# Patient Record
Sex: MALE | Race: WHITE | Employment: OTHER | URBAN - METROPOLITAN AREA
[De-identification: names, ages, dates, MRNs, and addresses within clinical notes are randomized per-mention and may not be internally consistent; named-entity substitution may affect disease eponyms.]

---

## 2017-01-06 ENCOUNTER — ANTI-COAG VISIT (OUTPATIENT)
Dept: INTERNAL MEDICINE CLINIC | Facility: CLINIC | Age: 77
End: 2017-01-06

## 2017-01-06 DIAGNOSIS — I48.91 ATRIAL FIBRILLATION, UNSPECIFIED TYPE (HCC): Primary | ICD-10-CM

## 2017-01-06 LAB — INR: 1.3 (ref 0.8–1.2)

## 2017-01-06 PROCEDURE — 36416 COLLJ CAPILLARY BLOOD SPEC: CPT

## 2017-01-06 PROCEDURE — 85610 PROTHROMBIN TIME: CPT

## 2017-01-06 PROCEDURE — G0463 HOSPITAL OUTPT CLINIC VISIT: HCPCS

## 2017-01-11 ENCOUNTER — ANTI-COAG VISIT (OUTPATIENT)
Dept: INTERNAL MEDICINE CLINIC | Facility: CLINIC | Age: 77
End: 2017-01-11

## 2017-01-11 DIAGNOSIS — I48.91 ATRIAL FIBRILLATION, UNSPECIFIED TYPE (HCC): Primary | ICD-10-CM

## 2017-01-11 LAB — INR: 1.7 (ref 0.8–1.2)

## 2017-01-11 PROCEDURE — 85610 PROTHROMBIN TIME: CPT

## 2017-01-11 PROCEDURE — 36416 COLLJ CAPILLARY BLOOD SPEC: CPT

## 2017-01-11 PROCEDURE — G0463 HOSPITAL OUTPT CLINIC VISIT: HCPCS

## 2017-01-18 ENCOUNTER — TELEPHONE (OUTPATIENT)
Dept: INTERNAL MEDICINE CLINIC | Facility: CLINIC | Age: 77
End: 2017-01-18

## 2017-01-19 RX ORDER — BUMETANIDE 1 MG/1
2 TABLET ORAL DAILY
Qty: 180 TABLET | Refills: 3 | Status: SHIPPED | OUTPATIENT
Start: 2017-01-19 | End: 2017-12-21 | Stop reason: DRUGHIGH

## 2017-01-19 RX ORDER — WARFARIN SODIUM 7.5 MG/1
7.5 TABLET ORAL AS DIRECTED
Qty: 90 TABLET | Refills: 1 | Status: CANCELLED | OUTPATIENT
Start: 2017-01-19

## 2017-01-20 NOTE — TELEPHONE ENCOUNTER
Another fax received from [address] with request for 3 pending refills  And - Warfarin sodium 7.5 mg tablet

## 2017-01-23 RX ORDER — CARVEDILOL 6.25 MG/1
6.25 TABLET ORAL 2 TIMES DAILY WITH MEALS
Qty: 180 TABLET | Refills: 3 | Status: SHIPPED | OUTPATIENT
Start: 2017-01-23 | End: 2017-11-06

## 2017-01-23 RX ORDER — ENALAPRIL MALEATE 2.5 MG/1
2.5 TABLET ORAL DAILY
Qty: 90 TABLET | Refills: 3 | Status: SHIPPED | OUTPATIENT
Start: 2017-01-23 | End: 2017-11-06

## 2017-01-23 RX ORDER — ATORVASTATIN CALCIUM 40 MG/1
40 TABLET, FILM COATED ORAL NIGHTLY
Qty: 90 TABLET | Refills: 3 | Status: SHIPPED | OUTPATIENT
Start: 2017-01-23 | End: 2017-12-26

## 2017-01-24 NOTE — TELEPHONE ENCOUNTER
Verified with patient that he takes carvedilol 6.25 mg po BID; pt called; ERx sent; last note addended

## 2017-01-24 NOTE — TELEPHONE ENCOUNTER
Hank faxed over clarification for Carvewdilol 6.25 mg & Atorvastatin 40 mg   90 day supply  Fax.  # [phone]     Placed in [address] [place] [address] folder routed to Rx

## 2017-01-25 ENCOUNTER — ANTI-COAG VISIT (OUTPATIENT)
Dept: INTERNAL MEDICINE CLINIC | Facility: CLINIC | Age: 77
End: 2017-01-25

## 2017-01-25 DIAGNOSIS — I48.91 ATRIAL FIBRILLATION, UNSPECIFIED TYPE (HCC): Primary | ICD-10-CM

## 2017-01-25 LAB — INR: 2 (ref 0.8–1.2)

## 2017-01-25 PROCEDURE — 85610 PROTHROMBIN TIME: CPT

## 2017-01-25 PROCEDURE — G0463 HOSPITAL OUTPT CLINIC VISIT: HCPCS

## 2017-01-25 PROCEDURE — 36416 COLLJ CAPILLARY BLOOD SPEC: CPT

## 2017-01-31 ENCOUNTER — TELEPHONE (OUTPATIENT)
Dept: INTERNAL MEDICINE CLINIC | Facility: CLINIC | Age: 77
End: 2017-01-31

## 2017-01-31 RX ORDER — WARFARIN SODIUM 5 MG/1
TABLET ORAL
Qty: 45 TABLET | Refills: 0 | Status: SHIPPED | OUTPATIENT
Start: 2017-01-31 | End: 2017-01-31

## 2017-01-31 RX ORDER — WARFARIN SODIUM 5 MG/1
TABLET ORAL
Qty: 135 TABLET | Refills: 0 | Status: SHIPPED | OUTPATIENT
Start: 2017-01-31 | End: 2017-04-20

## 2017-01-31 NOTE — TELEPHONE ENCOUNTER
Pt need a refill on Warfarin 5 mg, he takes 1.5 tablets a day. He would like a month supply sent to [facility] in [facility], and 3 months to [facility]. He ran out of medication yesterday. e thought he will get it in the mail but never received from [facility].  He sp

## 2017-01-31 NOTE — TELEPHONE ENCOUNTER
Refill request is for a maintenance medication and has met the criteria specified in the Ambulatory Medication Refill Standing Order for eligibility, visits, laboratory, alerts and was sent to the requested pharmacy. Both short and long term fill done.  Pt

## 2017-02-01 ENCOUNTER — TELEPHONE (OUTPATIENT)
Dept: INTERNAL MEDICINE CLINIC | Facility: CLINIC | Age: 77
End: 2017-02-01

## 2017-02-03 NOTE — TELEPHONE ENCOUNTER
spoke to pt ---he wants to start [facility] for joints; We discussed it can possibly interfere with INR ;  I recommended pt trial Glucosamine/chondroitin; Pt would like to cont. With cod liver oil;    Next INR in 9 days after start

## 2017-02-09 ENCOUNTER — ANTI-COAG VISIT (OUTPATIENT)
Dept: INTERNAL MEDICINE CLINIC | Facility: CLINIC | Age: 77
End: 2017-02-09

## 2017-02-09 DIAGNOSIS — I48.91 ATRIAL FIBRILLATION, UNSPECIFIED TYPE (HCC): Primary | ICD-10-CM

## 2017-02-09 LAB — INR: 2 (ref 0.8–1.2)

## 2017-02-09 PROCEDURE — 85610 PROTHROMBIN TIME: CPT

## 2017-02-09 PROCEDURE — G0463 HOSPITAL OUTPT CLINIC VISIT: HCPCS

## 2017-02-09 PROCEDURE — 36416 COLLJ CAPILLARY BLOOD SPEC: CPT

## 2017-03-02 ENCOUNTER — ANTI-COAG VISIT (OUTPATIENT)
Dept: INTERNAL MEDICINE CLINIC | Facility: CLINIC | Age: 77
End: 2017-03-02

## 2017-03-02 DIAGNOSIS — I48.91 ATRIAL FIBRILLATION, UNSPECIFIED TYPE (HCC): Primary | ICD-10-CM

## 2017-03-02 LAB — INR: 2.2 (ref 0.8–1.2)

## 2017-03-02 PROCEDURE — 85610 PROTHROMBIN TIME: CPT

## 2017-03-02 PROCEDURE — 36416 COLLJ CAPILLARY BLOOD SPEC: CPT

## 2017-03-02 PROCEDURE — G0463 HOSPITAL OUTPT CLINIC VISIT: HCPCS

## 2017-04-07 PROBLEM — E11.21 DIABETIC NEPHROPATHY (HCC): Status: ACTIVE | Noted: 2017-04-07

## 2017-04-07 PROBLEM — N25.81 HYPERPARATHYROIDISM, SECONDARY (HCC): Status: ACTIVE | Noted: 2017-04-07

## 2017-04-07 PROBLEM — F17.201 TOBACCO DEPENDENCE IN REMISSION: Status: ACTIVE | Noted: 2017-04-07

## 2017-04-11 ENCOUNTER — ANTI-COAG VISIT (OUTPATIENT)
Dept: INTERNAL MEDICINE CLINIC | Facility: CLINIC | Age: 77
End: 2017-04-11

## 2017-04-11 DIAGNOSIS — I48.91 ATRIAL FIBRILLATION, UNSPECIFIED TYPE (HCC): ICD-10-CM

## 2017-04-11 PROCEDURE — 36416 COLLJ CAPILLARY BLOOD SPEC: CPT

## 2017-04-11 PROCEDURE — G0463 HOSPITAL OUTPT CLINIC VISIT: HCPCS

## 2017-04-11 PROCEDURE — 85610 PROTHROMBIN TIME: CPT

## 2017-04-20 ENCOUNTER — LAB ENCOUNTER (OUTPATIENT)
Dept: LAB | Age: 77
End: 2017-04-20
Attending: INTERNAL MEDICINE
Payer: MEDICARE

## 2017-04-20 DIAGNOSIS — E78.00 HYPERCHOLESTEREMIA: ICD-10-CM

## 2017-04-20 DIAGNOSIS — E11.9 TYPE 2 DIABETES MELLITUS WITHOUT COMPLICATION, UNSPECIFIED LONG TERM INSULIN USE STATUS: ICD-10-CM

## 2017-04-20 DIAGNOSIS — N18.3 CHRONIC KIDNEY DISEASE (CKD), STAGE 3 (MODERATE): ICD-10-CM

## 2017-04-20 DIAGNOSIS — I48.91 ATRIAL FIBRILLATION, UNSPECIFIED TYPE (HCC): ICD-10-CM

## 2017-04-20 DIAGNOSIS — N18.30 CHRONIC RENAL DISEASE, STAGE III (HCC): ICD-10-CM

## 2017-04-20 PROCEDURE — 84100 ASSAY OF PHOSPHORUS: CPT

## 2017-04-20 PROCEDURE — 80061 LIPID PANEL: CPT

## 2017-04-20 PROCEDURE — 80053 COMPREHEN METABOLIC PANEL: CPT

## 2017-04-20 PROCEDURE — 83036 HEMOGLOBIN GLYCOSYLATED A1C: CPT

## 2017-04-20 PROCEDURE — 85025 COMPLETE CBC W/AUTO DIFF WBC: CPT

## 2017-04-20 PROCEDURE — 36415 COLL VENOUS BLD VENIPUNCTURE: CPT

## 2017-04-20 RX ORDER — WARFARIN SODIUM 5 MG/1
TABLET ORAL
Qty: 135 TABLET | Refills: 0 | Status: SHIPPED | OUTPATIENT
Start: 2017-04-20 | End: 2017-07-17

## 2017-04-24 ENCOUNTER — OFFICE VISIT (OUTPATIENT)
Dept: INTERNAL MEDICINE CLINIC | Facility: CLINIC | Age: 77
End: 2017-04-24

## 2017-04-24 VITALS
DIASTOLIC BLOOD PRESSURE: 68 MMHG | WEIGHT: 187 LBS | BODY MASS INDEX: 28.34 KG/M2 | HEART RATE: 76 BPM | TEMPERATURE: 98 F | HEIGHT: 68 IN | SYSTOLIC BLOOD PRESSURE: 128 MMHG

## 2017-04-24 DIAGNOSIS — I25.5 ISCHEMIC CARDIOMYOPATHY: ICD-10-CM

## 2017-04-24 DIAGNOSIS — Z79.01 LONG TERM (CURRENT) USE OF ANTICOAGULANTS: ICD-10-CM

## 2017-04-24 DIAGNOSIS — N18.3 CKD (CHRONIC KIDNEY DISEASE), STAGE 3 (MODERATE): ICD-10-CM

## 2017-04-24 DIAGNOSIS — I25.10 CORONARY ARTERY DISEASE INVOLVING NATIVE CORONARY ARTERY OF NATIVE HEART WITHOUT ANGINA PECTORIS: ICD-10-CM

## 2017-04-24 DIAGNOSIS — E11.9 TYPE 2 DIABETES MELLITUS WITHOUT COMPLICATION, WITHOUT LONG-TERM CURRENT USE OF INSULIN (HCC): ICD-10-CM

## 2017-04-24 DIAGNOSIS — Z00.00 PHYSICAL EXAM, ANNUAL: Primary | ICD-10-CM

## 2017-04-24 DIAGNOSIS — I42.9 CARDIOMYOPATHY, UNSPECIFIED TYPE (HCC): ICD-10-CM

## 2017-04-24 DIAGNOSIS — H35.30 MACULAR DEGENERATION: ICD-10-CM

## 2017-04-24 DIAGNOSIS — H26.9 CATARACT, UNSPECIFIED CATARACT TYPE, UNSPECIFIED LATERALITY: ICD-10-CM

## 2017-04-24 DIAGNOSIS — E11.21 DIABETIC NEPHROPATHY ASSOCIATED WITH TYPE 2 DIABETES MELLITUS (HCC): ICD-10-CM

## 2017-04-24 DIAGNOSIS — Z12.5 SCREENING PSA (PROSTATE SPECIFIC ANTIGEN): ICD-10-CM

## 2017-04-24 DIAGNOSIS — I48.20 CHRONIC ATRIAL FIBRILLATION (HCC): ICD-10-CM

## 2017-04-24 DIAGNOSIS — E78.00 HYPERCHOLESTEREMIA: ICD-10-CM

## 2017-04-24 DIAGNOSIS — N25.81 SECONDARY HYPERPARATHYROIDISM (HCC): ICD-10-CM

## 2017-04-24 DIAGNOSIS — N52.9 ERECTILE DYSFUNCTION, UNSPECIFIED ERECTILE DYSFUNCTION TYPE: ICD-10-CM

## 2017-04-24 DIAGNOSIS — E11.36 DIABETIC CATARACT(366.41): ICD-10-CM

## 2017-04-24 PROBLEM — N18.9 CKD (CHRONIC KIDNEY DISEASE): Status: ACTIVE | Noted: 2017-04-24

## 2017-04-24 PROCEDURE — 96160 PT-FOCUSED HLTH RISK ASSMT: CPT | Performed by: INTERNAL MEDICINE

## 2017-04-24 RX ORDER — SILDENAFIL 50 MG/1
50 TABLET, FILM COATED ORAL
Qty: 24 TABLET | Refills: 3 | Status: SHIPPED | OUTPATIENT
Start: 2017-04-24 | End: 2017-10-26

## 2017-04-24 NOTE — PROGRESS NOTES
Fifi Mena is a 68year old male.     HPI:   Patient presents with:  Physical: Pt is here for annual medicare physical      67 y/o M here for initial Medicare annual wellness exam;  no CP; no SOB; no headaches; no palpitation; had nuclear stress te bumetanide 1 MG Oral Tab Take 2 tablets (2 mg total) by mouth daily. Disp: 180 tablet Rfl: 3   aspirin 81 MG Oral Chew Tab Chew by mouth daily. Disp:  Rfl:    triamcinolone acetonide 0.1 % External Cream Apply topically 2 (two) times daily as needed.  Dis difficulty walking or getting up?: 0-No    Do you have any tripping hazards?: 0-No    Are you on multiple medications?: 0-No    Does pain affect your day to day activities?: 0-No     Have you had any memory issues?: 0-No    Fall/Risk ScorinIlsa Hoyt Eye Chart Acuity: 20/50     Cognitive Assessment     What day of the week is this?: Correct    What month is it?: Correct    What year is it?: Correct    Recall \"Ball\": Incorrect    Recall \"Flag\": Incorrect    Recall \"Tree\":  Incorrect          Denise Cisneros any previous visit. Update Immunization Activity if applicable    Tetanus No orders found for this or any previous visit. Update Immunization Activity if applicable    Zoster (Not covered by Medicare Part B) No orders found for this or any previous visit. melena or hematochezia  Musculoskeletal:  Negative for arthralgias or myalgias  Genitourinary:  Negative for dysuria or polyuria  Hema/Lymph:  Negative for easy bleeding and easy bruising  Integumentary:  Negative for pruritus and rash  Neurological:  Nega 1.77 in April 2017; F/U Dr Ken Laughter degeneration  Early and mild; saw ophtho Dr Car Pacheco in June 2016; advise annual visit    Cataract   saw ophtho Dr Car Pacheco in June 2016    DM  A1C 6.3% in April 2017; diet-controlled; check sugar qOD    PSA scre

## 2017-05-16 ENCOUNTER — ANTI-COAG VISIT (OUTPATIENT)
Dept: INTERNAL MEDICINE CLINIC | Facility: CLINIC | Age: 77
End: 2017-05-16

## 2017-05-16 DIAGNOSIS — I48.91 ATRIAL FIBRILLATION, UNSPECIFIED TYPE (HCC): ICD-10-CM

## 2017-05-16 PROCEDURE — G0463 HOSPITAL OUTPT CLINIC VISIT: HCPCS

## 2017-05-16 PROCEDURE — 36416 COLLJ CAPILLARY BLOOD SPEC: CPT

## 2017-05-16 PROCEDURE — 85610 PROTHROMBIN TIME: CPT

## 2017-06-16 ENCOUNTER — ANTI-COAG VISIT (OUTPATIENT)
Dept: INTERNAL MEDICINE CLINIC | Facility: CLINIC | Age: 77
End: 2017-06-16

## 2017-06-16 DIAGNOSIS — I48.20 CHRONIC ATRIAL FIBRILLATION (HCC): Primary | ICD-10-CM

## 2017-06-16 PROCEDURE — 36416 COLLJ CAPILLARY BLOOD SPEC: CPT

## 2017-06-16 PROCEDURE — G0463 HOSPITAL OUTPT CLINIC VISIT: HCPCS

## 2017-06-16 PROCEDURE — 85610 PROTHROMBIN TIME: CPT

## 2017-07-20 RX ORDER — WARFARIN SODIUM 5 MG/1
TABLET ORAL
Qty: 135 TABLET | Refills: 3 | Status: SHIPPED | OUTPATIENT
Start: 2017-07-20 | End: 2018-07-28

## 2017-07-27 ENCOUNTER — ANTI-COAG VISIT (OUTPATIENT)
Dept: INTERNAL MEDICINE CLINIC | Facility: CLINIC | Age: 77
End: 2017-07-27

## 2017-07-27 DIAGNOSIS — I48.20 CHRONIC ATRIAL FIBRILLATION (HCC): ICD-10-CM

## 2017-07-27 LAB
INR: 2.8 (ref 0.8–1.2)
TEST STRIP EXPIRATION DATE: ABNORMAL DATE

## 2017-07-27 PROCEDURE — 99211 OFF/OP EST MAY X REQ PHY/QHP: CPT | Performed by: INTERNAL MEDICINE

## 2017-07-27 PROCEDURE — 36416 COLLJ CAPILLARY BLOOD SPEC: CPT | Performed by: INTERNAL MEDICINE

## 2017-07-27 PROCEDURE — 85610 PROTHROMBIN TIME: CPT | Performed by: INTERNAL MEDICINE

## 2017-08-01 ENCOUNTER — OFFICE VISIT (OUTPATIENT)
Dept: CARDIOLOGY CLINIC | Facility: CLINIC | Age: 77
End: 2017-08-01

## 2017-08-01 VITALS
WEIGHT: 183 LBS | SYSTOLIC BLOOD PRESSURE: 100 MMHG | HEART RATE: 68 BPM | DIASTOLIC BLOOD PRESSURE: 58 MMHG | BODY MASS INDEX: 28 KG/M2 | RESPIRATION RATE: 20 BRPM

## 2017-08-01 DIAGNOSIS — I48.20 CHRONIC ATRIAL FIBRILLATION (HCC): Primary | ICD-10-CM

## 2017-08-01 DIAGNOSIS — I10 ESSENTIAL HYPERTENSION: ICD-10-CM

## 2017-08-01 PROCEDURE — 99214 OFFICE O/P EST MOD 30 MIN: CPT | Performed by: INTERNAL MEDICINE

## 2017-08-01 PROCEDURE — G0463 HOSPITAL OUTPT CLINIC VISIT: HCPCS | Performed by: INTERNAL MEDICINE

## 2017-08-01 NOTE — PROGRESS NOTES
Clau Arnulfo is a 68year old male. Patient presents with: Follow - Up    HPI:   This is a pleasant 59-year-old with coronary disease renal insufficiency hypertension with cholesterol and A. fib who presents for follow-up.   He is feeling well with n (congestive heart failure) ([facility] [address] 2014    occurred when off Bumex   • CKD (chronic kidney disease)     creatinine 1.8 in Nov 2015; nephrologist Dr Keely Hayes   • Diverticulosis     colonoscopy 3/10/15 by GI Dr Teri Perea   • Floaters 6/21/2016   • Hyper stress test is needed at this time. He is encouraged to continue activities as tolerated. Blood pressure stable his ankle swelling is stable on his present diuretic regimen. The patient indicates understanding of these issues and agrees to the plan.   Augustina Westbrook

## 2017-08-31 ENCOUNTER — ANTI-COAG VISIT (OUTPATIENT)
Dept: INTERNAL MEDICINE CLINIC | Facility: CLINIC | Age: 77
End: 2017-08-31

## 2017-08-31 DIAGNOSIS — I48.20 CHRONIC ATRIAL FIBRILLATION (HCC): ICD-10-CM

## 2017-08-31 LAB
INR: 1.9 (ref 0.8–1.2)
TEST STRIP EXPIRATION DATE: ABNORMAL DATE

## 2017-08-31 PROCEDURE — 85610 PROTHROMBIN TIME: CPT | Performed by: INTERNAL MEDICINE

## 2017-08-31 PROCEDURE — 36416 COLLJ CAPILLARY BLOOD SPEC: CPT | Performed by: INTERNAL MEDICINE

## 2017-09-28 ENCOUNTER — ANTI-COAG VISIT (OUTPATIENT)
Dept: INTERNAL MEDICINE CLINIC | Facility: CLINIC | Age: 77
End: 2017-09-28

## 2017-09-28 DIAGNOSIS — I48.20 CHRONIC ATRIAL FIBRILLATION (HCC): ICD-10-CM

## 2017-09-28 LAB
INR: 2 (ref 0.8–1.2)
TEST STRIP EXPIRATION DATE: ABNORMAL DATE

## 2017-09-28 PROCEDURE — 36416 COLLJ CAPILLARY BLOOD SPEC: CPT

## 2017-09-28 PROCEDURE — 85610 PROTHROMBIN TIME: CPT

## 2017-10-23 ENCOUNTER — LAB ENCOUNTER (OUTPATIENT)
Dept: LAB | Age: 77
End: 2017-10-23
Attending: INTERNAL MEDICINE
Payer: MEDICARE

## 2017-10-23 DIAGNOSIS — N25.81 SECONDARY HYPERPARATHYROIDISM (HCC): ICD-10-CM

## 2017-10-23 DIAGNOSIS — Z12.5 SCREENING PSA (PROSTATE SPECIFIC ANTIGEN): ICD-10-CM

## 2017-10-23 DIAGNOSIS — E11.9 TYPE 2 DIABETES MELLITUS WITHOUT COMPLICATION, WITHOUT LONG-TERM CURRENT USE OF INSULIN (HCC): ICD-10-CM

## 2017-10-23 DIAGNOSIS — E78.00 HYPERCHOLESTEREMIA: ICD-10-CM

## 2017-10-23 DIAGNOSIS — N18.30 STAGE 3 CHRONIC KIDNEY DISEASE (HCC): ICD-10-CM

## 2017-10-23 PROCEDURE — 80061 LIPID PANEL: CPT

## 2017-10-23 PROCEDURE — 83036 HEMOGLOBIN GLYCOSYLATED A1C: CPT

## 2017-10-23 PROCEDURE — 83970 ASSAY OF PARATHORMONE: CPT

## 2017-10-23 PROCEDURE — 36415 COLL VENOUS BLD VENIPUNCTURE: CPT

## 2017-10-23 PROCEDURE — 85025 COMPLETE CBC W/AUTO DIFF WBC: CPT

## 2017-10-23 PROCEDURE — 80069 RENAL FUNCTION PANEL: CPT

## 2017-10-26 ENCOUNTER — OFFICE VISIT (OUTPATIENT)
Dept: INTERNAL MEDICINE CLINIC | Facility: CLINIC | Age: 77
End: 2017-10-26

## 2017-10-26 ENCOUNTER — ANTI-COAG VISIT (OUTPATIENT)
Dept: INTERNAL MEDICINE CLINIC | Facility: CLINIC | Age: 77
End: 2017-10-26

## 2017-10-26 VITALS
SYSTOLIC BLOOD PRESSURE: 122 MMHG | WEIGHT: 187 LBS | BODY MASS INDEX: 28.34 KG/M2 | HEART RATE: 72 BPM | HEIGHT: 68 IN | DIASTOLIC BLOOD PRESSURE: 70 MMHG | TEMPERATURE: 98 F

## 2017-10-26 DIAGNOSIS — E78.00 HYPERCHOLESTEREMIA: ICD-10-CM

## 2017-10-26 DIAGNOSIS — I25.10 CORONARY ARTERY DISEASE INVOLVING NATIVE CORONARY ARTERY OF NATIVE HEART WITHOUT ANGINA PECTORIS: Primary | ICD-10-CM

## 2017-10-26 DIAGNOSIS — I25.5 ISCHEMIC CARDIOMYOPATHY: ICD-10-CM

## 2017-10-26 DIAGNOSIS — I48.20 CHRONIC ATRIAL FIBRILLATION (HCC): ICD-10-CM

## 2017-10-26 DIAGNOSIS — E11.9 TYPE 2 DIABETES MELLITUS WITHOUT COMPLICATION, WITHOUT LONG-TERM CURRENT USE OF INSULIN (HCC): ICD-10-CM

## 2017-10-26 DIAGNOSIS — H35.30 MACULAR DEGENERATION: ICD-10-CM

## 2017-10-26 DIAGNOSIS — N25.81 SECONDARY HYPERPARATHYROIDISM (HCC): ICD-10-CM

## 2017-10-26 PROCEDURE — 99215 OFFICE O/P EST HI 40 MIN: CPT | Performed by: INTERNAL MEDICINE

## 2017-10-26 PROCEDURE — G0463 HOSPITAL OUTPT CLINIC VISIT: HCPCS | Performed by: INTERNAL MEDICINE

## 2017-10-26 PROCEDURE — 85610 PROTHROMBIN TIME: CPT

## 2017-10-26 PROCEDURE — 36416 COLLJ CAPILLARY BLOOD SPEC: CPT

## 2017-10-26 RX ORDER — TADALAFIL 5 MG/1
5 TABLET ORAL
Qty: 30 TABLET | Refills: 0 | Status: SHIPPED | OUTPATIENT
Start: 2017-10-26 | End: 2018-01-05 | Stop reason: ALTCHOICE

## 2017-10-26 NOTE — PROGRESS NOTES
Oly Son is a 68year old male.     HPI:   Patient presents with:  Checkup: 11 month      69 y/o M with AF, CAD, cardiomyopathy here for F/U;  no CP; no SOB; no headaches; no palpitation        HISTORY:  Past Medical History:   Diagnosis Date   • Enalapril Maleate 2.5 MG Oral Tab Take 1 tablet (2.5 mg total) by mouth daily. Disp: 90 tablet Rfl: 3   bumetanide 1 MG Oral Tab Take 2 tablets (2 mg total) by mouth daily. Disp: 180 tablet Rfl: 3   aspirin 81 MG Oral Chew Tab Chew by mouth daily.  Disp: non-distended  Extremities: no clubbing, cyanosis or edema  Vascular: no carotid bruits; DP/PT 2/2  Musculoskeletal: Motor 5/5 upper and lower extremities  Neurological: cranial nerves II-XII intact; light touch and proprioception intact  Skin: no rash or Prescriptions Disp Refills    tadalafil (CIALIS) 5 MG Oral Tab 30 tablet 0      Sig: Take 1 tablet (5 mg total) by mouth daily as needed for Erectile Dysfunction.            Imaging & Referrals:  OPHTHALMOLOGY - INTERNAL     10/26/2017  Daljit Benites MD

## 2017-10-27 ENCOUNTER — OFFICE VISIT (OUTPATIENT)
Dept: NEPHROLOGY | Facility: CLINIC | Age: 77
End: 2017-10-27

## 2017-10-27 VITALS
HEART RATE: 81 BPM | SYSTOLIC BLOOD PRESSURE: 130 MMHG | WEIGHT: 189.63 LBS | DIASTOLIC BLOOD PRESSURE: 76 MMHG | BODY MASS INDEX: 29.07 KG/M2 | TEMPERATURE: 98 F | HEIGHT: 67.75 IN

## 2017-10-27 DIAGNOSIS — N18.30 CKD (CHRONIC KIDNEY DISEASE) STAGE 3, GFR 30-59 ML/MIN (HCC): ICD-10-CM

## 2017-10-27 DIAGNOSIS — I10 ESSENTIAL HYPERTENSION: ICD-10-CM

## 2017-10-27 DIAGNOSIS — E11.9 TYPE 2 DIABETES MELLITUS WITHOUT COMPLICATION, WITHOUT LONG-TERM CURRENT USE OF INSULIN (HCC): Primary | ICD-10-CM

## 2017-10-27 PROCEDURE — G0463 HOSPITAL OUTPT CLINIC VISIT: HCPCS | Performed by: INTERNAL MEDICINE

## 2017-10-27 PROCEDURE — 99213 OFFICE O/P EST LOW 20 MIN: CPT | Performed by: INTERNAL MEDICINE

## 2017-10-27 NOTE — PATIENT INSTRUCTIONS
1  Excellent job gus Lara stable 1.7     follow up w dr Phillip Botello    See me April     tylenol for any aches and pains     have a great holiday season!

## 2017-10-30 ENCOUNTER — APPOINTMENT (OUTPATIENT)
Dept: GENERAL RADIOLOGY | Facility: HOSPITAL | Age: 77
DRG: 222 | End: 2017-10-30
Attending: EMERGENCY MEDICINE
Payer: MEDICARE

## 2017-10-30 ENCOUNTER — HOSPITAL ENCOUNTER (INPATIENT)
Facility: HOSPITAL | Age: 77
LOS: 7 days | Discharge: HOME | DRG: 222 | End: 2017-11-06
Attending: EMERGENCY MEDICINE | Admitting: INTERNAL MEDICINE
Payer: MEDICARE

## 2017-10-30 DIAGNOSIS — I48.91 ATRIAL FIBRILLATION WITH RAPID VENTRICULAR RESPONSE (HCC): Primary | ICD-10-CM

## 2017-10-30 DIAGNOSIS — J81.0 ACUTE PULMONARY EDEMA (HCC): ICD-10-CM

## 2017-10-30 PROBLEM — R73.9 HYPERGLYCEMIA: Status: ACTIVE | Noted: 2017-10-30

## 2017-10-30 PROCEDURE — 71010 XR CHEST AP PORTABLE  (CPT=71010): CPT | Performed by: EMERGENCY MEDICINE

## 2017-10-30 RX ORDER — CARVEDILOL 12.5 MG/1
12.5 TABLET ORAL 2 TIMES DAILY WITH MEALS
Status: DISCONTINUED | OUTPATIENT
Start: 2017-10-31 | End: 2017-11-06

## 2017-10-30 RX ORDER — NITROGLYCERIN 20 MG/100ML
INJECTION INTRAVENOUS
Status: COMPLETED
Start: 2017-10-30 | End: 2017-10-30

## 2017-10-30 RX ORDER — FUROSEMIDE 10 MG/ML
INJECTION INTRAMUSCULAR; INTRAVENOUS
Status: COMPLETED
Start: 2017-10-30 | End: 2017-10-30

## 2017-10-30 RX ORDER — DILTIAZEM HYDROCHLORIDE 5 MG/ML
10 INJECTION INTRAVENOUS ONCE
Status: COMPLETED | OUTPATIENT
Start: 2017-10-30 | End: 2017-10-30

## 2017-10-30 RX ORDER — FUROSEMIDE 10 MG/ML
80 INJECTION INTRAMUSCULAR; INTRAVENOUS
Status: DISCONTINUED | OUTPATIENT
Start: 2017-10-31 | End: 2017-10-31

## 2017-10-30 RX ORDER — FUROSEMIDE 10 MG/ML
40 INJECTION INTRAMUSCULAR; INTRAVENOUS ONCE
Status: COMPLETED | OUTPATIENT
Start: 2017-10-30 | End: 2017-10-30

## 2017-10-30 RX ORDER — DILTIAZEM HYDROCHLORIDE 5 MG/ML
INJECTION INTRAVENOUS
Status: COMPLETED
Start: 2017-10-30 | End: 2017-10-30

## 2017-10-30 RX ORDER — NITROGLYCERIN 20 MG/100ML
INJECTION INTRAVENOUS CONTINUOUS
Status: DISCONTINUED | OUTPATIENT
Start: 2017-10-30 | End: 2017-10-30

## 2017-10-30 RX ORDER — ASPIRIN 325 MG
325 TABLET ORAL DAILY
Status: DISCONTINUED | OUTPATIENT
Start: 2017-10-30 | End: 2017-11-01

## 2017-10-30 NOTE — PROGRESS NOTES
NEPHROLOGY PROGRESS NOTE  Andres Elliott     MRN:  [id number]   Date of Service:  10/27/17     I saw Luci Holland for followup in the office. As you know, he is a 66-year-old white male who is doing well who has CKD stage 3.  He is doing fairly well

## 2017-10-31 ENCOUNTER — APPOINTMENT (OUTPATIENT)
Dept: GENERAL RADIOLOGY | Facility: HOSPITAL | Age: 77
DRG: 222 | End: 2017-10-31
Attending: INTERNAL MEDICINE
Payer: MEDICARE

## 2017-10-31 PROCEDURE — 99223 1ST HOSP IP/OBS HIGH 75: CPT | Performed by: INTERNAL MEDICINE

## 2017-10-31 PROCEDURE — 99222 1ST HOSP IP/OBS MODERATE 55: CPT | Performed by: INTERNAL MEDICINE

## 2017-10-31 PROCEDURE — 71020 XR CHEST PA + LAT CHEST (CPT=71020): CPT | Performed by: INTERNAL MEDICINE

## 2017-10-31 PROCEDURE — 74000 XR ABDOMEN (KUB) (1 AP VIEW)  (CPT=74000): CPT | Performed by: INTERNAL MEDICINE

## 2017-10-31 RX ORDER — ATORVASTATIN CALCIUM 40 MG/1
40 TABLET, FILM COATED ORAL NIGHTLY
Status: DISCONTINUED | OUTPATIENT
Start: 2017-10-31 | End: 2017-11-06

## 2017-10-31 RX ORDER — GUAIFENESIN 100 MG/5ML
100 SOLUTION ORAL EVERY 6 HOURS PRN
Status: DISCONTINUED | OUTPATIENT
Start: 2017-10-31 | End: 2017-11-06

## 2017-10-31 RX ORDER — WARFARIN SODIUM 5 MG/1
5 TABLET ORAL
Status: DISCONTINUED | OUTPATIENT
Start: 2017-10-31 | End: 2017-10-31

## 2017-10-31 RX ORDER — SACCHAROMYCES BOULARDII 250 MG
250 CAPSULE ORAL 2 TIMES DAILY
Status: DISCONTINUED | OUTPATIENT
Start: 2017-10-31 | End: 2017-11-06

## 2017-10-31 RX ORDER — DEXTROSE MONOHYDRATE 25 G/50ML
50 INJECTION, SOLUTION INTRAVENOUS AS NEEDED
Status: DISCONTINUED | OUTPATIENT
Start: 2017-10-31 | End: 2017-11-06

## 2017-10-31 RX ORDER — ASPIRIN 81 MG/1
81 TABLET, CHEWABLE ORAL DAILY
Status: DISCONTINUED | OUTPATIENT
Start: 2017-10-31 | End: 2017-10-31

## 2017-10-31 RX ORDER — WARFARIN SODIUM 7.5 MG/1
7.5 TABLET ORAL
Status: DISCONTINUED | OUTPATIENT
Start: 2017-10-31 | End: 2017-10-31

## 2017-10-31 RX ORDER — CARVEDILOL 6.25 MG/1
6.25 TABLET ORAL 2 TIMES DAILY WITH MEALS
Status: DISCONTINUED | OUTPATIENT
Start: 2017-10-31 | End: 2017-10-31

## 2017-10-31 RX ORDER — 0.9 % SODIUM CHLORIDE 0.9 %
VIAL (ML) INJECTION
Status: COMPLETED
Start: 2017-10-31 | End: 2017-10-31

## 2017-10-31 NOTE — PROGRESS NOTES
[facility]OSP - [facility]    Cardiology Progress Note    Daisy Perales Patient Status:  Inpatient    3/28/1940 MRN [id number]   Location [facility] 3W/SW Attending Catrachito Castelan MD   Hosp Day # 1 PCP Marivel Barajas MD     Patient A first admitted      Subjective:   Petty Barger is a(n) 68year old male with no new c/o today    Objective:   /57 (BP Location: Right arm)   Pulse 58   Temp 98.5 °F (36.9 °C) (Oral)   Resp 16   Ht 68\"   Wt 181 lb 11.2 oz (82.4 kg)   SpO2 99% Output   0      Net   +120 +100           Stool Occurrence  0 x              No results for input(s): BNP in the last 72 hours. Xr Chest Ap Portable  (cpt=71010)    Result Date: 10/30/2017  CONCLUSION:  1. Mild cardiomegaly. Tortuous aorta. CABG 2.  Min Payment

## 2017-10-31 NOTE — PLAN OF CARE
Pt a&o x4. Pt pedal pulses weak, capillary refill brisk, denies numbness in extremities, denies pain, denies shortness of breath.  Pt reports blood sugar of \"115-125\" in the morning and \"\" the rest of the day when checking sugars at home, Pt decli

## 2017-10-31 NOTE — ED PROVIDER NOTES
Patient Seen in: [facility] AND [facility] Emergency Department    History   Patient presents with:  Dyspnea JAH SOB (respiratory)    Stated Complaint: sob    HPI    The patient is a 66-year-old male with a history of atrial fibrillation, coronary artery disease complaint: sob  Other systems are as noted in HPI. Constitutional and vital signs reviewed. All other systems reviewed and negative except as noted above. PSFH elements reviewed from today and agreed except as otherwise stated in HPI.     Physical Reviewed   BASIC METABOLIC PANEL (8) - Abnormal; Notable for the following:        Result Value    Glucose 245 (*)     Potassium 3.2 (*)     BUN 25 (*)     Creatinine 2.03 (*)     Calculated Osmolality 303 (*)     GFR, Non- 32 (*)     GFR, performing a physical exam, bedside monitoring of interventions, collecting and interpreting tests and discussion with consultants but not including time spent performing procedures. Patient was placed on BiPAP, Lasix and nitroglycerin drip.   He was also

## 2017-10-31 NOTE — H&P
[facility]    History and Physical    [facility] Patient Status:  Inpatient    3/28/1940 MRN [id number]   Location [facility] 3W/SW Attending Jaxson Daniels MD   Hosp Day # 1 PCP Beth Birmingham MD     Date:  10/31/ 1.8 in Nov 2015; nephrologist Dr Bebeto Tsang   • Diverticulosis     colonoscopy 3/10/15 by GI Dr Bj Smith   • Floaters 6/21/2016   • Hypercholesteremia    • MI (myocardial infarction) 2008    cardiologist Dr Martha Chan at [facility]   • S/P cholecystecto Vital Signs:  Blood pressure 110/57, pulse 58, temperature 98.5 °F (36.9 °C), temperature source Oral, resp. rate 16, height 5' 8\" (1.727 m), weight 181 lb 11.2 oz (82.4 kg), SpO2 99 %.   Physical Exam  Gen: NAD  Lungs: bibasilar crackles (mild), no resp

## 2017-10-31 NOTE — CONSULTS
[facility]    Report of Consultation    Date of Admission:  10/30/2017  Date of Consult:  10/31/2017   Reason for Consultation:     WADE     History of Present Illness:     Solo Lopez is a 68 yrs old male with pmh of CAD s/p CABG Diabetes Mother    • Glaucoma Neg    • Macular degeneration Neg        Social History  Patient Guardian Status:  Not on file.     Other Topics            Concern    None on file    Social History Narrative    None on file            Current Medications: %  SpO2: 99 %     Intake/Output Summary (Last 24 hours) at 10/31/17 1204  Last data filed at 10/31/17 0900   Gross per 24 hour   Intake              220 ml   Output                0 ml   Net              220 ml     Wt Readings from Last 5 Encounters:  10/3 37-38 ml/min  - creatinine at 2.0 mg/dl   - WADE from NSAIDs along with perfusion related injury in light of A-fib rvr and CHF  - on bumex 2 mg daily dose at home - currently on lasix 80 mg IV BID  - change lasix to 40 mg IV BID from am and hold [facility]. R. Laura

## 2017-10-31 NOTE — ED NOTES
Patient is safe to transport to floor per MD. Report given to floor RN, Lilo Joshiiver. Transport via cart requested. Cardiac telemetry in progress and verified.

## 2017-10-31 NOTE — ED INITIAL ASSESSMENT (HPI)
SICK WITH FLU AT HOME FOR PAST WEEK. Sob WORSE TODAY. UNABLE TO TOLERATE BIPAP BY EMS.  O2 SATS 70% ON RA

## 2017-10-31 NOTE — CONSULTS
[facility] AND [facility]  [facility] Cardiology Progress Note    Derrick Rice Patient Status:  Emergency    3/28/1940 MRN [id number]   Location [address] Attending Allen Naylor MD   Hosp Day # 0 PCP Elizabeth Kirby MD 2014    occurred when off Bumex   • CKD (chronic kidney disease)     creatinine 1.8 in Nov 2015; nephrologist Dr Wing Grider   • Diverticulosis     colonoscopy 3/10/15 by GI Dr Lucía Sr   • Floaters 6/21/2016   • Hypercholesteremia    • MI (myocardial

## 2017-11-01 ENCOUNTER — APPOINTMENT (OUTPATIENT)
Dept: CV DIAGNOSTICS | Facility: HOSPITAL | Age: 77
DRG: 222 | End: 2017-11-01
Attending: INTERNAL MEDICINE
Payer: MEDICARE

## 2017-11-01 PROCEDURE — 99233 SBSQ HOSP IP/OBS HIGH 50: CPT | Performed by: INTERNAL MEDICINE

## 2017-11-01 PROCEDURE — 99232 SBSQ HOSP IP/OBS MODERATE 35: CPT | Performed by: INTERNAL MEDICINE

## 2017-11-01 PROCEDURE — 93306 TTE W/DOPPLER COMPLETE: CPT | Performed by: INTERNAL MEDICINE

## 2017-11-01 RX ORDER — FUROSEMIDE 10 MG/ML
40 INJECTION INTRAMUSCULAR; INTRAVENOUS
Status: DISCONTINUED | OUTPATIENT
Start: 2017-11-01 | End: 2017-11-01

## 2017-11-01 RX ORDER — ACETYLCYSTEINE 200 MG/ML
1200 SOLUTION ORAL; RESPIRATORY (INHALATION) EVERY 12 HOURS
Status: DISCONTINUED | OUTPATIENT
Start: 2017-11-01 | End: 2017-11-01

## 2017-11-01 RX ORDER — PHYTONADIONE 1 MG/.5ML
1 INJECTION, EMULSION INTRAMUSCULAR; INTRAVENOUS; SUBCUTANEOUS ONCE
Status: COMPLETED | OUTPATIENT
Start: 2017-11-01 | End: 2017-11-01

## 2017-11-01 RX ORDER — FUROSEMIDE 10 MG/ML
40 INJECTION INTRAMUSCULAR; INTRAVENOUS ONCE
Status: COMPLETED | OUTPATIENT
Start: 2017-11-01 | End: 2017-11-01

## 2017-11-01 RX ORDER — 0.9 % SODIUM CHLORIDE 0.9 %
VIAL (ML) INJECTION
Status: COMPLETED
Start: 2017-11-01 | End: 2017-11-01

## 2017-11-01 RX ORDER — FAMOTIDINE 20 MG/1
20 TABLET ORAL DAILY
Status: DISCONTINUED | OUTPATIENT
Start: 2017-11-01 | End: 2017-11-06

## 2017-11-01 RX ORDER — ACETYLCYSTEINE 200 MG/ML
600 SOLUTION ORAL; RESPIRATORY (INHALATION) EVERY 12 HOURS
Status: COMPLETED | OUTPATIENT
Start: 2017-11-02 | End: 2017-11-03

## 2017-11-01 RX ORDER — ASPIRIN 81 MG/1
81 TABLET ORAL DAILY
Status: DISCONTINUED | OUTPATIENT
Start: 2017-11-01 | End: 2017-11-06

## 2017-11-01 RX ORDER — ASPIRIN 81 MG/1
324 TABLET, CHEWABLE ORAL ONCE
Status: COMPLETED | OUTPATIENT
Start: 2017-11-02 | End: 2017-11-02

## 2017-11-01 RX ORDER — SODIUM CHLORIDE 9 MG/ML
INJECTION, SOLUTION INTRAVENOUS CONTINUOUS
Status: DISCONTINUED | OUTPATIENT
Start: 2017-11-01 | End: 2017-11-02 | Stop reason: ALTCHOICE

## 2017-11-01 RX ORDER — SIMETHICONE 80 MG
80 TABLET,CHEWABLE ORAL EVERY 6 HOURS PRN
Status: DISCONTINUED | OUTPATIENT
Start: 2017-11-01 | End: 2017-11-06

## 2017-11-01 NOTE — PROGRESS NOTES
[facility]    Cardiology Progress Note    Dre Signs Patient Status:  Inpatient    3/28/1940 MRN [id number]   Location [facility] 3W/SW Attending Samantha London MD   Hosp Day # 2 PCP Johny Moulton MD     Patient A will give the patient bicarb in the Cath Lab and start him on Mucomyst and I have discontinued his Lasix. Patient's INR today was 2.4 we will give him a small dose of vitamin K.           Subjective:   Tonya Block is a(n) 68year old male with no Intake/Output       10/30 0700 - 10/31 0659 10/31 0700 - 11/01 0659 11/01 0700 - 11/02 0659    P.O. 120 1115 300    I.V. (mL/kg) 0 (0) 10 (0.1)     Total Intake(mL/kg) 120 (1.5) 1125 (13.8) 300 (3.7)    Urine (mL/kg/hr) 0 1580 (0.8) 475 (1.1)    Stool

## 2017-11-01 NOTE — PLAN OF CARE
Orthopaedic Oncology clinic visit - TOÑO Hudson MD    Requesting physician: Georgi Hammond MD [facility]  Dr. Marlon Pulliam, [facility]  Dr. 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 5/19/2017, biopsy, tumor excision, ORIF with zometa impregnated cement ([facility]) [facility]      Interval History:  Doing well post-op    EXAM: incision healed.  Radial, median, ulnar nn intact motor function  Elbow -5-110 deg arc of motion  Forearm 90/90 sup and pronation    XR: intact fixation.    Plan:  1. Had alex kelly/ Dr. Andre Panda in Oncology for evaluation and management. Appointment is set for bone marrow biopsy today.  2. OK to proceed with any chemotherapy.  Hold on proceed with radiation to R arm for another 10 days.  3. RTC in 6 mo for XR of humerus.        John Hudson MD  [facility] Family Professor  Oncology and Adult Reconstructive Surgery  Dept Orthopaedic Surgery, [facility]  [phone] office, [phone] pager  [email or web address]    Total Time = 15 min, 50% of which was spent in counseling and coordination of care as documented above.         CARDIOVASCULAR - ADULT    • Maintains optimal cardiac output and hemodynamic stability Progressing    • Absence of cardiac arrhythmias or at baseline Progressing        Diabetes/Glucose Control    • Glucose maintained within prescribed range Progressing

## 2017-11-01 NOTE — PLAN OF CARE
Problem: Patient/Family Goals  Goal: Patient/Family Long Term Goal  Patient's Long Term Goal: to go home    Interventions:  - Medication Management  - See additional Care Plan goals for specific interventions    Outcome: Progressing    Goal: Patient/Family suctioning and perform as needed  - Assess and instruct to report SOB or any respiratory difficulty  - Respiratory Therapy support as indicated  - Manage/alleviate anxiety  - Monitor for signs/symptoms of CO2 retention   Outcome: Progressing      Problem:

## 2017-11-01 NOTE — CDS QUERY
Respiratory Failure Query   CLINICAL DOCUMENTATION CLARIFICATION FORM  Dear Doctor: Reji Cobian information (provided below) indicates impaired gas exchange.  For accurate ICD-10-CM code assignment to reflect severity of illness and risk of mortality,  P

## 2017-11-01 NOTE — PROGRESS NOTES
[facility]    Progress Note    Shirley Sky Patient Status:  Inpatient    3/28/1940 MRN [id number]   Location [facility] 3W/SW Attending Colton Redd MD   Hosp Day # 2 PCP Umer Andrea MD       SUBJECTIVE:  Bruce Marmolejo Scoliosis with degenerative changes of the spine. Xr Chest Ap Portable  (cpt=71010)    Result Date: 10/30/2017  CONCLUSION:  1. Mild cardiomegaly. Tortuous aorta.  CABG 2. Bilateral Perihilar and lower lobe mixed alveolar and interstitial infiltrate/pu

## 2017-11-02 ENCOUNTER — APPOINTMENT (OUTPATIENT)
Dept: INTERVENTIONAL RADIOLOGY/VASCULAR | Facility: HOSPITAL | Age: 77
DRG: 222 | End: 2017-11-02
Attending: NURSE PRACTITIONER
Payer: MEDICARE

## 2017-11-02 ENCOUNTER — APPOINTMENT (OUTPATIENT)
Dept: INTERVENTIONAL RADIOLOGY/VASCULAR | Facility: HOSPITAL | Age: 77
DRG: 222 | End: 2017-11-02
Attending: INTERNAL MEDICINE
Payer: MEDICARE

## 2017-11-02 PROCEDURE — B2151ZZ FLUOROSCOPY OF LEFT HEART USING LOW OSMOLAR CONTRAST: ICD-10-PCS | Performed by: INTERNAL MEDICINE

## 2017-11-02 PROCEDURE — 4A023N8 MEASUREMENT OF CARDIAC SAMPLING AND PRESSURE, BILATERAL, PERCUTANEOUS APPROACH: ICD-10-PCS | Performed by: INTERNAL MEDICINE

## 2017-11-02 PROCEDURE — 99232 SBSQ HOSP IP/OBS MODERATE 35: CPT | Performed by: INTERNAL MEDICINE

## 2017-11-02 PROCEDURE — B2111ZZ FLUOROSCOPY OF MULTIPLE CORONARY ARTERIES USING LOW OSMOLAR CONTRAST: ICD-10-PCS | Performed by: INTERNAL MEDICINE

## 2017-11-02 RX ORDER — SODIUM CHLORIDE 9 MG/ML
INJECTION, SOLUTION INTRAVENOUS CONTINUOUS
Status: ACTIVE | OUTPATIENT
Start: 2017-11-02 | End: 2017-11-02

## 2017-11-02 RX ORDER — HEPARIN SODIUM 1000 [USP'U]/ML
60 INJECTION, SOLUTION INTRAVENOUS; SUBCUTANEOUS ONCE
Status: COMPLETED | OUTPATIENT
Start: 2017-11-02 | End: 2017-11-02

## 2017-11-02 RX ORDER — POTASSIUM CHLORIDE 20 MEQ/1
40 TABLET, EXTENDED RELEASE ORAL 2 TIMES DAILY
Status: COMPLETED | OUTPATIENT
Start: 2017-11-02 | End: 2017-11-02

## 2017-11-02 RX ORDER — HEPARIN SODIUM AND DEXTROSE 10000; 5 [USP'U]/100ML; G/100ML
INJECTION INTRAVENOUS CONTINUOUS
Status: DISCONTINUED | OUTPATIENT
Start: 2017-11-02 | End: 2017-11-03

## 2017-11-02 RX ORDER — 0.9 % SODIUM CHLORIDE 0.9 %
VIAL (ML) INJECTION
Status: DISPENSED
Start: 2017-11-02 | End: 2017-11-03

## 2017-11-02 RX ORDER — HEPARIN SODIUM AND DEXTROSE 10000; 5 [USP'U]/100ML; G/100ML
12 INJECTION INTRAVENOUS ONCE
Status: COMPLETED | OUTPATIENT
Start: 2017-11-02 | End: 2017-11-03

## 2017-11-02 RX ORDER — BUMETANIDE 1 MG/1
2 TABLET ORAL DAILY
Status: DISCONTINUED | OUTPATIENT
Start: 2017-11-03 | End: 2017-11-06

## 2017-11-02 RX ORDER — MIDAZOLAM HYDROCHLORIDE 1 MG/ML
INJECTION INTRAMUSCULAR; INTRAVENOUS
Status: COMPLETED
Start: 2017-11-02 | End: 2017-11-02

## 2017-11-02 RX ORDER — SODIUM CHLORIDE 9 MG/ML
INJECTION, SOLUTION INTRAVENOUS
Status: COMPLETED
Start: 2017-11-02 | End: 2017-11-02

## 2017-11-02 RX ORDER — LIDOCAINE HYDROCHLORIDE 20 MG/ML
INJECTION, SOLUTION EPIDURAL; INFILTRATION; INTRACAUDAL; PERINEURAL
Status: COMPLETED
Start: 2017-11-02 | End: 2017-11-02

## 2017-11-02 NOTE — PROGRESS NOTES
Cardiac catheterization and PCI report obtained from [facility]. Reports added to paper chart.  [employer] sending films  Patient had CABG done at [facility] in [address] [place] [address],[facility]    09/04/08  Catheterization revealed  SVG to LAD, PDA and DIAG     100% prox SVG

## 2017-11-02 NOTE — PROGRESS NOTES
Tang Quintanilla  [id number]  [unfilled]    Post procedure/ recovery hand-off report given to Norah Raymond RN. Patient's vital signs stable, access site dry and intact, no signs and symptoms of bleeding/ hematoma.     Luis M Ayoub RN

## 2017-11-02 NOTE — PROGRESS NOTES
[place] [facility] - [facility]    Progress Note    [facility] Patient Status:  Inpatient    3/28/1940 MRN [id number]   Location [facility] 3W/SW Attending Kris Pearson MD   Hosp Day # 3 PCP Lyndsay Summers MD       Subjective:   [facility] abnormalities noted  Musculoskeletal: full ROM all extremities good strength  no deformities  Extremities: trace edema  Neurological:  Grossly normal    Results:     Laboratory Data:    Lab Results  Component Value Date   WBC 7.8 10/30/2017   HGB 14.8 10/3

## 2017-11-02 NOTE — PROGRESS NOTES
[place] [facility] - [facility]    Progress Note    Dre Signs Patient Status:  Inpatient    3/28/1940 MRN [id number]   Location [facility] 3W/SW Attending Samantha London MD   Hosp Day # 2 PCP Johny Moulton MD       Subjective:   Alli Feliz noted  Back/Spine: no abnormalities noted  Musculoskeletal: full ROM all extremities good strength  no deformities  Extremities: no edema, cyanosis  Neurological:  Grossly normal    Results:     Laboratory Data:    Lab Results  Component Value Date   WBC 7

## 2017-11-02 NOTE — PROCEDURES
CARDIAC CATH      Date of procedure November 2, 2017        Indication ischemic cardiomyopathy congestive heart failure coronary artery disease      After informed consent, explaining all risks and benefits of the procedure including 1/100 chance of MI str 55.1%    Recommendations:      Severe ischemic cardiomyopathy with no targets for bypass  Would convert from ACE inhibitors to [facility] and have the patient follow-up in the heart failure clinic  I recommended the patient for a defibrillator and I have exp

## 2017-11-02 NOTE — PROGRESS NOTES
[place] [facility] - [facility]    Progress Note    Iona Favor Patient Status:  Inpatient    3/28/1940 MRN [id number]   Location [facility] 3W/SW Attending Charisse Estrada MD   Hosp Day # 3 PCP Randall Ordonez MD       SUBJECTIVE:  Fee Xr Chest Ap Portable  (cpt=71010)    Result Date: 10/30/2017  CONCLUSION:  1. Mild cardiomegaly. Tortuous aorta.  CABG 2. Bilateral Perihilar and lower lobe mixed alveolar and interstitial infiltrate/pulmonary congestion                Assessment and Pl

## 2017-11-02 NOTE — PROGRESS NOTES
Core Measure Update: EF 35%  Currently on long acting beta-blocker. Currently no ace/arb or spironolactone secondary to renal function. Consider adding in the future if clinically appropriate.

## 2017-11-03 ENCOUNTER — APPOINTMENT (OUTPATIENT)
Dept: GENERAL RADIOLOGY | Facility: HOSPITAL | Age: 77
DRG: 222 | End: 2017-11-03
Attending: INTERNAL MEDICINE
Payer: MEDICARE

## 2017-11-03 ENCOUNTER — APPOINTMENT (OUTPATIENT)
Dept: INTERVENTIONAL RADIOLOGY/VASCULAR | Facility: HOSPITAL | Age: 77
DRG: 222 | End: 2017-11-03
Attending: INTERNAL MEDICINE
Payer: MEDICARE

## 2017-11-03 PROCEDURE — 99232 SBSQ HOSP IP/OBS MODERATE 35: CPT | Performed by: INTERNAL MEDICINE

## 2017-11-03 PROCEDURE — 02HK3KZ INSERTION OF DEFIBRILLATOR LEAD INTO RIGHT VENTRICLE, PERCUTANEOUS APPROACH: ICD-10-PCS | Performed by: INTERNAL MEDICINE

## 2017-11-03 PROCEDURE — 0JH609Z INSERTION OF CARDIAC RESYNCHRONIZATION DEFIBRILLATOR PULSE GENERATOR INTO CHEST SUBCUTANEOUS TISSUE AND FASCIA, OPEN APPROACH: ICD-10-PCS | Performed by: INTERNAL MEDICINE

## 2017-11-03 PROCEDURE — 71010 XR CHEST AP PORTABLE  (CPT=71010): CPT | Performed by: INTERNAL MEDICINE

## 2017-11-03 PROCEDURE — 02HL3KZ INSERTION OF DEFIBRILLATOR LEAD INTO LEFT VENTRICLE, PERCUTANEOUS APPROACH: ICD-10-PCS | Performed by: INTERNAL MEDICINE

## 2017-11-03 RX ORDER — BACITRACIN 50000 [USP'U]/1
INJECTION, POWDER, LYOPHILIZED, FOR SOLUTION INTRAMUSCULAR
Status: COMPLETED
Start: 2017-11-03 | End: 2017-11-03

## 2017-11-03 RX ORDER — MIDAZOLAM HYDROCHLORIDE 1 MG/ML
INJECTION INTRAMUSCULAR; INTRAVENOUS
Status: COMPLETED
Start: 2017-11-03 | End: 2017-11-03

## 2017-11-03 RX ORDER — ACETAMINOPHEN AND CODEINE PHOSPHATE 300; 30 MG/1; MG/1
1 TABLET ORAL EVERY 4 HOURS PRN
Status: DISCONTINUED | OUTPATIENT
Start: 2017-11-03 | End: 2017-11-06

## 2017-11-03 RX ORDER — ONDANSETRON 2 MG/ML
4 INJECTION INTRAMUSCULAR; INTRAVENOUS EVERY 6 HOURS PRN
Status: DISCONTINUED | OUTPATIENT
Start: 2017-11-03 | End: 2017-11-06

## 2017-11-03 RX ORDER — SODIUM CHLORIDE 9 MG/ML
INJECTION, SOLUTION INTRAVENOUS ONCE
Status: DISCONTINUED | OUTPATIENT
Start: 2017-11-03 | End: 2017-11-06

## 2017-11-03 RX ORDER — ACETAMINOPHEN 325 MG/1
650 TABLET ORAL EVERY 4 HOURS PRN
Status: DISCONTINUED | OUTPATIENT
Start: 2017-11-03 | End: 2017-11-06

## 2017-11-03 RX ORDER — POLYETHYLENE GLYCOL 3350 17 G/17G
17 POWDER, FOR SOLUTION ORAL DAILY
Status: DISCONTINUED | OUTPATIENT
Start: 2017-11-03 | End: 2017-11-06

## 2017-11-03 RX ORDER — SIMETHICONE 80 MG
80 TABLET,CHEWABLE ORAL EVERY 6 HOURS PRN
Qty: 60 TABLET | Refills: 0 | Status: SHIPPED | OUTPATIENT
Start: 2017-11-03 | End: 2018-02-27 | Stop reason: ALTCHOICE

## 2017-11-03 RX ORDER — SODIUM CHLORIDE 9 MG/ML
INJECTION, SOLUTION INTRAVENOUS
Status: COMPLETED
Start: 2017-11-03 | End: 2017-11-03

## 2017-11-03 RX ORDER — FAMOTIDINE 20 MG/1
20 TABLET ORAL DAILY
Qty: 90 TABLET | Refills: 0 | Status: SHIPPED | OUTPATIENT
Start: 2017-11-04 | End: 2017-11-06

## 2017-11-03 RX ORDER — LIDOCAINE HYDROCHLORIDE AND EPINEPHRINE 10; 10 MG/ML; UG/ML
INJECTION, SOLUTION INFILTRATION; PERINEURAL
Status: COMPLETED
Start: 2017-11-03 | End: 2017-11-03

## 2017-11-03 RX ORDER — SODIUM CHLORIDE 9 MG/ML
INJECTION, SOLUTION INTRAVENOUS
Status: DISPENSED
Start: 2017-11-03 | End: 2017-11-04

## 2017-11-03 RX ORDER — ACETAMINOPHEN AND CODEINE PHOSPHATE 300; 30 MG/1; MG/1
2 TABLET ORAL EVERY 4 HOURS PRN
Status: DISCONTINUED | OUTPATIENT
Start: 2017-11-03 | End: 2017-11-06

## 2017-11-03 NOTE — PLAN OF CARE
CARDIOVASCULAR - ADULT    • Maintains optimal cardiac output and hemodynamic stability Progressing    • Absence of cardiac arrhythmias or at baseline Progressing    BiV ICD placed in the left upper shoulder     Diabetes/Glucose Control    • Glucose maintai

## 2017-11-03 NOTE — PROGRESS NOTES
Sebastian Love  [id number]      Post procedure/ recovery hand-off report given to [facility]. Patient's vital signs stable, access site dry and intact, no signs and symptoms of bleeding/ hematoma.   Lt arm sling with patient    GEE Barrera

## 2017-11-03 NOTE — PROCEDURES
OPERATION(S) PERFORMED:   1. Biv ICD implant    2. Chest fluoroscopy. 3. DFT testing     : Ranjit Perkins MD    INDICATION: CHF, NYHA III, EF <30% >90d on medical rx, BBB qrs >140msec. Cad/scar/cmp but no recent pci/mi.     COMPLICATIONS: None   Mode muscle over the suture collar. The leads were connected to a pulse generator/new Biv ICD. The leads were coiled and placed into the pocket along with the generator. Fibrillar was placed in the pocket after purse string suture used to stop back bleeding.  Fuad Jim

## 2017-11-03 NOTE — PROGRESS NOTES
POD #0 s/p biv icd  Hold AC until tomorrow  Chg AC to xarelto low dose, 15mg, due to CrCl (off warfarin, heparin)  Home tomorrow if ok  F/u icd clinic and Dr. Snow Patrick

## 2017-11-03 NOTE — PLAN OF CARE
Aware of accucheck of 271. Pt completed late dinner after cath just 1 hour prior to accucheck. Will monitor for signs of hyperglycemia.

## 2017-11-03 NOTE — PROGRESS NOTES
[facility]    Progress Note    Darek Ivey Patient Status:  Inpatient    3/28/1940 MRN [id number]   Location [facility] 3W/SW Attending Meryle Raveling, MD   Hosp Day # 4 PCP Tana Saini MD       SUBJECTIVE:    Norman Sharpe degenerative changes of the spine. Assessment and Plan:          1. Acute hypoxemic respiratory failure 2/2 #2 and bronchitis. Off oxygen now. Get O2 saturations with ambulation.      2. Acute systolic congestive heart failure  Likely multifact

## 2017-11-03 NOTE — CONSULTS
[facility] HOSP - [facility]    Report of Cardiology Consultation    Delle Party Patient Status:  Inpatient    3/28/1940 MRN [id number]   Location [facility] 3W/SW Attending Christa Vilchis MD   Hosp Day # 4 PCP Queen Zeny MD • Glaucoma Neg    • Macular degeneration Neg        Social History  Patient Guardian Status:  Not on file.     Other Topics            Concern    None on file    Social History Narrative    None on file            Current Medications:    Current Facility- needed. Warfarin Sodium 7.5 MG Oral Tab Take 7.5 mg by mouth As Directed.  Tuesday, Wednesday, Thursday, Saturday, Sunday        Allergies  No Known Allergies    Review of Systems:   A comprehensive review of systems was negative except for as described i 10/23/2017   TROP 0.09 (HH) 10/30/2017         Thank you for allowing me to participate in the care of your patient.     Glen Alvarez, [address] [place] [address] [address] [facility]  Cardiac Electrophysiology  11/3/2017

## 2017-11-03 NOTE — PLAN OF CARE
Pt alert and oriented. Reports no pain. VSS. Bed rest in effect. Right groin dry and in tact. No bleeding or hematoma noted post procedure. Droplet precautions in effect. Swabbed for MRSA and sent to lab as ordered. Heparin infusing per protocol.

## 2017-11-03 NOTE — PLAN OF CARE
Aware of accucheck of 204. Pt completed late dinner after cath just 1 hour prior to accucheck. Will monitor for signs of hyperglycemia.

## 2017-11-04 ENCOUNTER — APPOINTMENT (OUTPATIENT)
Dept: GENERAL RADIOLOGY | Facility: HOSPITAL | Age: 77
DRG: 222 | End: 2017-11-04
Attending: INTERNAL MEDICINE
Payer: MEDICARE

## 2017-11-04 PROCEDURE — 71020 XR CHEST PA + LAT CHEST (CPT=71020): CPT | Performed by: INTERNAL MEDICINE

## 2017-11-04 PROCEDURE — 99232 SBSQ HOSP IP/OBS MODERATE 35: CPT | Performed by: INTERNAL MEDICINE

## 2017-11-04 RX ORDER — ACETAMINOPHEN AND CODEINE PHOSPHATE 300; 30 MG/1; MG/1
1 TABLET ORAL EVERY 6 HOURS PRN
Qty: 30 TABLET | Refills: 0 | Status: SHIPPED | OUTPATIENT
Start: 2017-11-04 | End: 2017-11-06

## 2017-11-04 RX ORDER — 0.9 % SODIUM CHLORIDE 0.9 %
VIAL (ML) INJECTION
Status: DISPENSED
Start: 2017-11-04 | End: 2017-11-04

## 2017-11-04 RX ORDER — SODIUM CHLORIDE 9 MG/ML
INJECTION, SOLUTION INTRAVENOUS
Status: DISPENSED
Start: 2017-11-04 | End: 2017-11-04

## 2017-11-04 NOTE — PROGRESS NOTES
[facility]    Progress Note    Trino Franco Patient Status:  Inpatient    3/28/1940 MRN [id number]   Location [facility] 3W/SW Attending Jarret Spears MD   Hosp Day # 5 PCP Lawrence Pardo MD       SUBJECTIVE:  Eusebia Chung radiographic pneumothorax. 3. Blunting of both posterior gutters may represent fluid or pleural reaction. 4. Pacemaker pack over the left chest. Stable position of the leads.          Xr Chest Ap Portable  (cpt=71010)    Result Date: 11/3/2017  CONCLUSION:

## 2017-11-04 NOTE — PLAN OF CARE
PATIENT AMBULATED WITH 2L/02 PER NASAL CANNULA OBTAINED 95-96% PULSE OX READING. PATIENT AMBULATED WITHOUT O2  OBTAINED PULSE OX READING OF 81-88%. PATIENT O2 SAT AT REST WITHOUT OXYGEN OBTAINED PULSE OX READING 86-88%.

## 2017-11-04 NOTE — PROGRESS NOTES
[place] [facility] - [facility]    Progress Note    Taj Velasquez Patient Status:  Inpatient    3/28/1940 MRN [id number]   Location [facility] 3W/SW Attending Mason Clemons MD   [facility] Day # 4 PCP Tala Ray MD       Subjective:   Tangela Pierce abnormal bruising noted  Back/Spine: no abnormalities noted  Musculoskeletal: full ROM all extremities good strength  no deformities  Extremities: no edema, cyanosis  Neurological:  Grossly normal    Results:     Laboratory Data:    Lab Results  Component

## 2017-11-05 ENCOUNTER — APPOINTMENT (OUTPATIENT)
Dept: GENERAL RADIOLOGY | Facility: HOSPITAL | Age: 77
DRG: 222 | End: 2017-11-05
Attending: INTERNAL MEDICINE
Payer: MEDICARE

## 2017-11-05 PROCEDURE — 71010 XR CHEST AP/PA (1 VIEW) (CPT=71010): CPT | Performed by: INTERNAL MEDICINE

## 2017-11-05 PROCEDURE — 99232 SBSQ HOSP IP/OBS MODERATE 35: CPT | Performed by: INTERNAL MEDICINE

## 2017-11-05 RX ORDER — POTASSIUM CHLORIDE 1.5 G/1.77G
40 POWDER, FOR SOLUTION ORAL ONCE
Status: DISCONTINUED | OUTPATIENT
Start: 2017-11-05 | End: 2017-11-06

## 2017-11-05 RX ORDER — FUROSEMIDE 10 MG/ML
20 INJECTION INTRAMUSCULAR; INTRAVENOUS ONCE
Status: COMPLETED | OUTPATIENT
Start: 2017-11-05 | End: 2017-11-05

## 2017-11-05 RX ORDER — AZITHROMYCIN 250 MG/1
250 TABLET, FILM COATED ORAL
Status: DISCONTINUED | OUTPATIENT
Start: 2017-11-06 | End: 2017-11-06

## 2017-11-05 RX ORDER — AZITHROMYCIN 250 MG/1
500 TABLET, FILM COATED ORAL ONCE
Status: COMPLETED | OUTPATIENT
Start: 2017-11-05 | End: 2017-11-05

## 2017-11-05 NOTE — PROGRESS NOTES
[facility]    Progress Note    Clau Arredondo Patient Status:  Inpatient    3/28/1940 MRN [id number]   Location [facility] 3W/SW Attending Sandro Harrison MD   Hosp Day # 6 PCP Seda Campa MD       SUBJECTIVE:    Silvia Sales radiographic pneumothorax. 3. Blunting of both posterior gutters may represent fluid or pleural reaction. 4. Pacemaker pack over the left chest. Stable position of the leads.          Xr Chest Ap Portable  (cpt=71010)    Result Date: 11/3/2017  CONCLUSION:

## 2017-11-05 NOTE — PROGRESS NOTES
[facility]    Progress Note      Subjective:     Feels good.  Denies any sob      Review of Systems:     Constitutional: + fatigue, cough and cold  Eyes: runny eye  Ears, nose, mouth, throat, and face: congestion and runny nose  Respirator powder packet 17 g 17 g Oral Daily   0.9%  NaCl infusion  Intravenous Once   ondansetron HCl (ZOFRAN) injection 4 mg 4 mg Intravenous Q6H PRN   acetaminophen (TYLENOL) tab 650 mg 650 mg Oral Q4H PRN   Or      Acetaminophen-Codeine #3 (TYLENOL #3) 300-30 MG 101  101   CO2  31  30  28       PTT   Date Value Ref Range Status   11/04/2017 36.6 (H) 23.2 - 35.3 seconds Final   Comment:     Elevations of the aPTT in patients not receiving  anticoagulant therapy (Heparin, etc.), may be  seen in Factor deficiency, vi baseline   - WADE from NSAIDs along with perfusion related injury in light of A-fib rvr and CHF. - on bumex 2 mg daily dose at home . Currently at home dose   - avoid nephrotoxins      2.  CHF with CMP/A-fib: on xarelto  - s/p Biv ICD   - s/p LHC - no inte

## 2017-11-06 ENCOUNTER — TELEPHONE (OUTPATIENT)
Dept: INTERNAL MEDICINE CLINIC | Facility: CLINIC | Age: 77
End: 2017-11-06

## 2017-11-06 ENCOUNTER — APPOINTMENT (OUTPATIENT)
Dept: NUCLEAR MEDICINE | Facility: HOSPITAL | Age: 77
DRG: 222 | End: 2017-11-06
Attending: INTERNAL MEDICINE
Payer: MEDICARE

## 2017-11-06 VITALS
TEMPERATURE: 98 F | HEIGHT: 68 IN | OXYGEN SATURATION: 93 % | HEART RATE: 76 BPM | DIASTOLIC BLOOD PRESSURE: 65 MMHG | WEIGHT: 187.88 LBS | SYSTOLIC BLOOD PRESSURE: 146 MMHG | RESPIRATION RATE: 20 BRPM | BODY MASS INDEX: 28.47 KG/M2

## 2017-11-06 PROCEDURE — 78582 LUNG VENTILAT&PERFUS IMAGING: CPT | Performed by: INTERNAL MEDICINE

## 2017-11-06 PROCEDURE — CB12VZZ PLANAR NUCLEAR MEDICINE IMAGING OF LUNGS AND BRONCHI USING XENON 133 (XE-133): ICD-10-PCS | Performed by: NUCLEAR MEDICINE

## 2017-11-06 PROCEDURE — 99238 HOSP IP/OBS DSCHRG MGMT 30/<: CPT | Performed by: INTERNAL MEDICINE

## 2017-11-06 RX ORDER — FUROSEMIDE 10 MG/ML
40 INJECTION INTRAMUSCULAR; INTRAVENOUS ONCE
Status: DISCONTINUED | OUTPATIENT
Start: 2017-11-06 | End: 2017-11-06

## 2017-11-06 RX ORDER — BUMETANIDE 0.25 MG/ML
1 INJECTION, SOLUTION INTRAMUSCULAR; INTRAVENOUS ONCE
Status: COMPLETED | OUTPATIENT
Start: 2017-11-06 | End: 2017-11-06

## 2017-11-06 RX ORDER — AZITHROMYCIN 250 MG/1
TABLET, FILM COATED ORAL
Qty: 3 TABLET | Refills: 0 | Status: SHIPPED | OUTPATIENT
Start: 2017-11-07 | End: 2017-11-10 | Stop reason: ALTCHOICE

## 2017-11-06 RX ORDER — AZITHROMYCIN 250 MG/1
TABLET, FILM COATED ORAL
Qty: 3 TABLET | Refills: 0 | Status: SHIPPED | OUTPATIENT
Start: 2017-11-06 | End: 2017-11-06

## 2017-11-06 RX ORDER — AZITHROMYCIN 250 MG/1
TABLET, FILM COATED ORAL
Qty: 3 TABLET | Refills: 0 | Status: SHIPPED | OUTPATIENT
Start: 2017-11-06 | End: 2017-11-08

## 2017-11-06 RX ORDER — WARFARIN SODIUM 5 MG/1
5 TABLET ORAL ONCE
Status: DISCONTINUED | OUTPATIENT
Start: 2017-11-06 | End: 2017-11-06

## 2017-11-06 RX ORDER — CARVEDILOL 12.5 MG/1
12.5 TABLET ORAL 2 TIMES DAILY WITH MEALS
Qty: 180 TABLET | Refills: 3 | Status: SHIPPED | OUTPATIENT
Start: 2017-11-06 | End: 2019-01-03

## 2017-11-06 RX ORDER — 0.9 % SODIUM CHLORIDE 0.9 %
VIAL (ML) INJECTION
Status: COMPLETED
Start: 2017-11-06 | End: 2017-11-06

## 2017-11-06 NOTE — PROGRESS NOTES
[facility]    Progress Note    Daisy Perales Patient Status:  Inpatient    3/28/1940 MRN [id number]   Location [facility] 3W/SW Attending Catrachito Castelan MD   Hosp Day # 7 PCP Marivel Barajas MD         Assessment and P Sacubitril-Valsartan  1 tablet Oral BID   • bumetanide  2 mg Oral Daily   • Fluticasone  2 puff Inhalation BID   • aspirin  81 mg Oral Daily   • famotidine  20 mg Oral Daily   • atorvastatin  40 mg Oral Nightly   • saccharomyces boulardii  250 mg Oral BID

## 2017-11-06 NOTE — PLAN OF CARE
O2 SAT WHILE AMBULATING ON ROOM AIR OXYGEN LEVEL 84-88%. O2 SAT ON ROOM AIR AT REST 94-95%    O2 SAT ON 1L 02 PER NASAL CANNULA 95%. MADE DR. EDWARDS AWARE OF THE ABOVE FINDINGS. ORDERS TO FOLLOW.

## 2017-11-06 NOTE — CONSULTS
Pulmonary Consult     Assessment / Plan:  1. Hypoxia - due to bronchitis causing afib with rvr and resulting pulm edema. Likely has underlying COPD and emphysema as well. Overall much better today  - desat screen with normal oximetry today  2.  Bronchitis w nephrologist Dr Serena Rodriguez   • Diverticulosis     colonoscopy 3/10/15 by GI Dr Peyton Patino   • Floaters 6/21/2016   • Hypercholesteremia    • MI (myocardial infarction) 2008    cardiologist Dr Krysten De La Fuente at [facility]   • S/P cholecystectomy 2005    [facility] Aerosol Powder, Breath Activated Inhale 2 puffs into the lungs 2 (two) times daily. Disp: 1 Inhaler Rfl: 0   simethicone 80 MG Oral Chew Tab Chew 1 tablet (80 mg total) by mouth every 6 (six) hours as needed for FLATULENCE.  Disp: 60 tablet Rfl: 0   famoTID Mother    • Glaucoma Neg    • Macular degeneration Neg          Exam:   11/05/17 2034 11/06/17  0500 11/06/17  0525 11/06/17  0851   BP: 124/65 139/82  146/65   BP Location: Right arm Right arm  Right arm   Pulse: 89 87  76   Resp: 20 18  20   Temp: 98.8

## 2017-11-06 NOTE — PROGRESS NOTES
[facility]    Progress Note    Jennifer Clemens Patient Status:  Inpatient    3/28/1940 MRN [id number]   Location [facility] 3W/SW Attending Maura Coley MD   [facility] Day # 7 PCP Anna Murcia MD         Assessment and Intake/Output:    Intake/Output Summary (Last 24 hours) at 11/06/17 1012  Last data filed at 11/06/17 0536   Gross per 24 hour   Intake              750 ml   Output             2000 ml   Net            -1250 ml       Wt Readings from Last 3 Encounters: atorvastatin (LIPITOR) tab 40 mg 40 mg Oral Nightly   saccharomyces boulardii (FLORASTOR) cap 250 mg 250 mg Oral BID   dextrose 50% injection 50 mL 50 mL Intravenous PRN   Glucose-Vitamin C (DEX-4) 4-0.006 g chewable tab 4 tablet 4 tablet Oral Q15 Min MO 11/3/2017  CONCLUSION:  1. Status post left-sided pacemaker/defibrillator device placement. There is no evidence of postprocedural complication or pneumothorax.   2. Postthoracotomy chest demonstrating cardiomegaly, pulmonary vascular congestion, and pulmon

## 2017-11-07 ENCOUNTER — PRIOR ORIGINAL RECORDS (OUTPATIENT)
Dept: OTHER | Age: 77
End: 2017-11-07

## 2017-11-07 ENCOUNTER — TELEPHONE (OUTPATIENT)
Dept: CARDIOLOGY CLINIC | Facility: HOSPITAL | Age: 77
End: 2017-11-07

## 2017-11-07 ENCOUNTER — TELEPHONE (OUTPATIENT)
Dept: CARDIOLOGY UNIT | Facility: HOSPITAL | Age: 77
End: 2017-11-07

## 2017-11-07 NOTE — TELEPHONE ENCOUNTER
Patient called with questions regarding prior authorization regarding his Huggins Yadiel which was started per Dr. Rony Martinez during his hospitalization. I contacted Dr. Laurent Nguyen office for him and spoke with Suresh Aguilar.   She will relay the message to Montse Whitney, Dr. Brittanie Walker

## 2017-11-08 ENCOUNTER — PATIENT OUTREACH (OUTPATIENT)
Dept: INTERNAL MEDICINE CLINIC | Facility: CLINIC | Age: 77
End: 2017-11-08

## 2017-11-08 NOTE — PROGRESS NOTES
Initial Post Discharge Follow Up   Discharge Date: 11/6/17  Contact Date: 11/8/2017    Consent Verification:  Assessment Completed With: Patient  HIPAA Verified? Yes    1.  Tell me why you were in the hospital? Patient reports he went to the ER for complai triamcinolone acetonide 0.1 % External Cream Apply topically 2 (two) times daily as needed. Disp: 45 g Rfl: 1       4. What questions do you have about your medications? None    5. How often do you forget to take your medicine? Never.  Denies forgetting 30, 2017 10:15 AM CST Anti-Coag with [facility], Aryan Barry ([facility])    Feb 06, 2018 11:30 AM CST Follow Up Visit with Maribeth Stallworth MD [facility] ([facility])

## 2017-11-09 ENCOUNTER — PRIOR ORIGINAL RECORDS (OUTPATIENT)
Dept: OTHER | Age: 77
End: 2017-11-09

## 2017-11-10 ENCOUNTER — OFFICE VISIT (OUTPATIENT)
Dept: CARDIOLOGY CLINIC | Facility: HOSPITAL | Age: 77
End: 2017-11-10
Attending: INTERNAL MEDICINE
Payer: MEDICARE

## 2017-11-10 VITALS
SYSTOLIC BLOOD PRESSURE: 116 MMHG | HEART RATE: 71 BPM | OXYGEN SATURATION: 100 % | DIASTOLIC BLOOD PRESSURE: 68 MMHG | WEIGHT: 187.5 LBS | BODY MASS INDEX: 29 KG/M2

## 2017-11-10 DIAGNOSIS — I48.20 CHRONIC ATRIAL FIBRILLATION (HCC): ICD-10-CM

## 2017-11-10 DIAGNOSIS — I50.23 ACUTE ON CHRONIC SYSTOLIC CONGESTIVE HEART FAILURE (HCC): Primary | ICD-10-CM

## 2017-11-10 DIAGNOSIS — J96.01 ACUTE HYPOXEMIC RESPIRATORY FAILURE (HCC): ICD-10-CM

## 2017-11-10 DIAGNOSIS — I50.9 CHF (CONGESTIVE HEART FAILURE) (HCC): ICD-10-CM

## 2017-11-10 DIAGNOSIS — I25.10 CORONARY ARTERY DISEASE INVOLVING NATIVE CORONARY ARTERY OF NATIVE HEART WITHOUT ANGINA PECTORIS: ICD-10-CM

## 2017-11-10 PROCEDURE — 93010 ELECTROCARDIOGRAM REPORT: CPT | Performed by: NURSE PRACTITIONER

## 2017-11-10 PROCEDURE — 36415 COLL VENOUS BLD VENIPUNCTURE: CPT | Performed by: NURSE PRACTITIONER

## 2017-11-10 PROCEDURE — 80048 BASIC METABOLIC PNL TOTAL CA: CPT | Performed by: NURSE PRACTITIONER

## 2017-11-10 PROCEDURE — 83880 ASSAY OF NATRIURETIC PEPTIDE: CPT | Performed by: NURSE PRACTITIONER

## 2017-11-10 PROCEDURE — 85025 COMPLETE CBC W/AUTO DIFF WBC: CPT | Performed by: NURSE PRACTITIONER

## 2017-11-10 PROCEDURE — 93005 ELECTROCARDIOGRAM TRACING: CPT

## 2017-11-10 PROCEDURE — 99214 OFFICE O/P EST MOD 30 MIN: CPT | Performed by: NURSE PRACTITIONER

## 2017-11-10 PROCEDURE — 83735 ASSAY OF MAGNESIUM: CPT | Performed by: NURSE PRACTITIONER

## 2017-11-10 PROCEDURE — 99211 OFF/OP EST MAY X REQ PHY/QHP: CPT | Performed by: NURSE PRACTITIONER

## 2017-11-10 RX ORDER — POTASSIUM CHLORIDE 20 MEQ/1
20 TABLET, EXTENDED RELEASE ORAL DAILY
Qty: 30 TABLET | Refills: 1 | Status: SHIPPED | OUTPATIENT
Start: 2017-11-10 | End: 2017-12-26

## 2017-11-10 NOTE — DISCHARGE SUMMARY
[facility]    PATIENT'S NAME: GERARD MICHAELS   ATTENDING PHYSICIAN: Joanne Paniagua MD   PATIENT ACCOUNT#:   [id number]    [facility] Cir #:   [id number]       YOB: 1940  ADMISSION DATE:       1 good flow collaterals that could be seen filling a faint left anterior descending artery. There were no suitable target lesions for bypass. The patient's enalapril was discontinued in favor of Entresto 1 tablet p.o. b.i.d.   The patient was advised for pl care.    Dictated By Dora Varghese.  Alfonzo Closs, MD  d: 11/08/2017 19:59:07  t: 11/10/2017 40:00:86  Job [id number]  [facility]/

## 2017-11-10 NOTE — PROGRESS NOTES
[address] Patient Status:  Outpatient    3/28/1940 MRN [id number]   Location MD Dr. Laurent Oliva is a 68year old male who presents to  05:55 AM   K 3.8 11/06/2017 05:55 AM    11/06/2017 05:55 AM   CO2 31 11/06/2017 05:55 AM    (H) 11/06/2017 05:55 AM   CA 8.7 11/06/2017 05:55 AM   ALB 3.8 10/23/2017 07:34 AM   ALKPHO 58 04/20/2017 07:35 AM   BILT 1.2 04/20/2017 07:35 AM   TP supplied the PDA and the posterolateral branches of the right coronary artery     #2 left main: Angiographically normal   #3 circumflex: Previous stents were wide open with good flow no lesions greater than 20%  #4 left anterior descending artery: 100% occ Completed abx. Increased activity tolerance. Wbc is 6.1, hgb 12.6. Renal function stable. Clinic wt 187, Home wt 179, stable here since discharge. Discharged on Entresto 24-26mg twice daily, carvedilol 25 mg twice daily, bumex 2 mg daily.  Compliant with t of breath, coughing, swelling, weight gain or worsening symptoms. 32–64 ounces of fluid daily    Less than 2000 mg salt/sodium daily.  Common high sodium foods include frozen dinners, soups (not homemade), some cereal, vegetable juice, canned vegetables

## 2017-11-10 NOTE — PATIENT INSTRUCTIONS
Start KDur 20 meq daily    Continue all other medications    Please call the heart failure clinic if you notice a weight gain of 3 pounds overnight or 5 pounds or more in 5 days.      Monitor yourself for signs and symptoms of fluid overload, increased addis exercise like walking for about 30 minutes 5 days per week. Start by walking at a slow to moderate pace for 5-10 minutes 2-3 times a day. Pace your activity to prevent shortness of breath or fatigue.  Stop exercise if you develop chest pain, lightheadedness

## 2017-11-13 PROBLEM — J43.9 EMPHYSEMA: Status: ACTIVE | Noted: 2017-11-13

## 2017-11-16 ENCOUNTER — OFFICE VISIT (OUTPATIENT)
Dept: INTERNAL MEDICINE CLINIC | Facility: CLINIC | Age: 77
End: 2017-11-16

## 2017-11-16 VITALS
HEIGHT: 66.75 IN | OXYGEN SATURATION: 97 % | WEIGHT: 184 LBS | RESPIRATION RATE: 18 BRPM | HEART RATE: 78 BPM | SYSTOLIC BLOOD PRESSURE: 122 MMHG | DIASTOLIC BLOOD PRESSURE: 56 MMHG | TEMPERATURE: 98 F | BODY MASS INDEX: 28.88 KG/M2

## 2017-11-16 DIAGNOSIS — E78.00 HYPERCHOLESTEREMIA: ICD-10-CM

## 2017-11-16 DIAGNOSIS — I48.20 CHRONIC ATRIAL FIBRILLATION (HCC): ICD-10-CM

## 2017-11-16 DIAGNOSIS — J44.9 CHRONIC OBSTRUCTIVE PULMONARY DISEASE, UNSPECIFIED COPD TYPE (HCC): ICD-10-CM

## 2017-11-16 DIAGNOSIS — E11.9 TYPE 2 DIABETES MELLITUS WITHOUT COMPLICATION, WITHOUT LONG-TERM CURRENT USE OF INSULIN (HCC): ICD-10-CM

## 2017-11-16 DIAGNOSIS — I25.5 ISCHEMIC CARDIOMYOPATHY: Primary | ICD-10-CM

## 2017-11-16 DIAGNOSIS — N18.30 CHRONIC RENAL DISEASE, STAGE III (HCC): ICD-10-CM

## 2017-11-16 DIAGNOSIS — H35.30 MACULAR DEGENERATION: ICD-10-CM

## 2017-11-16 DIAGNOSIS — H26.9 CATARACT, UNSPECIFIED CATARACT TYPE, UNSPECIFIED LATERALITY: ICD-10-CM

## 2017-11-16 PROBLEM — D69.6 THROMBOCYTOPENIA (HCC): Chronic | Status: ACTIVE | Noted: 2017-11-16

## 2017-11-16 PROCEDURE — 99495 TRANSJ CARE MGMT MOD F2F 14D: CPT | Performed by: INTERNAL MEDICINE

## 2017-11-16 NOTE — PROGRESS NOTES
HPI:    Thomas Correa is a 68year old male here today for hospital follow up.    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   Saphenous vein graft to distal right coronary artery supply the posterior descending artery and the posterolateral branc Prior to Visit:  famoTIDine 20 MG Oral Tab Take 20 mg by mouth 2 (two) times daily. Ipratropium-Albuterol (COMBIVENT RESPIMAT)  MCG/ACT Inhalation Aero Soln Inhale 1 puff into the lungs 4 (four) times daily.    Potassium Chloride ER 20 MEQ Oral Tab his father. He  reports that he quit smoking about 26 years ago. His smoking use included Cigarettes. He has a 35.00 pack-year smoking history. He has never used smokeless tobacco. He reports that he does not drink alcohol or use drugs.      ROS:   GENERA intact    ASSESSMENT/ PLAN:   There are no diagnoses linked to this encounter. No orders of the defined types were placed in this encounter.       Meds & Refills for this Visit:  No prescriptions requested or ordered in this encounter    Imaging & Consul service period of discharge to 30 days:   · Number of Possible Diagnoses and/or Management Options: moderate  · Amount and/or Complexity of Data to Be Reviewed: moderate  · Risk of Significant Complications, Morbidity, and/or Mortality: moderate    Overall

## 2017-11-17 ENCOUNTER — OFFICE VISIT (OUTPATIENT)
Dept: CARDIOLOGY CLINIC | Facility: HOSPITAL | Age: 77
End: 2017-11-17
Attending: INTERNAL MEDICINE
Payer: MEDICARE

## 2017-11-17 ENCOUNTER — TELEPHONE (OUTPATIENT)
Dept: CARDIOLOGY CLINIC | Facility: CLINIC | Age: 77
End: 2017-11-17

## 2017-11-17 VITALS
WEIGHT: 184 LBS | OXYGEN SATURATION: 96 % | SYSTOLIC BLOOD PRESSURE: 130 MMHG | BODY MASS INDEX: 29 KG/M2 | DIASTOLIC BLOOD PRESSURE: 64 MMHG | HEART RATE: 69 BPM

## 2017-11-17 DIAGNOSIS — I50.9 HEART FAILURE, UNSPECIFIED (HCC): ICD-10-CM

## 2017-11-17 DIAGNOSIS — I50.23 ACUTE ON CHRONIC SYSTOLIC CONGESTIVE HEART FAILURE (HCC): Primary | ICD-10-CM

## 2017-11-17 PROCEDURE — 80048 BASIC METABOLIC PNL TOTAL CA: CPT | Performed by: NURSE PRACTITIONER

## 2017-11-17 PROCEDURE — 83735 ASSAY OF MAGNESIUM: CPT | Performed by: NURSE PRACTITIONER

## 2017-11-17 PROCEDURE — 99214 OFFICE O/P EST MOD 30 MIN: CPT | Performed by: NURSE PRACTITIONER

## 2017-11-17 PROCEDURE — 99211 OFF/OP EST MAY X REQ PHY/QHP: CPT | Performed by: NURSE PRACTITIONER

## 2017-11-17 PROCEDURE — 36415 COLL VENOUS BLD VENIPUNCTURE: CPT | Performed by: NURSE PRACTITIONER

## 2017-11-17 NOTE — PROGRESS NOTES
[address] Patient Status:  Outpatient    3/28/1940 MRN [id number]   Location MD Dr. Yael Carlisle is a 68year old male who presents to  AM    (H) 11/10/2017 09:59 AM   CA 8.2 (L) 11/10/2017 09:59 AM   ALB 3.8 10/23/2017 07:34 AM   ALKPHO 58 04/20/2017 07:35 AM   BILT 1.2 04/20/2017 07:35 AM   TP 7.2 04/20/2017 07:35 AM   AST 24 04/20/2017 07:35 AM   ALT 22 04/20/2017 07:35 AM   PTT 20%  #4 left anterior descending artery: 100% occluded based on previous angiograms a saphenous vein graft to the left anterior descending artery was 100% occludedA saphenous vein graft supplying the diagonal was wide open with good flow collaterals could medications. He is not sure if he will be able to continue on Entresto as his co-pay is >$150. I have asked him to reach out to Dr. Bebo Goyal staff to find out about the other programs [employer] offers to keep costs lower.   Feeling good and states nutrition labels for sodium content.      Use your incentive spirometer 2 sets of 10 daily and use the Acapella/pickle/hippo breathing devices  2 sets of 10 daily    Return to clinic on 11/17/17    Follow up with Pacemaker clinic on 11/17/17 at [facility]

## 2017-11-17 NOTE — TELEPHONE ENCOUNTER
Pt states Natividad Somers is not covered by insurance and requesting alternative medication. Pls call. Thank you.

## 2017-11-17 NOTE — PATIENT INSTRUCTIONS
Start KDur 20 meq daily    Continue all other medications    Please call the heart failure clinic if you notice a weight gain of 3 pounds overnight or 5 pounds or more in 5 days.      Monitor yourself for signs and symptoms of fluid overload, increased addis

## 2017-11-21 NOTE — TELEPHONE ENCOUNTER
S/w representative for [employer] [facility] and state we should refer patient to [facility] ([phone]) as they can provide him with support or guidance as to how to get the [facility].        S/w Hank and they state PA was approved valid 11/09/17 until 11/09/

## 2017-11-21 NOTE — TELEPHONE ENCOUNTER
Received fax from [facility] stating Tier Exception has been approved. Until 12/31/17 S/w Serena and states approval is [id number]. Inquired cost but she referred me to Jorge [address] [phone].  S/w  and states cost will be between $15

## 2017-11-27 ENCOUNTER — TELEPHONE (OUTPATIENT)
Dept: CARDIOLOGY CLINIC | Facility: CLINIC | Age: 77
End: 2017-11-27

## 2017-11-27 NOTE — TELEPHONE ENCOUNTER
Pt states he followed our reccommendation  to call [employer]. Pt states he called [employer] and was on the phone with them for 2 hours and then he was told he ws not eligible because he has Medicare B. Pt states he called [employer] and was told his co pay was still about the same, even after this office completed a tier exception (see encounter 11/17/17). Pt was also given another foundation for assistance [employer] but states was on hold ofr one hour. Pt would like to switch to an alternate. Pt aware there is no alternate but would like to be switched from [facility] to something more affordable. pls advise.

## 2017-11-28 ENCOUNTER — OFFICE VISIT (OUTPATIENT)
Dept: CARDIOLOGY CLINIC | Facility: HOSPITAL | Age: 77
End: 2017-11-28
Attending: INTERNAL MEDICINE
Payer: MEDICARE

## 2017-11-28 VITALS
OXYGEN SATURATION: 98 % | HEART RATE: 78 BPM | WEIGHT: 189.81 LBS | DIASTOLIC BLOOD PRESSURE: 61 MMHG | SYSTOLIC BLOOD PRESSURE: 117 MMHG | BODY MASS INDEX: 30 KG/M2

## 2017-11-28 DIAGNOSIS — I50.9 HEART FAILURE, UNSPECIFIED (HCC): ICD-10-CM

## 2017-11-28 DIAGNOSIS — I50.23 ACUTE ON CHRONIC SYSTOLIC CONGESTIVE HEART FAILURE (HCC): Primary | ICD-10-CM

## 2017-11-28 DIAGNOSIS — N18.30 CKD (CHRONIC KIDNEY DISEASE), STAGE III (HCC): ICD-10-CM

## 2017-11-28 PROCEDURE — 36415 COLL VENOUS BLD VENIPUNCTURE: CPT | Performed by: NURSE PRACTITIONER

## 2017-11-28 PROCEDURE — 99214 OFFICE O/P EST MOD 30 MIN: CPT | Performed by: NURSE PRACTITIONER

## 2017-11-28 PROCEDURE — 99211 OFF/OP EST MAY X REQ PHY/QHP: CPT | Performed by: NURSE PRACTITIONER

## 2017-11-28 PROCEDURE — 80048 BASIC METABOLIC PNL TOTAL CA: CPT | Performed by: NURSE PRACTITIONER

## 2017-11-28 PROCEDURE — 83880 ASSAY OF NATRIURETIC PEPTIDE: CPT | Performed by: NURSE PRACTITIONER

## 2017-11-28 RX ORDER — SPIRONOLACTONE 25 MG/1
25 TABLET ORAL DAILY
Qty: 30 TABLET | Refills: 0 | Status: SHIPPED | OUTPATIENT
Start: 2017-11-28 | End: 2017-12-05 | Stop reason: CLARIF

## 2017-11-28 RX ORDER — LISINOPRIL 5 MG/1
5 TABLET ORAL DAILY
Qty: 30 TABLET | Refills: 1 | Status: SHIPPED | OUTPATIENT
Start: 2017-11-28 | End: 2017-12-21

## 2017-11-28 RX ORDER — POTASSIUM CHLORIDE 20 MEQ/1
TABLET, EXTENDED RELEASE ORAL
Status: COMPLETED
Start: 2017-11-28 | End: 2017-11-28

## 2017-11-28 RX ADMIN — POTASSIUM CHLORIDE 40 MEQ: 20 TABLET, EXTENDED RELEASE ORAL at 11:15:00

## 2017-11-28 NOTE — TELEPHONE ENCOUNTER
Discussed with Dr. Kala Thakkar and will hold on aldactone for now stop entresto then 2 days later start lisinopril 5mg daily follow up with BmP in 1 wk at CHF clinic and if renal is stable then start aldactone 12.5mg daily.  Pt informed, Rx faxed

## 2017-11-28 NOTE — PATIENT INSTRUCTIONS
Increase Bumex to 2mg twice daily for 3 days, then Bumex 2 mg daily    Increase Potassium Chloride (KDur) to 20 meq twice daily for 3 days, then KDur 20 meq in am and 10 meq in pm    Start Spironolactone 12.5 mg daily.   Please call nurse practitioner prior nutrition labels for sodium content. · Limit caffeine to no more than 16 ounces per day     · Exercise daily as tolerated, with goal of doing moderate aerobic exercise like walking for about 30 minutes 5 days per week.  Start by walking at a slow to mod

## 2017-11-28 NOTE — PROGRESS NOTES
[address] Patient Status:  Outpatient    3/28/1940 MRN [id number]   Location MD Dr. Jocelyn Chaparro is a 68year old male who presents to  09:50 AM    11/28/2017 09:50 AM   CO2 24 11/28/2017 09:50 AM    (H) 11/28/2017 09:50 AM   CA 8.5 11/28/2017 09:50 AM   ALB 3.8 10/23/2017 07:34 AM   ALKPHO 58 04/20/2017 07:35 AM   BILT 1.2 04/20/2017 07:35 AM   TP 7.2 04/20/2017 07:35 AM   AS greater than 20%  #4 left anterior descending artery: 100% occluded based on previous angiograms a saphenous vein graft to the left anterior descending artery was 100% occludedA saphenous vein graft supplying the diagonal was wide open with good flow colla Entresto as his co-pay is >$150. He has been working with Sylvia Tsai in Dr. Murali Prakash office. At this point he does not think he has enough coverage to continue on this medication.  Feeling good and states having an excellent appetite but has noticed increased medications    Please call the heart failure clinic if you notice a weight gain of 3 pounds overnight or 5 pounds or more in 5 days.      Monitor yourself for signs and symptoms of fluid overload, increased shortness of breath, difficulty breathing when la breath or fatigue.  Stop exercise if you develop chest pain, lightheadedness, or significant shortness of breath      I spent greater than 45 minutes with this patient providing counseling, coordination of care and education related specifically to heart fa

## 2017-11-30 ENCOUNTER — ANTI-COAG VISIT (OUTPATIENT)
Dept: INTERNAL MEDICINE CLINIC | Facility: CLINIC | Age: 77
End: 2017-11-30

## 2017-11-30 DIAGNOSIS — I48.20 CHRONIC ATRIAL FIBRILLATION (HCC): ICD-10-CM

## 2017-11-30 PROCEDURE — 85610 PROTHROMBIN TIME: CPT

## 2017-11-30 PROCEDURE — 99211 OFF/OP EST MAY X REQ PHY/QHP: CPT

## 2017-11-30 PROCEDURE — 36416 COLLJ CAPILLARY BLOOD SPEC: CPT

## 2017-12-01 ENCOUNTER — MYAURORA ACCOUNT LINK (OUTPATIENT)
Dept: OTHER | Age: 77
End: 2017-12-01

## 2017-12-01 ENCOUNTER — TELEPHONE (OUTPATIENT)
Dept: INTERNAL MEDICINE CLINIC | Facility: CLINIC | Age: 77
End: 2017-12-01

## 2017-12-01 DIAGNOSIS — I25.5 ISCHEMIC CARDIOMYOPATHY: Primary | ICD-10-CM

## 2017-12-01 DIAGNOSIS — I48.91 ATRIAL FIBRILLATION, UNSPECIFIED TYPE (HCC): ICD-10-CM

## 2017-12-01 NOTE — TELEPHONE ENCOUNTER
Pt was at [facility] heart this morning. He saw the technician for a pace maker check? He needs a referral for today's visit, and for the next visit on 2/23/18.    To nursing

## 2017-12-01 NOTE — TELEPHONE ENCOUNTER
OK; referrals generated for Dr Jakob Pina for pacemaker and cardiologist Dr Praveen Barragan; please call pt

## 2017-12-05 ENCOUNTER — OFFICE VISIT (OUTPATIENT)
Dept: CARDIOLOGY CLINIC | Facility: HOSPITAL | Age: 77
End: 2017-12-05
Attending: NURSE PRACTITIONER
Payer: MEDICARE

## 2017-12-05 ENCOUNTER — TELEPHONE (OUTPATIENT)
Dept: CARDIOLOGY CLINIC | Facility: HOSPITAL | Age: 77
End: 2017-12-05

## 2017-12-05 VITALS
OXYGEN SATURATION: 100 % | HEART RATE: 78 BPM | WEIGHT: 192.81 LBS | DIASTOLIC BLOOD PRESSURE: 79 MMHG | BODY MASS INDEX: 30 KG/M2 | SYSTOLIC BLOOD PRESSURE: 145 MMHG

## 2017-12-05 DIAGNOSIS — I50.9 HEART FAILURE, UNSPECIFIED (HCC): ICD-10-CM

## 2017-12-05 DIAGNOSIS — I50.23 ACUTE ON CHRONIC SYSTOLIC CONGESTIVE HEART FAILURE (HCC): Primary | ICD-10-CM

## 2017-12-05 PROCEDURE — 83880 ASSAY OF NATRIURETIC PEPTIDE: CPT | Performed by: NURSE PRACTITIONER

## 2017-12-05 PROCEDURE — 36415 COLL VENOUS BLD VENIPUNCTURE: CPT | Performed by: NURSE PRACTITIONER

## 2017-12-05 PROCEDURE — 99214 OFFICE O/P EST MOD 30 MIN: CPT | Performed by: NURSE PRACTITIONER

## 2017-12-05 PROCEDURE — 99211 OFF/OP EST MAY X REQ PHY/QHP: CPT | Performed by: NURSE PRACTITIONER

## 2017-12-05 PROCEDURE — 96374 THER/PROPH/DIAG INJ IV PUSH: CPT | Performed by: NURSE PRACTITIONER

## 2017-12-05 PROCEDURE — 80048 BASIC METABOLIC PNL TOTAL CA: CPT | Performed by: NURSE PRACTITIONER

## 2017-12-05 RX ORDER — FUROSEMIDE 10 MG/ML
INJECTION INTRAMUSCULAR; INTRAVENOUS
Status: COMPLETED
Start: 2017-12-05 | End: 2017-12-05

## 2017-12-05 RX ORDER — POTASSIUM CHLORIDE 20 MEQ/1
TABLET, EXTENDED RELEASE ORAL
Status: COMPLETED
Start: 2017-12-05 | End: 2017-12-05

## 2017-12-05 RX ADMIN — FUROSEMIDE 40 MG: 10 INJECTION INTRAMUSCULAR; INTRAVENOUS at 14:48:00

## 2017-12-05 RX ADMIN — POTASSIUM CHLORIDE 20 MEQ: 20 TABLET, EXTENDED RELEASE ORAL at 14:45:00

## 2017-12-05 NOTE — PATIENT INSTRUCTIONS
Increase Bumex to 2mg twice daily for 3 days, then Bumex 2 mg daily     Increase Potassium Chloride (KDur) to 20 meq twice daily for 3 days, then KDur 20 meq in am and 10 meq in pm    Stop [employer] Lisinopril 5 mg on Thursday 12/7/17    Continue a per day     · Exercise daily as tolerated, with goal of doing moderate aerobic exercise like walking for about 30 minutes 5 days per week. Start by walking at a slow to moderate pace for 5-10 minutes 2-3 times a day.  Pace your activity to prevent shortness

## 2017-12-05 NOTE — PROGRESS NOTES
[address] Patient Status:  Outpatient    3/28/1940 MRN [id number]   Location MD Dr. Millicent Martinez is a 68year old male who presents to  01:21 PM    (H) 12/05/2017 01:21 PM   CA 8.9 12/05/2017 01:21 PM   ALB 3.8 10/23/2017 07:34 AM   ALKPHO 58 04/20/2017 07:35 AM   BILT 1.2 04/20/2017 07:35 AM   TP 7.2 04/20/2017 07:35 AM   AST 24 04/20/2017 07:35 AM   ALT 22 04/20/2017 07:35 AM   PT anterior descending artery: 100% occluded based on previous angiograms a saphenous vein graft to the left anterior descending artery was 100% occludedA saphenous vein graft supplying the diagonal was wide open with good flow collaterals could be seen filli Entresto 24-26mg twice daily. Is unable to continue due to [place]. Instructions per Dr. Nikia Nance will be to start Lisinopril 5 mg daily. Received Lisinopril in mail this morning (Mail order pharmacy).   Instructed to discontinue [employer] and start pre-packaged rice or potatoes. Please remember to read nutrition labels for sodium content.      Use your incentive spirometer 2 sets of 10 daily and use the Acapella/pickle/hippo breathing devices  2 sets of 10 daily    Return to clinic on 12/13/17    [facility]

## 2017-12-05 NOTE — TELEPHONE ENCOUNTER
Patient called stating he is currently up 6 pounds on his home scale, now 185 lbs states he is slightly bloated with mild increase in shortness of breath. [facility]   Has not started this yet as he just received lisinop

## 2017-12-13 ENCOUNTER — OFFICE VISIT (OUTPATIENT)
Dept: CARDIOLOGY CLINIC | Facility: HOSPITAL | Age: 77
End: 2017-12-13
Attending: INTERNAL MEDICINE
Payer: MEDICARE

## 2017-12-13 VITALS
WEIGHT: 188.31 LBS | BODY MASS INDEX: 30 KG/M2 | SYSTOLIC BLOOD PRESSURE: 142 MMHG | HEART RATE: 68 BPM | DIASTOLIC BLOOD PRESSURE: 69 MMHG | OXYGEN SATURATION: 100 %

## 2017-12-13 DIAGNOSIS — I50.23 ACUTE ON CHRONIC SYSTOLIC CONGESTIVE HEART FAILURE (HCC): Primary | ICD-10-CM

## 2017-12-13 DIAGNOSIS — N18.30 CKD (CHRONIC KIDNEY DISEASE), STAGE III (HCC): ICD-10-CM

## 2017-12-13 DIAGNOSIS — I50.9 HEART FAILURE, UNSPECIFIED (HCC): ICD-10-CM

## 2017-12-13 PROCEDURE — 99214 OFFICE O/P EST MOD 30 MIN: CPT | Performed by: NURSE PRACTITIONER

## 2017-12-13 PROCEDURE — 80048 BASIC METABOLIC PNL TOTAL CA: CPT | Performed by: NURSE PRACTITIONER

## 2017-12-13 PROCEDURE — 83880 ASSAY OF NATRIURETIC PEPTIDE: CPT | Performed by: NURSE PRACTITIONER

## 2017-12-13 PROCEDURE — 83735 ASSAY OF MAGNESIUM: CPT | Performed by: NURSE PRACTITIONER

## 2017-12-13 PROCEDURE — 36415 COLL VENOUS BLD VENIPUNCTURE: CPT | Performed by: NURSE PRACTITIONER

## 2017-12-13 PROCEDURE — 99211 OFF/OP EST MAY X REQ PHY/QHP: CPT | Performed by: NURSE PRACTITIONER

## 2017-12-13 RX ORDER — HYDRALAZINE HYDROCHLORIDE 10 MG/1
10 TABLET, FILM COATED ORAL 3 TIMES DAILY
Qty: 90 TABLET | Refills: 0 | Status: SHIPPED | OUTPATIENT
Start: 2017-12-13 | End: 2017-12-29

## 2017-12-13 NOTE — PROGRESS NOTES
[address] Patient Status:  Outpatient    3/28/1940 MRN [id number]   Location MD Dr. Marycarmen Casiano is a 68year old male who presents to  10/23/2017 07:34 AM   ALKPHO 58 04/20/2017 07:35 AM   BILT 1.2 04/20/2017 07:35 AM   TP 7.2 04/20/2017 07:35 AM   AST 24 04/20/2017 07:35 AM   ALT 22 04/20/2017 07:35 AM   PTT 36.6 (H) 11/04/2017 06:41 AM   INR 2.1 (A) 11/30/2017 09:04 AM   PTP 19.5 (H) 11 artery was 100% occludedA saphenous vein graft supplying the diagonal was wide open with good flow collaterals could be seen filling a faint left anterior descending artery  #5 left heart hemodynamics: The EDP was 16.   No gradient was noted across the aort Discussed with Dr. Gail Dennis. Will continue Lisinopril 5 mg daily and add Hydralazine 10 mg TID. Repeat blood work in 1 week. Continue Bumex 2 mg daily. Return to clinic in 1 week.     Plan:  Start Hydralazine 10 mg three times a day    Continue Mariam Wheeler exercise like walking for about 30 minutes 5 days per week. Start by walking at a slow to moderate pace for 5-10 minutes 2-3 times a day. Pace your activity to prevent shortness of breath or fatigue.  Stop exercise if you develop chest pain, lightheadedness

## 2017-12-13 NOTE — PATIENT INSTRUCTIONS
Start Hydralazine 10 mg three times a day    Continue Lisinopril 5 mg on Friday 12/8/17    Continue all other medications    Please call the heart failure clinic if you notice a weight gain of 3 pounds overnight or 5 pounds or more in 5 days.      Monitor Stop exercise if you develop chest pain, lightheadedness, or significant shortness of breath

## 2017-12-18 ENCOUNTER — ANTI-COAG VISIT (OUTPATIENT)
Dept: INTERNAL MEDICINE CLINIC | Facility: CLINIC | Age: 77
End: 2017-12-18

## 2017-12-18 DIAGNOSIS — I48.20 CHRONIC ATRIAL FIBRILLATION (HCC): ICD-10-CM

## 2017-12-18 PROCEDURE — 36416 COLLJ CAPILLARY BLOOD SPEC: CPT

## 2017-12-18 PROCEDURE — 85610 PROTHROMBIN TIME: CPT

## 2017-12-20 ENCOUNTER — PRIOR ORIGINAL RECORDS (OUTPATIENT)
Dept: OTHER | Age: 77
End: 2017-12-20

## 2017-12-21 ENCOUNTER — OFFICE VISIT (OUTPATIENT)
Dept: CARDIOLOGY CLINIC | Facility: HOSPITAL | Age: 77
End: 2017-12-21
Attending: INTERNAL MEDICINE
Payer: MEDICARE

## 2017-12-21 VITALS
OXYGEN SATURATION: 100 % | HEART RATE: 70 BPM | WEIGHT: 187.81 LBS | DIASTOLIC BLOOD PRESSURE: 48 MMHG | BODY MASS INDEX: 30 KG/M2 | SYSTOLIC BLOOD PRESSURE: 108 MMHG

## 2017-12-21 DIAGNOSIS — I50.9 HEART FAILURE, UNSPECIFIED (HCC): ICD-10-CM

## 2017-12-21 DIAGNOSIS — N18.30 CKD (CHRONIC KIDNEY DISEASE), STAGE III (HCC): ICD-10-CM

## 2017-12-21 DIAGNOSIS — I10 ESSENTIAL HYPERTENSION: ICD-10-CM

## 2017-12-21 DIAGNOSIS — I50.23 ACUTE ON CHRONIC SYSTOLIC CONGESTIVE HEART FAILURE (HCC): Primary | ICD-10-CM

## 2017-12-21 PROCEDURE — 80048 BASIC METABOLIC PNL TOTAL CA: CPT | Performed by: NURSE PRACTITIONER

## 2017-12-21 PROCEDURE — 99211 OFF/OP EST MAY X REQ PHY/QHP: CPT | Performed by: NURSE PRACTITIONER

## 2017-12-21 PROCEDURE — 99214 OFFICE O/P EST MOD 30 MIN: CPT | Performed by: NURSE PRACTITIONER

## 2017-12-21 PROCEDURE — 36415 COLL VENOUS BLD VENIPUNCTURE: CPT | Performed by: NURSE PRACTITIONER

## 2017-12-21 PROCEDURE — 83880 ASSAY OF NATRIURETIC PEPTIDE: CPT | Performed by: NURSE PRACTITIONER

## 2017-12-21 RX ORDER — BUMETANIDE 1 MG/1
1 TABLET ORAL 2 TIMES DAILY
COMMUNITY
End: 2017-12-26

## 2017-12-21 NOTE — PATIENT INSTRUCTIONS
Continue Hydralazine 10 mg three times a day     Stop Lisinopril 5 mg     Call Shelly Parrish on 12/27/17 with Blood Pressure readings     Continue all other medications     Please call the heart failure clinic if you notice a weight gain of 3 pounds overnight or 5

## 2017-12-21 NOTE — PROGRESS NOTES
[address] Patient Status:  Outpatient    3/28/1940 MRN [id number]   Location MD Dr. Josseline Santos is a 68year old male who presents to  25 12/13/2017 10:46 AM    (H) 12/13/2017 10:46 AM   CA 8.7 12/13/2017 10:46 AM   ALB 3.8 10/23/2017 07:34 AM   ALKPHO 58 04/20/2017 07:35 AM   BILT 1.2 04/20/2017 07:35 AM   TP 7.2 04/20/2017 07:35 AM   AST 24 04/20/2017 07:35 AM   ALT 22 04/20/2017 descending artery: 100% occluded based on previous angiograms a saphenous vein graft to the left anterior descending artery was 100% occludedA saphenous vein graft supplying the diagonal was wide open with good flow collaterals could be seen filling a ilsa improvement. Blood pressure improved with hydralazine addition. With continued rise in creatinine will discontinue lisinopril, Continue  Hydralazine 10 mg TID. Patient will call next Wednesday 12/27/17 with blood pressure readings off lisinopril.  Continue labels for sodium content. · Limit caffeine to no more than 16 ounces per day     · Exercise daily as tolerated, with goal of doing moderate aerobic exercise like walking for about 30 minutes 5 days per week.  Start by walking at a slow to moderate pace

## 2017-12-27 ENCOUNTER — TELEPHONE (OUTPATIENT)
Dept: CARDIOLOGY CLINIC | Facility: HOSPITAL | Age: 77
End: 2017-12-27

## 2017-12-27 RX ORDER — ATORVASTATIN CALCIUM 40 MG/1
TABLET, FILM COATED ORAL
Qty: 90 TABLET | Refills: 3 | Status: SHIPPED | OUTPATIENT
Start: 2017-12-27 | End: 2019-01-03

## 2017-12-27 RX ORDER — POTASSIUM CHLORIDE 20 MEQ/1
TABLET, EXTENDED RELEASE ORAL
Qty: 60 TABLET | Refills: 1 | Status: SHIPPED | OUTPATIENT
Start: 2017-12-27 | End: 2018-03-15

## 2017-12-27 NOTE — TELEPHONE ENCOUNTER
Patient stopped by with blood pressure readings since discontinuing Lisinpril last week. BPs running 113-139/60-80 consistently. Will continue current medications.   States he was contacted by [employer] regarding prescription assistance to get him back on

## 2017-12-27 NOTE — TELEPHONE ENCOUNTER
ATORVASTATIN: Refill request is for a maintenance medication and has met the criteria specified in the Ambulatory Medication Refill Standing Order for eligibility, visits, laboratory, alerts and was sent to the requested pharmacy. To Dr. Jennifer Ferguson for Bumex:  Germán Kyle

## 2017-12-28 RX ORDER — BUMETANIDE 1 MG/1
TABLET ORAL
Qty: 180 TABLET | Refills: 3 | Status: SHIPPED | OUTPATIENT
Start: 2017-12-28 | End: 2018-03-23

## 2018-01-05 ENCOUNTER — OFFICE VISIT (OUTPATIENT)
Dept: CARDIOLOGY CLINIC | Facility: HOSPITAL | Age: 78
End: 2018-01-05
Attending: INTERNAL MEDICINE
Payer: MEDICARE

## 2018-01-05 ENCOUNTER — TELEPHONE (OUTPATIENT)
Dept: INTERNAL MEDICINE CLINIC | Facility: CLINIC | Age: 78
End: 2018-01-05

## 2018-01-05 ENCOUNTER — TELEPHONE (OUTPATIENT)
Dept: CARDIOLOGY CLINIC | Facility: CLINIC | Age: 78
End: 2018-01-05

## 2018-01-05 VITALS
HEART RATE: 75 BPM | DIASTOLIC BLOOD PRESSURE: 56 MMHG | BODY MASS INDEX: 30 KG/M2 | OXYGEN SATURATION: 99 % | WEIGHT: 190 LBS | SYSTOLIC BLOOD PRESSURE: 97 MMHG

## 2018-01-05 DIAGNOSIS — I50.23 ACUTE ON CHRONIC SYSTOLIC CONGESTIVE HEART FAILURE (HCC): Primary | ICD-10-CM

## 2018-01-05 DIAGNOSIS — N18.30 STAGE 3 CHRONIC KIDNEY DISEASE (HCC): Primary | ICD-10-CM

## 2018-01-05 DIAGNOSIS — I50.9 HEART FAILURE, UNSPECIFIED (HCC): ICD-10-CM

## 2018-01-05 DIAGNOSIS — N18.30 CKD (CHRONIC KIDNEY DISEASE), STAGE III (HCC): ICD-10-CM

## 2018-01-05 LAB
ANION GAP SERPL CALC-SCNC: 8 MMOL/L (ref 0–18)
BUN SERPL-MCNC: 26 MG/DL (ref 8–20)
BUN/CREAT SERPL: 12.3 (ref 10–20)
CALCIUM SERPL-MCNC: 8.8 MG/DL (ref 8.5–10.5)
CHLORIDE SERPL-SCNC: 101 MMOL/L (ref 95–110)
CO2 SERPL-SCNC: 26 MMOL/L (ref 22–32)
CREAT SERPL-MCNC: 2.12 MG/DL (ref 0.5–1.5)
GLUCOSE SERPL-MCNC: 210 MG/DL (ref 70–99)
OSMOLALITY UR CALC.SUM OF ELEC: 291 MOSM/KG (ref 275–295)
POTASSIUM SERPL-SCNC: 4 MMOL/L (ref 3.3–5.1)
SODIUM SERPL-SCNC: 135 MMOL/L (ref 136–144)

## 2018-01-05 PROCEDURE — 99214 OFFICE O/P EST MOD 30 MIN: CPT | Performed by: NURSE PRACTITIONER

## 2018-01-05 PROCEDURE — 80048 BASIC METABOLIC PNL TOTAL CA: CPT | Performed by: NURSE PRACTITIONER

## 2018-01-05 PROCEDURE — 99211 OFF/OP EST MAY X REQ PHY/QHP: CPT | Performed by: NURSE PRACTITIONER

## 2018-01-05 PROCEDURE — 36415 COLL VENOUS BLD VENIPUNCTURE: CPT | Performed by: NURSE PRACTITIONER

## 2018-01-05 RX ORDER — ISOSORBIDE DINITRATE 5 MG/1
5 TABLET ORAL 3 TIMES DAILY
Qty: 90 TABLET | Refills: 0 | Status: SHIPPED | OUTPATIENT
Start: 2018-01-05 | End: 2018-02-08

## 2018-01-05 NOTE — PATIENT INSTRUCTIONS
Hold Bumex until Monday    Decrease Bumex to 2 mg Monday, Wednesday and Friday's    Start Isordil 5 mg three times a day if blood pressure remains greater than 95/60    Repeat blood work in 1 week    Please call the heart failure clinic if you notice a Tristian Wilkerson times a day. Pace your activity to prevent shortness of breath or fatigue.  Stop exercise if you develop chest pain, lightheadedness, or significant shortness of breath

## 2018-01-05 NOTE — PROGRESS NOTES
[address] Patient Status:  Outpatient    3/28/1940 MRN [id number]   Location MD Dr. Kiara Sheppard is a 68year old male who presents to  K 3.8 12/21/2017 08:56 AM    12/21/2017 08:56 AM   CO2 25 12/21/2017 08:56 AM    (H) 12/21/2017 08:56 AM   CA 8.6 12/21/2017 08:56 AM   ALB 3.8 10/23/2017 07:34 AM   ALKPHO 58 04/20/2017 07:35 AM   BILT 1.2 04/20/2017 07:35 AM   TP 7.2 04/20 open with good flow no lesions greater than 20%  #4 left anterior descending artery: 100% occluded based on previous angiograms a saphenous vein graft to the left anterior descending artery was 100% occludedA saphenous vein graft supplying the diagonal was edema. Has been adhering to his low sodium diet. Drinking 32-64 oz per day. With continued Cr increase, spoke with Dr. Angel Orr. Will hold Bumex until Monday and decrease Bumex to 2mg on MWF. We will add isordil 5 mg TID.   Repeat blood work in 1 sausage, isaac, pepperoni, soy sauce, pre-packaged rice or potatoes. Please remember to read nutrition labels for sodium content.      · Limit caffeine to no more than 16 ounces per day     · Exercise daily as tolerated, with goal of doing moderate aerobic

## 2018-01-05 NOTE — TELEPHONE ENCOUNTER
To Dr. Sam Anderson - see below, pended above.   Dr. Brady Rascon and [facility] both advised pt to see nephrologist.

## 2018-01-10 ENCOUNTER — TELEPHONE (OUTPATIENT)
Dept: NEPHROLOGY | Facility: CLINIC | Age: 78
End: 2018-01-10

## 2018-01-10 NOTE — TELEPHONE ENCOUNTER
Pt requesting to be seen sooner than first available due to kidney levels are high. Pt states kidney level is at 26.  Please call thank you [phone]

## 2018-01-12 ENCOUNTER — APPOINTMENT (OUTPATIENT)
Dept: LAB | Facility: HOSPITAL | Age: 78
End: 2018-01-12
Attending: NURSE PRACTITIONER
Payer: MEDICARE

## 2018-01-12 ENCOUNTER — OFFICE VISIT (OUTPATIENT)
Dept: NEPHROLOGY | Facility: CLINIC | Age: 78
End: 2018-01-12

## 2018-01-12 VITALS
SYSTOLIC BLOOD PRESSURE: 138 MMHG | BODY MASS INDEX: 29.69 KG/M2 | DIASTOLIC BLOOD PRESSURE: 72 MMHG | HEART RATE: 84 BPM | WEIGHT: 193.63 LBS | HEIGHT: 67.75 IN

## 2018-01-12 DIAGNOSIS — N18.30 CKD (CHRONIC KIDNEY DISEASE) STAGE 3, GFR 30-59 ML/MIN (HCC): Primary | ICD-10-CM

## 2018-01-12 DIAGNOSIS — I50.9 HEART FAILURE, UNSPECIFIED (HCC): ICD-10-CM

## 2018-01-12 LAB
ANION GAP SERPL CALC-SCNC: 8 MMOL/L (ref 0–18)
BUN SERPL-MCNC: 32 MG/DL (ref 8–20)
BUN/CREAT SERPL: 16.2 (ref 10–20)
CALCIUM SERPL-MCNC: 8.9 MG/DL (ref 8.5–10.5)
CHLORIDE SERPL-SCNC: 103 MMOL/L (ref 95–110)
CO2 SERPL-SCNC: 25 MMOL/L (ref 22–32)
CREAT SERPL-MCNC: 1.98 MG/DL (ref 0.5–1.5)
GLUCOSE SERPL-MCNC: 157 MG/DL (ref 70–99)
OSMOLALITY UR CALC.SUM OF ELEC: 292 MOSM/KG (ref 275–295)
POTASSIUM SERPL-SCNC: 3.9 MMOL/L (ref 3.3–5.1)
SODIUM SERPL-SCNC: 136 MMOL/L (ref 136–144)

## 2018-01-12 PROCEDURE — 99213 OFFICE O/P EST LOW 20 MIN: CPT | Performed by: INTERNAL MEDICINE

## 2018-01-12 PROCEDURE — 36415 COLL VENOUS BLD VENIPUNCTURE: CPT

## 2018-01-12 PROCEDURE — 80048 BASIC METABOLIC PNL TOTAL CA: CPT

## 2018-01-12 PROCEDURE — G0463 HOSPITAL OUTPT CLINIC VISIT: HCPCS | Performed by: INTERNAL MEDICINE

## 2018-01-12 NOTE — PATIENT INSTRUCTIONS
Good job Juve    Take 2 water pills  Monday, wed, Friday     if weight up over 2 lbs or swelling. . Take one water pill on  Other days until weight down     call me if any problems    Watch salt.  Fluids    See me eight weeks    Keep creatinine levell  2  Or

## 2018-01-18 ENCOUNTER — OFFICE VISIT (OUTPATIENT)
Dept: CARDIOLOGY CLINIC | Facility: HOSPITAL | Age: 78
End: 2018-01-18
Attending: INTERNAL MEDICINE
Payer: MEDICARE

## 2018-01-18 VITALS
OXYGEN SATURATION: 99 % | SYSTOLIC BLOOD PRESSURE: 129 MMHG | HEART RATE: 72 BPM | DIASTOLIC BLOOD PRESSURE: 73 MMHG | WEIGHT: 188.5 LBS | BODY MASS INDEX: 29 KG/M2

## 2018-01-18 DIAGNOSIS — I50.9 HEART FAILURE, UNSPECIFIED (HCC): ICD-10-CM

## 2018-01-18 DIAGNOSIS — I50.23 ACUTE ON CHRONIC SYSTOLIC CONGESTIVE HEART FAILURE (HCC): Primary | ICD-10-CM

## 2018-01-18 DIAGNOSIS — N18.30 STAGE 3 CHRONIC KIDNEY DISEASE (HCC): ICD-10-CM

## 2018-01-18 LAB
ANION GAP SERPL CALC-SCNC: 9 MMOL/L (ref 0–18)
BUN SERPL-MCNC: 30 MG/DL (ref 8–20)
BUN/CREAT SERPL: 14.7 (ref 10–20)
CALCIUM SERPL-MCNC: 9.2 MG/DL (ref 8.5–10.5)
CHLORIDE SERPL-SCNC: 105 MMOL/L (ref 95–110)
CO2 SERPL-SCNC: 23 MMOL/L (ref 22–32)
CREAT SERPL-MCNC: 2.04 MG/DL (ref 0.5–1.5)
GLUCOSE SERPL-MCNC: 148 MG/DL (ref 70–99)
OSMOLALITY UR CALC.SUM OF ELEC: 293 MOSM/KG (ref 275–295)
POTASSIUM SERPL-SCNC: 4 MMOL/L (ref 3.3–5.1)
SODIUM SERPL-SCNC: 137 MMOL/L (ref 136–144)

## 2018-01-18 PROCEDURE — 99214 OFFICE O/P EST MOD 30 MIN: CPT | Performed by: NURSE PRACTITIONER

## 2018-01-18 PROCEDURE — 80048 BASIC METABOLIC PNL TOTAL CA: CPT | Performed by: NURSE PRACTITIONER

## 2018-01-18 PROCEDURE — 36415 COLL VENOUS BLD VENIPUNCTURE: CPT | Performed by: NURSE PRACTITIONER

## 2018-01-18 PROCEDURE — 99211 OFF/OP EST MAY X REQ PHY/QHP: CPT | Performed by: NURSE PRACTITIONER

## 2018-01-18 NOTE — PATIENT INSTRUCTIONS
Continue Bumex to 2 mg Monday, Wednesday and Friday's.  Can add Bumex 1 mg in addition on Sunday if noticing weight gain or lower extremity swelling    Continue all other medications    Please call the heart failure clinic if you notice a weight gain of 3 p Pace your activity to prevent shortness of breath or fatigue.  Stop exercise if you develop chest pain, lightheadedness, or significant shortness of breath

## 2018-01-18 NOTE — PROGRESS NOTES
[address] Patient Status:  Outpatient    3/28/1940 MRN [id number]   Location Thor Ally, MD Dr. Roney Opitz is a 68year old male who presents to  01/18/2018 10:42 AM   K 4.0 01/18/2018 10:42 AM    01/18/2018 10:42 AM   CO2 23 01/18/2018 10:42 AM    (H) 01/18/2018 10:42 AM   CA 9.2 01/18/2018 10:42 AM   ALB 3.8 10/23/2017 07:34 AM   ALKPHO 58 04/20/2017 07:35 AM   BILT 1.2 04/20/2017 07: circumflex: Previous stents were wide open with good flow no lesions greater than 20%  #4 left anterior descending artery: 100% occluded based on previous angiograms a saphenous vein graft to the left anterior descending artery was 100% occludedA saphenous Saturday and Sunday when he doesn't take his bumex on Sunday he has increased swelling in his legs. He took extra 1 mg of bumex which alleviated this. Sleeping well. Denies sob, young, cp, dizziness. Denies fatigue. Clinic wt [employer] [facility] Denies YOUNG.  Destiny Cage differences    · Heart healthy, decreased refined sugars, and  2000 mg sodium restricted diet and maintain fluids at 32 to 64 ounces per day.  Common high sodium foods include frozen dinners, soups (not homemade), some cereal, vegetable juice, canned vegeta

## 2018-01-22 ENCOUNTER — ANTI-COAG VISIT (OUTPATIENT)
Dept: INTERNAL MEDICINE CLINIC | Facility: CLINIC | Age: 78
End: 2018-01-22

## 2018-01-22 DIAGNOSIS — I48.20 CHRONIC ATRIAL FIBRILLATION (HCC): ICD-10-CM

## 2018-01-22 LAB — INR: 2 (ref 0.8–1.2)

## 2018-01-22 PROCEDURE — 36416 COLLJ CAPILLARY BLOOD SPEC: CPT

## 2018-01-22 PROCEDURE — 85610 PROTHROMBIN TIME: CPT

## 2018-01-31 ENCOUNTER — TELEPHONE (OUTPATIENT)
Dept: CARDIOLOGY CLINIC | Facility: HOSPITAL | Age: 78
End: 2018-01-31

## 2018-01-31 NOTE — TELEPHONE ENCOUNTER
Patient phoned with 3 pound increase in weight. He has noticed this on days he is not taking bumex. (Bumex 2 mg on MWF) Has taken 1mg on Saturday and Sunday this last week to keep weight off.  In order to make things easier, he would like to try taking Bum

## 2018-01-31 NOTE — PROGRESS NOTES
His creatinine and BUN are 30 and 2.0 respectively which is stable for her GFR is about 33 on exam he looks good blood pressure is stable he is not coughing not shortness of breath his heart is irregular rhythm and A. fib which is chronic his lungs are lynn

## 2018-02-09 ENCOUNTER — TELEPHONE (OUTPATIENT)
Dept: CARDIOLOGY CLINIC | Facility: HOSPITAL | Age: 78
End: 2018-02-09

## 2018-02-09 RX ORDER — ISOSORBIDE DINITRATE 5 MG/1
TABLET ORAL
Qty: 90 TABLET | Refills: 0 | Status: SHIPPED | OUTPATIENT
Start: 2018-02-09 | End: 2018-02-27

## 2018-02-09 NOTE — TELEPHONE ENCOUNTER
Patient called with condition update. States with change in his Bumex to 1 mg daily his weight has increased by 1.5-2 lbs over night with increased SOB.   He has been  taking Bumex 2mg daily from Saturday through Wednesday this past week, and only Bumex 1

## 2018-02-13 ENCOUNTER — OFFICE VISIT (OUTPATIENT)
Dept: CARDIOLOGY CLINIC | Facility: CLINIC | Age: 78
End: 2018-02-13

## 2018-02-13 VITALS
HEIGHT: 68 IN | DIASTOLIC BLOOD PRESSURE: 60 MMHG | RESPIRATION RATE: 18 BRPM | WEIGHT: 186 LBS | BODY MASS INDEX: 28.19 KG/M2 | SYSTOLIC BLOOD PRESSURE: 122 MMHG | HEART RATE: 72 BPM

## 2018-02-13 DIAGNOSIS — I48.20 CHRONIC ATRIAL FIBRILLATION (HCC): ICD-10-CM

## 2018-02-13 DIAGNOSIS — I25.10 CORONARY ARTERY DISEASE INVOLVING NATIVE CORONARY ARTERY OF NATIVE HEART WITHOUT ANGINA PECTORIS: Primary | ICD-10-CM

## 2018-02-13 DIAGNOSIS — I25.5 ISCHEMIC CARDIOMYOPATHY: ICD-10-CM

## 2018-02-13 PROCEDURE — G0463 HOSPITAL OUTPT CLINIC VISIT: HCPCS | Performed by: INTERNAL MEDICINE

## 2018-02-13 PROCEDURE — 99214 OFFICE O/P EST MOD 30 MIN: CPT | Performed by: INTERNAL MEDICINE

## 2018-02-13 RX ORDER — ISOSORBIDE DINITRATE 10 MG/1
TABLET ORAL
Qty: 90 TABLET | Refills: 3 | Status: SHIPPED | OUTPATIENT
Start: 2018-02-13 | End: 2019-01-03

## 2018-02-13 NOTE — PROGRESS NOTES
Sebastian Love is a 68year old male. Patient presents with:   Follow - Up  Fatigue  Dyspnea: on exertion  Atrial Fibrillation  CHF    HPI:   This is a pleasant 42-year-old with coronary disease cardiomyopathy bypass defibrillator last November and  Oral Chew Tab Chew 1 tablet (80 mg total) by mouth every 6 (six) hours as needed for FLATULENCE. Disp: 60 tablet Rfl: 0   triamcinolone acetonide 0.1 % External Cream Apply topically 2 (two) times daily as needed.  Disp: 45 g Rfl: 1      Past Medical Histor murmur  GI: good BS's,no masses, HSM or tenderness  EXTREMITIES: no cyanosis, clubbing or edema  PSYCH:alert and oriented x3    Assessment   ASSESSMENT AND PLAN:     Problem List Items Addressed This Visit     CAD (coronary artery disease) - Primary    Rel

## 2018-02-22 ENCOUNTER — ANTI-COAG VISIT (OUTPATIENT)
Dept: INTERNAL MEDICINE CLINIC | Facility: CLINIC | Age: 78
End: 2018-02-22

## 2018-02-22 DIAGNOSIS — I48.20 CHRONIC ATRIAL FIBRILLATION (HCC): ICD-10-CM

## 2018-02-22 LAB
INR: 2.3 (ref 0.8–1.2)
TEST STRIP EXPIRATION DATE: ABNORMAL DATE

## 2018-02-22 PROCEDURE — 36416 COLLJ CAPILLARY BLOOD SPEC: CPT

## 2018-02-22 PROCEDURE — 85610 PROTHROMBIN TIME: CPT

## 2018-02-22 PROCEDURE — G0463 HOSPITAL OUTPT CLINIC VISIT: HCPCS

## 2018-02-23 ENCOUNTER — TELEPHONE (OUTPATIENT)
Dept: INTERNAL MEDICINE CLINIC | Facility: CLINIC | Age: 78
End: 2018-02-23

## 2018-02-23 DIAGNOSIS — I25.5 ISCHEMIC CARDIOMYOPATHY: Primary | ICD-10-CM

## 2018-02-23 NOTE — TELEPHONE ENCOUNTER
Referral generated; will forward to managed care; apparently was needed for pacemaker check; please forward to [address] [place] [address]

## 2018-02-23 NOTE — TELEPHONE ENCOUNTER
Pt needs referral for pacemaker check - was at [facility] this Am

## 2018-02-23 NOTE — TELEPHONE ENCOUNTER
Pt needs a written referral for a Curetis check, [facility].   Pt is being seen this morning   Tasked to nursing high

## 2018-02-27 ENCOUNTER — OFFICE VISIT (OUTPATIENT)
Dept: CARDIOLOGY CLINIC | Facility: HOSPITAL | Age: 78
End: 2018-02-27
Attending: NURSE PRACTITIONER
Payer: MEDICARE

## 2018-02-27 VITALS
HEART RATE: 70 BPM | BODY MASS INDEX: 28 KG/M2 | DIASTOLIC BLOOD PRESSURE: 66 MMHG | OXYGEN SATURATION: 100 % | SYSTOLIC BLOOD PRESSURE: 114 MMHG | WEIGHT: 187 LBS

## 2018-02-27 DIAGNOSIS — N18.30 CKD (CHRONIC KIDNEY DISEASE), STAGE III (HCC): ICD-10-CM

## 2018-02-27 DIAGNOSIS — I50.9 HEART FAILURE, UNSPECIFIED (HCC): ICD-10-CM

## 2018-02-27 DIAGNOSIS — I25.5 ISCHEMIC CARDIOMYOPATHY: Primary | ICD-10-CM

## 2018-02-27 LAB
ANION GAP SERPL CALC-SCNC: 9 MMOL/L (ref 0–18)
BUN SERPL-MCNC: 37 MG/DL (ref 8–20)
BUN/CREAT SERPL: 19.4 (ref 10–20)
CALCIUM SERPL-MCNC: 8.8 MG/DL (ref 8.5–10.5)
CHLORIDE SERPL-SCNC: 104 MMOL/L (ref 95–110)
CO2 SERPL-SCNC: 24 MMOL/L (ref 22–32)
CREAT SERPL-MCNC: 1.91 MG/DL (ref 0.5–1.5)
GLUCOSE SERPL-MCNC: 173 MG/DL (ref 70–99)
INR BLD: 2.9 (ref 0.9–1.2)
OSMOLALITY UR CALC.SUM OF ELEC: 297 MOSM/KG (ref 275–295)
POTASSIUM SERPL-SCNC: 4 MMOL/L (ref 3.3–5.1)
PROTHROMBIN TIME: 29.4 SECONDS (ref 11.8–14.5)
SODIUM SERPL-SCNC: 137 MMOL/L (ref 136–144)

## 2018-02-27 PROCEDURE — 80048 BASIC METABOLIC PNL TOTAL CA: CPT

## 2018-02-27 PROCEDURE — 99211 OFF/OP EST MAY X REQ PHY/QHP: CPT | Performed by: NURSE PRACTITIONER

## 2018-02-27 PROCEDURE — 85610 PROTHROMBIN TIME: CPT

## 2018-02-27 PROCEDURE — 36415 COLL VENOUS BLD VENIPUNCTURE: CPT | Performed by: NURSE PRACTITIONER

## 2018-02-27 PROCEDURE — 99214 OFFICE O/P EST MOD 30 MIN: CPT | Performed by: NURSE PRACTITIONER

## 2018-02-27 NOTE — PROGRESS NOTES
[address] Patient Status:  Outpatient    3/28/1940 MRN [id number]   Location MD Dr. Josh Aguilar is a 68year old male who presents to  10:42 AM    01/18/2018 10:42 AM   K 4.0 01/18/2018 10:42 AM    01/18/2018 10:42 AM   CO2 23 01/18/2018 10:42 AM    (H) 01/18/2018 10:42 AM   CA 9.2 01/18/2018 10:42 AM   ALB 3.8 10/23/2017 07:34 AM   ALKPHO 58 04/20/2017 07:35 AM   BILT Angiographically normal   #3 circumflex: Previous stents were wide open with good flow no lesions greater than 20%  #4 left anterior descending artery: 100% occluded based on previous angiograms a saphenous vein graft to the left anterior descending artery running between 100-110/60s. Home wt 179-182 consistently. Clinic weight 187. Sleeping well. Denies sob, drummond, cp, dizziness. Excellent energy. Euvolemic on exam.    Has been adhering to his low sodium diet.   Reinforced adherence to no more than 64 oz p like hotdogs, sausage, isaac, pepperoni, soy sauce, pre-packaged rice or potatoes. Please remember to read nutrition labels for sodium content.      · Limit caffeine to no more than 16 ounces per day     · Exercise daily as tolerated, with goal of doing mod

## 2018-02-27 NOTE — PATIENT INSTRUCTIONS
Continue taking bumex 2mg alternating with 1 mg every other day.       Please call the heart failure clinic if you notice a weight gain of 3 pounds overnight or 5 pounds or more in 5 days.      Monitor yourself for signs and symptoms of fluid overload, incr breath or fatigue.  Stop exercise if you develop chest pain, lightheadedness, or significant shortness of breath

## 2018-03-14 ENCOUNTER — OFFICE VISIT (OUTPATIENT)
Dept: NEPHROLOGY | Facility: CLINIC | Age: 78
End: 2018-03-14

## 2018-03-14 VITALS
WEIGHT: 190.38 LBS | HEART RATE: 75 BPM | SYSTOLIC BLOOD PRESSURE: 127 MMHG | HEIGHT: 68 IN | DIASTOLIC BLOOD PRESSURE: 73 MMHG | BODY MASS INDEX: 28.85 KG/M2

## 2018-03-14 DIAGNOSIS — N18.30 CKD (CHRONIC KIDNEY DISEASE) STAGE 3, GFR 30-59 ML/MIN (HCC): Primary | ICD-10-CM

## 2018-03-14 DIAGNOSIS — I10 ESSENTIAL HYPERTENSION: ICD-10-CM

## 2018-03-14 DIAGNOSIS — I42.9 CARDIOMYOPATHY, UNSPECIFIED TYPE (HCC): ICD-10-CM

## 2018-03-14 PROCEDURE — G0463 HOSPITAL OUTPT CLINIC VISIT: HCPCS | Performed by: INTERNAL MEDICINE

## 2018-03-14 PROCEDURE — 99213 OFFICE O/P EST LOW 20 MIN: CPT | Performed by: INTERNAL MEDICINE

## 2018-03-15 RX ORDER — POTASSIUM CHLORIDE 20 MEQ/1
TABLET, EXTENDED RELEASE ORAL
Qty: 90 TABLET | Refills: 1 | Status: SHIPPED | OUTPATIENT
Start: 2018-03-15 | End: 2018-10-11

## 2018-03-15 NOTE — PROGRESS NOTES
Dear Yuliana Vaughan,  I had the pleasure of seeing Jodee Salgado today as you know he is a 80-year-old white male with systolic heart failure EF about 35%. He also has coronary artery disease with stents.   Christiane Gayle is doing well we will try to manage his diuretics

## 2018-03-23 ENCOUNTER — TELEPHONE (OUTPATIENT)
Dept: NEPHROLOGY | Facility: CLINIC | Age: 78
End: 2018-03-23

## 2018-03-23 RX ORDER — BUMETANIDE 1 MG/1
TABLET ORAL
Qty: 180 TABLET | Refills: 3 | Status: SHIPPED | OUTPATIENT
Start: 2018-03-23 | End: 2018-06-07

## 2018-03-23 RX ORDER — METOLAZONE 2.5 MG/1
TABLET ORAL
Qty: 30 TABLET | Refills: 0 | Status: SHIPPED | OUTPATIENT
Start: 2018-03-23 | End: 2019-06-12

## 2018-03-23 RX ORDER — METOLAZONE 2.5 MG/1
TABLET ORAL
Qty: 30 TABLET | Refills: 0 | Status: SHIPPED | OUTPATIENT
Start: 2018-03-23 | End: 2018-03-23

## 2018-03-23 NOTE — TELEPHONE ENCOUNTER
Patient contacted. Dr. Gisela De La Torre advice relayed. Prescription has been sent to [address] [facility]. Patient will call on Monday to update Dr. Yogi Canada on condition.  [facility] order pharmacy contacted and requested a stop on Metolazone prescription that was inadvertently

## 2018-03-23 NOTE — TELEPHONE ENCOUNTER
Patient contacted. He states he has gained weight since seen by Dr. Edson Donaldson in the office. 3/14/18 weight was 181.5, today 3/23/18 186.5.  Blood pressures: 3/14/18 120/57-71, 3/15 112/59-70, 3/16 115/67-70, 3/17 116/58-72, 3/18 121/62-74, 3/19 118/59-70, 3/20

## 2018-03-23 NOTE — TELEPHONE ENCOUNTER
Take 2 bumex daily. .  Add metolozone  30 min before am bumex daily     call me Monday with condition. .  Anthony Sow called in.

## 2018-03-26 ENCOUNTER — ANTI-COAG VISIT (OUTPATIENT)
Dept: INTERNAL MEDICINE CLINIC | Facility: CLINIC | Age: 78
End: 2018-03-26

## 2018-03-26 DIAGNOSIS — I48.20 CHRONIC ATRIAL FIBRILLATION (HCC): ICD-10-CM

## 2018-03-26 LAB
INR: 2.5 (ref 0.8–1.2)
TEST STRIP EXPIRATION DATE: ABNORMAL DATE

## 2018-03-26 PROCEDURE — G0463 HOSPITAL OUTPT CLINIC VISIT: HCPCS

## 2018-03-26 PROCEDURE — 36416 COLLJ CAPILLARY BLOOD SPEC: CPT

## 2018-03-26 PROCEDURE — 85610 PROTHROMBIN TIME: CPT

## 2018-03-26 NOTE — TELEPHONE ENCOUNTER
Patient contacted. He states he feels much better. Breathing much better. Lost weight: today weight is 181.5. Blood pressure today is 101/58-74. Knees feel much better, walking is easier. He is going to call next Friday with another update.

## 2018-03-27 ENCOUNTER — TELEPHONE (OUTPATIENT)
Dept: NEPHROLOGY | Facility: CLINIC | Age: 78
End: 2018-03-27

## 2018-03-27 NOTE — TELEPHONE ENCOUNTER
Encounter routed to Dr. Zofia Faria to address. (out of the office today Tuesday.  Will be back on Wednesday)

## 2018-04-08 ENCOUNTER — HOSPITAL ENCOUNTER (EMERGENCY)
Facility: HOSPITAL | Age: 78
Discharge: HOME | End: 2018-04-08
Attending: EMERGENCY MEDICINE
Payer: MEDICARE

## 2018-04-08 ENCOUNTER — APPOINTMENT (OUTPATIENT)
Dept: CT IMAGING | Facility: HOSPITAL | Age: 78
End: 2018-04-08
Attending: EMERGENCY MEDICINE
Payer: MEDICARE

## 2018-04-08 VITALS
HEIGHT: 68 IN | OXYGEN SATURATION: 100 % | HEART RATE: 88 BPM | RESPIRATION RATE: 20 BRPM | SYSTOLIC BLOOD PRESSURE: 128 MMHG | TEMPERATURE: 97 F | BODY MASS INDEX: 26.83 KG/M2 | WEIGHT: 177 LBS | DIASTOLIC BLOOD PRESSURE: 84 MMHG

## 2018-04-08 DIAGNOSIS — R42 VERTIGO: Primary | ICD-10-CM

## 2018-04-08 PROCEDURE — 85025 COMPLETE CBC W/AUTO DIFF WBC: CPT | Performed by: EMERGENCY MEDICINE

## 2018-04-08 PROCEDURE — 93010 ELECTROCARDIOGRAM REPORT: CPT | Performed by: EMERGENCY MEDICINE

## 2018-04-08 PROCEDURE — 96374 THER/PROPH/DIAG INJ IV PUSH: CPT

## 2018-04-08 PROCEDURE — 93005 ELECTROCARDIOGRAM TRACING: CPT

## 2018-04-08 PROCEDURE — 80048 BASIC METABOLIC PNL TOTAL CA: CPT | Performed by: EMERGENCY MEDICINE

## 2018-04-08 PROCEDURE — 80061 LIPID PANEL: CPT | Performed by: EMERGENCY MEDICINE

## 2018-04-08 PROCEDURE — 70450 CT HEAD/BRAIN W/O DYE: CPT | Performed by: EMERGENCY MEDICINE

## 2018-04-08 PROCEDURE — 99285 EMERGENCY DEPT VISIT HI MDM: CPT

## 2018-04-08 PROCEDURE — 84484 ASSAY OF TROPONIN QUANT: CPT | Performed by: EMERGENCY MEDICINE

## 2018-04-08 RX ORDER — ONDANSETRON 2 MG/ML
4 INJECTION INTRAMUSCULAR; INTRAVENOUS ONCE
Status: COMPLETED | OUTPATIENT
Start: 2018-04-08 | End: 2018-04-08

## 2018-04-08 RX ORDER — MECLIZINE HYDROCHLORIDE 25 MG/1
25 TABLET ORAL ONCE
Status: COMPLETED | OUTPATIENT
Start: 2018-04-08 | End: 2018-04-08

## 2018-04-09 NOTE — ED NOTES
Umer De Paz call from [address] [place] [address] and pts device is not causing his dizziness, nausea and vomiting

## 2018-04-09 NOTE — ED PROVIDER NOTES
Patient Seen in: [facility] AND [facility] Emergency Department    History   No chief complaint on file.     Stated Complaint: Dizziness; Vomiting    HPI    66year old male with pmh a fib on coumadin, CAD s/p CABG, AICD, CHF, high cholesterol who presents with d except as noted above.     Physical Exam   ED Triage Vitals [04/08/18 4299]  BP: (!) 146/123  Pulse: 83  Resp: 19  Temp: (!) 97.2 °F (36.2 °C)  Temp src: Oral  SpO2: 98 %  O2 Device: None (Room air)    Current:/84   Pulse 88   Temp (!) 97.2 °F (36.2 ° following:     Cholesterol, Total 87 (*)     All other components within normal limits   TROPONIN I - Abnormal; Notable for the following:     Troponin 0.05 (*)     All other components within normal limits   CBC W/ DIFFERENTIAL - Abnormal; Notable for the Radiology exams  Viewed and reviewed by myself and findings discussed with patient including need for follow up    D/w Dr Ebony Juárez (cardiology) - based on my assessment agrees ok to go home and f/u closely    Pt had trop 0.05 2 hrs apart, last comparis

## 2018-04-09 NOTE — ED NOTES
[facility] called for pacemaker interrogation. Pt was told in Md office that his device was on a 10sec delay and may experience dizziness, nausea and vomiting which pt is experiencing.

## 2018-04-09 NOTE — ED NOTES
Using Merlin at [facility] device to transmit device interrogation to [address] [place] [address] representative.

## 2018-04-09 NOTE — ED NOTES
Received pt from triage. Pt here with c/o dizziness that began today while getting dressed. Pt states he then began vomiting after dizziness started. Pt states he has been unable to hold down any liquids today. Pt denies any cp or sob.  IV inserted , labs d

## 2018-04-10 RX ORDER — HYDRALAZINE HYDROCHLORIDE 10 MG/1
TABLET, FILM COATED ORAL
Qty: 270 TABLET | Refills: 0 | Status: SHIPPED | OUTPATIENT
Start: 2018-04-10 | End: 2018-08-22

## 2018-04-13 ENCOUNTER — OFFICE VISIT (OUTPATIENT)
Dept: INTERNAL MEDICINE CLINIC | Facility: CLINIC | Age: 78
End: 2018-04-13

## 2018-04-13 ENCOUNTER — TELEPHONE (OUTPATIENT)
Dept: INTERNAL MEDICINE CLINIC | Facility: CLINIC | Age: 78
End: 2018-04-13

## 2018-04-13 VITALS
DIASTOLIC BLOOD PRESSURE: 68 MMHG | BODY MASS INDEX: 28.04 KG/M2 | SYSTOLIC BLOOD PRESSURE: 146 MMHG | HEART RATE: 64 BPM | HEIGHT: 68 IN | TEMPERATURE: 98 F | WEIGHT: 185 LBS

## 2018-04-13 DIAGNOSIS — E78.00 HYPERCHOLESTEREMIA: ICD-10-CM

## 2018-04-13 DIAGNOSIS — R42 DIZZINESS: Primary | ICD-10-CM

## 2018-04-13 DIAGNOSIS — N18.30 STAGE 3 CHRONIC KIDNEY DISEASE (HCC): ICD-10-CM

## 2018-04-13 DIAGNOSIS — I25.5 ISCHEMIC CARDIOMYOPATHY: ICD-10-CM

## 2018-04-13 DIAGNOSIS — J44.9 CHRONIC OBSTRUCTIVE PULMONARY DISEASE, UNSPECIFIED COPD TYPE (HCC): ICD-10-CM

## 2018-04-13 DIAGNOSIS — I48.91 ATRIAL FIBRILLATION, UNSPECIFIED TYPE (HCC): ICD-10-CM

## 2018-04-13 DIAGNOSIS — E11.9 TYPE 2 DIABETES MELLITUS WITHOUT COMPLICATION, WITHOUT LONG-TERM CURRENT USE OF INSULIN (HCC): ICD-10-CM

## 2018-04-13 PROCEDURE — 99212 OFFICE O/P EST SF 10 MIN: CPT | Performed by: INTERNAL MEDICINE

## 2018-04-13 PROCEDURE — 99214 OFFICE O/P EST MOD 30 MIN: CPT | Performed by: INTERNAL MEDICINE

## 2018-04-13 NOTE — PROGRESS NOTES
Arlinda Jeans is a 66year old male. HPI:   Patient presents with:  Er F/u: Pt went to ER on Sunday for \"violent vertigo\" with vomiting. He had a CT scan. Incidently, the abdominal bloating he has experienced all his life seems to have resolved. 10 MG Oral Tab TAKE 1 TABLET THREE TIMES DAILY. (Patient taking differently: 2 times daily) Disp: 270 tablet Rfl: 0   isosorbide dinitrate 10 MG Oral Tab One half pill TID (Patient taking differently: 2 (two) times daily.  One half pill TID ) Disp: 90 table erythema; no oral lesions  Neck/Thyroid: neck supple; no thyromegaly  Cardiovascular: RRR, S1, S2, no S3 or murmur  Respiratory: lungs without crackles or wheezes  Abdomen: normoactive bowel sounds, soft, non-tender and non-distended  Extremities: no clubb hyperparathyroidism   in April 2016; repeat PTH is 103 (was 67 in Oct 2016); on Vitamin D 2000 IU po qD     Diabetic nephropathy associated with diabetes mellitus  Urine microalbumin abnormal in April 2016 with microalbumin 29.8 mg/dL     ED  on Elizabeth

## 2018-04-19 ENCOUNTER — LAB ENCOUNTER (OUTPATIENT)
Dept: LAB | Age: 78
End: 2018-04-19
Attending: INTERNAL MEDICINE
Payer: MEDICARE

## 2018-04-19 ENCOUNTER — PRIOR ORIGINAL RECORDS (OUTPATIENT)
Dept: OTHER | Age: 78
End: 2018-04-19

## 2018-04-19 DIAGNOSIS — N18.30 CKD (CHRONIC KIDNEY DISEASE) STAGE 3, GFR 30-59 ML/MIN (HCC): ICD-10-CM

## 2018-04-19 DIAGNOSIS — E78.00 HYPERCHOLESTEREMIA: ICD-10-CM

## 2018-04-19 DIAGNOSIS — E11.9 TYPE 2 DIABETES MELLITUS WITHOUT COMPLICATION, WITHOUT LONG-TERM CURRENT USE OF INSULIN (HCC): ICD-10-CM

## 2018-04-19 PROCEDURE — 83036 HEMOGLOBIN GLYCOSYLATED A1C: CPT

## 2018-04-19 PROCEDURE — 80048 BASIC METABOLIC PNL TOTAL CA: CPT

## 2018-04-19 PROCEDURE — 85025 COMPLETE CBC W/AUTO DIFF WBC: CPT

## 2018-04-19 PROCEDURE — 36415 COLL VENOUS BLD VENIPUNCTURE: CPT

## 2018-04-19 PROCEDURE — 80061 LIPID PANEL: CPT

## 2018-05-07 ENCOUNTER — ANTI-COAG VISIT (OUTPATIENT)
Dept: INTERNAL MEDICINE CLINIC | Facility: CLINIC | Age: 78
End: 2018-05-07

## 2018-05-07 DIAGNOSIS — I48.20 CHRONIC ATRIAL FIBRILLATION (HCC): ICD-10-CM

## 2018-05-07 PROCEDURE — 93793 ANTICOAG MGMT PT WARFARIN: CPT

## 2018-05-07 PROCEDURE — 36416 COLLJ CAPILLARY BLOOD SPEC: CPT

## 2018-05-07 PROCEDURE — 85610 PROTHROMBIN TIME: CPT

## 2018-05-23 ENCOUNTER — MYAURORA ACCOUNT LINK (OUTPATIENT)
Dept: OTHER | Age: 78
End: 2018-05-23

## 2018-05-29 ENCOUNTER — TELEPHONE (OUTPATIENT)
Dept: INTERNAL MEDICINE CLINIC | Facility: CLINIC | Age: 78
End: 2018-05-29

## 2018-05-29 NOTE — TELEPHONE ENCOUNTER
[facility] faxed a request for: Atorvastatin 40 mg, Carvedilol 12.5 mg, Bumetanide 1 mg   Fax.  # [phone]   Routed to Rx

## 2018-06-06 ENCOUNTER — OFFICE VISIT (OUTPATIENT)
Dept: NEPHROLOGY | Facility: CLINIC | Age: 78
End: 2018-06-06

## 2018-06-06 VITALS
BODY MASS INDEX: 26.9 KG/M2 | HEART RATE: 71 BPM | DIASTOLIC BLOOD PRESSURE: 70 MMHG | HEIGHT: 69.6 IN | WEIGHT: 185.81 LBS | SYSTOLIC BLOOD PRESSURE: 116 MMHG

## 2018-06-06 DIAGNOSIS — I25.5 ISCHEMIC CARDIOMYOPATHY: ICD-10-CM

## 2018-06-06 DIAGNOSIS — N18.30 CKD (CHRONIC KIDNEY DISEASE) STAGE 3, GFR 30-59 ML/MIN (HCC): Primary | ICD-10-CM

## 2018-06-06 PROCEDURE — G0463 HOSPITAL OUTPT CLINIC VISIT: HCPCS | Performed by: INTERNAL MEDICINE

## 2018-06-06 PROCEDURE — 99213 OFFICE O/P EST LOW 20 MIN: CPT | Performed by: INTERNAL MEDICINE

## 2018-06-06 NOTE — PATIENT INSTRUCTIONS
Good job gus    Same meds    Keep weight same or less     labs July 1    Call me for results    See me September    Have a great summer

## 2018-06-07 ENCOUNTER — PRIOR ORIGINAL RECORDS (OUTPATIENT)
Dept: OTHER | Age: 78
End: 2018-06-07

## 2018-06-07 ENCOUNTER — OFFICE VISIT (OUTPATIENT)
Dept: CARDIOLOGY CLINIC | Facility: HOSPITAL | Age: 78
End: 2018-06-07
Attending: INTERNAL MEDICINE
Payer: MEDICARE

## 2018-06-07 VITALS
HEART RATE: 70 BPM | WEIGHT: 185.63 LBS | DIASTOLIC BLOOD PRESSURE: 55 MMHG | SYSTOLIC BLOOD PRESSURE: 104 MMHG | BODY MASS INDEX: 27 KG/M2 | OXYGEN SATURATION: 100 %

## 2018-06-07 DIAGNOSIS — N18.30 CKD (CHRONIC KIDNEY DISEASE), STAGE III (HCC): ICD-10-CM

## 2018-06-07 DIAGNOSIS — I50.22 CHRONIC SYSTOLIC HEART FAILURE (HCC): Primary | ICD-10-CM

## 2018-06-07 DIAGNOSIS — I50.9 HEART FAILURE, UNSPECIFIED (HCC): ICD-10-CM

## 2018-06-07 DIAGNOSIS — N28.9 RENAL INSUFFICIENCY: ICD-10-CM

## 2018-06-07 PROCEDURE — 99211 OFF/OP EST MAY X REQ PHY/QHP: CPT | Performed by: NURSE PRACTITIONER

## 2018-06-07 PROCEDURE — 99213 OFFICE O/P EST LOW 20 MIN: CPT | Performed by: NURSE PRACTITIONER

## 2018-06-07 PROCEDURE — 36415 COLL VENOUS BLD VENIPUNCTURE: CPT | Performed by: NURSE PRACTITIONER

## 2018-06-07 PROCEDURE — 80048 BASIC METABOLIC PNL TOTAL CA: CPT | Performed by: NURSE PRACTITIONER

## 2018-06-07 RX ORDER — BUMETANIDE 1 MG/1
TABLET ORAL
Qty: 180 TABLET | Refills: 3 | COMMUNITY
Start: 2018-06-07 | End: 2018-06-11

## 2018-06-07 NOTE — PROGRESS NOTES
[address] Patient Status:  Outpatient    3/28/1940 MRN [id number]   Location MD Dr. Janneth Burgess is a 66year old male who presents to  106 04/19/2018 07:40 AM   CO2 23 04/19/2018 07:40 AM    (H) 04/19/2018 07:40 AM   CA 8.6 04/19/2018 07:40 AM   ALB 3.8 10/23/2017 07:34 AM   ALKPHO 58 04/20/2017 07:35 AM   BILT 1.2 04/20/2017 07:35 AM   TP 7.2 04/20/2017 07:35 AM   AST 24 04/20/201 stents were wide open with good flow no lesions greater than 20%  #4 left anterior descending artery: 100% occluded based on previous angiograms a saphenous vein graft to the left anterior descending artery was 100% occludedA saphenous vein graft supplying wt 178-180 consistently. Clinic weight 187. Sleeping well. Denies sob, drummond, cp, dizziness. Excellent energy. Euvolemic on exam.    Has been adhering to his low sodium diet. Fluid intake has only been around 32 oz. BUN/Cr elevated 61/2.6.  Instructe sodium restricted diet and maintain fluids at 32 to 64 ounces per day.  Common high sodium foods include frozen dinners, soups (not homemade), some cereal, vegetable juice, canned vegetables, lunch meats, processed meats like hotdogs, sausage, isaac, pepper

## 2018-06-07 NOTE — PATIENT INSTRUCTIONS
HOLD BUMEX TODAY AND TOMORROW.   Decrease Bumex to 1 mg daily    Repeat blood work on Monday, 6/11/18    Please call the heart failure clinic if you notice a weight gain of 3 pounds overnight or 5 pounds or more in 5 days.      Monitor yourself for signs an exercise if you develop chest pain, lightheadedness, or significant shortness of breath

## 2018-06-11 ENCOUNTER — LAB ENCOUNTER (OUTPATIENT)
Dept: LAB | Age: 78
End: 2018-06-11
Attending: NURSE PRACTITIONER
Payer: MEDICARE

## 2018-06-11 ENCOUNTER — TELEPHONE (OUTPATIENT)
Dept: INTERNAL MEDICINE CLINIC | Facility: CLINIC | Age: 78
End: 2018-06-11

## 2018-06-11 ENCOUNTER — TELEPHONE (OUTPATIENT)
Dept: CARDIOLOGY CLINIC | Facility: HOSPITAL | Age: 78
End: 2018-06-11

## 2018-06-11 ENCOUNTER — PRIOR ORIGINAL RECORDS (OUTPATIENT)
Dept: OTHER | Age: 78
End: 2018-06-11

## 2018-06-11 DIAGNOSIS — I25.5 ISCHEMIC CARDIOMYOPATHY: ICD-10-CM

## 2018-06-11 DIAGNOSIS — N28.9 RENAL INSUFFICIENCY: ICD-10-CM

## 2018-06-11 DIAGNOSIS — N18.30 CKD (CHRONIC KIDNEY DISEASE) STAGE 3, GFR 30-59 ML/MIN (HCC): ICD-10-CM

## 2018-06-11 DIAGNOSIS — I48.20 CHRONIC ATRIAL FIBRILLATION (HCC): ICD-10-CM

## 2018-06-11 PROCEDURE — 85025 COMPLETE CBC W/AUTO DIFF WBC: CPT

## 2018-06-11 PROCEDURE — 85610 PROTHROMBIN TIME: CPT

## 2018-06-11 PROCEDURE — 80069 RENAL FUNCTION PANEL: CPT

## 2018-06-11 PROCEDURE — 36415 COLL VENOUS BLD VENIPUNCTURE: CPT

## 2018-06-11 RX ORDER — BUMETANIDE 1 MG/1
TABLET ORAL
Qty: 90 TABLET | Refills: 3 | Status: SHIPPED | OUTPATIENT
Start: 2018-06-11 | End: 2019-05-14

## 2018-06-11 NOTE — TELEPHONE ENCOUNTER
Pt states that they are still playing with the dosage of Bumetanide but it is okay that 1 mg tablets were ordered. He will expect a call from [facility] tomorrow.

## 2018-06-11 NOTE — TELEPHONE ENCOUNTER
Pt stopped in office  Cancelled his appt for today as he had labs this morning that included INR, please call him later with results  Pt scheduled his next appt for 7/12/18, please let pt know if he would need to be sooner.   Tasked to Coumadin

## 2018-06-11 NOTE — TELEPHONE ENCOUNTER
INR 3.6; will forward to EMA coumadin    Bumetanide was decreased to 1 mg daily by CHF clinic on 6/7/18; ERx sent to [address] for bumetanide 1 mg po qD #90, 3RF; please verify with patient

## 2018-06-11 NOTE — TELEPHONE ENCOUNTER
At last clinic visit, creatinine elevated and Bumex decreased to 1 mg daily. Today, patient called reporting 4 lb weight gain, now 184 lbs on home scale (goal weight 180 lbs). Denies respiratory complaints.  Renal panel today per nephrology, creatinine impr

## 2018-06-13 ENCOUNTER — TELEPHONE (OUTPATIENT)
Dept: CARDIOLOGY CLINIC | Facility: HOSPITAL | Age: 78
End: 2018-06-13

## 2018-06-13 NOTE — TELEPHONE ENCOUNTER
Spoke with patient regarding his blood work results and a condition update. BUN/Cr improved to 40/2.06 after holding bumex for 2 days and decreasing bumex to 1 mg daily. Home weight is staying around 180 lbs. currently.   Had long discussion regarding his o

## 2018-06-26 ENCOUNTER — ANTI-COAG VISIT (OUTPATIENT)
Dept: INTERNAL MEDICINE CLINIC | Facility: CLINIC | Age: 78
End: 2018-06-26

## 2018-06-26 DIAGNOSIS — I48.20 CHRONIC ATRIAL FIBRILLATION (HCC): ICD-10-CM

## 2018-06-26 PROCEDURE — 85610 PROTHROMBIN TIME: CPT

## 2018-06-26 PROCEDURE — 36416 COLLJ CAPILLARY BLOOD SPEC: CPT

## 2018-06-26 PROCEDURE — 93793 ANTICOAG MGMT PT WARFARIN: CPT

## 2018-06-29 NOTE — PROGRESS NOTES
Vianca Saenz is here for follow-up. He just recently came back from [facility] where he got a little fluid overloaded from too much salt and drinking.   He continued a heart failure center has been doing a great job with pressures 116/70 pulse is 71 his weight is gone u

## 2018-07-06 ENCOUNTER — TELEPHONE (OUTPATIENT)
Dept: NEPHROLOGY | Facility: CLINIC | Age: 78
End: 2018-07-06

## 2018-07-06 NOTE — TELEPHONE ENCOUNTER
Message from Dr. Luisito Maldonado cut/pasted from Results note tab/folder LM on vm. Requested a callback on Monday to confirm that patient got this message or if he has any questions or concerns.

## 2018-07-09 ENCOUNTER — ANTI-COAG VISIT (OUTPATIENT)
Dept: INTERNAL MEDICINE CLINIC | Facility: CLINIC | Age: 78
End: 2018-07-09

## 2018-07-09 DIAGNOSIS — I48.20 CHRONIC ATRIAL FIBRILLATION (HCC): ICD-10-CM

## 2018-07-09 LAB
INR: 3.5 (ref 0.8–1.2)
TEST STRIP EXPIRATION DATE: ABNORMAL DATE

## 2018-07-09 PROCEDURE — G0463 HOSPITAL OUTPT CLINIC VISIT: HCPCS

## 2018-07-09 PROCEDURE — 85610 PROTHROMBIN TIME: CPT

## 2018-07-09 PROCEDURE — 36416 COLLJ CAPILLARY BLOOD SPEC: CPT

## 2018-07-16 ENCOUNTER — TELEPHONE (OUTPATIENT)
Dept: INTERNAL MEDICINE CLINIC | Facility: CLINIC | Age: 78
End: 2018-07-16

## 2018-07-16 ENCOUNTER — TELEPHONE (OUTPATIENT)
Dept: CARDIOLOGY CLINIC | Facility: CLINIC | Age: 78
End: 2018-07-16

## 2018-07-16 ENCOUNTER — TELEPHONE (OUTPATIENT)
Dept: NEPHROLOGY | Facility: CLINIC | Age: 78
End: 2018-07-16

## 2018-07-16 DIAGNOSIS — N18.30 CKD (CHRONIC KIDNEY DISEASE) STAGE 3, GFR 30-59 ML/MIN (HCC): Primary | ICD-10-CM

## 2018-07-16 DIAGNOSIS — H53.8 BLURRY VISION: Primary | ICD-10-CM

## 2018-07-16 NOTE — TELEPHONE ENCOUNTER
Attn BELINDA Sheets, please see message below and advise regarding blood work prior to F/U appt with Dr. Siddharth Molina.  Thanks!!

## 2018-07-16 NOTE — TELEPHONE ENCOUNTER
Informed pt okay for orders. Lab orders listed below placed. Verbalized understanding. No further questions or concerns.

## 2018-07-16 NOTE — TELEPHONE ENCOUNTER
Called patient and notified of new referral entered for Dr. Tito Diaz. Informed that all others are still active.

## 2018-07-16 NOTE — TELEPHONE ENCOUNTER
Pt asking if labs are needed prior to appt [facility] 8/14, pls call at:[phone],thanks.

## 2018-07-16 NOTE — TELEPHONE ENCOUNTER
Pt is looking for referrals for the following doctors to which the pt has appointments for already:   Dr Christelle Mansfield on 8/14   Dr Jessica Bagley on 9/5  Dr Kelton Orta on 10/25  Best call back is [phone]. Tasked to nursing.

## 2018-07-16 NOTE — TELEPHONE ENCOUNTER
Pt called to request labs to be done before appt on 9/5/18 with [facility]. Please call.

## 2018-07-23 ENCOUNTER — ANTI-COAG VISIT (OUTPATIENT)
Dept: INTERNAL MEDICINE CLINIC | Facility: CLINIC | Age: 78
End: 2018-07-23

## 2018-07-23 DIAGNOSIS — I48.20 CHRONIC ATRIAL FIBRILLATION (HCC): ICD-10-CM

## 2018-07-23 LAB
INR: 2.1 (ref 0.8–1.2)
TEST STRIP EXPIRATION DATE: ABNORMAL DATE

## 2018-07-23 PROCEDURE — 36416 COLLJ CAPILLARY BLOOD SPEC: CPT

## 2018-07-23 PROCEDURE — 85610 PROTHROMBIN TIME: CPT

## 2018-07-28 RX ORDER — WARFARIN SODIUM 5 MG/1
TABLET ORAL
Qty: 135 TABLET | Refills: 3 | Status: SHIPPED | OUTPATIENT
Start: 2018-07-28 | End: 2019-08-14

## 2018-08-14 ENCOUNTER — OFFICE VISIT (OUTPATIENT)
Dept: CARDIOLOGY CLINIC | Facility: CLINIC | Age: 78
End: 2018-08-14

## 2018-08-14 VITALS
DIASTOLIC BLOOD PRESSURE: 54 MMHG | WEIGHT: 189 LBS | BODY MASS INDEX: 28.64 KG/M2 | RESPIRATION RATE: 16 BRPM | HEART RATE: 72 BPM | SYSTOLIC BLOOD PRESSURE: 110 MMHG | HEIGHT: 68 IN

## 2018-08-14 DIAGNOSIS — I10 ESSENTIAL HYPERTENSION: ICD-10-CM

## 2018-08-14 DIAGNOSIS — I48.20 CHRONIC ATRIAL FIBRILLATION (HCC): ICD-10-CM

## 2018-08-14 DIAGNOSIS — I25.10 CORONARY ARTERY DISEASE INVOLVING NATIVE CORONARY ARTERY OF NATIVE HEART WITHOUT ANGINA PECTORIS: Primary | ICD-10-CM

## 2018-08-14 DIAGNOSIS — E78.5 DYSLIPIDEMIA: ICD-10-CM

## 2018-08-14 PROCEDURE — 99214 OFFICE O/P EST MOD 30 MIN: CPT | Performed by: INTERNAL MEDICINE

## 2018-08-14 PROCEDURE — G0463 HOSPITAL OUTPT CLINIC VISIT: HCPCS | Performed by: INTERNAL MEDICINE

## 2018-08-14 NOTE — PROGRESS NOTES
Freddie Judd is a 66year old male. Patient presents with:  CHF: Follow Up    HPI:   This is a pleasant 75-year-old gentleman with coronary disease cardiomyopathy bypass and defibrillator last November who presents for follow-up.   He also has chronic fibrillation ([facility] 1995   • CAD (coronary artery disease)     s/p 3v CABG 1995   • CAD (coronary artery disease)     s/p stent x3 in 2008   • CHF (congestive heart failure) ([facility] [facility] 2014    occurred when off Bumex   • CKD (chronic kidney disease)     creatini Relevant Orders    CARD ECHO 2D DOPPLER (CPT=93306)    Atrial fibrillation ([address]    Relevant Orders    CARD ECHO 2D DOPPLER (CPT=93306)          In conclusion this is a pleasant 80-year-old with coronary disease atrial fibrillation cardiomyopathy defibr

## 2018-08-21 ENCOUNTER — TELEPHONE (OUTPATIENT)
Dept: CARDIOLOGY CLINIC | Facility: CLINIC | Age: 78
End: 2018-08-21

## 2018-08-21 RX ORDER — HYDRALAZINE HYDROCHLORIDE 10 MG/1
TABLET, FILM COATED ORAL
Qty: 270 TABLET | Refills: 0 | OUTPATIENT
Start: 2018-08-21

## 2018-08-22 ENCOUNTER — MED REC SCAN ONLY (OUTPATIENT)
Dept: CARDIOLOGY CLINIC | Facility: CLINIC | Age: 78
End: 2018-08-22

## 2018-08-23 RX ORDER — HYDRALAZINE HYDROCHLORIDE 10 MG/1
TABLET, FILM COATED ORAL
Qty: 270 TABLET | Refills: 3 | Status: ON HOLD | OUTPATIENT
Start: 2018-08-23 | End: 2019-09-10

## 2018-08-28 ENCOUNTER — ANTI-COAG VISIT (OUTPATIENT)
Dept: INTERNAL MEDICINE CLINIC | Facility: CLINIC | Age: 78
End: 2018-08-28

## 2018-08-28 DIAGNOSIS — I48.20 CHRONIC ATRIAL FIBRILLATION (HCC): ICD-10-CM

## 2018-08-28 LAB
INR: 2.4 (ref 0.8–1.2)
TEST STRIP EXPIRATION DATE: ABNORMAL DATE

## 2018-08-28 PROCEDURE — 93793 ANTICOAG MGMT PT WARFARIN: CPT

## 2018-08-28 PROCEDURE — 85610 PROTHROMBIN TIME: CPT

## 2018-08-28 PROCEDURE — 36416 COLLJ CAPILLARY BLOOD SPEC: CPT

## 2018-08-29 LAB
BUN: 61 MG/DL
CALCIUM: 9.1 MG/DL
CHLORIDE: 99 MEQ/L
CHOLESTEROL, TOTAL: 91 MG/DL
CREATININE, SERUM: 2.6 MG/DL
GLUCOSE: 141 MG/DL
HDL CHOLESTEROL: 27 MG/DL
HEMATOCRIT: 37.5 %
HEMOGLOBIN A1C: 7 %
HEMOGLOBIN: 12.9 G/DL
LDL CHOLESTEROL: 46 MG/DL
NON-HDL CHOLESTEROL: 64 MG/DL
PLATELETS: 153 K/UL
POTASSIUM, SERUM: 3.3 MEQ/L
RED BLOOD COUNT: 4.07 X 10-6/U
SODIUM: 134 MEQ/L
TRIGLYCERIDES: 91 MG/DL
WHITE BLOOD COUNT: 4.7 X 10-3/U

## 2018-08-31 ENCOUNTER — PRIOR ORIGINAL RECORDS (OUTPATIENT)
Dept: OTHER | Age: 78
End: 2018-08-31

## 2018-09-05 ENCOUNTER — OFFICE VISIT (OUTPATIENT)
Dept: NEPHROLOGY | Facility: CLINIC | Age: 78
End: 2018-09-05

## 2018-09-05 VITALS
WEIGHT: 190 LBS | HEART RATE: 71 BPM | DIASTOLIC BLOOD PRESSURE: 82 MMHG | HEIGHT: 68 IN | SYSTOLIC BLOOD PRESSURE: 137 MMHG | BODY MASS INDEX: 28.79 KG/M2

## 2018-09-05 DIAGNOSIS — N18.30 CKD (CHRONIC KIDNEY DISEASE) STAGE 3, GFR 30-59 ML/MIN (HCC): Primary | ICD-10-CM

## 2018-09-05 DIAGNOSIS — I25.5 ISCHEMIC CARDIOMYOPATHY: ICD-10-CM

## 2018-09-05 PROCEDURE — 99213 OFFICE O/P EST LOW 20 MIN: CPT | Performed by: INTERNAL MEDICINE

## 2018-09-05 NOTE — PATIENT INSTRUCTIONS
Great job gus     keep up good work     labs in late September    See me January   Call if problems     have a nice Debby

## 2018-09-07 NOTE — PROGRESS NOTES
Dear Meagan Trujillo saw Gallo Francois today in the office he is managing his cardiomyopathy very well is watching his salt intake his fluid intake.   He no longer sees a heart failure center he has been alternating his Bumex 1 mg daily with 2 mg daily he is not taking m

## 2018-09-27 ENCOUNTER — ANTI-COAG VISIT (OUTPATIENT)
Dept: INTERNAL MEDICINE CLINIC | Facility: CLINIC | Age: 78
End: 2018-09-27

## 2018-09-27 DIAGNOSIS — I48.20 CHRONIC ATRIAL FIBRILLATION (HCC): ICD-10-CM

## 2018-09-27 LAB
INR: 3.4 (ref 0.8–1.2)
TEST STRIP EXPIRATION DATE: ABNORMAL DATE

## 2018-09-27 PROCEDURE — 93793 ANTICOAG MGMT PT WARFARIN: CPT

## 2018-09-27 PROCEDURE — 85610 PROTHROMBIN TIME: CPT

## 2018-09-27 PROCEDURE — 36416 COLLJ CAPILLARY BLOOD SPEC: CPT

## 2018-10-10 RX ORDER — POTASSIUM CHLORIDE 20 MEQ/1
TABLET, EXTENDED RELEASE ORAL
Qty: 90 TABLET | Refills: 1 | OUTPATIENT
Start: 2018-10-10

## 2018-10-11 ENCOUNTER — ANTI-COAG VISIT (OUTPATIENT)
Dept: INTERNAL MEDICINE CLINIC | Facility: CLINIC | Age: 78
End: 2018-10-11

## 2018-10-11 DIAGNOSIS — I48.20 CHRONIC ATRIAL FIBRILLATION (HCC): ICD-10-CM

## 2018-10-11 PROCEDURE — 85610 PROTHROMBIN TIME: CPT

## 2018-10-11 PROCEDURE — 93793 ANTICOAG MGMT PT WARFARIN: CPT

## 2018-10-11 PROCEDURE — 36416 COLLJ CAPILLARY BLOOD SPEC: CPT

## 2018-10-11 RX ORDER — POTASSIUM CHLORIDE 20 MEQ/1
20 TABLET, EXTENDED RELEASE ORAL
Qty: 90 TABLET | Refills: 3 | Status: SHIPPED | OUTPATIENT
Start: 2018-10-11 | End: 2019-10-14

## 2018-10-25 ENCOUNTER — OFFICE VISIT (OUTPATIENT)
Dept: OPHTHALMOLOGY | Facility: CLINIC | Age: 78
End: 2018-10-25

## 2018-10-25 DIAGNOSIS — H35.30 ARMD (AGE-RELATED MACULAR DEGENERATION), BILATERAL: ICD-10-CM

## 2018-10-25 DIAGNOSIS — E11.9 DIET-CONTROLLED DIABETES MELLITUS (HCC): Primary | ICD-10-CM

## 2018-10-25 DIAGNOSIS — H25.13 AGE-RELATED NUCLEAR CATARACT OF BOTH EYES: ICD-10-CM

## 2018-10-25 DIAGNOSIS — H43.393 FLOATERS, BILATERAL: ICD-10-CM

## 2018-10-25 PROCEDURE — 92014 COMPRE OPH EXAM EST PT 1/>: CPT | Performed by: OPHTHALMOLOGY

## 2018-10-25 RX ORDER — MULTIVITAMIN
TABLET ORAL
Status: ON HOLD | COMMUNITY
End: 2021-08-28

## 2018-10-25 NOTE — PROGRESS NOTES
Shirley Swanson is a 66year old male. HPI:     HPI     Diabetic Eye Exam     Diabetes characteristics include Type 2. Duration of 4 years. Number of years on pills 0. Number of years on insulin 0.   Does PT check his/her own bloodsugar 2 (Twice a per week      Comment: Occas. Drug use: No      Medications:    Current Outpatient Medications:  Multiple Vitamin (ONE-A-DAY ESSENTIAL) Oral Tab Take by mouth.  Disp:  Rfl:    Potassium Chloride ER 20 MEQ Oral Tab CR Take 1 tablet (20 mEq total) by mouth dysfunction Dermatochalasis, Meibomian gland dysfunction    Conjunctiva/Sclera Temp pinguecula Nasal/temp pinguecula    Cornea Clear Clear    Anterior Chamber Deep and quiet Deep and quiet    Iris Normal Normal    Lens 1-2+ Nuclear sclerosis 1-2+ Nuclear s Presley Kumari MD

## 2018-10-25 NOTE — PATIENT INSTRUCTIONS
Diet-controlled diabetes mellitus ([facility] [address]  Diet controlled diabetes: no background of retinopathy, no signs of neovascularization noted. Discussed ocular and systemic benefits of blood sugar control.   Diagnosis and treatment discussed in detail with patient

## 2018-11-01 PROBLEM — I70.0 ATHEROSCLEROSIS OF AORTA (HCC): Status: ACTIVE | Noted: 2018-11-01

## 2018-11-01 PROBLEM — I77.1 TORTUOUS AORTA (HCC): Status: ACTIVE | Noted: 2018-11-01

## 2018-11-05 ENCOUNTER — TELEPHONE (OUTPATIENT)
Dept: NEPHROLOGY | Facility: CLINIC | Age: 78
End: 2018-11-05

## 2018-11-13 ENCOUNTER — LAB ENCOUNTER (OUTPATIENT)
Dept: LAB | Age: 78
End: 2018-11-13
Attending: INTERNAL MEDICINE
Payer: MEDICARE

## 2018-11-13 ENCOUNTER — PRIOR ORIGINAL RECORDS (OUTPATIENT)
Dept: OTHER | Age: 78
End: 2018-11-13

## 2018-11-13 DIAGNOSIS — N18.30 CKD (CHRONIC KIDNEY DISEASE) STAGE 3, GFR 30-59 ML/MIN (HCC): ICD-10-CM

## 2018-11-13 PROCEDURE — 85025 COMPLETE CBC W/AUTO DIFF WBC: CPT

## 2018-11-13 PROCEDURE — 36415 COLL VENOUS BLD VENIPUNCTURE: CPT

## 2018-11-13 PROCEDURE — 80069 RENAL FUNCTION PANEL: CPT

## 2018-11-14 ENCOUNTER — PRIOR ORIGINAL RECORDS (OUTPATIENT)
Dept: OTHER | Age: 78
End: 2018-11-14

## 2018-11-14 LAB
ALBUMIN: 3.7 G/DL
BUN: 29 MG/DL
CALCIUM: 8.9 MG/DL
CHLORIDE: 109 MEQ/L
CREATININE, SERUM: 2.1 MG/DL
GLUCOSE: 143 MG/DL
HEMATOCRIT: 40.4 %
HEMOGLOBIN: 13.8 G/DL
PLATELETS: 183 K/UL
POTASSIUM, SERUM: 3.6 MEQ/L
RED BLOOD COUNT: 4.25 X 10-6/U
SODIUM: 140 MEQ/L
WHITE BLOOD COUNT: 6.4 X 10-3/U

## 2018-11-15 ENCOUNTER — OFFICE VISIT (OUTPATIENT)
Dept: INTERNAL MEDICINE CLINIC | Facility: CLINIC | Age: 78
End: 2018-11-15

## 2018-11-15 ENCOUNTER — ANTI-COAG VISIT (OUTPATIENT)
Dept: INTERNAL MEDICINE CLINIC | Facility: CLINIC | Age: 78
End: 2018-11-15

## 2018-11-15 ENCOUNTER — TELEPHONE (OUTPATIENT)
Dept: CARDIOLOGY CLINIC | Facility: CLINIC | Age: 78
End: 2018-11-15

## 2018-11-15 VITALS
BODY MASS INDEX: 28.19 KG/M2 | TEMPERATURE: 98 F | DIASTOLIC BLOOD PRESSURE: 50 MMHG | WEIGHT: 186 LBS | OXYGEN SATURATION: 98 % | HEIGHT: 68 IN | SYSTOLIC BLOOD PRESSURE: 90 MMHG | HEART RATE: 70 BPM

## 2018-11-15 DIAGNOSIS — N25.81 SECONDARY HYPERPARATHYROIDISM (HCC): ICD-10-CM

## 2018-11-15 DIAGNOSIS — I48.20 CHRONIC ATRIAL FIBRILLATION (HCC): ICD-10-CM

## 2018-11-15 DIAGNOSIS — N18.30 STAGE 3 CHRONIC KIDNEY DISEASE (HCC): ICD-10-CM

## 2018-11-15 DIAGNOSIS — H35.30 MACULAR DEGENERATION, UNSPECIFIED LATERALITY, UNSPECIFIED TYPE: ICD-10-CM

## 2018-11-15 DIAGNOSIS — J44.9 CHRONIC OBSTRUCTIVE PULMONARY DISEASE, UNSPECIFIED COPD TYPE (HCC): ICD-10-CM

## 2018-11-15 DIAGNOSIS — E11.21 DIABETIC NEPHROPATHY ASSOCIATED WITH TYPE 2 DIABETES MELLITUS (HCC): ICD-10-CM

## 2018-11-15 DIAGNOSIS — I70.0 ATHEROSCLEROSIS OF AORTA (HCC): ICD-10-CM

## 2018-11-15 DIAGNOSIS — E78.00 HYPERCHOLESTEREMIA: ICD-10-CM

## 2018-11-15 DIAGNOSIS — E11.9 TYPE 2 DIABETES MELLITUS WITHOUT COMPLICATION, WITHOUT LONG-TERM CURRENT USE OF INSULIN (HCC): ICD-10-CM

## 2018-11-15 DIAGNOSIS — I25.5 ISCHEMIC CARDIOMYOPATHY: ICD-10-CM

## 2018-11-15 DIAGNOSIS — N52.9 ERECTILE DYSFUNCTION, UNSPECIFIED ERECTILE DYSFUNCTION TYPE: ICD-10-CM

## 2018-11-15 DIAGNOSIS — Z12.5 SCREENING PSA (PROSTATE SPECIFIC ANTIGEN): ICD-10-CM

## 2018-11-15 DIAGNOSIS — H26.9 CATARACT, UNSPECIFIED CATARACT TYPE, UNSPECIFIED LATERALITY: ICD-10-CM

## 2018-11-15 DIAGNOSIS — Z00.00 PHYSICAL EXAM, ANNUAL: Primary | ICD-10-CM

## 2018-11-15 PROCEDURE — G0439 PPPS, SUBSEQ VISIT: HCPCS | Performed by: INTERNAL MEDICINE

## 2018-11-15 PROCEDURE — 36416 COLLJ CAPILLARY BLOOD SPEC: CPT

## 2018-11-15 PROCEDURE — 96160 PT-FOCUSED HLTH RISK ASSMT: CPT | Performed by: INTERNAL MEDICINE

## 2018-11-15 PROCEDURE — 85610 PROTHROMBIN TIME: CPT

## 2018-11-15 NOTE — PROGRESS NOTES
Khushi Lynn is a 66year old male.     HPI:   Patient presents with:  Physical: Patient is here for medicare annual physical.      67 y/o M here for subsequent Medicare annual wellness exam;  no CP; no SOB; no headaches; no palpitation; pt wanted to tablet Rfl: 3   hydrALAzine HCl 10 MG Oral Tab TAKE 1 TABLET THREE TIMES DAILY.  Disp: 270 tablet Rfl: 3   WARFARIN SODIUM 5 MG Oral Tab TAKE 5MG  AT BEDTIME ON  MONDAY  AND  7.5MG ALL OTHER DAYS OR AS DIRECTED BY MD Disp: 135 tablet Rfl: 3   bumetanide 1 M help    Managing money/bills: Able without help    Taking medications as prescribed: Able without help    Are you able to afford your medications?: Yes    Hearing Problems?: Yes     Functional Status     Hearing Problems?: Yes    Vision Problems? : No    D avoid social activities because I cannot hear well and fear I will reply improperly:  No   Family members and friends have told me they think I may have hearing loss:  No           Visual Acuity     Right Eye Visual Acuity: Uncorrected Left Eye Visual Acui patient. Update Health Maintenance if applicable   Immunizations      Influenza No orders found for this or any previous visit.  Update Immunization Activity if applicable    Pneumococcal Orders placed or performed in visit on 11/13/17   • PNEUMOCOCCAL VAC Negative for eye discharge and vision loss  Cardiovascular:  Negative for chest pain; negative palpitations  Respiratory:  Negative for cough, dyspnea and wheezing  Endocrine:  Negative for abnormal sleep patterns, increased activity, polydipsia and polyph 24/26 one tab po BID, so med was changed to lisinopril 5 mg po qD, though creatinine increased to 1.9, so lisinopril was stopped; tried spironolactone 12.5 mg po qD in Nov 2017 and now stopped for unclear reasons (?elevated creatinine); s/p ICD/ Biv on 11/ [E]      Meds This Visit:  Requested Prescriptions      No prescriptions requested or ordered in this encounter       Imaging & Referrals:  None     11/15/2018  Meggan Whittington MD

## 2018-11-15 NOTE — TELEPHONE ENCOUNTER
Echo in nov or dec. CARD ECHO 2D DOPPLER (CPT=93306  Dx: Coronary artery disease involving native coronary artery of native heart without angina pectoris [I25.10 (ICD-10-CM)]; Chronic atrial fibrillation (HCC) [I48.2 (ICD-10-CM)];  Essential hypertens

## 2018-11-15 NOTE — TELEPHONE ENCOUNTER
Verified with [facility] Duane [facility] at [phone] ext [phone]). That this particular test does not need any prior authorization just the order. Called patient to give information and directions for him to go ahead and schedule ECHO.

## 2018-11-20 ENCOUNTER — TELEPHONE (OUTPATIENT)
Dept: NEPHROLOGY | Facility: CLINIC | Age: 78
End: 2018-11-20

## 2018-11-26 ENCOUNTER — TELEPHONE (OUTPATIENT)
Dept: CARDIOLOGY CLINIC | Facility: CLINIC | Age: 78
End: 2018-11-26

## 2018-11-26 DIAGNOSIS — I48.20 CHRONIC ATRIAL FIBRILLATION (HCC): ICD-10-CM

## 2018-11-26 DIAGNOSIS — I25.10 ATHEROSCLEROSIS OF NATIVE CORONARY ARTERY OF NATIVE HEART WITHOUT ANGINA PECTORIS: Primary | ICD-10-CM

## 2018-11-26 NOTE — TELEPHONE ENCOUNTER
Called patient and reminded him of Dr. Shivani Mathews recommendation for 2D Echo in November or December. Patient has P medicare adv. [employer] HMO. Please call patient once PA obtained and he is agreeable to schedule.       CARD ECHO 2D DOPPLER (CPT=93306) Herve Carroll

## 2018-11-29 NOTE — TELEPHONE ENCOUNTER
2D Echo authorized #[id number]. S/w Ellena Kawasaki to call and schedule test. states he has the number to call.

## 2018-11-30 PROBLEM — I77.1 TORTUOUS AORTA (HCC): Status: RESOLVED | Noted: 2018-11-01 | Resolved: 2018-11-30

## 2018-11-30 PROBLEM — D69.6 THROMBOCYTOPENIA (HCC): Chronic | Status: RESOLVED | Noted: 2017-11-16 | Resolved: 2018-11-30

## 2018-11-30 PROBLEM — J81.0 ACUTE PULMONARY EDEMA (HCC): Status: RESOLVED | Noted: 2017-10-30 | Resolved: 2018-11-30

## 2018-11-30 PROBLEM — J43.9 EMPHYSEMA: Status: RESOLVED | Noted: 2017-11-13 | Resolved: 2018-11-30

## 2018-11-30 PROBLEM — F17.201 TOBACCO DEPENDENCE IN REMISSION: Status: RESOLVED | Noted: 2017-04-07 | Resolved: 2018-11-30

## 2018-11-30 PROBLEM — N18.9 CKD (CHRONIC KIDNEY DISEASE): Status: RESOLVED | Noted: 2017-04-24 | Resolved: 2018-11-30

## 2018-12-06 ENCOUNTER — HOSPITAL ENCOUNTER (OUTPATIENT)
Dept: CV DIAGNOSTICS | Facility: HOSPITAL | Age: 78
Discharge: HOME | End: 2018-12-06
Attending: INTERNAL MEDICINE
Payer: MEDICARE

## 2018-12-06 ENCOUNTER — MYAURORA ACCOUNT LINK (OUTPATIENT)
Dept: OTHER | Age: 78
End: 2018-12-06

## 2018-12-06 DIAGNOSIS — I48.20 CHRONIC ATRIAL FIBRILLATION (HCC): ICD-10-CM

## 2018-12-06 DIAGNOSIS — I25.10 CORONARY ARTERY DISEASE INVOLVING NATIVE CORONARY ARTERY OF NATIVE HEART WITHOUT ANGINA PECTORIS: ICD-10-CM

## 2018-12-06 DIAGNOSIS — I10 ESSENTIAL HYPERTENSION: ICD-10-CM

## 2018-12-06 PROCEDURE — 93306 TTE W/DOPPLER COMPLETE: CPT | Performed by: INTERNAL MEDICINE

## 2018-12-11 ENCOUNTER — TELEPHONE (OUTPATIENT)
Dept: CARDIOLOGY CLINIC | Facility: CLINIC | Age: 78
End: 2018-12-11

## 2018-12-12 ENCOUNTER — APPOINTMENT (OUTPATIENT)
Dept: LAB | Age: 78
End: 2018-12-12
Attending: INTERNAL MEDICINE
Payer: MEDICARE

## 2018-12-12 DIAGNOSIS — Z12.5 SCREENING PSA (PROSTATE SPECIFIC ANTIGEN): ICD-10-CM

## 2018-12-12 DIAGNOSIS — E78.00 HYPERCHOLESTEREMIA: ICD-10-CM

## 2018-12-12 DIAGNOSIS — E11.9 TYPE 2 DIABETES MELLITUS WITHOUT COMPLICATION, WITHOUT LONG-TERM CURRENT USE OF INSULIN (HCC): ICD-10-CM

## 2018-12-12 PROCEDURE — 36415 COLL VENOUS BLD VENIPUNCTURE: CPT

## 2018-12-12 PROCEDURE — 80061 LIPID PANEL: CPT

## 2018-12-12 PROCEDURE — 83036 HEMOGLOBIN GLYCOSYLATED A1C: CPT

## 2018-12-12 PROCEDURE — 82947 ASSAY GLUCOSE BLOOD QUANT: CPT

## 2018-12-12 NOTE — TELEPHONE ENCOUNTER
Returned call to discuss his echo results, informed of results and CPM.  Answered questions. States he feels good.

## 2018-12-18 ENCOUNTER — ANTI-COAG VISIT (OUTPATIENT)
Dept: INTERNAL MEDICINE CLINIC | Facility: CLINIC | Age: 78
End: 2018-12-18

## 2018-12-18 DIAGNOSIS — I48.20 CHRONIC ATRIAL FIBRILLATION (HCC): ICD-10-CM

## 2018-12-18 PROCEDURE — 36416 COLLJ CAPILLARY BLOOD SPEC: CPT

## 2018-12-18 PROCEDURE — 85610 PROTHROMBIN TIME: CPT

## 2018-12-18 PROCEDURE — 93793 ANTICOAG MGMT PT WARFARIN: CPT

## 2019-01-04 ENCOUNTER — OFFICE VISIT (OUTPATIENT)
Dept: NEPHROLOGY | Facility: CLINIC | Age: 79
End: 2019-01-04
Payer: MEDICARE

## 2019-01-04 VITALS
HEIGHT: 68 IN | WEIGHT: 186 LBS | HEART RATE: 76 BPM | BODY MASS INDEX: 28.19 KG/M2 | DIASTOLIC BLOOD PRESSURE: 64 MMHG | SYSTOLIC BLOOD PRESSURE: 118 MMHG

## 2019-01-04 DIAGNOSIS — E78.5 DYSLIPIDEMIA: ICD-10-CM

## 2019-01-04 DIAGNOSIS — I10 ESSENTIAL HYPERTENSION: ICD-10-CM

## 2019-01-04 DIAGNOSIS — N18.30 CKD (CHRONIC KIDNEY DISEASE), STAGE III (HCC): Primary | ICD-10-CM

## 2019-01-04 DIAGNOSIS — E11.21 DIABETIC NEPHROPATHY ASSOCIATED WITH TYPE 2 DIABETES MELLITUS (HCC): ICD-10-CM

## 2019-01-04 PROCEDURE — G0463 HOSPITAL OUTPT CLINIC VISIT: HCPCS | Performed by: INTERNAL MEDICINE

## 2019-01-04 PROCEDURE — 99213 OFFICE O/P EST LOW 20 MIN: CPT | Performed by: INTERNAL MEDICINE

## 2019-01-04 RX ORDER — CARVEDILOL 12.5 MG/1
TABLET ORAL
Qty: 180 TABLET | Refills: 3 | Status: SHIPPED | OUTPATIENT
Start: 2019-01-04 | End: 2019-11-11

## 2019-01-04 RX ORDER — ATORVASTATIN CALCIUM 40 MG/1
TABLET, FILM COATED ORAL
Qty: 90 TABLET | Refills: 3 | Status: SHIPPED | OUTPATIENT
Start: 2019-01-04 | End: 2020-03-11

## 2019-01-04 RX ORDER — ISOSORBIDE DINITRATE 10 MG/1
TABLET ORAL
Qty: 135 TABLET | Refills: 1 | Status: ON HOLD | OUTPATIENT
Start: 2019-01-04 | End: 2019-09-10

## 2019-01-04 NOTE — PATIENT INSTRUCTIONS
Great job gus     no changes     keep working on sugars      Do labs around April 1    See me in April     have a healthy new year

## 2019-01-04 NOTE — TELEPHONE ENCOUNTER
isosorbide refill. Verified medication dose, creatinine elevated, but less than 10 % Meets protocol , refill order sent.   Hypertensive Medications  Protocol Criteria:  · Appointment scheduled with Cardiology in the past 12 months or in the next 3 months  · 11/13/2018    GFRAA 34 (L) 11/13/2018    CA 8.9 11/13/2018    ALKPHOS 53 04/18/2016    AST 24 04/20/2017    ALT 22 04/20/2017    BILT 1.2 04/20/2017    TP 7.2 04/20/2017    ALB 3.7 11/13/2018     11/13/2018    K 3.6 11/13/2018     11/13/2018

## 2019-01-15 NOTE — PROGRESS NOTES
Alva Lopes is here for a visit he has a history of cardiomyopathy with diastolic dysfunction and EF of about 35% is doing fine he is watching his weight is trying to keep it around 180 pounds in terms of diuretics he is taking Bumex 1 mg daily alternating with

## 2019-01-22 ENCOUNTER — ANTI-COAG VISIT (OUTPATIENT)
Dept: INTERNAL MEDICINE CLINIC | Facility: CLINIC | Age: 79
End: 2019-01-22
Payer: MEDICARE

## 2019-01-22 DIAGNOSIS — I48.20 CHRONIC ATRIAL FIBRILLATION (HCC): ICD-10-CM

## 2019-01-22 LAB
INR: 1.7 (ref 0.8–1.2)
TEST STRIP EXPIRATION DATE: ABNORMAL DATE

## 2019-01-22 PROCEDURE — 93793 ANTICOAG MGMT PT WARFARIN: CPT

## 2019-01-22 PROCEDURE — 85610 PROTHROMBIN TIME: CPT

## 2019-01-22 PROCEDURE — 36416 COLLJ CAPILLARY BLOOD SPEC: CPT

## 2019-02-12 ENCOUNTER — OFFICE VISIT (OUTPATIENT)
Dept: CARDIOLOGY CLINIC | Facility: CLINIC | Age: 79
End: 2019-02-12
Payer: MEDICARE

## 2019-02-12 ENCOUNTER — ANTI-COAG VISIT (OUTPATIENT)
Dept: INTERNAL MEDICINE CLINIC | Facility: CLINIC | Age: 79
End: 2019-02-12
Payer: MEDICARE

## 2019-02-12 VITALS
DIASTOLIC BLOOD PRESSURE: 68 MMHG | HEART RATE: 68 BPM | SYSTOLIC BLOOD PRESSURE: 129 MMHG | RESPIRATION RATE: 20 BRPM | BODY MASS INDEX: 29 KG/M2 | WEIGHT: 188 LBS

## 2019-02-12 DIAGNOSIS — I48.20 CHRONIC ATRIAL FIBRILLATION (HCC): ICD-10-CM

## 2019-02-12 DIAGNOSIS — I25.10 ATHEROSCLEROSIS OF NATIVE CORONARY ARTERY OF NATIVE HEART WITHOUT ANGINA PECTORIS: Primary | ICD-10-CM

## 2019-02-12 DIAGNOSIS — I25.10 CORONARY ARTERY DISEASE INVOLVING NATIVE CORONARY ARTERY OF NATIVE HEART WITHOUT ANGINA PECTORIS: ICD-10-CM

## 2019-02-12 LAB
INR: 1.7 (ref 0.8–1.2)
TEST STRIP EXPIRATION DATE: ABNORMAL DATE

## 2019-02-12 PROCEDURE — 85610 PROTHROMBIN TIME: CPT

## 2019-02-12 PROCEDURE — 99214 OFFICE O/P EST MOD 30 MIN: CPT | Performed by: INTERNAL MEDICINE

## 2019-02-12 PROCEDURE — G0463 HOSPITAL OUTPT CLINIC VISIT: HCPCS | Performed by: INTERNAL MEDICINE

## 2019-02-12 PROCEDURE — 36416 COLLJ CAPILLARY BLOOD SPEC: CPT

## 2019-02-12 NOTE — PROGRESS NOTES
Solo Lopez is a 66year old male. Patient presents with:   Follow - Up  CAD  Atrial Fibrillation  Chest Pain    HPI:   This is a pleasant 80-year-old gentleman known to us with coronary disease cardiomyopathy prior bypass in November 2017 with defib Diagnosis Date   • Age-related nuclear cataract of both eyes 6/21/2016   • ARMD (age-related macular degeneration), bilateral 6/21/2016   • Atrial fibrillation ([facility] [address] 1995   • CAD (coronary artery disease)     s/p 3v CABG 1995   • CAD (coronary artery dis auscultation  CARDIO: regular rate and rhythm,nl G1,G9, 2/6 systolic ejection murmur  GI: good BS's,no masses, HSM or tenderness  EXTREMITIES: no cyanosis, clubbing or edema  NEURO: no focal deficits  PSYCH:alert and oriented x3    Assessment   ASSESSMENT

## 2019-02-28 VITALS
SYSTOLIC BLOOD PRESSURE: 128 MMHG | BODY MASS INDEX: 28.49 KG/M2 | DIASTOLIC BLOOD PRESSURE: 60 MMHG | HEART RATE: 70 BPM | WEIGHT: 188 LBS | HEIGHT: 68 IN

## 2019-03-01 ENCOUNTER — TELEPHONE (OUTPATIENT)
Dept: CARDIOLOGY CLINIC | Facility: CLINIC | Age: 79
End: 2019-03-01

## 2019-03-01 ENCOUNTER — ANTI-COAG VISIT (OUTPATIENT)
Dept: INTERNAL MEDICINE CLINIC | Facility: CLINIC | Age: 79
End: 2019-03-01
Payer: MEDICARE

## 2019-03-01 DIAGNOSIS — I48.20 CHRONIC ATRIAL FIBRILLATION (HCC): ICD-10-CM

## 2019-03-01 LAB
INR: 2.5 (ref 0.8–1.2)
TEST STRIP EXPIRATION DATE: ABNORMAL DATE

## 2019-03-01 PROCEDURE — 85610 PROTHROMBIN TIME: CPT

## 2019-03-01 PROCEDURE — 36416 COLLJ CAPILLARY BLOOD SPEC: CPT

## 2019-03-01 PROCEDURE — 93793 ANTICOAG MGMT PT WARFARIN: CPT

## 2019-03-01 NOTE — TELEPHONE ENCOUNTER
Pt came by office and was stating that BPs were low 100-90s, he feels a bit tired.  Advised to cut coreg to 6.25mg BID and record BPs will review next time he has coumadin appt

## 2019-03-26 ENCOUNTER — ANCILLARY PROCEDURE (OUTPATIENT)
Dept: CARDIOLOGY | Age: 79
End: 2019-03-26
Attending: INTERNAL MEDICINE

## 2019-03-26 VITALS — SYSTOLIC BLOOD PRESSURE: 144 MMHG | DIASTOLIC BLOOD PRESSURE: 70 MMHG

## 2019-03-26 DIAGNOSIS — Z45.02 IMPLANTABLE DEFIBRILLATOR REPROGRAMMING/CHECK: Primary | ICD-10-CM

## 2019-04-01 ENCOUNTER — LAB ENCOUNTER (OUTPATIENT)
Dept: LAB | Age: 79
End: 2019-04-01
Attending: INTERNAL MEDICINE
Payer: MEDICARE

## 2019-04-01 DIAGNOSIS — N18.30 CKD (CHRONIC KIDNEY DISEASE), STAGE III (HCC): ICD-10-CM

## 2019-04-01 DIAGNOSIS — I10 ESSENTIAL HYPERTENSION: ICD-10-CM

## 2019-04-01 DIAGNOSIS — E78.5 DYSLIPIDEMIA: ICD-10-CM

## 2019-04-01 DIAGNOSIS — E11.21 DIABETIC NEPHROPATHY ASSOCIATED WITH TYPE 2 DIABETES MELLITUS (HCC): ICD-10-CM

## 2019-04-01 PROCEDURE — 82043 UR ALBUMIN QUANTITATIVE: CPT

## 2019-04-01 PROCEDURE — 83970 ASSAY OF PARATHORMONE: CPT

## 2019-04-01 PROCEDURE — 83036 HEMOGLOBIN GLYCOSYLATED A1C: CPT

## 2019-04-01 PROCEDURE — 85025 COMPLETE CBC W/AUTO DIFF WBC: CPT

## 2019-04-01 PROCEDURE — 36415 COLL VENOUS BLD VENIPUNCTURE: CPT

## 2019-04-01 PROCEDURE — 80069 RENAL FUNCTION PANEL: CPT

## 2019-04-01 PROCEDURE — 82570 ASSAY OF URINE CREATININE: CPT

## 2019-04-02 ENCOUNTER — ANTI-COAG VISIT (OUTPATIENT)
Dept: INTERNAL MEDICINE CLINIC | Facility: CLINIC | Age: 79
End: 2019-04-02
Payer: MEDICARE

## 2019-04-02 DIAGNOSIS — I48.20 CHRONIC ATRIAL FIBRILLATION (HCC): ICD-10-CM

## 2019-04-02 PROCEDURE — 36416 COLLJ CAPILLARY BLOOD SPEC: CPT

## 2019-04-02 PROCEDURE — 85610 PROTHROMBIN TIME: CPT

## 2019-04-02 PROCEDURE — 93793 ANTICOAG MGMT PT WARFARIN: CPT

## 2019-04-12 ENCOUNTER — TELEPHONE (OUTPATIENT)
Dept: CARDIOLOGY | Age: 79
End: 2019-04-12

## 2019-04-12 RX ORDER — CARVEDILOL 6.25 MG/1
6.26 TABLET ORAL 2 TIMES DAILY WITH MEALS
COMMUNITY
End: 2021-05-14 | Stop reason: SDUPTHER

## 2019-04-12 RX ORDER — WARFARIN SODIUM 5 MG/1
5 TABLET ORAL DAILY
COMMUNITY

## 2019-04-12 RX ORDER — POTASSIUM CHLORIDE 20 MEQ/1
20 TABLET, EXTENDED RELEASE ORAL DAILY
COMMUNITY

## 2019-04-12 RX ORDER — FAMOTIDINE 20 MG/1
TABLET, FILM COATED ORAL
COMMUNITY
End: 2019-08-19 | Stop reason: CLARIF

## 2019-04-12 RX ORDER — BUMETANIDE 1 MG/1
1 TABLET ORAL DAILY
COMMUNITY
End: 2021-05-14 | Stop reason: SDUPTHER

## 2019-04-15 ENCOUNTER — OFFICE VISIT (OUTPATIENT)
Dept: NEPHROLOGY | Facility: CLINIC | Age: 79
End: 2019-04-15
Payer: MEDICARE

## 2019-04-15 VITALS
DIASTOLIC BLOOD PRESSURE: 70 MMHG | SYSTOLIC BLOOD PRESSURE: 116 MMHG | WEIGHT: 182 LBS | BODY MASS INDEX: 27.58 KG/M2 | HEIGHT: 68 IN | HEART RATE: 82 BPM

## 2019-04-15 DIAGNOSIS — N18.30 CKD (CHRONIC KIDNEY DISEASE), STAGE III (HCC): ICD-10-CM

## 2019-04-15 DIAGNOSIS — I48.91 ATRIAL FIBRILLATION WITH RAPID VENTRICULAR RESPONSE (HCC): ICD-10-CM

## 2019-04-15 DIAGNOSIS — I25.10 CORONARY ARTERY DISEASE INVOLVING NATIVE CORONARY ARTERY OF NATIVE HEART WITHOUT ANGINA PECTORIS: Primary | ICD-10-CM

## 2019-04-15 DIAGNOSIS — I10 ESSENTIAL HYPERTENSION: ICD-10-CM

## 2019-04-15 DIAGNOSIS — I70.0 ATHEROSCLEROSIS OF AORTA (HCC): ICD-10-CM

## 2019-04-15 DIAGNOSIS — I25.10 ATHEROSCLEROSIS OF NATIVE CORONARY ARTERY OF NATIVE HEART WITHOUT ANGINA PECTORIS: ICD-10-CM

## 2019-04-15 DIAGNOSIS — I48.20 CHRONIC ATRIAL FIBRILLATION (HCC): ICD-10-CM

## 2019-04-15 PROCEDURE — G0463 HOSPITAL OUTPT CLINIC VISIT: HCPCS | Performed by: INTERNAL MEDICINE

## 2019-04-15 PROCEDURE — 99213 OFFICE O/P EST LOW 20 MIN: CPT | Performed by: INTERNAL MEDICINE

## 2019-04-15 NOTE — PROGRESS NOTES
Dianelysgregg Alessandro is here and is doing well is keeping his weights down in the low 180s at home he denies any chest pain or shortness of breath he is going to [facility] in the next week to visit his family he is due to see cardiology is currently Nerissa Luevano

## 2019-05-02 ENCOUNTER — ANTI-COAG VISIT (OUTPATIENT)
Dept: INTERNAL MEDICINE CLINIC | Facility: CLINIC | Age: 79
End: 2019-05-02
Payer: MEDICARE

## 2019-05-02 DIAGNOSIS — I48.20 CHRONIC ATRIAL FIBRILLATION (HCC): ICD-10-CM

## 2019-05-02 PROCEDURE — 85610 PROTHROMBIN TIME: CPT

## 2019-05-02 PROCEDURE — 36416 COLLJ CAPILLARY BLOOD SPEC: CPT

## 2019-05-02 PROCEDURE — 93793 ANTICOAG MGMT PT WARFARIN: CPT

## 2019-05-09 ENCOUNTER — TELEPHONE (OUTPATIENT)
Dept: INTERNAL MEDICINE CLINIC | Facility: CLINIC | Age: 79
End: 2019-05-09

## 2019-05-09 RX ORDER — BLOOD SUGAR DIAGNOSTIC
STRIP MISCELLANEOUS
Qty: 100 STRIP | Refills: 3 | Status: SHIPPED | OUTPATIENT
Start: 2019-05-09 | End: 2021-10-13

## 2019-05-09 RX ORDER — LANCETS 33 GAUGE
EACH MISCELLANEOUS
Qty: 100 EACH | Refills: 3 | Status: SHIPPED | OUTPATIENT
Start: 2019-05-09

## 2019-05-09 RX ORDER — BLOOD-GLUCOSE METER
EACH MISCELLANEOUS
Qty: 1 KIT | Refills: 11 | Status: SHIPPED | OUTPATIENT
Start: 2019-05-09 | End: 2019-05-28

## 2019-05-09 RX ORDER — BLOOD-GLUCOSE CONTROL, NORMAL
EACH MISCELLANEOUS
Qty: 1 VIAL | Refills: 11 | Status: SHIPPED | OUTPATIENT
Start: 2019-05-09 | End: 2021-10-25

## 2019-05-09 NOTE — TELEPHONE ENCOUNTER
Pt is returning Catalina's call regarding his diabetic supplies his insurance will cover one touch pt need everything

## 2019-05-14 RX ORDER — BUMETANIDE 1 MG/1
1-2 TABLET ORAL DAILY
Qty: 135 TABLET | Refills: 3 | Status: SHIPPED | OUTPATIENT
Start: 2019-05-14 | End: 2019-08-07

## 2019-05-14 NOTE — TELEPHONE ENCOUNTER
11/15/18 note states pt taking 1mg T,Th,S,S - 2 mg MWF - see 11/18 note. Spoke with pt: he states he is taking every other day 1mg and 2mg. Per Candance Hymen, OK to refill as pended.

## 2019-05-15 ENCOUNTER — MA CHART PREP (OUTPATIENT)
Dept: FAMILY MEDICINE CLINIC | Facility: CLINIC | Age: 79
End: 2019-05-15

## 2019-05-15 PROBLEM — Z79.01 CURRENT USE OF LONG TERM ANTICOAGULATION: Status: ACTIVE | Noted: 2019-05-15

## 2019-05-15 PROBLEM — Z95.810 ICD (IMPLANTABLE CARDIOVERTER-DEFIBRILLATOR) IN PLACE: Status: ACTIVE | Noted: 2019-05-15

## 2019-05-16 ENCOUNTER — OFFICE VISIT (OUTPATIENT)
Dept: INTERNAL MEDICINE CLINIC | Facility: CLINIC | Age: 79
End: 2019-05-16
Payer: MEDICARE

## 2019-05-16 VITALS
DIASTOLIC BLOOD PRESSURE: 64 MMHG | WEIGHT: 180.81 LBS | SYSTOLIC BLOOD PRESSURE: 110 MMHG | BODY MASS INDEX: 29.06 KG/M2 | HEIGHT: 66 IN | HEART RATE: 74 BPM | OXYGEN SATURATION: 98 % | TEMPERATURE: 98 F

## 2019-05-16 DIAGNOSIS — N18.30 STAGE 3 CHRONIC KIDNEY DISEASE (HCC): ICD-10-CM

## 2019-05-16 DIAGNOSIS — E11.22 TYPE 2 DIABETES MELLITUS WITH STAGE 3 CHRONIC KIDNEY DISEASE, UNSPECIFIED WHETHER LONG TERM INSULIN USE: ICD-10-CM

## 2019-05-16 DIAGNOSIS — N18.3 TYPE 2 DIABETES MELLITUS WITH STAGE 3 CHRONIC KIDNEY DISEASE, UNSPECIFIED WHETHER LONG TERM INSULIN USE: ICD-10-CM

## 2019-05-16 DIAGNOSIS — H35.30 MACULAR DEGENERATION, UNSPECIFIED LATERALITY, UNSPECIFIED TYPE: ICD-10-CM

## 2019-05-16 DIAGNOSIS — E78.00 HYPERCHOLESTEREMIA: ICD-10-CM

## 2019-05-16 DIAGNOSIS — N25.81 SECONDARY HYPERPARATHYROIDISM (HCC): ICD-10-CM

## 2019-05-16 DIAGNOSIS — J44.9 CHRONIC OBSTRUCTIVE PULMONARY DISEASE, UNSPECIFIED COPD TYPE (HCC): ICD-10-CM

## 2019-05-16 DIAGNOSIS — Z12.5 SCREENING PSA (PROSTATE SPECIFIC ANTIGEN): ICD-10-CM

## 2019-05-16 DIAGNOSIS — I70.0 ATHEROSCLEROSIS OF AORTA (HCC): ICD-10-CM

## 2019-05-16 DIAGNOSIS — Z00.00 PHYSICAL EXAM, ANNUAL: Primary | ICD-10-CM

## 2019-05-16 DIAGNOSIS — E11.21 DIABETIC NEPHROPATHY ASSOCIATED WITH TYPE 2 DIABETES MELLITUS (HCC): ICD-10-CM

## 2019-05-16 DIAGNOSIS — N52.9 ERECTILE DYSFUNCTION, UNSPECIFIED ERECTILE DYSFUNCTION TYPE: ICD-10-CM

## 2019-05-16 DIAGNOSIS — H26.9 CATARACT, UNSPECIFIED CATARACT TYPE, UNSPECIFIED LATERALITY: ICD-10-CM

## 2019-05-16 DIAGNOSIS — I48.20 CHRONIC ATRIAL FIBRILLATION (HCC): ICD-10-CM

## 2019-05-16 DIAGNOSIS — I34.0 MITRAL VALVE INSUFFICIENCY, UNSPECIFIED ETIOLOGY: ICD-10-CM

## 2019-05-16 DIAGNOSIS — I25.5 ISCHEMIC CARDIOMYOPATHY: ICD-10-CM

## 2019-05-16 PROCEDURE — 90732 PPSV23 VACC 2 YRS+ SUBQ/IM: CPT | Performed by: INTERNAL MEDICINE

## 2019-05-16 PROCEDURE — G0439 PPPS, SUBSEQ VISIT: HCPCS | Performed by: INTERNAL MEDICINE

## 2019-05-16 PROCEDURE — G0009 ADMIN PNEUMOCOCCAL VACCINE: HCPCS | Performed by: INTERNAL MEDICINE

## 2019-05-16 PROCEDURE — 99397 PER PM REEVAL EST PAT 65+ YR: CPT | Performed by: INTERNAL MEDICINE

## 2019-05-16 PROCEDURE — 96160 PT-FOCUSED HLTH RISK ASSMT: CPT | Performed by: INTERNAL MEDICINE

## 2019-05-16 NOTE — PROGRESS NOTES
Arlinda Jeans is a 78year old male.     HPI:   Patient presents with:  Physical: medicare annual wellness visit      77 y/o M here for subsequent Medicare annual wellness exam;  no CP; no SOB; no headaches; no palpitation; no constipation; no diarrhe apply Misc Use daily Disp: 100 each Rfl: 3   Glucose Blood (ONETOUCH VERIO) In Vitro Strip Use daily Disp: 100 strip Rfl: 3   Blood Glucose Monitoring Suppl ([place] [employer] IQ SYSTEM) w/Device Does not apply Kit Use as directed Disp: 1 kit Rfl: 11   Blood health state?: Good    How do you maintain positive mental well-being?: Social Interaction; Visiting Family; Visiting Friends    If you are a male age 38-65 or a female age 47-67, do you take aspirin?: Yes    Have you had any immunizations at another office physician but may not be covered, or covered at this frequency, by your insurer. Please check with your insurance carrier before scheduling to verify coverage.     PREVENTATIVE SERVICES  INDICATIONS AND SCHEDULE Internal Lab or Procedure External Lab or Pro Lab or Procedure External Lab or Procedure   Annual Monitoring of Persistent     Medications (ACE/ARB, digoxin, diuretics)    Potassium  Annually Potassium (mmol/L)   Date Value   04/01/2019 4.0     POTASSIUM (P) (mmol/L)   Date Value   04/18/2016 3.4    N m), weight 180 lb 12.8 oz (82 kg), SpO2 98 %.   Constitutional: alert and oriented x3 in no acute distress  HEENT- EOMI, PERRL  Nose/Mouth/Throat: pharynx without erythema  Neck/Thyroid: neck supple; no thyromegaly  Lymphatics: no lymphadenopathy of neck or except 5 mg po qHS on Mon, Wed, Fri; Rx per [facility] coumadin [facility]     Type II Diabetes mellitus, with kidney complication, CKD stage III, without insulin  accucheck ac/hs; A1c 7.0% in April 2019;  I have discussed that glycemic control may reduce worsening if

## 2019-05-21 NOTE — TELEPHONE ENCOUNTER
Form rec'd which requires completion for:  OneTouch Verio IQ Kit  Form placed in blue folder  Tasked to Will Dumas

## 2019-05-28 RX ORDER — BLOOD-GLUCOSE METER
EACH MISCELLANEOUS
Qty: 1 KIT | Refills: 11 | Status: SHIPPED | OUTPATIENT
Start: 2019-05-28 | End: 2019-05-29 | Stop reason: CLARIF

## 2019-05-28 NOTE — TELEPHONE ENCOUNTER
Clarified with [facility] - Kit needed instructions which were added.   Refill request is for a maintenance medication and has met the criteria specified in the Ambulatory Medication Refill Standing Order for eligibility, visits, laboratory, alerts and was se

## 2019-05-29 RX ORDER — LANCETS
EACH MISCELLANEOUS
Qty: 100 EACH | Refills: 3 | Status: SHIPPED | OUTPATIENT
Start: 2019-05-29 | End: 2021-10-25

## 2019-05-29 NOTE — TELEPHONE ENCOUNTER
Called pharmacy, spoke with [facility] pharmacist and sent Burnice Perking which is covered , with strips and lancets sent

## 2019-05-29 NOTE — TELEPHONE ENCOUNTER
Celeste/Nancy requesting call back to change orders to Accu chek which is preferred/covered by [employer]    [phone]

## 2019-06-07 ENCOUNTER — ANTI-COAG VISIT (OUTPATIENT)
Dept: INTERNAL MEDICINE CLINIC | Facility: CLINIC | Age: 79
End: 2019-06-07
Payer: MEDICARE

## 2019-06-07 DIAGNOSIS — I48.20 CHRONIC ATRIAL FIBRILLATION (HCC): ICD-10-CM

## 2019-06-07 PROCEDURE — 93793 ANTICOAG MGMT PT WARFARIN: CPT

## 2019-06-07 PROCEDURE — 85610 PROTHROMBIN TIME: CPT

## 2019-06-07 PROCEDURE — 36416 COLLJ CAPILLARY BLOOD SPEC: CPT

## 2019-06-12 RX ORDER — METOLAZONE 2.5 MG/1
TABLET ORAL
Qty: 90 TABLET | Refills: 0 | Status: SHIPPED | OUTPATIENT
Start: 2019-06-12 | End: 2019-06-14

## 2019-06-14 ENCOUNTER — TELEPHONE (OUTPATIENT)
Dept: NEPHROLOGY | Facility: CLINIC | Age: 79
End: 2019-06-14

## 2019-06-14 RX ORDER — METOLAZONE 2.5 MG/1
TABLET ORAL
Qty: 90 TABLET | Refills: 0 | Status: ON HOLD | OUTPATIENT
Start: 2019-06-14 | End: 2019-09-10

## 2019-06-14 NOTE — TELEPHONE ENCOUNTER
Pt requesting Metolazone to be sent to [facility] ([facility]). Pls call. Thank you.       Current Outpatient Medications:  metolazone 2.5 MG Oral Tab TAKE 1 TABLET BY MOUTH 30 MINUTES PRIOR TO MORNING BUMEX (BUMETANIDE) ONCE DAILY Disp: 90 tablet Rfl: 0

## 2019-06-14 NOTE — TELEPHONE ENCOUNTER
Refill was sent to [facility]. Resent to [facility] mail order pharmacy as patient requested.

## 2019-06-19 ENCOUNTER — APPOINTMENT (OUTPATIENT)
Dept: CARDIOLOGY | Age: 79
End: 2019-06-19
Attending: INTERNAL MEDICINE

## 2019-06-19 PROCEDURE — 93296 REM INTERROG EVL PM/IDS: CPT | Performed by: INTERNAL MEDICINE

## 2019-06-19 PROCEDURE — 93295 DEV INTERROG REMOTE 1/2/MLT: CPT | Performed by: INTERNAL MEDICINE

## 2019-06-28 ENCOUNTER — ANCILLARY ORDERS (OUTPATIENT)
Dept: CARDIOLOGY | Age: 79
End: 2019-06-28

## 2019-06-28 ENCOUNTER — ANCILLARY PROCEDURE (OUTPATIENT)
Dept: CARDIOLOGY | Age: 79
End: 2019-06-28
Attending: INTERNAL MEDICINE

## 2019-06-28 DIAGNOSIS — Z95.810 ICD (IMPLANTABLE CARDIOVERTER-DEFIBRILLATOR) IN PLACE: ICD-10-CM

## 2019-07-08 ENCOUNTER — TELEPHONE (OUTPATIENT)
Dept: NEPHROLOGY | Facility: CLINIC | Age: 79
End: 2019-07-08

## 2019-07-12 ENCOUNTER — APPOINTMENT (OUTPATIENT)
Dept: LAB | Age: 79
End: 2019-07-12
Attending: INTERNAL MEDICINE
Payer: MEDICARE

## 2019-07-12 ENCOUNTER — TELEPHONE (OUTPATIENT)
Dept: CARDIOLOGY CLINIC | Facility: CLINIC | Age: 79
End: 2019-07-12

## 2019-07-12 ENCOUNTER — ANTI-COAG VISIT (OUTPATIENT)
Dept: INTERNAL MEDICINE CLINIC | Facility: CLINIC | Age: 79
End: 2019-07-12
Payer: MEDICARE

## 2019-07-12 DIAGNOSIS — N28.9 RENAL INSUFFICIENCY: ICD-10-CM

## 2019-07-12 DIAGNOSIS — I25.10 CORONARY ARTERY DISEASE INVOLVING NATIVE HEART WITHOUT ANGINA PECTORIS, UNSPECIFIED VESSEL OR LESION TYPE: ICD-10-CM

## 2019-07-12 DIAGNOSIS — I48.20 CHRONIC ATRIAL FIBRILLATION (HCC): ICD-10-CM

## 2019-07-12 DIAGNOSIS — I25.10 CORONARY ARTERY DISEASE INVOLVING NATIVE HEART WITHOUT ANGINA PECTORIS, UNSPECIFIED VESSEL OR LESION TYPE: Primary | ICD-10-CM

## 2019-07-12 LAB
ANION GAP SERPL CALC-SCNC: 8 MMOL/L (ref 0–18)
BUN BLD-MCNC: 81 MG/DL (ref 7–18)
BUN/CREAT SERPL: 27.1 (ref 10–20)
CALCIUM BLD-MCNC: 8.8 MG/DL (ref 8.5–10.1)
CHLORIDE SERPL-SCNC: 103 MMOL/L (ref 98–112)
CO2 SERPL-SCNC: 27 MMOL/L (ref 21–32)
CREAT BLD-MCNC: 2.99 MG/DL (ref 0.7–1.3)
GLUCOSE BLD-MCNC: 145 MG/DL (ref 70–99)
INR: 2 (ref 0.8–1.2)
OSMOLALITY SERPL CALC.SUM OF ELEC: 313 MOSM/KG (ref 275–295)
PATIENT FASTING: NO
POTASSIUM SERPL-SCNC: 4.4 MMOL/L (ref 3.5–5.1)
SODIUM SERPL-SCNC: 138 MMOL/L (ref 136–145)
TEST STRIP EXPIRATION DATE: ABNORMAL DATE

## 2019-07-12 PROCEDURE — 80048 BASIC METABOLIC PNL TOTAL CA: CPT

## 2019-07-12 PROCEDURE — 93793 ANTICOAG MGMT PT WARFARIN: CPT

## 2019-07-12 PROCEDURE — 36415 COLL VENOUS BLD VENIPUNCTURE: CPT

## 2019-07-12 PROCEDURE — 85610 PROTHROMBIN TIME: CPT

## 2019-07-12 PROCEDURE — 36416 COLLJ CAPILLARY BLOOD SPEC: CPT

## 2019-07-12 NOTE — TELEPHONE ENCOUNTER
Spoke with Dr. Jacy Berkowitz, he ordered BMP today (order entered). Message left for patient to call back.     S/w [facility], advised to take Carvedilol 6.25 (1/2 of his 12.5 mg tab) twice daily, get BMP done today and record BPs over the weekend and that we will c

## 2019-07-12 NOTE — TELEPHONE ENCOUNTER
Reviewed BMP with Dr. Kaela Herrera. K+ 4.4 Creatinine 2.99 (4/1/19 1.99). Recommends patient not take any diuretic over the weekend and check BMP on Monday and notify Dr. Kaela Herrera. Then maybe, we can resume some diuretic.     Detailed message left (HIPAA verifi

## 2019-07-12 NOTE — TELEPHONE ENCOUNTER
Reba Erwin stopped by today after seeing Miguel Johnson in the [facility].    States he has reduced his   Carvedilol from 12.5 mg bid to daily  Hydralazine from 110 mg tid to daily and  Isosorbide dinitrate 5 mg tid to daily    And increased his  Bumetanide (bumex

## 2019-07-15 ENCOUNTER — APPOINTMENT (OUTPATIENT)
Dept: LAB | Age: 79
End: 2019-07-15
Attending: INTERNAL MEDICINE
Payer: MEDICARE

## 2019-07-15 DIAGNOSIS — I25.10 CORONARY ARTERY DISEASE INVOLVING NATIVE HEART WITHOUT ANGINA PECTORIS, UNSPECIFIED VESSEL OR LESION TYPE: ICD-10-CM

## 2019-07-15 LAB
ANION GAP SERPL CALC-SCNC: 7 MMOL/L (ref 0–18)
BUN BLD-MCNC: 70 MG/DL (ref 7–18)
BUN/CREAT SERPL: 25 (ref 10–20)
CALCIUM BLD-MCNC: 8.8 MG/DL (ref 8.5–10.1)
CHLORIDE SERPL-SCNC: 103 MMOL/L (ref 98–112)
CO2 SERPL-SCNC: 30 MMOL/L (ref 21–32)
CREAT BLD-MCNC: 2.8 MG/DL (ref 0.7–1.3)
GLUCOSE BLD-MCNC: 141 MG/DL (ref 70–99)
OSMOLALITY SERPL CALC.SUM OF ELEC: 313 MOSM/KG (ref 275–295)
PATIENT FASTING: NO
POTASSIUM SERPL-SCNC: 4.6 MMOL/L (ref 3.5–5.1)
SODIUM SERPL-SCNC: 140 MMOL/L (ref 136–145)

## 2019-07-15 PROCEDURE — 80048 BASIC METABOLIC PNL TOTAL CA: CPT

## 2019-07-15 PROCEDURE — 36415 COLL VENOUS BLD VENIPUNCTURE: CPT

## 2019-07-15 NOTE — TELEPHONE ENCOUNTER
Pt was here at desk, states he did not hold his diuretic, but taking half of carvedilol dose. Has not done lab yet. Has bp reading with him. He will complete lab work today and we will review with MB  tomorrow.

## 2019-07-16 NOTE — TELEPHONE ENCOUNTER
Reviewed bp's, labs with MB, instruction is to continue  Taking  bumex alternating 1 and 2 mg doses, daily . Check BMP in one week. follow up with Dr. Beatriz Penaloza.    Pt repeated directions correctly,

## 2019-07-17 ENCOUNTER — TELEPHONE (OUTPATIENT)
Dept: NEPHROLOGY | Facility: CLINIC | Age: 79
End: 2019-07-17

## 2019-07-17 NOTE — TELEPHONE ENCOUNTER
Pt requesting to speak with RN re: kidney issues. Pt states he saw Dr Jacy Berkowitz and discussed kidney levels increased from 1.9 to 2.9. Pls call. Thank you.

## 2019-07-19 NOTE — TELEPHONE ENCOUNTER
Dr. Aguilar Fail did you see this message? Just checking if it was taken care of so I can remove it from the in basket.

## 2019-07-23 ENCOUNTER — APPOINTMENT (OUTPATIENT)
Dept: LAB | Age: 79
End: 2019-07-23
Attending: INTERNAL MEDICINE
Payer: MEDICARE

## 2019-07-23 DIAGNOSIS — I48.20 CHRONIC ATRIAL FIBRILLATION (HCC): ICD-10-CM

## 2019-07-23 DIAGNOSIS — I25.10 CORONARY ARTERY DISEASE INVOLVING NATIVE HEART WITHOUT ANGINA PECTORIS, UNSPECIFIED VESSEL OR LESION TYPE: ICD-10-CM

## 2019-07-23 DIAGNOSIS — N28.9 RENAL INSUFFICIENCY: ICD-10-CM

## 2019-07-23 DIAGNOSIS — N18.30 CKD (CHRONIC KIDNEY DISEASE), STAGE III (HCC): ICD-10-CM

## 2019-07-23 DIAGNOSIS — I10 ESSENTIAL HYPERTENSION: ICD-10-CM

## 2019-07-23 DIAGNOSIS — I25.10 CORONARY ARTERY DISEASE INVOLVING NATIVE CORONARY ARTERY OF NATIVE HEART WITHOUT ANGINA PECTORIS: ICD-10-CM

## 2019-07-23 LAB
ALBUMIN SERPL-MCNC: 3.6 G/DL (ref 3.4–5)
ANION GAP SERPL CALC-SCNC: 6 MMOL/L (ref 0–18)
BUN BLD-MCNC: 55 MG/DL (ref 7–18)
BUN/CREAT SERPL: 22.7 (ref 10–20)
CALCIUM BLD-MCNC: 8.7 MG/DL (ref 8.5–10.1)
CHLORIDE SERPL-SCNC: 108 MMOL/L (ref 98–112)
CO2 SERPL-SCNC: 28 MMOL/L (ref 21–32)
CREAT BLD-MCNC: 2.42 MG/DL (ref 0.7–1.3)
GLUCOSE BLD-MCNC: 129 MG/DL (ref 70–99)
OSMOLALITY SERPL CALC.SUM OF ELEC: 311 MOSM/KG (ref 275–295)
PHOSPHATE SERPL-MCNC: 3.9 MG/DL (ref 2.5–4.9)
POTASSIUM SERPL-SCNC: 3.5 MMOL/L (ref 3.5–5.1)
SODIUM SERPL-SCNC: 142 MMOL/L (ref 136–145)

## 2019-07-23 PROCEDURE — 80069 RENAL FUNCTION PANEL: CPT

## 2019-07-23 PROCEDURE — 36415 COLL VENOUS BLD VENIPUNCTURE: CPT

## 2019-08-06 ENCOUNTER — TELEPHONE (OUTPATIENT)
Dept: NEPHROLOGY | Facility: CLINIC | Age: 79
End: 2019-08-06

## 2019-08-06 ENCOUNTER — OFFICE VISIT (OUTPATIENT)
Dept: CARDIOLOGY CLINIC | Facility: CLINIC | Age: 79
End: 2019-08-06
Payer: MEDICARE

## 2019-08-06 ENCOUNTER — APPOINTMENT (OUTPATIENT)
Dept: LAB | Age: 79
End: 2019-08-06
Attending: INTERNAL MEDICINE
Payer: MEDICARE

## 2019-08-06 ENCOUNTER — TELEPHONE (OUTPATIENT)
Dept: CARDIOLOGY CLINIC | Facility: CLINIC | Age: 79
End: 2019-08-06

## 2019-08-06 ENCOUNTER — ANTI-COAG VISIT (OUTPATIENT)
Dept: INTERNAL MEDICINE CLINIC | Facility: CLINIC | Age: 79
End: 2019-08-06
Payer: MEDICARE

## 2019-08-06 VITALS
RESPIRATION RATE: 18 BRPM | HEART RATE: 74 BPM | SYSTOLIC BLOOD PRESSURE: 124 MMHG | HEIGHT: 66 IN | DIASTOLIC BLOOD PRESSURE: 75 MMHG | BODY MASS INDEX: 27.97 KG/M2 | WEIGHT: 174 LBS

## 2019-08-06 DIAGNOSIS — E78.5 DYSLIPIDEMIA: ICD-10-CM

## 2019-08-06 DIAGNOSIS — I25.5 ISCHEMIC CARDIOMYOPATHY: ICD-10-CM

## 2019-08-06 DIAGNOSIS — N17.9 AKI (ACUTE KIDNEY INJURY) (HCC): Primary | ICD-10-CM

## 2019-08-06 DIAGNOSIS — I25.10 CORONARY ARTERY DISEASE INVOLVING NATIVE HEART WITHOUT ANGINA PECTORIS, UNSPECIFIED VESSEL OR LESION TYPE: ICD-10-CM

## 2019-08-06 DIAGNOSIS — I48.20 CHRONIC ATRIAL FIBRILLATION (HCC): ICD-10-CM

## 2019-08-06 DIAGNOSIS — I25.10 CORONARY ARTERY DISEASE INVOLVING NATIVE HEART WITHOUT ANGINA PECTORIS, UNSPECIFIED VESSEL OR LESION TYPE: Primary | ICD-10-CM

## 2019-08-06 LAB
ANION GAP SERPL CALC-SCNC: 8 MMOL/L (ref 0–18)
BUN BLD-MCNC: 92 MG/DL (ref 7–18)
BUN/CREAT SERPL: 29.6 (ref 10–20)
CALCIUM BLD-MCNC: 9.7 MG/DL (ref 8.5–10.1)
CHLORIDE SERPL-SCNC: 97 MMOL/L (ref 98–112)
CO2 SERPL-SCNC: 30 MMOL/L (ref 21–32)
CREAT BLD-MCNC: 3.11 MG/DL (ref 0.7–1.3)
GLUCOSE BLD-MCNC: 124 MG/DL (ref 70–99)
INR: 2.6 (ref 0.8–1.2)
OSMOLALITY SERPL CALC.SUM OF ELEC: 310 MOSM/KG (ref 275–295)
PATIENT FASTING: NO
POTASSIUM SERPL-SCNC: 4.2 MMOL/L (ref 3.5–5.1)
SODIUM SERPL-SCNC: 135 MMOL/L (ref 136–145)
TEST STRIP EXPIRATION DATE: ABNORMAL DATE

## 2019-08-06 PROCEDURE — 93793 ANTICOAG MGMT PT WARFARIN: CPT

## 2019-08-06 PROCEDURE — 36415 COLL VENOUS BLD VENIPUNCTURE: CPT

## 2019-08-06 PROCEDURE — 36416 COLLJ CAPILLARY BLOOD SPEC: CPT

## 2019-08-06 PROCEDURE — 80048 BASIC METABOLIC PNL TOTAL CA: CPT

## 2019-08-06 PROCEDURE — 99214 OFFICE O/P EST MOD 30 MIN: CPT | Performed by: INTERNAL MEDICINE

## 2019-08-06 PROCEDURE — 85610 PROTHROMBIN TIME: CPT

## 2019-08-06 NOTE — TELEPHONE ENCOUNTER
Received pt log with weights and bp reading with medications notations. Pt here for visit with MB, copies with appt notes for MB to review.

## 2019-08-06 NOTE — TELEPHONE ENCOUNTER
Spoke to pt and Suzie Meyer, RN - pt has not heard anything back from nephrology re: his lab work;    Pt would like assurance someone will call him today;   Leeroy Hood agrees to call him today to clarify delay for pt

## 2019-08-06 NOTE — TELEPHONE ENCOUNTER
addressed at [facility] UMESH SHI recommendations:  See BELINDA Sheets and approximately 1 week and Dr. Yvonne Israel in 8 weeks     Hold Isordil and hydralazine  Try taking carvedilol 6.25 twice a day if blood pressure okay     Decrease Bumex to 1 mg a day only taking extra do

## 2019-08-06 NOTE — PROGRESS NOTES
[address] [place] [address] NOTE    Amanda Ruth is a 78year old male. Patient presents with:   Follow - Up  Atrial Fibrillation  CAD    HPI:   This is a pleasant 78year old male with history of coronary disease cardiomyopathy prior bypass and Novembe TAKE 1 TABLET THREE TIMES DAILY.  (Patient taking differently: Take 10 mg by mouth 2 (two) times daily.  ) Disp: 270 tablet Rfl: 3   WARFARIN SODIUM 5 MG Oral Tab TAKE 5MG  AT BEDTIME ON  MONDAY  AND  7.5MG ALL OTHER DAYS OR AS DIRECTED BY MD Disp: 135 tabl quittin.2      Smokeless tobacco: Never Used    Alcohol use:  Yes      Alcohol/week: 0.0 standard drinks      Comment: occassionaly    Drug use: No    Family History  Family History   Problem Relation Age of Onset   • Hypertension Father    • Diabetes well in the past.  With variable blood pressures will try having him take regular doses of carvedilol and see if able to tolerate. Will then reassess if Isordil hydralazine can be reinitiated. He will see the APN in a week.   We will also decrease Bumex 2

## 2019-08-06 NOTE — PATIENT INSTRUCTIONS
Blood test today    See BELINDA Sheets and approximately 1 week and Dr. Phyllistine Burkitt in 8 weeks    Hold Isordil and hydralazine  Try taking carvedilol 6.25 twice a day if blood pressure okay    Decrease Bumex to 1 mg a day only taking extra dosing if gained 3 pound

## 2019-08-06 NOTE — TELEPHONE ENCOUNTER
Patient contacted. (See original message below) Patient also had lab work done on 7/23/19 and is asking about results. Encounter routed to Dr. Aakash Kenney to advise. Patient informed and is aware that Dr. Aakash Kenney is out of the office today.

## 2019-08-06 NOTE — TELEPHONE ENCOUNTER
Spoke to pt  carson  I have call ed him about 3x previous but cannot leave message on his vm   he will hold bumex wed, thurs. . Start 1mg day frid, sat, sun   repat bmp Monday    He is aweare

## 2019-08-06 NOTE — TELEPHONE ENCOUNTER
Dr. Angel Hatch spoke to patient who is upset that he has not received a call regarding lab results. An encounter from 7/17/19 that was routed to Dr. Aakash Kenney to advise but no one has called patient back.

## 2019-08-07 RX ORDER — BUMETANIDE 1 MG/1
1 TABLET ORAL DAILY
Qty: 135 TABLET | Refills: 3 | COMMUNITY
Start: 2019-08-07 | End: 2020-03-30

## 2019-08-12 ENCOUNTER — APPOINTMENT (OUTPATIENT)
Dept: LAB | Age: 79
End: 2019-08-12
Attending: INTERNAL MEDICINE
Payer: MEDICARE

## 2019-08-12 ENCOUNTER — TELEPHONE (OUTPATIENT)
Dept: NEPHROLOGY | Facility: CLINIC | Age: 79
End: 2019-08-12

## 2019-08-12 DIAGNOSIS — N17.9 AKI (ACUTE KIDNEY INJURY) (HCC): ICD-10-CM

## 2019-08-12 LAB
ANION GAP SERPL CALC-SCNC: 8 MMOL/L (ref 0–18)
BUN BLD-MCNC: 54 MG/DL (ref 7–18)
BUN/CREAT SERPL: 20.8 (ref 10–20)
CALCIUM BLD-MCNC: 9 MG/DL (ref 8.5–10.1)
CHLORIDE SERPL-SCNC: 105 MMOL/L (ref 98–112)
CO2 SERPL-SCNC: 28 MMOL/L (ref 21–32)
CREAT BLD-MCNC: 2.6 MG/DL (ref 0.7–1.3)
GLUCOSE BLD-MCNC: 143 MG/DL (ref 70–99)
OSMOLALITY SERPL CALC.SUM OF ELEC: 309 MOSM/KG (ref 275–295)
PATIENT FASTING: YES
POTASSIUM SERPL-SCNC: 4.4 MMOL/L (ref 3.5–5.1)
SODIUM SERPL-SCNC: 141 MMOL/L (ref 136–145)

## 2019-08-12 PROCEDURE — 80048 BASIC METABOLIC PNL TOTAL CA: CPT

## 2019-08-12 PROCEDURE — 36415 COLL VENOUS BLD VENIPUNCTURE: CPT

## 2019-08-12 NOTE — TELEPHONE ENCOUNTER
Pt states he was told to call [facility] today to discuss next steps in treatment. Please call.

## 2019-08-13 ENCOUNTER — TELEPHONE (OUTPATIENT)
Dept: CARDIOLOGY CLINIC | Facility: CLINIC | Age: 79
End: 2019-08-13

## 2019-08-13 ENCOUNTER — OFFICE VISIT (OUTPATIENT)
Dept: CARDIOLOGY CLINIC | Facility: CLINIC | Age: 79
End: 2019-08-13
Payer: MEDICARE

## 2019-08-13 VITALS
DIASTOLIC BLOOD PRESSURE: 57 MMHG | SYSTOLIC BLOOD PRESSURE: 88 MMHG | HEART RATE: 77 BPM | WEIGHT: 175 LBS | OXYGEN SATURATION: 95 % | RESPIRATION RATE: 20 BRPM | BODY MASS INDEX: 28 KG/M2

## 2019-08-13 DIAGNOSIS — R06.00 DYSPNEA, UNSPECIFIED TYPE: Primary | ICD-10-CM

## 2019-08-13 PROCEDURE — 99204 OFFICE O/P NEW MOD 45 MIN: CPT | Performed by: NURSE PRACTITIONER

## 2019-08-13 RX ORDER — CARVEDILOL 3.12 MG/1
3.12 TABLET ORAL 2 TIMES DAILY WITH MEALS
Qty: 60 TABLET | Refills: 1 | Status: ON HOLD | OUTPATIENT
Start: 2019-08-13 | End: 2019-09-10

## 2019-08-13 NOTE — PATIENT INSTRUCTIONS
1. Start coreg 3.125mg twice a day  2.  Schedule TARIQ   Monitor weight daily  Call if gain 3 lbs in 1 day or 5 lbs in 1 week  Call if increased leg edema or shortness of breath

## 2019-08-13 NOTE — PROGRESS NOTES
Amanda Ruth is a 78year old male. Patient presents with: Follow - Up  Atrial Fibrillation  CAD  Chest Pressure: For a month now  Dyspnea: For a month     HPI:   Patient comes in today for checkup.  He sees Dr. Leah Chris he has a history of coronary BS once daily Disp: 100 each Rfl: 3   ONETOUCH DELICA LANCETS 54X Does not apply Misc Use daily Disp: 100 each Rfl: 3   Glucose Blood (ONETOUCH VERIO) In Vitro Strip Use daily Disp: 100 strip Rfl: 3   Blood Glucose Calibration (ONETOUCH ULTRA CONTROL) In V [place]   • Diverticulosis     colonoscopy 3/10/15 by GI Dr Monique Hammonds   • Floaters 6/21/2016   • Hypercholesteremia    • MI (myocardial infarction) [facility]) 2008    cardiologist Dr Grant Burris at [facility]   • S/P cholecystectomy 2005    [address] regards to fixing his valve so we will recommend a TARIQ or Dr. Kaela Herrera. Based on this decide if he is a candidate for MitraClip. He understands to keep an eye on his pressures and daily weights.   Of note he does have a history of A. fib he is on warfarin

## 2019-08-13 NOTE — TELEPHONE ENCOUNTER
Scheduled 8/16/19. CARD ECHO TARIQ (CPT=93320/07152) [[id number]]  Order #: [id number].  #:[id number] FUTURE   Priority: Routine  Class: EHV - RFL   Resulting Agency: Kelsey Chain  Test ID: [id number]  Future Order Information    Expires on:08/13/2020

## 2019-08-14 NOTE — TELEPHONE ENCOUNTER
Called [employer] to initiate a PA for TARIQ, per agent MARBIN Toney was approved from 8/16/19 through 9/14/19. Authorization #: [id number].

## 2019-08-14 NOTE — TELEPHONE ENCOUNTER
Called and spoke to pt, informed him that per his insurance, TARIQ is covered and he may proceed with procedure on 8/16/19. Pt verbalized understanding.

## 2019-08-15 RX ORDER — WARFARIN SODIUM 5 MG/1
TABLET ORAL
Qty: 135 TABLET | Refills: 1 | Status: SHIPPED | OUTPATIENT
Start: 2019-08-15 | End: 2020-02-25

## 2019-08-16 ENCOUNTER — HOSPITAL ENCOUNTER (OUTPATIENT)
Dept: INTERVENTIONAL RADIOLOGY/VASCULAR | Facility: HOSPITAL | Age: 79
Discharge: HOME | End: 2019-08-16
Attending: INTERNAL MEDICINE | Admitting: INTERNAL MEDICINE
Payer: MEDICARE

## 2019-08-16 ENCOUNTER — HOSPITAL ENCOUNTER (OUTPATIENT)
Dept: CV DIAGNOSTICS | Facility: HOSPITAL | Age: 79
Discharge: HOME | End: 2019-08-16
Attending: INTERNAL MEDICINE
Payer: MEDICARE

## 2019-08-16 VITALS
SYSTOLIC BLOOD PRESSURE: 108 MMHG | RESPIRATION RATE: 22 BRPM | BODY MASS INDEX: 27.15 KG/M2 | HEIGHT: 67.5 IN | WEIGHT: 175 LBS | DIASTOLIC BLOOD PRESSURE: 67 MMHG | OXYGEN SATURATION: 93 % | HEART RATE: 85 BPM

## 2019-08-16 DIAGNOSIS — I34.0 MITRAL REGURGITATION: ICD-10-CM

## 2019-08-16 LAB — GLUCOSE BLDC GLUCOMTR-MCNC: 133 MG/DL (ref 70–99)

## 2019-08-16 PROCEDURE — 93312 ECHO TRANSESOPHAGEAL: CPT | Performed by: INTERNAL MEDICINE

## 2019-08-16 PROCEDURE — 93325 DOPPLER ECHO COLOR FLOW MAPG: CPT | Performed by: INTERNAL MEDICINE

## 2019-08-16 PROCEDURE — 93320 DOPPLER ECHO COMPLETE: CPT | Performed by: INTERNAL MEDICINE

## 2019-08-16 PROCEDURE — B246ZZ4 ULTRASONOGRAPHY OF RIGHT AND LEFT HEART, TRANSESOPHAGEAL: ICD-10-PCS | Performed by: INTERNAL MEDICINE

## 2019-08-16 PROCEDURE — 93312 ECHO TRANSESOPHAGEAL: CPT

## 2019-08-16 PROCEDURE — 99152 MOD SED SAME PHYS/QHP 5/>YRS: CPT

## 2019-08-16 PROCEDURE — 82962 GLUCOSE BLOOD TEST: CPT

## 2019-08-16 RX ORDER — SODIUM CHLORIDE 9 MG/ML
INJECTION, SOLUTION INTRAVENOUS
Status: DISCONTINUED
Start: 2019-08-16 | End: 2019-08-16

## 2019-08-16 RX ORDER — MIDAZOLAM HYDROCHLORIDE 1 MG/ML
INJECTION INTRAMUSCULAR; INTRAVENOUS
Status: DISCONTINUED
Start: 2019-08-16 | End: 2019-08-16

## 2019-08-16 RX ORDER — MIDAZOLAM HYDROCHLORIDE 1 MG/ML
1 INJECTION INTRAMUSCULAR; INTRAVENOUS ONCE
Status: DISCONTINUED | OUTPATIENT
Start: 2019-08-16 | End: 2019-08-16

## 2019-08-16 RX ORDER — SODIUM CHLORIDE 0.9 % (FLUSH) 0.9 %
10 SYRINGE (ML) INJECTION AS NEEDED
Status: DISCONTINUED | OUTPATIENT
Start: 2019-08-16 | End: 2019-08-16

## 2019-08-16 RX ORDER — SODIUM CHLORIDE 9 MG/ML
INJECTION, SOLUTION INTRAVENOUS
Status: DISCONTINUED | OUTPATIENT
Start: 2019-08-16 | End: 2019-08-16

## 2019-08-16 NOTE — IVS NOTE
Pt is able to sit up and ambulate without difficulty. Pt voided and tolerated fluids and food. Instruction provided, patient/family verbalizes understanding. Dr. Pat Talavera spoke with patient/family post procedure.    Appt with Dr. Luis Hooks set up on 8/19

## 2019-08-16 NOTE — BRIEF PROCEDURE NOTE
Cardiology:  Procedure:Transesophogeal echocardiogram with color flow doppler and agitated saline contrast  Indications: Mitral regurgitation  Meds:cetacaine spray,versed,fentanyl  Complications: none  Findings: Four-chamber enlargement, biatrial smoke, RV

## 2019-08-19 ENCOUNTER — TELEPHONE (OUTPATIENT)
Dept: INTERNAL MEDICINE CLINIC | Facility: CLINIC | Age: 79
End: 2019-08-19

## 2019-08-19 ENCOUNTER — OFFICE VISIT (OUTPATIENT)
Dept: CARDIOLOGY | Age: 79
End: 2019-08-19

## 2019-08-19 ENCOUNTER — TELEPHONE (OUTPATIENT)
Dept: CARDIOLOGY CLINIC | Facility: CLINIC | Age: 79
End: 2019-08-19

## 2019-08-19 ENCOUNTER — TELEPHONE (OUTPATIENT)
Dept: CARDIOLOGY | Age: 79
End: 2019-08-19

## 2019-08-19 VITALS — RESPIRATION RATE: 18 BRPM | WEIGHT: 173 LBS | BODY MASS INDEX: 27.15 KG/M2 | HEIGHT: 67 IN

## 2019-08-19 DIAGNOSIS — I50.22 CHRONIC SYSTOLIC CONGESTIVE HEART FAILURE (CMD): Primary | ICD-10-CM

## 2019-08-19 DIAGNOSIS — I25.10 CORONARY ARTERY DISEASE INVOLVING NATIVE HEART WITHOUT ANGINA PECTORIS, UNSPECIFIED VESSEL OR LESION TYPE: ICD-10-CM

## 2019-08-19 DIAGNOSIS — I50.9 HEART FAILURE, UNSPECIFIED HF CHRONICITY, UNSPECIFIED HEART FAILURE TYPE (HCC): Primary | ICD-10-CM

## 2019-08-19 DIAGNOSIS — I25.5 ISCHEMIC CARDIOMYOPATHY: ICD-10-CM

## 2019-08-19 DIAGNOSIS — I48.20 CHRONIC ATRIAL FIBRILLATION (HCC): ICD-10-CM

## 2019-08-19 DIAGNOSIS — I25.10 CORONARY ARTERY DISEASE INVOLVING NATIVE HEART WITHOUT ANGINA PECTORIS, UNSPECIFIED VESSEL OR LESION TYPE: Primary | ICD-10-CM

## 2019-08-19 PROCEDURE — 99215 OFFICE O/P EST HI 40 MIN: CPT | Performed by: INTERNAL MEDICINE

## 2019-08-19 RX ORDER — ISOSORBIDE DINITRATE 10 MG/1
5 TABLET ORAL 3 TIMES DAILY
COMMUNITY
Start: 2019-01-04 | End: 2019-08-19 | Stop reason: CLARIF

## 2019-08-19 RX ORDER — METOLAZONE 2.5 MG/1
2.5 TABLET ORAL PRN
COMMUNITY
Start: 2019-06-14

## 2019-08-19 RX ORDER — ATORVASTATIN CALCIUM 40 MG/1
40 TABLET, FILM COATED ORAL NIGHTLY
COMMUNITY

## 2019-08-19 SDOH — HEALTH STABILITY: MENTAL HEALTH: HOW OFTEN DO YOU HAVE A DRINK CONTAINING ALCOHOL?: MONTHLY OR LESS

## 2019-08-19 ASSESSMENT — ENCOUNTER SYMPTOMS
FEVER: 0
BRUISES/BLEEDS EASILY: 0
CHILLS: 0
SUSPICIOUS LESIONS: 0
ALLERGIC/IMMUNOLOGIC COMMENTS: NO NEW FOOD ALLERGIES
HEMATOCHEZIA: 0
WEIGHT LOSS: 0
BLOATING: 1
HEMOPTYSIS: 0
COUGH: 0
WEIGHT GAIN: 0

## 2019-08-19 NOTE — TELEPHONE ENCOUNTER
I believe referral needs to come from Dr. Ochoa Gonzalez.  Routed to [facility] office staff for assistance with referral for External Cardiology Dr. Carole Biggs for Heart Failure specialist.

## 2019-08-19 NOTE — TELEPHONE ENCOUNTER
Dangelo Skelton with [facility] called and requested referral for patient to see Dr Asael Vyas (Cardiology). Please sign off if agree. Maryann Flores MD  Cardiology  [address] [address]  [address] [address] [phone]  [phone]

## 2019-08-19 NOTE — TELEPHONE ENCOUNTER
Pt needs a referral for Dr Sobia Escalante (Cardiology) he saw today and was told needed a referral from dr Lexi Erwin since he referred pt to see dr Heidi Moreno  please advice

## 2019-08-19 NOTE — TELEPHONE ENCOUNTER
Pt need referral to see Traci Day he was referred to him by [facility] office.

## 2019-08-19 NOTE — TELEPHONE ENCOUNTER
[address] [facility] Access for Labs    NAME:  Kyle Wilder OF BIRTH:  1957  MEDICAL RECORD NUMBER:  [id number]  DATE:  1/9/2018    Patient arrived to [facility]   [] per wheelchair   [x] ambulatory   Alert and oriented X 4, denies c/o pain or fatigues. States can't tell when blood counts are low. Had appointment with DR. Castellon this AM. HGB 6.1. WBC 1.4. Orders received for 2 units blood tomorrow  Port Site Location:  right chest    Port Site:  Redness: No  Bruising: No   Edema: No  Pain: No     Port Site cleansed with:  Chloroprep Scrub for 30 seconds and air dried? Yes    Port Accessed with: 19 Gauge . 75 inch Power Port needle using sterile technique. Blood return obtained: Yes    Labs: type and screen and extra small lavender tube  Blood wasted: 20 ml  Labs drawn using a Vacutainer/Syringe: Yes  Flushed with 20 ml Normal Saline using push-pause method. Port flushes without resistance. Port Deaccessed:  Yes, covered with a bandaid per patient request    Response to treatment:  Well tolerated by patient. Scheduled for 10AM tomorrow 1/10/18 for 2 units blood.     Education:    Verbalized understanding    Electronically signed by Kaden Song RN on 1/9/2018 at 1:14 PM Referral pended to Dr. Scott Grew.

## 2019-08-21 ENCOUNTER — TELEPHONE (OUTPATIENT)
Dept: CARDIOLOGY | Age: 79
End: 2019-08-21

## 2019-08-21 ENCOUNTER — EXTERNAL RECORD (OUTPATIENT)
Dept: HEALTH INFORMATION MANAGEMENT | Facility: OTHER | Age: 79
End: 2019-08-21

## 2019-08-21 NOTE — H&P
Patient seen and examined. Agree with Above  Neck veins flat, no chest pain     Plan:   1. NYHA class III HF  2.  Plan RHC today for titration of meds and /or advanced therapy considerations      Chava Roberto MD, [facility]  Heart Failure Transplant / Pulmonary H

## 2019-08-22 NOTE — TELEPHONE ENCOUNTER
Spoke to pt creat coming down     take no  bumex if weght under 167   one bumex 167-170  Over 170  bumex bid   pt aware

## 2019-08-28 ENCOUNTER — TELEPHONE (OUTPATIENT)
Dept: INTERNAL MEDICINE CLINIC | Facility: CLINIC | Age: 79
End: 2019-08-28

## 2019-08-28 DIAGNOSIS — I25.10 CORONARY ARTERY DISEASE INVOLVING NATIVE HEART, ANGINA PRESENCE UNSPECIFIED, UNSPECIFIED VESSEL OR LESION TYPE: ICD-10-CM

## 2019-08-28 DIAGNOSIS — I25.5 ISCHEMIC CARDIOMYOPATHY: Primary | ICD-10-CM

## 2019-08-28 NOTE — TELEPHONE ENCOUNTER
Spoke to patient and relayed MD message, patient verbalized understanding. Referral faxed to Dr. Pratik Spain office, confirmation received.

## 2019-08-30 PROBLEM — Z95.810 ICD (IMPLANTABLE CARDIOVERTER-DEFIBRILLATOR) IN PLACE: Status: ACTIVE | Noted: 2019-08-30

## 2019-09-03 ENCOUNTER — OFFICE VISIT (OUTPATIENT)
Dept: CARDIOLOGY | Age: 79
End: 2019-09-03

## 2019-09-03 ENCOUNTER — TELEPHONE (OUTPATIENT)
Dept: INTERNAL MEDICINE CLINIC | Facility: CLINIC | Age: 79
End: 2019-09-03

## 2019-09-03 VITALS
BODY MASS INDEX: 25.76 KG/M2 | HEART RATE: 68 BPM | DIASTOLIC BLOOD PRESSURE: 56 MMHG | SYSTOLIC BLOOD PRESSURE: 90 MMHG | HEIGHT: 68 IN | OXYGEN SATURATION: 97 % | WEIGHT: 170 LBS

## 2019-09-03 DIAGNOSIS — I48.0 PAROXYSMAL ATRIAL FIBRILLATION (CMD): Primary | ICD-10-CM

## 2019-09-03 DIAGNOSIS — N18.30 STAGE 3 CHRONIC KIDNEY DISEASE (HCC): Primary | ICD-10-CM

## 2019-09-03 DIAGNOSIS — E78.00 HYPERCHOLESTEREMIA: ICD-10-CM

## 2019-09-03 DIAGNOSIS — I25.10 CORONARY ARTERY DISEASE INVOLVING NATIVE CORONARY ARTERY OF NATIVE HEART WITHOUT ANGINA PECTORIS: ICD-10-CM

## 2019-09-03 DIAGNOSIS — I50.22 CHRONIC SYSTOLIC CONGESTIVE HEART FAILURE (CMD): ICD-10-CM

## 2019-09-03 DIAGNOSIS — Z95.810 ICD (IMPLANTABLE CARDIOVERTER-DEFIBRILLATOR) IN PLACE: ICD-10-CM

## 2019-09-03 PROCEDURE — 99214 OFFICE O/P EST MOD 30 MIN: CPT | Performed by: INTERNAL MEDICINE

## 2019-09-03 SDOH — HEALTH STABILITY: MENTAL HEALTH: HOW OFTEN DO YOU HAVE A DRINK CONTAINING ALCOHOL?: MONTHLY OR LESS

## 2019-09-03 ASSESSMENT — PATIENT HEALTH QUESTIONNAIRE - PHQ9
2. FEELING DOWN, DEPRESSED OR HOPELESS: NOT AT ALL
1. LITTLE INTEREST OR PLEASURE IN DOING THINGS: NOT AT ALL
SUM OF ALL RESPONSES TO PHQ9 QUESTIONS 1 AND 2: 0
SUM OF ALL RESPONSES TO PHQ9 QUESTIONS 1 AND 2: 0

## 2019-09-03 NOTE — TELEPHONE ENCOUNTER
Pt is requesting a referral for Dr Xiang Land, pt has an dena on 9/4, for a routine follow up  Tasked to nursing

## 2019-09-04 ENCOUNTER — TELEPHONE (OUTPATIENT)
Dept: NEPHROLOGY | Facility: CLINIC | Age: 79
End: 2019-09-04

## 2019-09-04 ENCOUNTER — OFFICE VISIT (OUTPATIENT)
Dept: NEPHROLOGY | Facility: CLINIC | Age: 79
End: 2019-09-04
Payer: MEDICARE

## 2019-09-04 VITALS
DIASTOLIC BLOOD PRESSURE: 64 MMHG | SYSTOLIC BLOOD PRESSURE: 110 MMHG | HEIGHT: 66 IN | HEART RATE: 84 BPM | BODY MASS INDEX: 28.09 KG/M2 | WEIGHT: 174.81 LBS

## 2019-09-04 DIAGNOSIS — I25.10 ATHEROSCLEROSIS OF NATIVE CORONARY ARTERY OF NATIVE HEART WITHOUT ANGINA PECTORIS: Primary | ICD-10-CM

## 2019-09-04 DIAGNOSIS — E11.21 DIABETIC NEPHROPATHY ASSOCIATED WITH TYPE 2 DIABETES MELLITUS (HCC): ICD-10-CM

## 2019-09-04 DIAGNOSIS — N18.30 CKD (CHRONIC KIDNEY DISEASE), STAGE III (HCC): ICD-10-CM

## 2019-09-04 PROCEDURE — 99213 OFFICE O/P EST LOW 20 MIN: CPT | Performed by: INTERNAL MEDICINE

## 2019-09-04 NOTE — PATIENT INSTRUCTIONS
Good job gus    Same meds    Take bumex if weight over 173    Hold if less     do labs around sept 20 and call me for results    Have a great week

## 2019-09-05 NOTE — PROGRESS NOTES
Dear Lala Ill saw Anjalironnell Almaguer today for follow up   he's keeping his weights around 173 lbs  Is breathing well he denies any edema. He is currently dosing his Bumex due to his weight if it is under 173 hold it if it is over 173 he takes 1 mg.   He is not taking h

## 2019-09-09 ENCOUNTER — ANTI-COAG VISIT (OUTPATIENT)
Dept: INTERNAL MEDICINE CLINIC | Facility: CLINIC | Age: 79
End: 2019-09-09
Payer: MEDICARE

## 2019-09-09 DIAGNOSIS — I48.20 CHRONIC ATRIAL FIBRILLATION (HCC): ICD-10-CM

## 2019-09-09 LAB
INR: 1.7 (ref 0.8–1.2)
TEST STRIP EXPIRATION DATE: ABNORMAL DATE

## 2019-09-09 PROCEDURE — 93793 ANTICOAG MGMT PT WARFARIN: CPT

## 2019-09-09 PROCEDURE — 36416 COLLJ CAPILLARY BLOOD SPEC: CPT

## 2019-09-09 PROCEDURE — 85610 PROTHROMBIN TIME: CPT

## 2019-09-09 NOTE — PROGRESS NOTES
Per 8/19/19 Sunil Arango MD - 08/19/2019 2:00 PM CDT    Formatting of this note might be different from the original.  1. Increase coreg 6.25 twice daily     2.  Daria Vera will call you with the appt - Continue Coumadin if INR 2-3, if greater t

## 2019-09-10 ENCOUNTER — HOSPITAL ENCOUNTER (OUTPATIENT)
Dept: INTERVENTIONAL RADIOLOGY/VASCULAR | Facility: HOSPITAL | Age: 79
Discharge: HOME | End: 2019-09-10
Attending: INTERNAL MEDICINE | Admitting: INTERNAL MEDICINE
Payer: MEDICARE

## 2019-09-10 VITALS
SYSTOLIC BLOOD PRESSURE: 119 MMHG | WEIGHT: 173 LBS | OXYGEN SATURATION: 99 % | BODY MASS INDEX: 28 KG/M2 | DIASTOLIC BLOOD PRESSURE: 64 MMHG | RESPIRATION RATE: 15 BRPM | HEART RATE: 70 BPM

## 2019-09-10 DIAGNOSIS — I50.9 HF (HEART FAILURE) (HCC): ICD-10-CM

## 2019-09-10 LAB
ANION GAP SERPL CALC-SCNC: 7 MMOL/L (ref 0–18)
BUN BLD-MCNC: 59 MG/DL (ref 7–18)
BUN/CREAT SERPL: 22.5 (ref 10–20)
CALCIUM BLD-MCNC: 8.8 MG/DL (ref 8.5–10.1)
CHLORIDE SERPL-SCNC: 108 MMOL/L (ref 98–112)
CO2 SERPL-SCNC: 25 MMOL/L (ref 21–32)
CREAT BLD-MCNC: 2.62 MG/DL (ref 0.7–1.3)
DEPRECATED RDW RBC AUTO: 45.1 FL (ref 35.1–46.3)
ERYTHROCYTE [DISTWIDTH] IN BLOOD BY AUTOMATED COUNT: 13.5 % (ref 11–15)
GLUCOSE BLD-MCNC: 129 MG/DL (ref 70–99)
HCT VFR BLD AUTO: 33.1 % (ref 39–53)
HGB BLD-MCNC: 10.8 G/DL (ref 13–17.5)
INR BLD: 1.7 (ref 0.9–1.2)
MCH RBC QN AUTO: 29.8 PG (ref 26–34)
MCHC RBC AUTO-ENTMCNC: 32.6 G/DL (ref 31–37)
MCV RBC AUTO: 91.2 FL (ref 80–100)
OSMOLALITY SERPL CALC.SUM OF ELEC: 308 MOSM/KG (ref 275–295)
PATIENT FASTING: YES
PLATELET # BLD AUTO: 200 10(3)UL (ref 150–450)
POTASSIUM SERPL-SCNC: 4.1 MMOL/L (ref 3.5–5.1)
PROTHROMBIN TIME: 20 SECONDS (ref 11.8–14.5)
RBC # BLD AUTO: 3.63 X10(6)UL (ref 3.8–5.8)
SODIUM SERPL-SCNC: 140 MMOL/L (ref 136–145)
WBC # BLD AUTO: 5 X10(3) UL (ref 4–11)

## 2019-09-10 PROCEDURE — 85027 COMPLETE CBC AUTOMATED: CPT | Performed by: INTERNAL MEDICINE

## 2019-09-10 PROCEDURE — 85610 PROTHROMBIN TIME: CPT | Performed by: INTERNAL MEDICINE

## 2019-09-10 PROCEDURE — 99153 MOD SED SAME PHYS/QHP EA: CPT

## 2019-09-10 PROCEDURE — 80048 BASIC METABOLIC PNL TOTAL CA: CPT | Performed by: INTERNAL MEDICINE

## 2019-09-10 PROCEDURE — 4A023N6 MEASUREMENT OF CARDIAC SAMPLING AND PRESSURE, RIGHT HEART, PERCUTANEOUS APPROACH: ICD-10-PCS | Performed by: INTERNAL MEDICINE

## 2019-09-10 PROCEDURE — 93451 RIGHT HEART CATH: CPT

## 2019-09-10 PROCEDURE — 99152 MOD SED SAME PHYS/QHP 5/>YRS: CPT

## 2019-09-10 PROCEDURE — 93451 RIGHT HEART CATH: CPT | Performed by: INTERNAL MEDICINE

## 2019-09-10 PROCEDURE — B2111ZZ FLUOROSCOPY OF MULTIPLE CORONARY ARTERIES USING LOW OSMOLAR CONTRAST: ICD-10-PCS | Performed by: INTERNAL MEDICINE

## 2019-09-10 PROCEDURE — 36415 COLL VENOUS BLD VENIPUNCTURE: CPT

## 2019-09-10 RX ORDER — LIDOCAINE HYDROCHLORIDE 20 MG/ML
INJECTION, SOLUTION EPIDURAL; INFILTRATION; INTRACAUDAL; PERINEURAL
Status: COMPLETED
Start: 2019-09-10 | End: 2019-09-10

## 2019-09-10 RX ORDER — MIDAZOLAM HYDROCHLORIDE 1 MG/ML
INJECTION INTRAMUSCULAR; INTRAVENOUS
Status: COMPLETED
Start: 2019-09-10 | End: 2019-09-10

## 2019-09-10 RX ORDER — HYDRALAZINE HYDROCHLORIDE 10 MG/1
5 TABLET, FILM COATED ORAL 3 TIMES DAILY
Qty: 270 TABLET | Refills: 3 | Status: ON HOLD | OUTPATIENT
Start: 2019-09-10 | End: 2020-06-29

## 2019-09-10 RX ORDER — SODIUM CHLORIDE 9 MG/ML
INJECTION, SOLUTION INTRAVENOUS
Status: DISCONTINUED
Start: 2019-09-10 | End: 2019-09-10 | Stop reason: WASHOUT

## 2019-09-10 RX ORDER — SODIUM CHLORIDE 9 MG/ML
INJECTION, SOLUTION INTRAVENOUS
Status: DISCONTINUED | OUTPATIENT
Start: 2019-09-10 | End: 2019-09-10

## 2019-09-10 RX ORDER — SODIUM CHLORIDE 9 MG/ML
INJECTION, SOLUTION INTRAVENOUS
Status: DISCONTINUED
Start: 2019-09-10 | End: 2019-09-10

## 2019-09-10 NOTE — PROCEDURES
Sae Newby  3/28/1940      Procedure:  1) Right Heart Catheterization    Reason for Procedure: CHF    Procedure Summary:  1. Risks and Benefits were explained to patient. Informed Consent was obtained  2.  Patient prepped and draped in usual steril

## 2019-09-11 ENCOUNTER — TELEPHONE (OUTPATIENT)
Dept: CARDIOLOGY | Age: 79
End: 2019-09-11

## 2019-09-11 DIAGNOSIS — I50.22 CHRONIC SYSTOLIC CONGESTIVE HEART FAILURE (CMD): Primary | ICD-10-CM

## 2019-09-20 ENCOUNTER — LAB ENCOUNTER (OUTPATIENT)
Dept: LAB | Age: 79
End: 2019-09-20
Attending: INTERNAL MEDICINE
Payer: MEDICARE

## 2019-09-20 ENCOUNTER — TELEPHONE (OUTPATIENT)
Dept: NEPHROLOGY | Facility: CLINIC | Age: 79
End: 2019-09-20

## 2019-09-20 DIAGNOSIS — N18.30 CHRONIC KIDNEY DISEASE, STAGE III (MODERATE) (HCC): Primary | ICD-10-CM

## 2019-09-20 DIAGNOSIS — N18.30 CKD (CHRONIC KIDNEY DISEASE), STAGE III (HCC): ICD-10-CM

## 2019-09-20 DIAGNOSIS — E11.21 DIABETIC NEPHROPATHY ASSOCIATED WITH TYPE 2 DIABETES MELLITUS (HCC): ICD-10-CM

## 2019-09-20 DIAGNOSIS — I25.10 ATHEROSCLEROSIS OF NATIVE CORONARY ARTERY OF NATIVE HEART WITHOUT ANGINA PECTORIS: ICD-10-CM

## 2019-09-20 LAB
ALBUMIN SERPL-MCNC: 3.8 G/DL (ref 3.4–5)
ANION GAP SERPL CALC-SCNC: 10 MMOL/L (ref 0–18)
BASOPHILS # BLD AUTO: 0.04 X10(3) UL (ref 0–0.2)
BASOPHILS NFR BLD AUTO: 0.7 %
BUN BLD-MCNC: 51 MG/DL (ref 7–18)
BUN/CREAT SERPL: 21.3 (ref 10–20)
CALCIUM BLD-MCNC: 8.9 MG/DL (ref 8.5–10.1)
CHLORIDE SERPL-SCNC: 110 MMOL/L (ref 98–112)
CO2 SERPL-SCNC: 22 MMOL/L (ref 21–32)
CREAT BLD-MCNC: 2.39 MG/DL (ref 0.7–1.3)
DEPRECATED RDW RBC AUTO: 46.5 FL (ref 35.1–46.3)
EOSINOPHIL # BLD AUTO: 0.14 X10(3) UL (ref 0–0.7)
EOSINOPHIL NFR BLD AUTO: 2.6 %
ERYTHROCYTE [DISTWIDTH] IN BLOOD BY AUTOMATED COUNT: 13.9 % (ref 11–15)
GLUCOSE BLD-MCNC: 122 MG/DL (ref 70–99)
HCT VFR BLD AUTO: 33.4 % (ref 39–53)
HGB BLD-MCNC: 10.5 G/DL (ref 13–17.5)
IMM GRANULOCYTES # BLD AUTO: 0.01 X10(3) UL (ref 0–1)
IMM GRANULOCYTES NFR BLD: 0.2 %
LYMPHOCYTES # BLD AUTO: 1.18 X10(3) UL (ref 1–4)
LYMPHOCYTES NFR BLD AUTO: 21.6 %
MCH RBC QN AUTO: 28.8 PG (ref 26–34)
MCHC RBC AUTO-ENTMCNC: 31.4 G/DL (ref 31–37)
MCV RBC AUTO: 91.5 FL (ref 80–100)
MONOCYTES # BLD AUTO: 0.54 X10(3) UL (ref 0.1–1)
MONOCYTES NFR BLD AUTO: 9.9 %
NEUTROPHILS # BLD AUTO: 3.56 X10 (3) UL (ref 1.5–7.7)
NEUTROPHILS # BLD AUTO: 3.56 X10(3) UL (ref 1.5–7.7)
NEUTROPHILS NFR BLD AUTO: 65 %
OSMOLALITY SERPL CALC.SUM OF ELEC: 309 MOSM/KG (ref 275–295)
PHOSPHATE SERPL-MCNC: 3.4 MG/DL (ref 2.5–4.9)
PLATELET # BLD AUTO: 188 10(3)UL (ref 150–450)
POTASSIUM SERPL-SCNC: 3.9 MMOL/L (ref 3.5–5.1)
RBC # BLD AUTO: 3.65 X10(6)UL (ref 3.8–5.8)
SODIUM SERPL-SCNC: 142 MMOL/L (ref 136–145)
WBC # BLD AUTO: 5.5 X10(3) UL (ref 4–11)

## 2019-09-20 PROCEDURE — 80069 RENAL FUNCTION PANEL: CPT

## 2019-09-20 PROCEDURE — 85025 COMPLETE CBC W/AUTO DIFF WBC: CPT

## 2019-09-20 PROCEDURE — 36415 COLL VENOUS BLD VENIPUNCTURE: CPT

## 2019-09-24 ENCOUNTER — OFFICE VISIT (OUTPATIENT)
Dept: CARDIOLOGY CLINIC | Facility: HOSPITAL | Age: 79
End: 2019-09-24
Attending: NURSE PRACTITIONER
Payer: MEDICARE

## 2019-09-24 VITALS
BODY MASS INDEX: 28 KG/M2 | DIASTOLIC BLOOD PRESSURE: 39 MMHG | WEIGHT: 175.69 LBS | SYSTOLIC BLOOD PRESSURE: 100 MMHG | OXYGEN SATURATION: 97 % | HEART RATE: 62 BPM

## 2019-09-24 DIAGNOSIS — I50.9 HEART FAILURE, UNSPECIFIED (HCC): ICD-10-CM

## 2019-09-24 DIAGNOSIS — I25.5 ISCHEMIC CARDIOMYOPATHY: Primary | ICD-10-CM

## 2019-09-24 DIAGNOSIS — N18.30 CKD (CHRONIC KIDNEY DISEASE), STAGE III (HCC): ICD-10-CM

## 2019-09-24 DIAGNOSIS — I48.20 CHRONIC ATRIAL FIBRILLATION (HCC): ICD-10-CM

## 2019-09-24 LAB
ABSOLUTE IMMATURE GRANULOCYTES (OFFPRE24): NORMAL
ANION GAP SERPL CALC-SCNC: 6 MMOL/L (ref 0–18)
ANION GAP SERPL CALC-SCNC: NORMAL MMOL/L
BASO+EOS+MONOS # BLD: NORMAL 10*3/UL
BASO+EOS+MONOS NFR BLD: NORMAL %
BASOPHILS # BLD AUTO: 0.03 X10(3) UL (ref 0–0.2)
BASOPHILS # BLD: NORMAL 10*3/UL
BASOPHILS NFR BLD AUTO: 0.5 %
BASOPHILS NFR BLD: NORMAL %
BUN BLD-MCNC: 48 MG/DL (ref 7–18)
BUN SERPL-MCNC: 48 MG/DL
BUN/CREAT SERPL: 19.5 (ref 10–20)
BUN/CREAT SERPL: NORMAL
CALCIUM BLD-MCNC: 8.8 MG/DL (ref 8.5–10.1)
CALCIUM SERPL-MCNC: 8.8 MG/DL
CHLORIDE SERPL-SCNC: 111 MMOL/L
CHLORIDE SERPL-SCNC: 111 MMOL/L (ref 98–112)
CO2 SERPL-SCNC: 24 MMOL/L (ref 21–32)
CO2 SERPL-SCNC: NORMAL MMOL/L
CREAT BLD-MCNC: 2.46 MG/DL (ref 0.7–1.3)
CREAT SERPL-MCNC: 2.46 MG/DL
DEPRECATED RDW RBC AUTO: 47.8 FL (ref 35.1–46.3)
DIFFERENTIAL METHOD BLD: NORMAL
EOSINOPHIL # BLD AUTO: 0.12 X10(3) UL (ref 0–0.7)
EOSINOPHIL # BLD: NORMAL 10*3/UL
EOSINOPHIL NFR BLD AUTO: 2.2 %
EOSINOPHIL NFR BLD: NORMAL %
ERYTHROCYTE [DISTWIDTH] IN BLOOD BY AUTOMATED COUNT: 14.3 % (ref 11–15)
ERYTHROCYTE [DISTWIDTH] IN BLOOD: NORMAL %
GLUCOSE BLD-MCNC: 105 MG/DL (ref 70–99)
GLUCOSE SERPL-MCNC: 105 MG/DL
HCT VFR BLD AUTO: 32 % (ref 39–53)
HCT VFR BLD CALC: 32 %
HGB BLD-MCNC: 10.2 G/DL
HGB BLD-MCNC: 10.2 G/DL (ref 13–17.5)
IMM GRANULOCYTES # BLD AUTO: 0.01 X10(3) UL (ref 0–1)
IMM GRANULOCYTES NFR BLD: 0.2 %
IMMATURE GRANULOCYTES (OFFPRE25): NORMAL
LENGTH OF FAST TIME PATIENT: NORMAL H
LYMPHOCYTES # BLD AUTO: 1.02 X10(3) UL (ref 1–4)
LYMPHOCYTES # BLD: NORMAL 10*3/UL
LYMPHOCYTES NFR BLD AUTO: 18.4 %
LYMPHOCYTES NFR BLD: NORMAL %
MCH RBC QN AUTO: 29.3 PG (ref 26–34)
MCH RBC QN AUTO: NORMAL PG
MCHC RBC AUTO-ENTMCNC: 31.9 G/DL (ref 31–37)
MCHC RBC AUTO-ENTMCNC: NORMAL G/DL
MCV RBC AUTO: 92 FL (ref 80–100)
MCV RBC AUTO: NORMAL FL
MONOCYTES # BLD AUTO: 0.58 X10(3) UL (ref 0.1–1)
MONOCYTES # BLD: NORMAL 10*3/UL
MONOCYTES NFR BLD AUTO: 10.5 %
MONOCYTES NFR BLD: NORMAL %
MPV (OFFPRE2): NORMAL
NEUTROPHILS # BLD AUTO: 3.78 X10 (3) UL (ref 1.5–7.7)
NEUTROPHILS # BLD AUTO: 3.78 X10(3) UL (ref 1.5–7.7)
NEUTROPHILS # BLD: NORMAL 10*3/UL
NEUTROPHILS NFR BLD AUTO: 68.2 %
NEUTROPHILS NFR BLD: NORMAL %
NRBC BLD MANUAL-RTO: NORMAL %
OSMOLALITY SERPL CALC.SUM OF ELEC: 305 MOSM/KG (ref 275–295)
PATIENT FASTING: NO
PLAT MORPH BLD: NORMAL
PLATELET # BLD AUTO: 195 10(3)UL (ref 150–450)
PLATELET # BLD: 195 10*3/UL
POTASSIUM SERPL-SCNC: 4.6 MMOL/L
POTASSIUM SERPL-SCNC: 4.6 MMOL/L (ref 3.5–5.1)
RBC # BLD AUTO: 3.48 X10(6)UL (ref 3.8–5.8)
RBC # BLD: 3.48 10*6/UL
RBC MORPH BLD: NORMAL
SODIUM SERPL-SCNC: 141 MMOL/L
SODIUM SERPL-SCNC: 141 MMOL/L (ref 136–145)
WBC # BLD AUTO: 5.5 X10(3) UL (ref 4–11)
WBC # BLD: 5.5 10*3/UL
WBC MORPH BLD: NORMAL

## 2019-09-24 PROCEDURE — 99214 OFFICE O/P EST MOD 30 MIN: CPT | Performed by: NURSE PRACTITIONER

## 2019-09-24 PROCEDURE — 80048 BASIC METABOLIC PNL TOTAL CA: CPT | Performed by: NURSE PRACTITIONER

## 2019-09-24 PROCEDURE — 99212 OFFICE O/P EST SF 10 MIN: CPT | Performed by: NURSE PRACTITIONER

## 2019-09-24 PROCEDURE — 85025 COMPLETE CBC W/AUTO DIFF WBC: CPT | Performed by: NURSE PRACTITIONER

## 2019-09-24 PROCEDURE — 36415 COLL VENOUS BLD VENIPUNCTURE: CPT | Performed by: NURSE PRACTITIONER

## 2019-09-24 NOTE — PATIENT INSTRUCTIONS
Continue current medications    Cardiopulmonary exercise test on 10/4/19     Follow up with Dr. Rickie Day on 10/14/19    Return to [address] [place] [address] as directed by Dr. Rickie Day    Please call the heart failure clinic if you notice a weight gain of 3 pounds

## 2019-09-24 NOTE — PROGRESS NOTES
[address] Patient Status:  Outpatient    3/28/1940 MRN [id number]   Location MD Dr. Sara Oliva Dr. is a 78year old male who 07:30 AM    K 3.9 09/20/2019 07:30 AM     09/20/2019 07:30 AM    CO2 22.0 09/20/2019 07:30 AM     (H) 09/20/2019 07:30 AM    CA 8.9 09/20/2019 07:30 AM    ALB 3.8 09/20/2019 07:30 AM    ALKPHO 58 04/20/2017 07:35 AM    BILT 1.2 04/20/2017 07:3 Plan:  1. Add vasodilators   2.  Fup on Cpx to assess for any advanced therapy options, if not improving        Janie Moran MD, [facility]  Heart Failure Transplant / Pulmonary HTN                   Education:  Patient instructed at length regarding clinic p Disp: 180 tablet, Rfl: 3  •  ATORVASTATIN 40 MG Oral Tab, TAKE 1 TABLET EVERY NIGHT, Disp: 90 tablet, Rfl: 3  •  Multiple Vitamin (ONE-A-DAY ESSENTIAL) Oral Tab, Take by mouth., Disp: , Rfl:   •  Potassium Chloride ER 20 MEQ Oral Tab CR, Take 1 tablet (20

## 2019-09-24 NOTE — PROGRESS NOTES
[address] Patient Status:  Outpatient    3/28/1940 MRN [id number]   Location MD Dr. Stiven Ortiz Dr., Dr. is a 78year old improvement in his activity over the past 4-6 days. Has been off his isosorbide since Aug 12. Now able to walk almost back to his 1.5 miles. Tracks daily weights and dave take bumex 1 mg for weight > 173lb as directed by Dr. Yogi Canada.   Took bumex 1 mg last 09/20/2019 07:30 AM    TROP 0.05 (HH) 04/08/2018 10:04 PM    PGLU 133 (H) 08/16/2019 01:26 PM       Clinical labs drawn by MA: BMP, CBC-results reviewed with patient and family      /39   Pulse 62   Wt 175 lb 11.2 oz (79.7 kg)   SpO2 97%   BMI 28.36 occluded in its midportion a saphenous vein graft to the distal right supplied the PDA and the posterolateral branches of the right coronary artery     #2 left main: Angiographically normal   #3 circumflex: Previous stents were wide open with good flow no discontinued due to low BP and hydralazine decreased to 5 mg tid. -EF 25-30%, new decrease in EF, ICD already in situ  -After changes in medications, now able to walk his mile to 1.5 mile route again without dyspnea.   Home BPs improved on lower d

## 2019-10-02 ENCOUNTER — ANCILLARY ORDERS (OUTPATIENT)
Dept: CARDIOLOGY | Age: 79
End: 2019-10-02

## 2019-10-02 ENCOUNTER — ANCILLARY PROCEDURE (OUTPATIENT)
Dept: CARDIOLOGY | Age: 79
End: 2019-10-02
Attending: INTERNAL MEDICINE

## 2019-10-02 DIAGNOSIS — Z95.810 ICD (IMPLANTABLE CARDIOVERTER-DEFIBRILLATOR) IN PLACE: ICD-10-CM

## 2019-10-02 PROCEDURE — X1114 CARDIAC DEVICE HOME CHECK - REMOTE UNSCHEDULED: HCPCS | Performed by: INTERNAL MEDICINE

## 2019-10-02 PROCEDURE — 93295 DEV INTERROG REMOTE 1/2/MLT: CPT | Performed by: INTERNAL MEDICINE

## 2019-10-02 PROCEDURE — 93296 REM INTERROG EVL PM/IDS: CPT | Performed by: INTERNAL MEDICINE

## 2019-10-04 ENCOUNTER — APPOINTMENT (OUTPATIENT)
Dept: CARDIOLOGY | Age: 79
End: 2019-10-04
Attending: INTERNAL MEDICINE

## 2019-10-07 ENCOUNTER — ANTI-COAG VISIT (OUTPATIENT)
Dept: INTERNAL MEDICINE CLINIC | Facility: CLINIC | Age: 79
End: 2019-10-07
Payer: MEDICARE

## 2019-10-07 DIAGNOSIS — I48.20 CHRONIC ATRIAL FIBRILLATION (HCC): ICD-10-CM

## 2019-10-07 PROCEDURE — 93793 ANTICOAG MGMT PT WARFARIN: CPT

## 2019-10-07 PROCEDURE — 85610 PROTHROMBIN TIME: CPT

## 2019-10-07 PROCEDURE — 36416 COLLJ CAPILLARY BLOOD SPEC: CPT

## 2019-10-11 ENCOUNTER — ANCILLARY PROCEDURE (OUTPATIENT)
Dept: CARDIOLOGY | Age: 79
End: 2019-10-11
Attending: INTERNAL MEDICINE

## 2019-10-11 VITALS — HEIGHT: 68 IN | BODY MASS INDEX: 25.76 KG/M2 | WEIGHT: 170 LBS

## 2019-10-11 DIAGNOSIS — I50.22 CHRONIC SYSTOLIC CONGESTIVE HEART FAILURE (CMD): ICD-10-CM

## 2019-10-11 PROCEDURE — 94621 CARDIOPULM EXERCISE TESTING: CPT | Performed by: INTERNAL MEDICINE

## 2019-10-11 ASSESSMENT — EXERCISE STRESS TEST
PEAK_HR: 97
PEAK_HR: 84
PEAK_HR: 134
PEAK_BP: 130/80
PEAK_O2_SAT: 99
PEAK_RPP: 12610
STAGE_CATEGORIES: RECOVERY 0
STAGE_CATEGORIES: RESTING
STAGE_CATEGORIES: RECOVERY 1
PEAK_RPP: 8400
PEAK_BP: 120/70
PEAK_O2_SAT: 99
GRADE: 5
PEAK_HR: 123
STAGE_CATEGORIES: 1
COMMENTS: RPE:13
PEAK_RPP: 16080
PEAK_O2_SAT: 100
STAGE_CATEGORIES: RECOVERY 2
PEAK_BP: 124/64
STAGE_CATEGORIES: 3
STAGE_CATEGORIES: 2
PEAK_BP: 110/70
PEAK_RPP: 13530
PEAK_O2_SAT: 99
PEAK_BP: 100/60
MPH: 1.4
COMMENTS: RPE:15
MPH: 1.7
GRADE: 7
PEAK_BP: 120/70
PEAK_HR: 120
PEAK_RPP: 14400
PEAK_HR: 134
PEAK_O2_SAT: 99
PEAK_O2_SAT: 99

## 2019-10-14 ENCOUNTER — OFFICE VISIT (OUTPATIENT)
Dept: CARDIOLOGY | Age: 79
End: 2019-10-14

## 2019-10-14 VITALS
HEIGHT: 68 IN | SYSTOLIC BLOOD PRESSURE: 96 MMHG | DIASTOLIC BLOOD PRESSURE: 50 MMHG | BODY MASS INDEX: 26.22 KG/M2 | WEIGHT: 173 LBS | HEART RATE: 72 BPM | RESPIRATION RATE: 18 BRPM

## 2019-10-14 DIAGNOSIS — I50.22 CHRONIC SYSTOLIC CONGESTIVE HEART FAILURE (CMD): Primary | ICD-10-CM

## 2019-10-14 LAB
BODY MASS INDEX: 26.3
BREATHING RESERVE (CALCULATED) PREDICTED: 19 %
BREATHING RESERVE (MEASURED) ACHIEVED: 25.6 %
CHRONOTROPIC INDEX PREDICTED: 2.76
HEART RATE RESERVE PREDICTED: 4.96 BPM
HEIGHT: 171 CM
IDEAL BODY WEIGHT: 74 KG
METS ACHIEVED: 2.9
O2 SATURATION ACHIEVED: 97 %
OUES ACHIEVED: 975.6
PEAK HR ACHIEVED: 134 BPM
PEAK HR PREDICTED: 141 BPM
PEAK O2 PULSE (%) PREDICTED: 43.57 %
PEAK O2 PULSE (ML/BEAT) ACHIEVED: 5.93 ML/BEAT
PEAK O2 PULSE (ML/BEAT) PREDICTED: 13.62 ML/BEAT
PEAK RESPIRATORY RATE ACHIEVED: 29 BRS/MIN
PEAK VE ACHIEVED: 40.5 L/MIN
PECO2 ACHIEVED: 18.2 MMHG
PECO2/PETCO2 AT PREDICTED: 72.8 %
PETCO2 ACHIEVED: 25 MMHG
PREDICTED VO2 % AT AT: 33.33 %
PREDICTED VO2 %: 41.37 %
RER MAX ACHIEVED: 1.04
RESTING HR ACHIEVED: 84 BPM
RESTING MVV: 50 L/MIN
STRESS BASELINE BP: NORMAL MMHG
STRESS O2 SAT REST: 98 %
STRESS PERCENT HR: 95 %
STRESS POST ESTIMATED WORKLOAD: 2.9 METS
STRESS POST EXERCISE DUR MIN: 4 MIN
STRESS POST EXERCISE DUR SEC: 17 SEC
STRESS POST O2 SAT PEAK: 97 %
STRESS POST PEAK BP: NORMAL MMHG
STRESS TARGET HR: 141 BPM
VD/VT ACHIEVED: 0.28
VE/VCO2 AT ACHIEVED: 47
VE/VO2 AT ACHIEVED: 44
VO2 AT ACHIEVED: 8.3 ML/KG/MIN
VO2 AT AT (ML/MIN) ACHIEVED: 644 ML/MIN
VO2 AT, IBW ACHIEVED: 8.7 ML/KG/MIN
VO2 PEAK (ML/KG/MIN) ACHIEVED: 10.3 ML/KG/MIN
VO2 PEAK (ML/KG/MIN) PREDICTED: 24.9 ML/KG/MIN
VO2 PEAK (ML/MIN) ACHIEVED: 795 ML/MIN
VO2 PEAK (ML/MIN) PREDICTED: 1920 ML/MIN
VO2 PEAK IBW ACHIEVED: 10.74 ML/KG/MIN
VT/IC PREDICTED: 55 %
WEIGHT MEASUREMENT: 77 KG

## 2019-10-14 PROCEDURE — 99215 OFFICE O/P EST HI 40 MIN: CPT | Performed by: INTERNAL MEDICINE

## 2019-10-14 RX ORDER — DIGOXIN 125 MCG
125 TABLET ORAL
Qty: 15 TABLET | Refills: 11 | Status: SHIPPED | OUTPATIENT
Start: 2019-10-14 | End: 2019-10-29 | Stop reason: SDUPTHER

## 2019-10-14 RX ORDER — HYDRALAZINE HYDROCHLORIDE 10 MG/1
10 TABLET, FILM COATED ORAL 3 TIMES DAILY
COMMUNITY
Start: 2019-09-10 | End: 2020-10-20 | Stop reason: SDUPTHER

## 2019-10-14 SDOH — HEALTH STABILITY: MENTAL HEALTH: HOW OFTEN DO YOU HAVE A DRINK CONTAINING ALCOHOL?: MONTHLY OR LESS

## 2019-10-14 ASSESSMENT — ENCOUNTER SYMPTOMS
ALLERGIC/IMMUNOLOGIC COMMENTS: NO NEW FOOD ALLERGIES
FEVER: 0
CHILLS: 0
BLOATING: 1
HEMATOCHEZIA: 0
WEIGHT GAIN: 0
COUGH: 0
BRUISES/BLEEDS EASILY: 0
WEIGHT LOSS: 0
HEMOPTYSIS: 0
SUSPICIOUS LESIONS: 0

## 2019-10-15 ENCOUNTER — CLINICAL ABSTRACT (OUTPATIENT)
Dept: CARDIOLOGY | Age: 79
End: 2019-10-15

## 2019-10-15 ENCOUNTER — TELEPHONE (OUTPATIENT)
Dept: CARDIOLOGY | Age: 79
End: 2019-10-15

## 2019-10-15 DIAGNOSIS — I50.22 CHRONIC SYSTOLIC CONGESTIVE HEART FAILURE (CMD): ICD-10-CM

## 2019-10-15 DIAGNOSIS — I50.22 CHRONIC SYSTOLIC CONGESTIVE HEART FAILURE (CMD): Primary | ICD-10-CM

## 2019-10-16 RX ORDER — POTASSIUM CHLORIDE 20 MEQ/1
20 TABLET, EXTENDED RELEASE ORAL
Qty: 90 TABLET | Refills: 1 | Status: SHIPPED | OUTPATIENT
Start: 2019-10-16 | End: 2020-03-31

## 2019-10-18 ENCOUNTER — TELEPHONE (OUTPATIENT)
Dept: CARDIOLOGY | Age: 79
End: 2019-10-18

## 2019-10-24 ENCOUNTER — OFFICE VISIT (OUTPATIENT)
Dept: OPHTHALMOLOGY | Facility: CLINIC | Age: 79
End: 2019-10-24
Payer: MEDICARE

## 2019-10-24 DIAGNOSIS — H35.30 ARMD (AGE-RELATED MACULAR DEGENERATION), BILATERAL: ICD-10-CM

## 2019-10-24 DIAGNOSIS — H43.393 FLOATERS, BILATERAL: ICD-10-CM

## 2019-10-24 DIAGNOSIS — E11.9 DIET-CONTROLLED DIABETES MELLITUS (HCC): Primary | ICD-10-CM

## 2019-10-24 DIAGNOSIS — H25.13 AGE-RELATED NUCLEAR CATARACT OF BOTH EYES: ICD-10-CM

## 2019-10-24 PROCEDURE — 92015 DETERMINE REFRACTIVE STATE: CPT | Performed by: OPHTHALMOLOGY

## 2019-10-24 PROCEDURE — 92014 COMPRE OPH EXAM EST PT 1/>: CPT | Performed by: OPHTHALMOLOGY

## 2019-10-24 NOTE — ASSESSMENT & PLAN NOTE
Discussed early cataracts with patient. No treatment recommended at this time. Glasses RX given today for bifocal, but patient says he will most likely just stay with +3.00 over the counter for reading.

## 2019-10-24 NOTE — PATIENT INSTRUCTIONS
Diet-controlled diabetes mellitus ([facility] [address]  Diet controlled diabetes: no background of retinopathy, no signs of neovascularization noted. Discussed ocular and systemic benefits of blood sugar control.   Diagnosis and treatment discussed in detail with patient

## 2019-10-24 NOTE — PROGRESS NOTES
Petty Barger is a 78year old male.     HPI:     HPI     Diabetic Eye Exam      Additional comments: Pt has been a diabetic for 20 years   0 years on pills/  0 years on Insulin (diet controlled)  Pt checks his/her BS once a month  Pt's last blood sug ONCE DAILY. , Disp: 90 tablet, Rfl: 1  hydrALAzine HCl 10 MG Oral Tab, Take 0.5 tablets (5 mg total) by mouth 3 (three) times daily. TAKE 0.5 TABLET THREE TIMES DAILY. , Disp: 270 tablet, Rfl: 3  WARFARIN SODIUM 5 MG Oral Tab, TAKE 1 TABLET AT BEDTIME ON MON slightly reactive OU.            Visual Fields       Left Right     Full Full          Extraocular Movement       Right Left     Full, Ortho Full, Ortho          Dilation     Both eyes:  1.0% Mydriacyl and 2.5% Randy Synephrine @ 10:49 AM            Slit Lamp treatment recommended at this time. Glasses RX given today for bifocal, but patient says he will most likely just stay with +3.00 over the counter for reading.      ARMD (age-related macular degeneration), bilateral  Discussed early macular degeneration c

## 2019-10-25 ENCOUNTER — APPOINTMENT (OUTPATIENT)
Dept: LAB | Age: 79
End: 2019-10-25
Attending: INTERNAL MEDICINE
Payer: MEDICARE

## 2019-10-25 DIAGNOSIS — I50.22 CHRONIC SYSTOLIC HEART FAILURE (HCC): ICD-10-CM

## 2019-10-25 LAB
ANION GAP SERPL CALC-SCNC: NORMAL MMOL/L
BUN SERPL-MCNC: 49 MG/DL
BUN/CREAT SERPL: NORMAL
CALCIUM SERPL-MCNC: 9.1 MG/DL
CHLORIDE SERPL-SCNC: 111 MMOL/L
CO2 SERPL-SCNC: NORMAL MMOL/L
CREAT SERPL-MCNC: 2.32 MG/DL
DIGOXIN: 0.19
GLUCOSE SERPL-MCNC: 141 MG/DL
LENGTH OF FAST TIME PATIENT: NORMAL H
NT-PROBNP SERPL-MCNC: 4965 PG/ML
POTASSIUM SERPL-SCNC: 4.7 MMOL/L
SODIUM SERPL-SCNC: 144 MMOL/L

## 2019-10-25 PROCEDURE — 36415 COLL VENOUS BLD VENIPUNCTURE: CPT

## 2019-10-25 PROCEDURE — 80162 ASSAY OF DIGOXIN TOTAL: CPT

## 2019-10-25 PROCEDURE — 83880 ASSAY OF NATRIURETIC PEPTIDE: CPT

## 2019-10-25 PROCEDURE — 80048 BASIC METABOLIC PNL TOTAL CA: CPT

## 2019-10-28 ENCOUNTER — CLINICAL ABSTRACT (OUTPATIENT)
Dept: CARDIOLOGY | Age: 79
End: 2019-10-28

## 2019-10-29 ENCOUNTER — OFFICE VISIT (OUTPATIENT)
Dept: CARDIOLOGY CLINIC | Facility: CLINIC | Age: 79
End: 2019-10-29
Payer: MEDICARE

## 2019-10-29 ENCOUNTER — TELEPHONE (OUTPATIENT)
Dept: CARDIOLOGY | Age: 79
End: 2019-10-29

## 2019-10-29 VITALS
HEIGHT: 66 IN | HEART RATE: 71 BPM | BODY MASS INDEX: 27.64 KG/M2 | RESPIRATION RATE: 18 BRPM | DIASTOLIC BLOOD PRESSURE: 74 MMHG | WEIGHT: 172 LBS | SYSTOLIC BLOOD PRESSURE: 115 MMHG

## 2019-10-29 DIAGNOSIS — I25.10 CORONARY ARTERY DISEASE INVOLVING NATIVE HEART WITHOUT ANGINA PECTORIS, UNSPECIFIED VESSEL OR LESION TYPE: ICD-10-CM

## 2019-10-29 DIAGNOSIS — I50.22 CHRONIC SYSTOLIC CONGESTIVE HEART FAILURE (CMD): ICD-10-CM

## 2019-10-29 DIAGNOSIS — I48.20 CHRONIC ATRIAL FIBRILLATION (HCC): ICD-10-CM

## 2019-10-29 DIAGNOSIS — R06.00 DYSPNEA, UNSPECIFIED TYPE: Primary | ICD-10-CM

## 2019-10-29 PROCEDURE — 99214 OFFICE O/P EST MOD 30 MIN: CPT | Performed by: INTERNAL MEDICINE

## 2019-10-29 RX ORDER — DIGOXIN 125 MCG
125 TABLET ORAL
COMMUNITY
Start: 2019-10-30 | End: 2020-11-17

## 2019-10-29 RX ORDER — CARVEDILOL 3.12 MG/1
3.12 TABLET ORAL 2 TIMES DAILY WITH MEALS
Qty: 60 TABLET | Refills: 5 | Status: SHIPPED | OUTPATIENT
Start: 2019-10-29 | End: 2019-11-11

## 2019-10-29 RX ORDER — DIGOXIN 125 MCG
125 TABLET ORAL
Qty: 15 TABLET | Refills: 6 | Status: SHIPPED | OUTPATIENT
Start: 2019-10-30 | End: 2020-03-30

## 2019-10-29 NOTE — PATIENT INSTRUCTIONS
See BELINDA Sheets in 4 weeks    Increase carvedilol to 9.375 mg daily which is 6.25 mg tablet plus a 3.125 mg tablet twice a day    If gained 3 pounds in a day or 5 pounds in a week take an extra diuretic and call the office    Follow-up with Dr. Sang martinez

## 2019-10-29 NOTE — PROGRESS NOTES
[address] NOTE    Iona Elizondo is a 78year old male. Patient presents with: Follow - Up: CAD    HPI:   This is a pleasant 78year old male is feeling much better at this time with additional diuretics.   He was seen by Dr. Sara Stoddard and directed, Disp: 1 vial, Rfl: 11  CARVEDILOL 12.5 MG Oral Tab, TAKE 1 TABLET TWICE DAILY WITH MEALS (Patient taking differently: Take 6.25 mg by mouth 2 (two) times daily with meals.  ), Disp: 180 tablet, Rfl: 3  ATORVASTATIN 40 MG Oral Tab, TAKE 1 TABLET E breath with exertion  CARDIOVASCULAR:See HPI  GI: denies abdominal pain and denies heartburn  NEURO: denies headaches  Remainder of review of systems is completed and negative    EXAM:   /74   Pulse 71   Resp 18   Ht 5' 6\" (1.676 m)   Wt 172 lb (78

## 2019-11-07 ENCOUNTER — APPOINTMENT (OUTPATIENT)
Dept: LAB | Age: 79
End: 2019-11-07
Attending: INTERNAL MEDICINE
Payer: MEDICARE

## 2019-11-07 DIAGNOSIS — N18.30 CHRONIC KIDNEY DISEASE, STAGE III (MODERATE) (HCC): ICD-10-CM

## 2019-11-07 LAB
ANION GAP SERPL CALC-SCNC: NORMAL MMOL/L
BUN SERPL-MCNC: 69 MG/DL
BUN/CREAT SERPL: NORMAL
CALCIUM SERPL-MCNC: 9.3 MG/DL
CHLORIDE SERPL-SCNC: 101 MMOL/L
CO2 SERPL-SCNC: NORMAL MMOL/L
CREAT SERPL-MCNC: 2.55 MG/DL
GLUCOSE SERPL-MCNC: 154 MG/DL
LENGTH OF FAST TIME PATIENT: NORMAL H
POTASSIUM SERPL-SCNC: 3.5 MMOL/L
SODIUM SERPL-SCNC: 137 MMOL/L

## 2019-11-07 PROCEDURE — 80048 BASIC METABOLIC PNL TOTAL CA: CPT

## 2019-11-07 PROCEDURE — 36415 COLL VENOUS BLD VENIPUNCTURE: CPT

## 2019-11-08 ENCOUNTER — CLINICAL ABSTRACT (OUTPATIENT)
Dept: CARDIOLOGY | Age: 79
End: 2019-11-08

## 2019-11-11 ENCOUNTER — ANTI-COAG VISIT (OUTPATIENT)
Dept: INTERNAL MEDICINE CLINIC | Facility: CLINIC | Age: 79
End: 2019-11-11
Payer: MEDICARE

## 2019-11-11 ENCOUNTER — TELEPHONE (OUTPATIENT)
Dept: CARDIOLOGY | Age: 79
End: 2019-11-11

## 2019-11-11 ENCOUNTER — TELEPHONE (OUTPATIENT)
Dept: NEPHROLOGY | Facility: CLINIC | Age: 79
End: 2019-11-11

## 2019-11-11 DIAGNOSIS — I48.20 CHRONIC ATRIAL FIBRILLATION (HCC): ICD-10-CM

## 2019-11-11 PROCEDURE — 85610 PROTHROMBIN TIME: CPT

## 2019-11-11 PROCEDURE — 36416 COLLJ CAPILLARY BLOOD SPEC: CPT

## 2019-11-11 PROCEDURE — 93793 ANTICOAG MGMT PT WARFARIN: CPT

## 2019-11-11 RX ORDER — CARVEDILOL 6.25 MG/1
9.38 TABLET ORAL 2 TIMES DAILY WITH MEALS
Qty: 1 TABLET | Refills: 0 | COMMUNITY
Start: 2019-10-29 | End: 2020-03-31

## 2019-11-12 ENCOUNTER — TELEPHONE (OUTPATIENT)
Dept: CARDIOLOGY | Age: 79
End: 2019-11-12

## 2019-11-12 NOTE — TELEPHONE ENCOUNTER
Looks like pt called yesterday and rescheduled his appt to 1/17/20. Is he okay to wait this long or do you want him in sooner?

## 2019-11-12 NOTE — TELEPHONE ENCOUNTER
Patient was a no-show last week could we please have him reschedule for sometime this week with me thank you

## 2019-11-18 ENCOUNTER — OFFICE VISIT (OUTPATIENT)
Dept: NEPHROLOGY | Facility: CLINIC | Age: 79
End: 2019-11-18
Payer: MEDICARE

## 2019-11-18 VITALS
DIASTOLIC BLOOD PRESSURE: 72 MMHG | BODY MASS INDEX: 27.91 KG/M2 | HEART RATE: 70 BPM | HEIGHT: 66 IN | WEIGHT: 173.63 LBS | SYSTOLIC BLOOD PRESSURE: 125 MMHG

## 2019-11-18 DIAGNOSIS — I48.91 ATRIAL FIBRILLATION WITH RAPID VENTRICULAR RESPONSE (HCC): Primary | ICD-10-CM

## 2019-11-18 DIAGNOSIS — Z95.810 ICD (IMPLANTABLE CARDIOVERTER-DEFIBRILLATOR) IN PLACE: ICD-10-CM

## 2019-11-18 DIAGNOSIS — N18.30 CKD (CHRONIC KIDNEY DISEASE), STAGE III (HCC): ICD-10-CM

## 2019-11-18 DIAGNOSIS — E11.21 DIABETIC NEPHROPATHY ASSOCIATED WITH TYPE 2 DIABETES MELLITUS (HCC): ICD-10-CM

## 2019-11-18 DIAGNOSIS — I10 ESSENTIAL HYPERTENSION: ICD-10-CM

## 2019-11-18 PROCEDURE — 99213 OFFICE O/P EST LOW 20 MIN: CPT | Performed by: INTERNAL MEDICINE

## 2019-11-18 NOTE — PATIENT INSTRUCTIONS
Try to keep weight 164-167    If over take extra bumex    Do labs around dec 15 and call me    See me January     happy Carry Viral Reece 77 job w your health

## 2019-11-21 ENCOUNTER — TELEPHONE (OUTPATIENT)
Dept: NEPHROLOGY | Facility: CLINIC | Age: 79
End: 2019-11-21

## 2019-11-22 NOTE — PROGRESS NOTES
Dear Cristhian Mitchell saw Aparna Levin in the office today he is doing well his dry weight has gone down he is trying to keep his weight around between 164 168  His meds include potassium Bumex 1 mg a day hydralazine digoxin carvedilol  As you know he is A. fib rate con

## 2019-12-13 ENCOUNTER — OFFICE VISIT (OUTPATIENT)
Dept: CARDIOLOGY CLINIC | Facility: CLINIC | Age: 79
End: 2019-12-13
Payer: MEDICARE

## 2019-12-13 ENCOUNTER — ANTI-COAG VISIT (OUTPATIENT)
Dept: INTERNAL MEDICINE CLINIC | Facility: CLINIC | Age: 79
End: 2019-12-13
Payer: MEDICARE

## 2019-12-13 VITALS
HEIGHT: 66 IN | SYSTOLIC BLOOD PRESSURE: 105 MMHG | WEIGHT: 169 LBS | DIASTOLIC BLOOD PRESSURE: 62 MMHG | HEART RATE: 72 BPM | RESPIRATION RATE: 18 BRPM | BODY MASS INDEX: 27.16 KG/M2

## 2019-12-13 DIAGNOSIS — I48.20 CHRONIC ATRIAL FIBRILLATION (HCC): ICD-10-CM

## 2019-12-13 DIAGNOSIS — I25.5 ISCHEMIC CARDIOMYOPATHY: Primary | ICD-10-CM

## 2019-12-13 PROCEDURE — 99214 OFFICE O/P EST MOD 30 MIN: CPT | Performed by: NURSE PRACTITIONER

## 2019-12-13 PROCEDURE — 36416 COLLJ CAPILLARY BLOOD SPEC: CPT

## 2019-12-13 PROCEDURE — 85610 PROTHROMBIN TIME: CPT

## 2019-12-13 PROCEDURE — 93793 ANTICOAG MGMT PT WARFARIN: CPT

## 2019-12-13 RX ORDER — METOLAZONE 2.5 MG/1
2.5 TABLET ORAL AS NEEDED
COMMUNITY
Start: 2019-06-14 | End: 2020-11-17

## 2019-12-13 NOTE — PATIENT INSTRUCTIONS
1. Monitor weight daily  Call if gain 3 lbs in 1 day or 5 lbs in 1 week  Call if increased leg edema or shortness of breath  2. Continue same medications  3.  Follow up with dr. Marichuy Jane and Dr. Carole Biggs

## 2019-12-13 NOTE — PROGRESS NOTES
Trino Franco is a 78year old male. No chief complaint on file. HPI:   Patient comes in today for follow-up. He sees Dr. Christelle Mansfield he also sees Dr. Peyton Parish.   Had his diuretics adjusted and he is doing very well with Bumex 1 mg daily and metolazone a ESSENTIAL) Oral Tab Take by mouth. • aspirin 81 MG Oral Chew Tab Chew by mouth daily. • triamcinolone acetonide 0.1 % External Cream Apply topically 2 (two) times daily as needed.  45 g 1   • Blood Glucose Monitoring Suppl (ACCU-CHEK BROOKE) Does not breath with exertion  CARDIOVASCULAR: no chest pain  GI: denies abdominal pain and denies heartburn  NEURO: denies headaches    EXAM:   /62   Pulse 72   Resp 18   Ht 5' 6\" (1.676 m)   Wt 169 lb (76.7 kg)   BMI 27.28 kg/m²   GENERAL: well developed,

## 2019-12-16 ENCOUNTER — APPOINTMENT (OUTPATIENT)
Dept: LAB | Age: 79
End: 2019-12-16
Attending: INTERNAL MEDICINE
Payer: MEDICARE

## 2019-12-16 DIAGNOSIS — N18.30 CKD (CHRONIC KIDNEY DISEASE), STAGE III (HCC): ICD-10-CM

## 2019-12-16 DIAGNOSIS — I10 ESSENTIAL HYPERTENSION: ICD-10-CM

## 2019-12-16 DIAGNOSIS — I48.91 ATRIAL FIBRILLATION WITH RAPID VENTRICULAR RESPONSE (HCC): ICD-10-CM

## 2019-12-16 LAB
ANION GAP SERPL CALC-SCNC: NORMAL MMOL/L
BUN SERPL-MCNC: 56 MG/DL
BUN/CREAT SERPL: NORMAL
CALCIUM SERPL-MCNC: 9.3 MG/DL
CHLORIDE SERPL-SCNC: 106 MMOL/L
CO2 SERPL-SCNC: NORMAL MMOL/L
CREAT SERPL-MCNC: 2.56 MG/DL
GLUCOSE SERPL-MCNC: 112 MG/DL
LENGTH OF FAST TIME PATIENT: NORMAL H
POTASSIUM SERPL-SCNC: 4.6 MMOL/L
SODIUM SERPL-SCNC: 138 MMOL/L

## 2019-12-16 PROCEDURE — 36415 COLL VENOUS BLD VENIPUNCTURE: CPT

## 2019-12-16 PROCEDURE — 80048 BASIC METABOLIC PNL TOTAL CA: CPT

## 2019-12-17 ENCOUNTER — TELEPHONE (OUTPATIENT)
Dept: CARDIOLOGY | Age: 79
End: 2019-12-17

## 2019-12-17 ENCOUNTER — CLINICAL ABSTRACT (OUTPATIENT)
Dept: CARDIOLOGY | Age: 79
End: 2019-12-17

## 2019-12-30 ENCOUNTER — ANTI-COAG VISIT (OUTPATIENT)
Dept: INTERNAL MEDICINE CLINIC | Facility: CLINIC | Age: 79
End: 2019-12-30
Payer: MEDICARE

## 2019-12-30 DIAGNOSIS — I48.20 CHRONIC ATRIAL FIBRILLATION (HCC): ICD-10-CM

## 2019-12-30 PROCEDURE — 85610 PROTHROMBIN TIME: CPT

## 2019-12-30 PROCEDURE — 36416 COLLJ CAPILLARY BLOOD SPEC: CPT

## 2019-12-30 PROCEDURE — 93793 ANTICOAG MGMT PT WARFARIN: CPT

## 2020-01-02 ENCOUNTER — OFFICE VISIT (OUTPATIENT)
Dept: INTERNAL MEDICINE CLINIC | Facility: CLINIC | Age: 80
End: 2020-01-02
Payer: MEDICARE

## 2020-01-02 ENCOUNTER — ANCILLARY PROCEDURE (OUTPATIENT)
Dept: CARDIOLOGY | Age: 80
End: 2020-01-02
Attending: INTERNAL MEDICINE

## 2020-01-02 VITALS
BODY MASS INDEX: 27.16 KG/M2 | HEART RATE: 64 BPM | TEMPERATURE: 99 F | HEIGHT: 66 IN | SYSTOLIC BLOOD PRESSURE: 130 MMHG | DIASTOLIC BLOOD PRESSURE: 70 MMHG | WEIGHT: 169 LBS

## 2020-01-02 DIAGNOSIS — N18.3 TYPE 2 DIABETES MELLITUS WITH STAGE 3 CHRONIC KIDNEY DISEASE, UNSPECIFIED WHETHER LONG TERM INSULIN USE: ICD-10-CM

## 2020-01-02 DIAGNOSIS — E78.00 HYPERCHOLESTEREMIA: ICD-10-CM

## 2020-01-02 DIAGNOSIS — N18.30 STAGE 3 CHRONIC KIDNEY DISEASE (HCC): ICD-10-CM

## 2020-01-02 DIAGNOSIS — Z95.810 ICD (IMPLANTABLE CARDIOVERTER-DEFIBRILLATOR) IN PLACE: ICD-10-CM

## 2020-01-02 DIAGNOSIS — I48.20 CHRONIC ATRIAL FIBRILLATION (HCC): ICD-10-CM

## 2020-01-02 DIAGNOSIS — M54.41 ACUTE RIGHT-SIDED LOW BACK PAIN WITH RIGHT-SIDED SCIATICA: Primary | ICD-10-CM

## 2020-01-02 DIAGNOSIS — I25.5 ISCHEMIC CARDIOMYOPATHY: ICD-10-CM

## 2020-01-02 DIAGNOSIS — E11.22 TYPE 2 DIABETES MELLITUS WITH STAGE 3 CHRONIC KIDNEY DISEASE, UNSPECIFIED WHETHER LONG TERM INSULIN USE: ICD-10-CM

## 2020-01-02 DIAGNOSIS — J44.9 CHRONIC OBSTRUCTIVE PULMONARY DISEASE, UNSPECIFIED COPD TYPE (HCC): ICD-10-CM

## 2020-01-02 PROCEDURE — 93296 REM INTERROG EVL PM/IDS: CPT | Performed by: INTERNAL MEDICINE

## 2020-01-02 PROCEDURE — 93295 DEV INTERROG REMOTE 1/2/MLT: CPT | Performed by: INTERNAL MEDICINE

## 2020-01-02 PROCEDURE — 99214 OFFICE O/P EST MOD 30 MIN: CPT | Performed by: INTERNAL MEDICINE

## 2020-01-02 NOTE — PROGRESS NOTES
Jennifer Clemens is a 78year old male.     HPI:   Patient presents with:  Checkup: right hip and leg pain for last week also uri symptoms      79 y/o M who states he was lifting air conditioners and experienced subsequent +low back pain with radiation t Tab Take 1.5 tablets (9.375 mg total) by mouth 2 (two) times daily with meals. 1 tablet 0   • digoxin 0.125 MG Oral Tab Take 125 mcg by mouth. M-W-F     • POTASSIUM CHLORIDE ER 20 MEQ Oral Tab CR TAKE 1 TABLET (20 MEQ TOTAL) BY MOUTH ONCE DAILY.  90 tablet 130/70, pulse 64, temperature 98.7 °F (37.1 °C), temperature source Oral, height 5' 6\" (1.676 m), weight 169 lb (76.7 kg).   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

## 2020-01-03 ENCOUNTER — HOSPITAL ENCOUNTER (OUTPATIENT)
Dept: GENERAL RADIOLOGY | Age: 80
Discharge: HOME | End: 2020-01-03
Attending: INTERNAL MEDICINE
Payer: MEDICARE

## 2020-01-03 DIAGNOSIS — M54.41 ACUTE RIGHT-SIDED LOW BACK PAIN WITH RIGHT-SIDED SCIATICA: ICD-10-CM

## 2020-01-03 PROCEDURE — 72110 X-RAY EXAM L-2 SPINE 4/>VWS: CPT | Performed by: INTERNAL MEDICINE

## 2020-01-06 ENCOUNTER — TELEPHONE (OUTPATIENT)
Dept: INTERNAL MEDICINE CLINIC | Facility: CLINIC | Age: 80
End: 2020-01-06

## 2020-01-06 NOTE — TELEPHONE ENCOUNTER
Patient's wife Isabella Vanessa wants to know if her  can take her gabapentin pills. She states the pills worked very well for her pain, but she cannot take them due to the side effects  She states her  has terrible groin pain.  He had xrays done last Fr

## 2020-01-07 ENCOUNTER — TELEPHONE (OUTPATIENT)
Dept: INTERNAL MEDICINE CLINIC | Facility: CLINIC | Age: 80
End: 2020-01-07

## 2020-01-07 ENCOUNTER — OFFICE VISIT (OUTPATIENT)
Dept: PHYSICAL THERAPY | Age: 80
End: 2020-01-07
Attending: INTERNAL MEDICINE
Payer: MEDICARE

## 2020-01-07 DIAGNOSIS — M54.41 ACUTE RIGHT-SIDED LOW BACK PAIN WITH RIGHT-SIDED SCIATICA: ICD-10-CM

## 2020-01-07 PROCEDURE — 97162 PT EVAL MOD COMPLEX 30 MIN: CPT

## 2020-01-07 PROCEDURE — 97110 THERAPEUTIC EXERCISES: CPT

## 2020-01-07 NOTE — TELEPHONE ENCOUNTER
Patient had an x-ray for his groin & leg on Friday. Any results yet? (Was informed Dr. Potts Bethesda is not in today).       Call patient on cell at:  [phone]

## 2020-01-07 NOTE — PROGRESS NOTES
SPINE EVALUATION:   Referring Physician: Dr. Nadeen De La Cruz  Diagnosis: Acute right-sided low back pain with right-sided sciatica (M54.41)     Date of Service: 1/7/2020     PATIENT SUMMARY   Starla Cardenas is a 78year old y/o male who presents to therapy medical history of Age-related nuclear cataract of both eyes (6/21/2016), ARMD (age-related macular degeneration), bilateral (6/21/2016), Atrial fibrillation ([facility] [address] (1995), CAD (coronary artery disease), CAD (coronary artery disease), CHF (congestive heart 5/5  Knee Flexion: R 5/5; L 5/5   Knee extension (L3): R 4+/5; L 4+/5   DF (L4): R 5/5; L 5/5  Great Toe Ext (L5): R 5/5, L 5/5    Core: 4-/5      Flexibility:   LE   Hamstrings: mod  Piriformis: mod  Quads: min-mod       Special tests:   L/s  (-) SLR  (-) Therapeutic Activities, Therapeutic Exercise and Home Exercise Program instruction    Education or treatment limitation: None  Rehab Potential:good    FOTO: 50/100 (predicted 72/100)    The patient was advised of these findings, precautions, and treatment

## 2020-01-08 ENCOUNTER — TELEPHONE (OUTPATIENT)
Dept: CARDIOLOGY | Age: 80
End: 2020-01-08

## 2020-01-09 ENCOUNTER — OFFICE VISIT (OUTPATIENT)
Dept: PHYSICAL THERAPY | Age: 80
End: 2020-01-09
Attending: INTERNAL MEDICINE
Payer: MEDICARE

## 2020-01-09 DIAGNOSIS — M54.41 ACUTE RIGHT-SIDED LOW BACK PAIN WITH RIGHT-SIDED SCIATICA: ICD-10-CM

## 2020-01-09 PROCEDURE — 97110 THERAPEUTIC EXERCISES: CPT

## 2020-01-09 PROCEDURE — 97140 MANUAL THERAPY 1/> REGIONS: CPT

## 2020-01-09 NOTE — PROGRESS NOTES
Dx: Acute right-sided low back pain with right-sided sciatica (M54.41)          Insurance (Authorized # of Visits): [employer] [facility] (18 approved)           Authorizing Physician: Dr. Nereida Crowley MD visit: post therapy   Fall Risk: standard         P cts  Supine hip fall outs 15x  Supine R SKTC 5 x 5 cts  Gait training                      Manual:  Sdly MHP  Sdly lower lumbar paraspinal/sacral MFR  Sdly glut MFR  Sdly R ITB MFR         HEP: LTR; R SKTC; R piriformis stretch     Charges: 2TE; 1MM

## 2020-01-13 ENCOUNTER — ANTI-COAG VISIT (OUTPATIENT)
Dept: INTERNAL MEDICINE CLINIC | Facility: CLINIC | Age: 80
End: 2020-01-13
Payer: MEDICARE

## 2020-01-13 ENCOUNTER — APPOINTMENT (OUTPATIENT)
Dept: CARDIOLOGY | Age: 80
End: 2020-01-13

## 2020-01-13 DIAGNOSIS — I48.20 CHRONIC ATRIAL FIBRILLATION (HCC): ICD-10-CM

## 2020-01-13 LAB
INR: 4 (ref 0.8–1.2)
TEST STRIP EXPIRATION DATE: ABNORMAL DATE

## 2020-01-13 PROCEDURE — 85610 PROTHROMBIN TIME: CPT

## 2020-01-13 PROCEDURE — 93793 ANTICOAG MGMT PT WARFARIN: CPT

## 2020-01-13 PROCEDURE — 36416 COLLJ CAPILLARY BLOOD SPEC: CPT

## 2020-01-14 ENCOUNTER — TELEPHONE (OUTPATIENT)
Dept: NEPHROLOGY | Facility: CLINIC | Age: 80
End: 2020-01-14

## 2020-01-14 DIAGNOSIS — N18.30 CHRONIC KIDNEY DISEASE, STAGE III (MODERATE) (HCC): Primary | ICD-10-CM

## 2020-01-15 ENCOUNTER — OFFICE VISIT (OUTPATIENT)
Dept: PHYSICAL THERAPY | Age: 80
End: 2020-01-15
Attending: INTERNAL MEDICINE
Payer: MEDICARE

## 2020-01-15 DIAGNOSIS — M54.41 ACUTE RIGHT-SIDED LOW BACK PAIN WITH RIGHT-SIDED SCIATICA: ICD-10-CM

## 2020-01-15 PROCEDURE — 97140 MANUAL THERAPY 1/> REGIONS: CPT

## 2020-01-15 PROCEDURE — 97110 THERAPEUTIC EXERCISES: CPT

## 2020-01-15 NOTE — PROGRESS NOTES
Dx: Acute right-sided low back pain with right-sided sciatica (M54.41)          Insurance (Authorized # of Visits): [employer] [facility] (18 approved)           Authorizing Physician: Dr. Riggs Heads  Next MD visit: post therapy   Fall Risk: standard         P 15x  Hooklying hip add SB 10x 3 cts  Supine hip fall outs 15x  Supine R SKTC 5 x 5 cts  Gait training  There ex:   Sdly clam shell 20x  Supine LTR 20x  Supine R hip fallout 20x  Supine hip add SB 20x  Supine R SKTC 5 x 5 cts  Hamstring stretch  with DF/PF 2

## 2020-01-16 ENCOUNTER — APPOINTMENT (OUTPATIENT)
Dept: LAB | Age: 80
End: 2020-01-16
Attending: INTERNAL MEDICINE
Payer: MEDICARE

## 2020-01-16 DIAGNOSIS — N18.30 CHRONIC KIDNEY DISEASE, STAGE III (MODERATE) (HCC): ICD-10-CM

## 2020-01-16 LAB
ANION GAP SERPL CALC-SCNC: 5 MMOL/L (ref 0–18)
BUN BLD-MCNC: 61 MG/DL (ref 7–18)
BUN/CREAT SERPL: 23.4 (ref 10–20)
CALCIUM BLD-MCNC: 9.1 MG/DL (ref 8.5–10.1)
CHLORIDE SERPL-SCNC: 110 MMOL/L (ref 98–112)
CO2 SERPL-SCNC: 25 MMOL/L (ref 21–32)
CREAT BLD-MCNC: 2.61 MG/DL (ref 0.7–1.3)
GLUCOSE BLD-MCNC: 123 MG/DL (ref 70–99)
OSMOLALITY SERPL CALC.SUM OF ELEC: 309 MOSM/KG (ref 275–295)
PATIENT FASTING Y/N/NP: YES
POTASSIUM SERPL-SCNC: 5 MMOL/L (ref 3.5–5.1)
SODIUM SERPL-SCNC: 140 MMOL/L (ref 136–145)

## 2020-01-16 PROCEDURE — 80048 BASIC METABOLIC PNL TOTAL CA: CPT

## 2020-01-16 PROCEDURE — 36415 COLL VENOUS BLD VENIPUNCTURE: CPT

## 2020-01-17 ENCOUNTER — TELEPHONE (OUTPATIENT)
Dept: NEPHROLOGY | Facility: CLINIC | Age: 80
End: 2020-01-17

## 2020-01-17 ENCOUNTER — APPOINTMENT (OUTPATIENT)
Dept: PHYSICAL THERAPY | Age: 80
End: 2020-01-17
Attending: INTERNAL MEDICINE
Payer: MEDICARE

## 2020-01-20 ENCOUNTER — APPOINTMENT (OUTPATIENT)
Dept: PHYSICAL THERAPY | Age: 80
End: 2020-01-20
Attending: INTERNAL MEDICINE
Payer: MEDICARE

## 2020-01-20 ENCOUNTER — TELEPHONE (OUTPATIENT)
Dept: CARDIOLOGY | Age: 80
End: 2020-01-20

## 2020-01-22 ENCOUNTER — APPOINTMENT (OUTPATIENT)
Dept: PHYSICAL THERAPY | Age: 80
End: 2020-01-22
Attending: INTERNAL MEDICINE
Payer: MEDICARE

## 2020-01-22 ENCOUNTER — OFFICE VISIT (OUTPATIENT)
Dept: PHYSICAL THERAPY | Age: 80
End: 2020-01-22
Attending: INTERNAL MEDICINE
Payer: MEDICARE

## 2020-01-22 PROCEDURE — 97110 THERAPEUTIC EXERCISES: CPT

## 2020-01-22 PROCEDURE — 97140 MANUAL THERAPY 1/> REGIONS: CPT

## 2020-01-22 NOTE — PROGRESS NOTES
Dx: Acute right-sided low back pain with right-sided sciatica (M54.41)          Insurance (Authorized # of Visits): [employer] [place] [employer] [facility] (18 approved)           Authorizing Physician: Dr. Nataliya Crowley MD visit: post therapy   Fall Risk: standard         P 15x  Hooklying hip add SB 10x 3 cts  Supine hip fall outs 15x  Supine R SKTC 5 x 5 cts  Gait training  There ex:   Sdly clam shell 20x  Supine LTR 20x  Supine R hip fallout 20x  Supine hip add SB 20x  Supine R SKTC 5 x 5 cts  Hamstring stretch  with DF/PF 2

## 2020-01-23 ENCOUNTER — ANTI-COAG VISIT (OUTPATIENT)
Dept: INTERNAL MEDICINE CLINIC | Facility: CLINIC | Age: 80
End: 2020-01-23
Payer: MEDICARE

## 2020-01-23 DIAGNOSIS — I48.20 CHRONIC ATRIAL FIBRILLATION (HCC): ICD-10-CM

## 2020-01-23 LAB
INR: 2 (ref 0.8–1.2)
TEST STRIP EXPIRATION DATE: ABNORMAL DATE

## 2020-01-23 PROCEDURE — 36416 COLLJ CAPILLARY BLOOD SPEC: CPT

## 2020-01-23 PROCEDURE — 85610 PROTHROMBIN TIME: CPT

## 2020-01-23 PROCEDURE — 93793 ANTICOAG MGMT PT WARFARIN: CPT

## 2020-01-24 ENCOUNTER — OFFICE VISIT (OUTPATIENT)
Dept: NEPHROLOGY | Facility: CLINIC | Age: 80
End: 2020-01-24
Payer: MEDICARE

## 2020-01-24 ENCOUNTER — APPOINTMENT (OUTPATIENT)
Dept: PHYSICAL THERAPY | Age: 80
End: 2020-01-24
Attending: INTERNAL MEDICINE
Payer: MEDICARE

## 2020-01-24 VITALS
SYSTOLIC BLOOD PRESSURE: 107 MMHG | WEIGHT: 168.63 LBS | BODY MASS INDEX: 27.1 KG/M2 | HEART RATE: 68 BPM | DIASTOLIC BLOOD PRESSURE: 69 MMHG | HEIGHT: 66 IN

## 2020-01-24 DIAGNOSIS — I48.91 ATRIAL FIBRILLATION WITH RAPID VENTRICULAR RESPONSE (HCC): Primary | ICD-10-CM

## 2020-01-24 DIAGNOSIS — N18.30 CKD (CHRONIC KIDNEY DISEASE), STAGE III (HCC): ICD-10-CM

## 2020-01-24 DIAGNOSIS — D50.8 OTHER IRON DEFICIENCY ANEMIA: ICD-10-CM

## 2020-01-24 PROCEDURE — 99213 OFFICE O/P EST LOW 20 MIN: CPT | Performed by: INTERNAL MEDICINE

## 2020-01-24 NOTE — PATIENT INSTRUCTIONS
Good job Jair Chairez with your health  Please continue present medications    Do labs in early March and call me for results    See me in April  Try to keep weight under 165

## 2020-01-27 ENCOUNTER — APPOINTMENT (OUTPATIENT)
Dept: PHYSICAL THERAPY | Age: 80
End: 2020-01-27
Attending: INTERNAL MEDICINE
Payer: MEDICARE

## 2020-01-29 ENCOUNTER — TELEPHONE (OUTPATIENT)
Dept: NEPHROLOGY | Facility: CLINIC | Age: 80
End: 2020-01-29

## 2020-01-29 ENCOUNTER — APPOINTMENT (OUTPATIENT)
Dept: PHYSICAL THERAPY | Age: 80
End: 2020-01-29
Attending: INTERNAL MEDICINE
Payer: MEDICARE

## 2020-01-29 NOTE — PROGRESS NOTES
Pt came to his visit on 1/29/20. He did not want to cancel fearing a charge. However, he has been feeling good, denying pain. He has been able to walk > 2 blocks without difficulty.  After discussion, pt would like to decrease frequency to 1x/wk, as he will

## 2020-01-30 NOTE — PROGRESS NOTES
DEAR TAYLOR,  I saw Gianni Elisa in the office today. As you know he has heart failure with reduced systolic function atherosclerotic heart disease chronic atrial fibrillation and diabetes.   He is immaculate in his records his weights have been stable at about 162 p

## 2020-01-31 ENCOUNTER — APPOINTMENT (OUTPATIENT)
Dept: PHYSICAL THERAPY | Age: 80
End: 2020-01-31
Attending: INTERNAL MEDICINE
Payer: MEDICARE

## 2020-02-03 ENCOUNTER — APPOINTMENT (OUTPATIENT)
Dept: PHYSICAL THERAPY | Age: 80
End: 2020-02-03
Attending: INTERNAL MEDICINE
Payer: MEDICARE

## 2020-02-04 NOTE — PROGRESS NOTES
Pt stopped by office on 2/4/20 to cancel remaining appointments. The patient denies pain and/or functional limitations.  Self-discharge and will f/u with his primary as needed

## 2020-02-05 ENCOUNTER — APPOINTMENT (OUTPATIENT)
Dept: PHYSICAL THERAPY | Age: 80
End: 2020-02-05
Attending: INTERNAL MEDICINE
Payer: MEDICARE

## 2020-02-07 ENCOUNTER — APPOINTMENT (OUTPATIENT)
Dept: PHYSICAL THERAPY | Age: 80
End: 2020-02-07
Attending: INTERNAL MEDICINE
Payer: MEDICARE

## 2020-02-11 ENCOUNTER — ANTI-COAG VISIT (OUTPATIENT)
Dept: INTERNAL MEDICINE CLINIC | Facility: CLINIC | Age: 80
End: 2020-02-11
Payer: MEDICARE

## 2020-02-11 DIAGNOSIS — I48.20 CHRONIC ATRIAL FIBRILLATION (HCC): ICD-10-CM

## 2020-02-11 LAB
INR: 2.5 (ref 0.8–1.2)
TEST STRIP EXPIRATION DATE: ABNORMAL DATE

## 2020-02-11 PROCEDURE — 93793 ANTICOAG MGMT PT WARFARIN: CPT

## 2020-02-11 PROCEDURE — 36416 COLLJ CAPILLARY BLOOD SPEC: CPT

## 2020-02-11 PROCEDURE — 85610 PROTHROMBIN TIME: CPT

## 2020-02-12 ENCOUNTER — TELEPHONE (OUTPATIENT)
Dept: INTERNAL MEDICINE CLINIC | Facility: CLINIC | Age: 80
End: 2020-02-12

## 2020-02-12 DIAGNOSIS — M41.80 LEVOSCOLIOSIS: ICD-10-CM

## 2020-02-12 DIAGNOSIS — G89.29 CHRONIC LOW BACK PAIN, UNSPECIFIED BACK PAIN LATERALITY, UNSPECIFIED WHETHER SCIATICA PRESENT: Primary | ICD-10-CM

## 2020-02-12 DIAGNOSIS — M47.816 LUMBAR SPONDYLOSIS: ICD-10-CM

## 2020-02-12 DIAGNOSIS — M54.50 CHRONIC LOW BACK PAIN, UNSPECIFIED BACK PAIN LATERALITY, UNSPECIFIED WHETHER SCIATICA PRESENT: Primary | ICD-10-CM

## 2020-02-12 NOTE — TELEPHONE ENCOUNTER
Pt last saw Dr Peng Sharma on 1/2/2020 and was sent to PT for his back pain. Pt was informed per Dr Peng Sharma to call back if PT did not help. Pt is calling back today to inform Dr Peng Sharma that PT did not help.  Pt states that he was under the impression that Dr Peng Sharma would then sent

## 2020-02-13 NOTE — TELEPHONE ENCOUNTER
Advise referral to see spine physiatrist, Dr Yefri Grossman next; if Dr Nicole Rojas feels acupuncture is appropriate, then we can pursue; advise pt call Dr Nicole Rojas, [address] [facility] [facility] [address], [place], phone [phone]; please call pt; referral generated

## 2020-02-20 ENCOUNTER — OFFICE VISIT (OUTPATIENT)
Dept: NEUROLOGY | Facility: CLINIC | Age: 80
End: 2020-02-20
Payer: MEDICARE

## 2020-02-20 ENCOUNTER — MED REC SCAN ONLY (OUTPATIENT)
Dept: NEUROLOGY | Facility: CLINIC | Age: 80
End: 2020-02-20

## 2020-02-20 VITALS
HEIGHT: 67.5 IN | BODY MASS INDEX: 25.05 KG/M2 | HEART RATE: 70 BPM | RESPIRATION RATE: 18 BRPM | WEIGHT: 161.5 LBS | SYSTOLIC BLOOD PRESSURE: 115 MMHG | DIASTOLIC BLOOD PRESSURE: 64 MMHG

## 2020-02-20 DIAGNOSIS — Z79.01 ANTICOAGULANT LONG-TERM USE: ICD-10-CM

## 2020-02-20 DIAGNOSIS — I25.10 CORONARY ARTERY DISEASE INVOLVING NATIVE CORONARY ARTERY OF NATIVE HEART WITHOUT ANGINA PECTORIS: ICD-10-CM

## 2020-02-20 DIAGNOSIS — Z95.810 ICD (IMPLANTABLE CARDIOVERTER-DEFIBRILLATOR) IN PLACE: ICD-10-CM

## 2020-02-20 DIAGNOSIS — E11.9 TYPE 2 DIABETES MELLITUS WITHOUT COMPLICATION, WITHOUT LONG-TERM CURRENT USE OF INSULIN (HCC): ICD-10-CM

## 2020-02-20 DIAGNOSIS — N18.30 CKD (CHRONIC KIDNEY DISEASE), STAGE III (HCC): ICD-10-CM

## 2020-02-20 DIAGNOSIS — M47.816 LUMBAR SPONDYLOSIS: ICD-10-CM

## 2020-02-20 DIAGNOSIS — I48.20 CHRONIC ATRIAL FIBRILLATION (HCC): ICD-10-CM

## 2020-02-20 DIAGNOSIS — M76.31 ILIOTIBIAL BAND SYNDROME OF RIGHT SIDE: Primary | ICD-10-CM

## 2020-02-20 PROBLEM — N17.9 ACUTE RENAL FAILURE (HCC): Status: ACTIVE | Noted: 2020-02-20

## 2020-02-20 PROBLEM — I25.89 OTHER SPECIFIED FORMS OF CHRONIC ISCHEMIC HEART DISEASE: Status: ACTIVE | Noted: 2020-02-20

## 2020-02-20 PROBLEM — I50.9 CONGESTIVE HEART FAILURE (CHF) (HCC): Status: ACTIVE | Noted: 2020-02-20

## 2020-02-20 PROCEDURE — 99203 OFFICE O/P NEW LOW 30 MIN: CPT | Performed by: PHYSICAL MEDICINE & REHABILITATION

## 2020-02-20 NOTE — PROGRESS NOTES
[address]  Progress Note    CHIEF COMPLAINT:  Patient presents with:  Back Pain: New patient referred by Dr. Liliana Marcelino for back pain.  Patient states that in October of last year he was piling up window air co Not on file    Tobacco Use      Smoking status: Former Smoker        Packs/day: 1.00        Years: 35.00        Pack years: 35        Types: Cigarettes        Quit date: 1991        Years since quittin.8      Smokeless tobacco: Never Used    [employer] ESSENTIAL) Oral Tab Take by mouth. • aspirin 81 MG Oral Chew Tab Chew by mouth daily. • triamcinolone acetonide 0.1 % External Cream Apply topically 2 (two) times daily as needed.  45 g 1   • WARFARIN SODIUM 5 MG Oral Tab TAKE 1 TABLET AT BEDTIME ON Lungs: Non-labored respirations  Extremities: No lower extremity edema bilaterally   Skin: No lesions noted. Spine:  There is pain with lumbar extension, no pain with percussion, mildly tender with paraspinal palpation  hips: full and painfree ROM, tend due to the risk of hyperglycemia. 8. CKD (chronic kidney disease), stage III (HCC)  NSAIDs contraindicated due to renal disease. Limits treatment options. RTC:    Return in about 4 weeks (around 3/19/2020).         Discharge Instructions were pro

## 2020-02-21 ENCOUNTER — LAB ENCOUNTER (OUTPATIENT)
Dept: LAB | Age: 80
End: 2020-02-21
Attending: INTERNAL MEDICINE
Payer: MEDICARE

## 2020-02-21 ENCOUNTER — TELEPHONE (OUTPATIENT)
Dept: INTERNAL MEDICINE CLINIC | Facility: CLINIC | Age: 80
End: 2020-02-21

## 2020-02-21 DIAGNOSIS — I48.91 ATRIAL FIBRILLATION WITH RAPID VENTRICULAR RESPONSE (HCC): ICD-10-CM

## 2020-02-21 DIAGNOSIS — M76.31 ILIOTIBIAL BAND SYNDROME OF RIGHT SIDE: Primary | ICD-10-CM

## 2020-02-21 DIAGNOSIS — N18.30 CKD (CHRONIC KIDNEY DISEASE), STAGE III (HCC): ICD-10-CM

## 2020-02-21 DIAGNOSIS — D50.8 OTHER IRON DEFICIENCY ANEMIA: ICD-10-CM

## 2020-02-21 DIAGNOSIS — E78.00 HYPERCHOLESTEREMIA: ICD-10-CM

## 2020-02-21 LAB
ALBUMIN SERPL-MCNC: 3.4 G/DL (ref 3.4–5)
ANION GAP SERPL CALC-SCNC: 3 MMOL/L (ref 0–18)
BASOPHILS # BLD AUTO: 0.02 X10(3) UL (ref 0–0.2)
BASOPHILS NFR BLD AUTO: 0.4 %
BUN BLD-MCNC: 43 MG/DL (ref 7–18)
BUN/CREAT SERPL: 18.1 (ref 10–20)
CALCIUM BLD-MCNC: 8.7 MG/DL (ref 8.5–10.1)
CHLORIDE SERPL-SCNC: 110 MMOL/L (ref 98–112)
CHOLEST SMN-MCNC: 87 MG/DL (ref ?–200)
CO2 SERPL-SCNC: 25 MMOL/L (ref 21–32)
CREAT BLD-MCNC: 2.38 MG/DL (ref 0.7–1.3)
DEPRECATED RDW RBC AUTO: 58.8 FL (ref 35.1–46.3)
EOSINOPHIL # BLD AUTO: 0.15 X10(3) UL (ref 0–0.7)
EOSINOPHIL NFR BLD AUTO: 3 %
ERYTHROCYTE [DISTWIDTH] IN BLOOD BY AUTOMATED COUNT: 17.2 % (ref 11–15)
GLUCOSE BLD-MCNC: 144 MG/DL (ref 70–99)
HCT VFR BLD AUTO: 34.3 % (ref 39–53)
HDLC SERPL-MCNC: 31 MG/DL (ref 40–59)
HGB BLD-MCNC: 11.1 G/DL (ref 13–17.5)
IMM GRANULOCYTES # BLD AUTO: 0.01 X10(3) UL (ref 0–1)
IMM GRANULOCYTES NFR BLD: 0.2 %
IRON SATURATION: 21 % (ref 20–50)
IRON SERPL-MCNC: 83 UG/DL (ref 65–175)
LDLC SERPL CALC-MCNC: 27 MG/DL (ref ?–100)
LYMPHOCYTES # BLD AUTO: 0.82 X10(3) UL (ref 1–4)
LYMPHOCYTES NFR BLD AUTO: 16.4 %
MCH RBC QN AUTO: 30.1 PG (ref 26–34)
MCHC RBC AUTO-ENTMCNC: 32.4 G/DL (ref 31–37)
MCV RBC AUTO: 93 FL (ref 80–100)
MONOCYTES # BLD AUTO: 0.49 X10(3) UL (ref 0.1–1)
MONOCYTES NFR BLD AUTO: 9.8 %
NEUTROPHILS # BLD AUTO: 3.5 X10 (3) UL (ref 1.5–7.7)
NEUTROPHILS # BLD AUTO: 3.5 X10(3) UL (ref 1.5–7.7)
NEUTROPHILS NFR BLD AUTO: 70.2 %
NONHDLC SERPL-MCNC: 56 MG/DL (ref ?–130)
OSMOLALITY SERPL CALC.SUM OF ELEC: 299 MOSM/KG (ref 275–295)
PATIENT FASTING Y/N/NP: NO
PHOSPHATE SERPL-MCNC: 3.4 MG/DL (ref 2.5–4.9)
PLATELET # BLD AUTO: 189 10(3)UL (ref 150–450)
POTASSIUM SERPL-SCNC: 4.7 MMOL/L (ref 3.5–5.1)
RBC # BLD AUTO: 3.69 X10(6)UL (ref 3.8–5.8)
SODIUM SERPL-SCNC: 138 MMOL/L (ref 136–145)
TOTAL IRON BINDING CAPACITY: 390 UG/DL (ref 240–450)
TRANSFERRIN SERPL-MCNC: 262 MG/DL (ref 200–360)
TRIGL SERPL-MCNC: 146 MG/DL (ref 30–149)
TSI SER-ACNC: 1.96 MIU/ML (ref 0.36–3.74)
VLDLC SERPL CALC-MCNC: 29 MG/DL (ref 0–30)
WBC # BLD AUTO: 5 X10(3) UL (ref 4–11)

## 2020-02-21 PROCEDURE — 36415 COLL VENOUS BLD VENIPUNCTURE: CPT

## 2020-02-21 PROCEDURE — 84443 ASSAY THYROID STIM HORMONE: CPT

## 2020-02-21 PROCEDURE — 80069 RENAL FUNCTION PANEL: CPT

## 2020-02-21 PROCEDURE — 83540 ASSAY OF IRON: CPT

## 2020-02-21 PROCEDURE — 84466 ASSAY OF TRANSFERRIN: CPT

## 2020-02-21 PROCEDURE — 80061 LIPID PANEL: CPT

## 2020-02-21 PROCEDURE — 85025 COMPLETE CBC W/AUTO DIFF WBC: CPT

## 2020-02-21 NOTE — TELEPHONE ENCOUNTER
Patient saw Dr Riley Alfaro & was referred to acupuncturist Dr Milagro Lucero at [address] [place] [address]  They do not take pt's HMO and he is looking for referral that will take his insurance  Patient was not able to reach Dr Oracio Heath office & asks for Dr Pablito Grider assistance with this due to the pain he is in     Please call to advise   [phone]

## 2020-02-21 NOTE — TELEPHONE ENCOUNTER
Are they any acupuncturists in network?; will forward to [facility] care; please let me know and also contact pt

## 2020-02-25 PROBLEM — M76.31 ILIOTIBIAL BAND SYNDROME OF RIGHT SIDE: Status: ACTIVE | Noted: 2020-02-25

## 2020-02-25 PROBLEM — M47.816 LUMBAR SPONDYLOSIS: Status: ACTIVE | Noted: 2020-02-25

## 2020-02-25 PROBLEM — Z79.01 ANTICOAGULANT LONG-TERM USE: Status: ACTIVE | Noted: 2019-05-15

## 2020-02-25 RX ORDER — WARFARIN SODIUM 5 MG/1
TABLET ORAL
Qty: 135 TABLET | Refills: 1 | Status: ON HOLD | OUTPATIENT
Start: 2020-02-25 | End: 2020-06-29

## 2020-02-25 NOTE — TELEPHONE ENCOUNTER
Good morning,    Yes,  Patient must go to [facility] clinic at [facility] to have service but this must be approved first through health-plan. Please initiate referral request so I can submit and begin process for patient.     Kind Regards,    [place] Mar-Referral Specialist

## 2020-02-28 ENCOUNTER — TELEPHONE (OUTPATIENT)
Dept: NEPHROLOGY | Facility: CLINIC | Age: 80
End: 2020-02-28

## 2020-02-28 NOTE — TELEPHONE ENCOUNTER
Referral placed for [facility] clinic; to managed care; please contact pt if approved by health plan

## 2020-03-11 RX ORDER — ATORVASTATIN CALCIUM 40 MG/1
TABLET, FILM COATED ORAL
Qty: 90 TABLET | Refills: 3 | Status: SHIPPED | OUTPATIENT
Start: 2020-03-11 | End: 2021-06-09

## 2020-03-12 ENCOUNTER — ANTI-COAG VISIT (OUTPATIENT)
Dept: INTERNAL MEDICINE CLINIC | Facility: CLINIC | Age: 80
End: 2020-03-12
Payer: MEDICARE

## 2020-03-12 DIAGNOSIS — I48.20 CHRONIC ATRIAL FIBRILLATION (HCC): ICD-10-CM

## 2020-03-12 LAB — INR: 2.1 (ref 0.8–1.2)

## 2020-03-12 PROCEDURE — 36416 COLLJ CAPILLARY BLOOD SPEC: CPT

## 2020-03-12 PROCEDURE — 85610 PROTHROMBIN TIME: CPT

## 2020-03-12 PROCEDURE — 93793 ANTICOAG MGMT PT WARFARIN: CPT

## 2020-03-17 ENCOUNTER — TELEPHONE (OUTPATIENT)
Dept: NEUROLOGY | Facility: CLINIC | Age: 80
End: 2020-03-17

## 2020-03-17 ENCOUNTER — MED REC SCAN ONLY (OUTPATIENT)
Dept: NEUROLOGY | Facility: CLINIC | Age: 80
End: 2020-03-17

## 2020-03-17 ENCOUNTER — OFFICE VISIT (OUTPATIENT)
Dept: NEUROLOGY | Facility: CLINIC | Age: 80
End: 2020-03-17
Payer: MEDICARE

## 2020-03-17 VITALS
SYSTOLIC BLOOD PRESSURE: 140 MMHG | DIASTOLIC BLOOD PRESSURE: 80 MMHG | RESPIRATION RATE: 16 BRPM | HEART RATE: 68 BPM | BODY MASS INDEX: 26.37 KG/M2 | WEIGHT: 170 LBS | HEIGHT: 67.5 IN

## 2020-03-17 DIAGNOSIS — M47.816 LUMBAR SPONDYLOSIS: ICD-10-CM

## 2020-03-17 DIAGNOSIS — N18.30 CKD (CHRONIC KIDNEY DISEASE), STAGE III (HCC): ICD-10-CM

## 2020-03-17 DIAGNOSIS — Z95.810 ICD (IMPLANTABLE CARDIOVERTER-DEFIBRILLATOR) IN PLACE: ICD-10-CM

## 2020-03-17 DIAGNOSIS — I25.10 CORONARY ARTERY DISEASE INVOLVING NATIVE CORONARY ARTERY OF NATIVE HEART WITHOUT ANGINA PECTORIS: ICD-10-CM

## 2020-03-17 DIAGNOSIS — I48.20 CHRONIC ATRIAL FIBRILLATION (HCC): ICD-10-CM

## 2020-03-17 DIAGNOSIS — Z79.01 ANTICOAGULANT LONG-TERM USE: ICD-10-CM

## 2020-03-17 DIAGNOSIS — M76.31 ILIOTIBIAL BAND SYNDROME OF RIGHT SIDE: Primary | ICD-10-CM

## 2020-03-17 DIAGNOSIS — E11.9 TYPE 2 DIABETES MELLITUS WITHOUT COMPLICATION, WITHOUT LONG-TERM CURRENT USE OF INSULIN (HCC): ICD-10-CM

## 2020-03-17 PROCEDURE — 99213 OFFICE O/P EST LOW 20 MIN: CPT | Performed by: PHYSICAL MEDICINE & REHABILITATION

## 2020-03-17 NOTE — TELEPHONE ENCOUNTER
[address] for authorization of approval on  [address] ([address])  [place] codes 16835/53684. Spoke to Hunter Alvarado who informed that this is a cover benefit under the [facility] medical plan and Prior authorization is NOT required.  Reference#2

## 2020-03-17 NOTE — PROGRESS NOTES
[address]  Progress Note    CHIEF COMPLAINT:  Patient presents with:  Low Back Pain: Patient presents for follow up on right side low back pain, LOV:2/20/2020. States the pain has improved since last office [facility]) 2008    cardiologist Dr Martha Chan at [facility]   • S/P cholecystectomy 2005    [facility]       SURGICAL HISTORY:  No past surgical history on file.     SOCIAL HISTORY:   Social History    Occupational History      Not on file    Tobacco Use      Smoking sta strip 3   • ACCU-CHEK SOFTCLIX LANCETS Does not apply Misc Check BS once daily 100 each 3   • ONETOUCH DELICA LANCETS 40R Does not apply Misc Use daily 100 each 3   • Glucose Blood (ONETOUCH VERIO) In Vitro Strip Use daily 100 strip 3   • Blood Glucose Chidi the lower thoracic vertebrae. ASSESSMENT AND PLAN:  1. Iliotibial band syndrome of right side  He is much better. We are very limited in our ability to treat him with oral medication due to his comorbidities.   The availability of acupuncture will be

## 2020-03-30 DIAGNOSIS — I50.22 CHRONIC SYSTOLIC CONGESTIVE HEART FAILURE (CMD): ICD-10-CM

## 2020-03-30 RX ORDER — DIGOXIN 125 MCG
TABLET ORAL
Qty: 39 TABLET | Refills: 2 | Status: SHIPPED | OUTPATIENT
Start: 2020-03-30 | End: 2021-05-14 | Stop reason: SDUPTHER

## 2020-03-30 RX ORDER — BUMETANIDE 1 MG/1
1 TABLET ORAL DAILY
Qty: 90 TABLET | Refills: 3 | Status: ON HOLD | OUTPATIENT
Start: 2020-03-30 | End: 2020-06-29

## 2020-03-30 NOTE — TELEPHONE ENCOUNTER
To Dr. Wendy Zuniga---    Please see rx pended for review. Unable to fill per protocol due to high crt.

## 2020-03-31 ENCOUNTER — TELEPHONE (OUTPATIENT)
Dept: CARDIOLOGY CLINIC | Facility: CLINIC | Age: 80
End: 2020-03-31

## 2020-03-31 RX ORDER — CARVEDILOL 6.25 MG/1
9.38 TABLET ORAL 2 TIMES DAILY WITH MEALS
Qty: 1 TABLET | Refills: 1 | Status: SHIPPED | OUTPATIENT
Start: 2020-03-31 | End: 2020-04-07 | Stop reason: CLARIF

## 2020-03-31 RX ORDER — POTASSIUM CHLORIDE 20 MEQ/1
TABLET, EXTENDED RELEASE ORAL
Qty: 90 TABLET | Refills: 1 | OUTPATIENT
Start: 2020-03-31

## 2020-03-31 RX ORDER — BUMETANIDE 1 MG/1
TABLET ORAL
Qty: 180 TABLET | Refills: 0 | OUTPATIENT
Start: 2020-03-31

## 2020-03-31 RX ORDER — POTASSIUM CHLORIDE 20 MEQ/1
20 TABLET, EXTENDED RELEASE ORAL
Qty: 90 TABLET | Refills: 3 | Status: SHIPPED | OUTPATIENT
Start: 2020-03-31 | End: 2021-04-22

## 2020-03-31 NOTE — TELEPHONE ENCOUNTER
Current refill request refused due to refill is either a duplicate request or has active refills at the pharmacy. Check previous templates.     Requested Prescriptions     Refused Prescriptions Disp Refills   • POTASSIUM CHLORIDE ER 20 MEQ Oral Tab CR [Pha

## 2020-03-31 NOTE — TELEPHONE ENCOUNTER
Request was for refill on 3.125 mg tab, 1 tab BID  Chart reviewed. Patient should be on 9.375 mg BID (6.25 mg tab)  Elevated creatinine less than previous. Meets refill protocol. Refill sent. For correct dose.   Hypertensive Medications  Protocol Criteria: 02/21/2020    CA 8.7 02/21/2020    ALKPHOS 53 04/18/2016    AST 24 04/20/2017    ALT 22 04/20/2017    BILT 1.2 04/20/2017    TP 7.2 04/20/2017    ALB 3.4 02/21/2020     02/21/2020    K 4.7 02/21/2020     02/21/2020    CO2 25.0 02/21/2020    EMILY

## 2020-04-03 ENCOUNTER — VIRTUAL PHONE E/M (OUTPATIENT)
Dept: NEPHROLOGY | Facility: CLINIC | Age: 80
End: 2020-04-03

## 2020-04-03 DIAGNOSIS — N18.4 CKD (CHRONIC KIDNEY DISEASE) STAGE 4, GFR 15-29 ML/MIN (HCC): ICD-10-CM

## 2020-04-03 DIAGNOSIS — I25.5 ISCHEMIC CARDIOMYOPATHY: Primary | ICD-10-CM

## 2020-04-03 PROCEDURE — G2012 BRIEF CHECK IN BY MD/QHP: HCPCS | Performed by: INTERNAL MEDICINE

## 2020-04-06 ENCOUNTER — TELEPHONE (OUTPATIENT)
Dept: NEPHROLOGY | Facility: CLINIC | Age: 80
End: 2020-04-06

## 2020-04-06 ENCOUNTER — ANCILLARY PROCEDURE (OUTPATIENT)
Dept: CARDIOLOGY | Age: 80
End: 2020-04-06
Attending: INTERNAL MEDICINE

## 2020-04-06 DIAGNOSIS — N18.4 CKD (CHRONIC KIDNEY DISEASE) STAGE 4, GFR 15-29 ML/MIN (HCC): ICD-10-CM

## 2020-04-06 DIAGNOSIS — Z95.810 ICD (IMPLANTABLE CARDIOVERTER-DEFIBRILLATOR) IN PLACE: ICD-10-CM

## 2020-04-06 DIAGNOSIS — I25.5 ISCHEMIC CARDIOMYOPATHY: Primary | ICD-10-CM

## 2020-04-06 DIAGNOSIS — I48.0 PAROXYSMAL ATRIAL FIBRILLATION (HCC): Primary | ICD-10-CM

## 2020-04-06 PROCEDURE — 93296 REM INTERROG EVL PM/IDS: CPT | Performed by: INTERNAL MEDICINE

## 2020-04-06 PROCEDURE — G2012 BRIEF CHECK IN BY MD/QHP: HCPCS | Performed by: INTERNAL MEDICINE

## 2020-04-06 PROCEDURE — 93295 DEV INTERROG REMOTE 1/2/MLT: CPT | Performed by: INTERNAL MEDICINE

## 2020-04-06 NOTE — TELEPHONE ENCOUNTER
Virtual/Telephone Check-In    Tonya Block consents a Virtual/Telephone Check-In service on 04/06/20. Patient understands and accepts financial responsibility for any deductible, co-insurance and/or co-pays associated with this service.     Duration

## 2020-04-07 RX ORDER — CARVEDILOL 6.25 MG/1
9.5 TABLET ORAL 2 TIMES DAILY WITH MEALS
Qty: 180 TABLET | Refills: 1 | Status: SHIPPED | OUTPATIENT
Start: 2020-04-07 | End: 2020-04-29

## 2020-04-07 NOTE — TELEPHONE ENCOUNTER
Patient called to follow up on refills for  Potassium Chloride ER   And Bumetanide  Hank advises pt he can request these  refills to have medicine on hand due to the virus situation  Please send ok to [facility]

## 2020-04-10 ENCOUNTER — ANTI-COAG VISIT (OUTPATIENT)
Dept: INTERNAL MEDICINE CLINIC | Facility: CLINIC | Age: 80
End: 2020-04-10
Payer: MEDICARE

## 2020-04-10 DIAGNOSIS — I48.20 CHRONIC ATRIAL FIBRILLATION (HCC): ICD-10-CM

## 2020-04-10 PROCEDURE — 93793 ANTICOAG MGMT PT WARFARIN: CPT

## 2020-04-10 PROCEDURE — 85610 PROTHROMBIN TIME: CPT

## 2020-04-10 PROCEDURE — 36416 COLLJ CAPILLARY BLOOD SPEC: CPT

## 2020-04-28 ENCOUNTER — TELEPHONE (OUTPATIENT)
Dept: CARDIOLOGY CLINIC | Facility: CLINIC | Age: 80
End: 2020-04-28

## 2020-04-29 RX ORDER — CARVEDILOL 6.25 MG/1
6.25 TABLET ORAL 2 TIMES DAILY WITH MEALS
Qty: 180 TABLET | Refills: 1 | Status: ON HOLD | OUTPATIENT
Start: 2020-04-29 | End: 2021-08-28

## 2020-04-29 RX ORDER — CARVEDILOL 3.12 MG/1
3.12 TABLET ORAL 2 TIMES DAILY WITH MEALS
Qty: 180 TABLET | Refills: 1 | Status: SHIPPED | OUTPATIENT
Start: 2020-04-29 | End: 2020-11-24

## 2020-05-14 ENCOUNTER — TELEPHONE (OUTPATIENT)
Dept: NEPHROLOGY | Facility: CLINIC | Age: 80
End: 2020-05-14

## 2020-05-14 ENCOUNTER — APPOINTMENT (OUTPATIENT)
Dept: LAB | Age: 80
End: 2020-05-14
Attending: INTERNAL MEDICINE
Payer: MEDICARE

## 2020-05-14 ENCOUNTER — TELEPHONE (OUTPATIENT)
Dept: CARDIOLOGY CLINIC | Facility: CLINIC | Age: 80
End: 2020-05-14

## 2020-05-14 DIAGNOSIS — N18.4 CKD (CHRONIC KIDNEY DISEASE) STAGE 4, GFR 15-29 ML/MIN (HCC): ICD-10-CM

## 2020-05-14 DIAGNOSIS — I48.0 PAROXYSMAL ATRIAL FIBRILLATION (HCC): ICD-10-CM

## 2020-05-14 PROCEDURE — 36415 COLL VENOUS BLD VENIPUNCTURE: CPT

## 2020-05-14 PROCEDURE — 85610 PROTHROMBIN TIME: CPT

## 2020-05-14 PROCEDURE — 80048 BASIC METABOLIC PNL TOTAL CA: CPT

## 2020-05-14 NOTE — TELEPHONE ENCOUNTER
Patient came to office to request refill on medication. Please Advise. •  carvedilol 3.125 MG Oral Tab, Take 1 tablet (3.125 mg total) by mouth 2 (two) times daily with meals.  Take one tab along with the 6.25 mg tab for total of 9.375 mg twice daily, Dis

## 2020-05-15 NOTE — TELEPHONE ENCOUNTER
S/w with Zach's  [facility] pharmacy and they are set to send next refill in June. Confirmed he has both Rx sent to his home.    S/w Denise Cisneros and clarified that he should not be splitting any pill , but taking one of each coreg 3.125 and 6.25 mg to make dose of

## 2020-05-22 ENCOUNTER — ANTI-COAG VISIT (OUTPATIENT)
Dept: INTERNAL MEDICINE CLINIC | Facility: CLINIC | Age: 80
End: 2020-05-22
Payer: MEDICARE

## 2020-05-22 DIAGNOSIS — I48.20 CHRONIC ATRIAL FIBRILLATION (HCC): ICD-10-CM

## 2020-05-22 PROCEDURE — 36416 COLLJ CAPILLARY BLOOD SPEC: CPT

## 2020-05-22 PROCEDURE — 93793 ANTICOAG MGMT PT WARFARIN: CPT

## 2020-05-22 PROCEDURE — 85610 PROTHROMBIN TIME: CPT

## 2020-06-15 ENCOUNTER — OFFICE VISIT (OUTPATIENT)
Dept: INTERNAL MEDICINE CLINIC | Facility: CLINIC | Age: 80
End: 2020-06-15
Payer: MEDICARE

## 2020-06-15 VITALS
SYSTOLIC BLOOD PRESSURE: 112 MMHG | BODY MASS INDEX: 26.22 KG/M2 | DIASTOLIC BLOOD PRESSURE: 60 MMHG | TEMPERATURE: 97 F | RESPIRATION RATE: 18 BRPM | HEART RATE: 64 BPM | HEIGHT: 67.5 IN | WEIGHT: 169 LBS

## 2020-06-15 DIAGNOSIS — E11.22 TYPE 2 DIABETES MELLITUS WITH STAGE 3 CHRONIC KIDNEY DISEASE, UNSPECIFIED WHETHER LONG TERM INSULIN USE: ICD-10-CM

## 2020-06-15 DIAGNOSIS — N25.81 SECONDARY HYPERPARATHYROIDISM (HCC): ICD-10-CM

## 2020-06-15 DIAGNOSIS — N18.3 TYPE 2 DIABETES MELLITUS WITH STAGE 3 CHRONIC KIDNEY DISEASE, UNSPECIFIED WHETHER LONG TERM INSULIN USE: ICD-10-CM

## 2020-06-15 DIAGNOSIS — H26.9 CATARACT, UNSPECIFIED CATARACT TYPE, UNSPECIFIED LATERALITY: ICD-10-CM

## 2020-06-15 DIAGNOSIS — N18.30 STAGE 3 CHRONIC KIDNEY DISEASE (HCC): ICD-10-CM

## 2020-06-15 DIAGNOSIS — J44.9 CHRONIC OBSTRUCTIVE PULMONARY DISEASE, UNSPECIFIED COPD TYPE (HCC): ICD-10-CM

## 2020-06-15 DIAGNOSIS — I70.0 ATHEROSCLEROSIS OF AORTA (HCC): ICD-10-CM

## 2020-06-15 DIAGNOSIS — I25.5 ISCHEMIC CARDIOMYOPATHY: ICD-10-CM

## 2020-06-15 DIAGNOSIS — I34.0 MITRAL VALVE INSUFFICIENCY, UNSPECIFIED ETIOLOGY: ICD-10-CM

## 2020-06-15 DIAGNOSIS — I35.0 AORTIC VALVE STENOSIS, ETIOLOGY OF CARDIAC VALVE DISEASE UNSPECIFIED: ICD-10-CM

## 2020-06-15 DIAGNOSIS — N52.9 ERECTILE DYSFUNCTION, UNSPECIFIED ERECTILE DYSFUNCTION TYPE: ICD-10-CM

## 2020-06-15 DIAGNOSIS — Z12.5 SCREENING PSA (PROSTATE SPECIFIC ANTIGEN): ICD-10-CM

## 2020-06-15 DIAGNOSIS — H35.30 MACULAR DEGENERATION, UNSPECIFIED LATERALITY, UNSPECIFIED TYPE: ICD-10-CM

## 2020-06-15 DIAGNOSIS — E78.00 HYPERCHOLESTEREMIA: ICD-10-CM

## 2020-06-15 DIAGNOSIS — K59.00 CONSTIPATION, UNSPECIFIED CONSTIPATION TYPE: ICD-10-CM

## 2020-06-15 DIAGNOSIS — I48.20 CHRONIC ATRIAL FIBRILLATION (HCC): ICD-10-CM

## 2020-06-15 DIAGNOSIS — E11.21 DIABETIC NEPHROPATHY ASSOCIATED WITH TYPE 2 DIABETES MELLITUS (HCC): ICD-10-CM

## 2020-06-15 DIAGNOSIS — M54.16 LUMBAR RADICULOPATHY: ICD-10-CM

## 2020-06-15 DIAGNOSIS — Z00.00 PHYSICAL EXAM, ANNUAL: Primary | ICD-10-CM

## 2020-06-15 PROCEDURE — 96160 PT-FOCUSED HLTH RISK ASSMT: CPT | Performed by: INTERNAL MEDICINE

## 2020-06-15 PROCEDURE — G0439 PPPS, SUBSEQ VISIT: HCPCS | Performed by: INTERNAL MEDICINE

## 2020-06-15 PROCEDURE — 99397 PER PM REEVAL EST PAT 65+ YR: CPT | Performed by: INTERNAL MEDICINE

## 2020-06-15 NOTE — PROGRESS NOTES
Bib Crespo is a [de-identified]year old male. HPI:   Patient presents with:  Checkup: Pt is retaining water for 3 days. Pt states bumex did not help. Feels bloated- finds it difficult to breathe when lying down.     [de-identified] y/o M here for subsequent Medicare rik use: No       Medications (Active prior to today's visit):  Current Outpatient Medications   Medication Sig Dispense Refill   • carvedilol 3.125 MG Oral Tab Take 1 tablet (3.125 mg total) by mouth 2 (two) times daily with meals.  Take one tab along with the Cream Apply topically 2 (two) times daily as needed.  45 g 1       Allergies:  No Known Allergies                    General Health     In the past six months, have you lost more than 10 pounds without trying?: 2 - No    Has your appetite been poor?: No Right Eye Visual Acuity: Uncorrected Left Eye Visual Acuity: Uncorrected   Right Eye Chart Acuity: 20/40 Left Eye Chart Acuity: 20/40     Cognitive Assessment     What day of the week is this?: Correct    What month is it?: Correct    What year is it?: Update Immunization Activity if applicable    Hepatitis B No orders found for this or any previous visit. Update Immunization Activity if applicable    Tetanus No orders found for this or any previous visit.  Update Immunization Activity if applicable    Zo myalgias  Genitourinary:  Negative for dysuria or polyuria  Hema/Lymph:  Negative for easy bleeding and easy bruising  Integumentary:  Negative for pruritus and rash  Neurological:  Negative for gait disturbance; negative for paresthesias   All other revie mEQ po qD, and metolazone 2.5 mg prn weight gain; Rx per CHF clinic and Dr Leah Chris     Chronic obstructive pulmonary disease, unspecified COPD type ([facility]  CXR 11/5/17 with +hyperinflated lungs; saw pulmonologist, Dr Frances Choe; PFTs not consistent with  therapy     Constipation  Takes Metamucil 1 tbsp daily; TSH 1.96 in Feb 2020;  Last colonoscopy in Mar 2015 by GI Dr Francisca Torres in [address] [place] [address]; pt requests GI referral to Dr Aparna Ba 6/15/20            Orders This Visit:  No orders of the defined types we

## 2020-06-18 ENCOUNTER — OFFICE VISIT (OUTPATIENT)
Dept: GASTROENTEROLOGY | Facility: CLINIC | Age: 80
End: 2020-06-18
Payer: MEDICARE

## 2020-06-18 ENCOUNTER — TELEPHONE (OUTPATIENT)
Dept: NEPHROLOGY | Facility: CLINIC | Age: 80
End: 2020-06-18

## 2020-06-18 VITALS
SYSTOLIC BLOOD PRESSURE: 119 MMHG | DIASTOLIC BLOOD PRESSURE: 59 MMHG | WEIGHT: 166 LBS | HEART RATE: 80 BPM | HEIGHT: 66 IN | BODY MASS INDEX: 26.68 KG/M2

## 2020-06-18 DIAGNOSIS — R10.9 ABDOMINAL PAIN, UNSPECIFIED ABDOMINAL LOCATION: Primary | ICD-10-CM

## 2020-06-18 DIAGNOSIS — N18.30 CHRONIC KIDNEY DISEASE, STAGE III (MODERATE) (HCC): Primary | ICD-10-CM

## 2020-06-18 DIAGNOSIS — K62.5 RECTAL BLEEDING: ICD-10-CM

## 2020-06-18 DIAGNOSIS — R19.4 CHANGE IN BOWEL HABITS: ICD-10-CM

## 2020-06-18 PROCEDURE — 99203 OFFICE O/P NEW LOW 30 MIN: CPT | Performed by: INTERNAL MEDICINE

## 2020-06-18 RX ORDER — HYDROCORTISONE 25 MG/G
1 CREAM TOPICAL 2 TIMES DAILY
Qty: 28 G | Refills: 0 | Status: SHIPPED | OUTPATIENT
Start: 2020-06-18 | End: 2020-06-18

## 2020-06-18 RX ORDER — HYDROCORTISONE 25 MG/G
1 CREAM TOPICAL 2 TIMES DAILY
Qty: 28 G | Refills: 0 | Status: SHIPPED | OUTPATIENT
Start: 2020-06-18 | End: 2020-11-17

## 2020-06-18 NOTE — PATIENT INSTRUCTIONS
Abdominal pain/bloating  Rectal bleeding  Change in bowel movement   - CT scan abdomen Noberto Cerise  - anusol for hemorrhoids  - avoid Milk/dairy  - blood work up   - try probiotic - Florastor twice a day for 1 month

## 2020-06-18 NOTE — TELEPHONE ENCOUNTER
Order entered in system. Left message on voicemail and advised patient to call back if he has any questions or concerns.  No fasting per Dr. Chidi Casiano for this lab test.

## 2020-06-18 NOTE — PROGRESS NOTES
Arlinda Jeans is a [de-identified]year old male.     HPI:   Patient presents with:  Constipation: mucus in stool and some blood when wipping per pt    The patient is an 44-year-old male who has a history of heart disease/CHF who presents for evaluation of change 2008    cardiologist Dr Apurva Gutiérrez at [facility]   • S/P cholecystectomy 2005    [facility]      History reviewed. No pertinent surgical history.    Family History   Problem Relation Age of Onset   • Hypertension Father    • Diabetes Mother    • Glaucoma Neg    • Ma TAKE 0.5 TABLET THREE TIMES DAILY.  (Patient taking differently: Take 10 mg by mouth 3 (three) times daily.  ) 270 tablet 3   • Blood Glucose Monitoring Suppl (ACCU-CHEK [place]) Does not apply Device Check BS once daily 1 Device 0   • Glucose Blood (ACCU-CARLY many of his symptoms may be related to congestion of his liver and/or abdominal region which could be impairing liver and GI function. He has no other liver issues.   Rectal bleeding is likely hemorrhoidal and I think he would be at extremely high risk for

## 2020-06-19 ENCOUNTER — APPOINTMENT (OUTPATIENT)
Dept: LAB | Age: 80
DRG: 377 | End: 2020-06-19
Attending: INTERNAL MEDICINE
Payer: MEDICARE

## 2020-06-19 DIAGNOSIS — N18.30 CHRONIC KIDNEY DISEASE, STAGE III (MODERATE) (HCC): ICD-10-CM

## 2020-06-19 DIAGNOSIS — R10.9 ABDOMINAL PAIN, UNSPECIFIED ABDOMINAL LOCATION: ICD-10-CM

## 2020-06-19 DIAGNOSIS — K62.5 RECTAL BLEEDING: ICD-10-CM

## 2020-06-19 DIAGNOSIS — R19.4 CHANGE IN BOWEL HABITS: ICD-10-CM

## 2020-06-19 LAB
ALBUMIN SERPL-MCNC: 3.5 G/DL (ref 3.4–5)
ALP LIVER SERPL-CCNC: 76 U/L (ref 45–117)
ALT SERPL-CCNC: 24 U/L (ref 16–61)
ANION GAP SERPL CALC-SCNC: 8 MMOL/L (ref 0–18)
AST SERPL-CCNC: 19 U/L (ref 15–37)
BILIRUB DIRECT SERPL-MCNC: 0.2 MG/DL (ref 0–0.2)
BILIRUB SERPL-MCNC: 0.5 MG/DL (ref 0.1–2)
BUN BLD-MCNC: 91 MG/DL (ref 7–18)
BUN/CREAT SERPL: 26 (ref 10–20)
CALCIUM BLD-MCNC: 8.7 MG/DL (ref 8.5–10.1)
CHLORIDE SERPL-SCNC: 100 MMOL/L (ref 98–112)
CO2 SERPL-SCNC: 28 MMOL/L (ref 21–32)
CREAT BLD-MCNC: 3.5 MG/DL (ref 0.7–1.3)
GLUCOSE BLD-MCNC: 163 MG/DL (ref 70–99)
IGA SERPL-MCNC: 284 MG/DL (ref 70–312)
M PROTEIN MFR SERPL ELPH: 7.2 G/DL (ref 6.4–8.2)
OSMOLALITY SERPL CALC.SUM OF ELEC: 314 MOSM/KG (ref 275–295)
PATIENT FASTING Y/N/NP: NO
POTASSIUM SERPL-SCNC: 4 MMOL/L (ref 3.5–5.1)
SODIUM SERPL-SCNC: 136 MMOL/L (ref 136–145)

## 2020-06-19 PROCEDURE — 80076 HEPATIC FUNCTION PANEL: CPT

## 2020-06-19 PROCEDURE — 83516 IMMUNOASSAY NONANTIBODY: CPT

## 2020-06-19 PROCEDURE — 36415 COLL VENOUS BLD VENIPUNCTURE: CPT

## 2020-06-19 PROCEDURE — 82784 ASSAY IGA/IGD/IGG/IGM EACH: CPT

## 2020-06-19 PROCEDURE — 80048 BASIC METABOLIC PNL TOTAL CA: CPT

## 2020-06-21 ENCOUNTER — HOSPITAL ENCOUNTER (INPATIENT)
Facility: HOSPITAL | Age: 80
LOS: 8 days | Discharge: HOME | DRG: 377 | End: 2020-06-29
Attending: EMERGENCY MEDICINE | Admitting: INTERNAL MEDICINE
Payer: MEDICARE

## 2020-06-21 ENCOUNTER — APPOINTMENT (OUTPATIENT)
Dept: CT IMAGING | Facility: HOSPITAL | Age: 80
DRG: 377 | End: 2020-06-21
Attending: EMERGENCY MEDICINE
Payer: MEDICARE

## 2020-06-21 DIAGNOSIS — D64.9 ANEMIA, UNSPECIFIED TYPE: ICD-10-CM

## 2020-06-21 DIAGNOSIS — K92.2 GASTROINTESTINAL HEMORRHAGE, UNSPECIFIED GASTROINTESTINAL HEMORRHAGE TYPE: ICD-10-CM

## 2020-06-21 DIAGNOSIS — R10.9 ABDOMINAL PAIN, ACUTE: Primary | ICD-10-CM

## 2020-06-21 LAB
ALBUMIN SERPL-MCNC: 3.7 G/DL (ref 3.4–5)
ALP LIVER SERPL-CCNC: 77 U/L (ref 45–117)
ALT SERPL-CCNC: 25 U/L (ref 16–61)
ANION GAP SERPL CALC-SCNC: 5 MMOL/L (ref 0–18)
AST SERPL-CCNC: 28 U/L (ref 15–37)
BACTERIA UR QL AUTO: NEGATIVE /HPF
BASOPHILS # BLD AUTO: 0.03 X10(3) UL (ref 0–0.2)
BASOPHILS NFR BLD AUTO: 0.5 %
BILIRUB DIRECT SERPL-MCNC: 0.2 MG/DL (ref 0–0.2)
BILIRUB SERPL-MCNC: 0.4 MG/DL (ref 0.1–2)
BILIRUB UR QL: NEGATIVE
BUN BLD-MCNC: 91 MG/DL (ref 7–18)
BUN/CREAT SERPL: 25.8 (ref 10–20)
CALCIUM BLD-MCNC: 9.1 MG/DL (ref 8.5–10.1)
CHLORIDE SERPL-SCNC: 105 MMOL/L (ref 98–112)
CO2 SERPL-SCNC: 30 MMOL/L (ref 21–32)
COLOR UR: YELLOW
CREAT BLD-MCNC: 3.53 MG/DL (ref 0.7–1.3)
DEPRECATED RDW RBC AUTO: 45.4 FL (ref 35.1–46.3)
EOSINOPHIL # BLD AUTO: 0.12 X10(3) UL (ref 0–0.7)
EOSINOPHIL NFR BLD AUTO: 2 %
ERYTHROCYTE [DISTWIDTH] IN BLOOD BY AUTOMATED COUNT: 13.5 % (ref 11–15)
EST. AVERAGE GLUCOSE BLD GHB EST-MCNC: 157 MG/DL (ref 68–126)
GLUCOSE BLD-MCNC: 187 MG/DL (ref 70–99)
GLUCOSE BLDC GLUCOMTR-MCNC: 158 MG/DL (ref 70–99)
GLUCOSE BLDC GLUCOMTR-MCNC: 311 MG/DL (ref 70–99)
GLUCOSE UR-MCNC: NEGATIVE MG/DL
HBA1C MFR BLD HPLC: 7.1 % (ref ?–5.7)
HCT VFR BLD AUTO: 25.3 % (ref 39–53)
HGB BLD-MCNC: 7.7 G/DL (ref 13–17.5)
HGB UR QL STRIP.AUTO: NEGATIVE
HYALINE CASTS #/AREA URNS AUTO: 1 /LPF
IMM GRANULOCYTES # BLD AUTO: 0.02 X10(3) UL (ref 0–1)
IMM GRANULOCYTES NFR BLD: 0.3 %
INR BLD: 3.74 (ref 0.9–1.2)
KETONES UR-MCNC: NEGATIVE MG/DL
LEUKOCYTE ESTERASE UR QL STRIP.AUTO: NEGATIVE
LYMPHOCYTES # BLD AUTO: 0.93 X10(3) UL (ref 1–4)
LYMPHOCYTES NFR BLD AUTO: 15.5 %
M PROTEIN MFR SERPL ELPH: 7.6 G/DL (ref 6.4–8.2)
MCH RBC QN AUTO: 28.2 PG (ref 26–34)
MCHC RBC AUTO-ENTMCNC: 30.4 G/DL (ref 31–37)
MCV RBC AUTO: 92.7 FL (ref 80–100)
MONOCYTES # BLD AUTO: 0.64 X10(3) UL (ref 0.1–1)
MONOCYTES NFR BLD AUTO: 10.7 %
NEUTROPHILS # BLD AUTO: 4.26 X10 (3) UL (ref 1.5–7.7)
NEUTROPHILS # BLD AUTO: 4.26 X10(3) UL (ref 1.5–7.7)
NEUTROPHILS NFR BLD AUTO: 71 %
NITRITE UR QL STRIP.AUTO: NEGATIVE
OSMOLALITY SERPL CALC.SUM OF ELEC: 323 MOSM/KG (ref 275–295)
PH UR: 5 [PH] (ref 5–8)
PLATELET # BLD AUTO: 253 10(3)UL (ref 150–450)
POTASSIUM SERPL-SCNC: 3.7 MMOL/L (ref 3.5–5.1)
PROT UR-MCNC: NEGATIVE MG/DL
PROTHROMBIN TIME: 36.4 SECONDS (ref 11.8–14.5)
RBC # BLD AUTO: 2.73 X10(6)UL (ref 3.8–5.8)
RBC #/AREA URNS AUTO: <1 /HPF
SARS-COV-2 RNA RESP QL NAA+PROBE: NOT DETECTED
SODIUM SERPL-SCNC: 140 MMOL/L (ref 136–145)
SP GR UR STRIP: 1.01 (ref 1–1.03)
UROBILINOGEN UR STRIP-ACNC: <2
WBC # BLD AUTO: 6 X10(3) UL (ref 4–11)
WBC #/AREA URNS AUTO: 0 /HPF

## 2020-06-21 PROCEDURE — 99222 1ST HOSP IP/OBS MODERATE 55: CPT | Performed by: INTERNAL MEDICINE

## 2020-06-21 PROCEDURE — 74176 CT ABD & PELVIS W/O CONTRAST: CPT | Performed by: EMERGENCY MEDICINE

## 2020-06-21 RX ORDER — DOXEPIN HYDROCHLORIDE 50 MG/1
1 CAPSULE ORAL DAILY
Status: DISCONTINUED | OUTPATIENT
Start: 2020-06-21 | End: 2020-06-29

## 2020-06-21 RX ORDER — SODIUM CHLORIDE 0.9 % (FLUSH) 0.9 %
3 SYRINGE (ML) INJECTION AS NEEDED
Status: DISCONTINUED | OUTPATIENT
Start: 2020-06-21 | End: 2020-06-29

## 2020-06-21 RX ORDER — CARVEDILOL 6.25 MG/1
6.25 TABLET ORAL 2 TIMES DAILY WITH MEALS
Status: DISCONTINUED | OUTPATIENT
Start: 2020-06-21 | End: 2020-06-29

## 2020-06-21 RX ORDER — TRAZODONE HYDROCHLORIDE 50 MG/1
50 TABLET ORAL NIGHTLY PRN
Status: DISCONTINUED | OUTPATIENT
Start: 2020-06-21 | End: 2020-06-29

## 2020-06-21 RX ORDER — ATORVASTATIN CALCIUM 40 MG/1
40 TABLET, FILM COATED ORAL NIGHTLY
Status: DISCONTINUED | OUTPATIENT
Start: 2020-06-21 | End: 2020-06-29

## 2020-06-21 RX ORDER — BISACODYL 10 MG
10 SUPPOSITORY, RECTAL RECTAL
Status: DISCONTINUED | OUTPATIENT
Start: 2020-06-21 | End: 2020-06-29

## 2020-06-21 RX ORDER — POLYETHYLENE GLYCOL 3350 17 G/17G
17 POWDER, FOR SOLUTION ORAL DAILY PRN
Status: DISCONTINUED | OUTPATIENT
Start: 2020-06-21 | End: 2020-06-29

## 2020-06-21 RX ORDER — METOCLOPRAMIDE HYDROCHLORIDE 5 MG/ML
10 INJECTION INTRAMUSCULAR; INTRAVENOUS EVERY 8 HOURS PRN
Status: DISCONTINUED | OUTPATIENT
Start: 2020-06-21 | End: 2020-06-21

## 2020-06-21 RX ORDER — CARVEDILOL 3.12 MG/1
3.12 TABLET ORAL 2 TIMES DAILY WITH MEALS
Status: DISCONTINUED | OUTPATIENT
Start: 2020-06-21 | End: 2020-06-29

## 2020-06-21 RX ORDER — SODIUM CHLORIDE 9 MG/ML
INJECTION, SOLUTION INTRAVENOUS CONTINUOUS
Status: DISCONTINUED | OUTPATIENT
Start: 2020-06-21 | End: 2020-06-22

## 2020-06-21 RX ORDER — METOCLOPRAMIDE HYDROCHLORIDE 5 MG/ML
5 INJECTION INTRAMUSCULAR; INTRAVENOUS EVERY 8 HOURS PRN
Status: DISCONTINUED | OUTPATIENT
Start: 2020-06-21 | End: 2020-06-29

## 2020-06-21 RX ORDER — DEXTROSE MONOHYDRATE 25 G/50ML
50 INJECTION, SOLUTION INTRAVENOUS
Status: DISCONTINUED | OUTPATIENT
Start: 2020-06-21 | End: 2020-06-29

## 2020-06-21 RX ORDER — ONDANSETRON 2 MG/ML
4 INJECTION INTRAMUSCULAR; INTRAVENOUS EVERY 6 HOURS PRN
Status: DISCONTINUED | OUTPATIENT
Start: 2020-06-21 | End: 2020-06-29

## 2020-06-21 RX ORDER — DIGOXIN 125 MCG
125 TABLET ORAL
Status: DISCONTINUED | OUTPATIENT
Start: 2020-06-24 | End: 2020-06-29

## 2020-06-21 RX ORDER — DOCUSATE SODIUM 100 MG/1
100 CAPSULE, LIQUID FILLED ORAL 2 TIMES DAILY
Status: DISCONTINUED | OUTPATIENT
Start: 2020-06-21 | End: 2020-06-29

## 2020-06-21 RX ORDER — HYDRALAZINE HYDROCHLORIDE 10 MG/1
10 TABLET, FILM COATED ORAL 3 TIMES DAILY
Status: DISCONTINUED | OUTPATIENT
Start: 2020-06-21 | End: 2020-06-29

## 2020-06-21 RX ORDER — ACETAMINOPHEN 325 MG/1
650 TABLET ORAL EVERY 6 HOURS PRN
Status: DISCONTINUED | OUTPATIENT
Start: 2020-06-21 | End: 2020-06-29

## 2020-06-21 NOTE — ED NOTES
Orders for admission, patient is aware of plan and ready to go upstairs.  Any questions, please call ED RN Matias Vides  at extension [phone]

## 2020-06-21 NOTE — ED NOTES
Patient complaining of chest pressure starting 2 weeks ago. Has been getting worse. C/o burning sensation when having bowel movements and wiping. History of hemorrhoids.

## 2020-06-21 NOTE — CONSULTS
[address]  Report of GI Consultation    Iona Elizondo Patient Status:  Emergency    3/28/1940 MRN [id number]   Location [address] Attending Fabien Snell MD   Hosp Day # 0 PCP Randall Ordonez MD blood.    Noncontrast CT scan due to renal insufficiency is unrevealing. No bowel obstruction. Liver is described as grossly normal.  Cholecystectomy clips. No obvious biliary dilation. No abnormalities seen in the GI tract, intestines.       Surgical h bilateral lower and epigastric abdominal pain. Symptoms have become intolerable.     Initial labs in the ED show normocytic anemia with hemoglobin 7.7g, INR 3.74    Recommendations:  · Empiric PPI therapy with pantoprazole 40 mg IVP q12hrs  · Administered Current Medications:  Pantoprazole Sodium (PROTONIX) 40 mg in Sodium Chloride 0.9 % 10 mL IV push, 40 mg, Intravenous, Once      (Not in a hospital admission)      Allergies  No Known Allergies          Review of Systems:     No fevers. No vomiting. definite acute diverticulitis. Prominent prostate.       Dictated by (CST): Ag Rodriguez MD on 6/21/2020 at 2:41 PM     Finalized by (CST): Ag Rodriguez MD on 6/21/2020 at 2:48 PM

## 2020-06-21 NOTE — ED NOTES
Wife, Zulma Fearing, screened per care partner guidelines. Band placed and restrictions verbalized and printed for Winifred.

## 2020-06-21 NOTE — ED PROVIDER NOTES
Patient Seen in: [facility] AND [facility] Emergency Department      History   Patient presents with:  Abdomen/Flank Pain    Stated Complaint: abdominal pain    HPI    51-year-old male with chronic kidney disease, atrial fibrillation on Coumadin, coronary diseas Review of Systems    Positive for stated complaint: abdominal pain  Other systems are as noted in HPI. Constitutional and vital signs reviewed. All other systems reviewed and negative except as noted above.     Physical Exam     ED Triage Vitals Notable for the following components:    RBC 2.73 (*)     HGB 7.7 (*)     HCT 25.3 (*)     MCHC 30.4 (*)     Lymphocyte Absolute 0.93 (*)     All other components within normal limits   CBC WITH DIFFERENTIAL WITH PLATELET    Narrative:      The following or or atrophy. ADRENALS: No defined mass or abnormal enlargement. KIDNEYS: No hydronephrosis or calculi. AORTA/VASCULAR:   Abdominal aorta is normal in caliber. ABDOMINAL NODES: No mass or adenopathy.   BOWEL/MESENTERY:  No dilated loops of bowel or definite

## 2020-06-22 ENCOUNTER — APPOINTMENT (OUTPATIENT)
Dept: GENERAL RADIOLOGY | Facility: HOSPITAL | Age: 80
DRG: 377 | End: 2020-06-22
Attending: INTERNAL MEDICINE
Payer: MEDICARE

## 2020-06-22 PROBLEM — N17.9 ACUTE KIDNEY INJURY SUPERIMPOSED ON CKD (HCC): Status: ACTIVE | Noted: 2020-06-22

## 2020-06-22 PROBLEM — D62 ANEMIA DUE TO ACUTE BLOOD LOSS: Status: ACTIVE | Noted: 2020-06-22

## 2020-06-22 PROBLEM — D68.9 COAGULOPATHY (HCC): Status: ACTIVE | Noted: 2020-06-22

## 2020-06-22 PROBLEM — N18.9 ACUTE KIDNEY INJURY SUPERIMPOSED ON CKD (HCC): Status: ACTIVE | Noted: 2020-06-22

## 2020-06-22 PROBLEM — N17.9 ACUTE KIDNEY INJURY SUPERIMPOSED ON CKD: Status: ACTIVE | Noted: 2020-06-22

## 2020-06-22 PROBLEM — N18.9 ACUTE KIDNEY INJURY SUPERIMPOSED ON CKD: Status: ACTIVE | Noted: 2020-06-22

## 2020-06-22 LAB
ANION GAP SERPL CALC-SCNC: 5 MMOL/L (ref 0–18)
ANTIBODY SCREEN: NEGATIVE
BASOPHILS # BLD AUTO: 0.02 X10(3) UL (ref 0–0.2)
BASOPHILS NFR BLD AUTO: 0.4 %
BUN BLD-MCNC: 90 MG/DL (ref 7–18)
BUN/CREAT SERPL: 28.5 (ref 10–20)
CALCIUM BLD-MCNC: 8.1 MG/DL (ref 8.5–10.1)
CHLORIDE SERPL-SCNC: 107 MMOL/L (ref 98–112)
CO2 SERPL-SCNC: 27 MMOL/L (ref 21–32)
CREAT BLD-MCNC: 3.16 MG/DL (ref 0.7–1.3)
DEPRECATED RDW RBC AUTO: 44.8 FL (ref 35.1–46.3)
EOSINOPHIL # BLD AUTO: 0.11 X10(3) UL (ref 0–0.7)
EOSINOPHIL NFR BLD AUTO: 2.3 %
ERYTHROCYTE [DISTWIDTH] IN BLOOD BY AUTOMATED COUNT: 13.4 % (ref 11–15)
GLUCOSE BLD-MCNC: 129 MG/DL (ref 70–99)
GLUCOSE BLDC GLUCOMTR-MCNC: 154 MG/DL (ref 70–99)
GLUCOSE BLDC GLUCOMTR-MCNC: 176 MG/DL (ref 70–99)
GLUCOSE BLDC GLUCOMTR-MCNC: 188 MG/DL (ref 70–99)
GLUCOSE BLDC GLUCOMTR-MCNC: 236 MG/DL (ref 70–99)
HCT VFR BLD AUTO: 19.4 % (ref 39–53)
HCT VFR BLD AUTO: 22.2 % (ref 39–53)
HGB BLD-MCNC: 6.1 G/DL (ref 13–17.5)
HGB BLD-MCNC: 7 G/DL (ref 13–17.5)
HGB BLD-MCNC: 8.7 G/DL (ref 13–17.5)
IMM GRANULOCYTES # BLD AUTO: 0.01 X10(3) UL (ref 0–1)
IMM GRANULOCYTES NFR BLD: 0.2 %
INR BLD: 2.3 (ref 0.9–1.2)
LYMPHOCYTES # BLD AUTO: 0.9 X10(3) UL (ref 1–4)
LYMPHOCYTES NFR BLD AUTO: 18.4 %
MCH RBC QN AUTO: 28.9 PG (ref 26–34)
MCHC RBC AUTO-ENTMCNC: 31.4 G/DL (ref 31–37)
MCV RBC AUTO: 91.9 FL (ref 80–100)
MONOCYTES # BLD AUTO: 0.51 X10(3) UL (ref 0.1–1)
MONOCYTES NFR BLD AUTO: 10.5 %
NEUTROPHILS # BLD AUTO: 3.33 X10 (3) UL (ref 1.5–7.7)
NEUTROPHILS # BLD AUTO: 3.33 X10(3) UL (ref 1.5–7.7)
NEUTROPHILS NFR BLD AUTO: 68.2 %
OSMOLALITY SERPL CALC.SUM OF ELEC: 317 MOSM/KG (ref 275–295)
PLATELET # BLD AUTO: 164 10(3)UL (ref 150–450)
POTASSIUM SERPL-SCNC: 4 MMOL/L (ref 3.5–5.1)
PROTHROMBIN TIME: 24.9 SECONDS (ref 11.8–14.5)
RBC # BLD AUTO: 2.11 X10(6)UL (ref 3.8–5.8)
RH BLOOD TYPE: POSITIVE
SODIUM SERPL-SCNC: 139 MMOL/L (ref 136–145)
WBC # BLD AUTO: 4.9 X10(3) UL (ref 4–11)

## 2020-06-22 PROCEDURE — 30233N1 TRANSFUSION OF NONAUTOLOGOUS RED BLOOD CELLS INTO PERIPHERAL VEIN, PERCUTANEOUS APPROACH: ICD-10-PCS | Performed by: INTERNAL MEDICINE

## 2020-06-22 PROCEDURE — 99233 SBSQ HOSP IP/OBS HIGH 50: CPT | Performed by: INTERNAL MEDICINE

## 2020-06-22 PROCEDURE — 99223 1ST HOSP IP/OBS HIGH 75: CPT | Performed by: INTERNAL MEDICINE

## 2020-06-22 PROCEDURE — 71045 X-RAY EXAM CHEST 1 VIEW: CPT | Performed by: INTERNAL MEDICINE

## 2020-06-22 PROCEDURE — 99221 1ST HOSP IP/OBS SF/LOW 40: CPT | Performed by: INTERNAL MEDICINE

## 2020-06-22 RX ORDER — BUMETANIDE 0.25 MG/ML
1 INJECTION, SOLUTION INTRAMUSCULAR; INTRAVENOUS ONCE
Status: DISCONTINUED | OUTPATIENT
Start: 2020-06-22 | End: 2020-06-22

## 2020-06-22 RX ORDER — FUROSEMIDE 10 MG/ML
20 INJECTION INTRAMUSCULAR; INTRAVENOUS ONCE
Status: DISCONTINUED | OUTPATIENT
Start: 2020-06-22 | End: 2020-06-22

## 2020-06-22 RX ORDER — BUMETANIDE 0.25 MG/ML
0.5 INJECTION, SOLUTION INTRAMUSCULAR; INTRAVENOUS 2 TIMES DAILY PRN
Status: DISCONTINUED | OUTPATIENT
Start: 2020-06-22 | End: 2020-06-22

## 2020-06-22 RX ORDER — SODIUM CHLORIDE 9 MG/ML
INJECTION, SOLUTION INTRAVENOUS ONCE
Status: COMPLETED | OUTPATIENT
Start: 2020-06-22 | End: 2020-06-22

## 2020-06-22 NOTE — PHYSICAL THERAPY NOTE
PT evaluation orders received. Patient's Hgb at 6.1. Per rehab protocol, therapeutic intervention held with Hgb <7.0. Will reschedule.      Thank you,  Mabel [place], PT, DPT

## 2020-06-22 NOTE — H&P
[address] Patient Status:  Inpatient    3/28/1940 MRN [id number]   [facility] 5SW/SE Attending Mirlande Smith MD   Hosp Day # 1 PCP Mane Pryor MD     Date:   Hypercholesteremia    • MI (myocardial infarction) [facility]) 2008    cardiologist Dr Ephraim Villalba at [facility]   • S/P cholecystectomy 2005    [facility]     History reviewed. No pertinent surgical history.     Allergies:  No Known Allergies    Home Medications:  No curr neck or groin  Cardiovascular: RRR, S1, S2, no S3 or murmur  Respiratory: lungs without crackles or wheezes  Abdomen: normoactive bowel sounds, soft, non-tender and non-distended  Extremities: no clubbing, cyanosis or edema  Vascular: no carotid bruits; DP s/p Vitamin K 5 mg po with decrease in INR to 2.3; will give additional Vitamin K 1 mg IVP x one dose    Anemia due to acute blood loss  Hgb decreased from 7.7 on admission to 6.1 this morning  -transfuse 2 U PRBCs; check post tx H/H    Acute on CKD  Creat changes     Hypercholesterolemia  LDL 27 in Feb 2020 on Lipitor 40 mg po qHS     Macular degeneration  Early and mild; saw ophtho Dr Wendy Aguirre 2019; annual visit     Cataract   saw ophtho Dr Babak Gibbons in Oct 2019     Secondary hyperparathyroidism  PTH

## 2020-06-22 NOTE — PLAN OF CARE
Problem: Diabetes/Glucose Control  Goal: Glucose maintained within prescribed range  Description  INTERVENTIONS:  - Monitor Blood Glucose as ordered  - Assess for signs and symptoms of hyperglycemia and hypoglycemia  - Administer ordered medications to m perspectives and choices  6/22/2020 0207 by Syl Bansal RN  Outcome: Progressing  6/22/2020 0202 by Syl Bansal RN  Outcome: Progressing     Problem: PAIN - ADULT  Goal: Verbalizes/displays adequate comfort level or patient's stated pain

## 2020-06-22 NOTE — OCCUPATIONAL THERAPY NOTE
Orders received, chart reviewed for OT evaluation. Pt with a hemoglobin of 6.1 --pt not appropriate to participate at this time. Per rehab department protocol, therapeutic activity held when hemoglobin <7.0. Per EMR, plan for blood transfusion.  Will follow

## 2020-06-22 NOTE — CONSULTS
Metro Gasman  3/28/1940    Reason for consult: ICM      HPI:   [de-identified]year old with DM, CAD, CABG 3V 1995, subsequent PCI, ICM EF 20-25%, BIV ICD 2017, PAF, CKD, who presents with abdominal discomfort and acute GI bleeding, Hgb down to 6.   GI consult on 35        Types: Cigarettes        Quit date: 1991        Years since quittin.1      Smokeless tobacco: Never Used    Substance and Sexual Activity      Alcohol use:  Yes        Alcohol/week: 0.0 standard drinks        Comment: occassionaly      D joint pain, stiffness or swelling. GI: No nausea, vomiting or diarrhea. + abdominal pain. No blood in stools. Neurologic: No seizures, tremors, weakness or numbness.       PE:   Blood pressure 95/41, pulse 86, temperature 97.8 °F (36.6 °C), temperature so Recent Labs   Lab 06/21/20  1253 06/22/20  0555   RBC 2.73* 2.11*   HGB 7.7* 6.1*   HCT 25.3* 19.4*   MCV 92.7 91.9   MCH 28.2 28.9   MCHC 30.4* 31.4   RDW 13.5 13.4   NEPRELIM 4.26 3.33   WBC 6.0 4.9   .0 164.0             No results for input( in place     Anticoagulant long-term use     Dyspnea     Congestive heart failure (CHF) ([facility] [address]     Acute renal failure (HCC)     Vitamin D deficiency     Other specified forms of chronic ischemic heart disease     Iliotibial band syndrome of right side

## 2020-06-22 NOTE — PLAN OF CARE
Problem: Diabetes/Glucose Control  Goal: Glucose maintained within prescribed range  Description  INTERVENTIONS:  - Monitor Blood Glucose as ordered  - Assess for signs and symptoms of hyperglycemia and hypoglycemia  - Administer ordered medications to m pain and evaluate response  - Implement non-pharmacological measures as appropriate and evaluate response  - Consider cultural and social influences on pain and pain management  - Manage/alleviate anxiety  - Utilize distraction and/or relaxation techniques

## 2020-06-23 ENCOUNTER — ANESTHESIA (OUTPATIENT)
Dept: ENDOSCOPY | Facility: HOSPITAL | Age: 80
DRG: 377 | End: 2020-06-23
Payer: MEDICARE

## 2020-06-23 ENCOUNTER — ANESTHESIA EVENT (OUTPATIENT)
Dept: ENDOSCOPY | Facility: HOSPITAL | Age: 80
DRG: 377 | End: 2020-06-23
Payer: MEDICARE

## 2020-06-23 LAB
ALBUMIN SERPL-MCNC: 2.9 G/DL (ref 3.4–5)
ALBUMIN/GLOB SERPL: 1 {RATIO} (ref 1–2)
ALP LIVER SERPL-CCNC: 60 U/L (ref 45–117)
ALT SERPL-CCNC: 23 U/L (ref 16–61)
ANION GAP SERPL CALC-SCNC: 6 MMOL/L (ref 0–18)
AST SERPL-CCNC: 24 U/L (ref 15–37)
BASOPHILS # BLD AUTO: 0.02 X10(3) UL (ref 0–0.2)
BASOPHILS NFR BLD AUTO: 0.4 %
BILIRUB SERPL-MCNC: 0.6 MG/DL (ref 0.1–2)
BLOOD TYPE BARCODE: 6200
BLOOD TYPE BARCODE: 6200
BUN BLD-MCNC: 74 MG/DL (ref 7–18)
BUN/CREAT SERPL: 24.1 (ref 10–20)
CALCIUM BLD-MCNC: 8 MG/DL (ref 8.5–10.1)
CHLORIDE SERPL-SCNC: 107 MMOL/L (ref 98–112)
CO2 SERPL-SCNC: 26 MMOL/L (ref 21–32)
CREAT BLD-MCNC: 3.07 MG/DL (ref 0.7–1.3)
DEPRECATED RDW RBC AUTO: 45.2 FL (ref 35.1–46.3)
EOSINOPHIL # BLD AUTO: 0.13 X10(3) UL (ref 0–0.7)
EOSINOPHIL NFR BLD AUTO: 2.5 %
ERYTHROCYTE [DISTWIDTH] IN BLOOD BY AUTOMATED COUNT: 13.5 % (ref 11–15)
GLOBULIN PLAS-MCNC: 2.9 G/DL (ref 2.8–4.4)
GLUCOSE BLD-MCNC: 131 MG/DL (ref 70–99)
GLUCOSE BLDC GLUCOMTR-MCNC: 118 MG/DL (ref 70–99)
GLUCOSE BLDC GLUCOMTR-MCNC: 146 MG/DL (ref 70–99)
GLUCOSE BLDC GLUCOMTR-MCNC: 215 MG/DL (ref 70–99)
GLUCOSE BLDC GLUCOMTR-MCNC: 286 MG/DL (ref 70–99)
HCT VFR BLD AUTO: 26.1 % (ref 39–53)
HCT VFR BLD AUTO: 27.6 % (ref 39–53)
HEMOCCULT STL QL: POSITIVE
HGB BLD-MCNC: 8.2 G/DL (ref 13–17.5)
HGB BLD-MCNC: 8.8 G/DL (ref 13–17.5)
IMM GRANULOCYTES # BLD AUTO: 0.02 X10(3) UL (ref 0–1)
IMM GRANULOCYTES NFR BLD: 0.4 %
INR BLD: 1.7 (ref 0.9–1.2)
IRON SATURATION: 5 % (ref 20–50)
IRON SERPL-MCNC: 20 UG/DL (ref 65–175)
LYMPHOCYTES # BLD AUTO: 0.84 X10(3) UL (ref 1–4)
LYMPHOCYTES NFR BLD AUTO: 16 %
M PROTEIN MFR SERPL ELPH: 5.8 G/DL (ref 6.4–8.2)
MCH RBC QN AUTO: 28.8 PG (ref 26–34)
MCHC RBC AUTO-ENTMCNC: 31.4 G/DL (ref 31–37)
MCV RBC AUTO: 91.6 FL (ref 80–100)
MONOCYTES # BLD AUTO: 0.59 X10(3) UL (ref 0.1–1)
MONOCYTES NFR BLD AUTO: 11.2 %
NEUTROPHILS # BLD AUTO: 3.66 X10 (3) UL (ref 1.5–7.7)
NEUTROPHILS # BLD AUTO: 3.66 X10(3) UL (ref 1.5–7.7)
NEUTROPHILS NFR BLD AUTO: 69.5 %
OSMOLALITY SERPL CALC.SUM OF ELEC: 312 MOSM/KG (ref 275–295)
PLATELET # BLD AUTO: 157 10(3)UL (ref 150–450)
POTASSIUM SERPL-SCNC: 3.7 MMOL/L (ref 3.5–5.1)
PROTHROMBIN TIME: 19.7 SECONDS (ref 11.8–14.5)
RBC # BLD AUTO: 2.85 X10(6)UL (ref 3.8–5.8)
SODIUM SERPL-SCNC: 139 MMOL/L (ref 136–145)
TOTAL IRON BINDING CAPACITY: 375 UG/DL (ref 240–450)
TRANSFERRIN SERPL-MCNC: 252 MG/DL (ref 200–360)
TTG IGA SER-ACNC: 0.6 U/ML (ref ?–7)
WBC # BLD AUTO: 5.3 X10(3) UL (ref 4–11)

## 2020-06-23 PROCEDURE — 0DB98ZX EXCISION OF DUODENUM, VIA NATURAL OR ARTIFICIAL OPENING ENDOSCOPIC, DIAGNOSTIC: ICD-10-PCS | Performed by: INTERNAL MEDICINE

## 2020-06-23 PROCEDURE — 99233 SBSQ HOSP IP/OBS HIGH 50: CPT | Performed by: INTERNAL MEDICINE

## 2020-06-23 PROCEDURE — 43239 EGD BIOPSY SINGLE/MULTIPLE: CPT | Performed by: INTERNAL MEDICINE

## 2020-06-23 PROCEDURE — 99232 SBSQ HOSP IP/OBS MODERATE 35: CPT | Performed by: INTERNAL MEDICINE

## 2020-06-23 RX ORDER — LIDOCAINE HYDROCHLORIDE 20 MG/ML
INJECTION, SOLUTION EPIDURAL; INFILTRATION; INTRACAUDAL; PERINEURAL AS NEEDED
Status: DISCONTINUED | OUTPATIENT
Start: 2020-06-23 | End: 2020-06-23 | Stop reason: SURG

## 2020-06-23 RX ORDER — SODIUM CHLORIDE 9 MG/ML
INJECTION, SOLUTION INTRAVENOUS CONTINUOUS PRN
Status: DISCONTINUED | OUTPATIENT
Start: 2020-06-23 | End: 2020-06-23 | Stop reason: SURG

## 2020-06-23 RX ADMIN — SODIUM CHLORIDE: 9 INJECTION, SOLUTION INTRAVENOUS at 14:52:00

## 2020-06-23 RX ADMIN — LIDOCAINE HYDROCHLORIDE 80 MG: 20 INJECTION, SOLUTION EPIDURAL; INFILTRATION; INTRACAUDAL; PERINEURAL at 14:52:00

## 2020-06-23 NOTE — OCCUPATIONAL THERAPY NOTE
OCCUPATIONAL THERAPY EVALUATION - INPATIENT     Room Number: 566/566-A  Evaluation Date: 6/23/2020  Type of Evaluation: Initial  Presenting Problem: (gastrointestinal hemorrhage)    Physician Order: IP Consult to Occupational Therapy  Reason for Therapy: A Treatment Plan: Balance activities; Energy conservation/work simplification techniques;ADL training;Functional transfer training; Endurance training;Patient/Family education;Patient/Family training;Equipment eval/education; Compensatory technique education basement (does not go down there)   Use of Assistive Device(s): none     Prior Level of [place]: Pt reports being indep with self care and fx mobility and transfers - lives home with his spouse.      SUBJECTIVE  \"I feel so much better after I got that good  Dynamic Sitting: good  Static Standing: fair+  Dynamic Standing: fair+    FUNCTIONAL ADL ASSESSMENT  Grooming: indep  Feeding: indep  Bathing: min A   Toileting: SBA  Upper Extremity Dressing: indep  Lower Extremity Dressing: min a     Education [facility]

## 2020-06-23 NOTE — PLAN OF CARE
Problem: Diabetes/Glucose Control  Goal: Glucose maintained within prescribed range  Description  INTERVENTIONS:  - Monitor Blood Glucose as ordered  - Assess for signs and symptoms of hyperglycemia and hypoglycemia  - Administer ordered medications to m OB stool sent this morning. Call light within reach.

## 2020-06-23 NOTE — PLAN OF CARE
Problem: Diabetes/Glucose Control  Goal: Glucose maintained within prescribed range  Description  INTERVENTIONS:  - Monitor Blood Glucose as ordered  - Assess for signs and symptoms of hyperglycemia and hypoglycemia  - Administer ordered medications to m on type and severity of pain and evaluate response  - Implement non-pharmacological measures as appropriate and evaluate response  - Consider cultural and social influences on pain and pain management  - Manage/alleviate anxiety  - Utilize distraction and/

## 2020-06-23 NOTE — ANESTHESIA PREPROCEDURE EVALUATION
Anesthesia PreOp Note    HPI:     Severo Christensen is a [de-identified]year old male who presents for preoperative consultation requested by: Chuy Ford MD    Date of Surgery: 6/21/2020 - 6/23/2020    Procedure(s):  ESOPHAGOGASTRODUODENOSCOPY (EGD) 08/31/2017      Diabetic nephropathy associated with type 2 diabetes mellitus ([facility] [address]         Date Noted: 04/24/2017      Secondary hyperparathyroidism [facility])         Date Noted: 04/24/2017      Type 2 diabetes mellitus without complication, without long-term • CKD (chronic kidney disease)     creatinine 1.8 in Nov 2015; nephrologist Dr Helene Lau   • Congestive heart disease [facility])    • Diabetes [facility])    • Diverticulosis     colonoscopy 3/10/15 by GI Dr Hosea Ayala   • Floaters 6/21/2016   • High blood pre differently: Take 10 mg by mouth 3 (three) times daily.  ), Disp: 270 tablet, Rfl: 3, Taking  Blood Glucose Monitoring Suppl (ACCU-CHEK BROOKE) Does not apply Device, Check BS once daily, Disp: 1 Device, Rfl: 0, Taking  Glucose Blood (ACCU-CHEK BROOKE) In Vi Intravenous, PRN, Ole Orosco MD  glucose (DEX4) oral liquid 15 g, 15 g, Oral, Q15 Min PRN, Kaila Lamb MD    Or  Glucose-Vitamin C (DEX-4) chewable tab 4 tablet, 4 tablet, Oral, Q15 Min PRN, Ole Orosco MD    Or  dextrose 50 % injection Financial resource strain: Not on file      Food insecurity:        Worry: Not on file        Inability: Not on file      Transportation needs:        Medical: Not on file        Non-medical: Not on file    Tobacco Use      Smoking status: Former Smoker Results   Component Value Date    WBC 5.3 06/23/2020    RBC 2.85 (L) 06/23/2020    HGB 8.2 (L) 06/23/2020    HCT 26.1 (L) 06/23/2020    MCV 91.6 06/23/2020    MCH 28.8 06/23/2020    MCHC 31.4 06/23/2020    RDW 13.5 06/23/2020    .0 06/23/2020     La plan with:  CRNA      I have informed Sebastian Love and/or legal guardian or family member of the nature of the anesthetic plan, benefits, risks including possible dental damage if relevant, major complications, and any alternative forms of anestheti

## 2020-06-23 NOTE — CONSULTS
[facility]    Report of Consultation    Debra Torre Patient Status:  Inpatient    3/28/1940 MRN [id number]   Location [facility] 5SW/SE Attending Willard Charles MD   Hosp Day # 1 PCP Sharon Hugo MD     Date of Adm reviewed. No pertinent surgical history.     Family History  Family History   Problem Relation Age of Onset   • Hypertension Father    • Diabetes Mother    • Glaucoma Neg    • Macular degeneration Neg        Social History  Originally from [place]  Is  1 cup of coffee daily        Occupational Exposure: Not Asked        Hobby Hazards: Not Asked        Sleep Concern: Not Asked        Stress Concern: Not Asked        Weight Concern: Not Asked        Special Diet: Not Asked        Back Care: Not Asked suppository 10 mg, 10 mg, Rectal, Daily PRN  Metoclopramide HCl (REGLAN) injection 5 mg, 5 mg, Intravenous, Q8H PRN  Pantoprazole Sodium (PROTONIX) 40 mg in Sodium Chloride 0.9 % 10 mL IV push, 40 mg, Intravenous, Q12H  Insulin Aspart Pen (NOVOLOG) 100 UNI Vitamin (ONE-A-DAY ESSENTIAL) Oral Tab, Take by mouth. aspirin 81 MG Oral Chew Tab, Chew by mouth daily. triamcinolone acetonide 0.1 % External Cream, Apply topically 2 (two) times daily as needed.         Allergies  No Known Allergies      Review of Syst calm    Results:     Laboratory Data:  Lab Results   Component Value Date    WBC 4.9 06/22/2020    HGB 8.7 (L) 06/22/2020    HCT 22.2 (L) 06/22/2020    .0 06/22/2020    CREATSERUM 3.16 (H) 06/22/2020    BUN 90 (H) 06/22/2020     06/22/2020 Hypercholesteremia     Ischemic cardiomyopathy     Atherosclerosis of native coronary artery of native heart without angina pectoris     Erectile dysfunction     Chronic atrial fibrillation (HCC)     Atrial fibrillation with rapid ventricular response ([address]

## 2020-06-23 NOTE — PAYOR COMM NOTE
6/23 ALREADY SUBMITTED 6/22    CONTINUED STAY REVIEW    Marisa Marcelino MA [facility] [place] [facility]  Subscriber #:  [id number]  Authorization Number: [id number]    Admit date: 6/21/20  Admit time: [address],8[address]    Admitting Physician: Thelma De La Garza MD  Attending Physician:  Samantha Grider, Location: Right arm)   Pulse 82   Temp 97.2 °F (36.2 °C) (Oral)   Resp 18   Wt 169 lb 8 oz (76.9 kg)   SpO2 100%   BMI 27.36 kg/m²      Intake/Output:     Intake/Output Summary (Last 24 hours) at 6/23/2020 1008  Last data filed at 6/22/2020 1930      Gross bloating/gas  -pt notes 2-3 weeks of significant abdominal bloating --relieved temporarily with belching/flatus but then recurs shortly thereafter  -CT a/p unrevealing  -EGD today     Acute on CKD  Creatinine 3.53 on admission; creatinine was 2.75 in May 2 or insulin  -'s on admission. Now on SS insulin -- 's-200's.   May add levemir once pt no longer NPO     Hypercholesterolemia  LDL 27 in Feb 2020 on Lipitor 40 mg po qHS     Macular degeneration  Early and mild; saw ophtho Dr Lisbet Durant 20 and responsive no acute distress noted  Neck/Thyroid: neck is supple without adenopathy  Lymphatic: no abnormal cervical, supraclavicular or axillary adenopathy is noted  Respiratory: normal to inspection lungs are clear to auscultation bilaterally normal angina pectoris     Erectile dysfunction     Chronic atrial fibrillation (HCC)     Atrial fibrillation with rapid ventricular response (HCC)     Hyperglycemia     Bloating     Floaters, bilateral     Atherosclerosis of aorta ([address]     ICD (implantable card PRBC yesterday  3. ICM, EF 25%  4. BIV ICD   5. CKD      Plan:  1. Stable CV status. Patient is at Intermediate risk to proceed with GI evaluation. No further testing is recommended   2. Hold diuretics for now  3. Continue current CV meds  4.  Will discuss

## 2020-06-23 NOTE — PROGRESS NOTES
[address] NOTE    Honey Milder Patient Status:  Inpatient    3/28/1940 MRN [id number]   [facility] 5SW/SE Attending Mirlande Smith MD   Hosp Day # 1 PCP MD Bryant Reynoso B12 in the last 168 hours. No results for input(s): URINE, CULTI, BLDSMR in the last 168 hours. Ct Abdomen+pelvis(cpt=74176)    Result Date: 6/21/2020  CONCLUSION:   Motion limits evaluation.   No definite evidence of acute abnormality in the abdome coming down  · Normal LFTs 3 days ago 6/19/2020 and 6/21  · Cardiac evaluation for CHF decompensation; abdominal congestion could be causing/contributing to the abdominal pressure complaint and difficulty breathing.   Check CXR; consider echocardiogram etc.

## 2020-06-23 NOTE — PROGRESS NOTES
[facility]    Progress Note    Leobardo Robert Patient Status:  Inpatient    3/28/1940 MRN [id number]   [facility] 5SW/SE Attending Hayden Almeida MD   Hosp Day # 2 PCP Lena Mann MD       SUBJECTIVE:  Fee Colonic diverticulosis without definite acute diverticulitis. Prominent prostate.       Dictated by (CST): Maurice Parra MD on 6/21/2020 at 2:41 PM     Finalized by (CST): Maurice Parra MD on 6/21/2020 at 2:48 PM          Xr Chest Ap Portable  (cp PRN  bisacodyl (DULCOLAX) rectal suppository 10 mg, 10 mg, Rectal, Daily PRN  Metoclopramide HCl (REGLAN) injection 5 mg, 5 mg, Intravenous, Q8H PRN  Pantoprazole Sodium (PROTONIX) 40 mg in Sodium Chloride 0.9 % 10 mL IV push, 40 mg, Intravenous, Q12H  Ins 10 mg po TID, Kdur 20 mEQ po qD, and metolazone 2.5 mg prn weight gain; outpt Rx per CHF clinic and Dr Claudia Birmingham  - seen by Dr. Sang Whitehead; he will d/w EP whether PPM is MRI-compatible  - bumex on hold due to WADE, though did receive 1 dose between units of bloo stenosis  As seen on ECHO in Aug 2019     Low back pain with lumbar radiculopathy   +radiation to RLE; s/p physical therapy     Constipation  Takes Metamucil 1 tbsp daily; TSH 1.96 in Feb 2020; Last colonoscopy in Mar 2015 by GI Dr Dedrick Steele; p

## 2020-06-23 NOTE — ANESTHESIA POSTPROCEDURE EVALUATION
Patient: Leobardo Robert    Procedure Summary     Date:  06/23/20 Room / Location:  [facility] ENDOSCOPY 05 / [facility] ENDOSCOPY    Anesthesia Start:  1615 Anesthesia Stop:      Procedure:  ESOPHAGOGASTRODUODENOSCOPY (EGD) (N/A ) Diagnosis:  (duodenitis)    Mariano Rojas

## 2020-06-23 NOTE — PROGRESS NOTES
[facility]  Nephrology Daily Progress Note    Oly Son  [id number]  [de-identified]year old      HPI:   Oly Son is a [de-identified]year old male. Up in chair. Overall feels better but still c/o abd bloating and discomfrot.   No grosss GI b for age    Labs:  Lab Results   Component Value Date    WBC 5.3 06/23/2020    HGB 8.2 06/23/2020    HCT 26.1 06/23/2020    .0 06/23/2020    CREATSERUM 3.07 06/23/2020    BUN 74 06/23/2020     06/23/2020    K 3.7 06/23/2020     06/23/2020 Demineralization. 8. Scoliosis. 9. Osteoarthritis. 10. Defibrillator. Dictated by (CST): Lucy Scott MD on 6/23/2020 at 8:01 AM     Finalized by (CST):  Lucy Scott MD on 6/23/2020 at 8:05 AM              Medications:    Current Facility-[facility]mi Allergies    Input/Output:    Intake/Output Summary (Last 24 hours) at 6/23/2020 1028  Last data filed at 6/22/2020 1930  Gross per 24 hour   Intake 1173.33 ml   Output 0 ml   Net 1173.33 ml          ASSESSMENT/PLAN:   Assessment   Patient Active Problem L down to 3.07. Scheduled for CT of abd and EGD today. Await results. Venofer for iron def. Discussed with Dr. Brionna Batista and wife.               6/23/2020  Shawn Miranda MD

## 2020-06-23 NOTE — PROGRESS NOTES
Cardiology Progress Note    SUBJECTIVE:  S/p 2 U PRBC yesterday  Feeling much better  No chest pain; some SOB    OBJECTIVE:    PE: Blood pressure 106/63, pulse 78, temperature 97 °F (36.1 °C), temperature source Oral, resp.  rate 15, height 5' 6\" (1.676 105 107 107   CO2 30.0 27.0 26.0     Recent Labs   Lab 06/21/20  1253 06/22/20  0555 06/22/20  1342 06/22/20  2104 06/23/20  0624   RBC 2.73* 2.11*  --   --  2.85*   HGB 7.7* 6.1* 7.0* 8.7* 8.2*   HCT 25.3* 19.4* 22.2*  --  26.1*   MCV 92.7 91.9  --   -- dysfunction     Chronic atrial fibrillation (HCC)     Atrial fibrillation with rapid ventricular response (HCC)     Hyperglycemia     Bloating     Floaters, bilateral     Atherosclerosis of aorta (HCC)     ICD (implantable cardioverter-defibrillator) in pl

## 2020-06-23 NOTE — PAYOR COMM NOTE
--------------  ADMISSION REVIEW     [facility]  Subscriber #:  [id number]  Authorization Number: [id number]    Admit date: 6/21/20  Admit time: 1717       Admitting Physician: Bronwyn Espinoza MD  Attending Physician:  Lyndia Ochoa, MD Lazarus Cassis by GI Dr Kraig Torrez   • Floaters 6/21/2016   • Hypercholesteremia    • MI (myocardial infarction) [facility]) 2008    cardiologist Dr Vincent Trivedi at [facility]   • S/P cholecystectomy 2005    [facility]              History reviewed. No pertinent surgical history. colitis, diverticulitis, constipation, ileus, GI bleeding, neoplastic process.       ED Course     Labs Reviewed   BASIC METABOLIC PANEL (8) - Abnormal; Notable for the following components:       Result Value    Glucose 187 (*)     BUN 91 (*)     Creatinin intravenous contrast.  Multiplanar reformations obtained. Dose reduction techniques utilized. Dose information is transmitted to the [facility] [employer] Radiology) NRDR ([address]) which includes the Dose Index Registry.   Lance Impression:  Abdominal pain, acute  (primary encounter diagnosis)  Anemia, unspecified type  Gastrointestinal hemorrhage, unspecified gastrointestinal hemorrhage type    Disposition:  Admit  6/21/2020  3:29 pm    Follow-up:  No follow-up provider specified diverticulosis without definite acute diverticulitis.   Hgb low at 7.7, and INR supratherapeutically elevated at 3.7; creatinine was also above baseline at 3.53; he was seen in consultation with GI Dr Ramon Marcelino; he was given Vitamin K 5 mg po; he was given IVF Negative for cough, dyspnea and wheezing  Endocrine:  Negative for abnormal sleep patterns, increased activity, polydipsia and polyphagia  Gastrointestinal:  Negative for abdominal pain, constipation, decreased appetite, diarrhea and vomiting; no melena or 3.33   .0         Imaging:  Ct Abdomen+pelvis(cpt=74176)    Result Date: 6/21/2020  CONCLUSION:   Motion limits evaluation.   No definite evidence of acute abnormality in the abdomen and pelvis on this unenhanced examination within the limitations fr mg po qD,  Isordil 10 mg TID, and hydralazine 10 mg po TID, Kdur 20 mEQ po qD, and metolazone 2.5 mg prn weight gain; Rx per CHF clinic and Dr Natali Medina     Chronic obstructive pulmonary disease, unspecified COPD type ([facility]  CXR 11/5/17 with +hyperinflated therapy     Constipation  Takes Metamucil 1 tbsp daily; TSH 1.96 in Feb 2020;  Last colonoscopy in Mar 2015 by GI Dr Hosea Ayala in [address] [place] [address]; pt requests GI referral to Dr Ish Olvera colonoscopy in Mar 2015 by GI Dr Hosea [facility]ala in [facility] Date Action Dose Route User    6/22/2020 2221 Given 2 packet Oral Argelia Chandra, RN      0.9% NaCl infusion     Date Action Dose Route User    6/22/2020 1020 New Bag (none) Intravenous Asael Toure, RN      0.9% NaCl infusion     Date Action Dose Ro 2/21/2020.     Rectal exam in the ED shows dark brown stool, \"strongly positive\" for occult blood.     Noncontrast CT scan due to renal insufficiency is unrevealing. No bowel obstruction. Liver is described as grossly normal.  Cholecystectomy clips.   Coreen Thomas down to 6. GI consult ongoing. Plans for EGD today for eval.  Cardiac status has been stable. No chest pain sxs. Diuretics have been adjusted for fluid managemnt.  Weight has remained stable.    Review of Systems:    Constitutional: No fevers, chills risk to proceed with GI evaluation. No further testing is recommended   2. IV diuretics with blood transfusion   3. Cont CV meds   4. Will discuss with EP re: MRI compatibility of device.      6/22 NEPHROLOGY  NOTE  Date of Consult:  6/22/2020  Reason for C weight 166 lb (75.3 kg), SpO2 97 %.     GENERAL: Looks fatigued.   Lying in bed, smiling as always  HEENT: Normocephalic; pupils equally round and reactive to light; no temporal wasting; no thyromegaly or cervical lymphadenopathy  PULM: Speaking in shorter bloating, diffuse bilateral lower and epigastric abdominal pain.  Symptoms have become intolerable.     Initial labs in the ED show normocytic anemia with hemoglobin 7.7g, INR 3.74     6/22/2020: Low blood pressure, short of breath.   Hemoglobin 7.7g –> 6.1

## 2020-06-23 NOTE — PHYSICAL THERAPY NOTE
PHYSICAL THERAPY EVALUATION - INPATIENT     Room Number: 566/566-A  Evaluation Date: 6/23/2020  Type of Evaluation: Initial   Physician Order: PT Eval and Treat    Presenting Problem: GI bleed, acute anemia, s/p blood transfusion 6/22  Reason for Therapy: continued IP PT services to address these deficits in preparation for discharge. DISCHARGE RECOMMENDATIONS  PT Discharge Recommendations: 24 hour care/supervision;Home    PLAN  PT Treatment Plan: Bed mobility; Endurance; Energy conservation;Patient educat [place]: IND with ADLs, IADLs without assistive device. Pt is retired, drives, does his own grocery shopping. SUBJECTIVE  \"I feel much better after I got the blood yesterday. \"    PHYSICAL THERAPY EXAMINATION     OBJECTIVE  Precautions: Cardiac; Manjula Shetty Degree of Impairment: 46.58%   Standardized Score (AM-PAC Scale): 43.63   CMS Modifier (G-Code): CK    FUNCTIONAL ABILITY STATUS  Gait Assessment   Gait Assistance: Contact guard assist(to SBA)  Distance (ft): 15' x 1, 60' x 1  Assistive Device: None  Thelma

## 2020-06-23 NOTE — OPERATIVE REPORT
EGD PROCEDURE REPORT    DATE OF PROCEDURE:  6/23/2020    PCP: Anna Murcia MD     PREOPERATIVE DIAGNOSIS: Anemia, occult blood in the stool, epigastric and generalized abdominal pain     POSTOPERATIVE DIAGNOSIS:  See impression.      SURGEON:  Lotus complaint. RECOMMENDATIONS:    · Followup above duodenal mucosal biopsy results. Aspirin effect? · Ultrasound of liver and biliary tree. Patient has a cardiac pacemaker –> no MRI MRCP  · Continue close monitoring of H&H.   Receiving Venofer iron infus

## 2020-06-24 ENCOUNTER — APPOINTMENT (OUTPATIENT)
Dept: ULTRASOUND IMAGING | Facility: HOSPITAL | Age: 80
DRG: 377 | End: 2020-06-24
Attending: INTERNAL MEDICINE
Payer: MEDICARE

## 2020-06-24 LAB
ALBUMIN SERPL-MCNC: 2.8 G/DL (ref 3.4–5)
ANION GAP SERPL CALC-SCNC: 7 MMOL/L (ref 0–18)
BASOPHILS # BLD AUTO: 0.01 X10(3) UL (ref 0–0.2)
BASOPHILS NFR BLD AUTO: 0.2 %
BUN BLD-MCNC: 57 MG/DL (ref 7–18)
BUN/CREAT SERPL: 21.1 (ref 10–20)
CALCIUM BLD-MCNC: 7.9 MG/DL (ref 8.5–10.1)
CHLORIDE SERPL-SCNC: 109 MMOL/L (ref 98–112)
CO2 SERPL-SCNC: 25 MMOL/L (ref 21–32)
CREAT BLD-MCNC: 2.7 MG/DL (ref 0.7–1.3)
DEPRECATED RDW RBC AUTO: 45.5 FL (ref 35.1–46.3)
EOSINOPHIL # BLD AUTO: 0.13 X10(3) UL (ref 0–0.7)
EOSINOPHIL NFR BLD AUTO: 2 %
ERYTHROCYTE [DISTWIDTH] IN BLOOD BY AUTOMATED COUNT: 13.5 % (ref 11–15)
GLUCOSE BLD-MCNC: 124 MG/DL (ref 70–99)
GLUCOSE BLDC GLUCOMTR-MCNC: 152 MG/DL (ref 70–99)
GLUCOSE BLDC GLUCOMTR-MCNC: 159 MG/DL (ref 70–99)
GLUCOSE BLDC GLUCOMTR-MCNC: 161 MG/DL (ref 70–99)
GLUCOSE BLDC GLUCOMTR-MCNC: 235 MG/DL (ref 70–99)
HAV IGM SER QL: 2.2 MG/DL (ref 1.6–2.6)
HCT VFR BLD AUTO: 25.8 % (ref 39–53)
HGB BLD-MCNC: 8.1 G/DL (ref 13–17.5)
IMM GRANULOCYTES # BLD AUTO: 0.02 X10(3) UL (ref 0–1)
IMM GRANULOCYTES NFR BLD: 0.3 %
INR BLD: 1.2 (ref 0.9–1.2)
LYMPHOCYTES # BLD AUTO: 0.55 X10(3) UL (ref 1–4)
LYMPHOCYTES NFR BLD AUTO: 8.5 %
MCH RBC QN AUTO: 28.7 PG (ref 26–34)
MCHC RBC AUTO-ENTMCNC: 31.4 G/DL (ref 31–37)
MCV RBC AUTO: 91.5 FL (ref 80–100)
MONOCYTES # BLD AUTO: 0.75 X10(3) UL (ref 0.1–1)
MONOCYTES NFR BLD AUTO: 11.6 %
NEUTROPHILS # BLD AUTO: 5.02 X10 (3) UL (ref 1.5–7.7)
NEUTROPHILS # BLD AUTO: 5.02 X10(3) UL (ref 1.5–7.7)
NEUTROPHILS NFR BLD AUTO: 77.4 %
OSMOLALITY SERPL CALC.SUM OF ELEC: 309 MOSM/KG (ref 275–295)
PHOSPHATE SERPL-MCNC: 3.3 MG/DL (ref 2.5–4.9)
PLATELET # BLD AUTO: 149 10(3)UL (ref 150–450)
POTASSIUM SERPL-SCNC: 3.5 MMOL/L (ref 3.5–5.1)
POTASSIUM SERPL-SCNC: 3.7 MMOL/L (ref 3.5–5.1)
PROTHROMBIN TIME: 15 SECONDS (ref 11.8–14.5)
RBC # BLD AUTO: 2.82 X10(6)UL (ref 3.8–5.8)
SODIUM SERPL-SCNC: 141 MMOL/L (ref 136–145)
WBC # BLD AUTO: 6.5 X10(3) UL (ref 4–11)

## 2020-06-24 PROCEDURE — 93975 VASCULAR STUDY: CPT | Performed by: INTERNAL MEDICINE

## 2020-06-24 PROCEDURE — 99232 SBSQ HOSP IP/OBS MODERATE 35: CPT | Performed by: INTERNAL MEDICINE

## 2020-06-24 PROCEDURE — 99232 SBSQ HOSP IP/OBS MODERATE 35: CPT | Performed by: NURSE PRACTITIONER

## 2020-06-24 PROCEDURE — 76700 US EXAM ABDOM COMPLETE: CPT | Performed by: INTERNAL MEDICINE

## 2020-06-24 RX ORDER — POTASSIUM CHLORIDE 20 MEQ/1
20 TABLET, EXTENDED RELEASE ORAL ONCE
Status: COMPLETED | OUTPATIENT
Start: 2020-06-24 | End: 2020-06-24

## 2020-06-24 RX ORDER — POTASSIUM CHLORIDE 20 MEQ/1
40 TABLET, EXTENDED RELEASE ORAL ONCE
Status: COMPLETED | OUTPATIENT
Start: 2020-06-24 | End: 2020-06-24

## 2020-06-24 NOTE — PLAN OF CARE
Problem: PAIN - ADULT  Goal: Verbalizes/displays adequate comfort level or patient's stated pain goal  Description  INTERVENTIONS:  - Encourage pt to monitor pain and request assistance  - Assess pain using appropriate pain scale  - Administer analgesics trends  - Administer supportive blood products/factors, fluids and medications as ordered and appropriate  - Administer supportive blood products/factors as ordered and appropriate  Outcome: Progressing     Problem: Diabetes/Glucose Control  Goal: Glucose

## 2020-06-24 NOTE — PLAN OF CARE
Problem: Diabetes/Glucose Control  Goal: Glucose maintained within prescribed range  Description  INTERVENTIONS:  - Monitor Blood Glucose as ordered  - Assess for signs and symptoms of hyperglycemia and hypoglycemia  - Administer ordered medications to m type and severity of pain and evaluate response  - Implement non-pharmacological measures as appropriate and evaluate response  - Consider cultural and social influences on pain and pain management  - Manage/alleviate anxiety  - Utilize distraction and/or noc. No pain so far this shift. Pt is s/p abd u/s per dr feliz pt ok to eat. Pt currently on 02 saturation 98% pt states feeling sob  and worried about 2 pound weight gain. Md informed and will assess pt later. Pt instructed on use of call light system .

## 2020-06-24 NOTE — PROGRESS NOTES
[facility]    Progress Note    Bib Crespo Patient Status:  Inpatient    3/28/1940 MRN [id number]   [facility] 5SW/SE Attending Garland Hodgkin, MD   Hosp Day # 3 PCP Karen Payne MD       Subjective:   [facility] bruising noted  Back/Spine: no abnormalities noted  Musculoskeletal: full ROM all extremities good strength  no deformities  Extremities: no edema, cyanosis  Neurological:  Grossly normal    Results:     Laboratory Data:  Lab Results   Component Value Date 6/24/2020 at 11:21 AM     Finalized by (CST): Wendi Koch MD on 6/24/2020 at 11:29 AM              ASSESSMENT/PLAN:   Assessment       GI bleed hemoglobin is stable 8.1  On proton pump inhibitor on IV iron for low iron 5% await pathology from duodenum

## 2020-06-24 NOTE — PROGRESS NOTES
[facility]    Progress Note    Alburgh Clause Patient Status:  Inpatient    3/28/1940 MRN [id number]   [facility] 5SW/SE Attending Krystin Rangel MD   Hosp Day # 3 PCP Sahra Herr MD         Assessment and Dr Reji Smith carvedilol 9.375 mg po BID, Bumex 1 mg po qD,  Isordil 10 mg TID, and hydralazine 10 mg po TID, Kdur 20 mEQ po qD, and metolazone 2.5 mg prn weight gain; outpt Rx per CHF clinic and Dr Bethena Burkitt  - seen by Dr. Adan Nagel; he will d/w EP whether PP ECHO in Aug 2019 shows EF 25-30% ; not on ACEI due to CKD;  Rx per Dr Sascha Zamudio     Mild aortic stenosis  As seen on ECHO in Aug 2019     Low back pain with lumbar radiculopathy   +radiation to RLE; s/p physical therapy     Constipation  Takes Metamucil 1 tb (LANOXIN) tab 125 mcg, 125 mcg, Oral, Once per day on Wed Fri  hydrALAzine HCl (APRESOLINE) tab 10 mg, 10 mg, Oral, TID  multivitamin (ADULT) tab 1 tablet, 1 tablet, Oral, Daily  Normal Saline Flush 0.9 % injection 3 mL, 3 mL, Intravenous, PRN  glucose (DE UROBILINOGEN <2.0   NITRITE Negative   LEUUR Negative   WBCUR 0   RBCUR <1   BACUR Negative       No results for input(s): URINE, CULTI, BLDSMR in the last 168 hours.       Imaging:  Ct Abdomen+pelvis(cpt=74176)    Result Date: 6/21/2020  CONCLUSION:   Mo

## 2020-06-24 NOTE — PROGRESS NOTES
[facility] - [facility]    Cardiology Progress Note    Sae Newby Patient Status:  Inpatient    3/28/1940 MRN [id number]   [facility] 5SW/SE Attending Dearradhika Card MD   Hosp Day # 3 PCP Mariam Byrd MD     Primary C yesterday  3. ICM, EF 25%  4. BIV ICD   5. CKD      Plan:  1. Stable CV status. Patient is at Intermediate risk to proceed with GI testing. No further testing is recommended   2. Hold diuretics & ASA for now  3.  Continue current CV meds: statin, coreg 9.75 kidneys. No hydronephrosis. Normal aorta and IVC. 2. Patent main portal vein and hepatic veins. Mesenteric veins could not be visualized.     Dictated by (CST): Fabi Jay MD on 6/24/2020 at 11:21 AM     Finalized by (CST): Fabi Jay MD on 6/24/

## 2020-06-25 ENCOUNTER — APPOINTMENT (OUTPATIENT)
Dept: GENERAL RADIOLOGY | Facility: HOSPITAL | Age: 80
DRG: 377 | End: 2020-06-25
Attending: INTERNAL MEDICINE
Payer: MEDICARE

## 2020-06-25 LAB
ALBUMIN SERPL-MCNC: 2.8 G/DL (ref 3.4–5)
ANION GAP SERPL CALC-SCNC: 6 MMOL/L (ref 0–18)
BASOPHILS # BLD AUTO: 0.03 X10(3) UL (ref 0–0.2)
BASOPHILS NFR BLD AUTO: 0.4 %
BUN BLD-MCNC: 55 MG/DL (ref 7–18)
BUN/CREAT SERPL: 19 (ref 10–20)
CALCIUM BLD-MCNC: 8.4 MG/DL (ref 8.5–10.1)
CHLORIDE SERPL-SCNC: 110 MMOL/L (ref 98–112)
CO2 SERPL-SCNC: 25 MMOL/L (ref 21–32)
CREAT BLD-MCNC: 2.89 MG/DL (ref 0.7–1.3)
DEPRECATED RDW RBC AUTO: 46.6 FL (ref 35.1–46.3)
EOSINOPHIL # BLD AUTO: 0.13 X10(3) UL (ref 0–0.7)
EOSINOPHIL NFR BLD AUTO: 1.8 %
ERYTHROCYTE [DISTWIDTH] IN BLOOD BY AUTOMATED COUNT: 13.7 % (ref 11–15)
GLUCOSE BLD-MCNC: 100 MG/DL (ref 70–99)
GLUCOSE BLDC GLUCOMTR-MCNC: 198 MG/DL (ref 70–99)
GLUCOSE BLDC GLUCOMTR-MCNC: 93 MG/DL (ref 70–99)
GLUCOSE BLDC GLUCOMTR-MCNC: 94 MG/DL (ref 70–99)
GLUCOSE BLDC GLUCOMTR-MCNC: 94 MG/DL (ref 70–99)
HCT VFR BLD AUTO: 26.3 % (ref 39–53)
HGB BLD-MCNC: 8.1 G/DL (ref 13–17.5)
IMM GRANULOCYTES # BLD AUTO: 0.03 X10(3) UL (ref 0–1)
IMM GRANULOCYTES NFR BLD: 0.4 %
INR BLD: 1.54 (ref 0.9–1.2)
LYMPHOCYTES # BLD AUTO: 0.6 X10(3) UL (ref 1–4)
LYMPHOCYTES NFR BLD AUTO: 8.5 %
MCH RBC QN AUTO: 28.5 PG (ref 26–34)
MCHC RBC AUTO-ENTMCNC: 30.8 G/DL (ref 31–37)
MCV RBC AUTO: 92.6 FL (ref 80–100)
MONOCYTES # BLD AUTO: 0.9 X10(3) UL (ref 0.1–1)
MONOCYTES NFR BLD AUTO: 12.8 %
NEUTROPHILS # BLD AUTO: 5.34 X10 (3) UL (ref 1.5–7.7)
NEUTROPHILS # BLD AUTO: 5.34 X10(3) UL (ref 1.5–7.7)
NEUTROPHILS NFR BLD AUTO: 76.1 %
OSMOLALITY SERPL CALC.SUM OF ELEC: 307 MOSM/KG (ref 275–295)
PHOSPHATE SERPL-MCNC: 2.9 MG/DL (ref 2.5–4.9)
PLATELET # BLD AUTO: 150 10(3)UL (ref 150–450)
POTASSIUM SERPL-SCNC: 4.1 MMOL/L (ref 3.5–5.1)
PROTHROMBIN TIME: 18.2 SECONDS (ref 11.8–14.5)
RBC # BLD AUTO: 2.84 X10(6)UL (ref 3.8–5.8)
SODIUM SERPL-SCNC: 141 MMOL/L (ref 136–145)
WBC # BLD AUTO: 7 X10(3) UL (ref 4–11)

## 2020-06-25 PROCEDURE — 99232 SBSQ HOSP IP/OBS MODERATE 35: CPT | Performed by: INTERNAL MEDICINE

## 2020-06-25 PROCEDURE — 74250 X-RAY XM SM INT 1CNTRST STD: CPT | Performed by: INTERNAL MEDICINE

## 2020-06-25 PROCEDURE — 99233 SBSQ HOSP IP/OBS HIGH 50: CPT | Performed by: INTERNAL MEDICINE

## 2020-06-25 RX ORDER — BUMETANIDE 1 MG/1
1 TABLET ORAL DAILY
Status: DISCONTINUED | OUTPATIENT
Start: 2020-06-26 | End: 2020-06-26

## 2020-06-25 RX ORDER — BUMETANIDE 1 MG/1
0.5 TABLET ORAL DAILY
Status: DISCONTINUED | OUTPATIENT
Start: 2020-06-26 | End: 2020-06-25

## 2020-06-25 RX ORDER — BUMETANIDE 0.25 MG/ML
1 INJECTION, SOLUTION INTRAMUSCULAR; INTRAVENOUS ONCE
Status: COMPLETED | OUTPATIENT
Start: 2020-06-25 | End: 2020-06-25

## 2020-06-25 RX ORDER — SIMETHICONE 80 MG
80 TABLET,CHEWABLE ORAL
Status: DISCONTINUED | OUTPATIENT
Start: 2020-06-25 | End: 2020-06-29

## 2020-06-25 NOTE — PLAN OF CARE
Problem: Diabetes/Glucose Control  Goal: Glucose maintained within prescribed range  Description  INTERVENTIONS:  - Monitor Blood Glucose as ordered  - Assess for signs and symptoms of hyperglycemia and hypoglycemia  - Administer ordered medications to m scale  - Administer analgesics based on type and severity of pain and evaluate response  - Implement non-pharmacological measures as appropriate and evaluate response  - Consider cultural and social influences on pain and pain management  - Manage/alleviat RESPIRATORY - ADULT  Goal: Achieves optimal ventilation and oxygenation  Description  INTERVENTIONS:  - Assess for changes in respiratory status  - Assess for changes in mentation and behavior  - Position to facilitate oxygenation and minimize respiratory

## 2020-06-25 NOTE — PLAN OF CARE
Problem: Diabetes/Glucose Control  Goal: Glucose maintained within prescribed range  Description  INTERVENTIONS:  - Monitor Blood Glucose as ordered  - Assess for signs and symptoms of hyperglycemia and hypoglycemia  - Administer ordered medications to m and request assistance  - Assess pain using appropriate pain scale  - Administer analgesics based on type and severity of pain and evaluate response  - Implement non-pharmacological measures as appropriate and evaluate response  - Consider cultural and soc and appropriate  Outcome: Progressing     Problem: RESPIRATORY - ADULT  Goal: Achieves optimal ventilation and oxygenation  Description  INTERVENTIONS:  - Assess for changes in respiratory status  - Assess for changes in mentation and behavior  - Position

## 2020-06-25 NOTE — PROGRESS NOTES
[facility]    Progress Note    Nicole Toribio Patient Status:  Inpatient    3/28/1940 MRN [id number]   [facility] 5SW/SE Attending David Anguiano MD   Hosp Day # 4 PCP Brandie Guevara MD       SUBJECTIVE:  Rep 3.7 3.5 3.7 4.1    105   < > 107 109  --  110   CO2 28.0 30.0   < > 26.0 25.0  --  25.0   ALKPHO 76 77  --  60  --   --   --    AST 19 28  --  24  --   --   --    ALT 24 25  --  23  --   --   --    BILT 0.5 0.4  --  0.6  --   --   --    TP 7.2 7.6  - units PRBC's on 6/22; Hgb 8.1 yesterday and today.  Cont to monitor closely  -warfarin held for INR 3.74 on admission -- INR 1.20  To 1.5 today  -on pantoprazole 40 mg IV [id number]  -pt seen in consultation with gastroenterologist, Dr Ashley Melendez; EGD on 6/23 wa pulmonologist, Dr Frank Hicks; PFTs not consistent with COPD; he never took Combivent 1 p QID; Flovent stopped; on no meds     Chronic atrial fibrillation  On Coreg 9.375 mg po bid.  aspirin 81 mg daily; on coumadin 7.5 mg po qHS except 5 mg po qHS on Mon, W in 2015; had Prevnar on 11/13/17;  Pneumovax 23 given in May 2019  [address] [place], [place]  6/25/2020  2:36 PM

## 2020-06-25 NOTE — PROGRESS NOTES
[facility]    Progress Note    Oly Ruby Patient Status:  Inpatient    3/28/1940 MRN [id number]   [facility] 5SW/SE Attending Oneida Rubinstein, MD   Hosp Day # 4 PCP Dimas Flores MD       Subjective:   [facility] ROM all extremities good strength  no deformities  Extremities: 1+ edema  Neurological:  Grossly normal    Results:     Laboratory Data:  Lab Results   Component Value Date    WBC 7.0 06/25/2020    HGB 8.1 (L) 06/25/2020    HCT 26.3 (L) 06/25/2020    PLT 1 start him on 1 mg p.o. Bumex daily  #4 anticoagulation hold Coumadin still INR 1.54  #5 low blood pressure now in the low 100s resume blood pressure meds which help his heart  #6 cardiomyopathy  Continue present plan           6/25/2020  Wan Abel,

## 2020-06-25 NOTE — PROGRESS NOTES
[facility] HOSP - [facility]    Cardiology Progress Note    Shirley Sky Patient Status:  Inpatient    3/28/1940 MRN [id number]   [facility] 5SW/SE Attending Mg Tan MD   Hosp Day # 4 PCP Umer Andrea MD     Primary C yesterday  3. ICM, EF 25%  4. BIV ICD   5. CKD      Plan:  1. Stable CV status. Patient is at Intermediate risk to proceed with GI testing. No further testing is recommended   2. REsume diuretics today. LE edema present.    3. Continue current CV meds: stat

## 2020-06-25 NOTE — PROGRESS NOTES
[address] NOTE    Tonya Double Patient Status:  Inpatient    3/28/1940 MRN [id number]   [facility] 5SW/SE Attending Bello Hughes MD   Hosp Day # 4 PCP MD Kylee Napier --  141   K 4.0 3.7   < > 3.7 3.5 3.7 4.1    105   < > 107 109  --  110   CO2 28.0 30.0   < > 26.0 25.0  --  25.0   ALKPHO 76 77  --  60  --   --   --    AST 19 28  --  24  --   --   --    ALT 24 25  --  23  --   --   --    BILT 0.5 0.4  --  0.6  -- passive hepatic congestion. Initial plan was to avoid invasive testing and begin with CT imaging of the abdomen and pelvis first.     Taking daily aspirin 81 mg in addition to the Coumadin.     Mr. Burk presented to the ED today 12:30 PM complaini

## 2020-06-26 ENCOUNTER — ANESTHESIA EVENT (OUTPATIENT)
Dept: ENDOSCOPY | Facility: HOSPITAL | Age: 80
DRG: 377 | End: 2020-06-26
Payer: MEDICARE

## 2020-06-26 ENCOUNTER — ANESTHESIA (OUTPATIENT)
Dept: ENDOSCOPY | Facility: HOSPITAL | Age: 80
DRG: 377 | End: 2020-06-26
Payer: MEDICARE

## 2020-06-26 LAB
ANION GAP SERPL CALC-SCNC: 6 MMOL/L (ref 0–18)
BASOPHILS # BLD AUTO: 0.03 X10(3) UL (ref 0–0.2)
BASOPHILS NFR BLD AUTO: 0.5 %
BUN BLD-MCNC: 44 MG/DL (ref 7–18)
BUN/CREAT SERPL: 16.4 (ref 10–20)
CALCIUM BLD-MCNC: 8.4 MG/DL (ref 8.5–10.1)
CHLORIDE SERPL-SCNC: 107 MMOL/L (ref 98–112)
CO2 SERPL-SCNC: 28 MMOL/L (ref 21–32)
CREAT BLD-MCNC: 2.68 MG/DL (ref 0.7–1.3)
DEPRECATED RDW RBC AUTO: 46.7 FL (ref 35.1–46.3)
EOSINOPHIL # BLD AUTO: 0.16 X10(3) UL (ref 0–0.7)
EOSINOPHIL NFR BLD AUTO: 2.8 %
ERYTHROCYTE [DISTWIDTH] IN BLOOD BY AUTOMATED COUNT: 14 % (ref 11–15)
GLUCOSE BLD-MCNC: 120 MG/DL (ref 70–99)
GLUCOSE BLDC GLUCOMTR-MCNC: 111 MG/DL (ref 70–99)
GLUCOSE BLDC GLUCOMTR-MCNC: 131 MG/DL (ref 70–99)
GLUCOSE BLDC GLUCOMTR-MCNC: 184 MG/DL (ref 70–99)
GLUCOSE BLDC GLUCOMTR-MCNC: 277 MG/DL (ref 70–99)
GLUCOSE BLDC GLUCOMTR-MCNC: 292 MG/DL (ref 70–99)
HAV IGM SER QL: 2.2 MG/DL (ref 1.6–2.6)
HCT VFR BLD AUTO: 29.3 % (ref 39–53)
HGB BLD-MCNC: 9.1 G/DL (ref 13–17.5)
IMM GRANULOCYTES # BLD AUTO: 0.02 X10(3) UL (ref 0–1)
IMM GRANULOCYTES NFR BLD: 0.3 %
INR BLD: 2.09 (ref 0.9–1.2)
LYMPHOCYTES # BLD AUTO: 0.68 X10(3) UL (ref 1–4)
LYMPHOCYTES NFR BLD AUTO: 11.8 %
MCH RBC QN AUTO: 28.6 PG (ref 26–34)
MCHC RBC AUTO-ENTMCNC: 31.1 G/DL (ref 31–37)
MCV RBC AUTO: 92.1 FL (ref 80–100)
MONOCYTES # BLD AUTO: 0.82 X10(3) UL (ref 0.1–1)
MONOCYTES NFR BLD AUTO: 14.3 %
NEUTROPHILS # BLD AUTO: 4.04 X10 (3) UL (ref 1.5–7.7)
NEUTROPHILS # BLD AUTO: 4.04 X10(3) UL (ref 1.5–7.7)
NEUTROPHILS NFR BLD AUTO: 70.3 %
OSMOLALITY SERPL CALC.SUM OF ELEC: 304 MOSM/KG (ref 275–295)
PLATELET # BLD AUTO: 182 10(3)UL (ref 150–450)
POTASSIUM SERPL-SCNC: 3.7 MMOL/L (ref 3.5–5.1)
PROTHROMBIN TIME: 23.1 SECONDS (ref 11.8–14.5)
RBC # BLD AUTO: 3.18 X10(6)UL (ref 3.8–5.8)
SARS-COV-2 RNA RESP QL NAA+PROBE: NOT DETECTED
SODIUM SERPL-SCNC: 141 MMOL/L (ref 136–145)
WBC # BLD AUTO: 5.8 X10(3) UL (ref 4–11)

## 2020-06-26 PROCEDURE — 99233 SBSQ HOSP IP/OBS HIGH 50: CPT | Performed by: INTERNAL MEDICINE

## 2020-06-26 PROCEDURE — 99232 SBSQ HOSP IP/OBS MODERATE 35: CPT | Performed by: INTERNAL MEDICINE

## 2020-06-26 PROCEDURE — 99232 SBSQ HOSP IP/OBS MODERATE 35: CPT | Performed by: NURSE PRACTITIONER

## 2020-06-26 PROCEDURE — 45385 COLONOSCOPY W/LESION REMOVAL: CPT | Performed by: INTERNAL MEDICINE

## 2020-06-26 PROCEDURE — 0DBK8ZZ EXCISION OF ASCENDING COLON, VIA NATURAL OR ARTIFICIAL OPENING ENDOSCOPIC: ICD-10-PCS | Performed by: INTERNAL MEDICINE

## 2020-06-26 RX ORDER — PHENYLEPHRINE HCL 10 MG/ML
VIAL (ML) INJECTION AS NEEDED
Status: DISCONTINUED | OUTPATIENT
Start: 2020-06-26 | End: 2020-06-26 | Stop reason: SURG

## 2020-06-26 RX ORDER — BUMETANIDE 0.25 MG/ML
0.5 INJECTION, SOLUTION INTRAMUSCULAR; INTRAVENOUS
Status: DISCONTINUED | OUTPATIENT
Start: 2020-06-26 | End: 2020-06-27

## 2020-06-26 RX ORDER — SODIUM CHLORIDE 9 MG/ML
INJECTION, SOLUTION INTRAVENOUS CONTINUOUS PRN
Status: DISCONTINUED | OUTPATIENT
Start: 2020-06-26 | End: 2020-06-26 | Stop reason: SURG

## 2020-06-26 RX ORDER — POTASSIUM CHLORIDE 14.9 MG/ML
20 INJECTION INTRAVENOUS ONCE
Status: DISCONTINUED | OUTPATIENT
Start: 2020-06-26 | End: 2020-06-26

## 2020-06-26 RX ORDER — LIDOCAINE HYDROCHLORIDE 10 MG/ML
INJECTION, SOLUTION EPIDURAL; INFILTRATION; INTRACAUDAL; PERINEURAL AS NEEDED
Status: DISCONTINUED | OUTPATIENT
Start: 2020-06-26 | End: 2020-06-26 | Stop reason: SURG

## 2020-06-26 RX ORDER — POTASSIUM CHLORIDE 1.5 G/1.77G
20 POWDER, FOR SOLUTION ORAL ONCE
Status: COMPLETED | OUTPATIENT
Start: 2020-06-26 | End: 2020-06-26

## 2020-06-26 RX ADMIN — PHENYLEPHRINE HCL 100 MCG: 10 MG/ML VIAL (ML) INJECTION at 15:00:00

## 2020-06-26 RX ADMIN — PHENYLEPHRINE HCL 100 MCG: 10 MG/ML VIAL (ML) INJECTION at 15:16:00

## 2020-06-26 RX ADMIN — PHENYLEPHRINE HCL 100 MCG: 10 MG/ML VIAL (ML) INJECTION at 15:13:00

## 2020-06-26 RX ADMIN — LIDOCAINE HYDROCHLORIDE 50 MG: 10 INJECTION, SOLUTION EPIDURAL; INFILTRATION; INTRACAUDAL; PERINEURAL at 14:36:00

## 2020-06-26 RX ADMIN — PHENYLEPHRINE HCL 100 MCG: 10 MG/ML VIAL (ML) INJECTION at 14:53:00

## 2020-06-26 RX ADMIN — SODIUM CHLORIDE: 9 INJECTION, SOLUTION INTRAVENOUS at 14:24:00

## 2020-06-26 RX ADMIN — PHENYLEPHRINE HCL 100 MCG: 10 MG/ML VIAL (ML) INJECTION at 15:27:00

## 2020-06-26 NOTE — ANESTHESIA PREPROCEDURE EVALUATION
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 TAKE 0.5 TABLET THREE TIMES DAILY.  (Patient taking differently: Take 10 mg by mouth 3 (three) times daily.  ), Disp: 270 tablet, Rfl: 3, Taking  Blood Glucose Monitoring Suppl (ACCU-CHEK BROOKE) Does not apply Device, Check BS o mcg, 125 mcg, Oral, Once per day on Wed Fri, [place], Jose Manuel Richards MD, 125 mcg at 06/26/20 9680  hydrALAzine HCl (APRESOLINE) tab 10 mg, 10 mg, Oral, TID, Alexey Pate MD, 10 mg at 06/26/20 0813  multivitamin (ADULT) tab 1 tablet, 1 tablet, Oral, Daily, H Onset   • Hypertension Father    • Diabetes Mother    • Glaucoma Neg    • Macular degeneration Neg      Social History    Socioeconomic History      Marital status:       Spouse name: Not on file      Number of children: Not on file      Years of ed Asked        Weight Concern: Not Asked        Special Diet: Not Asked        Back Care: Not Asked        Exercise: No        Bike Helmet: Not Asked        Seat Belt: Not Asked        Self-Exams: Not Asked    Social History Narrative      The patient does n estimated ejection fraction was 25-30%. Severe hypokinesis.     BiV pacemaker/ICD    Neuro/Psych      GI/Hepatic/Renal    (+) chronic renal disease CRI,     Endo/Other    (+) diabetes mellitus,   Abdominal      Other findings: Edentulous        Anesthesia P

## 2020-06-26 NOTE — PLAN OF CARE
Problem: Diabetes/Glucose Control  Goal: Glucose maintained within prescribed range  Description  INTERVENTIONS:  - Monitor Blood Glucose as ordered  - Assess for signs and symptoms of hyperglycemia and hypoglycemia  - Administer ordered medications to m goal  Description  INTERVENTIONS:  - Encourage pt to monitor pain and request assistance  - Assess pain using appropriate pain scale  - Administer analgesics based on type and severity of pain and evaluate response  - Implement non-pharmacological measures appropriate  - Administer supportive blood products/factors as ordered and appropriate  Outcome: Progressing     Problem: RESPIRATORY - ADULT  Goal: Achieves optimal ventilation and oxygenation  Description  INTERVENTIONS:  - Assess for changes in respirat

## 2020-06-26 NOTE — OCCUPATIONAL THERAPY NOTE
OCCUPATIONAL THERAPY TREATMENT NOTE - INPATIENT        Room Number: 566/566-A    Presenting Problem: (gastrointestinal hemorrhage)    Problem List  Principal Problem:    Gastrointestinal hemorrhage, unspecified gastrointestinal hemorrhage type  Active Prob eval/education    SUBJECTIVE  I feel bloated because of that drink I had to finish     OBJECTIVE  Precautions: Cardiac;Hard of hearing    WEIGHT BEARING RESTRICTION  Weight Bearing Restriction: None    PAIN ASSESSMENT  Ratin    ACTIVITY TOLERANCE    AC

## 2020-06-26 NOTE — OPERATIVE REPORT
COLONOSCOPY PROCEDURE REPORT     DATE OF PROCEDURE:  6/26/2020     PCP: Kate Avilez MD     PREOPERATIVE DIAGNOSIS: Anemia, abdominal pain, occult blood in the stool     POSTOPERATIVE DIAGNOSIS:  See impression. SURGEON:  JESSE Kay year.  · Advance diet  · No aspirin or NSAID medications for next 10 days to prevent bleeding.   · Elective \"Given\" Video Capsule Enteroscopy (VCE examination)

## 2020-06-26 NOTE — PROGRESS NOTES
[facility] HOSP - [facility]    Cardiology Progress Note    Dre Signs Patient Status:  Inpatient    3/28/1940 MRN [id number]   [facility] 5SW/SE Attending Thelma De La Garza MD   Hosp Day # 5 PCP Johny Moulton MD     Primary C Normocytic Anemia s/p 2 U PRBC yesterday  3. ICM, EF 25%  4. BIV ICD   5. CKD      Plan:  1. Stable CV status. Patient is at Intermediate risk to proceed with GI testing. No further testing is recommended   2.  IV bumex AFTER Cscope today - pt is compensate

## 2020-06-26 NOTE — PROGRESS NOTES
[facility]    Progress Note    Taj Velasquez Patient Status:  Inpatient    3/28/1940 MRN [id number]   Location [facility] ENDOSCOPY LAB SUITES Attending Maureen Bergeron MD   Hosp Day # 5 PCP Meggan Whittington MD oil     Acute on CKD  Creatinine 3.53 on admission; creatinine was 2.75 in May 2020.  Creat improved 3.53>3.16>3.07>2.70>2.8;   Followed by Huyen Casey  -resumed on bumex 1 mg po qD     CAD with ischemic cardiomyopathy  S/p 3v CABG in 1995, s/p ksenia degeneration  Early and mild; saw ophtho Dr Shahnaz Connor 2019; annual visit     Cataract   saw ophtho Dr Lance Mclain in Oct 2019     Secondary hyperparathyroidism   in April 2016; repeat PTH is 107 in April 2019 (was 72 in Oct 2016); on Vitamin D 20 normoactive bowel sounds, soft, non-tender and non-distended  Extremities: no clubbing, cyanosis or edema        Meds:   bumetanide (BUMEX) 0.25 MG/ML injection 0.5 mg, 0.5 mg, Intravenous, BID (Diuretic)  simethicone (MYLICON) chewable tab 80 mg, 80 mg, O Component Value Date     06/26/2020     06/26/2020    K 3.7 06/26/2020     06/26/2020    CO2 28.0 06/26/2020    BUN 44 06/26/2020    CREATSERUM 2.68 06/26/2020    CA 8.4 06/26/2020    INR 2.09 06/26/2020    MG 2.2 06/26/2020       Rece

## 2020-06-27 PROBLEM — I47.2 NSVT (NONSUSTAINED VENTRICULAR TACHYCARDIA) (HCC): Status: ACTIVE | Noted: 2020-06-27

## 2020-06-27 PROBLEM — I50.23 ACUTE ON CHRONIC SYSTOLIC CHF (CONGESTIVE HEART FAILURE) (HCC): Status: ACTIVE | Noted: 2020-06-27

## 2020-06-27 PROBLEM — I47.29 NSVT (NONSUSTAINED VENTRICULAR TACHYCARDIA) (HCC): Status: ACTIVE | Noted: 2020-06-27

## 2020-06-27 LAB
ALBUMIN SERPL-MCNC: 2.8 G/DL (ref 3.4–5)
ANION GAP SERPL CALC-SCNC: 8 MMOL/L (ref 0–18)
BASOPHILS # BLD AUTO: 0.01 X10(3) UL (ref 0–0.2)
BASOPHILS NFR BLD AUTO: 0.2 %
BUN BLD-MCNC: 42 MG/DL (ref 7–18)
BUN/CREAT SERPL: 15.4 (ref 10–20)
CALCIUM BLD-MCNC: 8.2 MG/DL (ref 8.5–10.1)
CHLORIDE SERPL-SCNC: 105 MMOL/L (ref 98–112)
CO2 SERPL-SCNC: 26 MMOL/L (ref 21–32)
CREAT BLD-MCNC: 2.72 MG/DL (ref 0.7–1.3)
DEPRECATED RDW RBC AUTO: 46.5 FL (ref 35.1–46.3)
EOSINOPHIL # BLD AUTO: 0.12 X10(3) UL (ref 0–0.7)
EOSINOPHIL NFR BLD AUTO: 2.5 %
ERYTHROCYTE [DISTWIDTH] IN BLOOD BY AUTOMATED COUNT: 14.2 % (ref 11–15)
GLUCOSE BLD-MCNC: 111 MG/DL (ref 70–99)
GLUCOSE BLDC GLUCOMTR-MCNC: 124 MG/DL (ref 70–99)
GLUCOSE BLDC GLUCOMTR-MCNC: 130 MG/DL (ref 70–99)
GLUCOSE BLDC GLUCOMTR-MCNC: 231 MG/DL (ref 70–99)
GLUCOSE BLDC GLUCOMTR-MCNC: 246 MG/DL (ref 70–99)
HCT VFR BLD AUTO: 25.7 % (ref 39–53)
HGB BLD-MCNC: 8 G/DL (ref 13–17.5)
IMM GRANULOCYTES # BLD AUTO: 0.01 X10(3) UL (ref 0–1)
IMM GRANULOCYTES NFR BLD: 0.2 %
INR BLD: 1.41 (ref 0.9–1.2)
LYMPHOCYTES # BLD AUTO: 0.65 X10(3) UL (ref 1–4)
LYMPHOCYTES NFR BLD AUTO: 13.4 %
MCH RBC QN AUTO: 28.7 PG (ref 26–34)
MCHC RBC AUTO-ENTMCNC: 31.1 G/DL (ref 31–37)
MCV RBC AUTO: 92.1 FL (ref 80–100)
MONOCYTES # BLD AUTO: 0.53 X10(3) UL (ref 0.1–1)
MONOCYTES NFR BLD AUTO: 10.9 %
NEUTROPHILS # BLD AUTO: 3.53 X10 (3) UL (ref 1.5–7.7)
NEUTROPHILS # BLD AUTO: 3.53 X10(3) UL (ref 1.5–7.7)
NEUTROPHILS NFR BLD AUTO: 72.8 %
OSMOLALITY SERPL CALC.SUM OF ELEC: 299 MOSM/KG (ref 275–295)
PHOSPHATE SERPL-MCNC: 2.6 MG/DL (ref 2.5–4.9)
PLATELET # BLD AUTO: 148 10(3)UL (ref 150–450)
POTASSIUM SERPL-SCNC: 3.6 MMOL/L (ref 3.5–5.1)
PROTHROMBIN TIME: 17 SECONDS (ref 11.8–14.5)
RBC # BLD AUTO: 2.79 X10(6)UL (ref 3.8–5.8)
SODIUM SERPL-SCNC: 139 MMOL/L (ref 136–145)
WBC # BLD AUTO: 4.9 X10(3) UL (ref 4–11)

## 2020-06-27 PROCEDURE — 99232 SBSQ HOSP IP/OBS MODERATE 35: CPT | Performed by: NURSE PRACTITIONER

## 2020-06-27 PROCEDURE — 99232 SBSQ HOSP IP/OBS MODERATE 35: CPT | Performed by: INTERNAL MEDICINE

## 2020-06-27 RX ORDER — WARFARIN SODIUM 5 MG/1
5 TABLET ORAL NIGHTLY
Status: DISCONTINUED | OUTPATIENT
Start: 2020-06-27 | End: 2020-06-29

## 2020-06-27 RX ORDER — BUMETANIDE 1 MG/1
1 TABLET ORAL
Status: DISCONTINUED | OUTPATIENT
Start: 2020-06-27 | End: 2020-06-29

## 2020-06-27 RX ORDER — METOLAZONE 2.5 MG/1
2.5 TABLET ORAL ONCE
Status: COMPLETED | OUTPATIENT
Start: 2020-06-28 | End: 2020-06-28

## 2020-06-27 RX ORDER — BUMETANIDE 1 MG/1
1 TABLET ORAL DAILY
Status: DISCONTINUED | OUTPATIENT
Start: 2020-06-27 | End: 2020-06-27

## 2020-06-27 RX ORDER — POTASSIUM CHLORIDE 20 MEQ/1
40 TABLET, EXTENDED RELEASE ORAL ONCE
Status: COMPLETED | OUTPATIENT
Start: 2020-06-27 | End: 2020-06-27

## 2020-06-27 RX ORDER — METOLAZONE 2.5 MG/1
2.5 TABLET ORAL ONCE
Status: COMPLETED | OUTPATIENT
Start: 2020-06-27 | End: 2020-06-27

## 2020-06-27 NOTE — PROGRESS NOTES
[facility]    Progress Note    Ha Juarez Patient Status:  Inpatient    3/28/1940 MRN [id number]   [facility] 5SW/SE Attending Ole Orosco MD   Hosp Day # 6 PCP Linette Aragon MD         Assessment and in Aug 2019 with LVEF 25-30%, hypokinesis of inferior area, mod MR;  LVEF closer to 20% on CATH, with chronic CAD on 11/2/17; unable to afford Entresto 24/26 one tab po BID, so med was changed to lisinopril 5 mg po qD, though creatinine increased to 1.9, s declined Victoza Rx, or insulin  - Levemir 15 Un SQ qD, SSI, -290     Hypercholesterolemia  LDL 27 in Feb 2020 on Lipitor 40 mg po qHS     Macular degeneration  Early and mild; saw ophthmariah Dunlap Pap 2019; annual visit     Cataract   saw Aurelia Salcedo without erythema  Neck/Thyroid: neck supple; no thyromegaly  Cardiovascular: RRR, S1, S2, no S3 or murmur  Respiratory: lungs without crackles or wheezes  Abdomen: normoactive bowel sounds, soft, non-tender and non-distended  Extremities: no clubbing, cyan Units, Subcutaneous, TID CC and HS             Data Review:     Labs:   Lab Results   Component Value Date     06/27/2020     06/27/2020    K 3.6 06/27/2020     06/27/2020    CO2 26.0 06/27/2020    BUN 42 06/27/2020    CREATSERUM 2.72 06

## 2020-06-27 NOTE — PLAN OF CARE
Problem: Diabetes/Glucose Control  Goal: Glucose maintained within prescribed range  Description  INTERVENTIONS:  - Monitor Blood Glucose as ordered  - Assess for signs and symptoms of hyperglycemia and hypoglycemia  - Administer ordered medications to m goal  Description  INTERVENTIONS:  - Encourage pt to monitor pain and request assistance  - Assess pain using appropriate pain scale  - Administer analgesics based on type and severity of pain and evaluate response  - Implement non-pharmacological measures appropriate  - Administer supportive blood products/factors as ordered and appropriate  Outcome: Progressing     Problem: RESPIRATORY - ADULT  Goal: Achieves optimal ventilation and oxygenation  Description  INTERVENTIONS:  - Assess for changes in respirat and integrated in the patient's plan of care  Description  Interventions:  - What would you like us to know as we care for you? \"I  speak [place], [facility], [address] and Faro[place]\".    - Provide timely, complete, and accurate information to patient/family and nutrition restrictions as appropriate  Outcome: Progressing     Problem: SKIN/TISSUE INTEGRITY - ADULT  Goal: Skin integrity remains intact  Description  INTERVENTIONS  - Assess and document risk factors for pressure ulcer development  - Assess and doc RN  Outcome: Progressing  6/27/2020 1226 by Pavan Bruno RN  Outcome: Progressing     Problem: Patient/Family Goals  Goal: Patient/Family Long Term Goal  Description  Patient's Long Term Goal: to return home     Interventions:  -Monitor labs Progressing  6/27/2020 1226 by Giana Martinez, RN  Outcome: Progressing     Problem: HEMATOLOGIC - ADULT  Goal: Maintains hematologic stability  Description  INTERVENTIONS  - Assess for signs and symptoms of bleeding or hemorrhage  - Monitor lab

## 2020-06-27 NOTE — PHYSICAL THERAPY NOTE
Pt cleared for session by RN. Pt greeted sitting in room with family member present. Pt refusing PT at this time due to swelling in B ankles resulting in pain with mobility up to 5/10. Continues to refuse despite education. RN aware.  Will re-attempt tomorr

## 2020-06-27 NOTE — PLAN OF CARE
Dr. Ciara Parrish called-informed patient had a 9 beat run of V tach at 0350 this morning-patient was asymptomatic, sitting up in chair, vital signs stable.   Dr. Ciara Parrish also informed patient's weight has been trending upward--today: 179.6 pounds, 6/26: 176.2,

## 2020-06-27 NOTE — PROGRESS NOTES
[address]     Gastroenterology Progress Note    Jennifer Fitting Patient Status:  Inpatient    3/28/1940 MRN [id number]   [facility] 5SW/SE Attending Thad Aguilar MD   [facility] Day # 6 PCP Monty Addison 06/26/20 1610 93/45 — — 70 20 97 % —   06/26/20 1559 98/48 — — 79 20 94 % —   06/26/20 1557 95/43 — — 74 22 94 % —   06/26/20 1550 (!) 73/41 97.1 °F (36.2 °C) — 76 11 96 % —     Body mass index is 28.99 kg/m².     General: Patient appears comfortable and Date    PT 41.6 (H) 04/18/2016    INR 1.41 (H) 06/27/2020    INR 2.09 (H) 06/26/2020    INR 1.54 (H) 06/25/2020       Xr Small Bowel Single Contrast (cpt=74250)    Result Date: 6/25/2020  CONCLUSION: Normal examination. Dictated by (CST):  Sarah Gomez

## 2020-06-27 NOTE — PROGRESS NOTES
[facility]    Progress Note    Amanda Ruth Patient Status:  Inpatient    3/28/1940 MRN [id number]   [facility] 5SW/SE Attending Kunal Garner MD   Hosp Day # 6 PCP Daljit Benites MD       Subjective:   [facility] all extremities good strength  no deformities  Extremities: 1+EDEMA  Neurological:  Grossly normal    Results:     Laboratory Data:  Lab Results   Component Value Date    WBC 5.8 06/26/2020    HGB 9.1 (L) 06/26/2020    HCT 29.3 (L) 06/26/2020    .0

## 2020-06-27 NOTE — PROGRESS NOTES
[facility]    Progress Note    Derrick Rice Patient Status:  Inpatient    3/28/1940 MRN [id number]   [facility] 5SW/SE Attending Karyn Jauregui MD   Hosp Day # 6 PCP Elizabeth Kirby MD        Subjective: 06/23/2020    BILT 0.6 06/23/2020    TP 5.8 (L) 06/23/2020    AST 24 06/23/2020    ALT 23 06/23/2020    PTT 36.6 (H) 11/04/2017    INR 2.09 (H) 06/26/2020    PT 41.6 (H) 04/18/2016    TSH 1.960 02/21/2020    PSA 3.4 12/12/2018    MG 2.2 06/26/2020    PHOS

## 2020-06-28 ENCOUNTER — TELEPHONE (OUTPATIENT)
Dept: GASTROENTEROLOGY | Facility: CLINIC | Age: 80
End: 2020-06-28

## 2020-06-28 DIAGNOSIS — D50.0 CHRONIC BLOOD LOSS ANEMIA: ICD-10-CM

## 2020-06-28 DIAGNOSIS — R19.5 OCCULT BLOOD IN STOOLS: Primary | ICD-10-CM

## 2020-06-28 LAB
ANION GAP SERPL CALC-SCNC: 7 MMOL/L (ref 0–18)
BASOPHILS # BLD AUTO: 0.03 X10(3) UL (ref 0–0.2)
BASOPHILS NFR BLD AUTO: 0.6 %
BUN BLD-MCNC: 46 MG/DL (ref 7–18)
BUN/CREAT SERPL: 17.1 (ref 10–20)
CALCIUM BLD-MCNC: 8.1 MG/DL (ref 8.5–10.1)
CHLORIDE SERPL-SCNC: 105 MMOL/L (ref 98–112)
CO2 SERPL-SCNC: 29 MMOL/L (ref 21–32)
CREAT BLD-MCNC: 2.69 MG/DL (ref 0.7–1.3)
DEPRECATED RDW RBC AUTO: 45.2 FL (ref 35.1–46.3)
EOSINOPHIL # BLD AUTO: 0.13 X10(3) UL (ref 0–0.7)
EOSINOPHIL NFR BLD AUTO: 2.6 %
ERYTHROCYTE [DISTWIDTH] IN BLOOD BY AUTOMATED COUNT: 14.3 % (ref 11–15)
GLUCOSE BLD-MCNC: 74 MG/DL (ref 70–99)
GLUCOSE BLDC GLUCOMTR-MCNC: 126 MG/DL (ref 70–99)
GLUCOSE BLDC GLUCOMTR-MCNC: 180 MG/DL (ref 70–99)
GLUCOSE BLDC GLUCOMTR-MCNC: 210 MG/DL (ref 70–99)
GLUCOSE BLDC GLUCOMTR-MCNC: 92 MG/DL (ref 70–99)
HAV IGM SER QL: 1.9 MG/DL (ref 1.6–2.6)
HCT VFR BLD AUTO: 25.7 % (ref 39–53)
HGB BLD-MCNC: 8 G/DL (ref 13–17.5)
IMM GRANULOCYTES # BLD AUTO: 0.01 X10(3) UL (ref 0–1)
IMM GRANULOCYTES NFR BLD: 0.2 %
INR BLD: 1.36 (ref 0.9–1.2)
LYMPHOCYTES # BLD AUTO: 0.62 X10(3) UL (ref 1–4)
LYMPHOCYTES NFR BLD AUTO: 12.5 %
MCH RBC QN AUTO: 28.7 PG (ref 26–34)
MCHC RBC AUTO-ENTMCNC: 31.1 G/DL (ref 31–37)
MCV RBC AUTO: 92.1 FL (ref 80–100)
MONOCYTES # BLD AUTO: 0.58 X10(3) UL (ref 0.1–1)
MONOCYTES NFR BLD AUTO: 11.7 %
NEUTROPHILS # BLD AUTO: 3.59 X10 (3) UL (ref 1.5–7.7)
NEUTROPHILS # BLD AUTO: 3.59 X10(3) UL (ref 1.5–7.7)
NEUTROPHILS NFR BLD AUTO: 72.4 %
OSMOLALITY SERPL CALC.SUM OF ELEC: 303 MOSM/KG (ref 275–295)
PLATELET # BLD AUTO: 146 10(3)UL (ref 150–450)
POTASSIUM SERPL-SCNC: 3.1 MMOL/L (ref 3.5–5.1)
POTASSIUM SERPL-SCNC: 3.8 MMOL/L (ref 3.5–5.1)
PROTHROMBIN TIME: 16.5 SECONDS (ref 11.8–14.5)
RBC # BLD AUTO: 2.79 X10(6)UL (ref 3.8–5.8)
SODIUM SERPL-SCNC: 141 MMOL/L (ref 136–145)
WBC # BLD AUTO: 5 X10(3) UL (ref 4–11)

## 2020-06-28 PROCEDURE — 99232 SBSQ HOSP IP/OBS MODERATE 35: CPT | Performed by: NURSE PRACTITIONER

## 2020-06-28 PROCEDURE — 99232 SBSQ HOSP IP/OBS MODERATE 35: CPT | Performed by: INTERNAL MEDICINE

## 2020-06-28 RX ORDER — POTASSIUM CHLORIDE 20 MEQ/1
40 TABLET, EXTENDED RELEASE ORAL EVERY 4 HOURS
Status: COMPLETED | OUTPATIENT
Start: 2020-06-28 | End: 2020-06-28

## 2020-06-28 RX ORDER — POTASSIUM CHLORIDE 20 MEQ/1
20 TABLET, EXTENDED RELEASE ORAL ONCE
Status: COMPLETED | OUTPATIENT
Start: 2020-06-28 | End: 2020-06-28

## 2020-06-28 NOTE — PROGRESS NOTES
[facility]    [facility] Note    Ace Goo Patient Status:  Inpatient    3/28/1940 MRN [id number]   [facility] 5SW/SE Attending Adrianna Dong MD   Hosp Day # 7 PCP Margarita Osuna MD         Assessment and in Aug 2019 with LVEF 25-30%, hypokinesis of inferior area, mod MR;  LVEF closer to 20% on CATH, with chronic CAD on 11/2/17; unable to afford Entresto 24/26 one tab po BID, so med was changed to lisinopril 5 mg po qD, though creatinine increased to 1.9, s pt declined Victoza Rx, or insulin  - Levemir 15 Un SQ qD, SSI, BS 110-290     Hypercholesterolemia  LDL 27 in Feb 2020 on Lipitor 40 mg po qHS     Macular degeneration  Early and mild; saw ophtho Dr Crews Necessary 2019; annual visit     Cataract   saw op pharynx without erythema  Neck/Thyroid: neck supple; no thyromegaly  Cardiovascular: RRR, S1, S2, no S3 or murmur  Respiratory: lungs without crackles or wheezes  Abdomen: normoactive bowel sounds, soft, non-tender and non-distended  Extremities: no clubbi Intravenous, Q8H PRN  Insulin Aspart Pen (NOVOLOG) 100 UNIT/ML flexpen 1-5 Units, 1-5 Units, Subcutaneous, TID CC and HS             Data Review:     Labs:   Lab Results   Component Value Date    GLU 74 06/28/2020     06/28/2020    K 3.1 06/28/2020

## 2020-06-28 NOTE — TELEPHONE ENCOUNTER
The patient will be discharged from the hospital tomorrow 6/29/2020. Please arrange for the patient to have an outpatient capsule enteroscopy under the direction of Dr. Pau Danielson. Timing and preprocedural orders as per Dr. Pau Danielson.   The patient will require

## 2020-06-28 NOTE — PROGRESS NOTES
[facility]    Progress Note    Jodeekasia Salgado Patient Status:  Inpatient    3/28/1940 MRN [id number]   [facility] 5SW/SE Attending Christoph Shearer MD   Hosp Day # 7 PCP Veda Jones MD        Subjective: 06/27/2020    CA 8.2 (L) 06/27/2020    ALB 2.8 (L) 06/27/2020    ALKPHO 60 06/23/2020    BILT 0.6 06/23/2020    TP 5.8 (L) 06/23/2020    AST 24 06/23/2020    ALT 23 06/23/2020    PTT 36.6 (H) 11/04/2017    INR 1.41 (H) 06/27/2020    PT 41.6 (H) 04/18/2016

## 2020-06-29 ENCOUNTER — APPOINTMENT (OUTPATIENT)
Dept: GENERAL RADIOLOGY | Facility: HOSPITAL | Age: 80
DRG: 377 | End: 2020-06-29
Attending: NURSE PRACTITIONER
Payer: MEDICARE

## 2020-06-29 VITALS
BODY MASS INDEX: 27.94 KG/M2 | HEART RATE: 75 BPM | WEIGHT: 173.88 LBS | HEIGHT: 66 IN | DIASTOLIC BLOOD PRESSURE: 48 MMHG | SYSTOLIC BLOOD PRESSURE: 109 MMHG | RESPIRATION RATE: 18 BRPM | OXYGEN SATURATION: 99 % | TEMPERATURE: 97 F

## 2020-06-29 LAB
ALBUMIN SERPL-MCNC: 3.1 G/DL (ref 3.4–5)
ANION GAP SERPL CALC-SCNC: 7 MMOL/L (ref 0–18)
ANION GAP SERPL CALC-SCNC: 7 MMOL/L (ref 0–18)
BASOPHILS # BLD AUTO: 0.03 X10(3) UL (ref 0–0.2)
BASOPHILS NFR BLD AUTO: 0.5 %
BUN BLD-MCNC: 46 MG/DL (ref 7–18)
BUN BLD-MCNC: 46 MG/DL (ref 7–18)
BUN/CREAT SERPL: 18 (ref 10–20)
BUN/CREAT SERPL: 18 (ref 10–20)
CALCIUM BLD-MCNC: 8.7 MG/DL (ref 8.5–10.1)
CALCIUM BLD-MCNC: 8.7 MG/DL (ref 8.5–10.1)
CHLORIDE SERPL-SCNC: 107 MMOL/L (ref 98–112)
CHLORIDE SERPL-SCNC: 107 MMOL/L (ref 98–112)
CO2 SERPL-SCNC: 30 MMOL/L (ref 21–32)
CO2 SERPL-SCNC: 30 MMOL/L (ref 21–32)
CREAT BLD-MCNC: 2.56 MG/DL (ref 0.7–1.3)
CREAT BLD-MCNC: 2.56 MG/DL (ref 0.7–1.3)
DEPRECATED RDW RBC AUTO: 46 FL (ref 35.1–46.3)
EOSINOPHIL # BLD AUTO: 0.11 X10(3) UL (ref 0–0.7)
EOSINOPHIL NFR BLD AUTO: 1.8 %
ERYTHROCYTE [DISTWIDTH] IN BLOOD BY AUTOMATED COUNT: 15.1 % (ref 11–15)
GLUCOSE BLD-MCNC: 61 MG/DL (ref 70–99)
GLUCOSE BLD-MCNC: 61 MG/DL (ref 70–99)
GLUCOSE BLDC GLUCOMTR-MCNC: 246 MG/DL (ref 70–99)
GLUCOSE BLDC GLUCOMTR-MCNC: 80 MG/DL (ref 70–99)
HCT VFR BLD AUTO: 27.6 % (ref 39–53)
HGB BLD-MCNC: 8.8 G/DL (ref 13–17.5)
IMM GRANULOCYTES # BLD AUTO: 0.02 X10(3) UL (ref 0–1)
IMM GRANULOCYTES NFR BLD: 0.3 %
INR BLD: 1.22 (ref 0.9–1.2)
LYMPHOCYTES # BLD AUTO: 0.72 X10(3) UL (ref 1–4)
LYMPHOCYTES NFR BLD AUTO: 11.7 %
MCH RBC QN AUTO: 29.1 PG (ref 26–34)
MCHC RBC AUTO-ENTMCNC: 31.9 G/DL (ref 31–37)
MCV RBC AUTO: 91.4 FL (ref 80–100)
MONOCYTES # BLD AUTO: 0.62 X10(3) UL (ref 0.1–1)
MONOCYTES NFR BLD AUTO: 10.1 %
NEUTROPHILS # BLD AUTO: 4.64 X10 (3) UL (ref 1.5–7.7)
NEUTROPHILS # BLD AUTO: 4.64 X10(3) UL (ref 1.5–7.7)
NEUTROPHILS NFR BLD AUTO: 75.6 %
OSMOLALITY SERPL CALC.SUM OF ELEC: 308 MOSM/KG (ref 275–295)
OSMOLALITY SERPL CALC.SUM OF ELEC: 308 MOSM/KG (ref 275–295)
PHOSPHATE SERPL-MCNC: 2.1 MG/DL (ref 2.5–4.9)
PLATELET # BLD AUTO: 164 10(3)UL (ref 150–450)
POTASSIUM SERPL-SCNC: 3.3 MMOL/L (ref 3.5–5.1)
POTASSIUM SERPL-SCNC: 3.3 MMOL/L (ref 3.5–5.1)
POTASSIUM SERPL-SCNC: 3.7 MMOL/L (ref 3.5–5.1)
PROTHROMBIN TIME: 15.2 SECONDS (ref 11.8–14.5)
RBC # BLD AUTO: 3.02 X10(6)UL (ref 3.8–5.8)
SODIUM SERPL-SCNC: 144 MMOL/L (ref 136–145)
SODIUM SERPL-SCNC: 144 MMOL/L (ref 136–145)
WBC # BLD AUTO: 6.1 X10(3) UL (ref 4–11)

## 2020-06-29 PROCEDURE — 99239 HOSP IP/OBS DSCHRG MGMT >30: CPT | Performed by: INTERNAL MEDICINE

## 2020-06-29 PROCEDURE — 99232 SBSQ HOSP IP/OBS MODERATE 35: CPT | Performed by: NURSE PRACTITIONER

## 2020-06-29 PROCEDURE — 71046 X-RAY EXAM CHEST 2 VIEWS: CPT | Performed by: NURSE PRACTITIONER

## 2020-06-29 RX ORDER — HYDRALAZINE HYDROCHLORIDE 10 MG/1
10 TABLET, FILM COATED ORAL 3 TIMES DAILY
Qty: 90 TABLET | Refills: 5 | Status: SHIPPED | OUTPATIENT
Start: 2020-06-29 | End: 2021-04-22

## 2020-06-29 RX ORDER — POTASSIUM CHLORIDE 20 MEQ/1
40 TABLET, EXTENDED RELEASE ORAL ONCE
Status: COMPLETED | OUTPATIENT
Start: 2020-06-29 | End: 2020-06-29

## 2020-06-29 RX ORDER — POTASSIUM CHLORIDE 20 MEQ/1
20 TABLET, EXTENDED RELEASE ORAL ONCE
Status: COMPLETED | OUTPATIENT
Start: 2020-06-29 | End: 2020-06-29

## 2020-06-29 RX ORDER — BUMETANIDE 1 MG/1
1 TABLET ORAL 2 TIMES DAILY
Qty: 60 TABLET | Refills: 5 | Status: ON HOLD | OUTPATIENT
Start: 2020-06-29 | End: 2020-07-12

## 2020-06-29 RX ORDER — WARFARIN SODIUM 5 MG/1
5 TABLET ORAL NIGHTLY
Qty: 30 TABLET | Refills: 5 | Status: SHIPPED | OUTPATIENT
Start: 2020-06-29 | End: 2021-03-04

## 2020-06-29 NOTE — PROGRESS NOTES
Patient dc home. IV removed by SAGRARIO Ventura. Understands to follow up with PCP in 1 week, cardiology as directed, and dr. Arty Hammans as directed. Patient given scripts for bumetanidine, hydralazine and coumadin (prepared by SAGRARIO cyr with dc papers).  All needs met

## 2020-06-29 NOTE — PROGRESS NOTES
[facility]    Progress Note    Nicole Toribio Patient Status:  Inpatient    3/28/1940 MRN [id number]   [facility] 5SW/SE Attending David Anguiano MD   Hosp Day # 8 PCP Brandie Guevara MD         Assessment and BID  -weight down 6 pounds from yesterday; advise continue Bumex 1 mg po BID at discharge; check BMP in 1 week    Acute of chronic systolic CHF  CAD with ischemic cardiomyopathy  S/p 3v CABG in 1995, s/p stent x 3 in 2008  TARIQ in Aug 2019 with LVEF 25-30%, may reduce worsening of renal function; I discussed addition of Victoza 0.6 mg SQ qD would be reasonable Rx to reduce risk of worsening renal function; pt declined Victoza Rx, or insulin  - Levemir 15 Un SQ qD, SSI, BN 730-600     Hypercholesterolemia  LDL Encounters:  06/29/20 : 173 lb 14.4 oz (78.9 kg)  06/18/20 : 166 lb (75.3 kg)  06/15/20 : 169 lb (76.7 kg)        Constitutional: alert and oriented x3 in no acute distress  HEENT- EOMI, PERRL  Nose/Mouth/Throat: pharynx without erythema  Neck/Thyroid: [employer] Oral, Daily PRN  bisacodyl (DULCOLAX) rectal suppository 10 mg, 10 mg, Rectal, Daily PRN  Metoclopramide HCl (REGLAN) injection 5 mg, 5 mg, Intravenous, Q8H PRN  Insulin Aspart Pen (NOVOLOG) 100 UNIT/ML flexpen 1-5 Units, 1-5 Units, Subcutaneous, TID CC an

## 2020-06-29 NOTE — PLAN OF CARE
PIV and tele removed. Belongings gathered. Discharging in stable condition to home with wife.    Problem: Diabetes/Glucose Control  Goal: Glucose maintained within prescribed range  Description  INTERVENTIONS:  - Monitor Blood Glucose as ordered  - Assess f Discharge     Problem: PAIN - ADULT  Goal: Verbalizes/displays adequate comfort level or patient's stated pain goal  Description  INTERVENTIONS:  - Encourage pt to monitor pain and request assistance  - Assess pain using appropriate pain scale  - Administe hemorrhage  - Monitor labs and vital signs for trends  - Administer supportive blood products/factors, fluids and medications as ordered and appropriate  - Administer supportive blood products/factors as ordered and appropriate  Outcome: Adequate for Disch

## 2020-06-29 NOTE — PROGRESS NOTES
[facility]    Progress Note    Starla Cardenas Patient Status:  Inpatient    3/28/1940 MRN [id number]   [facility] 5SW/SE Attending Bryon Blanco MD   Hosp Day # 8 PCP Fernando Lane MD     Patient seen and e WADE on CKD stage-4  -CR baseline aprox 2.6-2.7  - cr pending   -renal  S/o       ICM, EF 25%- BIV ICD - mri compatible   -hypervolemia LE edema  Improved on exam   -bumex 1 mg qd, zaroxolyn x1 6/27 and 6/28  diuresis as ordered  per renal - excellent

## 2020-06-29 NOTE — DISCHARGE SUMMARY
[facility] AND [facility]  Discharge Summary    Zully Cotton Patient Status:  Inpatient    3/28/1940 MRN [id number]   [facility] 5SW/SE Attending No att. providers found   Hosp Day # 8 PCP Gunjan Stark MD     Date of Admission:  Gastrointestinal hemorrhage, unspecified gastrointestinal hemorrhage type     GI bleed     Acute kidney injury superimposed on CKD (HCC)     Anemia due to acute blood loss     Coagulopathy (HCC)     Acute on chronic systolic CHF (congestive heart failure) above baseline at 3.53; he was seen in consultation with GI Dr Ashley Melendez; he was given Vitamin K 5 mg po; he was given IVF and admitted for further care his Hgb has decreased this AM to 6.1; he has not yet had a [facility]       Hospital Course:     GI Bleed  Pt has yesterday; advise continue Bumex 1 mg po BID at discharge; check BMP in 1 week     Acute of chronic systolic CHF  CAD with ischemic cardiomyopathy  S/p 3v CABG in 1995, s/p stent x 3 in 2008  TARIQ in Aug 2019 with LVEF 25-30%, hypokinesis of inferior area, function; I discussed addition of Victoza 0.6 mg SQ qD would be reasonable Rx to reduce risk of worsening renal function; pt declined Victoza Rx, or insulin  - Levemir 15 Un SQ qD, SSI, EJ 488-104     Hypercholesterolemia  LDL 27 in Feb 2020 on Lipitor 40 nightly. , Print Script, Disp-30 tablet, R-5      CONTINUE these medications which have NOT CHANGED    Hydrocortisone 2.5 % External Cream  Place 1 Dose rectally 2 (two) times daily.  Apply by topical route 2 times every day to the affected area(s), Normal, (ONE-A-DAY ESSENTIAL) Oral Tab  Take by mouth., Historical    aspirin 81 MG Oral Chew Tab  Chew by mouth daily. , Historical    triamcinolone acetonide 0.1 % External Cream  Apply topically 2 (two) times daily as needed., Normal, Disp-45 g, R-1    !! - Pote

## 2020-06-30 ENCOUNTER — PATIENT OUTREACH (OUTPATIENT)
Dept: CASE MANAGEMENT | Age: 80
End: 2020-06-30

## 2020-06-30 DIAGNOSIS — D64.9 ANEMIA, UNSPECIFIED TYPE: ICD-10-CM

## 2020-06-30 DIAGNOSIS — Z02.9 ENCOUNTERS FOR ADMINISTRATIVE PURPOSE: ICD-10-CM

## 2020-06-30 PROCEDURE — 1111F DSCHRG MED/CURRENT MED MERGE: CPT

## 2020-06-30 NOTE — PROGRESS NOTES
SHAWANDA s/w patient who stated that he was currently driving but wanted to schedule follow up with PCP. SHAWANDA scheduled HFU for 7/6/2020.  He states that SHAWANDA can call back tomorrow to complete HFU. SHAWANDA also transferred patient to main number to schedule with the

## 2020-07-01 NOTE — PROGRESS NOTES
Initial Post Discharge Follow Up   Discharge Date: 6/29/20  Contact Date: 6/30/2020    Consent Verification:  Assessment Completed With: Patient  HIPAA Verified?   Yes    Discharge Dx:   Gastrointestinal hemorrhage, unspecified gastrointestinal hemorrhage 2 (two) times daily with meals. Take one tab along with the 3.125 mg tab for total of 9.375 mg twice daily 180 tablet 1   • Potassium Chloride ER 20 MEQ Oral Tab CR Take 1 tablet (20 mEq total) by mouth once daily.  90 tablet 3   • ATORVASTATIN 40 MG Oral T Health/Services ordered at D/C? Yes   What services:   Rehab, CHF, Stroke, PT, OT, Speech, Other: CHF clinic  Except for [address] mentioned above, have you scheduled these other services? yes, 7/2/2020       DME ordered at D/C? No  DX: [facility] Carlee Santoyo  [address] [facility] [address]  [phone]          PCP TCM/HFU appointment: scheduled at D/C within 7-14 days  no     NCM Reviewed/scheduled/rescheduled PCP TCM/HFU appointment with pt:  Yes      Have you made all

## 2020-07-01 NOTE — PROGRESS NOTES
[address] Patient Status:  Outpatient    3/28/1940 MRN [id number]   Location MD Dr. Jasmeet Aguilar Dr., Dr. is a 78year old episode of 9 beat run NSVT likely result of hypokalemia; K 3.1. Multiple consults with GI, Cardiology and Nephrology. Discharged to home on bumex 1 mg twice daily, hydralazine 10 mg tid and warfarin. All other medications remained unchanged.          Proble 06/29/2020 06:52 AM    GLU 61 (L) 06/29/2020 06:52 AM    GLU 61 (L) 06/29/2020 06:52 AM    CA 8.7 06/29/2020 06:52 AM    CA 8.7 06/29/2020 06:52 AM    ALB 3.1 (L) 06/29/2020 06:52 AM    ALKPHO 60 06/23/2020 06:24 AM    BILT 0.6 06/23/2020 06:24 AM    TP 5. 9. 7   6. Cirilo BUBBA: 6396 dynes         Saturations Study:  1. Ao sat: 96   2. PA sat: 57   3. SVC sat: 56         Impression:   1. Stable RH and LH filling pressures  2. Mild pulmonary artery hypertension  3. Severely reduced Cardiac index   4.  Severely usha vascular congestion.   4. 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       Monitor yourself for signs and symptoms of fluid overload, increased shortness of breath, difficulty breathing when laying down etc. Call the clinic if any of these signs develop at ph: [phone]    Call if

## 2020-07-01 NOTE — PAYOR COMM NOTE
--------------  DISCHARGE REVIEW    Cesar Espana MA [facility]  Subscriber #:  [id number]  Authorization Number: [id number]    Admit date: 6/21/20  Admit time:  1717  Discharge Date: 6/29/2020  2:03 PM     Admitting Physician: Garland Hodgkin, MD  Attending Physi coronary artery of native heart without angina pectoris     Erectile dysfunction     Chronic atrial fibrillation (HCC)     Atrial fibrillation with rapid ventricular response (HCC)     Hyperglycemia     Bloating     Floaters, bilateral     Atherosclerosis Metamucil 1 tbsp po qD; has BM daily, though c/o small stool volumes; has not responded to stool softeners in the past; he reports occasional epigastric discomfort, though he denies post-prandial pain; no melena; no hematochezia, though he does reports int PRBC's; s/p Venofer 300 mg IV qD x 3 doses     Abdominal bloating/gas  -pt notes 2-3 weeks of significant abdominal bloating --relieved temporarily with belching/flatus but then recurs shortly thereafter  -CT a/p unrevealing, abdominal US unrevealing  -tri x 1 dose     Chronic obstructive pulmonary disease, unspecified COPD type ([facility]  CXR 11/5/17 with +hyperinflated lungs; saw pulmonologist, Dr Latonya Leavitt; PFTs not consistent with COPD; he never took Combivent 1 p QID; Flovent stopped; on no meds     Chronic 23 given in May 2019       Consultations: cardio, Dr Luis Hooks; GI, Dr Fermin Wilkerson; Renal, Dr Luis Ravi    Procedures: EGD, colonoscopy, PRBC transfusion    Disposition: Home or Self Care    Discharge Condition: Stable    Discharge Medications: Discharge Medication Relda Fraction E11.9 non insulin dependend    !! Glucose Blood (ACCU-CHEK BROOKE) In Vitro Strip  Check BS once daily, Normal, Disp-100 strip, R-3DX E11.9    ! ! ACCU-CHEK SOFTCLIX LANCETS Does not apply Misc  Check BS once daily, Normal, Disp-100 each, R-3DX E 11.9    !!

## 2020-07-01 NOTE — TELEPHONE ENCOUNTER
pt. states that he just got out of the hosp., and is supposed to call our office to sched his proc. Out Patient Capsule Enteroscopy with Dr Kash Gottlieb.

## 2020-07-02 ENCOUNTER — OFFICE VISIT (OUTPATIENT)
Dept: CARDIOLOGY CLINIC | Facility: HOSPITAL | Age: 80
End: 2020-07-02
Attending: NURSE PRACTITIONER
Payer: MEDICARE

## 2020-07-02 VITALS
HEART RATE: 70 BPM | BODY MASS INDEX: 27 KG/M2 | SYSTOLIC BLOOD PRESSURE: 108 MMHG | WEIGHT: 170 LBS | DIASTOLIC BLOOD PRESSURE: 59 MMHG | OXYGEN SATURATION: 98 %

## 2020-07-02 DIAGNOSIS — I50.9 HEART FAILURE, UNSPECIFIED (HCC): ICD-10-CM

## 2020-07-02 DIAGNOSIS — Z95.810 ICD (IMPLANTABLE CARDIOVERTER-DEFIBRILLATOR) IN PLACE: ICD-10-CM

## 2020-07-02 DIAGNOSIS — I50.23 ACUTE ON CHRONIC SYSTOLIC CHF (CONGESTIVE HEART FAILURE) (HCC): Primary | ICD-10-CM

## 2020-07-02 DIAGNOSIS — K92.2 GASTROINTESTINAL HEMORRHAGE, UNSPECIFIED GASTROINTESTINAL HEMORRHAGE TYPE: ICD-10-CM

## 2020-07-02 DIAGNOSIS — I48.20 CHRONIC ATRIAL FIBRILLATION (HCC): ICD-10-CM

## 2020-07-02 LAB
ANION GAP SERPL CALC-SCNC: 8 MMOL/L (ref 0–18)
BASOPHILS # BLD AUTO: 0.02 X10(3) UL (ref 0–0.2)
BASOPHILS NFR BLD AUTO: 0.4 %
BUN BLD-MCNC: 51 MG/DL (ref 7–18)
BUN/CREAT SERPL: 17.2 (ref 10–20)
CALCIUM BLD-MCNC: 8.7 MG/DL (ref 8.5–10.1)
CHLORIDE SERPL-SCNC: 102 MMOL/L (ref 98–112)
CO2 SERPL-SCNC: 31 MMOL/L (ref 21–32)
CREAT BLD-MCNC: 2.96 MG/DL (ref 0.7–1.3)
DEPRECATED RDW RBC AUTO: 52.3 FL (ref 35.1–46.3)
EOSINOPHIL # BLD AUTO: 0.09 X10(3) UL (ref 0–0.7)
EOSINOPHIL NFR BLD AUTO: 2 %
ERYTHROCYTE [DISTWIDTH] IN BLOOD BY AUTOMATED COUNT: 16.4 % (ref 11–15)
GLUCOSE BLD-MCNC: 167 MG/DL (ref 70–99)
HCT VFR BLD AUTO: 29.7 % (ref 39–53)
HGB BLD-MCNC: 9.3 G/DL (ref 13–17.5)
IMM GRANULOCYTES # BLD AUTO: 0.01 X10(3) UL (ref 0–1)
IMM GRANULOCYTES NFR BLD: 0.2 %
INR BLD: 1.51 (ref 0.9–1.2)
LYMPHOCYTES # BLD AUTO: 0.58 X10(3) UL (ref 1–4)
LYMPHOCYTES NFR BLD AUTO: 12.8 %
MCH RBC QN AUTO: 29.2 PG (ref 26–34)
MCHC RBC AUTO-ENTMCNC: 31.3 G/DL (ref 31–37)
MCV RBC AUTO: 93.4 FL (ref 80–100)
MONOCYTES # BLD AUTO: 0.49 X10(3) UL (ref 0.1–1)
MONOCYTES NFR BLD AUTO: 10.8 %
NEUTROPHILS # BLD AUTO: 3.35 X10 (3) UL (ref 1.5–7.7)
NEUTROPHILS # BLD AUTO: 3.35 X10(3) UL (ref 1.5–7.7)
NEUTROPHILS NFR BLD AUTO: 73.8 %
OSMOLALITY SERPL CALC.SUM OF ELEC: 309 MOSM/KG (ref 275–295)
PATIENT FASTING Y/N/NP: NO
PLATELET # BLD AUTO: 152 10(3)UL (ref 150–450)
POTASSIUM SERPL-SCNC: 3.4 MMOL/L (ref 3.5–5.1)
PROTHROMBIN TIME: 17.9 SECONDS (ref 11.8–14.5)
RBC # BLD AUTO: 3.18 X10(6)UL (ref 3.8–5.8)
SODIUM SERPL-SCNC: 141 MMOL/L (ref 136–145)
WBC # BLD AUTO: 4.5 X10(3) UL (ref 4–11)

## 2020-07-02 PROCEDURE — 99212 OFFICE O/P EST SF 10 MIN: CPT | Performed by: NURSE PRACTITIONER

## 2020-07-02 PROCEDURE — 80048 BASIC METABOLIC PNL TOTAL CA: CPT | Performed by: NURSE PRACTITIONER

## 2020-07-02 PROCEDURE — 85025 COMPLETE CBC W/AUTO DIFF WBC: CPT | Performed by: NURSE PRACTITIONER

## 2020-07-02 PROCEDURE — 85610 PROTHROMBIN TIME: CPT | Performed by: NURSE PRACTITIONER

## 2020-07-02 PROCEDURE — 99214 OFFICE O/P EST MOD 30 MIN: CPT | Performed by: NURSE PRACTITIONER

## 2020-07-02 PROCEDURE — 36415 COLL VENOUS BLD VENIPUNCTURE: CPT | Performed by: NURSE PRACTITIONER

## 2020-07-02 NOTE — PATIENT INSTRUCTIONS
Continue bumex 1 mg twice daily for 2 more days then decrease to 1 mg daily    Wear compression stockings or tubigrips during the day    Follow up with Dr. Tyler Phoenix 7/6/20    Return to [address] [place] [address] 7/16/20    Please call the heart failure clinic i

## 2020-07-06 ENCOUNTER — TELEPHONE (OUTPATIENT)
Dept: GASTROENTEROLOGY | Facility: CLINIC | Age: 80
End: 2020-07-06

## 2020-07-06 ENCOUNTER — ANTI-COAG VISIT (OUTPATIENT)
Dept: INTERNAL MEDICINE CLINIC | Facility: CLINIC | Age: 80
End: 2020-07-06
Payer: MEDICARE

## 2020-07-06 ENCOUNTER — OFFICE VISIT (OUTPATIENT)
Dept: INTERNAL MEDICINE CLINIC | Facility: CLINIC | Age: 80
End: 2020-07-06
Payer: MEDICARE

## 2020-07-06 VITALS
BODY MASS INDEX: 26.71 KG/M2 | TEMPERATURE: 98 F | OXYGEN SATURATION: 97 % | WEIGHT: 166.19 LBS | HEART RATE: 68 BPM | DIASTOLIC BLOOD PRESSURE: 52 MMHG | HEIGHT: 66 IN | SYSTOLIC BLOOD PRESSURE: 96 MMHG

## 2020-07-06 DIAGNOSIS — H35.30 MACULAR DEGENERATION, UNSPECIFIED LATERALITY, UNSPECIFIED TYPE: ICD-10-CM

## 2020-07-06 DIAGNOSIS — R14.0 ABDOMINAL BLOATING: ICD-10-CM

## 2020-07-06 DIAGNOSIS — I48.20 CHRONIC ATRIAL FIBRILLATION (HCC): ICD-10-CM

## 2020-07-06 DIAGNOSIS — E78.00 HYPERCHOLESTEREMIA: ICD-10-CM

## 2020-07-06 DIAGNOSIS — I47.2 NSVT (NONSUSTAINED VENTRICULAR TACHYCARDIA) (HCC): ICD-10-CM

## 2020-07-06 DIAGNOSIS — K92.2 GASTROINTESTINAL HEMORRHAGE, UNSPECIFIED GASTROINTESTINAL HEMORRHAGE TYPE: Primary | ICD-10-CM

## 2020-07-06 DIAGNOSIS — N18.30 CKD (CHRONIC KIDNEY DISEASE), STAGE III (HCC): ICD-10-CM

## 2020-07-06 DIAGNOSIS — N18.3 TYPE 2 DIABETES MELLITUS WITH STAGE 3 CHRONIC KIDNEY DISEASE, UNSPECIFIED WHETHER LONG TERM INSULIN USE: ICD-10-CM

## 2020-07-06 DIAGNOSIS — E11.22 TYPE 2 DIABETES MELLITUS WITH STAGE 3 CHRONIC KIDNEY DISEASE, UNSPECIFIED WHETHER LONG TERM INSULIN USE: ICD-10-CM

## 2020-07-06 DIAGNOSIS — E87.6 HYPOKALEMIA: ICD-10-CM

## 2020-07-06 DIAGNOSIS — I50.23 ACUTE ON CHRONIC SYSTOLIC CHF (CONGESTIVE HEART FAILURE), NYHA CLASS 2 (HCC): ICD-10-CM

## 2020-07-06 DIAGNOSIS — I25.5 ISCHEMIC CARDIOMYOPATHY: ICD-10-CM

## 2020-07-06 DIAGNOSIS — J44.9 CHRONIC OBSTRUCTIVE PULMONARY DISEASE, UNSPECIFIED COPD TYPE (HCC): ICD-10-CM

## 2020-07-06 DIAGNOSIS — D50.0 ANEMIA, BLOOD LOSS: ICD-10-CM

## 2020-07-06 LAB
INR: 1.4 (ref 0.8–1.2)
TEST STRIP EXPIRATION DATE: ABNORMAL DATE

## 2020-07-06 PROCEDURE — 99496 TRANSJ CARE MGMT HIGH F2F 7D: CPT | Performed by: INTERNAL MEDICINE

## 2020-07-06 PROCEDURE — 36416 COLLJ CAPILLARY BLOOD SPEC: CPT

## 2020-07-06 PROCEDURE — 85610 PROTHROMBIN TIME: CPT

## 2020-07-06 RX ORDER — MELATONIN
325
Qty: 90 TABLET | Refills: 3 | Status: SHIPPED | OUTPATIENT
Start: 2020-07-06 | End: 2020-09-22

## 2020-07-06 NOTE — PROGRESS NOTES
HPI:    Freddie Judd is a [de-identified]year old male here today for hospital follow up.    He was discharged from Inpatient hospital, [facility]  to Home   Admission Date: 6/21/20   Discharge Date: 6/29/20  Hospital Discharge Diagnoses (since 6/6/2020) supratherapeutically elevated at 3.7; creatinine was also above baseline at 3.53; he was seen in consultation with GI Dr Ramon Marcelino; he was given Vitamin K 5 mg po; he was given IVF and admitted for further care his Hgb had decreased this AM to 6.1; suspected one tab along with the 3.125 mg tab for total of 9.375 mg twice daily  Potassium Chloride ER 20 MEQ Oral Tab CR, Take 1 tablet (20 mEq total) by mouth once daily.   ATORVASTATIN 40 MG Oral Tab, TAKE 1 TABLET EVERY NIGHT  metolazone 2.5 MG Oral Tab, Take 2.5 smoking about 29 years ago. His smoking use included cigarettes. He has a 35.00 pack-year smoking history. He has never used smokeless tobacco. He reports current alcohol use. He reports that he does not use drugs.      ROS:   GENERAL: weight stable, energy Diagnoses and all orders for this visit:    Gastrointestinal hemorrhage, unspecified gastrointestinal hemorrhage type    Anemia, blood loss  -     CBC WITH DIFFERENTIAL WITH PLATELET;  Future    Abdominal bloating    CKD (chronic kidney disease), stage II diverticulosis descending and sigmoid colon.  Medium sized internal hemorrhoids seen; repeat colonoscopy in 1 year per Dr Silvia Fiore  -Hgb 9.3 on 7/2/20; will monitor H/H as outpatient and consider capsule endoscopy if demonstrates worsening anemia     Anemia 5pm today     Hypokalemia  K+ 3.4 on 7/2/20     Chronic obstructive pulmonary disease, unspecified COPD type ([facility]  CXR 11/5/17 with +hyperinflated lungs; saw pulmonologist, Dr Diana Dunne; PFTs not consistent with COPD; he never took Combivent 1 p QID; Flov Pneumovax in 2015; had Prevnar on 11/13/17;  Pneumovax 23 given in May 2019        Transitional Care Management Certification:  I certify that the following are true:  Communication with the patient was made within 2 business days of discharge on date above

## 2020-07-06 NOTE — TELEPHONE ENCOUNTER
Patient called in requesting to speak directly to the DR.  Or Nurse about him recently being discharged from the hospital. Please advise Walking

## 2020-07-06 NOTE — TELEPHONE ENCOUNTER
I spoke to the pt and we scheduled a f/u appt in /ADO with the pt. Date, time & location verified with the pt.     Future Appointments   Date Time Provider Yandy Perla   7/10/2020 11:00 AM Wil Fox MD [facility] EC ADO

## 2020-07-07 ENCOUNTER — ANCILLARY PROCEDURE (OUTPATIENT)
Dept: CARDIOLOGY | Age: 80
End: 2020-07-07
Attending: INTERNAL MEDICINE

## 2020-07-07 VITALS
HEIGHT: 68 IN | BODY MASS INDEX: 24.71 KG/M2 | SYSTOLIC BLOOD PRESSURE: 92 MMHG | DIASTOLIC BLOOD PRESSURE: 52 MMHG | WEIGHT: 163 LBS | HEART RATE: 70 BPM

## 2020-07-07 DIAGNOSIS — Z45.02 IMPLANTABLE DEFIBRILLATOR REPROGRAMMING/CHECK: ICD-10-CM

## 2020-07-07 PROCEDURE — 93283 PRGRMG EVAL IMPLANTABLE DFB: CPT | Performed by: INTERNAL MEDICINE

## 2020-07-09 ENCOUNTER — TELEPHONE (OUTPATIENT)
Dept: INTERNAL MEDICINE CLINIC | Facility: CLINIC | Age: 80
End: 2020-07-09

## 2020-07-09 DIAGNOSIS — Z20.822 EXPOSURE TO COVID-19 VIRUS: Primary | ICD-10-CM

## 2020-07-10 ENCOUNTER — HOSPITAL ENCOUNTER (INPATIENT)
Facility: HOSPITAL | Age: 80
LOS: 2 days | Discharge: HOME HEALTH | DRG: 177 | End: 2020-07-12
Attending: EMERGENCY MEDICINE | Admitting: INTERNAL MEDICINE
Payer: MEDICARE

## 2020-07-10 ENCOUNTER — OFFICE VISIT (OUTPATIENT)
Dept: GASTROENTEROLOGY | Facility: CLINIC | Age: 80
End: 2020-07-10
Payer: MEDICARE

## 2020-07-10 ENCOUNTER — APPOINTMENT (OUTPATIENT)
Dept: GENERAL RADIOLOGY | Facility: HOSPITAL | Age: 80
DRG: 177 | End: 2020-07-10
Attending: EMERGENCY MEDICINE
Payer: MEDICARE

## 2020-07-10 ENCOUNTER — APPOINTMENT (OUTPATIENT)
Dept: NUCLEAR MEDICINE | Facility: HOSPITAL | Age: 80
DRG: 177 | End: 2020-07-10
Attending: EMERGENCY MEDICINE
Payer: MEDICARE

## 2020-07-10 ENCOUNTER — MED REC SCAN ONLY (OUTPATIENT)
Dept: GASTROENTEROLOGY | Facility: CLINIC | Age: 80
End: 2020-07-10

## 2020-07-10 VITALS
BODY MASS INDEX: 25.17 KG/M2 | DIASTOLIC BLOOD PRESSURE: 55 MMHG | HEART RATE: 73 BPM | WEIGHT: 156.63 LBS | SYSTOLIC BLOOD PRESSURE: 91 MMHG | HEIGHT: 66 IN | OXYGEN SATURATION: 81 %

## 2020-07-10 DIAGNOSIS — I48.20 CHRONIC ATRIAL FIBRILLATION (HCC): ICD-10-CM

## 2020-07-10 DIAGNOSIS — I50.23 ACUTE ON CHRONIC SYSTOLIC CHF (CONGESTIVE HEART FAILURE) (HCC): Primary | ICD-10-CM

## 2020-07-10 DIAGNOSIS — R77.8 ELEVATED TROPONIN: ICD-10-CM

## 2020-07-10 DIAGNOSIS — R09.02 HYPOXIA: ICD-10-CM

## 2020-07-10 DIAGNOSIS — D62 ANEMIA DUE TO ACUTE BLOOD LOSS: ICD-10-CM

## 2020-07-10 DIAGNOSIS — U07.1 COVID-19: Primary | ICD-10-CM

## 2020-07-10 DIAGNOSIS — N18.30 CKD (CHRONIC KIDNEY DISEASE), STAGE III (HCC): ICD-10-CM

## 2020-07-10 PROBLEM — R79.89 ELEVATED TROPONIN: Status: ACTIVE | Noted: 2020-07-10

## 2020-07-10 LAB
ANION GAP SERPL CALC-SCNC: 9 MMOL/L (ref 0–18)
APTT PPP: 54.7 SECONDS (ref 23.2–35.3)
BASOPHILS # BLD AUTO: 0.01 X10(3) UL (ref 0–0.2)
BASOPHILS NFR BLD AUTO: 0.3 %
BUN BLD-MCNC: 60 MG/DL (ref 7–18)
BUN/CREAT SERPL: 16.2 (ref 10–20)
CALCIUM BLD-MCNC: 8.7 MG/DL (ref 8.5–10.1)
CHLORIDE SERPL-SCNC: 105 MMOL/L (ref 98–112)
CHOLEST SMN-MCNC: 67 MG/DL (ref ?–200)
CK SERPL-CCNC: 112 U/L (ref 39–308)
CO2 SERPL-SCNC: 23 MMOL/L (ref 21–32)
CREAT BLD-MCNC: 3.71 MG/DL (ref 0.7–1.3)
CRP SERPL-MCNC: 6.6 MG/DL (ref ?–0.3)
D DIMER PPP FEU-MCNC: 1.03 UG/ML FEU (ref ?–0.8)
DEPRECATED HBV CORE AB SER IA-ACNC: 625.9 NG/ML (ref 30–530)
DEPRECATED RDW RBC AUTO: 59.6 FL (ref 35.1–46.3)
DIGOXIN SERPL-MCNC: 0.71 NG/ML (ref 0.8–2)
EOSINOPHIL # BLD AUTO: 0 X10(3) UL (ref 0–0.7)
EOSINOPHIL NFR BLD AUTO: 0 %
ERYTHROCYTE [DISTWIDTH] IN BLOOD BY AUTOMATED COUNT: 17.7 % (ref 11–15)
GLUCOSE BLD-MCNC: 133 MG/DL (ref 70–99)
GLUCOSE BLDC GLUCOMTR-MCNC: 122 MG/DL (ref 70–99)
GLUCOSE BLDC GLUCOMTR-MCNC: 165 MG/DL (ref 70–99)
HCT VFR BLD AUTO: 33.8 % (ref 39–53)
HDLC SERPL-MCNC: 24 MG/DL (ref 40–59)
HGB BLD-MCNC: 10.8 G/DL (ref 13–17.5)
IMM GRANULOCYTES # BLD AUTO: 0.02 X10(3) UL (ref 0–1)
IMM GRANULOCYTES NFR BLD: 0.6 %
INR BLD: 1.95 (ref 0.9–1.2)
LDLC SERPL CALC-MCNC: 24 MG/DL (ref ?–100)
LYMPHOCYTES # BLD AUTO: 0.59 X10(3) UL (ref 1–4)
LYMPHOCYTES NFR BLD AUTO: 17.5 %
MCH RBC QN AUTO: 29.6 PG (ref 26–34)
MCHC RBC AUTO-ENTMCNC: 32 G/DL (ref 31–37)
MCV RBC AUTO: 92.6 FL (ref 80–100)
MONOCYTES # BLD AUTO: 0.57 X10(3) UL (ref 0.1–1)
MONOCYTES NFR BLD AUTO: 16.9 %
NEUTROPHILS # BLD AUTO: 2.18 X10 (3) UL (ref 1.5–7.7)
NEUTROPHILS # BLD AUTO: 2.18 X10(3) UL (ref 1.5–7.7)
NEUTROPHILS NFR BLD AUTO: 64.7 %
NONHDLC SERPL-MCNC: 43 MG/DL (ref ?–130)
NT-PROBNP SERPL-MCNC: ABNORMAL PG/ML (ref ?–450)
OSMOLALITY SERPL CALC.SUM OF ELEC: 303 MOSM/KG (ref 275–295)
PLATELET # BLD AUTO: 168 10(3)UL (ref 150–450)
POTASSIUM SERPL-SCNC: 4.6 MMOL/L (ref 3.5–5.1)
PROCALCITONIN SERPL-MCNC: 0.56 NG/ML (ref ?–0.16)
PROTHROMBIN TIME: 21.9 SECONDS (ref 11.8–14.5)
RBC # BLD AUTO: 3.65 X10(6)UL (ref 3.8–5.8)
SARS-COV-2 RNA RESP QL NAA+PROBE: DETECTED
SODIUM SERPL-SCNC: 137 MMOL/L (ref 136–145)
TRIGL SERPL-MCNC: 94 MG/DL (ref 30–149)
TROPONIN I SERPL-MCNC: 0.18 NG/ML (ref ?–0.04)
TROPONIN I SERPL-MCNC: 0.19 NG/ML (ref ?–0.04)
TROPONIN I SERPL-MCNC: 0.19 NG/ML (ref ?–0.04)
VLDLC SERPL CALC-MCNC: 19 MG/DL (ref 0–30)
WBC # BLD AUTO: 3.4 X10(3) UL (ref 4–11)

## 2020-07-10 PROCEDURE — 99223 1ST HOSP IP/OBS HIGH 75: CPT | Performed by: INTERNAL MEDICINE

## 2020-07-10 PROCEDURE — 99213 OFFICE O/P EST LOW 20 MIN: CPT | Performed by: INTERNAL MEDICINE

## 2020-07-10 PROCEDURE — 71045 X-RAY EXAM CHEST 1 VIEW: CPT | Performed by: EMERGENCY MEDICINE

## 2020-07-10 PROCEDURE — 99221 1ST HOSP IP/OBS SF/LOW 40: CPT | Performed by: INTERNAL MEDICINE

## 2020-07-10 PROCEDURE — 78580 LUNG PERFUSION IMAGING: CPT | Performed by: EMERGENCY MEDICINE

## 2020-07-10 RX ORDER — ATORVASTATIN CALCIUM 40 MG/1
40 TABLET, FILM COATED ORAL NIGHTLY
Status: DISCONTINUED | OUTPATIENT
Start: 2020-07-10 | End: 2020-07-12

## 2020-07-10 RX ORDER — DIGOXIN 125 MCG
125 TABLET ORAL
Status: DISCONTINUED | OUTPATIENT
Start: 2020-07-13 | End: 2020-07-12

## 2020-07-10 RX ORDER — HYDRALAZINE HYDROCHLORIDE 10 MG/1
10 TABLET, FILM COATED ORAL 3 TIMES DAILY
Status: DISCONTINUED | OUTPATIENT
Start: 2020-07-10 | End: 2020-07-12

## 2020-07-10 RX ORDER — WARFARIN SODIUM 5 MG/1
5 TABLET ORAL NIGHTLY
Status: DISCONTINUED | OUTPATIENT
Start: 2020-07-10 | End: 2020-07-12

## 2020-07-10 RX ORDER — CARVEDILOL 3.12 MG/1
3.12 TABLET ORAL 2 TIMES DAILY WITH MEALS
Status: DISCONTINUED | OUTPATIENT
Start: 2020-07-10 | End: 2020-07-12

## 2020-07-10 RX ORDER — ASPIRIN 81 MG/1
81 TABLET, CHEWABLE ORAL DAILY
Status: DISCONTINUED | OUTPATIENT
Start: 2020-07-11 | End: 2020-07-12

## 2020-07-10 RX ORDER — BUMETANIDE 1 MG/1
1 TABLET ORAL NIGHTLY
Status: DISCONTINUED | OUTPATIENT
Start: 2020-07-10 | End: 2020-07-11

## 2020-07-10 RX ORDER — MELATONIN
325
Status: DISCONTINUED | OUTPATIENT
Start: 2020-07-11 | End: 2020-07-12

## 2020-07-10 RX ORDER — CARVEDILOL 6.25 MG/1
6.25 TABLET ORAL 2 TIMES DAILY WITH MEALS
Status: DISCONTINUED | OUTPATIENT
Start: 2020-07-10 | End: 2020-07-12

## 2020-07-10 RX ORDER — POTASSIUM CHLORIDE 20 MEQ/1
20 TABLET, EXTENDED RELEASE ORAL DAILY
Status: DISCONTINUED | OUTPATIENT
Start: 2020-07-11 | End: 2020-07-12

## 2020-07-10 RX ORDER — DEXTROSE MONOHYDRATE 25 G/50ML
50 INJECTION, SOLUTION INTRAVENOUS
Status: DISCONTINUED | OUTPATIENT
Start: 2020-07-10 | End: 2020-07-12

## 2020-07-10 NOTE — CONSULTS
[facility] HOSP - [facility]    Report of Consultation    Daisy Perales Patient Status:  Emergency    3/28/1940 MRN [id number]   Location [address] Attending Mauricio Chavez MD   Hosp Day # 0 PCP Marivel Barajas MD CHOLECYSTECTOMY     • COLONOSCOPY N/A 6/26/2020    Performed by En Carrillo MD at [address] [place] [address] ENDOSCOPY   • ESOPHAGOGASTRODUODENOSCOPY (EGD) N/A 6/23/2020    Performed by En Carrillo MD at [address] [place] [address] ENDOSCOPY       Family History  Family Hist 07/10/2020    HGB 10.8 (L) 07/10/2020    HCT 33.8 (L) 07/10/2020    .0 07/10/2020    CREATSERUM 3.71 (H) 07/10/2020    BUN 60 (H) 07/10/2020     07/10/2020    K 4.6 07/10/2020     07/10/2020    CO2 23.0 07/10/2020     (H) 07/10/20

## 2020-07-10 NOTE — PROGRESS NOTES
Mr. Eugene Maguire presented today for a follow-up visit with me after the recent hospitalization for multiple concerns including severe anemia, congestive heart failure, renal insufficiency. On check-in, Mr. Burk complained of extreme fatigue, mo

## 2020-07-10 NOTE — ED PROVIDER NOTES
Patient Seen in: [facility] AND [facility] Emergency Department      History   Patient presents with:  Dyspnea JAH SOB    Stated Complaint: SOB    HPI    Patient is an 51-year-old male status post recent hospitalization for anemia who presents with hypoxia noted Smokeless tobacco: Never Used    Alcohol use: Yes      Alcohol/week: 1.0 standard drinks      Types: 1 Glasses of wine per week      Drinks per session: 1 or 2      Comment: about 3 months ago    Drug use:  No             Review of Systems    Positive for s Notable for the following components:    Pro-Beta Natriuretic Peptide 17,579 (*)     All other components within normal limits   DIGOXIN (LANOXIN) - Abnormal; Notable for the following components:    Digoxin 0.71 (*)     All other components within normal Pulse Ox: 96%, Normal, RA but 80% RA with ambulation    Cardiac Monitor: Pulse Readings from Last 1 Encounters:  07/10/20 : 72  , atrial fibrillation     Radiology findings: Nm Lung Perfusion Scan Only  (cpt=78580)    Result Date: 7/10/2020  CONCLUSION health screening including reassessment of your blood pressure.       Medications Prescribed:  Current Discharge Medication List                       Present on Admission  Date Reviewed: 7/10/2020    None

## 2020-07-10 NOTE — CONSULTS
CARDIOLOGY SERVICE CONSULT      Patient: Flako Tejada Date: 7/10/2020     : 3/28/1940 Attending: Shea Pereira MD   [de-identified]year old male Requested by: Dr. Martha Vanegas     A/P:  SOB/Hypoxia  COVID+  Elevated troponin  Chronic HFrEF due to ICM s/p B has not noticed any melena/hematochezia.     Past Medical History:   Diagnosis Date   • Age-related nuclear cataract of both eyes 6/21/2016   • ARMD (age-related macular degeneration), bilateral 6/21/2016   • Arrhythmia    • Atrial fibrillation ([facility] 1995 Alcohol use:  Yes        Alcohol/week: 1.0 standard drinks        Types: 1 Glasses of wine per week        Drinks per session: 1 or 2        Comment: about 3 months ago      Drug use: No      Sexual activity: Not on file    Lifestyle      Physical Ernestina Cowart °F (37.8 °C) Temp  Min: 98.2 °F (36.8 °C)  Max: 100.1 °F (37.8 °C)   Pulse 87 Pulse  Min: 73  Max: 87   Respiratory 20 Resp  Min: 20  Max: 20   Blood Pressure 102/58 BP  Min: 91/55  Max: 120/51   Pulse Oximetry 96 % SpO2  Min: 81 %  Max: 97 %     Vital Tod atrium: The atrium was mildly to moderately dilated. No     evidence of thrombus in the atrial cavity or appendage. There     was spontaneous echo contrast (&quot;smoke&quot;). 5. Right ventricle: Systolic function was mildly reduced. 6. Right atrium:  Cloyde Seminole

## 2020-07-10 NOTE — ED NOTES
PCT had pt ambulate 1 lap around pod. Beginning O2 sat started at 95%. After 1 lap pt O2 sat dropped to 85%.

## 2020-07-11 LAB
ANION GAP SERPL CALC-SCNC: 12 MMOL/L (ref 0–18)
BASOPHILS # BLD AUTO: 0 X10(3) UL (ref 0–0.2)
BASOPHILS NFR BLD AUTO: 0 %
BILIRUB UR QL: NEGATIVE
BUN BLD-MCNC: 76 MG/DL (ref 7–18)
BUN/CREAT SERPL: 18.1 (ref 10–20)
CALCIUM BLD-MCNC: 8.6 MG/DL (ref 8.5–10.1)
CHLORIDE SERPL-SCNC: 105 MMOL/L (ref 98–112)
CO2 SERPL-SCNC: 21 MMOL/L (ref 21–32)
COLOR UR: YELLOW
CREAT BLD-MCNC: 4.21 MG/DL (ref 0.7–1.3)
CRP SERPL-MCNC: 5.1 MG/DL (ref ?–0.3)
D DIMER PPP FEU-MCNC: 1.17 UG/ML FEU (ref ?–0.8)
DEPRECATED HBV CORE AB SER IA-ACNC: 701.5 NG/ML (ref 30–530)
DEPRECATED RDW RBC AUTO: 59.4 FL (ref 35.1–46.3)
EOSINOPHIL # BLD AUTO: 0.01 X10(3) UL (ref 0–0.7)
EOSINOPHIL NFR BLD AUTO: 0.3 %
ERYTHROCYTE [DISTWIDTH] IN BLOOD BY AUTOMATED COUNT: 17.5 % (ref 11–15)
GLUCOSE BLD-MCNC: 96 MG/DL (ref 70–99)
GLUCOSE BLDC GLUCOMTR-MCNC: 110 MG/DL (ref 70–99)
GLUCOSE BLDC GLUCOMTR-MCNC: 113 MG/DL (ref 70–99)
GLUCOSE BLDC GLUCOMTR-MCNC: 116 MG/DL (ref 70–99)
GLUCOSE BLDC GLUCOMTR-MCNC: 129 MG/DL (ref 70–99)
GLUCOSE UR-MCNC: NEGATIVE MG/DL
HCT VFR BLD AUTO: 33.3 % (ref 39–53)
HGB BLD-MCNC: 10.7 G/DL (ref 13–17.5)
HGB UR QL STRIP.AUTO: NEGATIVE
HYALINE CASTS #/AREA URNS AUTO: 2 /LPF
IMM GRANULOCYTES # BLD AUTO: 0.01 X10(3) UL (ref 0–1)
IMM GRANULOCYTES NFR BLD: 0.3 %
INR BLD: 1.92 (ref 0.9–1.2)
KETONES UR-MCNC: NEGATIVE MG/DL
LDH SERPL L TO P-CCNC: 253 U/L
LEUKOCYTE ESTERASE UR QL STRIP.AUTO: NEGATIVE
LYMPHOCYTES # BLD AUTO: 0.72 X10(3) UL (ref 1–4)
LYMPHOCYTES NFR BLD AUTO: 20.9 %
MCH RBC QN AUTO: 29.6 PG (ref 26–34)
MCHC RBC AUTO-ENTMCNC: 32.1 G/DL (ref 31–37)
MCV RBC AUTO: 92.2 FL (ref 80–100)
MONOCYTES # BLD AUTO: 0.52 X10(3) UL (ref 0.1–1)
MONOCYTES NFR BLD AUTO: 15.1 %
NEUTROPHILS # BLD AUTO: 2.18 X10 (3) UL (ref 1.5–7.7)
NEUTROPHILS # BLD AUTO: 2.18 X10(3) UL (ref 1.5–7.7)
NEUTROPHILS NFR BLD AUTO: 63.4 %
NITRITE UR QL STRIP.AUTO: NEGATIVE
OSMOLALITY SERPL CALC.SUM OF ELEC: 308 MOSM/KG (ref 275–295)
PH UR: 5 [PH] (ref 5–8)
PLATELET # BLD AUTO: 178 10(3)UL (ref 150–450)
POTASSIUM SERPL-SCNC: 4 MMOL/L (ref 3.5–5.1)
PROT UR-MCNC: 30 MG/DL
PROTHROMBIN TIME: 21.6 SECONDS (ref 11.8–14.5)
RBC # BLD AUTO: 3.61 X10(6)UL (ref 3.8–5.8)
RBC #/AREA URNS AUTO: <1 /HPF
SODIUM SERPL-SCNC: 138 MMOL/L (ref 136–145)
SP GR UR STRIP: 1.02 (ref 1–1.03)
TROPONIN I SERPL-MCNC: 0.2 NG/ML (ref ?–0.04)
UROBILINOGEN UR STRIP-ACNC: <2
WBC # BLD AUTO: 3.4 X10(3) UL (ref 4–11)
WBC #/AREA URNS AUTO: 5 /HPF

## 2020-07-11 PROCEDURE — 99232 SBSQ HOSP IP/OBS MODERATE 35: CPT | Performed by: INTERNAL MEDICINE

## 2020-07-11 PROCEDURE — 99222 1ST HOSP IP/OBS MODERATE 55: CPT | Performed by: INTERNAL MEDICINE

## 2020-07-11 RX ORDER — SODIUM CHLORIDE 9 MG/ML
INJECTION, SOLUTION INTRAVENOUS CONTINUOUS
Status: DISCONTINUED | OUTPATIENT
Start: 2020-07-11 | End: 2020-07-12

## 2020-07-11 NOTE — PLAN OF CARE
Problem: Patient Centered Care  Goal: Patient preferences are identified and integrated in the patient's plan of care  Description  Interventions:  - What would you like us to know as we care for you? Patient is home with wife.   States wife also has covi

## 2020-07-11 NOTE — PROGRESS NOTES
[facility]     Progress Note        Jodee Salgado Patient Status:  Inpatient    3/28/1940 MRN [id number]   Location [facility] Attending Laura Avina MD   Hosp Day # 1 PCP Veda Jones MD       Subjective:   Lm Alegre Units, 10 Units, Subcutaneous, Nightly  Insulin Aspart Pen (NOVOLOG) 100 UNIT/ML flexpen 1-5 Units, 1-5 Units, Subcutaneous, TID CC        Continuous Infusions:     Physical Exam  Constitutional: no acute distress  Eyes: PERRL  ENT: joshua pateint  Cardio: Date: 7/10/2020  ECG Report  Interpretation  -------------------------- Atrial fibrillation -With ventricular paced beats and PVC's ABNORMAL When compared with ECG of 06/21/2020 12:36:56 No significant changes have occurred Electronically signed on 07/10/2

## 2020-07-11 NOTE — PROGRESS NOTES
[facility]    Progress Note    Nicole Toribio Patient Status:  Inpatient    3/28/1940 MRN [id number]   Location [facility] Attending Bar Gomes MD   Hosp Day # 1 PCP Brandie Guevara MD       Subjective:   Ji Estevez wheezes, rales, rhonchi. Normal excursions and effort. Abdomen: Soft, non-tender. No mass or rebound, BS-present x 4. Extremities: Without cyanosis or edema.   Peripheral pulses are 2+, equal and bilateral.        Assessment and Plan:      -Elevated trop reticulonodular opacities, unchanged. Differentials include mild interstitial edema, chronic scarring versus viral infectious etiology.     Dictated by (CST): Anny Wagoner MD on 7/10/2020 at 5:08 PM     Finalized by (CST): Anny Wagoner MD on 7/10/2020 at 5:

## 2020-07-11 NOTE — H&P
[facility] HOSP - [facility]    History and Physical    Daisy Perales Patient Status:  Inpatient    3/28/1940 MRN [id number]   Location [facility] Attending Catrachito Castelan MD   Hosp Day # 1 PCP Marivel Barajas MD     Date:  2020 • MI (myocardial infarction) [facility]) 2008    cardiologist Dr Vincent Trivedi at [facility]   • S/P cholecystectomy 2005    [facility]   • Shortness of breath      Past Surgical History:   Procedure Laterality Date   • CABG     • CHOLECYSTECTOMY     • COLONOSCOPY N/A 6/2 CR, Take 1 tablet (20 mEq total) by mouth once daily. ATORVASTATIN 40 MG Oral Tab, TAKE 1 TABLET EVERY NIGHT  metolazone 2.5 MG Oral Tab, Take 2.5 mg by mouth as needed. digoxin 0.125 MG Oral Tab, Take 125 mcg by mouth.  M-W-F  Multiple Vitamin (ONE-A-DAY Value Date    WBC 3.4 (L) 07/11/2020    HGB 10.7 (L) 07/11/2020    HCT 33.3 (L) 07/11/2020    .0 07/11/2020    CREATSERUM 3.71 (H) 07/10/2020    BUN 60 (H) 07/10/2020     07/10/2020    K 4.6 07/10/2020     07/10/2020    CO2 23.0 07/10/20 significant changes have occurred Electronically signed on 07/10/2020 at 14:40 by JESSE Solorio       Assessment/Plan:       Acute hypoxic respiratory failure, COVID-19 positive  The patient was noted to be significantly hypoxic in the low 80s at Dr. Panda Brannon' unable to afford Entresto 24/26 one tab po BID, so med was changed to lisinopril 5 mg po qD, though creatinine increased to 1.9, so lisinopril was stopped; tried spironolactone 12.5 mg po qD in Nov 2017 and now stopped for unclear reasons (?elevated creati 5940         **Certification      PHYSICIAN Certification of Need for Inpatient Hospitalization - Initial Certification    Patient will require inpatient services that will reasonably be expected to span two midnight's based on the clinical documentation i

## 2020-07-11 NOTE — PLAN OF CARE
Patient alert and oriented with low systolic varying from 47'A to low 100's. Bp meds held this evening. No oxygen required. Glucose 165. Patient resting comfortably throughout evening. Trop was trending down and came back . 197 . [facility] notified.  Found pinky Tyra Hartley  Outcome: Progressing  Goal: Patient/Family Short Term Goal  Description  Patient's Short Term Goal: stable VSS    Interventions:   - follow plan o care  - See additional Care Plan goals for specific interventions   7/11/2020 0212 by Annabelle Krishnamurthy

## 2020-07-12 VITALS
DIASTOLIC BLOOD PRESSURE: 56 MMHG | SYSTOLIC BLOOD PRESSURE: 93 MMHG | WEIGHT: 173.63 LBS | RESPIRATION RATE: 20 BRPM | BODY MASS INDEX: 26.32 KG/M2 | HEART RATE: 90 BPM | HEIGHT: 68 IN | OXYGEN SATURATION: 93 % | TEMPERATURE: 98 F

## 2020-07-12 LAB
ANION GAP SERPL CALC-SCNC: 9 MMOL/L (ref 0–18)
BASOPHILS # BLD AUTO: 0.01 X10(3) UL (ref 0–0.2)
BASOPHILS NFR BLD AUTO: 0.3 %
BUN BLD-MCNC: 78 MG/DL (ref 7–18)
BUN/CREAT SERPL: 19.5 (ref 10–20)
CALCIUM BLD-MCNC: 8.4 MG/DL (ref 8.5–10.1)
CHLORIDE SERPL-SCNC: 108 MMOL/L (ref 98–112)
CO2 SERPL-SCNC: 20 MMOL/L (ref 21–32)
CREAT BLD-MCNC: 3.99 MG/DL (ref 0.7–1.3)
CRP SERPL-MCNC: 4.19 MG/DL (ref ?–0.3)
D DIMER PPP FEU-MCNC: 1.24 UG/ML FEU (ref ?–0.8)
DEPRECATED HBV CORE AB SER IA-ACNC: 733.3 NG/ML (ref 30–530)
DEPRECATED RDW RBC AUTO: 58.9 FL (ref 35.1–46.3)
EOSINOPHIL # BLD AUTO: 0.01 X10(3) UL (ref 0–0.7)
EOSINOPHIL NFR BLD AUTO: 0.3 %
ERYTHROCYTE [DISTWIDTH] IN BLOOD BY AUTOMATED COUNT: 17.6 % (ref 11–15)
GLUCOSE BLD-MCNC: 105 MG/DL (ref 70–99)
GLUCOSE BLDC GLUCOMTR-MCNC: 104 MG/DL (ref 70–99)
HCT VFR BLD AUTO: 34 % (ref 39–53)
HGB BLD-MCNC: 10.9 G/DL (ref 13–17.5)
IMM GRANULOCYTES # BLD AUTO: 0.01 X10(3) UL (ref 0–1)
IMM GRANULOCYTES NFR BLD: 0.3 %
INR BLD: 2.09 (ref 0.9–1.2)
LDH SERPL L TO P-CCNC: 282 U/L
LYMPHOCYTES # BLD AUTO: 0.66 X10(3) UL (ref 1–4)
LYMPHOCYTES NFR BLD AUTO: 18.5 %
MCH RBC QN AUTO: 29.5 PG (ref 26–34)
MCHC RBC AUTO-ENTMCNC: 32.1 G/DL (ref 31–37)
MCV RBC AUTO: 92.1 FL (ref 80–100)
MONOCYTES # BLD AUTO: 0.44 X10(3) UL (ref 0.1–1)
MONOCYTES NFR BLD AUTO: 12.3 %
NEUTROPHILS # BLD AUTO: 2.44 X10 (3) UL (ref 1.5–7.7)
NEUTROPHILS # BLD AUTO: 2.44 X10(3) UL (ref 1.5–7.7)
NEUTROPHILS NFR BLD AUTO: 68.3 %
OSMOLALITY SERPL CALC.SUM OF ELEC: 308 MOSM/KG (ref 275–295)
PLATELET # BLD AUTO: 164 10(3)UL (ref 150–450)
POTASSIUM SERPL-SCNC: 3.9 MMOL/L (ref 3.5–5.1)
PROTHROMBIN TIME: 23.1 SECONDS (ref 11.8–14.5)
RBC # BLD AUTO: 3.69 X10(6)UL (ref 3.8–5.8)
SODIUM SERPL-SCNC: 137 MMOL/L (ref 136–145)
WBC # BLD AUTO: 3.6 X10(3) UL (ref 4–11)

## 2020-07-12 PROCEDURE — 99232 SBSQ HOSP IP/OBS MODERATE 35: CPT | Performed by: INTERNAL MEDICINE

## 2020-07-12 PROCEDURE — 99239 HOSP IP/OBS DSCHRG MGMT >30: CPT | Performed by: INTERNAL MEDICINE

## 2020-07-12 RX ORDER — BUMETANIDE 1 MG/1
1 TABLET ORAL DAILY
Qty: 60 TABLET | Refills: 5 | Status: SHIPPED | COMMUNITY
Start: 2020-07-14 | End: 2021-02-26

## 2020-07-12 NOTE — DISCHARGE SUMMARY
[facility]    Discharge Summary    Honey Milder Patient Status:  Inpatient    3/28/1940 MRN [id number]   Location [facility] Attending Jefferson Suresh MD   Hosp Day # 2 PCP Mane Pryor MD     Date of Admission: 7 health). Followed by Huyen Escobar/Jose Luis as outpt.     Recent GI bleed 6/2020  Patient was recently admitted to the hospital last month for suspected GI blood loss with a hemoglobin of 6.1. Patient received 2 units PRBCs on 6/22/2020.   EGD on 623 was unre  aspirin 81 mg daily; was on coumadin 7.5 mg po qHS except 5 mg po qHS on Mon, Wed, Fri; Rx per [facility] coumadin clinic  -INR 1.95; cont same coumadin; repeat in am     Type II Diabetes mellitus, with kidney complication, CKD stage III, without insulin  accuch Oral Tab  Take 1 tablet (6.25 mg total) by mouth 2 (two) times daily with meals. Take one tab along with the 3.125 mg tab for total of 9.375 mg twice daily    Potassium Chloride ER 20 MEQ Oral Tab CR  Take 1 tablet (20 mEq total) by mouth once daily.     AT

## 2020-07-12 NOTE — PLAN OF CARE
Patient's VSS. Patient refusing hydralazine due to being concerned it will result in a low blood pressure in the morning which will prevent him from going home.  This rn educated patient on why it is important to take and that his blood pressure Is stable e Outcome: Progressing

## 2020-07-12 NOTE — PROGRESS NOTES
[facility]     Progress Note        Amanda Ruth Patient Status:  Inpatient    3/28/1940 MRN [id number]   Location [facility] Attending Sergio Anderson MD   Hosp Day # 2 PCP Daljit Benites MD       Subjective:   Nicanor Pelaez UNIT/ML flexpen 1-5 Units, 1-5 Units, Subcutaneous, TID CC        Continuous Infusions:   • sodium chloride 50 mL/hr at 07/11/20 1229       Physical Exam  Constitutional: no acute distress  Eyes: PERRL  ENT: nares pateint  Cardio: irregularly irregular, S1 ventricular paced beats and PVC's ABNORMAL When compared with ECG of 06/21/2020 12:36:56 No significant changes have occurred Electronically signed on 07/10/2020 at 14:40 by Severa Res, M. Assessment   1. COVID-19  2. Anemia  3. Recent GI bleed  4.

## 2020-07-13 ENCOUNTER — PATIENT OUTREACH (OUTPATIENT)
Dept: CASE MANAGEMENT | Age: 80
End: 2020-07-13

## 2020-07-13 ENCOUNTER — TELEPHONE (OUTPATIENT)
Dept: INTERNAL MEDICINE CLINIC | Facility: CLINIC | Age: 80
End: 2020-07-13

## 2020-07-13 DIAGNOSIS — N18.4 STAGE 4 CHRONIC KIDNEY DISEASE (HCC): ICD-10-CM

## 2020-07-13 DIAGNOSIS — Z02.9 ENCOUNTERS FOR ADMINISTRATIVE PURPOSE: ICD-10-CM

## 2020-07-13 DIAGNOSIS — I50.9 CONGESTIVE HEART FAILURE, UNSPECIFIED HF CHRONICITY, UNSPECIFIED HEART FAILURE TYPE (HCC): Primary | ICD-10-CM

## 2020-07-13 DIAGNOSIS — U07.1 LAB TEST POSITIVE FOR DETECTION OF COVID-19 VIRUS: ICD-10-CM

## 2020-07-13 PROCEDURE — 93296 REM INTERROG EVL PM/IDS: CPT | Performed by: INTERNAL MEDICINE

## 2020-07-13 NOTE — PROGRESS NOTES
Initial Post Discharge Follow Up   Discharge Date: 7/12/20  Contact Date: 7/13/2020    Consent Verification:  Assessment Completed With: Patient  HIPAA Verified?   Yes    Discharge Dx:   [id number]         General:   • How have you been since your discharg (Patient taking differently: Place 1 Dose rectally as needed. Apply by topical route 2 times every day to the affected area(s) ) 28 g 0   • carvedilol 3.125 MG Oral Tab Take 1 tablet (3.125 mg total) by mouth 2 (two) times daily with meals.  Take one tab al No  Are you having any concerns with constipation? No    Referrals/orders at D/C:  Home Health/Services ordered at D/C? Yes but patient is stating that he does not want anyone coming to his home. DME ordered at D/C? No    DX: specifics:  CHF:   [facility]EL [facility]  [address] [address] [facility] [address]  [phone]          PCP TCM/HFU appointment: scheduled at D/C within 7-14 days  no     NCM Reviewed/scheduled/rescheduled PCP TCM/ but confirmed changes as stated on the AVS. NCM sent TE to PCP office for HFU appt. Patient denied having any questions or concerns at this time.      CCM referral placed:  Yes      [x]  Discharge Summary, Discharge medications reviewed/discussed/and reconc

## 2020-07-13 NOTE — TELEPHONE ENCOUNTER
Patient discharged 7/12/2020, +COVID-19. Pt will need a f/u call and possible televisit or virtual visit. HFU appt recommended by (7/14/2020—within 2 days of discharge) as pt is a high risk for readmission.  Please discuss with PCP and follow up with leena

## 2020-07-13 NOTE — PROGRESS NOTES
[facility]    Cardiology Progress Note    Derrick Rice Patient Status:  Inpatient    3/28/1940 MRN [id number]   Location [address] [facility] Attending No att. providers found   Hosp Day # 2 PCP Elizabeth Kirby MD         Assessm 07/10/2020    INR 2.09 (H) 07/12/2020    PT 41.6 (H) 04/18/2016    TSH 1.960 02/21/2020    PSA 3.4 12/12/2018    DDIMER 1.24 (H) 07/12/2020    CRP 4.19 (H) 07/12/2020    MG 1.9 06/28/2020    PHOS 2.1 (L) 06/29/2020    TROP 0.197 (HH) 07/10/2020     0

## 2020-07-13 NOTE — TELEPHONE ENCOUNTER
Pt. Is calling to speak with Ladarius Goldberg he was just discharged from [facility] and he is COVID19 positive he wants to know what to do about his appt.  Ph. #  [phone]   Routed high to [facility]

## 2020-07-13 NOTE — TELEPHONE ENCOUNTER
Spoke to pt - s/p discharge and pt will have [address] ;  He will take 5 mg HS until INR with HH;  Pt aware to not come to outpt facilities at this time  Discussed with DR. Niurka Parks will open pt and check BMP/INR 7/16/20;   Order faxed to [phone]

## 2020-07-14 ENCOUNTER — TELEPHONE (OUTPATIENT)
Dept: INTERNAL MEDICINE CLINIC | Facility: CLINIC | Age: 80
End: 2020-07-14

## 2020-07-14 NOTE — TELEPHONE ENCOUNTER
Pt wants to speak with Rajendra Rivera  re: home nurse visits - before scheduling tele - visit w/ Dr Doris Belle

## 2020-07-14 NOTE — TELEPHONE ENCOUNTER
Patient calling today to speak with Grace Benitez. Patient states Grace Benitez step up for someone to visit him at home and he cannot remember when they are coming to visit him.      Best call back: [phone]

## 2020-07-16 ENCOUNTER — VIRTUAL PHONE E/M (OUTPATIENT)
Dept: CARDIOLOGY CLINIC | Facility: HOSPITAL | Age: 80
End: 2020-07-16
Attending: NURSE PRACTITIONER
Payer: MEDICARE

## 2020-07-16 DIAGNOSIS — I25.5 ISCHEMIC CARDIOMYOPATHY: ICD-10-CM

## 2020-07-16 DIAGNOSIS — I50.22 CHRONIC HFREF (HEART FAILURE WITH REDUCED EJECTION FRACTION) (HCC): ICD-10-CM

## 2020-07-16 DIAGNOSIS — N18.9 ACUTE KIDNEY INJURY SUPERIMPOSED ON CKD (HCC): ICD-10-CM

## 2020-07-16 DIAGNOSIS — I48.19 PERSISTENT ATRIAL FIBRILLATION (HCC): ICD-10-CM

## 2020-07-16 DIAGNOSIS — N17.9 ACUTE KIDNEY INJURY SUPERIMPOSED ON CKD (HCC): ICD-10-CM

## 2020-07-16 DIAGNOSIS — U07.1 COVID-19 VIRUS INFECTION: Primary | ICD-10-CM

## 2020-07-16 PROCEDURE — 99443 PR TELEPHONE EVAL AND MGNT EST PATIENT 21-30 MIN MEDICAL DISCUSSION: CPT | Performed by: NURSE PRACTITIONER

## 2020-07-16 RX ORDER — MULTIVIT-MIN/IRON/FOLIC ACID/K 18-600-40
1 CAPSULE ORAL DAILY
Status: ON HOLD | COMMUNITY
End: 2021-08-28

## 2020-07-16 RX ORDER — MULTIVIT WITH MINERALS/LUTEIN
500 TABLET ORAL 2 TIMES DAILY
Refills: 0 | COMMUNITY
Start: 2020-07-16 | End: 2020-07-16

## 2020-07-16 RX ORDER — MULTIVIT WITH MINERALS/LUTEIN
2000 TABLET ORAL 2 TIMES DAILY
COMMUNITY
End: 2020-07-16

## 2020-07-16 RX ORDER — GARLIC EXTRACT 500 MG
1 CAPSULE ORAL 2 TIMES DAILY
COMMUNITY

## 2020-07-16 RX ORDER — ASCORBIC ACID 500 MG
500 TABLET ORAL 2 TIMES DAILY
COMMUNITY
Start: 2020-07-16

## 2020-07-16 NOTE — PROGRESS NOTES
[address] Patient Status:  Outpatient    3/28/1940 MRN [id number]   Location MD Dr. Jamir Gonzales Dr., Dr. is a 78year old function, cr up to 3.0, down to 2.6 after coreg decreased from 25 mg to 3.25 mg bid, hydralazine and isosorbide dc'd, bumex decreased. Coreg up to 6.25 mg bid.  RA 10, RV 45/10, PA 46/18 mean of 32, PCWP 18, CO 1.5, SVR 2096 stable right heart pressures, se negative for cough,  negative hemoptysis and wheezing  Cardiovascular: negative for chest pain, exertional chest pressure/discomfort, near-syncope, orthopnea and palpitations  Gastrointestinal: negative for abdominal pain,  melena, nausea and vomiting  Hem 1. 6   2. Thermodilution PVR / PVRI: 5.1 / 9.6      4. Cirilo C.O. / C. I. :  2.9 / 1.5   5. Cirilo PVR / PVRI:  5.2 / 9.7   6. Cirilo EHX: 9808 dynes         Saturations Study:  1. Ao sat: 96   2. PA sat: 57   3.  SVC sat: 56         Impression:   1. Stable RH and scheduled  -Call if having recurrent diarrhea, fever, chills, night sweats, cough, wheezing, shortness of breath, chest pain or weakness. Chronic HFrEF  ACC/AHA Stage D; NYHA III  --weight is down to 154 lbs since discharge 7/12/2020, with diarrhea.  Adron Home NP  7/16/20

## 2020-07-17 ENCOUNTER — TELEPHONE (OUTPATIENT)
Dept: INTERNAL MEDICINE CLINIC | Facility: CLINIC | Age: 80
End: 2020-07-17

## 2020-07-17 NOTE — TELEPHONE ENCOUNTER
David Moreno - [facility]   called to let Dr Zaida Oneil patient does not want home health services

## 2020-07-17 NOTE — TELEPHONE ENCOUNTER
Patient denied services because he did not understand who Javan Marie from [facility] was. Spoke to patient and explained who Javan Marie is and what her role is. Patient verbalized understanding- patient will call [facility] back and schedule visit with romeo

## 2020-07-21 ENCOUNTER — LAB REQUISITION (OUTPATIENT)
Dept: LAB | Facility: HOSPITAL | Age: 80
End: 2020-07-21
Payer: MEDICARE

## 2020-07-21 ENCOUNTER — TELEPHONE (OUTPATIENT)
Dept: INTERNAL MEDICINE CLINIC | Facility: CLINIC | Age: 80
End: 2020-07-21

## 2020-07-21 DIAGNOSIS — I50.9 HEART FAILURE, UNSPECIFIED (HCC): ICD-10-CM

## 2020-07-21 LAB
ANION GAP SERPL CALC-SCNC: 8 MMOL/L (ref 0–18)
BUN BLD-MCNC: 81 MG/DL (ref 7–18)
BUN/CREAT SERPL: 22.7 (ref 10–20)
CALCIUM BLD-MCNC: 8.3 MG/DL (ref 8.5–10.1)
CHLORIDE SERPL-SCNC: 113 MMOL/L (ref 98–112)
CO2 SERPL-SCNC: 22 MMOL/L (ref 21–32)
CREAT BLD-MCNC: 3.57 MG/DL (ref 0.7–1.3)
GLUCOSE BLD-MCNC: 165 MG/DL (ref 70–99)
INR BLD: 3.46 (ref 0.9–1.2)
OSMOLALITY SERPL CALC.SUM OF ELEC: 324 MOSM/KG (ref 275–295)
POTASSIUM SERPL-SCNC: 4.4 MMOL/L (ref 3.5–5.1)
PROTHROMBIN TIME: 34.3 SECONDS (ref 11.8–14.5)
SODIUM SERPL-SCNC: 143 MMOL/L (ref 136–145)

## 2020-07-21 PROCEDURE — 80048 BASIC METABOLIC PNL TOTAL CA: CPT | Performed by: INTERNAL MEDICINE

## 2020-07-21 PROCEDURE — 85610 PROTHROMBIN TIME: CPT | Performed by: INTERNAL MEDICINE

## 2020-07-21 NOTE — TELEPHONE ENCOUNTER
Sommer Harris / John is calling the patient is Covid positive, they have a nurse available to see him on Thursday 7/23  Pt will have a BMP & PT/INR drawn on Thursday    Please call to confirm that Dr Kenneth Grimes is aware the dena and blood draw will be delayed t

## 2020-07-22 ENCOUNTER — VIRTUAL PHONE E/M (OUTPATIENT)
Dept: INTERNAL MEDICINE CLINIC | Facility: CLINIC | Age: 80
End: 2020-07-22
Payer: MEDICARE

## 2020-07-22 DIAGNOSIS — I48.20 CHRONIC ATRIAL FIBRILLATION (HCC): ICD-10-CM

## 2020-07-22 DIAGNOSIS — U07.1 COVID-19 VIRUS INFECTION: ICD-10-CM

## 2020-07-22 DIAGNOSIS — J96.01 ACUTE HYPOXEMIC RESPIRATORY FAILURE (HCC): Primary | ICD-10-CM

## 2020-07-22 DIAGNOSIS — R19.7 DIARRHEA, UNSPECIFIED TYPE: ICD-10-CM

## 2020-07-22 DIAGNOSIS — E11.22 TYPE 2 DIABETES MELLITUS WITH STAGE 3 CHRONIC KIDNEY DISEASE, UNSPECIFIED WHETHER LONG TERM INSULIN USE: ICD-10-CM

## 2020-07-22 DIAGNOSIS — I25.5 ISCHEMIC CARDIOMYOPATHY: ICD-10-CM

## 2020-07-22 DIAGNOSIS — N18.3 TYPE 2 DIABETES MELLITUS WITH STAGE 3 CHRONIC KIDNEY DISEASE, UNSPECIFIED WHETHER LONG TERM INSULIN USE: ICD-10-CM

## 2020-07-22 DIAGNOSIS — I50.23 ACUTE ON CHRONIC SYSTOLIC CHF (CONGESTIVE HEART FAILURE) (HCC): ICD-10-CM

## 2020-07-22 DIAGNOSIS — N17.9 AKI (ACUTE KIDNEY INJURY) (HCC): ICD-10-CM

## 2020-07-22 DIAGNOSIS — E78.00 HYPERCHOLESTEREMIA: ICD-10-CM

## 2020-07-22 PROCEDURE — 99443 PHONE E/M BY PHYS 21-30 MIN: CPT | Performed by: INTERNAL MEDICINE

## 2020-07-22 NOTE — PROGRESS NOTES
Virtual Telephone Check-In    Freddie Buys verbally consents to a Virtual/Telephone Check-In visit on 07/22/20. Patient has been referred to the [facility] website at [email or web address] to review the yearly Consent to Treat document.     Patient und cardiomyopathy  S/p 3v CABG in 1995, s/p stent x 3 in 2008  TARIQ in Aug 2019 with LVEF 25-30%, hypokinesis of inferior area, mod MR;  LVEF closer to 20% on CATH, with chronic CAD on 11/2/17; unable to afford Entresto 24/26 one tab po BID, so med was changed though currently asymptomatic; ECHO in Aug 2019 shows EF 25-30% ; not on ACEI due to CKD; Rx per Dr Estrada Dural     Mild aortic stenosis  As seen on ECHO in Aug 2019            Continue with current treatment plan.       Anna Murcia MD

## 2020-07-23 ENCOUNTER — TELEPHONE (OUTPATIENT)
Dept: INTERNAL MEDICINE CLINIC | Facility: CLINIC | Age: 80
End: 2020-07-23

## 2020-07-23 NOTE — TELEPHONE ENCOUNTER
Patient is calling stating he had a telephone visit with Dr Kellogg Prior yesterday and was informed per Dr Kellogg Prior that a nurse was going to come to see him today to draw more blood. No nurse has visited the patient today.  Patient is confused and wondering if

## 2020-07-23 NOTE — TELEPHONE ENCOUNTER
To Dr Krissy Egan see message from Silvio melendez. I noted you had telephone visit with patient yesterday  Any further instructions for Silvio melendez?

## 2020-07-23 NOTE — TELEPHONE ENCOUNTER
Bibi @ [facility] :  Noticed that labs were completed on 7/21 but does have not any call from Dr Dee Galeano. Catrachito Lee was on vacation but was not sure if the nurse taking her spot informed Dr Dee Galeano that the labs were completed.      Best call back: [phone]

## 2020-07-27 ENCOUNTER — TELEPHONE (OUTPATIENT)
Dept: INTERNAL MEDICINE CLINIC | Facility: CLINIC | Age: 80
End: 2020-07-27

## 2020-07-27 ENCOUNTER — LAB REQUISITION (OUTPATIENT)
Dept: LAB | Facility: HOSPITAL | Age: 80
End: 2020-07-27
Payer: MEDICARE

## 2020-07-27 DIAGNOSIS — I50.23 ACUTE ON CHRONIC SYSTOLIC (CONGESTIVE) HEART FAILURE (HCC): ICD-10-CM

## 2020-07-27 DIAGNOSIS — I48.20 CHRONIC ATRIAL FIBRILLATION (HCC): ICD-10-CM

## 2020-07-27 DIAGNOSIS — I11.0 HYPERTENSIVE HEART DISEASE WITH HEART FAILURE (HCC): ICD-10-CM

## 2020-07-27 LAB
ANION GAP SERPL CALC-SCNC: 6 MMOL/L (ref 0–18)
BUN BLD-MCNC: 85 MG/DL (ref 7–18)
BUN/CREAT SERPL: 24.7 (ref 10–20)
CALCIUM BLD-MCNC: 8.7 MG/DL (ref 8.5–10.1)
CHLORIDE SERPL-SCNC: 110 MMOL/L (ref 98–112)
CO2 SERPL-SCNC: 24 MMOL/L (ref 21–32)
CREAT BLD-MCNC: 3.44 MG/DL (ref 0.7–1.3)
GLUCOSE BLD-MCNC: 164 MG/DL (ref 70–99)
OSMOLALITY SERPL CALC.SUM OF ELEC: 319 MOSM/KG (ref 275–295)
POTASSIUM SERPL-SCNC: 4.5 MMOL/L (ref 3.5–5.1)
SODIUM SERPL-SCNC: 140 MMOL/L (ref 136–145)

## 2020-07-27 PROCEDURE — 80048 BASIC METABOLIC PNL TOTAL CA: CPT | Performed by: INTERNAL MEDICINE

## 2020-07-27 NOTE — TELEPHONE ENCOUNTER
Please call patient. Please review with him his medications particularly the Bumex that is on our medication profile. Looks like he is on 1 tablet of 1 mg a day. In addition it looks like he is also on metolazone 2.5 mg or 1 tablet as needed. Please ask him if he has taken that recently or how he takes it. Also if he is on potassium. Please ask him which nephrologist he sees most either Dr. Karen Godfrey or Dr. Margot Zavaleta. We will relay his kidney numbers to them. They appear about the same range as they were around July 10 and thereafter. Also please ask him if he had his pro time INR today by fingerstick as I do not see any result called into [facility]. Lastly please ask him how he is feeling and further any changes that he is having.

## 2020-07-27 NOTE — TELEPHONE ENCOUNTER
Spoke to patient--  He confirms he is taking 1 mg Bumex daily. Pt states he has not taken the metolazone for over 1 month now. Pt takes 20 mEq Potassium daily. Pt sees Dr. Tineo Grief more. He is drinking 2 quarts of water daily. He recalls the visiting [facility] getting finger stick from pt, awaiting results at this time from [facility]. Patient mentions he is feeling OK, not having any COVID symptoms at this time at all. Pt would like to be re-tested for COVID eventually but was informed [facility] does not re-test pts. [facility] testing site locations for [facility] and [address] [place] [address] provided to pt.

## 2020-07-27 NOTE — TELEPHONE ENCOUNTER
Discussed with patient. He feels good. No shortness of breath. No swelling. I also spoke to Dr. Seth Tatum regarding patient's renal function. BUN 85 and creatinine 3.44. I did look at his trend from July 10, 2020 and these numbers look fairly consistent although BUN a little bit higher than baseline however creatinine improved from July 11. I have spoken to patient and asked him to follow-up with Dr. Stanislaw Sawyer from nephrology and he verbalized understanding. He has a follow-up with Dr. Jeremías Weller August 6. He did remember a fingerstick but we have no pro time INR results. We will look into this tomorrow to get results and decide on Coumadin dosage. I did review his hospitalization records from July 10 to July 12. Jun Brown, can you follow-up on patient's INR. Patient could not let me know which home health care service he has.

## 2020-07-28 ENCOUNTER — TELEPHONE (OUTPATIENT)
Dept: INTERNAL MEDICINE CLINIC | Facility: CLINIC | Age: 80
End: 2020-07-28

## 2020-07-28 DIAGNOSIS — I48.20 CHRONIC ATRIAL FIBRILLATION (HCC): ICD-10-CM

## 2020-07-28 LAB — INR: 2.3 (ref 0.8–1.2)

## 2020-07-28 NOTE — TELEPHONE ENCOUNTER
Discussed with [employer]. Will repeat INR sometime this week. Recent INR that was elevated was when patient was not feeling well.

## 2020-07-28 NOTE — TELEPHONE ENCOUNTER
[facility] called---See AC note ; Discussed isolation;   Pt afebrile/antipyretics x3 days and 10 days improved symptoms/asymptomatic;  Per DR.K blanca to discontinue isolation  ZAIDA Leos

## 2020-07-28 NOTE — TELEPHONE ENCOUNTER
Spoke to Stanley--she will get INR this week  Contacts      Type Contact Phone   07/21/2020 02:55 PM Phone (Incoming) [place] / John [phone]

## 2020-07-28 NOTE — TELEPHONE ENCOUNTER
[facility] called     to report 7/27 PT/INR results    PT 27.6    INR 2.3    [phone]

## 2020-07-31 ENCOUNTER — TELEPHONE (OUTPATIENT)
Dept: INTERNAL MEDICINE CLINIC | Facility: CLINIC | Age: 80
End: 2020-07-31

## 2020-07-31 DIAGNOSIS — I48.20 CHRONIC ATRIAL FIBRILLATION (HCC): ICD-10-CM

## 2020-07-31 NOTE — TELEPHONE ENCOUNTER
Spoke to pt -  He is describing foot pain with minor swelling;  Rt>Lt; Has been going on for 2 weeks; Was bearable and has escalated now;  Skin color is normal;  Pt reports skin slightly hot to the touch; No open wounds or drainage;   No fever;  Pt is on

## 2020-07-31 NOTE — TELEPHONE ENCOUNTER
Patient calling looking for a call back from [facility], patient would not give any more information.      [phone]

## 2020-07-31 NOTE — TELEPHONE ENCOUNTER
I called pt. R foot pain  2-3 wks ago, \"At first my toe started to hurt and then it spread to the whole leg. \"  Started 2-3 wks ago, \"gradually\", and now is \"unbearable and excruciating. \"  I recom go to ER.  Discussed rationale, to rule out circulatio

## 2020-08-01 ENCOUNTER — HOSPITAL ENCOUNTER (EMERGENCY)
Facility: HOSPITAL | Age: 80
Discharge: HOME | End: 2020-08-01
Attending: EMERGENCY MEDICINE
Payer: MEDICARE

## 2020-08-01 ENCOUNTER — APPOINTMENT (OUTPATIENT)
Dept: GENERAL RADIOLOGY | Facility: HOSPITAL | Age: 80
End: 2020-08-01
Attending: EMERGENCY MEDICINE
Payer: MEDICARE

## 2020-08-01 VITALS
OXYGEN SATURATION: 100 % | HEIGHT: 67 IN | WEIGHT: 152 LBS | BODY MASS INDEX: 23.86 KG/M2 | DIASTOLIC BLOOD PRESSURE: 71 MMHG | SYSTOLIC BLOOD PRESSURE: 118 MMHG | HEART RATE: 67 BPM | TEMPERATURE: 97 F | RESPIRATION RATE: 22 BRPM

## 2020-08-01 DIAGNOSIS — M10.9 ACUTE GOUT OF RIGHT FOOT, UNSPECIFIED CAUSE: Primary | ICD-10-CM

## 2020-08-01 PROCEDURE — 99283 EMERGENCY DEPT VISIT LOW MDM: CPT

## 2020-08-01 PROCEDURE — 73630 X-RAY EXAM OF FOOT: CPT | Performed by: EMERGENCY MEDICINE

## 2020-08-01 RX ORDER — HYDROCODONE BITARTRATE AND ACETAMINOPHEN 5; 325 MG/1; MG/1
1 TABLET ORAL EVERY 6 HOURS PRN
Qty: 10 TABLET | Refills: 0 | Status: SHIPPED | OUTPATIENT
Start: 2020-08-01 | End: 2020-08-08

## 2020-08-01 RX ORDER — HYDROCODONE BITARTRATE AND ACETAMINOPHEN 5; 325 MG/1; MG/1
1 TABLET ORAL ONCE
Status: COMPLETED | OUTPATIENT
Start: 2020-08-01 | End: 2020-08-01

## 2020-08-01 RX ORDER — METHYLPREDNISOLONE 4 MG/1
TABLET ORAL
Qty: 1 PACKAGE | Refills: 0 | Status: SHIPPED | OUTPATIENT
Start: 2020-08-01 | End: 2020-09-22

## 2020-08-01 NOTE — ED PROVIDER NOTES
Patient Seen in: [facility] AND [facility] Emergency Department      History   Patient presents with:  Musculoskeletal Problem    Stated Complaint: L foot pain    HPI  43-year-old male with atrial fibrillation on Coumadin, CAD, CHF, CKD, hypertension, high dominga Years: 35.00        Pack years: 28        Types: Cigarettes        Quit date: 1991        Years since quittin.2      Smokeless tobacco: Never Used    Alcohol use:  Yes      Alcohol/week: 1.0 standard drinks      Types: 1 Glasses of wine per week Mental Status: He is alert.       Comments: No focal deficits               ED Course   Labs Reviewed - No data to display       Xr Foot, Complete (min 3 Views), Left (cpt=73630)    Result Date: 8/1/2020  CONCLUSION: No acute osseous abnormality of the left [address]  [facility] [id number]  [phone]    In 3 days      We recommend that you schedule follow up care with a primary care provider within the next three months to obtain basic health screening including reassessment of your blood pressure.

## 2020-08-03 ENCOUNTER — TELEPHONE (OUTPATIENT)
Dept: INTERNAL MEDICINE CLINIC | Facility: CLINIC | Age: 80
End: 2020-08-03

## 2020-08-03 DIAGNOSIS — I48.20 CHRONIC ATRIAL FIBRILLATION (HCC): ICD-10-CM

## 2020-08-03 LAB — INR: 2.5 (ref 0.8–1.2)

## 2020-08-03 NOTE — TELEPHONE ENCOUNTER
Spoke to patient relayed MD message. Patient verbalized understanding and agrees with plan- patient read back instructions. Instructed patient to call back with any questions or concerns. Patient has appointment 8/6 with Dr. Dahiana Whaley and Brijesh Coleman.

## 2020-08-03 NOTE — TELEPHONE ENCOUNTER
INR 2.5; on warfarin 7.5 Mon, Fri and 5 mg all other days; same Rx; repeat PT/INR in 2 weeks; please call pt

## 2020-08-06 ENCOUNTER — ANTI-COAG VISIT (OUTPATIENT)
Dept: INTERNAL MEDICINE CLINIC | Facility: CLINIC | Age: 80
End: 2020-08-06
Payer: MEDICARE

## 2020-08-06 ENCOUNTER — OFFICE VISIT (OUTPATIENT)
Dept: INTERNAL MEDICINE CLINIC | Facility: CLINIC | Age: 80
End: 2020-08-06
Payer: MEDICARE

## 2020-08-06 VITALS
BODY MASS INDEX: 24.8 KG/M2 | WEIGHT: 158 LBS | SYSTOLIC BLOOD PRESSURE: 130 MMHG | HEIGHT: 67 IN | HEART RATE: 50 BPM | DIASTOLIC BLOOD PRESSURE: 80 MMHG | OXYGEN SATURATION: 96 %

## 2020-08-06 DIAGNOSIS — I48.20 CHRONIC ATRIAL FIBRILLATION (HCC): ICD-10-CM

## 2020-08-06 DIAGNOSIS — Z86.16 HISTORY OF 2019 NOVEL CORONAVIRUS DISEASE (COVID-19): ICD-10-CM

## 2020-08-06 DIAGNOSIS — E78.00 HYPERCHOLESTEREMIA: ICD-10-CM

## 2020-08-06 DIAGNOSIS — I50.23 ACUTE ON CHRONIC SYSTOLIC CHF (CONGESTIVE HEART FAILURE) (HCC): ICD-10-CM

## 2020-08-06 DIAGNOSIS — M10.9 GOUT, UNSPECIFIED CAUSE, UNSPECIFIED CHRONICITY, UNSPECIFIED SITE: Primary | ICD-10-CM

## 2020-08-06 DIAGNOSIS — N18.4 CKD (CHRONIC KIDNEY DISEASE), STAGE IV (HCC): ICD-10-CM

## 2020-08-06 DIAGNOSIS — I25.5 ISCHEMIC CARDIOMYOPATHY: ICD-10-CM

## 2020-08-06 DIAGNOSIS — J44.9 CHRONIC OBSTRUCTIVE PULMONARY DISEASE, UNSPECIFIED COPD TYPE (HCC): ICD-10-CM

## 2020-08-06 LAB
INR: 3.5 (ref 0.8–1.2)
TEST STRIP EXPIRATION DATE: ABNORMAL DATE

## 2020-08-06 PROCEDURE — 1111F DSCHRG MED/CURRENT MED MERGE: CPT | Performed by: INTERNAL MEDICINE

## 2020-08-06 PROCEDURE — 3075F SYST BP GE 130 - 139MM HG: CPT | Performed by: INTERNAL MEDICINE

## 2020-08-06 PROCEDURE — 85610 PROTHROMBIN TIME: CPT

## 2020-08-06 PROCEDURE — 36416 COLLJ CAPILLARY BLOOD SPEC: CPT

## 2020-08-06 PROCEDURE — 99214 OFFICE O/P EST MOD 30 MIN: CPT | Performed by: INTERNAL MEDICINE

## 2020-08-06 PROCEDURE — 1111F DSCHRG MED/CURRENT MED MERGE: CPT

## 2020-08-06 PROCEDURE — 3079F DIAST BP 80-89 MM HG: CPT | Performed by: INTERNAL MEDICINE

## 2020-08-06 PROCEDURE — 3008F BODY MASS INDEX DOCD: CPT | Performed by: INTERNAL MEDICINE

## 2020-08-06 NOTE — PROGRESS NOTES
Severo Christensen is a [de-identified]year old male. HPI:   No chief complaint on file.       [de-identified] y/o M with CKD, cardiomyopathy who was seen in ED 8/1/20 for acute gout of foot; pt was given Medrol Dose Gabo x 6d and pain has resolved; no F/C; taking Bumex 1 mg po standard drinks      Types: 1 Glasses of wine per week      Drinks per session: 1 or 2      Comment: about 3 months ago    Drug use: No       Medications (Active prior to today's visit):  Current Outpatient Medications   Medication Sig Dispense Refill   • with meals. Take one tab along with the 3.125 mg tab for total of 9.375 mg twice daily 180 tablet 1   • Potassium Chloride ER 20 MEQ Oral Tab CR Take 1 tablet (20 mEq total) by mouth once daily.  90 tablet 3   • ATORVASTATIN 40 MG Oral Tab TAKE 1 TABLET NELDA murmur  Respiratory: lungs without crackles or wheezes  Abdomen: normoactive bowel sounds, soft, non-tender and non-distended  Extremities: no clubbing, cyanosis or edema           ASSESSMENT/PLAN:   Acute gout of right foot  Seen in ED on 8/1/20; pain res po qD, and metolazone 2.5 mg prn weight gain; outpt Rx per CHF clinic and Dr Natali Medina  - seen by Dr. Gareth Martins MRI-compatible  - Rx as directed by Dr Teodora Walton     Chronic obstructive pulmonary disease, unspecified [facility]  CXR 11/5/17 with

## 2020-08-10 ENCOUNTER — TELEPHONE (OUTPATIENT)
Dept: INTERNAL MEDICINE CLINIC | Facility: CLINIC | Age: 80
End: 2020-08-10

## 2020-08-10 NOTE — TELEPHONE ENCOUNTER
[facility]. Will cancel his appt. With Kenneth Schaefer will draw him for is INR due to that being his discharge date ph.  # [phone]  Routed high to [facility]

## 2020-08-11 ENCOUNTER — TELEPHONE (OUTPATIENT)
Dept: CASE MANAGEMENT | Age: 80
End: 2020-08-11

## 2020-08-11 NOTE — TELEPHONE ENCOUNTER
Rescheduled for:  Video Capsule Enteroscopy 73093  Provider Name:  Dr Elsy Mullen  Date: From 08/17/2020 To: 8/19/2020  Location:  [facility]  Sedation:  NA  Time:  0730 (pt is aware to arrive at 0630)    Prep:  Magnesium citrate  Meds/Allergies Reconciled?: Physician

## 2020-08-11 NOTE — TELEPHONE ENCOUNTER
I spoke to patient to advise him I will call endo to get a date for his VCE. Spoke to georgie in endo to get a date, she will call me back at ext [phone]. PATIENT IS AWARE I WILL CALL HIM BACK AS SOON AS I GET A DATE.

## 2020-08-11 NOTE — TELEPHONE ENCOUNTER
Scheduled for:  Video Capsule Enteroscopy 88644  Provider Name:  Dr Alethea Hoffman  Date:  08/17/2020  Location:  [facility]  Sedation:  NA  Time:  0730 (pt is aware to arrive at 0630)    Prep:  Magnesium citrate  Meds/Allergies Reconciled?: Physician reviewed     Donna Wilhelm

## 2020-08-11 NOTE — TELEPHONE ENCOUNTER
Patient is eligible for a 2020 Medicare Advantage Supervisit. Discussed in detail w/patient. Appt scheduled for 10/1/20.

## 2020-08-12 ENCOUNTER — TELEPHONE (OUTPATIENT)
Dept: NEPHROLOGY | Facility: CLINIC | Age: 80
End: 2020-08-12

## 2020-08-12 ENCOUNTER — APPOINTMENT (OUTPATIENT)
Dept: LAB | Age: 80
End: 2020-08-12
Attending: INTERNAL MEDICINE
Payer: MEDICARE

## 2020-08-12 DIAGNOSIS — M10.9 GOUT, UNSPECIFIED CAUSE, UNSPECIFIED CHRONICITY, UNSPECIFIED SITE: ICD-10-CM

## 2020-08-12 DIAGNOSIS — N18.4 CKD (CHRONIC KIDNEY DISEASE), STAGE IV (HCC): ICD-10-CM

## 2020-08-12 DIAGNOSIS — M10.9 GOUT, UNSPECIFIED CAUSE, UNSPECIFIED CHRONICITY, UNSPECIFIED SITE: Primary | ICD-10-CM

## 2020-08-12 LAB
ANION GAP SERPL CALC-SCNC: 6 MMOL/L (ref 0–18)
BUN BLD-MCNC: 53 MG/DL (ref 7–18)
BUN SERPL-MCNC: 53 MG/DL
BUN/CREAT SERPL: 17.4 (ref 10–20)
CALCIUM BLD-MCNC: 8.2 MG/DL (ref 8.5–10.1)
CALCIUM SERPL-MCNC: 8.2 MG/DL
CHLORIDE SERPL-SCNC: 104 MMOL/L
CHLORIDE SERPL-SCNC: 104 MMOL/L (ref 98–112)
CO2 SERPL-SCNC: 27 MMOL/L (ref 21–32)
CREAT BLD-MCNC: 3.04 MG/DL (ref 0.7–1.3)
CREAT SERPL-MCNC: 3.04 MG/DL
GLUCOSE BLD-MCNC: 121 MG/DL (ref 70–99)
GLUCOSE SERPL-MCNC: 121 MG/DL
OSMOLALITY SERPL CALC.SUM OF ELEC: 300 MOSM/KG (ref 275–295)
PATIENT FASTING Y/N/NP: YES
POTASSIUM SERPL-SCNC: 3.5 MMOL/L
POTASSIUM SERPL-SCNC: 3.5 MMOL/L (ref 3.5–5.1)
SODIUM SERPL-SCNC: 137 MMOL/L
SODIUM SERPL-SCNC: 137 MMOL/L (ref 136–145)
URATE SERPL-MCNC: 8.8 MG/DL
URATE SERPL-MCNC: 8.8 MG/DL (ref 3.5–7.2)

## 2020-08-12 PROCEDURE — 80048 BASIC METABOLIC PNL TOTAL CA: CPT

## 2020-08-12 PROCEDURE — 84550 ASSAY OF BLOOD/URIC ACID: CPT

## 2020-08-12 PROCEDURE — 36415 COLL VENOUS BLD VENIPUNCTURE: CPT

## 2020-08-13 ENCOUNTER — CLINICAL ABSTRACT (OUTPATIENT)
Dept: CARDIOLOGY | Age: 80
End: 2020-08-13

## 2020-08-13 RX ORDER — ALLOPURINOL 100 MG/1
100 TABLET ORAL DAILY
Qty: 30 TABLET | Refills: 5 | Status: SHIPPED | OUTPATIENT
Start: 2020-08-13 | End: 2020-08-20

## 2020-08-13 NOTE — TELEPHONE ENCOUNTER
Jake Frias,  He had acute gout of right foot and was seen in ED on 8/1/20. His pain resolved on Medrol Dose Gabo x 6d. He had never had gout before.      Andrew Pro

## 2020-08-13 NOTE — TELEPHONE ENCOUNTER
Imp- hyperuricemia; recent gout; uric acid 8.8    Rec- start allopurinol 100 mg po qD; will re-check uric acid in 6-8 weeks; pt called; ERx sent; FYFLORENCE to Dr Zak Doran, [facility])

## 2020-08-14 ENCOUNTER — TELEPHONE (OUTPATIENT)
Dept: GASTROENTEROLOGY | Facility: CLINIC | Age: 80
End: 2020-08-14

## 2020-08-14 ENCOUNTER — TELEPHONE (OUTPATIENT)
Dept: CARDIOLOGY | Age: 80
End: 2020-08-14

## 2020-08-14 ENCOUNTER — TELEPHONE (OUTPATIENT)
Dept: INTERNAL MEDICINE CLINIC | Facility: CLINIC | Age: 80
End: 2020-08-14

## 2020-08-14 DIAGNOSIS — R19.5 OCCULT BLOOD IN STOOLS: Primary | ICD-10-CM

## 2020-08-14 DIAGNOSIS — D50.0 CHRONIC BLOOD LOSS ANEMIA: ICD-10-CM

## 2020-08-14 NOTE — TELEPHONE ENCOUNTER
Spoke to patient and relayed MD message, patient verbalized understanding. Patient mentioned that he had redness and swelling also to his right hand and possibly to his lip. Patient's timeline and symptoms were difficult to follow.  Patient states his sympt

## 2020-08-14 NOTE — TELEPHONE ENCOUNTER
Please call pt regarding new medication:  Allopurinol 100MG  Pt feels this is too strong, can dosage be decreased, cut in half?   Please call to discuss/advise  Tasked to nursing

## 2020-08-14 NOTE — TELEPHONE ENCOUNTER
To Dr. Mary Klein to advise--  Spoke to patient who is wondering if his allopurinol 100 mg tablet could be decreased to 50 mg daily. Pt states \"it does work, but it cuts down my breathing a little\". Pt denies SOB currently.  Pt believes this medication causes his

## 2020-08-17 NOTE — TELEPHONE ENCOUNTER
I spoke to the pt and let him know we have to cancel his procedure. Pt felt so bad about not stopping the iron supplements    I told him to stop taking the iron until after his procedure.     He is also experiencing constipation     I told him he can eron

## 2020-08-17 NOTE — TELEPHONE ENCOUNTER
Pt states that he does NOT want to reschedule procedure now if possible. He also has questions about his medications. Please call.

## 2020-08-17 NOTE — TELEPHONE ENCOUNTER
GI/RN--    This TE was sent to Surg Sched but I believe this should have been sent to GI/RN    This patient wants to cancel her Video Capsule but I am not sure this is a good idea and he also has medication questions.     Please contact this patient and ple

## 2020-08-17 NOTE — TELEPHONE ENCOUNTER
Rescheduled for:  Video Capsule 59100  Provider Name:  Dr. Brionna Zapata  Date:    From-8/19/20  To-8/27/20  Location:    [facility]  Sedation:  No sedation  Time:  0730 (pt is aware to arrive at 0630)   Prep:  Magnesium Citrate, pt picking up prep   Meds/Allergies Adama

## 2020-08-17 NOTE — TELEPHONE ENCOUNTER
Patient dropped down to allopurinol 50 mg for the last few days. He reports he is still having shortness of breath and constipation. He thinks this is being caused by the allopurinol and asks if there is something else he can take.   Patient tells me he ha

## 2020-08-17 NOTE — TELEPHONE ENCOUNTER
Verbally discussed with Dr Omar Carson  Patient should discontinue allopurinol  Ask patient about his weight. Spoke to patient and relayed to stop allopurinol. Patient states he has had no weight increases.  States he has been around 151 lbs for about a

## 2020-08-17 NOTE — TELEPHONE ENCOUNTER
Patient calling having side effects from medication Allopurinol. On 8/14/2020 was instructed to cut Allopurinol to 50mg daily. Having shortness of breath and constipation.     Best number to contact patient at [phone]

## 2020-08-17 NOTE — TELEPHONE ENCOUNTER
I spoke to the pt and he has not stopped his oral iron at all. He even took it this morning.      He just read his instructions this morning    Asking if he should proceed or reschedule his video capsule

## 2020-08-17 NOTE — TELEPHONE ENCOUNTER
Yes, that iron is like tar in the small intestines. Please ask Bere Crowley to discontinue it and reschedule the exam for 10-14 days. He is a jadon man. Please give him my regards and ask how he has been doing.   Last I saw him we were sending him back to t

## 2020-08-19 NOTE — TELEPHONE ENCOUNTER
Pt called to see what Dr Pino Galarza advised  Please call back when information available  Tasked to nursing

## 2020-08-20 ENCOUNTER — TELEPHONE (OUTPATIENT)
Dept: INTERNAL MEDICINE CLINIC | Facility: CLINIC | Age: 80
End: 2020-08-20

## 2020-08-20 ENCOUNTER — TELEPHONE (OUTPATIENT)
Dept: GASTROENTEROLOGY | Facility: CLINIC | Age: 80
End: 2020-08-20

## 2020-08-20 DIAGNOSIS — D50.0 ANEMIA DUE TO CHRONIC BLOOD LOSS: Primary | ICD-10-CM

## 2020-08-20 DIAGNOSIS — I48.20 CHRONIC ATRIAL FIBRILLATION (HCC): ICD-10-CM

## 2020-08-20 DIAGNOSIS — D50.0 IRON DEFICIENCY ANEMIA DUE TO CHRONIC BLOOD LOSS: ICD-10-CM

## 2020-08-20 LAB — INR: 4.1 (ref 0.8–1.2)

## 2020-08-20 NOTE — TELEPHONE ENCOUNTER
Vidhya Shepard from [facility] calling with PT/INR    PT 49.2  INR 4.1    Dosage: 7.5mg Monday only  5mg other 6 days of the week.     Call Vidhya Shepard back at [phone]

## 2020-08-20 NOTE — TELEPHONE ENCOUNTER
Pt states that he has been taking \"strong laxatives\" in order to have a bowel movement. Pt states that he has now been having problems with bleeding after bowel movement. Please call.

## 2020-08-20 NOTE — TELEPHONE ENCOUNTER
Patient contacted and message from Dr. Brionna Zapata given. Patient states he will go for CBC tomorrow and would like to keep video capsule procedure on 08/27/2020. Patient voiced understanding. Awaiting results.

## 2020-08-20 NOTE — TELEPHONE ENCOUNTER
Patient states he is scheduled for a video capsule on 08/27/2020.  3 months ago started having trouble with constipation and now has to take a Senna every night ( x 2 weeks)  in order to have a bowel movement in the morning.    Patient states has 3 to 4 bow

## 2020-08-20 NOTE — TELEPHONE ENCOUNTER
Please advise Mr. Burk to start taking MiraLAX once daily every morning along with the senna. That should relieve the constipation. Very important. We could certainly postpone the capsule study.   In July his CBC was stable after severe anemia

## 2020-08-21 ENCOUNTER — LAB ENCOUNTER (OUTPATIENT)
Dept: LAB | Age: 80
End: 2020-08-21
Attending: INTERNAL MEDICINE
Payer: MEDICARE

## 2020-08-21 DIAGNOSIS — D50.0 ANEMIA DUE TO CHRONIC BLOOD LOSS: ICD-10-CM

## 2020-08-21 LAB
BASOPHILS # BLD AUTO: 0.03 X10(3) UL (ref 0–0.2)
BASOPHILS NFR BLD AUTO: 0.6 %
DEPRECATED RDW RBC AUTO: 67.7 FL (ref 35.1–46.3)
EOSINOPHIL # BLD AUTO: 0.25 X10(3) UL (ref 0–0.7)
EOSINOPHIL NFR BLD AUTO: 5 %
ERYTHROCYTE [DISTWIDTH] IN BLOOD BY AUTOMATED COUNT: 19.2 % (ref 11–15)
HCT VFR BLD AUTO: 26 % (ref 39–53)
HGB BLD-MCNC: 8.2 G/DL (ref 13–17.5)
IMM GRANULOCYTES # BLD AUTO: 0.01 X10(3) UL (ref 0–1)
IMM GRANULOCYTES NFR BLD: 0.2 %
LYMPHOCYTES # BLD AUTO: 0.66 X10(3) UL (ref 1–4)
LYMPHOCYTES NFR BLD AUTO: 13.3 %
MCH RBC QN AUTO: 30.4 PG (ref 26–34)
MCHC RBC AUTO-ENTMCNC: 31.5 G/DL (ref 31–37)
MCV RBC AUTO: 96.3 FL (ref 80–100)
MONOCYTES # BLD AUTO: 0.42 X10(3) UL (ref 0.1–1)
MONOCYTES NFR BLD AUTO: 8.5 %
NEUTROPHILS # BLD AUTO: 3.6 X10 (3) UL (ref 1.5–7.7)
NEUTROPHILS # BLD AUTO: 3.6 X10(3) UL (ref 1.5–7.7)
NEUTROPHILS NFR BLD AUTO: 72.4 %
PLATELET # BLD AUTO: 175 10(3)UL (ref 150–450)
PLATELET MORPHOLOGY: NORMAL
RBC # BLD AUTO: 2.7 X10(6)UL (ref 3.8–5.8)
WBC # BLD AUTO: 5 X10(3) UL (ref 4–11)

## 2020-08-21 PROCEDURE — 36415 COLL VENOUS BLD VENIPUNCTURE: CPT

## 2020-08-21 PROCEDURE — 85025 COMPLETE CBC W/AUTO DIFF WBC: CPT

## 2020-08-24 NOTE — OR NURSING
Patient denies any symptoms of COVID-19. Per Infection Control, no pre-procedure COVID test needs to be performed if patient symptoms free.

## 2020-08-25 ENCOUNTER — TELEPHONE (OUTPATIENT)
Dept: GASTROENTEROLOGY | Facility: CLINIC | Age: 80
End: 2020-08-25

## 2020-08-25 NOTE — TELEPHONE ENCOUNTER
Rich/Insurance Verification states PA is pending due clinical notes needed. PA is needed for Capsule endoscopy scheduled for 8/27/20.  Please contact [employer] pending authorization # [id number]   Fax: [phone]

## 2020-08-25 NOTE — TELEPHONE ENCOUNTER
Facundo Juarez,    Is there a reason clinicals were not sent to [facility] for this capsule endoscopy scheduled for 08/27/2020    I sent the clinicals to the appropriate fax number    Can you please follow up for me

## 2020-08-26 NOTE — TELEPHONE ENCOUNTER
Adry,  The clinicals have been faxed as noted in the referral.  If you would like the clinicals faxed again, please let me know.   Thank you,  Ivan Rodney

## 2020-08-27 ENCOUNTER — HOSPITAL ENCOUNTER (OUTPATIENT)
Facility: HOSPITAL | Age: 80
Setting detail: HOSPITAL OUTPATIENT SURGERY
Discharge: HOME | End: 2020-08-27
Attending: INTERNAL MEDICINE | Admitting: INTERNAL MEDICINE
Payer: MEDICARE

## 2020-08-27 VITALS
OXYGEN SATURATION: 98 % | SYSTOLIC BLOOD PRESSURE: 124 MMHG | HEART RATE: 60 BPM | DIASTOLIC BLOOD PRESSURE: 68 MMHG | RESPIRATION RATE: 26 BRPM | WEIGHT: 151 LBS | BODY MASS INDEX: 23.7 KG/M2 | TEMPERATURE: 98 F | HEIGHT: 67 IN

## 2020-08-27 DIAGNOSIS — D50.0 CHRONIC BLOOD LOSS ANEMIA: ICD-10-CM

## 2020-08-27 DIAGNOSIS — R19.5 OCCULT BLOOD IN STOOLS: ICD-10-CM

## 2020-08-27 PROCEDURE — 0DJ07ZZ INSPECTION OF UPPER INTESTINAL TRACT, VIA NATURAL OR ARTIFICIAL OPENING: ICD-10-PCS | Performed by: INTERNAL MEDICINE

## 2020-08-27 PROCEDURE — 91110 GI TRC IMG INTRAL ESOPH-ILE: CPT | Performed by: INTERNAL MEDICINE

## 2020-08-27 PROCEDURE — 93288 INTERROG EVL PM/LDLS PM IP: CPT | Performed by: NURSE PRACTITIONER

## 2020-08-27 NOTE — PROGRESS NOTES
Patient admitted for observation x8 hours to monitor pacemaker post capsule endoscopy. Patient denies any pain at this time. Currently ventricular paced. Observation to be completed at 19:45.

## 2020-08-27 NOTE — ICD/PM
Called for abn pacer spikes on monitor- device interrogated- patient without cardiac complaints  -d/w [facility] rep- no acute events noted- he is set to trigger pace at [facility].  reviewed with md blanca to proceed with gi capsul study

## 2020-08-28 PROBLEM — D50.0 ANEMIA DUE TO CHRONIC BLOOD LOSS: Status: ACTIVE | Noted: 2020-08-28

## 2020-08-28 PROBLEM — D50.0 IRON DEFICIENCY ANEMIA SECONDARY TO BLOOD LOSS (CHRONIC): Status: ACTIVE | Noted: 2020-08-28

## 2020-08-28 NOTE — TELEPHONE ENCOUNTER
Sacha Laughlin and GI RNs,    We did get the Given video capsule study done today. I hope to have it read by Monday night. The last CBC 8/21 showed that his anemia was worsened again (8.2g).   He has been seeing a significant amount of rectal/hemorrhoidal type bl

## 2020-08-28 NOTE — TELEPHONE ENCOUNTER
Venofer does not require prior authorization per the [facility] Advantage precert list. Okay for patient to schedule.

## 2020-08-28 NOTE — TELEPHONE ENCOUNTER
Dr Hwang Ba,    1) pt will make an appointment to have his CBC/Iron Studies done either Saturday or early next week. He has the number to Central Scheduling    2) Pt was not taking his MiraLax.  I advised him to please start that once daily in the morning wit

## 2020-08-28 NOTE — TELEPHONE ENCOUNTER
Adry,    I have been attempting to get an approximate turnaround time from [facility], but they are refusing to provide any information regarding how long it will take to determine the outcome of this case.     Hank stated they have received the clinica

## 2020-08-30 NOTE — H&P
History & Physical Examination    Patient Name: Shirley Swanson  MRN: [id number]  [facility]: [id number]  YOB: 1940    Diagnosis: Chronic blood loss anemia    Present Illness: 80-year-old gentleman with complex history of heart and kidney diseas Relation Age of Onset   • Hypertension Father    • Diabetes Mother    • Glaucoma Neg    • Macular degeneration Neg      Social History    Tobacco Use      Smoking status: Former Smoker        Packs/day: 1.00        Years: 35.00        Pack years: 28

## 2020-08-31 ENCOUNTER — TELEPHONE (OUTPATIENT)
Dept: INTERNAL MEDICINE CLINIC | Facility: CLINIC | Age: 80
End: 2020-08-31

## 2020-08-31 ENCOUNTER — TELEPHONE (OUTPATIENT)
Dept: GASTROENTEROLOGY | Facility: CLINIC | Age: 80
End: 2020-08-31

## 2020-08-31 ENCOUNTER — LAB ENCOUNTER (OUTPATIENT)
Dept: LAB | Age: 80
End: 2020-08-31
Attending: INTERNAL MEDICINE
Payer: MEDICARE

## 2020-08-31 DIAGNOSIS — D50.0 ANEMIA DUE TO CHRONIC BLOOD LOSS: ICD-10-CM

## 2020-08-31 DIAGNOSIS — Z51.81 ENCOUNTER FOR MONITORING COUMADIN THERAPY: Primary | ICD-10-CM

## 2020-08-31 DIAGNOSIS — D50.0 CHRONIC BLOOD LOSS ANEMIA: Primary | ICD-10-CM

## 2020-08-31 DIAGNOSIS — K55.20 ARTERIOVENOUS MALFORMATION OF COLON: ICD-10-CM

## 2020-08-31 DIAGNOSIS — K92.1 HEMATOCHEZIA: ICD-10-CM

## 2020-08-31 DIAGNOSIS — Z79.01 ENCOUNTER FOR MONITORING COUMADIN THERAPY: Primary | ICD-10-CM

## 2020-08-31 DIAGNOSIS — I48.20 CHRONIC ATRIAL FIBRILLATION (HCC): ICD-10-CM

## 2020-08-31 DIAGNOSIS — R19.5 OCCULT BLOOD IN STOOLS: ICD-10-CM

## 2020-08-31 LAB
DEPRECATED HBV CORE AB SER IA-ACNC: 95.4 NG/ML (ref 30–530)
DEPRECATED RDW RBC AUTO: 71.8 FL (ref 35.1–46.3)
ERYTHROCYTE [DISTWIDTH] IN BLOOD BY AUTOMATED COUNT: 19.9 % (ref 11–15)
HCT VFR BLD AUTO: 27.2 % (ref 39–53)
HGB BLD-MCNC: 8.5 G/DL (ref 13–17.5)
IRON SATURATION: 22 % (ref 20–50)
IRON SERPL-MCNC: 66 UG/DL (ref 65–175)
MCH RBC QN AUTO: 30.9 PG (ref 26–34)
MCHC RBC AUTO-ENTMCNC: 31.3 G/DL (ref 31–37)
MCV RBC AUTO: 98.9 FL (ref 80–100)
PLATELET # BLD AUTO: 222 10(3)UL (ref 150–450)
RBC # BLD AUTO: 2.75 X10(6)UL (ref 3.8–5.8)
TOTAL IRON BINDING CAPACITY: 297 UG/DL (ref 240–450)
TRANSFERRIN SERPL-MCNC: 199 MG/DL (ref 200–360)
WBC # BLD AUTO: 4.2 X10(3) UL (ref 4–11)

## 2020-08-31 PROCEDURE — 82728 ASSAY OF FERRITIN: CPT

## 2020-08-31 PROCEDURE — 83540 ASSAY OF IRON: CPT

## 2020-08-31 PROCEDURE — 85027 COMPLETE CBC AUTOMATED: CPT

## 2020-08-31 PROCEDURE — 36415 COLL VENOUS BLD VENIPUNCTURE: CPT

## 2020-08-31 PROCEDURE — 84466 ASSAY OF TRANSFERRIN: CPT

## 2020-08-31 RX ORDER — FERROUS SULFATE 325(65) MG
325 TABLET ORAL 2 TIMES DAILY
Qty: 180 TABLET | Refills: 3 | Status: SHIPPED | OUTPATIENT
Start: 2020-08-31 | End: 2020-11-29

## 2020-08-31 RX ORDER — PANTOPRAZOLE SODIUM 40 MG/1
40 TABLET, DELAYED RELEASE ORAL
Qty: 90 TABLET | Refills: 3 | Status: SHIPPED | OUTPATIENT
Start: 2020-08-31 | End: 2020-11-29

## 2020-08-31 NOTE — TELEPHONE ENCOUNTER
Teresa Allen,    Could you please call Julee Raymond and advise that this morning's CBC is stable, hemoglobin 8.5g not great but a little bit better than 10 days ago 8 2g. I will be reading his capsule study today.   If no findings and the bleeding continues, we may

## 2020-08-31 NOTE — TELEPHONE ENCOUNTER
Patient called in stating that he is bleeding again when using the restroom.  Please advise thank you

## 2020-08-31 NOTE — TELEPHONE ENCOUNTER
Spoke to pt - we will check INR tomorrow in clinic; Pt will skip his warfarin tonight; He will wait for DR. Bianchi to call on report

## 2020-08-31 NOTE — TELEPHONE ENCOUNTER
Dr Benedict Sharma,    I spoke to Kalia Ceja over the phone    He started to experience bloody stools again today. He states the loss of blood is significant. He is not weak or dizzy and states he only gets SOB when he is bloated.  Denies abdominal pain    He was off

## 2020-08-31 NOTE — TELEPHONE ENCOUNTER
Had capsule endoscopy on 8/28 per Dr Gopal Pate; had repeat CBC today; please call pt and inquire Re: medication/ warfarin; to [facility] Prom

## 2020-08-31 NOTE — TELEPHONE ENCOUNTER
Please call pt  He is bleeding again, rectal bleeding after bowel movement  Pt would like to discuss dosage of medication  Tasked to [place]

## 2020-08-31 NOTE — TELEPHONE ENCOUNTER
Dr Ramon Marcelino,    I spoke to Maicol Han.     I let him know his CBC is stable    He will go in the morning for a PT/INR    He is scheduled for a Venofer Infusion this Friday    I let him know you are reading his capsule now    He will hold his coumadin for 2 days

## 2020-08-31 NOTE — TELEPHONE ENCOUNTER
See DR. Bianchi note:   Spoke to pt  - we will check INR tomorrow in clinic;   Pt will skip his warfarin tonight;

## 2020-08-31 NOTE — TELEPHONE ENCOUNTER
You're welcome! I just called Hank again, and the case is still pending. I also informed Hank the Dr. Kaity Rico like this test completed asap, and the representative Joseline Yanes stated she is noting that and passing the info on to the clinician reviewer.

## 2020-09-01 ENCOUNTER — TELEPHONE (OUTPATIENT)
Dept: CARDIOLOGY | Age: 80
End: 2020-09-01

## 2020-09-01 ENCOUNTER — ANTI-COAG VISIT (OUTPATIENT)
Dept: INTERNAL MEDICINE CLINIC | Facility: CLINIC | Age: 80
End: 2020-09-01
Payer: MEDICARE

## 2020-09-01 DIAGNOSIS — I48.20 CHRONIC ATRIAL FIBRILLATION (HCC): ICD-10-CM

## 2020-09-01 LAB
INR: 1.3 (ref 0.8–1.2)
TEST STRIP EXPIRATION DATE: ABNORMAL DATE

## 2020-09-01 PROCEDURE — 36416 COLLJ CAPILLARY BLOOD SPEC: CPT

## 2020-09-01 PROCEDURE — 85610 PROTHROMBIN TIME: CPT

## 2020-09-01 PROCEDURE — 93793 ANTICOAG MGMT PT WARFARIN: CPT

## 2020-09-01 NOTE — OPERATIVE REPORT
\"GIVEN\" Small Bowel Capsule Endoscopy report    Pre-op Diagnosis: Iron deficiency anemia due to chronic blood loss; occult blood in the stool    Postoperative diagnosis: See impression    Study date: 8/27/2020    Sedation: none    Study details:     The p pantoprazole; prescription sent in tonight to his [address] · Intravenous iron \"Venofer\" infusions have been scheduled on a weekly basis to start end of this week. Proceed with the iron infusions.   · Findings of suspected cecal or ascending col

## 2020-09-01 NOTE — TELEPHONE ENCOUNTER
I called Mr Oc Oliveira to follow-up on last week's Given video capsule enteroscopy examination.     That study showed significant erosive gastritis/duodenitis and what appears to be multiple AVM lesions likely cecum and proximal colon, likely ascending co

## 2020-09-01 NOTE — TELEPHONE ENCOUNTER
GI schedulers, please call Mr Hewitt Laughlin to schedule colonoscopy examination [facility] only in the next 2-3 weeks if possible.     WED SEPT 9TH if possible - should be at [facility] ([facility] still closed)  Otherwise Sept 16th or afternoon of Monday Sept 14th (office day)

## 2020-09-01 NOTE — TELEPHONE ENCOUNTER
Scheduled for:  Colonoscopy 09134  Provider Name:  Dr. Sanchez Ventura  Date:  9/9/20  Location:    [facility]  Sedation:  MAC  Time:  1500 (pt is aware to arrive at 1400)   Prep:  Golytely, pt will  instructions today 9/1/20  Meds/Allergies Reconciled?:  Physician

## 2020-09-02 ENCOUNTER — TELEPHONE (OUTPATIENT)
Dept: GASTROENTEROLOGY | Facility: CLINIC | Age: 80
End: 2020-09-02

## 2020-09-02 DIAGNOSIS — K55.20 ARTERIOVENOUS MALFORMATION OF COLON: ICD-10-CM

## 2020-09-02 DIAGNOSIS — D50.0 CHRONIC BLOOD LOSS ANEMIA: Primary | ICD-10-CM

## 2020-09-02 DIAGNOSIS — R19.5 OCCULT BLOOD IN STOOLS: ICD-10-CM

## 2020-09-02 NOTE — TELEPHONE ENCOUNTER
Rescheduled for:  Colonoscopy 45267  Provider Name:  Dr. Ritika Jacob  Date:    From-9/9/20  To-9/8/20  Location:    [facility]  Sedation:  MAC  Time:    From-1500   To-0830 (pt is aware to arrive at 0730)   Prep:  Golytely, pt will  instructions today 9/3/20  [facility]

## 2020-09-02 NOTE — TELEPHONE ENCOUNTER
Just received word from [facility] the request for capsule endoscopy has been approved.

## 2020-09-03 ENCOUNTER — TELEPHONE (OUTPATIENT)
Dept: GASTROENTEROLOGY | Facility: CLINIC | Age: 80
End: 2020-09-03

## 2020-09-03 NOTE — TELEPHONE ENCOUNTER
Pt stopped at desk- has appt for colonoscopy 9/9  Pt has stopped taking iron pills- but has appt tomorrow (Fri) for Iron infusion- pt wondering if he should keep that appt- pl call pt

## 2020-09-03 NOTE — TELEPHONE ENCOUNTER
Patient transported to imaging   Yes, absolutely keep the appointment for the iron infusion. The iron infusion will not affect his bowel prep. He should not be taking iron pills as iron pills look like tar in the small intestine and colonoscopy.

## 2020-09-04 ENCOUNTER — OFFICE VISIT (OUTPATIENT)
Dept: HEMATOLOGY/ONCOLOGY | Facility: HOSPITAL | Age: 80
End: 2020-09-04
Attending: INTERNAL MEDICINE
Payer: MEDICARE

## 2020-09-04 VITALS
SYSTOLIC BLOOD PRESSURE: 107 MMHG | TEMPERATURE: 97 F | RESPIRATION RATE: 18 BRPM | DIASTOLIC BLOOD PRESSURE: 49 MMHG | HEART RATE: 70 BPM

## 2020-09-04 DIAGNOSIS — D50.0 ANEMIA DUE TO CHRONIC BLOOD LOSS: ICD-10-CM

## 2020-09-04 DIAGNOSIS — D50.0 IRON DEFICIENCY ANEMIA SECONDARY TO BLOOD LOSS (CHRONIC): Primary | ICD-10-CM

## 2020-09-04 PROCEDURE — 96374 THER/PROPH/DIAG INJ IV PUSH: CPT

## 2020-09-04 NOTE — OR NURSING
Per infection control, patient does not need a pre-procedure COVID due to positive result within last 90 days. No need for isolation if patient asymptomatic.

## 2020-09-04 NOTE — PROGRESS NOTES
Zach to infusion today for 200mg Venofer 1 of 8. He arrives alert and independent. He has colonoscopy scheduled for 9/8/20, with next dose of iron on 9/11/20. He is feeling well, is currently off of his oral iron since last week.  He reports having 3 dose

## 2020-09-08 ENCOUNTER — HOSPITAL ENCOUNTER (OUTPATIENT)
Facility: HOSPITAL | Age: 80
Setting detail: HOSPITAL OUTPATIENT SURGERY
Discharge: HOME | End: 2020-09-08
Attending: INTERNAL MEDICINE | Admitting: INTERNAL MEDICINE
Payer: MEDICARE

## 2020-09-08 ENCOUNTER — ANESTHESIA (OUTPATIENT)
Dept: ENDOSCOPY | Facility: HOSPITAL | Age: 80
End: 2020-09-08
Payer: MEDICARE

## 2020-09-08 ENCOUNTER — ANESTHESIA EVENT (OUTPATIENT)
Dept: ENDOSCOPY | Facility: HOSPITAL | Age: 80
End: 2020-09-08
Payer: MEDICARE

## 2020-09-08 VITALS
SYSTOLIC BLOOD PRESSURE: 123 MMHG | HEIGHT: 67 IN | BODY MASS INDEX: 23.86 KG/M2 | DIASTOLIC BLOOD PRESSURE: 59 MMHG | TEMPERATURE: 98 F | WEIGHT: 152 LBS | OXYGEN SATURATION: 97 % | HEART RATE: 73 BPM | RESPIRATION RATE: 21 BRPM

## 2020-09-08 DIAGNOSIS — K55.20 ARTERIOVENOUS MALFORMATION OF COLON: ICD-10-CM

## 2020-09-08 DIAGNOSIS — R19.5 OCCULT BLOOD IN STOOLS: ICD-10-CM

## 2020-09-08 DIAGNOSIS — Z01.818 PRE-OP TESTING: Primary | ICD-10-CM

## 2020-09-08 DIAGNOSIS — D50.0 CHRONIC BLOOD LOSS ANEMIA: ICD-10-CM

## 2020-09-08 PROCEDURE — 45388 COLONOSCOPY W/ABLATION: CPT | Performed by: INTERNAL MEDICINE

## 2020-09-08 PROCEDURE — 3E0H8GC INTRODUCTION OF OTHER THERAPEUTIC SUBSTANCE INTO LOWER GI, VIA NATURAL OR ARTIFICIAL OPENING ENDOSCOPIC: ICD-10-PCS | Performed by: INTERNAL MEDICINE

## 2020-09-08 PROCEDURE — 0D5K8ZZ DESTRUCTION OF ASCENDING COLON, VIA NATURAL OR ARTIFICIAL OPENING ENDOSCOPIC: ICD-10-PCS | Performed by: INTERNAL MEDICINE

## 2020-09-08 PROCEDURE — 45381 COLONOSCOPY SUBMUCOUS NJX: CPT | Performed by: INTERNAL MEDICINE

## 2020-09-08 RX ORDER — SODIUM CHLORIDE, SODIUM LACTATE, POTASSIUM CHLORIDE, CALCIUM CHLORIDE 600; 310; 30; 20 MG/100ML; MG/100ML; MG/100ML; MG/100ML
INJECTION, SOLUTION INTRAVENOUS CONTINUOUS
Status: DISCONTINUED | OUTPATIENT
Start: 2020-09-08 | End: 2020-09-08

## 2020-09-08 RX ORDER — SODIUM CHLORIDE 9 MG/ML
INJECTION, SOLUTION INTRAVENOUS ONCE
Status: COMPLETED | OUTPATIENT
Start: 2020-09-08 | End: 2020-09-08

## 2020-09-08 RX ORDER — NALOXONE HYDROCHLORIDE 0.4 MG/ML
80 INJECTION, SOLUTION INTRAMUSCULAR; INTRAVENOUS; SUBCUTANEOUS AS NEEDED
Status: DISCONTINUED | OUTPATIENT
Start: 2020-09-08 | End: 2020-09-08

## 2020-09-08 RX ORDER — DEXTROSE MONOHYDRATE 25 G/50ML
50 INJECTION, SOLUTION INTRAVENOUS
Status: DISCONTINUED | OUTPATIENT
Start: 2020-09-08 | End: 2020-09-08

## 2020-09-08 RX ORDER — LIDOCAINE HYDROCHLORIDE 20 MG/ML
INJECTION, SOLUTION EPIDURAL; INFILTRATION; INTRACAUDAL; PERINEURAL AS NEEDED
Status: DISCONTINUED | OUTPATIENT
Start: 2020-09-08 | End: 2020-09-08 | Stop reason: SURG

## 2020-09-08 RX ORDER — SODIUM CHLORIDE 9 MG/ML
INJECTION, SOLUTION INTRAVENOUS CONTINUOUS
Status: DISCONTINUED | OUTPATIENT
Start: 2020-09-08 | End: 2020-09-08

## 2020-09-08 RX ORDER — SODIUM CHLORIDE 9 MG/ML
INJECTION, SOLUTION INTRAVENOUS CONTINUOUS PRN
Status: DISCONTINUED | OUTPATIENT
Start: 2020-09-08 | End: 2020-09-08 | Stop reason: SURG

## 2020-09-08 RX ADMIN — SODIUM CHLORIDE: 9 INJECTION, SOLUTION INTRAVENOUS at 09:38:00

## 2020-09-08 RX ADMIN — LIDOCAINE HYDROCHLORIDE 40 MG: 20 INJECTION, SOLUTION EPIDURAL; INFILTRATION; INTRACAUDAL; PERINEURAL at 08:45:00

## 2020-09-08 RX ADMIN — SODIUM CHLORIDE: 9 INJECTION, SOLUTION INTRAVENOUS at 08:39:00

## 2020-09-08 NOTE — H&P
History & Physical Examination    Patient Name: Trino Franco  MRN: [id number]  CSN: [id number]  YOB: 1940    Diagnosis: Iron deficiency anemia due to chronic blood loss; colonic AVM lesions    Present Illness: 49-year-old gentleman wit differently: Place 1 Dose rectally as needed. Apply by topical route 2 times every day to the affected area(s) ), Disp: 28 g, Rfl: 0, Taking  carvedilol 3.125 MG Oral Tab, Take 1 tablet (3.125 mg total) by mouth 2 (two) times daily with meals.  Take one tab Strip, Check BS once daily, Disp: 100 strip, Rfl: 3, Taking  ACCU-CHEK SOFTCLIX LANCETS Does not apply Misc, Check BS once daily, Disp: 100 each, Rfl: 3, Taking  ONETOUCH DELICA LANCETS 45W Does not apply Misc, Use daily, Disp: 100 each, Rfl: 3, Taking  Gl [facility]     Family History   Problem Relation Age of Onset   • Hypertension Father    • Diabetes Mother    • Glaucoma Neg    • Macular degeneration Neg      Social History    Tobacco Use      Smoking status: Former Smoker        Packs/day: 1.00

## 2020-09-08 NOTE — ANESTHESIA POSTPROCEDURE EVALUATION
Patient: Severo Christensen    Procedure Summary     Date:  09/08/20 Room / Location:  [address] [place] [address] ENDOSCOPY 05 / [address] [place] [address] ENDOSCOPY    Anesthesia Start:  5167 Anesthesia Stop:      Procedure:  COLONOSCOPY (N/A ) Diagnosis:       Chronic blood loss anemia      Occult

## 2020-09-08 NOTE — ANESTHESIA PREPROCEDURE EVALUATION
Anesthesia PreOp Note    HPI:     Ha Juarez is a [de-identified]year old male who presents for preoperative consultation requested by: Jenniffer Ledesma MD    Date of Surgery: 9/8/2020    Procedure(s):  COLONOSCOPY  Indication: Chronic blood loss ane ischemic heart disease         Date Noted: 02/20/2020      Dyspnea         Date Noted: 10/29/2019      ICD (implantable cardioverter-defibrillator) in place         Date Noted: 05/15/2019      Anticoagulant long-term use         Date Noted: 05/15/2019 fibrillation [facility])         Date Noted: 05/31/2016      CKD (chronic kidney disease), stage III [facility])         Date Noted: 05/13/2016      Vitamin D deficiency         Date Noted: 07/15/2015        Past Medical History:   Diagnosis Date   • 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 , Disp: , Rfl: , 9/7/2020  vitamin C 500 MG Oral Tab, Take 1 tablet (500 mg total) by mouth 2 (two) times a day., Disp: , Rfl: , 9/7/2020  bumetanide 1 MG Oral Tab, Take 1 tablet (1 mg total) by mouth daily.  Do not take m Rfl: 3, 9/1/2020  PEG 3350-KCl-NaBcb-NaCl-NaSulf (COLYTE WITH FLAVOR PACKS) 240 g Oral Recon Soln, Take as directed in the colonoscopy instructions. , Disp: 4000 mL, Rfl: 0  Acidophilus/Pectin Oral Cap, Take 1 capsule by mouth 2 (two) times daily. , Disp: , Occupational History      Not on file    Social Needs      Financial resource strain: Not on file      Food insecurity:        Worry: Not on file        Inability: Not on file      Transportation needs:        Medical: Not on file        Non-medical: Not o patient does not use an assistive device. .        The patient does live in a home with stairs. Available pre-op labs reviewed.   Lab Results   Component Value Date    WBC 4.2 08/31/2020    RBC 2.75 (L) 08/31/2020    HGB 8.5 (L) 08/31/2020    HCT 27.2 ( Anesthesia Plan:   ASA:  4  Plan:   MAC  Informed Consent Plan and Risks Discussed With:  Patient  Consent Comment: Discussed anesthetic risks such as but, not limited to, CVA, MI, dental damage and aspiration; verbalizes understanding and wishes to proc

## 2020-09-08 NOTE — OPERATIVE REPORT
COLONOSCOPY PROCEDURE REPORT     DATE OF PROCEDURE:  9/8/2020     PCP: Weston Wynn MD     PREOPERATIVE DIAGNOSIS:  Iron deficiency anemia due to chronic blood loss; colonic AVM lesions    PRESENT ILLNESS:  25-year-old gentleman with complex heart monica was not bleeding. Bleeding was not provoked by vigorous washer irrigation in this patient off his anticoagulation.   · Careful exam in and around the cecum up and down the ascending colon retroflexing in the cecum and ascending colon showed no other lesion

## 2020-09-09 NOTE — TELEPHONE ENCOUNTER
I placed a CBC recall in 2 weeks (09/23/2020)  If you want we can wait for those results and then I'll place another one

## 2020-09-09 NOTE — TELEPHONE ENCOUNTER
Dr Medrano Newport,    I do not see any standing CBC orders. I did call Ban Appiah and told him to come for his next CBC on 09/23/2020. He verbalizes understanding    You will have to place an order for the standing CBC.     Also,  he is asking if you want him to eron

## 2020-09-09 NOTE — TELEPHONE ENCOUNTER
I informed the pt that Dr Silvia Fiore thought it would be a good idea for him to start the acidophilus and the pt articulates understanding    He will come for his CBC in 2 weeks

## 2020-09-09 NOTE — TELEPHONE ENCOUNTER
Linnette Dubose, today's colonoscopy exam went very well. I found and cauterized/ablated the large colonic/cecum AVM lesion. Are there recalls/standing order for repeat CBCs? Anemia is still severe.   Mr Shanti Quintanilla needs to come for the next CBC in 2 week

## 2020-09-09 NOTE — TELEPHONE ENCOUNTER
Ailyn and THANKS EDGAR!!    I am entering a standing order for CBC every 2 weeks so we have some flexibility. He should come in in about 2 weeks and then 4 weeks after that.   I will not check irons for now as he is in the middle of doing the Venofer iron

## 2020-09-11 ENCOUNTER — OFFICE VISIT (OUTPATIENT)
Dept: HEMATOLOGY/ONCOLOGY | Facility: HOSPITAL | Age: 80
End: 2020-09-11
Attending: INTERNAL MEDICINE
Payer: MEDICARE

## 2020-09-11 VITALS
HEART RATE: 94 BPM | RESPIRATION RATE: 18 BRPM | SYSTOLIC BLOOD PRESSURE: 107 MMHG | OXYGEN SATURATION: 97 % | DIASTOLIC BLOOD PRESSURE: 77 MMHG | TEMPERATURE: 98 F

## 2020-09-11 DIAGNOSIS — D50.0 IRON DEFICIENCY ANEMIA SECONDARY TO BLOOD LOSS (CHRONIC): Primary | ICD-10-CM

## 2020-09-11 DIAGNOSIS — D50.0 ANEMIA DUE TO CHRONIC BLOOD LOSS: ICD-10-CM

## 2020-09-11 PROCEDURE — 96374 THER/PROPH/DIAG INJ IV PUSH: CPT

## 2020-09-11 NOTE — PROGRESS NOTES
Zach to infusion today for 200mg Venofer 2 of 8. He arrives alert and independent. He is feeling well and  denies any ill side effects at that time.     Most recent labs- 8/31/20- Hgb/Hct: 8.5/27.2  Fe: 66    TIBC: 297    %sat: 22    Transferrin: 199    Fe

## 2020-09-18 ENCOUNTER — OFFICE VISIT (OUTPATIENT)
Dept: HEMATOLOGY/ONCOLOGY | Facility: HOSPITAL | Age: 80
End: 2020-09-18
Attending: INTERNAL MEDICINE
Payer: MEDICARE

## 2020-09-18 ENCOUNTER — APPOINTMENT (OUTPATIENT)
Dept: CARDIOLOGY | Age: 80
End: 2020-09-18

## 2020-09-18 VITALS
SYSTOLIC BLOOD PRESSURE: 133 MMHG | OXYGEN SATURATION: 94 % | DIASTOLIC BLOOD PRESSURE: 50 MMHG | TEMPERATURE: 98 F | RESPIRATION RATE: 18 BRPM | HEART RATE: 82 BPM

## 2020-09-18 DIAGNOSIS — D50.0 ANEMIA DUE TO CHRONIC BLOOD LOSS: ICD-10-CM

## 2020-09-18 DIAGNOSIS — D50.0 IRON DEFICIENCY ANEMIA SECONDARY TO BLOOD LOSS (CHRONIC): Primary | ICD-10-CM

## 2020-09-18 PROCEDURE — 96374 THER/PROPH/DIAG INJ IV PUSH: CPT

## 2020-09-18 NOTE — PROGRESS NOTES
Zach to infusion today for 200mg Venofer 3 of 8. He arrives alert and independent. He is feeling well denies any complaints or fatigue. He does state occasional SOB noted with GERD which is resolved with TUMS, continues to follow with Dr. Ritika Jacob.       Belia Munguia

## 2020-09-21 ENCOUNTER — TELEPHONE (OUTPATIENT)
Dept: INTERNAL MEDICINE CLINIC | Facility: CLINIC | Age: 80
End: 2020-09-21

## 2020-09-21 NOTE — TELEPHONE ENCOUNTER
Pt. Is calling to speak with Grace Benitez ph.  # [phone]  Routed high to [facility]

## 2020-09-22 RX ORDER — ASPIRIN 81 MG/1
81 TABLET ORAL DAILY
Qty: 1 TABLET | Refills: 0 | COMMUNITY
Start: 2020-09-22 | End: 2020-10-01

## 2020-09-23 ENCOUNTER — LAB ENCOUNTER (OUTPATIENT)
Dept: LAB | Age: 80
End: 2020-09-23
Attending: INTERNAL MEDICINE
Payer: MEDICARE

## 2020-09-23 DIAGNOSIS — Z51.81 ENCOUNTER FOR MONITORING COUMADIN THERAPY: ICD-10-CM

## 2020-09-23 DIAGNOSIS — N18.30 CKD (CHRONIC KIDNEY DISEASE), STAGE III (HCC): ICD-10-CM

## 2020-09-23 DIAGNOSIS — K92.1 HEMATOCHEZIA: ICD-10-CM

## 2020-09-23 DIAGNOSIS — Z79.01 ENCOUNTER FOR MONITORING COUMADIN THERAPY: ICD-10-CM

## 2020-09-23 DIAGNOSIS — I50.22 CHRONIC HFREF (HEART FAILURE WITH REDUCED EJECTION FRACTION) (HCC): ICD-10-CM

## 2020-09-23 DIAGNOSIS — D50.0 ANEMIA DUE TO CHRONIC BLOOD LOSS: ICD-10-CM

## 2020-09-23 DIAGNOSIS — M10.9 GOUT, UNSPECIFIED CAUSE, UNSPECIFIED CHRONICITY, UNSPECIFIED SITE: ICD-10-CM

## 2020-09-23 LAB
ANION GAP SERPL CALC-SCNC: 6 MMOL/L
ANION GAP SERPL CALC-SCNC: 6 MMOL/L (ref 0–18)
BUN BLD-MCNC: 64 MG/DL (ref 7–18)
BUN SERPL-MCNC: 64 MG/DL
BUN/CREAT SERPL: 20.5
BUN/CREAT SERPL: 20.5 (ref 10–20)
CALCIUM BLD-MCNC: 9.1 MG/DL (ref 8.5–10.1)
CALCIUM SERPL-MCNC: 9.1 MG/DL
CHLORIDE SERPL-SCNC: 105 MMOL/L
CHLORIDE SERPL-SCNC: 105 MMOL/L (ref 98–112)
CO2 SERPL-SCNC: 29 MMOL/L
CO2 SERPL-SCNC: 29 MMOL/L (ref 21–32)
CREAT BLD-MCNC: 3.12 MG/DL
CREAT SERPL-MCNC: 3.12 MG/DL
DEPRECATED RDW RBC AUTO: 66.8 FL (ref 35.1–46.3)
ERYTHROCYTE [DISTWIDTH] IN BLOOD BY AUTOMATED COUNT: 17.8 % (ref 11–15)
GLUCOSE BLD-MCNC: 158 MG/DL (ref 70–99)
GLUCOSE SERPL-MCNC: 158 MG/DL
HCT VFR BLD AUTO: 30.8 %
HCT VFR BLD CALC: 30.8 %
HGB BLD-MCNC: 9.8 G/DL
HGB BLD-MCNC: 9.8 G/DL
INR BLD: 1.87 (ref 0.9–1.2)
MCH RBC QN AUTO: 32 PG (ref 26–34)
MCHC RBC AUTO-ENTMCNC: 31.8 G/DL (ref 31–37)
MCV RBC AUTO: 100.7 FL
OSMOLALITY SERPL CALC.SUM OF ELEC: 312 MOSM/KG (ref 275–295)
PLATELET # BLD AUTO: 168 10(3)UL (ref 150–450)
PLATELET # BLD: 168 K/MCL
POTASSIUM SERPL-SCNC: 3.3 MMOL/L
POTASSIUM SERPL-SCNC: 3.3 MMOL/L (ref 3.5–5.1)
PROTHROMBIN TIME: 21.2 SECONDS (ref 11.8–14.5)
RBC # BLD AUTO: 3.06 X10(6)UL
RBC # BLD: 3.06 10*6/UL
SODIUM SERPL-SCNC: 140 MMOL/L
SODIUM SERPL-SCNC: 140 MMOL/L (ref 136–145)
URATE SERPL-MCNC: 8.1 MG/DL
WBC # BLD AUTO: 4.6 X10(3) UL (ref 4–11)
WBC # BLD: 4.6 K/MCL

## 2020-09-23 PROCEDURE — 85027 COMPLETE CBC AUTOMATED: CPT

## 2020-09-23 PROCEDURE — 85610 PROTHROMBIN TIME: CPT

## 2020-09-23 PROCEDURE — 36415 COLL VENOUS BLD VENIPUNCTURE: CPT

## 2020-09-23 PROCEDURE — 80048 BASIC METABOLIC PNL TOTAL CA: CPT

## 2020-09-23 PROCEDURE — 84550 ASSAY OF BLOOD/URIC ACID: CPT

## 2020-09-24 ENCOUNTER — ANTI-COAG VISIT (OUTPATIENT)
Dept: INTERNAL MEDICINE CLINIC | Facility: CLINIC | Age: 80
End: 2020-09-24
Payer: MEDICARE

## 2020-09-24 ENCOUNTER — TELEPHONE (OUTPATIENT)
Dept: GASTROENTEROLOGY | Facility: CLINIC | Age: 80
End: 2020-09-24

## 2020-09-24 DIAGNOSIS — I48.20 CHRONIC ATRIAL FIBRILLATION (HCC): ICD-10-CM

## 2020-09-24 LAB
INR: 1.6 (ref 0.8–1.2)
TEST STRIP EXPIRATION DATE: ABNORMAL DATE

## 2020-09-24 PROCEDURE — 85610 PROTHROMBIN TIME: CPT

## 2020-09-24 PROCEDURE — 93793 ANTICOAG MGMT PT WARFARIN: CPT

## 2020-09-24 PROCEDURE — 36416 COLLJ CAPILLARY BLOOD SPEC: CPT

## 2020-09-25 ENCOUNTER — OFFICE VISIT (OUTPATIENT)
Dept: HEMATOLOGY/ONCOLOGY | Facility: HOSPITAL | Age: 80
End: 2020-09-25
Attending: INTERNAL MEDICINE
Payer: MEDICARE

## 2020-09-25 VITALS
SYSTOLIC BLOOD PRESSURE: 118 MMHG | TEMPERATURE: 98 F | DIASTOLIC BLOOD PRESSURE: 40 MMHG | HEART RATE: 79 BPM | RESPIRATION RATE: 18 BRPM | OXYGEN SATURATION: 95 %

## 2020-09-25 DIAGNOSIS — D50.0 IRON DEFICIENCY ANEMIA SECONDARY TO BLOOD LOSS (CHRONIC): Primary | ICD-10-CM

## 2020-09-25 DIAGNOSIS — D50.0 ANEMIA DUE TO CHRONIC BLOOD LOSS: ICD-10-CM

## 2020-09-25 PROCEDURE — 96374 THER/PROPH/DIAG INJ IV PUSH: CPT

## 2020-09-25 NOTE — PROGRESS NOTES
Gallo Francois presents for the 4 of 10 total doses of venofer 200mg order for iron deficiency anemia. HGB of 9.8 on 9/23. States tolerating well, less fatigue, and hgb has increased since starting.   PIV established with brisk blood return noted, 200mg venofer wi

## 2020-09-25 NOTE — TELEPHONE ENCOUNTER
GI RNs -     Please call Mr. Burk to advise that the CBC this week 9/23/2020 shows that the anemia is a little better. Hopefully the iron infusions are working. It looks like he comes in for his 4th of 8 infusions 9/25/2020.     His \"hemoglobin

## 2020-10-01 ENCOUNTER — ANTI-COAG VISIT (OUTPATIENT)
Dept: INTERNAL MEDICINE CLINIC | Facility: CLINIC | Age: 80
End: 2020-10-01
Payer: MEDICARE

## 2020-10-01 ENCOUNTER — OFFICE VISIT (OUTPATIENT)
Dept: NEUROLOGY | Facility: CLINIC | Age: 80
End: 2020-10-01
Payer: MEDICARE

## 2020-10-01 ENCOUNTER — OFFICE VISIT (OUTPATIENT)
Dept: INTERNAL MEDICINE CLINIC | Facility: CLINIC | Age: 80
End: 2020-10-01
Payer: MEDICARE

## 2020-10-01 VITALS
TEMPERATURE: 97 F | HEIGHT: 67 IN | BODY MASS INDEX: 25.11 KG/M2 | WEIGHT: 160 LBS | SYSTOLIC BLOOD PRESSURE: 110 MMHG | HEART RATE: 72 BPM | DIASTOLIC BLOOD PRESSURE: 60 MMHG

## 2020-10-01 DIAGNOSIS — I25.10 CORONARY ARTERY DISEASE INVOLVING NATIVE CORONARY ARTERY OF NATIVE HEART WITHOUT ANGINA PECTORIS: ICD-10-CM

## 2020-10-01 DIAGNOSIS — I48.20 CHRONIC ATRIAL FIBRILLATION (HCC): ICD-10-CM

## 2020-10-01 DIAGNOSIS — N18.4 CKD (CHRONIC KIDNEY DISEASE), STAGE IV (HCC): ICD-10-CM

## 2020-10-01 DIAGNOSIS — Z95.810 ICD (IMPLANTABLE CARDIOVERTER-DEFIBRILLATOR) IN PLACE: ICD-10-CM

## 2020-10-01 DIAGNOSIS — K92.2 GASTROINTESTINAL HEMORRHAGE, UNSPECIFIED GASTROINTESTINAL HEMORRHAGE TYPE: ICD-10-CM

## 2020-10-01 DIAGNOSIS — M10.9 GOUT, UNSPECIFIED CAUSE, UNSPECIFIED CHRONICITY, UNSPECIFIED SITE: Primary | ICD-10-CM

## 2020-10-01 DIAGNOSIS — J44.9 CHRONIC OBSTRUCTIVE PULMONARY DISEASE, UNSPECIFIED COPD TYPE (HCC): ICD-10-CM

## 2020-10-01 DIAGNOSIS — M76.31 ILIOTIBIAL BAND SYNDROME OF RIGHT SIDE: Primary | ICD-10-CM

## 2020-10-01 DIAGNOSIS — D50.0 ANEMIA, BLOOD LOSS: ICD-10-CM

## 2020-10-01 DIAGNOSIS — Z79.01 ANTICOAGULANT LONG-TERM USE: ICD-10-CM

## 2020-10-01 DIAGNOSIS — M47.816 LUMBAR SPONDYLOSIS: ICD-10-CM

## 2020-10-01 DIAGNOSIS — N18.30 STAGE 3 CHRONIC KIDNEY DISEASE, UNSPECIFIED WHETHER STAGE 3A OR 3B CKD (HCC): ICD-10-CM

## 2020-10-01 DIAGNOSIS — E11.9 TYPE 2 DIABETES MELLITUS WITHOUT COMPLICATION, WITHOUT LONG-TERM CURRENT USE OF INSULIN (HCC): ICD-10-CM

## 2020-10-01 DIAGNOSIS — I50.23 ACUTE ON CHRONIC SYSTOLIC CHF (CONGESTIVE HEART FAILURE) (HCC): ICD-10-CM

## 2020-10-01 DIAGNOSIS — Z86.16 HISTORY OF 2019 NOVEL CORONAVIRUS DISEASE (COVID-19): ICD-10-CM

## 2020-10-01 PROCEDURE — 3074F SYST BP LT 130 MM HG: CPT | Performed by: INTERNAL MEDICINE

## 2020-10-01 PROCEDURE — 85610 PROTHROMBIN TIME: CPT

## 2020-10-01 PROCEDURE — 99215 OFFICE O/P EST HI 40 MIN: CPT | Performed by: INTERNAL MEDICINE

## 2020-10-01 PROCEDURE — 3008F BODY MASS INDEX DOCD: CPT | Performed by: INTERNAL MEDICINE

## 2020-10-01 PROCEDURE — 3078F DIAST BP <80 MM HG: CPT | Performed by: INTERNAL MEDICINE

## 2020-10-01 PROCEDURE — 99213 OFFICE O/P EST LOW 20 MIN: CPT | Performed by: PHYSICAL MEDICINE & REHABILITATION

## 2020-10-01 PROCEDURE — 36416 COLLJ CAPILLARY BLOOD SPEC: CPT

## 2020-10-01 RX ORDER — ALLOPURINOL 100 MG/1
100 TABLET ORAL DAILY
Refills: 0 | COMMUNITY
Start: 2020-10-01 | End: 2021-01-25

## 2020-10-01 NOTE — PROGRESS NOTES
[address]  Progress Note    CHIEF COMPLAINT:  Patient presents with:  Low Back Pain: LOV 03/17/20 f/u for right side low back pain radiating to hip/groin/lateral leg.  Accupuncture was not covered by Jaylen Mcconnell Diverticulosis     colonoscopy 3/10/15 by GI Dr Dilia Cobos   • Floaters 6/21/2016   • High blood pressure    • High cholesterol    • Hypercholesteremia    • MI (myocardial infarction) [facility]) 2008    cardiologist Dr Emilai Whyte at [facility]   • Renal disorder times a day. • B Complex Vitamins (B COMPLEX-B12 OR) Take 1 tablet by mouth daily. • Cholecalciferol (VITAMIN D) 50 MCG (2000 UT) Oral Cap Take 1 capsule by mouth daily.      • vitamin C 500 MG Oral Tab Take 1 tablet (500 mg total) by mouth 2 (two) Glucose Blood (ACCU-CHEK BROOKE) In Vitro Strip Check BS once daily 100 strip 3   • ACCU-CHEK SOFTCLIX LANCETS Does not apply Misc Check BS once daily 100 each 3   • ONETOUCH DELICA LANCETS 91Y Does not apply Misc Use daily 100 each 3   • Glucose Blood (ONE attentive, able to follow 2 step commands, comprehention intact, spontaneous speech intact  Strength: Lower extremities have 5/5 strength  Sensation: Normal lower extremities  Reflexes: Normal lower extremities  SLR: neg        Data    Radiology Imaging: provided as documented in AVS summary. The patient was in agreement with the assessment and plan. All questions were answered. There were no barriers to learning.         Samm Stevens MD  Physical Medicine and Rehabilitation/Sports Medicine  [place]

## 2020-10-01 NOTE — PROGRESS NOTES
Wu Reilly is a [de-identified]year old male.     HPI:   Patient presents with:  Physical      [de-identified] y/o M with gout, CKD, AF, DM, hypercholesterolemia here for F/U; had COVID infection on 7/10/20 and has since recovered well; c/o right hip pain and plans to see [facility] ENDOSCOPY   • CHOLECYSTECTOMY     • COLONOSCOPY N/A 9/8/2020    Performed by Delores Marie MD at [facility] ENDOSCOPY   • COLONOSCOPY N/A 6/26/2020    Performed by Delores Marie MD at [facility] ENDOSCOPY   • ESOPHAGOGASTRODUODENOSCOPY (EGD) Tab Take 1 tablet (5 mg total) by mouth nightly. (Patient taking differently: Take 5 mg by mouth daily. And as directed ) 30 tablet 5   • Hydrocortisone 2.5 % External Cream Place 1 Dose rectally 2 (two) times daily.  Apply by topical route 2 times every da during case             entry, please review and add reactions, type, and             severity as needed        General Health     In the past six months, have you lost more than 10 pounds without trying?: 2 - No    Has your appetite been poor?: No    Type Test  Whisper Test Result: Fail         Visual Acuity     Right Eye Visual Acuity: Uncorrected Left Eye Visual Acuity: Uncorrected   Right Eye Chart Acuity: 20/40 Left Eye Chart Acuity: 20/40     Cognitive Assessment     What day of the week is this?: [place] placed or performed in visit on 11/13/17   • PNEUMOCOCCAL VACC, 13 MARTHA IM    Update Immunization Activity if applicable    Hepatitis B No orders found for this or any previous visit.  Update Immunization Activity if applicable    Tetanus No orders found for vomiting; no melena or hematochezia  Musculoskeletal:  Negative for arthralgias or myalgias  Genitourinary:  Negative for dysuria or polyuria  Hema/Lymph:  Negative for easy bleeding and easy bruising  Integumentary:  Negative for pruritus and rash  Neurol removed from the proximal mid ascending colon by cold snare polypectomy. Miomie Ruff was moderate diverticulosis of the descending and sigmoid colon and medium-sized internal hemorrhoids but no active bleeding.    -had outpatient given video capsule enteroscopy unspecified COPD type ([facility].)  CXR 11/5/17 with +hyperinflated lungs; saw pulmonologist, Dr Olive Alicea; PFTs not consistent with COPD; he never took Combivent 1 p QID; Flovent stopped; on no meds;     Chronic atrial fibrillation  On Coreg 9.375 mg po bid.  as

## 2020-10-02 ENCOUNTER — OFFICE VISIT (OUTPATIENT)
Dept: HEMATOLOGY/ONCOLOGY | Facility: HOSPITAL | Age: 80
End: 2020-10-02
Attending: INTERNAL MEDICINE
Payer: MEDICARE

## 2020-10-02 VITALS
OXYGEN SATURATION: 99 % | DIASTOLIC BLOOD PRESSURE: 52 MMHG | TEMPERATURE: 97 F | SYSTOLIC BLOOD PRESSURE: 125 MMHG | RESPIRATION RATE: 16 BRPM | HEART RATE: 77 BPM

## 2020-10-02 DIAGNOSIS — D50.0 IRON DEFICIENCY ANEMIA SECONDARY TO BLOOD LOSS (CHRONIC): Primary | ICD-10-CM

## 2020-10-02 DIAGNOSIS — D50.0 ANEMIA DUE TO CHRONIC BLOOD LOSS: ICD-10-CM

## 2020-10-02 PROCEDURE — 96374 THER/PROPH/DIAG INJ IV PUSH: CPT

## 2020-10-02 NOTE — PROGRESS NOTES
Ganesh Rubio presents for the 5/8 total doses of venofer 200mg order for iron deficiency anemia. .  States tolerating well, less fatigue, and less SOB.   PIV established  To right ac with brisk blood return noted, 200mg venofer  Over 5 minutes with positive blood

## 2020-10-09 ENCOUNTER — OFFICE VISIT (OUTPATIENT)
Dept: HEMATOLOGY/ONCOLOGY | Facility: HOSPITAL | Age: 80
End: 2020-10-09
Attending: INTERNAL MEDICINE
Payer: MEDICARE

## 2020-10-09 VITALS
TEMPERATURE: 98 F | RESPIRATION RATE: 16 BRPM | SYSTOLIC BLOOD PRESSURE: 115 MMHG | HEART RATE: 70 BPM | OXYGEN SATURATION: 98 % | DIASTOLIC BLOOD PRESSURE: 52 MMHG

## 2020-10-09 DIAGNOSIS — D50.0 IRON DEFICIENCY ANEMIA SECONDARY TO BLOOD LOSS (CHRONIC): Primary | ICD-10-CM

## 2020-10-09 DIAGNOSIS — D50.0 ANEMIA DUE TO CHRONIC BLOOD LOSS: ICD-10-CM

## 2020-10-09 PROCEDURE — 96374 THER/PROPH/DIAG INJ IV PUSH: CPT

## 2020-10-09 NOTE — PROGRESS NOTES
Betty Sportsman presents for the 6/8 total doses of venofer 200mg order for iron deficiency anemia. .  States tolerating well, has more energy and is breathing easier.   PIV established to right ac with brisk blood return noted, 200mg venofer  Over 10 minutes with p

## 2020-10-12 ENCOUNTER — TELEPHONE (OUTPATIENT)
Dept: PHYSICAL THERAPY | Age: 80
End: 2020-10-12

## 2020-10-14 RX ORDER — LANOLIN ALCOHOL/MO/W.PET/CERES
325 CREAM (GRAM) TOPICAL DAILY
COMMUNITY
Start: 2020-07-06

## 2020-10-14 RX ORDER — ALLOPURINOL 100 MG/1
100 TABLET ORAL DAILY
COMMUNITY
Start: 2020-08-13

## 2020-10-15 ENCOUNTER — ANTI-COAG VISIT (OUTPATIENT)
Dept: INTERNAL MEDICINE CLINIC | Facility: CLINIC | Age: 80
End: 2020-10-15
Payer: MEDICARE

## 2020-10-15 ENCOUNTER — OFFICE VISIT (OUTPATIENT)
Dept: PHYSICAL THERAPY | Facility: HOSPITAL | Age: 80
End: 2020-10-15
Attending: INTERNAL MEDICINE
Payer: MEDICARE

## 2020-10-15 DIAGNOSIS — I48.20 CHRONIC ATRIAL FIBRILLATION (HCC): ICD-10-CM

## 2020-10-15 DIAGNOSIS — M76.31 ILIOTIBIAL BAND SYNDROME OF RIGHT SIDE: ICD-10-CM

## 2020-10-15 DIAGNOSIS — M47.816 LUMBAR SPONDYLOSIS: ICD-10-CM

## 2020-10-15 PROCEDURE — 85610 PROTHROMBIN TIME: CPT

## 2020-10-15 PROCEDURE — 36416 COLLJ CAPILLARY BLOOD SPEC: CPT

## 2020-10-15 PROCEDURE — 97110 THERAPEUTIC EXERCISES: CPT

## 2020-10-15 PROCEDURE — 97162 PT EVAL MOD COMPLEX 30 MIN: CPT

## 2020-10-15 PROCEDURE — 93793 ANTICOAG MGMT PT WARFARIN: CPT

## 2020-10-15 NOTE — PROGRESS NOTES
LUMBAR SPINE EVALUATION:   Shirley Swanson    3/28/1940  Referring Physician:  Delila Goodpasture  Diagnosis: Iliotibial band syndrome of right side (M76.31)  Lumbar spondylosis (M47.816)  Date of Onset: 10/2019  Initial Evaluation Date: 10/15/2020  I Nicole Toribio is a [de-identified]year old y/o male who presents to therapy today with complaints of R lower back pain, R SI, R gluteals to ant/lat R thigh. He does not report paraesthesias.  He notes that he will get some tingling in the inguinal area where h factors/comorbidities, 3 body structures involved/activity limitations, and evolving symptoms including changing pain levels.      SUBJECTIVE:     Visit count 1/13/2021 1/8     Date 10/15/2020     LBP/ R LE      Pain Range 2 to 10/10     Pain Ave 6/10 personal belongings. Gait:        Deviations: loss of trunk mobility, large arm swing, R forefoot scuffing, min wide stance       Devices: none       Assistance: none  Sensation: Intact to light touch of the LE dermatomes while in sitting.      Abdominals (CST): Gina Henriquez MD on 1/03/2020 at 16:42       Approved by (CST): Gina Henriquez MD on 1/03/2020 at 16:44       ASSESSMENT:     Physical Therapy Goals: From 10/15/2020 to 1/13/2021  - Created with patient input during initial evaluation   1 certify the need for these services furnished under this plan of treatment and while under my care.     X___________________________________________________ Date____________________    Certification From: [id number]/97/0758  To:1/13/2021

## 2020-10-16 ENCOUNTER — OFFICE VISIT (OUTPATIENT)
Dept: HEMATOLOGY/ONCOLOGY | Facility: HOSPITAL | Age: 80
End: 2020-10-16
Attending: INTERNAL MEDICINE
Payer: MEDICARE

## 2020-10-16 VITALS
RESPIRATION RATE: 16 BRPM | SYSTOLIC BLOOD PRESSURE: 121 MMHG | TEMPERATURE: 97 F | HEART RATE: 84 BPM | DIASTOLIC BLOOD PRESSURE: 65 MMHG

## 2020-10-16 DIAGNOSIS — D50.0 ANEMIA DUE TO CHRONIC BLOOD LOSS: ICD-10-CM

## 2020-10-16 DIAGNOSIS — D50.0 IRON DEFICIENCY ANEMIA SECONDARY TO BLOOD LOSS (CHRONIC): Primary | ICD-10-CM

## 2020-10-16 PROCEDURE — 96374 THER/PROPH/DIAG INJ IV PUSH: CPT

## 2020-10-16 NOTE — PROGRESS NOTES
Betty Sportsman presents for the 7/8 total doses of venofer 200mg order for iron deficiency anemia. .  States tolerating well, less fatigue, and less SOB. Currently complaints of right hip pain- seeing PT and pain to hands from gout.    PIV established  To right ac

## 2020-10-19 ENCOUNTER — TELEPHONE (OUTPATIENT)
Dept: INTERNAL MEDICINE CLINIC | Facility: CLINIC | Age: 80
End: 2020-10-19

## 2020-10-19 RX ORDER — METHYLPREDNISOLONE 4 MG/1
TABLET ORAL
Qty: 21 TABLET | Refills: 0 | Status: SHIPPED | OUTPATIENT
Start: 2020-10-19 | End: 2020-11-17

## 2020-10-19 NOTE — TELEPHONE ENCOUNTER
Patient is calling with a bad gout flare up. Patient states the gout started about four days ago, patient thought it would go away on its own, but can barely walk now. Patient is hoping to get some medication to help with this.      Medication: Methylpred 4

## 2020-10-20 ENCOUNTER — OFFICE VISIT (OUTPATIENT)
Dept: CARDIOLOGY | Age: 80
End: 2020-10-20

## 2020-10-20 VITALS
SYSTOLIC BLOOD PRESSURE: 132 MMHG | RESPIRATION RATE: 18 BRPM | HEIGHT: 68 IN | HEART RATE: 69 BPM | DIASTOLIC BLOOD PRESSURE: 62 MMHG | WEIGHT: 162 LBS | BODY MASS INDEX: 24.55 KG/M2

## 2020-10-20 DIAGNOSIS — I48.0 PAROXYSMAL ATRIAL FIBRILLATION (CMD): Primary | ICD-10-CM

## 2020-10-20 PROCEDURE — 93000 ELECTROCARDIOGRAM COMPLETE: CPT | Performed by: INTERNAL MEDICINE

## 2020-10-20 PROCEDURE — 99214 OFFICE O/P EST MOD 30 MIN: CPT | Performed by: INTERNAL MEDICINE

## 2020-10-20 RX ORDER — HYDRALAZINE HYDROCHLORIDE 10 MG/1
10 TABLET, FILM COATED ORAL 3 TIMES DAILY
Qty: 270 TABLET | Refills: 0 | Status: SHIPPED | OUTPATIENT
Start: 2020-10-20 | End: 2021-01-28

## 2020-10-20 ASSESSMENT — PATIENT HEALTH QUESTIONNAIRE - PHQ9
SUM OF ALL RESPONSES TO PHQ9 QUESTIONS 1 AND 2: 0
2. FEELING DOWN, DEPRESSED OR HOPELESS: NOT AT ALL
CLINICAL INTERPRETATION OF PHQ2 SCORE: NO FURTHER SCREENING NEEDED
1. LITTLE INTEREST OR PLEASURE IN DOING THINGS: NOT AT ALL
SUM OF ALL RESPONSES TO PHQ9 QUESTIONS 1 AND 2: 0
CLINICAL INTERPRETATION OF PHQ9 SCORE: NO FURTHER SCREENING NEEDED

## 2020-10-21 PROCEDURE — 93296 REM INTERROG EVL PM/IDS: CPT | Performed by: INTERNAL MEDICINE

## 2020-10-21 PROCEDURE — 93295 DEV INTERROG REMOTE 1/2/MLT: CPT | Performed by: INTERNAL MEDICINE

## 2020-10-22 ENCOUNTER — OFFICE VISIT (OUTPATIENT)
Dept: PHYSICAL THERAPY | Facility: HOSPITAL | Age: 80
End: 2020-10-22
Attending: INTERNAL MEDICINE
Payer: MEDICARE

## 2020-10-22 PROCEDURE — 97112 NEUROMUSCULAR REEDUCATION: CPT

## 2020-10-22 PROCEDURE — 97110 THERAPEUTIC EXERCISES: CPT

## 2020-10-22 NOTE — PROGRESS NOTES
Derrick Rice    3/28/1940  Referring Physician:  Pina Dodson  Diagnosis: Iliotibial band syndrome of right side (M76.31)  Lumbar spondylosis (M47.816)  Date of Onset: 10/2019  Initial Evaluation Date: 10/15/2020  Insurance: Medicare        Th gout. Pt required consistent cueing for correct breath control and performance of new exercises. Physical Therapy Goals: From 10/15/2020 to 1/13/2021  - Created with patient input during initial evaluation   1.  Pt will be independent in [facility]   SLR L 45 myofascial      RICKY 10/15/2020   R Mod-max *   L wnl          ROM Lumbar 10/15/2020   Flexion Min*   Extension Max    R SB Mod-max   L SB Mod-max   R Rotation mod   L Rotation mod   * pain limiting    ROM Hip 10/15/2020   Flex

## 2020-10-23 ENCOUNTER — OFFICE VISIT (OUTPATIENT)
Dept: HEMATOLOGY/ONCOLOGY | Facility: HOSPITAL | Age: 80
End: 2020-10-23
Attending: INTERNAL MEDICINE
Payer: MEDICARE

## 2020-10-23 ENCOUNTER — TELEPHONE (OUTPATIENT)
Dept: INTERNAL MEDICINE CLINIC | Facility: CLINIC | Age: 80
End: 2020-10-23

## 2020-10-23 VITALS
RESPIRATION RATE: 16 BRPM | OXYGEN SATURATION: 95 % | HEART RATE: 82 BPM | SYSTOLIC BLOOD PRESSURE: 104 MMHG | TEMPERATURE: 96 F | DIASTOLIC BLOOD PRESSURE: 64 MMHG

## 2020-10-23 DIAGNOSIS — D50.0 IRON DEFICIENCY ANEMIA SECONDARY TO BLOOD LOSS (CHRONIC): Primary | ICD-10-CM

## 2020-10-23 DIAGNOSIS — D50.0 ANEMIA DUE TO CHRONIC BLOOD LOSS: ICD-10-CM

## 2020-10-23 PROCEDURE — 96374 THER/PROPH/DIAG INJ IV PUSH: CPT

## 2020-10-23 RX ORDER — HYDRALAZINE HYDROCHLORIDE 10 MG/1
10 TABLET, FILM COATED ORAL 3 TIMES DAILY
Qty: 30 TABLET | Refills: 0 | Status: SHIPPED | OUTPATIENT
Start: 2020-10-23 | End: 2020-11-17

## 2020-10-23 NOTE — PROGRESS NOTES
Randy Alpers presents for the 8/8 total doses of venofer 200mg order for iron deficiency anemia. .  States tolerating well, less fatigue, and less SOB.    PIV established  To right right on 2 nd attempt with brisk blood return noted, 200mg venofer  Over 5 minutes

## 2020-10-23 NOTE — TELEPHONE ENCOUNTER
Pt. Called his mail order pharmacy never sent him Hydralazine. He contacted them today and they mailed it out but it is going to take awhile. The pt. Is completely out of that medication and needs a 30 day refill sent to [facility] in [place].   Pt. can be

## 2020-10-26 ENCOUNTER — ANTI-COAG VISIT (OUTPATIENT)
Dept: INTERNAL MEDICINE CLINIC | Facility: CLINIC | Age: 80
End: 2020-10-26
Payer: MEDICARE

## 2020-10-26 ENCOUNTER — LAB ENCOUNTER (OUTPATIENT)
Dept: LAB | Age: 80
End: 2020-10-26
Attending: INTERNAL MEDICINE
Payer: MEDICARE

## 2020-10-26 ENCOUNTER — ANCILLARY ORDERS (OUTPATIENT)
Dept: CARDIOLOGY | Age: 80
End: 2020-10-26

## 2020-10-26 ENCOUNTER — ANCILLARY PROCEDURE (OUTPATIENT)
Dept: CARDIOLOGY | Age: 80
End: 2020-10-26
Attending: INTERNAL MEDICINE

## 2020-10-26 DIAGNOSIS — K92.1 HEMATOCHEZIA: ICD-10-CM

## 2020-10-26 DIAGNOSIS — I48.20 CHRONIC ATRIAL FIBRILLATION (HCC): ICD-10-CM

## 2020-10-26 DIAGNOSIS — D50.0 ANEMIA DUE TO CHRONIC BLOOD LOSS: ICD-10-CM

## 2020-10-26 DIAGNOSIS — Z95.810 ICD (IMPLANTABLE CARDIOVERTER-DEFIBRILLATOR) IN PLACE: ICD-10-CM

## 2020-10-26 LAB
HCT VFR BLD CALC: 34 %
HGB BLD-MCNC: 10.9 G/DL
PLATELET # BLD: 181 K/MCL
RBC # BLD: 3.39 10*6/UL
WBC # BLD: 4.9 K/MCL

## 2020-10-26 PROCEDURE — 93793 ANTICOAG MGMT PT WARFARIN: CPT

## 2020-10-26 PROCEDURE — 36416 COLLJ CAPILLARY BLOOD SPEC: CPT

## 2020-10-26 PROCEDURE — 85610 PROTHROMBIN TIME: CPT

## 2020-10-26 PROCEDURE — 36415 COLL VENOUS BLD VENIPUNCTURE: CPT

## 2020-10-26 PROCEDURE — 85027 COMPLETE CBC AUTOMATED: CPT

## 2020-10-26 PROCEDURE — X1114 CARDIAC DEVICE HOME CHECK - REMOTE UNSCHEDULED: HCPCS | Performed by: INTERNAL MEDICINE

## 2020-10-30 ENCOUNTER — OFFICE VISIT (OUTPATIENT)
Dept: PHYSICAL THERAPY | Facility: HOSPITAL | Age: 80
End: 2020-10-30
Attending: INTERNAL MEDICINE
Payer: MEDICARE

## 2020-10-30 PROCEDURE — 97140 MANUAL THERAPY 1/> REGIONS: CPT

## 2020-10-30 PROCEDURE — 97110 THERAPEUTIC EXERCISES: CPT

## 2020-10-31 ENCOUNTER — TELEPHONE (OUTPATIENT)
Dept: GASTROENTEROLOGY | Facility: CLINIC | Age: 80
End: 2020-10-31

## 2020-10-31 NOTE — TELEPHONE ENCOUNTER
Shamika Connell, could you please call mon alessia the marvelous Mr. Burk to advise that last week CBC looks much better? I have been following him for chronic anemia, likely chronic GI bleeding. We just burned an AVM lesion in his colon 2 months ago 9/8/2020.

## 2020-11-03 ENCOUNTER — OFFICE VISIT (OUTPATIENT)
Dept: PHYSICAL THERAPY | Facility: HOSPITAL | Age: 80
End: 2020-11-03
Attending: INTERNAL MEDICINE
Payer: MEDICARE

## 2020-11-03 PROCEDURE — 97110 THERAPEUTIC EXERCISES: CPT

## 2020-11-03 PROCEDURE — 97140 MANUAL THERAPY 1/> REGIONS: CPT

## 2020-11-03 PROCEDURE — 97112 NEUROMUSCULAR REEDUCATION: CPT

## 2020-11-03 NOTE — PROGRESS NOTES
Honey Milder    3/28/1940  Referring Physician:  Mariya Sebastian  Diagnosis: Iliotibial band syndrome of right side (M76.31)  Lumbar spondylosis (M47.816)  Date of Onset: 10/2019  Initial Evaluation Date: 10/15/2020  Insurance: Medicare        Th exercises done B unless otherwise indicated. Pt advised to discontinue exercises that increase pain and to call or return to therapist to discuss. ASSESSMENT:     Pt continues to have LBP limiting function.   Pt demonstrates spinal segmental mobility spine, L trunk rotation, B genu varus, L lumbar, R lower thoracic scoliosis  Body Mechanics: poor demonstrated while reaching for personal belongings.   Gait:        Deviations: loss of trunk mobility, large arm swing, R forefoot scuffing, min wide stance 1. Moderate levoscoliosis and advanced multilevel lumbar spondylosis.   2. No acute fracture or malalignment         Dictated by (CST): Enrrique Parker MD on 1/03/2020 at 16:42       Approved by (CST): Enrrique Parker MD on 1/03/2020 at 16:44

## 2020-11-03 NOTE — TELEPHONE ENCOUNTER
I spoke to the pt and went over Dr Bianchi's recommendations and f/u instructions.     Pt will continue oral iron twice daily    He will return for his next CBC on or around 11/26/2020

## 2020-11-05 ENCOUNTER — OFFICE VISIT (OUTPATIENT)
Dept: PHYSICAL THERAPY | Facility: HOSPITAL | Age: 80
End: 2020-11-05
Attending: INTERNAL MEDICINE
Payer: MEDICARE

## 2020-11-05 PROCEDURE — 97112 NEUROMUSCULAR REEDUCATION: CPT

## 2020-11-05 PROCEDURE — 97110 THERAPEUTIC EXERCISES: CPT

## 2020-11-05 NOTE — PROGRESS NOTES
Starla Cardenas    3/28/1940  Referring Physician:  Gina Reilly  Diagnosis: Iliotibial band syndrome of right side (M76.31)  Lumbar spondylosis (M47.816)  Date of Onset: 10/2019  Initial Evaluation Date: 10/15/2020  Insurance: Medicare        Th HEP. Pt given copies of this exercise for home program.  X = Exercises done this date - pt verbalized understanding and demonstrated competence. All exercises done B unless otherwise indicated.    Pt advised to discontinue exercises that increase pain and t 65 min     _______________________________________________________________________________    Objective Initial Evaluation Data 10/15/2020:     10/15/2020   FOTO primary measure *   Total visits/score prediction *   Oswestry *   FABQ >43 =avoidance *   * P Marked disc space narrowing endplate marginal osteophyte formation throughout. OBLIQUE VIEWS:          Bilateral facet arthrosis mid and lower lumbar spine  OTHER:             Vascular calcifications noted.   Surgical clips right upper quadrant transven

## 2020-11-05 NOTE — PATIENT INSTRUCTIONS
MORNING ROUTINE - perform before getting out of bed in the am if possible. Do at least 1 of each. Emphasize R LE reach and L UE reach.

## 2020-11-09 ENCOUNTER — ANTI-COAG VISIT (OUTPATIENT)
Dept: INTERNAL MEDICINE CLINIC | Facility: CLINIC | Age: 80
End: 2020-11-09
Payer: MEDICARE

## 2020-11-09 DIAGNOSIS — I48.20 CHRONIC ATRIAL FIBRILLATION (HCC): ICD-10-CM

## 2020-11-09 PROCEDURE — 85610 PROTHROMBIN TIME: CPT

## 2020-11-09 PROCEDURE — 36416 COLLJ CAPILLARY BLOOD SPEC: CPT

## 2020-11-09 PROCEDURE — 93793 ANTICOAG MGMT PT WARFARIN: CPT

## 2020-11-10 ENCOUNTER — OFFICE VISIT (OUTPATIENT)
Dept: PHYSICAL THERAPY | Facility: HOSPITAL | Age: 80
End: 2020-11-10
Attending: INTERNAL MEDICINE
Payer: MEDICARE

## 2020-11-10 PROCEDURE — 97112 NEUROMUSCULAR REEDUCATION: CPT

## 2020-11-10 PROCEDURE — 97140 MANUAL THERAPY 1/> REGIONS: CPT

## 2020-11-10 PROCEDURE — 97110 THERAPEUTIC EXERCISES: CPT

## 2020-11-10 NOTE — PROGRESS NOTES
Arlinda Jeans    3/28/1940  Referring Physician:  Kvng Rodriguez  Diagnosis: Iliotibial band syndrome of right side (M76.31)  Lumbar spondylosis (M47.816)  Date of Onset: 10/2019  Initial Evaluation Date: 10/15/2020  Insurance: Medicare        Th control to abdominal activation and lumbar positioning.   Diaphragm overfiring inhibiting abdominal activation   Man Tx MFR  X L SI release      IASTM        MET  X L on R sacral torsion, L5 FRSL      Brachial chain -  upper and lower, B combo upper/lower w brachial chain mob. Continue PT per original plan for therapeutic exercises, posture retraining, therapeutic activities, manual treatment, neuromuscular reeducation, therapeutic pain neuroscience education, patient education, modalities as needed.     Vani None.     INDICATIONS:  Right side lower back pain radiating down leg x2 months. No known injury.      TECHNIQUE:    Lumbar spine radiographs (minimum 4 views)       FINDINGS:             ALIGNMENT:    Mild chronic wedge compression lower thoracic vertebra

## 2020-11-12 RX ORDER — CARVEDILOL 3.12 MG/1
TABLET ORAL
Qty: 180 TABLET | Refills: 1 | OUTPATIENT
Start: 2020-11-12

## 2020-11-12 NOTE — TELEPHONE ENCOUNTER
Patient referred to Dr. Viky Méndez at [address] [place] [address]. Refill denied and message sent to pharmacy.

## 2020-11-16 ENCOUNTER — OFFICE VISIT (OUTPATIENT)
Dept: PHYSICAL THERAPY | Facility: HOSPITAL | Age: 80
End: 2020-11-16
Attending: INTERNAL MEDICINE
Payer: MEDICARE

## 2020-11-16 PROCEDURE — 97110 THERAPEUTIC EXERCISES: CPT

## 2020-11-16 NOTE — PROGRESS NOTES
Andrade Almonte    3/28/1940  Referring Physician:  Paulino Hood  Diagnosis: Iliotibial band syndrome of right side (M76.31)  Lumbar spondylosis (M47.816)  Date of Onset: 10/2019  Initial Evaluation Date: 10/15/2020  Insurance: Medicare        Th performed due to COVID 19    Rehab Potential: good       OBJECTIVE:     Treatment performed this date:    Therapeutic Exercise:  Visit #   5/8 6/8 7/8   Position Exercise HEP 11/5/2020  11/10/2020  11/16/2020    NuStep Seat 11, Arms 12, Resist 4        Sup beginning level of HEP for stretching, posture and strengthening. MET  2. Pt will demonstrate good body mechanics awareness for prevention of reinjury. PROGRESSING  3. Pt will be able to walk for 15 min x 2 without increased symptoms.   PROGRESSING, 5 min visits/score prediction *   Oswestry *   FABQ >43 =avoidance *   * Pt has not completed    Posture:  Trunk flex, R shoulder elevated, flat thoracic and lumbar spine, L trunk rotation, B genu varus, L lumbar, R lower thoracic scoliosis  Body Mechanics: poor formation throughout. OBLIQUE VIEWS:          Bilateral facet arthrosis mid and lower lumbar spine  OTHER:             Vascular calcifications noted. Surgical clips right upper quadrant transvenous pacing leads partially imaged.   Bilateral hip osteoar

## 2020-11-17 ENCOUNTER — OFFICE VISIT (OUTPATIENT)
Dept: NEUROLOGY | Facility: CLINIC | Age: 80
End: 2020-11-17
Payer: MEDICARE

## 2020-11-17 VITALS — WEIGHT: 153 LBS | HEIGHT: 67 IN | BODY MASS INDEX: 24.01 KG/M2

## 2020-11-17 DIAGNOSIS — I48.20 CHRONIC ATRIAL FIBRILLATION (HCC): ICD-10-CM

## 2020-11-17 DIAGNOSIS — E11.9 TYPE 2 DIABETES MELLITUS WITHOUT COMPLICATION, WITHOUT LONG-TERM CURRENT USE OF INSULIN (HCC): ICD-10-CM

## 2020-11-17 DIAGNOSIS — N18.30 STAGE 3 CHRONIC KIDNEY DISEASE, UNSPECIFIED WHETHER STAGE 3A OR 3B CKD (HCC): ICD-10-CM

## 2020-11-17 DIAGNOSIS — I25.10 CORONARY ARTERY DISEASE INVOLVING NATIVE CORONARY ARTERY OF NATIVE HEART WITHOUT ANGINA PECTORIS: ICD-10-CM

## 2020-11-17 DIAGNOSIS — M47.816 LUMBAR SPONDYLOSIS: ICD-10-CM

## 2020-11-17 DIAGNOSIS — Z95.810 ICD (IMPLANTABLE CARDIOVERTER-DEFIBRILLATOR) IN PLACE: ICD-10-CM

## 2020-11-17 DIAGNOSIS — Z79.01 ANTICOAGULANT LONG-TERM USE: ICD-10-CM

## 2020-11-17 DIAGNOSIS — M76.31 ILIOTIBIAL BAND SYNDROME OF RIGHT SIDE: Primary | ICD-10-CM

## 2020-11-17 PROCEDURE — 99213 OFFICE O/P EST LOW 20 MIN: CPT | Performed by: PHYSICAL MEDICINE & REHABILITATION

## 2020-11-17 PROCEDURE — 3008F BODY MASS INDEX DOCD: CPT | Performed by: PHYSICAL MEDICINE & REHABILITATION

## 2020-11-17 NOTE — PROGRESS NOTES
[address]  Progress Note    CHIEF COMPLAINT:  Patient presents with:  Low Back Pain: Patient presents to follow up on right sided low back pain.  He states that he is currently in physical therapy and is doi [facility] 2008    cardiologist Dr Cheyanne Perkins at [facility]   • Renal disorder    • S/P cholecystectomy 2005    [facility]   • Shortness of breath        SURGICAL HISTORY:  Past Surgical History:   Procedure Laterality Date   • CABG     • CAPSULE ENDOSCOPY N/A 8/27/2020 Complex Vitamins (B COMPLEX-B12 OR) Take 1 tablet by mouth daily. • Cholecalciferol (VITAMIN D) 50 MCG (2000 UT) Oral Cap Take 1 capsule by mouth daily. • vitamin C 500 MG Oral Tab Take 1 tablet (500 mg total) by mouth 2 (two) times a day.      • bu case             entry, please review and add reactions, type, and             severity as needed    REVIEW OF SYSTEMS:   Patient-reported ROS  Constitutional  Sleep Disturbance: denies   Cardiovascular  Chest Pain: denies  Irregular Heartbeat: denies   Ga side  RTC 4 weeks, he is loving PT and using his ex bike regularly. I encouraged him to continue being reasonably active. Motion is the lotion. 2. Lumbar spondylosis  Improving    3.  Anticoagulant long-term use  NSAIDs contraindicated due to concurren

## 2020-11-19 ENCOUNTER — APPOINTMENT (OUTPATIENT)
Dept: PHYSICAL THERAPY | Facility: HOSPITAL | Age: 80
End: 2020-11-19
Attending: INTERNAL MEDICINE
Payer: MEDICARE

## 2020-11-20 ENCOUNTER — OFFICE VISIT (OUTPATIENT)
Dept: PHYSICAL THERAPY | Facility: HOSPITAL | Age: 80
End: 2020-11-20
Attending: INTERNAL MEDICINE
Payer: MEDICARE

## 2020-11-20 PROCEDURE — 97110 THERAPEUTIC EXERCISES: CPT

## 2020-11-20 PROCEDURE — 97140 MANUAL THERAPY 1/> REGIONS: CPT

## 2020-11-20 NOTE — PROGRESS NOTES
January 22, 2019     Brennan Junior MD  [address] [address]    Patient: Shantell March   YOB: 1963   Date of Visit: 1/22/2019       Dear Dr Queen Narayanan: Thank you for referring Dora Gracia to me for evaluation  Below are my notes for this consultation  If you have questions, please do not hesitate to call me  I look forward to following your patient along with you  Sincerely,        Arnav Dewey MD        CC: No Recipients  Arnav Dewey MD  1/22/2019  7:46 AM  Sign at close encounter  Pain Medicine Follow-Up Note    Assessment:  1  Chronic pain syndrome    2  Chronic bilateral low back pain with right-sided sciatica    3  Lumbar post-laminectomy syndrome    4  Myofascial pain syndrome        Plan:  New Medications Ordered This Visit   Medications    ibuprofen (MOTRIN) 200 mg tablet     Sig: Take 600 mg by mouth every 6 (six) hours as needed for mild pain    baclofen 10 mg tablet     Sig: Take 1 tablet (10 mg total) by mouth 3 (three) times a day as needed for muscle spasms     Dispense:  90 tablet     Refill:  0     My impressions and treatment recommendations were discussed in detail with the patient who verbalized understanding and had no further questions  The patient appears to have acute right-sided thoracic paraspinal muscle spasm  This appears to be affecting him significantly, so I asked him to speak to the physical therapist about using a TENS unit to help alleviate his pain in that region  I also felt a reasonable to have the patient trial baclofen 10 mg 3 times daily as needed for muscle spasms  Side effects were reviewed with the patient  The patient reports that he has few more weeks worth of physical therapy before his MRI of the lumbar spine will be approved  I encouraged him to continue with physical therapy  Follow-up is planned in 4 weeks time or sooner as warranted  Discharge instructions were provided   I personally saw and examined Leobardo Robert    3/28/1940  Referring Physician:  Sonia Leslie  Diagnosis: Iliotibial band syndrome of right side (M76.31)  Lumbar spondylosis (M47.816)  Date of Onset: 10/2019  Initial Evaluation Date: 10/15/2020  Insurance: Medicare        Th Neuro Cueing        Re-education  Importance of breath control to abdominal activation and lumbar positioning.   Diaphragm overfiring inhibiting abdominal activation      Lumbar rolls    Discussion of use in sitting and supine only if he has no occurrence the patient and I agree with the above discussed plan of care  History of Present Illness:    Carolyn Alvarez is a 54 y o  male who presents to [facility] for interval re-evaluation of the above stated pain complaints  The patient has a past medical and chronic pain history as outlined in the assessment section  He was last seen on December 31, 2018  At today's office visit, the patient's pain score is 9/10 on the verbal numerical pain rating scale  The patient states that his pain is primarily on the right side of his low back  He states that his pain is worse in the morning, evening, and night  His pain is constant in nature and he reports the quality of his pain as sharp " He reports that he has been going for physical therapy  Unfortunately, he appears to have developed a muscle spasm of his right thoracic paraspinal musculature  Other than as stated above, the patient denies any interval changes in medications, medical condition, mental condition, symptoms, or allergies since the last office visit  Review of Systems:    Review of Systems   Respiratory: Negative for shortness of breath  Cardiovascular: Negative for chest pain  Gastrointestinal: Negative for constipation, diarrhea, nausea and vomiting  Musculoskeletal: Positive for gait problem (difficulty walking/ decreased range of motion) and joint swelling (joint stiffness)  Negative for arthralgias and myalgias  Skin: Negative for rash  Neurological: Negative for dizziness, seizures and weakness  All other systems reviewed and are negative          Patient Active Problem List   Diagnosis    Closed fracture of transverse process of lumbar vertebra (HCC)    Anxiety    Backache    Collagen vascular disease ([facility] [address] )    GERD (gastroesophageal reflux disease)    Hypertension    Impaired fasting glucose    Osteoarthritis    Sciatica    Breathing-related sleep disorder    Type 2 diabetes mellitus ([facility] [address] )    Urinary calculus    Chronic bilateral low back pain with right-sided sciatica    Morbid obesity (HCC)    Chronic pain syndrome    Lumbar post-laminectomy syndrome    Primary osteoarthritis of right knee    Routine general medical examination at a health care facility    Screening for hyperlipidemia    Colon cancer screening    Encounter for prostate cancer screening    Screening for hypothyroidism       Past Medical History:   Diagnosis Date    Hypertension     Kidney stone     x 2 episodes    Localized pain of joint     Mild sleep apnea     Skin tag        Past Surgical History:   Procedure Laterality Date    KNEE ARTHROSCOPY Left 2007    LAMINECTOMY      LAMINOTOMY  07/2010    spinal       Family History   Problem Relation Age of Onset    Osteoarthritis Mother     Obesity Father     No Known Problems Sister     No Known Problems Brother     No Known Problems Sister     No Known Problems Sister     Substance Abuse Neg Hx     Mental illness Neg Hx     Cancer Neg Hx     Diabetes Neg Hx     Heart disease Neg Hx     Stroke Neg Hx        Social History     Occupational History    Not on file       Social History Main Topics    Smoking status: Never Smoker    Smokeless tobacco: Never Used    Alcohol use No    Drug use: No    Sexual activity: Not on file         Current Outpatient Prescriptions:     ibuprofen (MOTRIN) 200 mg tablet, Take 600 mg by mouth every 6 (six) hours as needed for mild pain, Disp: , Rfl:     lisinopril-hydrochlorothiazide (PRINZIDE,ZESTORETIC) 20-25 MG per tablet, Take 1 tablet by mouth daily, Disp: 90 tablet, Rfl: 3    baclofen 10 mg tablet, Take 1 tablet (10 mg total) by mouth 3 (three) times a day as needed for muscle spasms, Disp: 90 tablet, Rfl: 0    No Known Allergies    Physical Exam:    /90 (BP Location: Left arm, Patient Position: Sitting, Cuff Size: Standard)   Pulse 96   Resp 17   Ht 6' 3 5" (1 918 m)   Wt (!) 186 kg (411 lb) symptoms.  MET     PLAN OF CARE:      Continue PT per original plan for therapeutic exercises, posture retraining, therapeutic activities, manual treatment, neuromuscular reeducation, therapeutic pain neuroscience education, patient education, modalities as BMI 50 69 kg/m²      Constitutional:obese and malodorous  Eyes:anicteric  HEENT:grossly intact  Neck:supple, symmetric, trachea midline and no masses   Pulmonary:even and unlabored  Cardiovascular:No edema or pitting edema present  Skin:Normal without rashes or lesions and well hydrated  Psychiatric:Mood and affect appropriate  Neurologic:Cranial Nerves II-XII grossly intact  Musculoskeletal:Tenderness noted in the right paraspinal thoracic musculature    There are discrete trigger points palpable Piriformis L mod Min-mod   Piriformis R Mod-max Min-mod   Hams L -32 -26   Hams R -28 -25          11/20/2020    Hip flexors R --- wnl   Hip flexors L --- wnl   Quads R --- min   Quads L --- min     Imaging:     PROCEDURE:  XR LUMBAR SPINE (MIN 4 VIEWS)

## 2020-11-23 ENCOUNTER — OFFICE VISIT (OUTPATIENT)
Dept: PHYSICAL THERAPY | Facility: HOSPITAL | Age: 80
End: 2020-11-23
Attending: INTERNAL MEDICINE
Payer: MEDICARE

## 2020-11-23 ENCOUNTER — LAB ENCOUNTER (OUTPATIENT)
Dept: LAB | Age: 80
End: 2020-11-23
Attending: INTERNAL MEDICINE
Payer: MEDICARE

## 2020-11-23 DIAGNOSIS — D50.0 ANEMIA DUE TO CHRONIC BLOOD LOSS: ICD-10-CM

## 2020-11-23 DIAGNOSIS — K92.1 HEMATOCHEZIA: ICD-10-CM

## 2020-11-23 PROCEDURE — 36415 COLL VENOUS BLD VENIPUNCTURE: CPT

## 2020-11-23 PROCEDURE — 97110 THERAPEUTIC EXERCISES: CPT

## 2020-11-23 PROCEDURE — 85027 COMPLETE CBC AUTOMATED: CPT

## 2020-11-23 PROCEDURE — 97112 NEUROMUSCULAR REEDUCATION: CPT

## 2020-11-23 NOTE — PROGRESS NOTES
Darek Ivey    3/28/1940  Referring Physician:  Aakash Aparicio  Diagnosis: Iliotibial band syndrome of right side (M76.31)  Lumbar spondylosis (M47.816)  Date of Onset: 10/2019  Initial Evaluation Date: 10/15/2020  Insurance: Medicare        Th and strength for assessment  X     Neuro Cueing        Re-education        Lumbar rolls   Discussion of use in sitting and supine only if he has no occurrence of symptoms, pt chose Nova semi-roll.      Lifting    Patient instructed in body mechanics for squ stand for 15 min to make a small without increased symptoms. PROGRESSING, 5 min currently improved from 30 sed  5. Pt will be able to reach into upper cabinet without increased symptoms. MET  6.  Pt will be able to reach to the floor for an object without i 5/5 10/15/2020 11/16/2020    L2- hip flex R 5- 5   Hip flex L 5- 5   L3- knee ext R 5 5   Knee ext L 5 5   L4  ankle DF R 5 5   Ankle DF L 5 5   L5 – EHL R 5 5   EHL L 5 5   S1 –ankle PF R --- 5   Ankle PF L --- 5   Hams R 5 5   Hams L 5 5   *pain limiting

## 2020-11-24 RX ORDER — CARVEDILOL 3.12 MG/1
3.12 TABLET ORAL 2 TIMES DAILY WITH MEALS
Qty: 180 TABLET | Refills: 1 | Status: ON HOLD | OUTPATIENT
Start: 2020-11-24 | End: 2021-08-28

## 2020-11-24 NOTE — TELEPHONE ENCOUNTER
Dose verified, combination dosing. Creatinine elevated but well within 30%. Known renal insufficiency. Passes protocol.

## 2020-11-25 ENCOUNTER — APPOINTMENT (OUTPATIENT)
Dept: PHYSICAL THERAPY | Facility: HOSPITAL | Age: 80
End: 2020-11-25
Attending: INTERNAL MEDICINE
Payer: MEDICARE

## 2020-11-27 ENCOUNTER — TELEPHONE (OUTPATIENT)
Dept: PHYSICAL THERAPY | Facility: HOSPITAL | Age: 80
End: 2020-11-27

## 2020-11-30 ENCOUNTER — APPOINTMENT (OUTPATIENT)
Dept: PHYSICAL THERAPY | Facility: HOSPITAL | Age: 80
End: 2020-11-30
Attending: INTERNAL MEDICINE
Payer: MEDICARE

## 2020-12-02 ENCOUNTER — OFFICE VISIT (OUTPATIENT)
Dept: PHYSICAL THERAPY | Facility: HOSPITAL | Age: 80
End: 2020-12-02
Attending: INTERNAL MEDICINE
Payer: MEDICARE

## 2020-12-02 PROCEDURE — 97140 MANUAL THERAPY 1/> REGIONS: CPT

## 2020-12-02 PROCEDURE — 97112 NEUROMUSCULAR REEDUCATION: CPT

## 2020-12-02 PROCEDURE — 97110 THERAPEUTIC EXERCISES: CPT

## 2020-12-02 NOTE — PROGRESS NOTES
Fifi Clause    3/28/1940  Referring Physician:  Jackelin Cabral  Diagnosis: Iliotibial band syndrome of right side (M76.31)  Lumbar spondylosis (M47.816)  Date of Onset: 10/2019  Initial Evaluation Date: 10/15/2020  Insurance: Medicare        Th H X X X   Prone Lying         On elbows        Press ups gentle       Many ROM and strength for assessment       Neuro Cueing        Re-education    Discussion of sx of stenosis and judicious use of lumbar extn positioning.     Lumbar rolls  Discussion of u be independent in beginning level of HEP for stretching, posture and strengthening. MET  2. Pt will demonstrate good body mechanics awareness for prevention of reinjury. PROGRESSING  3. Pt will be able to walk for 15 min x 2 without increased symptoms.   P Mod-max * Mod*   L wnl wnl          ROM Lumbar 10/15/2020 11/16/2020    Flexion Min* min   Extension Max  mod   R SB Mod-max mod   L SB Mod-max mod   R Rotation mod mod   L Rotation mod mod   * pain limiting    ROM Hip 10/15/2020   Flex R 120   Flex L 120

## 2020-12-03 ENCOUNTER — TELEPHONE (OUTPATIENT)
Dept: SURGERY | Age: 80
End: 2020-12-03

## 2020-12-03 ENCOUNTER — TELEPHONE (OUTPATIENT)
Dept: GASTROENTEROLOGY | Facility: CLINIC | Age: 80
End: 2020-12-03

## 2020-12-03 NOTE — TELEPHONE ENCOUNTER
GI RNs,    Please call my friend Mr. Burk and wish him well for me. I hope he and his wife are doing all right this month. He suffered Covid back in mid July.   Looking at his chart, looks like he has managed to stay out of the hospital since then

## 2020-12-03 NOTE — TELEPHONE ENCOUNTER
8 week CBC and iron studies recall placed in patient outreach per Dr. Adelaide Lin. Please see other 12/03/2020 encounter for orders.

## 2020-12-03 NOTE — TELEPHONE ENCOUNTER
Patient contacted, verified and message from Dr. Reji Skelton given. Patient sates he is feeling much better. Patient taking iron BID. Recall CBC and iron studies placed in patient outreach for 8 weeks.  (see other encounter from today)

## 2020-12-07 ENCOUNTER — APPOINTMENT (OUTPATIENT)
Dept: PHYSICAL THERAPY | Facility: HOSPITAL | Age: 80
End: 2020-12-07
Attending: INTERNAL MEDICINE
Payer: MEDICARE

## 2020-12-08 ENCOUNTER — ANTI-COAG VISIT (OUTPATIENT)
Dept: INTERNAL MEDICINE CLINIC | Facility: CLINIC | Age: 80
End: 2020-12-08
Payer: MEDICARE

## 2020-12-08 DIAGNOSIS — I48.20 CHRONIC ATRIAL FIBRILLATION (HCC): ICD-10-CM

## 2020-12-08 PROCEDURE — 36416 COLLJ CAPILLARY BLOOD SPEC: CPT

## 2020-12-08 PROCEDURE — 93793 ANTICOAG MGMT PT WARFARIN: CPT

## 2020-12-08 PROCEDURE — 85610 PROTHROMBIN TIME: CPT

## 2020-12-09 ENCOUNTER — OFFICE VISIT (OUTPATIENT)
Dept: PHYSICAL THERAPY | Facility: HOSPITAL | Age: 80
End: 2020-12-09
Attending: PHYSICAL MEDICINE & REHABILITATION
Payer: MEDICARE

## 2020-12-09 PROCEDURE — 97110 THERAPEUTIC EXERCISES: CPT

## 2020-12-09 NOTE — PROGRESS NOTES
Discharge Summary    Pt has attended 11, cancelled 0, and no shown 0 visits in Physical Therapy.      Tang Quintanilla    3/28/1940  Referring Physician:  Papo Reyes  Diagnosis: Iliotibial band syndrome of right side (M76.31)  Lumbar spondylosis (M piriformis H   X    Cross leg hip ER H   X    Leg pumps h   X    SKC H X  X    DKC H X  X    LTR H X  X    L UE/R LE reach H X      Ipsilat UE/LE reach R  X      Hip flexor and quad stretch  X      Thoracic decompression    X    T decmpres B UE and B LE re Physical Therapy Goals: From 10/15/2020 to 1/13/2021, Assessed 12/9/2020  - Created with patient input during initial evaluation   1. Pt will be independent in beginning level of HEP for stretching, posture and strengthening. MET  2.  Pt will demonstra SLR L 45 myofascial 60 myofascial      RICKY 10/15/2020 11/16/2020  12/9/2020    R Mod-max * Mod* Mod*   L wnl wnl wnl          ROM Lumbar 10/15/2020 11/16/2020  12/9/2020    Flexion Min* min min   Extension Max  mod Mod(-)   R SB Mod-max mod mod   L SB Jacky Pineda MD on 1/03/2020 at 16:42       Approved by (CST): Jacky Pineda MD on 1/03/2020 at 16:44

## 2020-12-14 ENCOUNTER — APPOINTMENT (OUTPATIENT)
Dept: PHYSICAL THERAPY | Facility: HOSPITAL | Age: 80
End: 2020-12-14
Attending: PHYSICAL MEDICINE & REHABILITATION
Payer: MEDICARE

## 2020-12-14 ENCOUNTER — MED REC SCAN ONLY (OUTPATIENT)
Dept: NEUROLOGY | Facility: CLINIC | Age: 80
End: 2020-12-14

## 2020-12-15 ENCOUNTER — TELEPHONE (OUTPATIENT)
Dept: NEUROLOGY | Facility: CLINIC | Age: 80
End: 2020-12-15

## 2020-12-15 ENCOUNTER — OFFICE VISIT (OUTPATIENT)
Dept: NEUROLOGY | Facility: CLINIC | Age: 80
End: 2020-12-15
Payer: MEDICARE

## 2020-12-15 VITALS — BODY MASS INDEX: 24.01 KG/M2 | WEIGHT: 153 LBS | HEIGHT: 67 IN

## 2020-12-15 DIAGNOSIS — E11.9 TYPE 2 DIABETES MELLITUS WITHOUT COMPLICATION, WITHOUT LONG-TERM CURRENT USE OF INSULIN (HCC): ICD-10-CM

## 2020-12-15 DIAGNOSIS — N18.30 STAGE 3 CHRONIC KIDNEY DISEASE, UNSPECIFIED WHETHER STAGE 3A OR 3B CKD (HCC): ICD-10-CM

## 2020-12-15 DIAGNOSIS — Z79.01 ANTICOAGULANT LONG-TERM USE: ICD-10-CM

## 2020-12-15 DIAGNOSIS — I48.20 CHRONIC ATRIAL FIBRILLATION (HCC): ICD-10-CM

## 2020-12-15 DIAGNOSIS — M47.816 LUMBAR SPONDYLOSIS: Primary | ICD-10-CM

## 2020-12-15 DIAGNOSIS — I25.10 CORONARY ARTERY DISEASE INVOLVING NATIVE CORONARY ARTERY OF NATIVE HEART WITHOUT ANGINA PECTORIS: ICD-10-CM

## 2020-12-15 DIAGNOSIS — Z95.810 ICD (IMPLANTABLE CARDIOVERTER-DEFIBRILLATOR) IN PLACE: ICD-10-CM

## 2020-12-15 PROCEDURE — 3008F BODY MASS INDEX DOCD: CPT | Performed by: PHYSICAL MEDICINE & REHABILITATION

## 2020-12-15 PROCEDURE — 99214 OFFICE O/P EST MOD 30 MIN: CPT | Performed by: PHYSICAL MEDICINE & REHABILITATION

## 2020-12-15 NOTE — PROGRESS NOTES
[address]  Progress Note    CHIEF COMPLAINT:  Patient presents with:  Low Back Pain: Patient presents to follow up on right sided low back pain.  LOV 11/17/2020 Patient has completed physical therapy and sta cholesterol    • Hypercholesteremia    • MI (myocardial infarction) [facility]) 2008    cardiologist Dr Maurice Roblero at [facility]   • Renal disorder    • S/P cholecystectomy 2005    [facility]   • Shortness of breath        SURGICAL HISTORY:  Past Surgical History:   Proc daily.     • B Complex Vitamins (B COMPLEX-B12 OR) Take 1 tablet by mouth daily. • Cholecalciferol (VITAMIN D) 50 MCG (2000 UT) Oral Cap Take 1 capsule by mouth daily.      • vitamin C 500 MG Oral Tab Take 1 tablet (500 mg total) by mouth 2 (two) times denies   Cardiovascular  Chest Pain: denies  Irregular Heartbeat: denies   Gastrointestinal  Bowel Incontinence: denies  Heartburn: denies   Genitourinary  Difficulty Urinating: denies  Bladder Incontinence: denies   Musculoskeletal  Joint Stiffness: admit and that he return 2 weeks after that for follow-up. - SPECIALTY (OTHER) - INTERNAL    2. Anticoagulant long-term use  Will need to temporarily hold anticoagulants per PMD or cardiologist approval.  Will contact Dr. Juice Zarate.     3. Coronary artery disease

## 2020-12-15 NOTE — TELEPHONE ENCOUNTER
Right L34, L45 and L5-S1 facet injections CPT Code: 33376,09170,97006- pending approval   [employer]. . clinical notes pending   12/16/20 UPDATE: Saul Givens online, clinical notes sent to Saul Givens for review Web Request ID [id number]

## 2020-12-16 ENCOUNTER — APPOINTMENT (OUTPATIENT)
Dept: PHYSICAL THERAPY | Facility: HOSPITAL | Age: 80
End: 2020-12-16
Attending: PHYSICAL MEDICINE & REHABILITATION
Payer: MEDICARE

## 2020-12-17 NOTE — TELEPHONE ENCOUNTER
Right L34, L45 and L5-S1 facet injections CPT Code: 68824,05782,34581-[id number]  [employer] incoming fax, request above is authorized. Authorization # [id number] effective date#  12/16/20 thru 1/30/21   Will inform GEORGE Approved referrals.    Determinat

## 2020-12-18 ENCOUNTER — TELEPHONE (OUTPATIENT)
Dept: CARDIOLOGY | Age: 80
End: 2020-12-18

## 2020-12-21 ENCOUNTER — APPOINTMENT (OUTPATIENT)
Dept: PHYSICAL THERAPY | Facility: HOSPITAL | Age: 80
End: 2020-12-21
Attending: PHYSICAL MEDICINE & REHABILITATION
Payer: MEDICARE

## 2020-12-22 NOTE — TELEPHONE ENCOUNTER
Patient states he is in [facility] and he does not know when he will be returning to [facility]. Patient will call the office once he returns.

## 2021-01-01 ENCOUNTER — TELEPHONE (OUTPATIENT)
Dept: INTERNAL MEDICINE CLINIC | Facility: CLINIC | Age: 81
End: 2021-01-01

## 2021-01-01 ENCOUNTER — TELEPHONE (OUTPATIENT)
Dept: GASTROENTEROLOGY | Facility: CLINIC | Age: 81
End: 2021-01-01

## 2021-01-01 ENCOUNTER — TELEPHONE (OUTPATIENT)
Dept: NEPHROLOGY | Facility: CLINIC | Age: 81
End: 2021-01-01

## 2021-01-01 DIAGNOSIS — I48.20 CHRONIC ATRIAL FIBRILLATION (HCC): ICD-10-CM

## 2021-01-01 RX ORDER — LANCETS
EACH MISCELLANEOUS
Qty: 100 EACH | Refills: 5 | Status: SHIPPED | OUTPATIENT
Start: 2021-01-01

## 2021-01-01 RX ORDER — PEN NEEDLE, DIABETIC 32 GX 1/4"
NEEDLE, DISPOSABLE MISCELLANEOUS
Qty: 100 EACH | Refills: 3 | Status: SHIPPED | OUTPATIENT
Start: 2021-01-01

## 2021-01-01 RX ORDER — CALCIUM CARBONATE 500 MG/1
2000 TABLET, CHEWABLE ORAL NIGHTLY
Qty: 120 TABLET | Refills: 3 | Status: SHIPPED | OUTPATIENT
Start: 2021-01-01

## 2021-01-01 RX ORDER — LANCING DEVICE/LANCETS
KIT MISCELLANEOUS
Qty: 1 KIT | Refills: 0 | Status: SHIPPED | OUTPATIENT
Start: 2021-01-01

## 2021-01-01 RX ORDER — AMIODARONE HYDROCHLORIDE 400 MG/1
400 TABLET ORAL DAILY
Qty: 30 TABLET | Refills: 0 | Status: CANCELLED | OUTPATIENT
Start: 2021-01-01

## 2021-01-01 RX ORDER — INSULIN DETEMIR 100 [IU]/ML
8 INJECTION, SOLUTION SUBCUTANEOUS NIGHTLY
Qty: 3 ML | Refills: 5 | Status: SHIPPED | OUTPATIENT
Start: 2021-01-01 | End: 2022-01-01

## 2021-01-01 RX ORDER — POLYETHYLENE GLYCOL 3350 17 G/17G
17 POWDER, FOR SOLUTION ORAL DAILY
Qty: 30 EACH | Refills: 5 | Status: ON HOLD | OUTPATIENT
Start: 2021-01-01 | End: 2022-01-01

## 2021-01-01 RX ORDER — AMIODARONE HYDROCHLORIDE 400 MG/1
400 TABLET ORAL DAILY
Qty: 30 TABLET | Refills: 0 | Status: SHIPPED | OUTPATIENT
Start: 2021-01-01 | End: 2022-01-01

## 2021-01-01 RX ORDER — HYDROCORTISONE 10 MG/G
1 CREAM TOPICAL 2 TIMES DAILY
Qty: 28 G | Refills: 0 | Status: SHIPPED | OUTPATIENT
Start: 2021-01-01 | End: 2022-01-01

## 2021-01-06 ENCOUNTER — OFFICE VISIT (OUTPATIENT)
Dept: OPHTHALMOLOGY | Facility: CLINIC | Age: 81
End: 2021-01-06
Payer: MEDICARE

## 2021-01-06 DIAGNOSIS — H25.13 AGE-RELATED NUCLEAR CATARACT OF BOTH EYES: ICD-10-CM

## 2021-01-06 DIAGNOSIS — H43.393 FLOATERS, BILATERAL: ICD-10-CM

## 2021-01-06 DIAGNOSIS — H35.30 ARMD (AGE-RELATED MACULAR DEGENERATION), BILATERAL: ICD-10-CM

## 2021-01-06 DIAGNOSIS — E11.9 DIET-CONTROLLED DIABETES MELLITUS (HCC): Primary | ICD-10-CM

## 2021-01-06 PROBLEM — H26.9 CATARACT: Status: RESOLVED | Noted: 2017-04-24 | Resolved: 2021-01-06

## 2021-01-06 PROCEDURE — 92014 COMPRE OPH EXAM EST PT 1/>: CPT | Performed by: OPHTHALMOLOGY

## 2021-01-06 NOTE — PATIENT INSTRUCTIONS
Diet-controlled diabetes mellitus ([facility] [address]  Diet controlled diabetes: no background of retinopathy, no signs of neovascularization noted. Discussed ocular and systemic benefits of blood sugar control.   Diagnosis and treatment discussed in detail with patient

## 2021-01-06 NOTE — PROGRESS NOTES
Severo Party is a [de-identified]year old male.     HPI:     HPI     Diabetic Eye Exam      Additional comments: Pt has been a diabetic for 20+ years       Pt's diabetes is currently controlled by diet   Pt checks BS a few times a week   Pt's last blood sugar wa Glaucoma Neg    • Macular degeneration Neg        Social History: Social History    Tobacco Use      Smoking status: Former Smoker        Packs/day: 1.00        Years: 35.00        Pack years: 35        Types: Cigarettes        Quit date: 5/13/1991 • ATORVASTATIN 40 MG Oral Tab TAKE 1 TABLET EVERY NIGHT 90 tablet 3   • Blood Glucose Monitoring Suppl (ACCU-CHEK BROOKE) Does not apply Device Check BS once daily 1 Device 0   • Glucose Blood (ACCU-CHEK BROOKE) In Vitro Strip Check BS once daily 100 strip Cornea Clear Clear    Anterior Chamber Deep and quiet Deep and quiet    Iris Normal Normal    Lens 1-2+ Nuclear sclerosis 1-2+ Nuclear sclerosis    Vitreous Rebolledo ring Rebolledo ring          Fundus Exam       Right Left    Disc Good rim Good rim, Temporal cre

## 2021-01-08 ENCOUNTER — ANTI-COAG VISIT (OUTPATIENT)
Dept: INTERNAL MEDICINE CLINIC | Facility: CLINIC | Age: 81
End: 2021-01-08
Payer: MEDICARE

## 2021-01-08 DIAGNOSIS — I48.20 CHRONIC ATRIAL FIBRILLATION (HCC): ICD-10-CM

## 2021-01-08 LAB
INR: 3.2 (ref 0.8–1.2)
TEST STRIP EXPIRATION DATE: ABNORMAL DATE

## 2021-01-08 PROCEDURE — 85610 PROTHROMBIN TIME: CPT

## 2021-01-08 PROCEDURE — 93793 ANTICOAG MGMT PT WARFARIN: CPT

## 2021-01-08 PROCEDURE — 36416 COLLJ CAPILLARY BLOOD SPEC: CPT

## 2021-01-14 RX ORDER — PANTOPRAZOLE SODIUM 40 MG/1
TABLET, DELAYED RELEASE ORAL
COMMUNITY
Start: 2020-12-07

## 2021-01-14 RX ORDER — METHYLPREDNISOLONE 4 MG/1
TABLET ORAL
COMMUNITY
Start: 2020-10-19

## 2021-01-25 ENCOUNTER — OFFICE VISIT (OUTPATIENT)
Dept: CARDIOLOGY | Age: 81
End: 2021-01-25

## 2021-01-25 VITALS
DIASTOLIC BLOOD PRESSURE: 62 MMHG | HEART RATE: 86 BPM | SYSTOLIC BLOOD PRESSURE: 112 MMHG | WEIGHT: 160 LBS | HEIGHT: 68 IN | BODY MASS INDEX: 24.25 KG/M2 | OXYGEN SATURATION: 98 %

## 2021-01-25 DIAGNOSIS — I50.22 CHRONIC SYSTOLIC CONGESTIVE HEART FAILURE (CMD): Primary | ICD-10-CM

## 2021-01-25 DIAGNOSIS — I48.0 PAROXYSMAL ATRIAL FIBRILLATION (CMD): ICD-10-CM

## 2021-01-25 DIAGNOSIS — I25.10 CORONARY ARTERY DISEASE INVOLVING NATIVE CORONARY ARTERY OF NATIVE HEART WITHOUT ANGINA PECTORIS: ICD-10-CM

## 2021-01-25 DIAGNOSIS — Z23 NEED FOR VACCINATION: ICD-10-CM

## 2021-01-25 PROCEDURE — 99214 OFFICE O/P EST MOD 30 MIN: CPT | Performed by: INTERNAL MEDICINE

## 2021-01-25 RX ORDER — ALLOPURINOL 100 MG/1
TABLET ORAL
Qty: 90 TABLET | Refills: 0 | Status: SHIPPED | OUTPATIENT
Start: 2021-01-25 | End: 2021-04-16

## 2021-01-25 SDOH — HEALTH STABILITY: MENTAL HEALTH: HOW OFTEN DO YOU HAVE A DRINK CONTAINING ALCOHOL?: MONTHLY OR LESS

## 2021-01-25 ASSESSMENT — PATIENT HEALTH QUESTIONNAIRE - PHQ9
1. LITTLE INTEREST OR PLEASURE IN DOING THINGS: NOT AT ALL
CLINICAL INTERPRETATION OF PHQ2 SCORE: NO FURTHER SCREENING NEEDED
CLINICAL INTERPRETATION OF PHQ9 SCORE: NO FURTHER SCREENING NEEDED
2. FEELING DOWN, DEPRESSED OR HOPELESS: NOT AT ALL
SUM OF ALL RESPONSES TO PHQ9 QUESTIONS 1 AND 2: 0
SUM OF ALL RESPONSES TO PHQ9 QUESTIONS 1 AND 2: 0

## 2021-01-27 PROCEDURE — 93295 DEV INTERROG REMOTE 1/2/MLT: CPT | Performed by: INTERNAL MEDICINE

## 2021-01-27 PROCEDURE — 93296 REM INTERROG EVL PM/IDS: CPT | Performed by: INTERNAL MEDICINE

## 2021-01-28 RX ORDER — HYDRALAZINE HYDROCHLORIDE 10 MG/1
TABLET, FILM COATED ORAL
Qty: 270 TABLET | Refills: 0 | Status: SHIPPED | OUTPATIENT
Start: 2021-01-28 | End: 2021-04-20 | Stop reason: SDUPTHER

## 2021-01-29 ENCOUNTER — ANCILLARY PROCEDURE (OUTPATIENT)
Dept: CARDIOLOGY | Age: 81
End: 2021-01-29
Attending: INTERNAL MEDICINE

## 2021-01-29 ENCOUNTER — ANTI-COAG VISIT (OUTPATIENT)
Dept: INTERNAL MEDICINE CLINIC | Facility: CLINIC | Age: 81
End: 2021-01-29
Payer: MEDICARE

## 2021-01-29 DIAGNOSIS — Z95.810 ICD (IMPLANTABLE CARDIOVERTER-DEFIBRILLATOR) IN PLACE: ICD-10-CM

## 2021-01-29 DIAGNOSIS — I48.20 CHRONIC ATRIAL FIBRILLATION (HCC): ICD-10-CM

## 2021-01-29 LAB
INR: 2 (ref 0.8–1.2)
TEST STRIP EXPIRATION DATE: ABNORMAL DATE

## 2021-01-29 PROCEDURE — 36416 COLLJ CAPILLARY BLOOD SPEC: CPT

## 2021-01-29 PROCEDURE — 85610 PROTHROMBIN TIME: CPT

## 2021-01-29 PROCEDURE — 93793 ANTICOAG MGMT PT WARFARIN: CPT

## 2021-02-02 ENCOUNTER — TELEPHONE (OUTPATIENT)
Dept: GASTROENTEROLOGY | Facility: CLINIC | Age: 81
End: 2021-02-02

## 2021-02-02 DIAGNOSIS — D50.0 IRON DEFICIENCY ANEMIA DUE TO CHRONIC BLOOD LOSS: Primary | ICD-10-CM

## 2021-02-02 NOTE — TELEPHONE ENCOUNTER
Patient outreach message received. Patient is due for repeat labs. \"8 week CBC and iron studies recall placed in patient outreach per Dr. Alethea Hoffman. Please see other 12/03/2020 encounter for orders. \"

## 2021-02-03 NOTE — TELEPHONE ENCOUNTER
Thanks Khurram Sandhu!!!!!    Standing order from 9/9/2020 for repeat CBCs should still be valid. Order added for ferritin and iron studies.

## 2021-02-05 NOTE — TELEPHONE ENCOUNTER
I spoke to Ban Appiah.     He will go to the lab around the 20th of the month for CBC and Iron Studies

## 2021-02-19 NOTE — TELEPHONE ENCOUNTER
Dr. Sagar Sutton please advise if patient is taking the medication listed below. TE from 4/6/2020 mentions medication but no directions.

## 2021-02-22 NOTE — ED INITIAL ASSESSMENT (HPI)
Kathleen Aranda called back he found 2 Benadryl and was able to swallow them, his breathing same, he states he can breath through his nose but throat is very painful and tight. His parents home with him, I instructed his to go to ER. Patient complaining of vertigo that started this morning. + Nausea and vomiting x 7.

## 2021-02-23 ENCOUNTER — LAB ENCOUNTER (OUTPATIENT)
Dept: LAB | Age: 81
End: 2021-02-23
Attending: INTERNAL MEDICINE
Payer: MEDICARE

## 2021-02-23 DIAGNOSIS — K92.1 HEMATOCHEZIA: ICD-10-CM

## 2021-02-23 DIAGNOSIS — D50.0 ANEMIA DUE TO CHRONIC BLOOD LOSS: ICD-10-CM

## 2021-02-23 DIAGNOSIS — D50.0 IRON DEFICIENCY ANEMIA DUE TO CHRONIC BLOOD LOSS: ICD-10-CM

## 2021-02-23 LAB
DEPRECATED HBV CORE AB SER IA-ACNC: 634.7 NG/ML
DEPRECATED RDW RBC AUTO: 51.4 FL (ref 35.1–46.3)
ERYTHROCYTE [DISTWIDTH] IN BLOOD BY AUTOMATED COUNT: 13.1 % (ref 11–15)
HCT VFR BLD AUTO: 34.9 %
HGB BLD-MCNC: 11.6 G/DL
IRON SATURATION: 26 %
IRON SERPL-MCNC: 73 UG/DL
MCH RBC QN AUTO: 35.2 PG (ref 26–34)
MCHC RBC AUTO-ENTMCNC: 33.2 G/DL (ref 31–37)
MCV RBC AUTO: 105.8 FL
PLATELET # BLD AUTO: 158 10(3)UL (ref 150–450)
RBC # BLD AUTO: 3.3 X10(6)UL
TOTAL IRON BINDING CAPACITY: 279 UG/DL (ref 240–450)
TRANSFERRIN SERPL-MCNC: 187 MG/DL (ref 200–360)
WBC # BLD AUTO: 5.1 X10(3) UL (ref 4–11)

## 2021-02-23 PROCEDURE — 85027 COMPLETE CBC AUTOMATED: CPT

## 2021-02-23 PROCEDURE — 83540 ASSAY OF IRON: CPT

## 2021-02-23 PROCEDURE — 36415 COLL VENOUS BLD VENIPUNCTURE: CPT

## 2021-02-23 PROCEDURE — 82728 ASSAY OF FERRITIN: CPT

## 2021-02-23 PROCEDURE — 84466 ASSAY OF TRANSFERRIN: CPT

## 2021-02-23 RX ORDER — BUMETANIDE 1 MG/1
1 TABLET ORAL DAILY
Qty: 60 TABLET | Refills: 5 | OUTPATIENT
Start: 2021-02-23

## 2021-02-23 NOTE — TELEPHONE ENCOUNTER
Current refill request refused due to refill is either a duplicate request or has active refills at the pharmacy. Check previous templates.     Requested Prescriptions     Refused Prescriptions Disp Refills   • bumetanide 1 MG Oral Tab 60 tablet 5     Sig:

## 2021-02-24 NOTE — TELEPHONE ENCOUNTER
Per records, Dr. Katie Valentino sent #[phone] with 3 [facility] 8/31/20[facility]? Does he take once daily or twice daily?

## 2021-02-25 RX ORDER — ALLOPURINOL 100 MG/1
100 TABLET ORAL DAILY
Qty: 90 TABLET | Refills: 1 | OUTPATIENT
Start: 2021-02-25

## 2021-02-25 RX ORDER — METOLAZONE 2.5 MG/1
2.5 TABLET ORAL AS NEEDED
Qty: 30 TABLET | Refills: 0 | Status: SHIPPED | OUTPATIENT
Start: 2021-02-25 | End: 2021-03-04

## 2021-02-25 NOTE — TELEPHONE ENCOUNTER
Pt declines refill of allopurinol at this time - states he has 2 months of pills left. [facility]. Pt will call 7-10 days before refill needed.     Current refill request refused

## 2021-02-25 NOTE — TELEPHONE ENCOUNTER
Left message for pt to call back. See 1/25/21 Dr. Naya Dumont refill of #90 to [facility]. Does pt need? If so, send to [facility]? How much/how often is pt taking med? See 10/1/20 Dr. Alfonzo Closs note - pt takes only as needed.

## 2021-02-25 NOTE — TELEPHONE ENCOUNTER
Televisit 4/3/20. Spoke with patient. Verified he takes metolazone 2.5mg PRN for fluid retention. Rx pended and routed.

## 2021-02-26 ENCOUNTER — ANTI-COAG VISIT (OUTPATIENT)
Dept: INTERNAL MEDICINE CLINIC | Facility: CLINIC | Age: 81
End: 2021-02-26
Payer: MEDICARE

## 2021-02-26 DIAGNOSIS — I48.20 CHRONIC ATRIAL FIBRILLATION (HCC): ICD-10-CM

## 2021-02-26 LAB
INR: 1.6 (ref 0.8–1.2)
TEST STRIP EXPIRATION DATE: ABNORMAL DATE

## 2021-02-26 PROCEDURE — 93793 ANTICOAG MGMT PT WARFARIN: CPT

## 2021-02-26 PROCEDURE — 85610 PROTHROMBIN TIME: CPT

## 2021-02-26 PROCEDURE — 36416 COLLJ CAPILLARY BLOOD SPEC: CPT

## 2021-02-26 RX ORDER — BUMETANIDE 1 MG/1
1 TABLET ORAL DAILY
Qty: 90 TABLET | Refills: 3 | Status: ON HOLD | OUTPATIENT
Start: 2021-02-26 | End: 2021-08-28

## 2021-02-26 NOTE — TELEPHONE ENCOUNTER
Hank called to check on status of refill Bumetanide  Explained it was denied there should be active refills, they show no active refills at pharmacy

## 2021-02-26 NOTE — TELEPHONE ENCOUNTER
Refill from 7/2020 was for 6 months  Refill request is for a maintenance medication and has met the criteria specified in the Ambulatory Medication Refill Standing Order for eligibility, visits, laboratory, alerts and was sent to the requested pharmacy.

## 2021-03-03 ENCOUNTER — TELEPHONE (OUTPATIENT)
Dept: NEPHROLOGY | Facility: CLINIC | Age: 81
End: 2021-03-03

## 2021-03-04 RX ORDER — METOLAZONE 2.5 MG/1
2.5 TABLET ORAL AS NEEDED
Qty: 30 TABLET | Refills: 0 | Status: SHIPPED | OUTPATIENT
Start: 2021-03-04 | End: 2021-03-08

## 2021-03-04 RX ORDER — WARFARIN SODIUM 5 MG/1
TABLET ORAL DAILY
Qty: 120 TABLET | Refills: 1 | Status: SHIPPED | OUTPATIENT
Start: 2021-03-04 | End: 2021-03-11

## 2021-03-04 NOTE — TELEPHONE ENCOUNTER
Hank briceño a voicemail requesting a short term refill for Warfarin be sent to [facility]  Please send a 90 day supply to [facility]

## 2021-03-04 NOTE — TELEPHONE ENCOUNTER
To [facility] - high warning with asa. Confimed with pt that he does not need ferrous sulfate refill at this time. Pt does need Warfarin refill - confirmed he takes 5mg tablets and needs 120 to last 90 days.   Confirmed again that all maintenance meds shoul

## 2021-03-04 NOTE — TELEPHONE ENCOUNTER
Current Outpatient Medications   Medication Sig Dispense Refill   • metolazone 2.5 MG Oral Tab Take 1 tablet (2.5 mg total) by mouth as needed.  30 tablet 0

## 2021-03-05 NOTE — TELEPHONE ENCOUNTER
Patient returning our call. States received voice message from Luzmaria Harrison at our office today at 11:11 regarding [employer] for prescriptions. Did explain to patient Luzmaria Harrison is not in the office today.

## 2021-03-05 NOTE — TELEPHONE ENCOUNTER
eRx for Warfarin sent to [facility] on 3/4/21. Spoke with patient and he states that he has 6-7 days worth of warfarin at this time.   If he does not receive his refill of warfarin from [facility] by early next week he will call back for a short term fill to be sen

## 2021-03-08 ENCOUNTER — TELEPHONE (OUTPATIENT)
Dept: GASTROENTEROLOGY | Facility: CLINIC | Age: 81
End: 2021-03-08

## 2021-03-08 ENCOUNTER — TELEPHONE (OUTPATIENT)
Dept: NEPHROLOGY | Facility: CLINIC | Age: 81
End: 2021-03-08

## 2021-03-08 RX ORDER — METOLAZONE 2.5 MG/1
TABLET ORAL
Qty: 30 TABLET | Refills: 0 | Status: ON HOLD | OUTPATIENT
Start: 2021-03-08 | End: 2021-08-28

## 2021-03-08 NOTE — TELEPHONE ENCOUNTER
Spoke to patient and relayed Dr. Alan Murillo message as shown below. Patient verbalized understanding of whole message and had no further questions at this time. Lab recall entered.

## 2021-03-08 NOTE — TELEPHONE ENCOUNTER
Joy from [facility]  is calling to get dosage frequency on   metolazone 2.5 MG Oral Tab 30 tablet 0 3/4/2021    Sig:   Take 1 tablet (2.5 mg total) by mouth as needed.      Route:   Oral     Order #:   [id number]     Additional     Please call [phone]

## 2021-03-08 NOTE — TELEPHONE ENCOUNTER
GI RNs,    Please call Mr. Burk to advise that his recent labs 2/23/2021 looked good. He is still mildly anemic at hemoglobin 11.6g which remains much much better than his levels of June–September 2020.   His iron levels look good, no longer low on

## 2021-03-09 NOTE — TELEPHONE ENCOUNTER
Patient is requesting a refill Pantoprazole 40 mg  Take 1 time daily before breakfast, Lisseth Kruger

## 2021-03-09 NOTE — TELEPHONE ENCOUNTER
To Dr. Seda Tse to please advise on RX, never sent by our office before. Not on med list, found under past meds (marked ) and last prescribed by Dr. Mitchell Ortiz. Thanks!

## 2021-03-10 RX ORDER — WARFARIN SODIUM 5 MG/1
TABLET ORAL DAILY
Qty: 120 TABLET | Refills: 1 | Status: CANCELLED | OUTPATIENT
Start: 2021-03-10

## 2021-03-10 RX ORDER — PANTOPRAZOLE SODIUM 40 MG/1
40 TABLET, DELAYED RELEASE ORAL
Qty: 90 TABLET | Refills: 3 | Status: SHIPPED | OUTPATIENT
Start: 2021-03-10 | End: 2021-11-26

## 2021-03-10 NOTE — TELEPHONE ENCOUNTER
Per pharmacy, Clarification needed;    MESSAGE:  Rx for Metolazone 2.5mg tab with directions 1 PRN(Missing Frequency). Should this be: Take 1 every day PRN or other.  (Specify Frequency: Take 1 dose____ times per day PRN)    Please follow up, thank you,

## 2021-03-11 ENCOUNTER — ANTI-COAG VISIT (OUTPATIENT)
Dept: INTERNAL MEDICINE CLINIC | Facility: CLINIC | Age: 81
End: 2021-03-11
Payer: MEDICARE

## 2021-03-11 DIAGNOSIS — I48.20 CHRONIC ATRIAL FIBRILLATION (HCC): ICD-10-CM

## 2021-03-11 LAB
INR: 1.4 (ref 0.8–1.2)
TEST STRIP EXPIRATION DATE: ABNORMAL DATE

## 2021-03-11 PROCEDURE — 93793 ANTICOAG MGMT PT WARFARIN: CPT

## 2021-03-11 PROCEDURE — 36416 COLLJ CAPILLARY BLOOD SPEC: CPT

## 2021-03-11 PROCEDURE — 85610 PROTHROMBIN TIME: CPT

## 2021-03-11 RX ORDER — WARFARIN SODIUM 5 MG/1
TABLET ORAL DAILY
Qty: 15 TABLET | Refills: 0 | Status: SHIPPED | OUTPATIENT
Start: 2021-03-11 | End: 2021-06-04

## 2021-03-16 NOTE — TELEPHONE ENCOUNTER
Hank requesting refill Warfarin 5 mg tab, refill complete and sent to [facility]

## 2021-03-25 ENCOUNTER — ANTI-COAG VISIT (OUTPATIENT)
Dept: INTERNAL MEDICINE CLINIC | Facility: CLINIC | Age: 81
End: 2021-03-25
Payer: MEDICARE

## 2021-03-25 DIAGNOSIS — I48.20 CHRONIC ATRIAL FIBRILLATION (HCC): ICD-10-CM

## 2021-03-25 LAB
INR: 1.7 (ref 0.8–1.2)
TEST STRIP EXPIRATION DATE: ABNORMAL DATE

## 2021-03-25 PROCEDURE — 36416 COLLJ CAPILLARY BLOOD SPEC: CPT

## 2021-03-25 PROCEDURE — 93793 ANTICOAG MGMT PT WARFARIN: CPT

## 2021-03-25 PROCEDURE — 85610 PROTHROMBIN TIME: CPT

## 2021-04-01 RX ORDER — CARVEDILOL 6.25 MG/1
6.25 TABLET ORAL 2 TIMES DAILY WITH MEALS
Qty: 180 TABLET | Refills: 1 | OUTPATIENT
Start: 2021-04-01

## 2021-04-01 NOTE — TELEPHONE ENCOUNTER
Refill request has failed the Ambulatory Medication Refill Standing Order and is routed to the primary physician to review the following:    Requested Prescriptions     Refused Prescriptions Disp Refills   • carvedilol 6.25 MG Oral Tab 180 tablet 1     Sig

## 2021-04-09 ENCOUNTER — ANTI-COAG VISIT (OUTPATIENT)
Dept: INTERNAL MEDICINE CLINIC | Facility: CLINIC | Age: 81
End: 2021-04-09
Payer: MEDICARE

## 2021-04-09 DIAGNOSIS — I48.20 CHRONIC ATRIAL FIBRILLATION (HCC): ICD-10-CM

## 2021-04-09 PROCEDURE — 93793 ANTICOAG MGMT PT WARFARIN: CPT

## 2021-04-09 PROCEDURE — 85610 PROTHROMBIN TIME: CPT

## 2021-04-09 PROCEDURE — 36416 COLLJ CAPILLARY BLOOD SPEC: CPT

## 2021-04-16 RX ORDER — ALLOPURINOL 100 MG/1
TABLET ORAL
Qty: 90 TABLET | Refills: 1 | Status: SHIPPED | OUTPATIENT
Start: 2021-04-16 | End: 2022-01-05

## 2021-04-19 RX ORDER — CARVEDILOL 6.25 MG/1
TABLET ORAL
Qty: 180 TABLET | Refills: 1 | OUTPATIENT
Start: 2021-04-19

## 2021-04-19 NOTE — TELEPHONE ENCOUNTER
Former patient at [address] [place] [address] cardiology now following at [facility] where he sees Dr. Reece Rust and Dr. Parul Lucas. Refill denied and message sent to pharmacy.

## 2021-04-20 RX ORDER — HYDRALAZINE HYDROCHLORIDE 10 MG/1
10 TABLET, FILM COATED ORAL 3 TIMES DAILY
Qty: 270 TABLET | Refills: 0 | Status: SHIPPED | OUTPATIENT
Start: 2021-04-20

## 2021-04-20 RX ORDER — CARVEDILOL 6.25 MG/1
6.25 TABLET ORAL 2 TIMES DAILY WITH MEALS
Qty: 180 TABLET | Refills: 1 | Status: CANCELLED | OUTPATIENT
Start: 2021-04-20

## 2021-04-20 NOTE — TELEPHONE ENCOUNTER
Pt called for the following refills     Ferrous sulfate ec 325 mg tablets   Potassium  Hydralazine   Carvedilol 6.25 mg    Please send to [address]

## 2021-04-22 RX ORDER — HYDRALAZINE HYDROCHLORIDE 10 MG/1
10 TABLET, FILM COATED ORAL 3 TIMES DAILY
Qty: 270 TABLET | Refills: 3 | Status: ON HOLD | OUTPATIENT
Start: 2021-04-22 | End: 2021-08-28

## 2021-04-22 RX ORDER — POTASSIUM CHLORIDE 20 MEQ/1
20 TABLET, EXTENDED RELEASE ORAL
Qty: 90 TABLET | Refills: 3 | Status: ON HOLD | OUTPATIENT
Start: 2021-04-22 | End: 2021-08-28

## 2021-04-23 ENCOUNTER — ANTI-COAG VISIT (OUTPATIENT)
Dept: INTERNAL MEDICINE CLINIC | Facility: CLINIC | Age: 81
End: 2021-04-23
Payer: MEDICARE

## 2021-04-23 DIAGNOSIS — I48.20 CHRONIC ATRIAL FIBRILLATION (HCC): ICD-10-CM

## 2021-04-23 PROCEDURE — 93793 ANTICOAG MGMT PT WARFARIN: CPT

## 2021-04-23 PROCEDURE — 85610 PROTHROMBIN TIME: CPT

## 2021-04-23 PROCEDURE — 36416 COLLJ CAPILLARY BLOOD SPEC: CPT

## 2021-05-02 PROCEDURE — 93296 REM INTERROG EVL PM/IDS: CPT | Performed by: INTERNAL MEDICINE

## 2021-05-14 DIAGNOSIS — I50.22 CHRONIC SYSTOLIC CONGESTIVE HEART FAILURE (CMD): ICD-10-CM

## 2021-05-14 RX ORDER — CARVEDILOL 6.25 MG/1
6.26 TABLET ORAL 2 TIMES DAILY WITH MEALS
Qty: 180 TABLET | Refills: 0 | Status: SHIPPED | OUTPATIENT
Start: 2021-05-14 | End: 2021-05-27 | Stop reason: SDUPTHER

## 2021-05-14 RX ORDER — DIGOXIN 125 MCG
125 TABLET ORAL DAILY
Qty: 30 TABLET | Refills: 0 | Status: SHIPPED | OUTPATIENT
Start: 2021-05-14

## 2021-05-14 RX ORDER — BUMETANIDE 1 MG/1
1 TABLET ORAL DAILY
Qty: 90 TABLET | Refills: 0 | Status: SHIPPED | OUTPATIENT
Start: 2021-05-14

## 2021-05-17 ENCOUNTER — ANTI-COAG VISIT (OUTPATIENT)
Dept: INTERNAL MEDICINE CLINIC | Facility: CLINIC | Age: 81
End: 2021-05-17
Payer: MEDICARE

## 2021-05-17 DIAGNOSIS — I48.20 CHRONIC ATRIAL FIBRILLATION (HCC): ICD-10-CM

## 2021-05-17 PROCEDURE — 36415 COLL VENOUS BLD VENIPUNCTURE: CPT

## 2021-05-17 PROCEDURE — 93793 ANTICOAG MGMT PT WARFARIN: CPT

## 2021-05-17 PROCEDURE — 85610 PROTHROMBIN TIME: CPT

## 2021-05-27 ENCOUNTER — TELEPHONE (OUTPATIENT)
Dept: CARDIOLOGY | Age: 81
End: 2021-05-27

## 2021-05-27 ENCOUNTER — TELEPHONE (OUTPATIENT)
Dept: INTERNAL MEDICINE CLINIC | Facility: CLINIC | Age: 81
End: 2021-05-27

## 2021-05-27 RX ORDER — CARVEDILOL 6.25 MG/1
6.26 TABLET ORAL 2 TIMES DAILY WITH MEALS
Qty: 180 TABLET | Refills: 0 | Status: SHIPPED | OUTPATIENT
Start: 2021-05-27 | End: 2021-06-04 | Stop reason: SDUPTHER

## 2021-05-28 RX ORDER — WARFARIN SODIUM 5 MG/1
TABLET ORAL DAILY
Qty: 15 TABLET | Refills: 0 | Status: CANCELLED | OUTPATIENT
Start: 2021-05-28

## 2021-05-28 NOTE — TELEPHONE ENCOUNTER
Warfarin 5 mg was refilled 3/4/21 for #120 with 1 RF to [facility]. Per previous records, pt was having difficulties with mail order delivery.  Nursing, please call pt to double check/notify of refill sent 3/4/21 was a 5 month supply

## 2021-05-28 NOTE — TELEPHONE ENCOUNTER
Patient called back - he will receive warfarin will be mailed to him he call Hank , patient has has enough medication until then

## 2021-06-03 ENCOUNTER — TELEPHONE (OUTPATIENT)
Dept: CARDIOLOGY | Age: 81
End: 2021-06-03

## 2021-06-03 RX ORDER — CARVEDILOL 3.12 MG/1
3.12 TABLET ORAL 2 TIMES DAILY WITH MEALS
Qty: 180 TABLET | Refills: 1 | Status: SHIPPED | OUTPATIENT
Start: 2021-06-03 | End: 2021-06-04 | Stop reason: SDUPTHER

## 2021-06-03 NOTE — TELEPHONE ENCOUNTER
Spoke with patient to relay [employer] issue below; Patient verbalized he has \"enough Warfarin til next week Thursday / Friday\". Noted we would ask our [facility] Pharm D for assistance.  Frances Conroy is aware the last 90 day script was sent with a refill and does not know w

## 2021-06-04 RX ORDER — CARVEDILOL 3.12 MG/1
3.12 TABLET ORAL 2 TIMES DAILY WITH MEALS
Qty: 180 TABLET | Refills: 2 | Status: SHIPPED | OUTPATIENT
Start: 2021-06-04

## 2021-06-04 RX ORDER — WARFARIN SODIUM 5 MG/1
TABLET ORAL DAILY
Qty: 120 TABLET | Refills: 1 | Status: ON HOLD | OUTPATIENT
Start: 2021-06-04 | End: 2021-08-28

## 2021-06-04 RX ORDER — CARVEDILOL 6.25 MG/1
6.26 TABLET ORAL 2 TIMES DAILY WITH MEALS
Qty: 180 TABLET | Refills: 2 | Status: SHIPPED | OUTPATIENT
Start: 2021-06-04

## 2021-06-04 NOTE — TELEPHONE ENCOUNTER
Rx resent  Requested Prescriptions     Signed Prescriptions Disp Refills   • Warfarin Sodium 5 MG Oral Tab 120 tablet 1     Sig: Take 1-1.5 tablets (5-7.5 mg total) by mouth daily. as directed by coumadin clinic     Authorizing Provider:  Scooter Tejeda

## 2021-06-09 RX ORDER — ATORVASTATIN CALCIUM 40 MG/1
40 TABLET, FILM COATED ORAL NIGHTLY
Qty: 90 TABLET | Refills: 3 | Status: ON HOLD | OUTPATIENT
Start: 2021-06-09 | End: 2021-09-02

## 2021-06-09 NOTE — TELEPHONE ENCOUNTER
Pt. Called asking for refills on Atorvastatin 40 mgs  1 daily   # 90. He is also asking to add refills. Please send to [address].

## 2021-06-15 ENCOUNTER — TELEPHONE (OUTPATIENT)
Dept: INTERNAL MEDICINE CLINIC | Facility: CLINIC | Age: 81
End: 2021-06-15

## 2021-06-16 RX ORDER — PANTOPRAZOLE SODIUM 40 MG/1
40 TABLET, DELAYED RELEASE ORAL
Qty: 90 TABLET | Refills: 3 | OUTPATIENT
Start: 2021-06-16

## 2021-06-16 NOTE — TELEPHONE ENCOUNTER
Called patient and relayed message to [facility] Pantoprazole - verbalized understanding

## 2021-06-16 NOTE — TELEPHONE ENCOUNTER
Pt has active RX from 3/2021 for pantoprazole;  pls have pt contact pharmacy (too early for me to call pt)

## 2021-06-17 ENCOUNTER — LAB ENCOUNTER (OUTPATIENT)
Dept: LAB | Age: 81
End: 2021-06-17
Attending: INTERNAL MEDICINE
Payer: MEDICARE

## 2021-06-17 ENCOUNTER — ANTI-COAG VISIT (OUTPATIENT)
Dept: INTERNAL MEDICINE CLINIC | Facility: CLINIC | Age: 81
End: 2021-06-17
Payer: MEDICARE

## 2021-06-17 DIAGNOSIS — I48.20 CHRONIC ATRIAL FIBRILLATION (HCC): ICD-10-CM

## 2021-06-17 DIAGNOSIS — D50.0 ANEMIA DUE TO CHRONIC BLOOD LOSS: ICD-10-CM

## 2021-06-17 DIAGNOSIS — K92.1 HEMATOCHEZIA: ICD-10-CM

## 2021-06-17 DIAGNOSIS — D50.0 IRON DEFICIENCY ANEMIA DUE TO CHRONIC BLOOD LOSS: ICD-10-CM

## 2021-06-17 PROCEDURE — 85027 COMPLETE CBC AUTOMATED: CPT

## 2021-06-17 PROCEDURE — 36415 COLL VENOUS BLD VENIPUNCTURE: CPT

## 2021-06-17 PROCEDURE — 84466 ASSAY OF TRANSFERRIN: CPT

## 2021-06-17 PROCEDURE — 85610 PROTHROMBIN TIME: CPT

## 2021-06-17 PROCEDURE — 82728 ASSAY OF FERRITIN: CPT

## 2021-06-17 PROCEDURE — 36416 COLLJ CAPILLARY BLOOD SPEC: CPT

## 2021-06-17 PROCEDURE — 93793 ANTICOAG MGMT PT WARFARIN: CPT

## 2021-06-17 PROCEDURE — 83540 ASSAY OF IRON: CPT

## 2021-07-04 ENCOUNTER — TELEPHONE (OUTPATIENT)
Dept: GASTROENTEROLOGY | Facility: CLINIC | Age: 81
End: 2021-07-04

## 2021-07-04 NOTE — TELEPHONE ENCOUNTER
GI RNs,    Please call the remarkable Mr. Burk to advise that the recent blood tests of 6/17/2021 looked great. The anemia of last summer June – August has not returned. His hemoglobin blood count was 12.6g which is about perfect.   His iron level

## 2021-07-06 ENCOUNTER — TELEPHONE (OUTPATIENT)
Dept: NEUROLOGY | Facility: CLINIC | Age: 81
End: 2021-07-06

## 2021-07-07 ENCOUNTER — OFFICE VISIT (OUTPATIENT)
Dept: NEUROLOGY | Facility: CLINIC | Age: 81
End: 2021-07-07
Payer: MEDICARE

## 2021-07-07 VITALS — HEART RATE: 69 BPM | WEIGHT: 158 LBS | BODY MASS INDEX: 24.8 KG/M2 | HEIGHT: 67 IN | OXYGEN SATURATION: 97 %

## 2021-07-07 DIAGNOSIS — I48.20 CHRONIC ATRIAL FIBRILLATION (HCC): ICD-10-CM

## 2021-07-07 DIAGNOSIS — N18.30 STAGE 3 CHRONIC KIDNEY DISEASE, UNSPECIFIED WHETHER STAGE 3A OR 3B CKD (HCC): ICD-10-CM

## 2021-07-07 DIAGNOSIS — M47.816 LUMBAR SPONDYLOSIS: Primary | ICD-10-CM

## 2021-07-07 DIAGNOSIS — Z95.810 ICD (IMPLANTABLE CARDIOVERTER-DEFIBRILLATOR) IN PLACE: ICD-10-CM

## 2021-07-07 DIAGNOSIS — E11.9 TYPE 2 DIABETES MELLITUS WITHOUT COMPLICATION, WITHOUT LONG-TERM CURRENT USE OF INSULIN (HCC): ICD-10-CM

## 2021-07-07 DIAGNOSIS — I25.10 CORONARY ARTERY DISEASE INVOLVING NATIVE CORONARY ARTERY OF NATIVE HEART WITHOUT ANGINA PECTORIS: ICD-10-CM

## 2021-07-07 DIAGNOSIS — Z79.01 ANTICOAGULANT LONG-TERM USE: ICD-10-CM

## 2021-07-07 PROCEDURE — 99214 OFFICE O/P EST MOD 30 MIN: CPT | Performed by: PHYSICAL MEDICINE & REHABILITATION

## 2021-07-07 PROCEDURE — 3008F BODY MASS INDEX DOCD: CPT | Performed by: PHYSICAL MEDICINE & REHABILITATION

## 2021-07-07 NOTE — PROGRESS NOTES
[address]  Progress Note    CHIEF COMPLAINT:  Patient presents with:  Back Pain:  LOV 12/15/20 Pt comes in for low back pain. Does not radiate. Admits PT helped 20% Takes no meds. Rates pain 5/10  Low Back Pa Floaters 6/21/2016   • High blood pressure    • High cholesterol    • Hypercholesteremia    • MI (myocardial infarction) [facility]) 2008    cardiologist Dr Floresita Choudhury at [facility]   • Renal disorder    • S/P cholecystectomy 2005    [facility]   • Shortness of breath Take 1 tablet by mouth 2 times per week as needed for edema. 30 tablet 0   • bumetanide 1 MG Oral Tab Take 1 tablet (1 mg total) by mouth daily.  90 tablet 3   • carvedilol 3.125 MG Oral Tab Take 1 tablet (3.125 mg total) by mouth 2 (two) times daily with m Gastrointestinal  Bowel Incontinence: denies  Heartburn: denies   Genitourinary  Difficulty Urinating: denies  Bladder Incontinence: denies   Musculoskeletal  Joint Stiffness: denies  Painful Joints: admits   Neurological  Loss of Strength Since last Vis cardiologist approval.  Will contact Dr. Forrest Verma. 3. Coronary artery disease involving native coronary artery of native heart without angina pectoris  NSAIDs contraindicated due to coronary artery disease. Limits treatment options.     4. Type 2 diabet

## 2021-07-07 NOTE — TELEPHONE ENCOUNTER
I spoke to the pt and went over Dr Bianchi's recommendations & f/u instructions below.     He verbalizes understanding    He is currently taking 2 iron tablets daily so he will cut back to one iron tablet daily

## 2021-07-08 ENCOUNTER — TELEPHONE (OUTPATIENT)
Dept: INTERNAL MEDICINE CLINIC | Facility: CLINIC | Age: 81
End: 2021-07-08

## 2021-07-08 ENCOUNTER — ANTI-COAG VISIT (OUTPATIENT)
Dept: INTERNAL MEDICINE CLINIC | Facility: CLINIC | Age: 81
End: 2021-07-08
Payer: MEDICARE

## 2021-07-08 DIAGNOSIS — H91.91 HEARING LOSS OF RIGHT EAR, UNSPECIFIED HEARING LOSS TYPE: Primary | ICD-10-CM

## 2021-07-08 DIAGNOSIS — I48.20 CHRONIC ATRIAL FIBRILLATION (HCC): ICD-10-CM

## 2021-07-08 LAB
INR: 2.3 (ref 0.8–1.2)
TEST STRIP EXPIRATION DATE: ABNORMAL DATE

## 2021-07-08 PROCEDURE — 85610 PROTHROMBIN TIME: CPT

## 2021-07-08 PROCEDURE — 93793 ANTICOAG MGMT PT WARFARIN: CPT

## 2021-07-08 PROCEDURE — 36416 COLLJ CAPILLARY BLOOD SPEC: CPT

## 2021-07-08 NOTE — TELEPHONE ENCOUNTER
Spoke with patient and provided him with contact info to Dr. Salo Garcia.  Informed patient it looks like may need insurance auth first.     To [facility] -- can you advise on referral? Patient is waiting to schedule until it is confirmed authorized or no auth needed so

## 2021-07-08 NOTE — TELEPHONE ENCOUNTER
To Dr. Lucian SWEENEY for pended referral.  Pt states he has right hearing loss and would like to see Dr. Job Alexander. Pt states last time he saw you, 10/1/20, his ears were clear.

## 2021-07-09 ENCOUNTER — TELEPHONE (OUTPATIENT)
Dept: NEUROLOGY | Facility: CLINIC | Age: 81
End: 2021-07-09

## 2021-07-09 NOTE — TELEPHONE ENCOUNTER
Bilateral L34, L45 and L5-S1 facet injections  Pending MD note, will initate authorization once clinical note is available

## 2021-07-12 NOTE — TELEPHONE ENCOUNTER
Bilateral L34, L45 and L5-S1 facet injections CPT Code: 51901-36 07635 Rt 71761 LT 35446 Rt 81008 LT Dx: L15.527 -pending approval     [employer], initiates authorization for above.   Pending auth #  [id number]  Clinical notes sent for review

## 2021-07-12 NOTE — TELEPHONE ENCOUNTER
Bilateral L34, L45 and L5-S1 facet injections CPT Code: 94249-24 45564 Rt 74095 LT 85815 Rt 53976 LT Dx: F36.717 -pending approval    Incoming call from  [address], spoke to nurse reviewer North Kim who states, per new Medicare guidelines:  · 2 unilateral or 2

## 2021-07-13 ENCOUNTER — TELEPHONE (OUTPATIENT)
Dept: NEUROLOGY | Facility: CLINIC | Age: 81
End: 2021-07-13

## 2021-07-13 NOTE — TELEPHONE ENCOUNTER
Pt. Thought he had an appt. For injection this Thursday, 7/15-he does not. He needs to know about taking his coumadin  Medication-please call him to advise.

## 2021-07-13 NOTE — TELEPHONE ENCOUNTER
Patient states he's been off rx since Saturday, patient states that Dr. Dahiana Whaley knows he's off this rx.

## 2021-07-13 NOTE — TELEPHONE ENCOUNTER
Bilateral L34, L45 and L5-S1 facet injections CPT Code: 34089-78 52151 Rt 18005 LT 71901 Rt 70223 LT Dx: A02.188 -pending approval    Michaela Gan  nurse reviewer Brian Ariza who provided medical guides for review.  Advice to read Limitations, line # 6     F

## 2021-07-14 NOTE — TELEPHONE ENCOUNTER
Patient takes a blood thinner and he stopped taking it b/c he thought he had an appointment. He is asking for a call back.

## 2021-07-14 NOTE — TELEPHONE ENCOUNTER
S/w patient in regards to restarting rx due to insurance still pending with authorization. Patient verbalized understanding.

## 2021-07-14 NOTE — TELEPHONE ENCOUNTER
Received an incoming call from Selina Farmer, spoke to Jd Villareal who states patient called [employer] requesting a status and  approval for injection additionally, complaining of pain and asking why is it taking too long.      Explained to [employer], request

## 2021-07-15 NOTE — TELEPHONE ENCOUNTER
Fauzia Boyd,  Please tell the insurance company that I read the 30 page LCD. It states that it is possible to appeal 3 level injections.   I would like to do that based on the x-ray report indicating the patient has \"bilateral facet arthrosis mid and lower l

## 2021-07-16 NOTE — TELEPHONE ENCOUNTER
Received a VM from nurse reviewer Spencer Perkins who states request for Bilateral L34, L45 and L5-S1 facet injections   CPT CODE: 87306-62,50235-84977 is partially approved.      Called [employer], spoke to nurser review Spencer Perkins who states  CPT CODEs APPROVED: [id number]

## 2021-07-19 ENCOUNTER — APPOINTMENT (OUTPATIENT)
Dept: CARDIOLOGY | Age: 81
End: 2021-07-19

## 2021-07-19 ENCOUNTER — OFFICE VISIT (OUTPATIENT)
Dept: OTOLARYNGOLOGY | Facility: CLINIC | Age: 81
End: 2021-07-19
Payer: MEDICARE

## 2021-07-19 ENCOUNTER — OFFICE VISIT (OUTPATIENT)
Dept: AUDIOLOGY | Facility: CLINIC | Age: 81
End: 2021-07-19
Payer: MEDICARE

## 2021-07-19 VITALS
DIASTOLIC BLOOD PRESSURE: 52 MMHG | TEMPERATURE: 96 F | HEIGHT: 67 IN | WEIGHT: 158 LBS | SYSTOLIC BLOOD PRESSURE: 98 MMHG | HEART RATE: 72 BPM | BODY MASS INDEX: 24.8 KG/M2

## 2021-07-19 DIAGNOSIS — H90.3 SENSORINEURAL HEARING LOSS (SNHL) OF BOTH EARS: Primary | ICD-10-CM

## 2021-07-19 PROCEDURE — 92557 COMPREHENSIVE HEARING TEST: CPT | Performed by: AUDIOLOGIST

## 2021-07-19 PROCEDURE — 92567 TYMPANOMETRY: CPT | Performed by: AUDIOLOGIST

## 2021-07-19 PROCEDURE — 3074F SYST BP LT 130 MM HG: CPT | Performed by: OTOLARYNGOLOGY

## 2021-07-19 PROCEDURE — 99203 OFFICE O/P NEW LOW 30 MIN: CPT | Performed by: OTOLARYNGOLOGY

## 2021-07-19 PROCEDURE — 3078F DIAST BP <80 MM HG: CPT | Performed by: OTOLARYNGOLOGY

## 2021-07-19 PROCEDURE — 3008F BODY MASS INDEX DOCD: CPT | Performed by: OTOLARYNGOLOGY

## 2021-07-19 NOTE — PATIENT INSTRUCTIONS
How Hearing Aids Can Help You    Losing your hearing can be frustrating. But hearing aids can help you hear what Katelyn Salomon been missing. Not everyone who has hearing loss needs hearing aids.  Hearing aids will most likely help you if your hearing loss:   · [facility]. All rights reserved. This information is not intended as a substitute for professional medical care. Always follow your healthcare professional's instructions.

## 2021-07-19 NOTE — TELEPHONE ENCOUNTER
Received [facility] Denial letter for  CPT CODE DENIED: W143468  Reference # [id number]     Jose Negor is offering an opportunity to discuss the request with [facility]'s medical director.   Call Jose Negro  no later than July 20,21 at 9:00 a.m EST to [facility]

## 2021-07-19 NOTE — PROGRESS NOTES
AUDIOLOGY REPORT      Iona Elizondo is a 80year old male     Referring Provider: Keith Higgins   YOB: 1940  Medical Record: [id number]      Patient Hearing History:  Patient seen today through Dr. Brain Mary clinic.   He reports bilateral monitor hearing sensitivity or sooner if any change is suspected. Based on these test results and the report of communication difficulties, patient is an excellent hearing aid candidate.     Return to clinic for Hearing Aid Evaluation to further explore co

## 2021-07-19 NOTE — PROGRESS NOTES
Freddie Judd is a 80year old male. Patient presents with:  Hearing Loss: left side, per pt has recently worsened         HISTORY OF PRESENT ILLNESS  7/19/2021   Here for evaluation of   hearing loss.  Patient feels this has worsened over the las yea Expenses:   Food Insecurity:       Worried About [address] in the Last Year:       Ran Out of Food in the Last Year:   Transportation Needs:       Lack of Transportation (Medical):       Lack of Transportation (Non-Medical):   Physical Activity: Procedure: COLONOSCOPY;  Surgeon: Pat Everett MD;  Location: [address] [place] [address] ENDOSCOPY   • COLONOSCOPY N/A 9/8/2020    Procedure: COLONOSCOPY;  Surgeon: Pat Everett MD;  Location: [address] [place] [address] ENDOSCOPY         REVIEW OF SYSTEMS    System Neg/Pos Miguel Angel Edwards mg total) by mouth daily. as directed by coumadin clinic, Disp: 120 tablet, Rfl: 1  •  Potassium Chloride ER 20 MEQ Oral Tab CR, Take 1 tablet (20 mEq total) by mouth once daily. , Disp: 90 tablet, Rfl: 3  •  hydrALAzine HCl 10 MG Oral Tab, Take 1 tablet (1 Disp: 100 each, Rfl: 3  •  ONETOUCH DELICA LANCETS 35M Does not apply Misc, Use daily, Disp: 100 each, Rfl: 3  •  Glucose Blood (ONETOUCH VERIO) In Vitro Strip, Use daily, Disp: 100 strip, Rfl: 3  •  Blood Glucose Calibration (ONETOUCH ULTRA CONTROL) In Vi

## 2021-07-20 NOTE — TELEPHONE ENCOUNTER
Called [employer], spoke to Suzi Saavedra who updated levels to L45 and L5-S1 for authorization # [id number]   Update will take up to 24 HR. Cain Schaefer will either call the office or fax updated authorization letter.      Reference call # [id number]

## 2021-07-20 NOTE — TELEPHONE ENCOUNTER
Received [employer] fax with partial approval for Bilateral Facet injection CPT CODE; [id number] 06629-58 ( levels not listed on authroization)     [employer] partial approval rational:   Your doctor has asked for an approval to give you shots(Facet Blocks) at t

## 2021-07-22 NOTE — TELEPHONE ENCOUNTER
Called [employer], spoke to Chan who updated levels to L45 and L5-S1 for authorization # [id number]   Update will take up to 24 HR. Tuyet Reed will either call the office or fax updated authorization letter.      Reference call # [id number]

## 2021-07-23 ENCOUNTER — TELEPHONE (OUTPATIENT)
Dept: CARDIOLOGY | Age: 81
End: 2021-07-23

## 2021-07-23 NOTE — TELEPHONE ENCOUNTER
Called [facility], spoke to Garry DINH who verified if levels L45 and L5-S1 have been update for   authorization # [id number]. Request is currently being worked on.       Jose Negro will either call the office or fax updated authorization letter. Reference

## 2021-07-23 NOTE — TELEPHONE ENCOUNTER
Denise Faith, spoke to Deana Quintana who states levels are updated for   authorization# [id number] which expires on 8/26/21     Bilateral L45 and L5-S1 facet injections CPT Code: 03432-41 56570 Rt 62569 LT 85597 Rt 80205 LT Dx: U63.607 -APPROVED     Refe

## 2021-07-26 ENCOUNTER — TELEPHONE (OUTPATIENT)
Dept: NEUROLOGY | Facility: CLINIC | Age: 81
End: 2021-07-26

## 2021-07-26 DIAGNOSIS — Z79.01 ANTICOAGULANT LONG-TERM USE: Primary | ICD-10-CM

## 2021-07-26 NOTE — TELEPHONE ENCOUNTER
S/W Albert Perdomo from [facility]. Patient needs INR/PT for procedure on 07/29-orders placed and signed.

## 2021-07-29 ENCOUNTER — LAB ENCOUNTER (OUTPATIENT)
Dept: LAB | Facility: HOSPITAL | Age: 81
End: 2021-07-29
Attending: PHYSICAL MEDICINE & REHABILITATION
Payer: MEDICARE

## 2021-07-29 ENCOUNTER — OFFICE VISIT (OUTPATIENT)
Dept: SURGERY | Facility: CLINIC | Age: 81
End: 2021-07-29

## 2021-07-29 DIAGNOSIS — M47.816 LUMBAR SPONDYLOSIS: Primary | ICD-10-CM

## 2021-07-29 DIAGNOSIS — Z79.01 ANTICOAGULANT LONG-TERM USE: ICD-10-CM

## 2021-07-29 LAB
INR BLD: 1.86 (ref 0.9–1.2)
PROTHROMBIN TIME: 21.1 SECONDS (ref 11.8–14.5)

## 2021-07-29 PROCEDURE — 64494 INJ PARAVERT F JNT L/S 2 LEV: CPT | Performed by: PHYSICAL MEDICINE & REHABILITATION

## 2021-07-29 PROCEDURE — 36415 COLL VENOUS BLD VENIPUNCTURE: CPT

## 2021-07-29 PROCEDURE — 85610 PROTHROMBIN TIME: CPT

## 2021-07-29 PROCEDURE — 64493 INJ PARAVERT F JNT L/S 1 LEV: CPT | Performed by: PHYSICAL MEDICINE & REHABILITATION

## 2021-07-31 NOTE — PROCEDURES
Preoperative Diagnosis:  (M47.816) Lumbar spondylosis  (primary encounter diagnosis)       Postoperative Diagnosis:  (M47.816) Lumbar spondylosis  (primary encounter diagnosis)       Procedures:  Bilateral L4-5 and L5-S1 Facet injection(s) under fluoroscop

## 2021-08-02 ENCOUNTER — ANTI-COAG VISIT (OUTPATIENT)
Dept: INTERNAL MEDICINE CLINIC | Facility: CLINIC | Age: 81
End: 2021-08-02
Payer: MEDICARE

## 2021-08-02 ENCOUNTER — TELEPHONE (OUTPATIENT)
Dept: NEUROLOGY | Facility: CLINIC | Age: 81
End: 2021-08-02

## 2021-08-02 DIAGNOSIS — I48.20 CHRONIC ATRIAL FIBRILLATION (HCC): ICD-10-CM

## 2021-08-02 LAB
INR: 1.5 (ref 0.8–1.2)
TEST STRIP EXPIRATION DATE: ABNORMAL DATE

## 2021-08-02 PROCEDURE — 85610 PROTHROMBIN TIME: CPT

## 2021-08-02 PROCEDURE — 93793 ANTICOAG MGMT PT WARFARIN: CPT

## 2021-08-02 NOTE — TELEPHONE ENCOUNTER
Patient is asking if our referral team can work with insurance on getting his spine injection apprvd. Pt states he recvd a denial in the mail. Please call him with a f/u.

## 2021-08-02 NOTE — TELEPHONE ENCOUNTER
Informed patient the injections have been approved after the initial denial. Patient verbalized understanding. Pt has upcoming up. Will discuss then.

## 2021-08-16 ENCOUNTER — LAB ENCOUNTER (OUTPATIENT)
Dept: LAB | Age: 81
DRG: 673 | End: 2021-08-16
Attending: INTERNAL MEDICINE
Payer: MEDICARE

## 2021-08-16 ENCOUNTER — ANTI-COAG VISIT (OUTPATIENT)
Dept: INTERNAL MEDICINE CLINIC | Facility: CLINIC | Age: 81
End: 2021-08-16
Payer: MEDICARE

## 2021-08-16 DIAGNOSIS — I48.20 CHRONIC ATRIAL FIBRILLATION (HCC): ICD-10-CM

## 2021-08-16 LAB
INR BLD: 2.26 (ref 0.9–1.2)
PROTHROMBIN TIME: 24.7 SECONDS (ref 11.8–14.5)

## 2021-08-16 PROCEDURE — 85610 PROTHROMBIN TIME: CPT

## 2021-08-16 PROCEDURE — 36415 COLL VENOUS BLD VENIPUNCTURE: CPT

## 2021-08-16 NOTE — TELEPHONE ENCOUNTER
Pt in [facility] office for INR today; He reports he is scheduled for injection 8/23/21 ; Please indicate what DR. Elieser Lovett wants to do with pt ASA and warfarin (any holds? ??)    I will call pt tomorrow with instructions

## 2021-08-18 ENCOUNTER — HOSPITAL ENCOUNTER (INPATIENT)
Facility: HOSPITAL | Age: 81
LOS: 15 days | Discharge: HOME | DRG: 673 | End: 2021-09-02
Attending: EMERGENCY MEDICINE | Admitting: INTERNAL MEDICINE
Payer: MEDICARE

## 2021-08-18 ENCOUNTER — APPOINTMENT (OUTPATIENT)
Dept: ULTRASOUND IMAGING | Facility: HOSPITAL | Age: 81
DRG: 673 | End: 2021-08-18
Attending: EMERGENCY MEDICINE
Payer: MEDICARE

## 2021-08-18 ENCOUNTER — APPOINTMENT (OUTPATIENT)
Dept: GENERAL RADIOLOGY | Facility: HOSPITAL | Age: 81
DRG: 673 | End: 2021-08-18
Attending: EMERGENCY MEDICINE
Payer: MEDICARE

## 2021-08-18 DIAGNOSIS — R77.8 ELEVATED TROPONIN: ICD-10-CM

## 2021-08-18 DIAGNOSIS — E87.5 HYPERKALEMIA: ICD-10-CM

## 2021-08-18 DIAGNOSIS — N18.6 ESRD (END STAGE RENAL DISEASE) ON DIALYSIS (HCC): ICD-10-CM

## 2021-08-18 DIAGNOSIS — I48.91 ATRIAL FIBRILLATION, UNSPECIFIED TYPE (HCC): ICD-10-CM

## 2021-08-18 DIAGNOSIS — N18.9 ACUTE RENAL FAILURE SUPERIMPOSED ON CHRONIC KIDNEY DISEASE, UNSPECIFIED CKD STAGE, UNSPECIFIED ACUTE RENAL FAILURE TYPE (HCC): Primary | ICD-10-CM

## 2021-08-18 DIAGNOSIS — Z99.2 ESRD (END STAGE RENAL DISEASE) ON DIALYSIS (HCC): ICD-10-CM

## 2021-08-18 DIAGNOSIS — Z79.01 ENCOUNTER FOR MONITORING WARFARIN THERAPY: ICD-10-CM

## 2021-08-18 DIAGNOSIS — N17.9 ACUTE RENAL FAILURE SUPERIMPOSED ON CHRONIC KIDNEY DISEASE, UNSPECIFIED CKD STAGE, UNSPECIFIED ACUTE RENAL FAILURE TYPE (HCC): Primary | ICD-10-CM

## 2021-08-18 DIAGNOSIS — E11.9 DIET-CONTROLLED DIABETES MELLITUS (HCC): ICD-10-CM

## 2021-08-18 DIAGNOSIS — Z51.81 ENCOUNTER FOR MONITORING WARFARIN THERAPY: ICD-10-CM

## 2021-08-18 LAB
ALBUMIN SERPL-MCNC: 3.6 G/DL (ref 3.4–5)
ALP LIVER SERPL-CCNC: 73 U/L
ALT SERPL-CCNC: 25 U/L
ANION GAP SERPL CALC-SCNC: 18 MMOL/L (ref 0–18)
ANION GAP SERPL CALC-SCNC: 20 MMOL/L (ref 0–18)
AST SERPL-CCNC: 8 U/L (ref 15–37)
BASOPHILS # BLD AUTO: 0.01 X10(3) UL (ref 0–0.2)
BASOPHILS NFR BLD AUTO: 0.1 %
BILIRUB DIRECT SERPL-MCNC: 0.1 MG/DL (ref 0–0.2)
BILIRUB SERPL-MCNC: 0.4 MG/DL (ref 0.1–2)
BILIRUB UR QL: NEGATIVE
BUN BLD-MCNC: 209 MG/DL (ref 7–18)
BUN BLD-MCNC: 221 MG/DL (ref 7–18)
BUN/CREAT SERPL: 15.5 (ref 10–20)
BUN/CREAT SERPL: 16.3 (ref 10–20)
CALCIUM BLD-MCNC: 7.8 MG/DL (ref 8.5–10.1)
CALCIUM BLD-MCNC: 8.1 MG/DL (ref 8.5–10.1)
CHLORIDE SERPL-SCNC: 110 MMOL/L (ref 98–112)
CHLORIDE SERPL-SCNC: 110 MMOL/L (ref 98–112)
CHOLEST SMN-MCNC: 52 MG/DL (ref ?–200)
CHOLEST SMN-MCNC: <50 MG/DL (ref ?–200)
CO2 SERPL-SCNC: 8 MMOL/L (ref 21–32)
CO2 SERPL-SCNC: 9 MMOL/L (ref 21–32)
COLOR UR: YELLOW
CREAT BLD-MCNC: 13.5 MG/DL
CREAT BLD-MCNC: 13.6 MG/DL
CREAT UR-SCNC: 135 MG/DL
D DIMER PPP FEU-MCNC: 0.53 UG/ML FEU (ref ?–0.81)
DEPRECATED RDW RBC AUTO: 55.4 FL (ref 35.1–46.3)
DIGOXIN SERPL-MCNC: 0.08 NG/ML (ref 0.8–2)
EOSINOPHIL # BLD AUTO: 0.03 X10(3) UL (ref 0–0.7)
EOSINOPHIL NFR BLD AUTO: 0.4 %
ERYTHROCYTE [DISTWIDTH] IN BLOOD BY AUTOMATED COUNT: 14.1 % (ref 11–15)
GLUCOSE BLD-MCNC: 163 MG/DL (ref 70–99)
GLUCOSE BLD-MCNC: 195 MG/DL (ref 70–99)
GLUCOSE BLDC GLUCOMTR-MCNC: 182 MG/DL (ref 70–99)
GLUCOSE UR-MCNC: 50 MG/DL
HAV IGM SER QL: 2.3 MG/DL (ref 1.6–2.6)
HCT VFR BLD AUTO: 31.9 %
HDLC SERPL-MCNC: 21 MG/DL (ref 40–59)
HDLC SERPL-MCNC: 23 MG/DL (ref 40–59)
HGB BLD-MCNC: 10.5 G/DL
IMM GRANULOCYTES # BLD AUTO: 0.02 X10(3) UL (ref 0–1)
IMM GRANULOCYTES NFR BLD: 0.3 %
INR BLD: 3.03 (ref 0.9–1.2)
KETONES UR-MCNC: NEGATIVE MG/DL
LACTATE SERPL-SCNC: 0.7 MMOL/L (ref 0.4–2)
LDLC SERPL CALC-MCNC: 14 MG/DL (ref ?–100)
LYMPHOCYTES # BLD AUTO: 0.54 X10(3) UL (ref 1–4)
LYMPHOCYTES NFR BLD AUTO: 7.2 %
M PROTEIN MFR SERPL ELPH: 6.7 G/DL (ref 6.4–8.2)
MCH RBC QN AUTO: 34.9 PG (ref 26–34)
MCHC RBC AUTO-ENTMCNC: 32.9 G/DL (ref 31–37)
MCV RBC AUTO: 106 FL
MONOCYTES # BLD AUTO: 0.36 X10(3) UL (ref 0.1–1)
MONOCYTES NFR BLD AUTO: 4.8 %
NEUTROPHILS # BLD AUTO: 6.58 X10 (3) UL (ref 1.5–7.7)
NEUTROPHILS # BLD AUTO: 6.58 X10(3) UL (ref 1.5–7.7)
NEUTROPHILS NFR BLD AUTO: 87.2 %
NITRITE UR QL STRIP.AUTO: NEGATIVE
NONHDLC SERPL-MCNC: 29 MG/DL (ref ?–130)
NT-PROBNP SERPL-MCNC: ABNORMAL PG/ML (ref ?–450)
OSMOLALITY SERPL CALC.SUM OF ELEC: 360 MOSM/KG (ref 275–295)
OSMOLALITY SERPL CALC.SUM OF ELEC: 363 MOSM/KG (ref 275–295)
PH UR: 5 [PH] (ref 5–8)
PHOSPHATE SERPL-MCNC: 7.1 MG/DL (ref 2.5–4.9)
PLATELET # BLD AUTO: 114 10(3)UL (ref 150–450)
POTASSIUM SERPL-SCNC: 4.4 MMOL/L (ref 3.5–5.1)
POTASSIUM SERPL-SCNC: 5.9 MMOL/L (ref 3.5–5.1)
PROCALCITONIN SERPL-MCNC: 0.83 NG/ML (ref ?–0.16)
PROT UR-MCNC: 100 MG/DL
PROTHROMBIN TIME: 31.1 SECONDS (ref 11.8–14.5)
RBC # BLD AUTO: 3.01 X10(6)UL
RBC #/AREA URNS AUTO: >10 /HPF
SARS-COV-2 RNA RESP QL NAA+PROBE: NOT DETECTED
SODIUM SERPL-SCNC: 10 MMOL/L
SODIUM SERPL-SCNC: 136 MMOL/L (ref 136–145)
SODIUM SERPL-SCNC: 139 MMOL/L (ref 136–145)
SP GR UR STRIP: 1.01 (ref 1–1.03)
TRIGL SERPL-MCNC: 47 MG/DL (ref 30–149)
TRIGL SERPL-MCNC: 58 MG/DL (ref 30–149)
TROPONIN I SERPL-MCNC: 0.5 NG/ML (ref ?–0.04)
TROPONIN I SERPL-MCNC: 0.54 NG/ML (ref ?–0.04)
UROBILINOGEN UR STRIP-ACNC: <2
VLDLC SERPL CALC-MCNC: 7 MG/DL (ref 0–30)
WBC # BLD AUTO: 7.5 X10(3) UL (ref 4–11)
WBC #/AREA URNS AUTO: >50 /HPF
WBC CLUMPS UR QL AUTO: PRESENT /HPF

## 2021-08-18 PROCEDURE — 76770 US EXAM ABDO BACK WALL COMP: CPT | Performed by: EMERGENCY MEDICINE

## 2021-08-18 PROCEDURE — 99223 1ST HOSP IP/OBS HIGH 75: CPT | Performed by: INTERNAL MEDICINE

## 2021-08-18 PROCEDURE — 71045 X-RAY EXAM CHEST 1 VIEW: CPT | Performed by: EMERGENCY MEDICINE

## 2021-08-18 RX ORDER — DOCUSATE SODIUM 100 MG/1
100 CAPSULE, LIQUID FILLED ORAL 2 TIMES DAILY
Status: DISCONTINUED | OUTPATIENT
Start: 2021-08-18 | End: 2021-09-02

## 2021-08-18 RX ORDER — ASPIRIN 81 MG/1
81 TABLET, CHEWABLE ORAL DAILY
Status: DISCONTINUED | OUTPATIENT
Start: 2021-08-18 | End: 2021-09-02

## 2021-08-18 RX ORDER — WARFARIN SODIUM 5 MG/1
5 TABLET ORAL NIGHTLY
Status: DISCONTINUED | OUTPATIENT
Start: 2021-08-18 | End: 2021-08-19

## 2021-08-18 RX ORDER — DEXTROSE MONOHYDRATE 25 G/50ML
50 INJECTION, SOLUTION INTRAVENOUS
Status: DISCONTINUED | OUTPATIENT
Start: 2021-08-18 | End: 2021-09-02

## 2021-08-18 RX ORDER — ALLOPURINOL 100 MG/1
100 TABLET ORAL DAILY
Status: DISCONTINUED | OUTPATIENT
Start: 2021-08-18 | End: 2021-09-02

## 2021-08-18 RX ORDER — PANTOPRAZOLE SODIUM 40 MG/1
40 TABLET, DELAYED RELEASE ORAL
Status: DISCONTINUED | OUTPATIENT
Start: 2021-08-19 | End: 2021-08-22

## 2021-08-18 RX ORDER — BISACODYL 10 MG
10 SUPPOSITORY, RECTAL RECTAL
Status: DISCONTINUED | OUTPATIENT
Start: 2021-08-18 | End: 2021-09-02

## 2021-08-18 RX ORDER — POLYETHYLENE GLYCOL 3350 17 G/17G
17 POWDER, FOR SOLUTION ORAL DAILY PRN
Status: DISCONTINUED | OUTPATIENT
Start: 2021-08-18 | End: 2021-09-02

## 2021-08-18 RX ORDER — ATORVASTATIN CALCIUM 20 MG/1
20 TABLET, FILM COATED ORAL NIGHTLY
Status: DISCONTINUED | OUTPATIENT
Start: 2021-08-18 | End: 2021-08-26

## 2021-08-18 RX ORDER — CARVEDILOL 6.25 MG/1
6.25 TABLET ORAL 2 TIMES DAILY WITH MEALS
Status: DISCONTINUED | OUTPATIENT
Start: 2021-08-18 | End: 2021-08-26

## 2021-08-18 RX ORDER — SEVELAMER CARBONATE 800 MG/1
800 TABLET, FILM COATED ORAL
Status: DISCONTINUED | OUTPATIENT
Start: 2021-08-19 | End: 2021-09-02

## 2021-08-18 RX ORDER — SODIUM PHOSPHATE, DIBASIC AND SODIUM PHOSPHATE, MONOBASIC 7; 19 G/133ML; G/133ML
1 ENEMA RECTAL ONCE AS NEEDED
Status: DISCONTINUED | OUTPATIENT
Start: 2021-08-18 | End: 2021-09-02

## 2021-08-18 RX ORDER — ONDANSETRON 2 MG/ML
4 INJECTION INTRAMUSCULAR; INTRAVENOUS EVERY 6 HOURS PRN
Status: DISCONTINUED | OUTPATIENT
Start: 2021-08-18 | End: 2021-09-02

## 2021-08-18 RX ORDER — HYDRALAZINE HYDROCHLORIDE 10 MG/1
10 TABLET, FILM COATED ORAL 3 TIMES DAILY
Status: DISCONTINUED | OUTPATIENT
Start: 2021-08-18 | End: 2021-08-25

## 2021-08-18 RX ORDER — METOCLOPRAMIDE HYDROCHLORIDE 5 MG/ML
10 INJECTION INTRAMUSCULAR; INTRAVENOUS EVERY 8 HOURS PRN
Status: DISCONTINUED | OUTPATIENT
Start: 2021-08-18 | End: 2021-08-19

## 2021-08-18 RX ORDER — ACETAMINOPHEN 325 MG/1
650 TABLET ORAL EVERY 6 HOURS PRN
Status: DISCONTINUED | OUTPATIENT
Start: 2021-08-18 | End: 2021-09-02

## 2021-08-18 RX ORDER — DEXTROSE MONOHYDRATE 25 G/50ML
50 INJECTION, SOLUTION INTRAVENOUS ONCE
Status: COMPLETED | OUTPATIENT
Start: 2021-08-18 | End: 2021-08-18

## 2021-08-18 RX ORDER — CARVEDILOL 3.12 MG/1
3.12 TABLET ORAL 2 TIMES DAILY WITH MEALS
Status: DISCONTINUED | OUTPATIENT
Start: 2021-08-18 | End: 2021-08-26

## 2021-08-18 RX ORDER — ATORVASTATIN CALCIUM 40 MG/1
40 TABLET, FILM COATED ORAL NIGHTLY
Status: DISCONTINUED | OUTPATIENT
Start: 2021-08-18 | End: 2021-08-18

## 2021-08-18 RX ORDER — ASPIRIN 81 MG/1
324 TABLET, CHEWABLE ORAL ONCE
Status: COMPLETED | OUTPATIENT
Start: 2021-08-18 | End: 2021-08-18

## 2021-08-18 RX ORDER — ENOXAPARIN SODIUM 100 MG/ML
40 INJECTION SUBCUTANEOUS DAILY
Status: DISCONTINUED | OUTPATIENT
Start: 2021-08-18 | End: 2021-08-18

## 2021-08-18 NOTE — PLAN OF CARE
Problem: Patient Centered Care  Goal: Patient preferences are identified and integrated in the patient's plan of care  Description: Interventions:  - What would you like us to know as we care for you?  I am independent at home   - Provide timely, complete ordered  - Initiate emergency measures for life threatening arrhythmias  - Monitor electrolytes and administer replacement therapy as ordered  Outcome: Not Progressing     Problem: RESPIRATORY - ADULT  Goal: Achieves optimal ventilation and oxygenation  Mary Dye symptoms of electrolyte imbalances  - Administer electrolyte replacement as ordered  - Monitor response to electrolyte replacements, including rhythm and repeat lab results as appropriate  - Fluid restriction as ordered  - Instruct patient on fluid and nut

## 2021-08-18 NOTE — CONSULTS
[facility] [address] NOTE    Taj Velasquez Patient Status:  Emergency    3/28/1940 MRN [id number]   Location [address] Attending Chika Rodriguez MD   Hosp Day # 0 PCP MD FARHANA Ram the hospital for A. fib shortness of breath renal failure and elevated troponin.   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 with BiV defibrillator paroxysmal atrial fibrillation and chron [facility]   • Shortness of breath        Past Surgical History  Past Surgical History:   Procedure Laterality Date   • CABG     • CHOLECYSTECTOMY     • COLONOSCOPY N/A 6/26/2020    Procedure: COLONOSCOPY;  Surgeon: Orest Habermann, MD;  Location: [address] [place] [address] 1351  Gross per 24 hour   Intake 200 ml   Output —   Net 200 ml      This shift: I/O this shift:  In: 200 [I.V.:100; IV PIGGYBACK:100]  Out: -     Scheduled Meds:       Physical Exam:    General: Comfortable, well-nourished, in no acute distress.   HEENT: A Desmond Sicard, MD on 8/18/2021 at 11:23 AM

## 2021-08-18 NOTE — ED INITIAL ASSESSMENT (HPI)
Patient to ed via private vehicle per family patient co of fatigue x few days with sob.  Patient found to be hypoxic in triage 74% ra, patient immediately brought back to ed placed on 1l nc spo2 100%

## 2021-08-18 NOTE — ED PROVIDER NOTES
Patient Seen in: [facility] AND [facility] Emergency Department      History   Patient presents with:  Fatigue    Stated Complaint: fatigue, anorexia    HPI/Subjective:   HPI  Patient is a 29-year-old male history of CAD status post CABG and PCI, CHF with pacema Use      Smoking status: Former Smoker        Packs/day: 1.00        Years: 35.00        Pack years: 35        Types: Cigarettes        Quit date: 1991        Years since quittin.2      Smokeless tobacco: Never Used    Vaping Use      Vaping Use: seconds. Neurological:      General: No focal deficit present. Mental Status: He is alert and oriented to person, place, and time. Mental status is at baseline.    Psychiatric:         Mood and Affect: Mood normal.           ED Course     Labs Review panel order CBC With Differential With Platelet.   Procedure                               Abnormality         Status                     ---------                               -----------         ------                     CBC W/ DIFFERENTIAL[[id number]] V3 through V6. Patient's labs consistent with renal failure. Hyperkalemia. Acidosis. Elevated troponin. Patient given 2 doses of IV fluids. Patient given aspirin, calcium, bicarb, insulin, D50, albuterol. Discussed with cardiology and nephrology.

## 2021-08-19 ENCOUNTER — APPOINTMENT (OUTPATIENT)
Dept: CV DIAGNOSTICS | Facility: HOSPITAL | Age: 81
DRG: 673 | End: 2021-08-19
Attending: INTERNAL MEDICINE
Payer: MEDICARE

## 2021-08-19 ENCOUNTER — APPOINTMENT (OUTPATIENT)
Dept: INTERVENTIONAL RADIOLOGY/VASCULAR | Facility: HOSPITAL | Age: 81
DRG: 673 | End: 2021-08-19
Attending: INTERNAL MEDICINE
Payer: MEDICARE

## 2021-08-19 ENCOUNTER — APPOINTMENT (OUTPATIENT)
Dept: GENERAL RADIOLOGY | Facility: HOSPITAL | Age: 81
DRG: 673 | End: 2021-08-19
Attending: RADIOLOGY
Payer: MEDICARE

## 2021-08-19 PROBLEM — K55.20 AVM (ARTERIOVENOUS MALFORMATION) OF COLON: Status: ACTIVE | Noted: 2021-01-01

## 2021-08-19 PROBLEM — K55.20 AVM (ARTERIOVENOUS MALFORMATION) OF COLON: Status: ACTIVE | Noted: 2021-08-19

## 2021-08-19 LAB
ALBUMIN SERPL-MCNC: 2.9 G/DL (ref 3.4–5)
ANION GAP SERPL CALC-SCNC: 18 MMOL/L (ref 0–18)
BASOPHILS # BLD AUTO: 0 X10(3) UL (ref 0–0.2)
BASOPHILS NFR BLD AUTO: 0 %
BUN BLD-MCNC: 199 MG/DL (ref 7–18)
BUN/CREAT SERPL: 15.1 (ref 10–20)
CALCIUM BLD-MCNC: 7.3 MG/DL (ref 8.5–10.1)
CHLORIDE SERPL-SCNC: 109 MMOL/L (ref 98–112)
CO2 SERPL-SCNC: 11 MMOL/L (ref 21–32)
CREAT BLD-MCNC: 13.2 MG/DL
DEPRECATED RDW RBC AUTO: 53.3 FL (ref 35.1–46.3)
EOSINOPHIL # BLD AUTO: 0.08 X10(3) UL (ref 0–0.7)
EOSINOPHIL NFR BLD AUTO: 1.4 %
ERYTHROCYTE [DISTWIDTH] IN BLOOD BY AUTOMATED COUNT: 13.8 % (ref 11–15)
EST. AVERAGE GLUCOSE BLD GHB EST-MCNC: 148 MG/DL (ref 68–126)
GLUCOSE BLD-MCNC: 162 MG/DL (ref 70–99)
GLUCOSE BLDC GLUCOMTR-MCNC: 138 MG/DL (ref 70–99)
GLUCOSE BLDC GLUCOMTR-MCNC: 149 MG/DL (ref 70–99)
GLUCOSE BLDC GLUCOMTR-MCNC: 197 MG/DL (ref 70–99)
GLUCOSE BLDC GLUCOMTR-MCNC: 211 MG/DL (ref 70–99)
GLUCOSE BLDC GLUCOMTR-MCNC: 231 MG/DL (ref 70–99)
HBA1C MFR BLD HPLC: 6.8 % (ref ?–5.7)
HBV CORE AB SERPL QL IA: NONREACTIVE
HBV SURFACE AB SER QL: NONREACTIVE
HBV SURFACE AB SERPL IA-ACNC: <3.1 MIU/ML
HBV SURFACE AG SER-ACNC: <0.1 [IU]/L
HBV SURFACE AG SERPL QL IA: NONREACTIVE
HCT VFR BLD AUTO: 25.2 %
HGB BLD-MCNC: 8.4 G/DL
IMM GRANULOCYTES # BLD AUTO: 0.02 X10(3) UL (ref 0–1)
IMM GRANULOCYTES NFR BLD: 0.3 %
INR BLD: 3.88 (ref 0.9–1.2)
IRON SATURATION: 88 %
IRON SERPL-MCNC: 142 UG/DL
LYMPHOCYTES # BLD AUTO: 0.45 X10(3) UL (ref 1–4)
LYMPHOCYTES NFR BLD AUTO: 7.7 %
MCH RBC QN AUTO: 35 PG (ref 26–34)
MCHC RBC AUTO-ENTMCNC: 33.3 G/DL (ref 31–37)
MCV RBC AUTO: 105 FL
MONOCYTES # BLD AUTO: 0.66 X10(3) UL (ref 0.1–1)
MONOCYTES NFR BLD AUTO: 11.3 %
NEUTROPHILS # BLD AUTO: 4.64 X10 (3) UL (ref 1.5–7.7)
NEUTROPHILS # BLD AUTO: 4.64 X10(3) UL (ref 1.5–7.7)
NEUTROPHILS NFR BLD AUTO: 79.3 %
OSMOLALITY SERPL CALC.SUM OF ELEC: 356 MOSM/KG (ref 275–295)
PHOSPHATE SERPL-MCNC: 5.4 MG/DL (ref 2.5–4.9)
PLATELET # BLD AUTO: 82 10(3)UL (ref 150–450)
POTASSIUM SERPL-SCNC: 4.2 MMOL/L (ref 3.5–5.1)
PROTHROMBIN TIME: 37.8 SECONDS (ref 11.8–14.5)
PTH-INTACT SERPL-MCNC: 279.2 PG/ML (ref 18.5–88)
RBC # BLD AUTO: 2.4 X10(6)UL
SODIUM SERPL-SCNC: 138 MMOL/L (ref 136–145)
T4 FREE SERPL-MCNC: 0.6 NG/DL (ref 0.8–1.7)
TOTAL IRON BINDING CAPACITY: 162 UG/DL (ref 240–450)
TRANSFERRIN SERPL-MCNC: 109 MG/DL (ref 200–360)
TSI SER-ACNC: 2.48 MIU/ML (ref 0.36–3.74)
URATE SERPL-MCNC: 6.9 MG/DL
VIT D+METAB SERPL-MCNC: 43.1 NG/ML (ref 30–100)
WBC # BLD AUTO: 5.9 X10(3) UL (ref 4–11)

## 2021-08-19 PROCEDURE — 93306 TTE W/DOPPLER COMPLETE: CPT | Performed by: INTERNAL MEDICINE

## 2021-08-19 PROCEDURE — 99223 1ST HOSP IP/OBS HIGH 75: CPT | Performed by: INTERNAL MEDICINE

## 2021-08-19 PROCEDURE — 02HV33Z INSERTION OF INFUSION DEVICE INTO SUPERIOR VENA CAVA, PERCUTANEOUS APPROACH: ICD-10-PCS | Performed by: RADIOLOGY

## 2021-08-19 PROCEDURE — 99233 SBSQ HOSP IP/OBS HIGH 50: CPT | Performed by: INTERNAL MEDICINE

## 2021-08-19 PROCEDURE — 71045 X-RAY EXAM CHEST 1 VIEW: CPT | Performed by: RADIOLOGY

## 2021-08-19 RX ORDER — ALBUMIN (HUMAN) 12.5 G/50ML
100 SOLUTION INTRAVENOUS AS NEEDED
Status: DISCONTINUED | OUTPATIENT
Start: 2021-08-19 | End: 2021-08-23

## 2021-08-19 RX ORDER — HEPARIN SODIUM 1000 [USP'U]/ML
INJECTION, SOLUTION INTRAVENOUS; SUBCUTANEOUS
Status: COMPLETED
Start: 2021-08-19 | End: 2021-08-19

## 2021-08-19 RX ORDER — LIDOCAINE HYDROCHLORIDE 20 MG/ML
INJECTION, SOLUTION EPIDURAL; INFILTRATION; INTRACAUDAL; PERINEURAL
Status: COMPLETED
Start: 2021-08-19 | End: 2021-08-19

## 2021-08-19 RX ORDER — HEPARIN SODIUM 1000 [USP'U]/ML
1.5 INJECTION, SOLUTION INTRAVENOUS; SUBCUTANEOUS ONCE
Status: COMPLETED | OUTPATIENT
Start: 2021-08-19 | End: 2021-08-19

## 2021-08-19 RX ORDER — HEPARIN SODIUM 1000 [USP'U]/ML
1.5 INJECTION, SOLUTION INTRAVENOUS; SUBCUTANEOUS ONCE
Status: DISCONTINUED | OUTPATIENT
Start: 2021-08-19 | End: 2021-08-27

## 2021-08-19 RX ORDER — METOCLOPRAMIDE HYDROCHLORIDE 5 MG/ML
5 INJECTION INTRAMUSCULAR; INTRAVENOUS EVERY 8 HOURS PRN
Status: DISCONTINUED | OUTPATIENT
Start: 2021-08-19 | End: 2021-09-02

## 2021-08-19 NOTE — CM/SW NOTE
PT/OT recommending acute rehab, requested PMR consult. Attempted to speak w/ patient but undergoing a test. Attempted to reach patient's wife & son but no answer. SW/CM to f/u with assessment & discharge planning tomorrow.     Laila Scruggs, [address] Villareal

## 2021-08-19 NOTE — H&P
[facility]    History and Physical    Tonya Double Patient Status:  Inpatient    3/28/1940 MRN [id number]   [facility] 2W/SW Attending Aisha Church [facility] Day # 1 PCP Laury Rivas MD     Date:  2021 2008    cardiologist Dr Martha Chan at [facility]   • Renal disorder    • S/P cholecystectomy 2005    [facility]   • Shortness of breath      Past Surgical History:   Procedure Laterality Date   • CABG     • CHOLECYSTECTOMY     • COLONOSCOPY N/A 6/26/2020    Procedu Tab, Take 1 tablet (1 mg total) by mouth daily. carvedilol 3.125 MG Oral Tab, Take 1 tablet (3.125 mg total) by mouth 2 (two) times daily with meals.  Take one tab along with the 6.25 mg tab for total of 9.375 mg twice daily  Zinc 50 MG Oral Cap, Take 1 ta constipation. Genitourinary: Negative for dysuria, flank pain and hematuria. Musculoskeletal: Positive for back pain. Negative for neck pain. Neurological: Negative for dizziness, syncope, light-headedness and headaches.    Psychiatric/Behavioral: Neg (H) 08/19/2021     (H) 08/19/2021     08/19/2021    K 4.2 08/19/2021     08/19/2021    CO2 11.0 (L) 08/19/2021     (H) 08/19/2021    CA 7.3 (L) 08/19/2021    ALB 2.9 (L) 08/19/2021    ALKPHO 73 08/18/2021    BILT 0.4 08/18/2021 without angina pectoris    Dyslipidemia    HTN (hypertension)    Atrial fibrillation (HCC)    Diet-controlled diabetes mellitus ([facility] [address]    Ischemic cardiomyopathy    ICD (implantable cardioverter-defibrillator) in place    Anticoagulant long-term use    Luis

## 2021-08-19 NOTE — OCCUPATIONAL THERAPY NOTE
OCCUPATIONAL THERAPY EVALUATION - INPATIENT     Room Number: 220/220-A  Evaluation Date: 8/19/2021  Type of Evaluation: Initial  Presenting Problem:  (acute renal failure)    Physician Order: IP Consult to Occupational Therapy  Reason for Therapy: ADL/IADL driving and very active watering his plants, cooking. DISCHARGE RECOMMENDATIONS  OT Discharge Recommendations: Acute rehabilitation  OT Device Recommendations: TBD    PLAN  OT Treatment Plan: Balance activities; Energy conservation/work simplification infarction) [facility]) 2008    cardiologist Dr Hanson Neighbor at [facility]   • Renal disorder    • S/P cholecystectomy 2005    [facility]   • Shortness of breath        Past Surgical History  Past Surgical History:   Procedure Laterality Date   • CABG     • CHOLECYSTECTOMY currently need…  -   Putting on and taking off regular lower body clothing?: A Lot  -   Bathing (including washing, rinsing, drying)?: A Lot  -   Toileting, which includes using toilet, bedpan or urinal? : A Little  -   Putting on and taking off regular up

## 2021-08-19 NOTE — PROGRESS NOTES
[facility] Note:  Renal Dose Adjustment for Metoclopramide (REGLAN)    Petty Barger has been prescribed Metoclopramide (REGLAN) 10 mg every 8 hours as needed for nausea/vomiting,.     Estimated Creatinine Clearance: 4.1 mL/min (A) (based on SCr of

## 2021-08-19 NOTE — PLAN OF CARE
Note: Maicol Han is alert and oriented x4. He is extremely hard of hearing, which makes communication and education difficult at times. He is on room air. Dr. Nicole Certain approved administration of Coumadin overnight.  I spoke with Dr. Oumar Dunham in the PM who expre saturation or ABGs  - Provide Smoking Cessation handout, if applicable  - Encourage broncho-pulmonary hygiene including cough, deep breathe, Incentive Spirometry  - Assess the need for suctioning and perform as needed  - Assess and instruct to report SOB o replacements, including rhythm and repeat lab results as appropriate  - Fluid restriction as ordered  - Instruct patient on fluid and nutrition restrictions as appropriate  Outcome: Not Progressing  Goal: Hemodynamic stability and optimal renal function ma

## 2021-08-19 NOTE — CONSULTS
[facility]    PATIENT'S NAME: ANDREIRUDYFLORENCEGERARD   ATTENDING PHYSICIAN: Umer Cash MD   CONSULTING PHYSICIAN: Alessia Quiñonez MD   PATIENT ACCOUNT#:   [id number]    [facility] #:   [id number]       DATE OF BI ago, does not really drink. He is originally from [place], and he is . REVIEW OF SYSTEMS:  He feels weak, poor appetite, slight shortness of breath, some nausea and vomiting. No diarrhea. No problems with his urination. No skin changes.   No bl artery disease, stable. 7.   Hypertension. Blood pressure acceptable currently. 8.   Atrial fibrillation. Continue with blood thinner, but may need to hold if we do a dialysis catheter. He is not a good candidate for home dialysis.      We will continu

## 2021-08-19 NOTE — PROGRESS NOTES
[facility]    Progress Note    Janki Polk Patient Status:  Inpatient    3/28/1940 MRN [id number]   [facility] 2W/SW Attending Mani Hopkins, [address] [facility] Day # 1 PCP Tony Bowen MD       Subjective:   Josh Almanza extremities good strength  no deformities  Extremities: no edema, cyanosis  Neurological:  Grossly normal    Results:     Laboratory Data:  Lab Results   Component Value Date    WBC 5.9 08/19/2021    HGB 8.4 (L) 08/19/2021    HCT 25.2 (L) 08/19/2021    PLT 8/18/2021 at 11:23 AM          IR NON-TUNNELED CATHETER    Result Date: 8/19/2021  CONCLUSION:  1. Ultrasound-guided placement of temporary large-bore dual-lumen dialysis catheter.     Dictated by (CST): Shola Moe MD on 8/19/2021 at 3:42 PM     Israel Christy

## 2021-08-19 NOTE — PHYSICAL THERAPY NOTE
PHYSICAL THERAPY EVALUATION - INPATIENT     Room Number: 220/220-A  Evaluation Date: 8/19/2021  Type of Evaluation: Initial   Physician Order: PT Eval and Treat    Presenting Problem: renal failure; fatigue; SOB; elevated troponin; Afib; Dialysis  Reason training;Gait training;Family education;Patient education; Energy conservation; Endurance; Body mechanics; Coordination;Balance training;Stair training;Stoop training;Strengthening  Rehab Potential : Good  Frequency (Obs): 5x/week       PHYSICAL THERAPY MEDICA both eyes 6/21/2016   • ARMD (age-related macular degeneration), bilateral 6/21/2016   • Arrhythmia    • Atrial fibrillation ([address] 1995   • CAD (coronary artery disease)     s/p 3v CABG 1995   • CAD (coronary artery disease)     s/p stent x3 in 2008   • [facility] assistance required to correct errors made  · Awareness of Deficits:  decreased awareness of deficits        BALANCE  Static Sitting: Fair +  Dynamic Sitting: Fair +  Static Standing: Poor +  Dynamic Standing: Poor    ADDITIONAL TESTS assistance level: modified independent with walker - rolling     Goal #2  Current Status    Goal #3 Patient is able to ambulate 150 feet with assist device: walker - rolling at assistance level: modified independent   Goal #3   Current Status    Goal #4 Pa

## 2021-08-19 NOTE — DIETARY NOTE
ADULT NUTRITION INITIAL ASSESSMENT    Pt is at moderate nutrition risk. Pt meets moderate malnutrition criteria.       CRITERIA FOR MALNUTRITION DIAGNOSIS:  Criteria for non-severe malnutrition diagnosis: acute illness/injury related to energy intake less However, per pt wife, reports UBW of 170# ~6 months ago indicating 7.6% wt loss. Per MD note, pt will need dialysis.      FOOD/NUTRITION RELATED HISTORY:  Appetite: Fair  Intake: ~100% x1 meals documented since admit  Intake Meeting Needs: No, but oral nutr kg (157 lb 13.6 oz)  07/19/21 : 71.7 kg (158 lb)  07/07/21 : 71.7 kg (158 lb)  12/15/20 : 69.4 kg (153 lb)  11/17/20 : 69.4 kg (153 lb)  10/01/20 : 72.6 kg (160 lb)  09/08/20 : 68.9 kg (152 lb)  08/27/20 : 68.5 kg (151 lb)  08/06/20 : 71.7 kg (158 lb)  08/ FOLLOW UP: RD to follow and monitor nutrition status      Kallie Spencer  MS, [address] [facility], [address]  [phone]

## 2021-08-19 NOTE — PROGRESS NOTES
[facility]                                                               PROGRESS NOTE      Patient Name: Brunilda Saha MRN: [id number]   : 3/28/1940 CSN 08/18/2021    PSA 3.4 12/12/2018    DDIMER 0.53 08/18/2021    CRP 4.19 (H) 07/12/2020    MG 2.3 08/18/2021    PHOS 5.4 (H) 08/19/2021    TROP 0.497 (HH) 08/18/2021     07/10/2020       XR CHEST AP PORTABLE  (CPT=71045)    Result Date: 8/18/2021  CONC

## 2021-08-19 NOTE — PAYOR COMM NOTE
--------------  ADMISSION REVIEW     PayorSocorro Pallas MA O  Subscriber #:  [id number]  Authorization Number: [id number]    Admit date: 8/18/21  Admit time:  2:19 PM       History   Patient presents with:  Fatigue    Stated Complaint: fatigue, anorexia    HPI/ Vero Chew MD;  Location: [address] [place] [address] ENDOSCOPY     Positive for stated complaint: fatigue, anorexia    Physical Exam     ED Triage Vitals [08/18/21 1048]   BP 92/57   Pulse 108   Resp 20   Temp 97.3 °F (36.3 °C)   Temp src Oral   SpO2 100 %   O2 De - Normal   RAPID SARS-COV-2 BY PCR - Normal   CBC WITH DIFFERENTIAL WITH PLATELET    Narrative: The following orders were created for panel order CBC With Differential With Platelet.   Procedure                               Abnormality         Status and T wave inversions in V3 through V6. Patient's labs consistent with renal failure. Hyperkalemia. Acidosis. Elevated troponin. Patient given 2 doses of IV fluids. Patient given aspirin, calcium, bicarb, insulin, D50, albuterol.   Discussed with ca Take 1-1.5 tablets (5-7.5 mg total) by mouth daily. as directed by coumadin clinic  Potassium Chloride ER 20 MEQ Oral Tab CR, Take 1 tablet (20 mEq total) by mouth once daily.   hydrALAzine HCl 10 MG Oral Tab, Take 1 tablet (10 mg total) by mouth 3 (three) Constitutional: Positive for activity change, appetite change and fatigue. Negative for chills and fever. HENT: Negative for congestion, postnasal drip, sinus pressure, sore throat and trouble swallowing. Eyes: Negative for visual disturbance.    Res 08/18/2021     07/10/2020     XR CHEST AP PORTABLE  (CPT=71045)    Result Date: 8/18/2021  CONCLUSION:  1. Borderline cardiomegaly. CABG. 2. Dual chamber defibrillator leads unchanged. 3. No acute pulmonary disease.     Dictated by (CST): Miguel Preston, persists. Renal ultrasound did not show any obstructive uropathy. Renal is following. Will probably require dialysis    Cardiac status appears stable. Elevated troponin most likely secondary to renal failure.   EKG changes secondary to hyperkalemia and gluconate 1 g in sodium chloride 0.9% 100 mL IVPB     Date Action Dose Route User    8/18/2021 1238 New Bag 1 g Intravenous Reji Cardoza, SAGRARIO      carvedilol (COREG) tab 3.125 mg     Date Action Dose Route User    8/18/2021 1716 Given 3.125 mg Oral de V warfarin (COUMADIN) tab 5 mg     Date Action Dose Route User    8/18/2021 2340 Given 5 mg Oral Mina Moncada RN          Vitals (last day)     Date/Time Temp Pulse Resp BP SpO2 Weight O2 Device O2 Flow Rate (L/min) [facility]    08/19/21 0800  —  100  15  103 92/57  100 %  —  None (Room air)  — US            Cards MD 8/18:  History of Present Illness:   Patient is a 80year old male who was admitted to the hospital for A. fib shortness of breath renal failure and elevated troponin.   This is a 80year-old gentl acceptable currently. 8.       Atrial fibrillation. Continue with blood thinner, but may need to hold if we do a dialysis catheter. He is not a good candidate for home dialysis.      We will continue to follow with you.   He is in intensive care [facility]

## 2021-08-20 LAB
ANION GAP SERPL CALC-SCNC: 10 MMOL/L (ref 0–18)
ANION GAP SERPL CALC-SCNC: 12 MMOL/L (ref 0–18)
ANTIBODY SCREEN: NEGATIVE
APTT PPP: 45.4 SECONDS (ref 23.2–35.3)
BASOPHILS # BLD AUTO: 0.01 X10(3) UL (ref 0–0.2)
BASOPHILS # BLD AUTO: 0.01 X10(3) UL (ref 0–0.2)
BASOPHILS NFR BLD AUTO: 0.2 %
BASOPHILS NFR BLD AUTO: 0.2 %
BUN BLD-MCNC: 126 MG/DL (ref 7–18)
BUN BLD-MCNC: 80 MG/DL (ref 7–18)
BUN/CREAT SERPL: 13.4 (ref 10–20)
BUN/CREAT SERPL: 14.6 (ref 10–20)
CALCIUM BLD-MCNC: 7.2 MG/DL (ref 8.5–10.1)
CALCIUM BLD-MCNC: 7.6 MG/DL (ref 8.5–10.1)
CHLORIDE SERPL-SCNC: 101 MMOL/L (ref 98–112)
CHLORIDE SERPL-SCNC: 104 MMOL/L (ref 98–112)
CO2 SERPL-SCNC: 22 MMOL/L (ref 21–32)
CO2 SERPL-SCNC: 25 MMOL/L (ref 21–32)
CREAT BLD-MCNC: 5.97 MG/DL
CREAT BLD-MCNC: 8.65 MG/DL
DEPRECATED RDW RBC AUTO: 48 FL (ref 35.1–46.3)
DEPRECATED RDW RBC AUTO: 49 FL (ref 35.1–46.3)
EOSINOPHIL # BLD AUTO: 0.06 X10(3) UL (ref 0–0.7)
EOSINOPHIL # BLD AUTO: 0.07 X10(3) UL (ref 0–0.7)
EOSINOPHIL NFR BLD AUTO: 0.9 %
EOSINOPHIL NFR BLD AUTO: 1.3 %
ERYTHROCYTE [DISTWIDTH] IN BLOOD BY AUTOMATED COUNT: 13.3 % (ref 11–15)
ERYTHROCYTE [DISTWIDTH] IN BLOOD BY AUTOMATED COUNT: 13.6 % (ref 11–15)
GLUCOSE BLD-MCNC: 140 MG/DL (ref 70–99)
GLUCOSE BLD-MCNC: 171 MG/DL (ref 70–99)
GLUCOSE BLDC GLUCOMTR-MCNC: 181 MG/DL (ref 70–99)
GLUCOSE BLDC GLUCOMTR-MCNC: 194 MG/DL (ref 70–99)
GLUCOSE BLDC GLUCOMTR-MCNC: 202 MG/DL (ref 70–99)
GLUCOSE BLDC GLUCOMTR-MCNC: 236 MG/DL (ref 70–99)
HCT VFR BLD AUTO: 23.6 %
HCT VFR BLD AUTO: 23.8 %
HGB BLD-MCNC: 8.2 G/DL
HGB BLD-MCNC: 8.4 G/DL
IMM GRANULOCYTES # BLD AUTO: 0.02 X10(3) UL (ref 0–1)
IMM GRANULOCYTES # BLD AUTO: 0.02 X10(3) UL (ref 0–1)
IMM GRANULOCYTES NFR BLD: 0.3 %
IMM GRANULOCYTES NFR BLD: 0.4 %
INR BLD: 3.59 (ref 0.9–1.2)
LYMPHOCYTES # BLD AUTO: 0.54 X10(3) UL (ref 1–4)
LYMPHOCYTES # BLD AUTO: 0.66 X10(3) UL (ref 1–4)
LYMPHOCYTES NFR BLD AUTO: 10.1 %
LYMPHOCYTES NFR BLD AUTO: 10.2 %
MCH RBC QN AUTO: 34.4 PG (ref 26–34)
MCH RBC QN AUTO: 34.5 PG (ref 26–34)
MCHC RBC AUTO-ENTMCNC: 34.7 G/DL (ref 31–37)
MCHC RBC AUTO-ENTMCNC: 35.3 G/DL (ref 31–37)
MCV RBC AUTO: 97.5 FL
MCV RBC AUTO: 99.2 FL
MONOCYTES # BLD AUTO: 0.65 X10(3) UL (ref 0.1–1)
MONOCYTES # BLD AUTO: 0.85 X10(3) UL (ref 0.1–1)
MONOCYTES NFR BLD AUTO: 12.1 %
MONOCYTES NFR BLD AUTO: 13.2 %
NEUTROPHILS # BLD AUTO: 4.06 X10 (3) UL (ref 1.5–7.7)
NEUTROPHILS # BLD AUTO: 4.06 X10(3) UL (ref 1.5–7.7)
NEUTROPHILS # BLD AUTO: 4.85 X10 (3) UL (ref 1.5–7.7)
NEUTROPHILS # BLD AUTO: 4.85 X10(3) UL (ref 1.5–7.7)
NEUTROPHILS NFR BLD AUTO: 75.2 %
NEUTROPHILS NFR BLD AUTO: 75.9 %
OSMOLALITY SERPL CALC.SUM OF ELEC: 310 MOSM/KG (ref 275–295)
OSMOLALITY SERPL CALC.SUM OF ELEC: 329 MOSM/KG (ref 275–295)
PLATELET # BLD AUTO: 71 10(3)UL (ref 150–450)
PLATELET # BLD AUTO: 74 10(3)UL (ref 150–450)
POTASSIUM SERPL-SCNC: 3.3 MMOL/L (ref 3.5–5.1)
POTASSIUM SERPL-SCNC: 3.4 MMOL/L (ref 3.5–5.1)
PROTHROMBIN TIME: 35.6 SECONDS (ref 11.8–14.5)
RBC # BLD AUTO: 2.38 X10(6)UL
RBC # BLD AUTO: 2.44 X10(6)UL
RH BLOOD TYPE: POSITIVE
SODIUM SERPL-SCNC: 136 MMOL/L (ref 136–145)
SODIUM SERPL-SCNC: 138 MMOL/L (ref 136–145)
WBC # BLD AUTO: 5.4 X10(3) UL (ref 4–11)
WBC # BLD AUTO: 6.5 X10(3) UL (ref 4–11)

## 2021-08-20 PROCEDURE — 99233 SBSQ HOSP IP/OBS HIGH 50: CPT | Performed by: INTERNAL MEDICINE

## 2021-08-20 PROCEDURE — 30233R1 TRANSFUSION OF NONAUTOLOGOUS PLATELETS INTO PERIPHERAL VEIN, PERCUTANEOUS APPROACH: ICD-10-PCS | Performed by: INTERNAL MEDICINE

## 2021-08-20 RX ORDER — SODIUM CHLORIDE 9 MG/ML
INJECTION, SOLUTION INTRAVENOUS ONCE
Status: DISCONTINUED | OUTPATIENT
Start: 2021-08-20 | End: 2021-09-02

## 2021-08-20 NOTE — PLAN OF CARE
Problem: Patient/Family Goals  Goal: Patient/Family Long Term Goal  Description: Patient's Long Term Goal: To go home    Interventions:  - See additional Care Plan goals for specific interventions  Outcome: Not Progressing  Goal: Patient/Family Short Ter oxygenation  Description: INTERVENTIONS:  - Assess for changes in respiratory status  - Assess for changes in mentation and behavior  - Position to facilitate oxygenation and minimize respiratory effort  - Oxygen supplementation based on oxygen saturation electrolyte replacement as ordered  - Monitor response to electrolyte replacements, including rhythm and repeat lab results as appropriate  - Fluid restriction as ordered  - Instruct patient on fluid and nutrition restrictions as appropriate  Outcome: Not

## 2021-08-20 NOTE — PHYSICAL THERAPY NOTE
Attempted to see pt for follow-up PT session however per medical chart pt with acute oozing/bleeding from RIJ catheter site overnight-currently in IR. Will follow-up with pt tomorrow as medically able/apprropriate.     Steven Conde

## 2021-08-20 NOTE — PLAN OF CARE
Pt alert. Reports no pain. No cough or SOB noted. Right jugular permcath oozing minor amount of blood at site. Will continue to monitor. Gillis draining cloudly yellow urine to gravity. Call light within easy reach. VSS. No acute distress noted.

## 2021-08-20 NOTE — PLAN OF CARE
Pt RIJ continues to ooze. Sandbag remains in place. Notified Dr. Ivania Flowers. Order rec'd to transfuse an unit of FFP. Consent obtained from wife.   Endorsed care to [facility]

## 2021-08-20 NOTE — CM/SW NOTE
08/20/21 1600   CM/SW Referral Data   Referral Source    Reason for Referral Discharge planning   Informant Spouse/Significant Other   Patient [address]   Number of Levels in Home 1   Number of Stair in Home   (

## 2021-08-20 NOTE — PLAN OF CARE
RIJ permcath continued to ooze and the rate seemed to be accelerating. Notified Dr. Annabelle Fu. He applied surgicel and instructed to place a sandbag on site for about 45 minutes. VSS. Awaiting ordered labs. Pt alert, oriented and asymptomatic.   Monitoring

## 2021-08-20 NOTE — PROGRESS NOTES
[facility]    Progress Note    Wu Reilly Patient Status:  Inpatient    3/28/1940 MRN [id number]   [facility] 2W/SW Attending Jessy Barrios, [address] [facility] Se Day # 2 PCP Robyn Mederos MD     Subjective:   Bailey Britton progression. 3. Post sternotomy changes with dual lead AICD/pacemaker again noted.      Dictated by (CST): Netta Witt MD on 8/19/2021 at 3:22 PM     Finalized by (CST): Netta Witt MD on 8/19/2021 at 3:27 PM          IR NON-TUNNELED CATHETER    Result relatively stable in the low 8 range. Continue with pressure dressing. Continue to monitor H/H. He remains in atrial fibrillation with controlled rate--continue with carvedilol for rate control.   As above, Coumadin currently on hold secondary to ceasar

## 2021-08-20 NOTE — PROGRESS NOTES
[address] NOTE      Starla Fell Patient Status:  Inpatient    3/28/1940 MRN [id number]   [facility] 2W/SW Attending Sherry Wells, [facility] Day # 2 PC and rhythm, nl X0,Y7, 2/6 systolic ejection murmur  Abd: Abdomen soft, nontender, nondistended,  bowel sounds present  Ext:  no clubbing, no cyanosis,no edema  Neuro: no focal deficits  Skin: no rashes or lesions        Scheduled Meds:   • sodium chloride with patient's symptoms and consider short-term follow-up x-ray imaging to assess for resolution or progression. 3. Post sternotomy changes with dual lead AICD/pacemaker again noted.      Dictated by (CST): Corbin Pak MD on 8/19/2021 at 3:22 PM     Christina

## 2021-08-20 NOTE — SIGNIFICANT EVENT
Called for bleeding from dialysis catheter. Applied direct pressure and surgicel for 5 minutes. Bleeding stopped. Applied new dressing and placed sand bag over dressing.

## 2021-08-21 LAB
ALBUMIN SERPL-MCNC: 3.1 G/DL (ref 3.4–5)
ANION GAP SERPL CALC-SCNC: 12 MMOL/L (ref 0–18)
ANION GAP SERPL CALC-SCNC: 9 MMOL/L (ref 0–18)
APTT PPP: 41.7 SECONDS (ref 23.2–35.3)
APTT PPP: 44.9 SECONDS (ref 23.2–35.3)
BASOPHILS # BLD AUTO: 0.01 X10(3) UL (ref 0–0.2)
BASOPHILS NFR BLD AUTO: 0.1 %
BLOOD TYPE BARCODE: 6200
BUN BLD-MCNC: 88 MG/DL (ref 7–18)
BUN BLD-MCNC: 92 MG/DL (ref 7–18)
BUN/CREAT SERPL: 12.8 (ref 10–20)
BUN/CREAT SERPL: 13.1 (ref 10–20)
CALCIUM BLD-MCNC: 7.2 MG/DL (ref 8.5–10.1)
CALCIUM BLD-MCNC: 7.3 MG/DL (ref 8.5–10.1)
CHLORIDE SERPL-SCNC: 96 MMOL/L (ref 98–112)
CHLORIDE SERPL-SCNC: 99 MMOL/L (ref 98–112)
CO2 SERPL-SCNC: 26 MMOL/L (ref 21–32)
CO2 SERPL-SCNC: 27 MMOL/L (ref 21–32)
CREAT BLD-MCNC: 6.73 MG/DL
CREAT BLD-MCNC: 7.21 MG/DL
DEPRECATED HBV CORE AB SER IA-ACNC: 909.5 NG/ML
DEPRECATED RDW RBC AUTO: 47.1 FL (ref 35.1–46.3)
DEPRECATED RDW RBC AUTO: 50.5 FL (ref 35.1–46.3)
EOSINOPHIL # BLD AUTO: 0.1 X10(3) UL (ref 0–0.7)
EOSINOPHIL NFR BLD AUTO: 1.4 %
ERYTHROCYTE [DISTWIDTH] IN BLOOD BY AUTOMATED COUNT: 13.2 % (ref 11–15)
ERYTHROCYTE [DISTWIDTH] IN BLOOD BY AUTOMATED COUNT: 13.5 % (ref 11–15)
GLUCOSE BLD-MCNC: 160 MG/DL (ref 70–99)
GLUCOSE BLD-MCNC: 243 MG/DL (ref 70–99)
GLUCOSE BLDC GLUCOMTR-MCNC: 152 MG/DL (ref 70–99)
GLUCOSE BLDC GLUCOMTR-MCNC: 202 MG/DL (ref 70–99)
GLUCOSE BLDC GLUCOMTR-MCNC: 209 MG/DL (ref 70–99)
GLUCOSE BLDC GLUCOMTR-MCNC: 218 MG/DL (ref 70–99)
HCT VFR BLD AUTO: 24 %
HCT VFR BLD AUTO: 24.2 %
HCT VFR BLD AUTO: 26.4 %
HEMOCCULT STL QL: POSITIVE
HGB BLD-MCNC: 8.3 G/DL
HGB BLD-MCNC: 8.4 G/DL
HGB BLD-MCNC: 8.9 G/DL
IMM GRANULOCYTES # BLD AUTO: 0.03 X10(3) UL (ref 0–1)
IMM GRANULOCYTES NFR BLD: 0.4 %
INR BLD: 1.83 (ref 0.9–1.2)
IRON SATURATION: 84 %
IRON SERPL-MCNC: 145 UG/DL
LYMPHOCYTES # BLD AUTO: 0.87 X10(3) UL (ref 1–4)
LYMPHOCYTES NFR BLD AUTO: 12 %
MCH RBC QN AUTO: 34.3 PG (ref 26–34)
MCH RBC QN AUTO: 34.5 PG (ref 26–34)
MCHC RBC AUTO-ENTMCNC: 33.7 G/DL (ref 31–37)
MCHC RBC AUTO-ENTMCNC: 35 G/DL (ref 31–37)
MCV RBC AUTO: 102.3 FL
MCV RBC AUTO: 98 FL
MONOCYTES # BLD AUTO: 0.98 X10(3) UL (ref 0.1–1)
MONOCYTES NFR BLD AUTO: 13.6 %
NEUTROPHILS # BLD AUTO: 5.23 X10 (3) UL (ref 1.5–7.7)
NEUTROPHILS # BLD AUTO: 5.23 X10(3) UL (ref 1.5–7.7)
NEUTROPHILS NFR BLD AUTO: 72.5 %
OSMOLALITY SERPL CALC.SUM OF ELEC: 310 MOSM/KG (ref 275–295)
OSMOLALITY SERPL CALC.SUM OF ELEC: 314 MOSM/KG (ref 275–295)
PHOSPHATE SERPL-MCNC: 3.9 MG/DL (ref 2.5–4.9)
PLATELET # BLD AUTO: 76 10(3)UL (ref 150–450)
PLATELET # BLD AUTO: 87 10(3)UL (ref 150–450)
POTASSIUM SERPL-SCNC: 3.4 MMOL/L (ref 3.5–5.1)
POTASSIUM SERPL-SCNC: 3.8 MMOL/L (ref 3.5–5.1)
PROT UR-MCNC: 89.5 MG/DL
PROTHROMBIN TIME: 20.9 SECONDS (ref 11.8–14.5)
PTH-INTACT SERPL-MCNC: 441.9 PG/ML (ref 18.5–88)
RBC # BLD AUTO: 2.45 X10(6)UL
RBC # BLD AUTO: 2.58 X10(6)UL
SODIUM SERPL-SCNC: 134 MMOL/L (ref 136–145)
SODIUM SERPL-SCNC: 135 MMOL/L (ref 136–145)
TOTAL IRON BINDING CAPACITY: 173 UG/DL (ref 240–450)
TRANSFERRIN SERPL-MCNC: 116 MG/DL (ref 200–360)
WBC # BLD AUTO: 7.2 X10(3) UL (ref 4–11)
WBC # BLD AUTO: 9 X10(3) UL (ref 4–11)

## 2021-08-21 PROCEDURE — 99222 1ST HOSP IP/OBS MODERATE 55: CPT | Performed by: INTERNAL MEDICINE

## 2021-08-21 PROCEDURE — 99233 SBSQ HOSP IP/OBS HIGH 50: CPT | Performed by: INTERNAL MEDICINE

## 2021-08-21 PROCEDURE — 99232 SBSQ HOSP IP/OBS MODERATE 35: CPT | Performed by: INTERNAL MEDICINE

## 2021-08-21 RX ORDER — POTASSIUM CHLORIDE 20 MEQ/1
20 TABLET, EXTENDED RELEASE ORAL ONCE
Status: COMPLETED | OUTPATIENT
Start: 2021-08-21 | End: 2021-08-21

## 2021-08-21 RX ORDER — WARFARIN SODIUM 2 MG/1
3 TABLET ORAL
Status: COMPLETED | OUTPATIENT
Start: 2021-08-21 | End: 2021-08-21

## 2021-08-21 RX ORDER — ALBUMIN (HUMAN) 12.5 G/50ML
100 SOLUTION INTRAVENOUS AS NEEDED
Status: DISCONTINUED | OUTPATIENT
Start: 2021-08-21 | End: 2021-08-23

## 2021-08-21 RX ORDER — HEPARIN SODIUM AND DEXTROSE 10000; 5 [USP'U]/100ML; G/100ML
INJECTION INTRAVENOUS CONTINUOUS
Status: DISCONTINUED | OUTPATIENT
Start: 2021-08-21 | End: 2021-08-21

## 2021-08-21 NOTE — PLAN OF CARE
Problem: Patient Centered Care  Goal: Patient preferences are identified and integrated in the patient's plan of care  Description: Interventions:  - What would you like us to know as we care for you?  I live at home with my wife  - Provide timely, comple emergency measures for life threatening arrhythmias  - Monitor electrolytes and administer replacement therapy as ordered  Outcome: Progressing     Problem: RESPIRATORY - ADULT  Goal: Achieves optimal ventilation and oxygenation  Description: INTERVENTIONS imbalances  - Administer electrolyte replacement as ordered  - Monitor response to electrolyte replacements, including rhythm and repeat lab results as appropriate  - Fluid restriction as ordered  - Instruct patient on fluid and nutrition restrictions as a including physical limitations  - Instruct pt to call for assistance with activity based on assessment  - Modify environment to reduce risk of injury  - Provide assistive devices as appropriate  - Consider OT/PT consult to assist with strengthening/mobilit

## 2021-08-21 NOTE — PROGRESS NOTES
[address] NOTE      Amanda Ruth Patient Status:  Inpatient    3/28/1940 MRN [id number]   [facility] 2W/SW Attending Heather Kumar, [address] [facility] Se Day # 3 PCP Daljit Benites MD         Assessment an allopurinol  100 mg Oral Daily   • aspirin  81 mg Oral Daily   • carvedilol  3.125 mg Oral BID with meals   • carvedilol  6.25 mg Oral BID with meals   • hydrALAzine HCl  10 mg Oral TID   • pantoprazole  40 mg Oral QAM AC   • docusate sodium  100 mg Oral B IR NON-TUNNELED CATHETER    Result Date: 8/19/2021  CONCLUSION:  1. Ultrasound-guided placement of temporary large-bore dual-lumen dialysis catheter.     Dictated by (CST): Abe Rodriguez MD on 8/19/2021 at 3:42 PM     Finalized by (CST): Aurelio Pena

## 2021-08-21 NOTE — CONSULTS
[facility]   Gastroenterology Consultation Note    Ha Juarez  Patient Status:    Inpatient  Date of Admission:         8/18/2021, Hospital day #3  Attending:   Timothy Hooks MD  PCP:     Linette Aragon MD    Chief Complai Diabetes ([facility]    • [facility]    • Diverticulosis     colonoscopy 3/10/15 by GI Dr Vazquez [place]   • Floaters 6/21/2016   • High blood pressure    • High cholesterol    • Hypercholesteremia    • MI (myocardial infarction) [facility]) 2008    cardiolog 150 mEq, Intravenous, Continuous  •  Albumin Human (ALBUMINAR) 25 % solution 100 mL, 100 mL, Intravenous, PRN  •  allopurinol (ZYLOPRIM) tab 100 mg, 100 mg, Oral, Daily  •  aspirin chewable tab 81 mg, 81 mg, Oral, Daily  •  carvedilol (COREG) tab 3.125 mg, anicteric, oropharynx clear, mucus membranes appear moist  CV- tachycardic  Lung- Moves air well;  No labored breathing  Abdomen- soft, non-tender exam in all quadrants without rigidity or guarding, non-distended, no abnormal bowel sounds noted, no masses a use, who presents with renal failure, dark stools, supratherapeutic INR, and chronic anemia. #Acute on chronic renal failure  #CHF -EF 40%  #Dark stools    Medically complicated patient--->consulted for dark stools.  Extensive w/u last year with Dr. Erik Blackman

## 2021-08-21 NOTE — PROGRESS NOTES
[facility]    Progress Note    Lebanon Clause Patient Status:  Inpatient    3/28/1940 MRN [id number]   [facility] 2W/SW Attending Kristina Clayton, [address] [facility] Day # 2 PCP Sahra Herr MD       Subjective:   Quang Fritz noted  Musculoskeletal: full ROM all extremities good strength  no deformities  Extremities: no edema, cyanosis  Neurological:  Grossly normal    Results:     Laboratory Data:  Lab Results   Component Value Date    WBC 6.5 08/20/2021    HGB 8.4 (L) 08/20/2 3:43 PM              ASSESSMENT/PLAN:   Assessment       1 acute on chronic renal failure urine output improved a little bit 400 today we will have to watch  Most likely still needs dialysis have not set up for outpatient dialysis  Tolerated dialysis fine

## 2021-08-21 NOTE — PLAN OF CARE
Pt transferred to floor this evening. No acute changes. All safety measures in place. Plan for dialysis tomorrow. Continue to monitor.      Problem: CARDIOVASCULAR - ADULT  Goal: Maintains optimal cardiac output and hemodynamic stability  Description: INTER intake and initiate nutrition consult as needed  - Instruct patient on self management of diabetes  Outcome: Progressing  Goal: Electrolytes maintained within normal limits  Description: INTERVENTIONS:  - Monitor labs and rhythm and assess patient for sign FALL  Goal: Free from fall injury  Description: INTERVENTIONS:  - Assess pt frequently for physical needs  - Identify cognitive and physical deficits and behaviors that affect risk of falls.   - [place] fall precautions as indicated by assessment.  - Educ

## 2021-08-21 NOTE — PLAN OF CARE
Problem: Patient Centered Care  Goal: Patient preferences are identified and integrated in the patient's plan of care  Description: Interventions:  - What would you like us to know as we care for you?  I live at home with my wife  - Provide timely, comple emergency measures for life threatening arrhythmias  - Monitor electrolytes and administer replacement therapy as ordered  Outcome: Progressing     Problem: RESPIRATORY - ADULT  Goal: Achieves optimal ventilation and oxygenation  Description: INTERVENTIONS imbalances  - Administer electrolyte replacement as ordered  - Monitor response to electrolyte replacements, including rhythm and repeat lab results as appropriate  - Fluid restriction as ordered  - Instruct patient on fluid and nutrition restrictions as a assistance  - Assess pain using appropriate pain scale  - Administer analgesics based on type and severity of pain and evaluate response  - Implement non-pharmacological measures as appropriate and evaluate response  - Consider cultural and social influenc services based on physician/LIP order or complex needs related to functional status, cognitive ability or social support system  Outcome: Progressing

## 2021-08-21 NOTE — PLAN OF CARE
Problem: Patient Centered Care  Goal: Patient preferences are identified and integrated in the patient's plan of care  Description: Interventions:  - What would you like us to know as we care for you?  I live at home with my wife  - Provide timely, comple emergency measures for life threatening arrhythmias  - Monitor electrolytes and administer replacement therapy as ordered  Outcome: Progressing     Problem: RESPIRATORY - ADULT  Goal: Achieves optimal ventilation and oxygenation  Description: INTERVENTIONS imbalances  - Administer electrolyte replacement as ordered  - Monitor response to electrolyte replacements, including rhythm and repeat lab results as appropriate  - Fluid restriction as ordered  - Instruct patient on fluid and nutrition restrictions as a assistance  - Assess pain using appropriate pain scale  - Administer analgesics based on type and severity of pain and evaluate response  - Implement non-pharmacological measures as appropriate and evaluate response  - Consider cultural and social influenc services based on physician/LIP order or complex needs related to functional status, cognitive ability or social support system  Outcome: Progressing   Assumed care of patient at 0000. Right I.J. catheter bleeding subsided. Sodium bicarb gtt maintained.  So

## 2021-08-21 NOTE — PHYSICAL THERAPY NOTE
PHYSICAL THERAPY TREATMENT NOTE - INPATIENT     Room Number: 220/220-A       Presenting Problem: renal failure, fatigue, SOB, elevated troponin, Afib, dailysis    Problem List  Principal Problem:    Acute renal failure superimposed on chronic kidney diseas mobility; Body mechanics;Gait training;Family education; Energy conservation;Transfer training;Balance training    SUBJECTIVE  I want to  Go to bathroom    OBJECTIVE  Precautions: Bed/chair alarm;Cardiac;Hard of hearing    WEIGHT BEARING RESTRICTION  Weight supine - sit EOB @ level: modified independent     Goal #1   Current Status    Goal #2 Patient is able to demonstrate transfers Sit to/from Stand at assistance level: modified independent with walker - rolling     Goal #2  Current Status    Goal #3 Patient

## 2021-08-21 NOTE — CONSULTS
[facility]  PMR Referral      Brunilda Saha Patient Status:  Inpatient    3/28/1940 MRN [id number]   [facility] 2W/SW Attending Vishal Harp, [address] [facility] Se Day # 3 PCP Naomie Banks MD     Patient Identification  Ayden Pickard 1 units FFP. Premorbid Functional Status: independent with ADLs. Was driving, doing grocery shopping.   Ambulated with cane recently when outside of home  Support System and Family Circumstances (i.e., potential caregivers): spouse / significant other  H infusion, , Intravenous, Once  heparin sodium 1000 UNIT/ML injection 1,500 Units, 1.5 mL, Intravenous, Once  [] sodium chloride 0.9% IV bolus 100 mL, 100 mL, Intravenous, Once PRN  Albumin Human (ALBUMINAR) 25 % solution 100 mL, 100 mL, Intravenous, mg, Oral, TID  pantoprazole (PROTONIX) EC tab 40 mg, 40 mg, Oral, QAM AC  acetaminophen (TYLENOL) tab 650 mg, 650 mg, Oral, Q6H PRN  docusate sodium (COLACE) cap 100 mg, 100 mg, Oral, BID  PEG 3350 (MIRALAX) powder packet 17 g, 17 g, Oral, Daily PRN  magne 08/21/2021    PLT 76.0 08/21/2021    CREATSERUM 6.73 08/21/2021    BUN 88 08/21/2021     08/21/2021    K 3.4 08/21/2021    CL 99 08/21/2021    CO2 27.0 08/21/2021     08/21/2021    CA 7.2 08/21/2021    ALB 3.1 08/21/2021    INR 1.83 08/21/2021

## 2021-08-21 NOTE — PROGRESS NOTES
[facility]  Nephrology Daily Progress Note    Nicole Toribio  [id number]  80year old      HPI:   Nicole Toribio is a 80year old male. Eating and drinking OK. On RA. Comfortable at rest.  To walk to BR. YOUNG but on RA. age    Labs:  Lab Results   Component Value Date    WBC 9.0 08/21/2021    HGB 8.9 08/21/2021    HCT 26.4 08/21/2021    PLT 87.0 08/21/2021    CREATSERUM 6.73 08/21/2021    BUN 88 08/21/2021     08/21/2021    K 3.4 08/21/2021    CL 99 08/21/2021    CO AICD/pacemaker again noted. Dictated by (CST): Lele Broges MD on 8/19/2021 at 3:22 PM     Finalized by (CST): Lele Borges MD on 8/19/2021 at 3:27 PM          IR NON-TUNNELED CATHETER    Result Date: 8/19/2021  CONCLUSION:  1.  Ultrasound-guided plac Intravenous, Q6H PRN  •  atorvastatin (LIPITOR) tab 20 mg, 20 mg, Oral, Nightly  •  sevelamer (RENVELA) tab 800 mg, 800 mg, Oral, TID CC  •  glucose (DEX4) oral liquid 15 g, 15 g, Oral, Q15 Min PRN **OR** glucose-vitamin C (DEX-4) chewable tab 4 tablet, 4 ventricular response (HCC)     Hyperglycemia     Bloating     Floaters, bilateral     Atherosclerosis of aorta (HCC)     ICD (implantable cardioverter-defibrillator) in place     Anticoagulant long-term use     Dyspnea     Congestive heart failure (CHF) (H

## 2021-08-21 NOTE — PROGRESS NOTES
[facility]    Progress Note    Delle Party Patient Status:  Inpatient    3/28/1940 MRN [id number]   [facility] 2W/SW Attending Zak Rossi, [address] [facility] Se Day # 3 PCP Queen Zeny MD     Subjective:   Howie Donnelly without erythema or exudates; hard of hearing  Cardiovascular: S1, S2, no S3, no S4, Regular rate and rhythm, No murmurs/gallops/rubs. Respiratory: Clear to auscultation bilaterally.   No wheezes/rales/rhonchi  Gastrointestinal: Soft, nontender, nondisten 3. Post sternotomy changes with dual lead AICD/pacemaker again noted.      Dictated by (CST): Rima Bello MD on 8/19/2021 at 3:22 PM     Finalized by (CST): Rima Bello MD on 8/19/2021 at 3:27 PM          IR NON-TUNNELED CATHETER    Result Date: 8/19/2 with ischemic cardiomyopathy  S/p 3v CABG in 1995, s/p stent x 3 in 2008  TARIQ in Aug 2019 with LVEF 25-30%, hypokinesis of inferior area, mod MR;  LVEF closer to 20% on CATH, with chronic CAD on 11/2/17; unable to afford Entresto 24/26 one tab po BID, so m hemoglobin of 6.1.  Patient received 2 units PRBCs on 6/22/2020.  EGD on 623 was unremarkable with no source of bleeding identified.  Colonoscopy on 6/26 revealed 2 sessile 10 to 12 mm polyps removed from the proximal mid ascending colon by cold snare poly ophtho Dr Lance Mclain in Oct 2019; annual visit     Cataract   saw ophtho Dr Lance Mclain in Oct 2019     Secondary hyperparathyroidism   in April 2016; repeat PTH is 107 in April 2019 (was 72 in Oct 2016); on Vitamin D 2000 IU po every day  - iPTH 441.9

## 2021-08-22 ENCOUNTER — APPOINTMENT (OUTPATIENT)
Dept: GENERAL RADIOLOGY | Facility: HOSPITAL | Age: 81
DRG: 673 | End: 2021-08-22
Attending: INTERNAL MEDICINE
Payer: MEDICARE

## 2021-08-22 LAB
ALBUMIN SERPL-MCNC: 2.8 G/DL (ref 3.4–5)
ALBUMIN/GLOB SERPL: 0.9 {RATIO} (ref 1–2)
ALP LIVER SERPL-CCNC: 57 U/L
ALT SERPL-CCNC: 20 U/L
ANION GAP SERPL CALC-SCNC: 10 MMOL/L (ref 0–18)
AST SERPL-CCNC: 13 U/L (ref 15–37)
BASOPHILS # BLD AUTO: 0.01 X10(3) UL (ref 0–0.2)
BASOPHILS NFR BLD AUTO: 0.1 %
BILIRUB SERPL-MCNC: 0.5 MG/DL (ref 0.1–2)
BUN BLD-MCNC: 99 MG/DL (ref 7–18)
BUN/CREAT SERPL: 12.7 (ref 10–20)
CALCIUM BLD-MCNC: 7.6 MG/DL (ref 8.5–10.1)
CHLORIDE SERPL-SCNC: 94 MMOL/L (ref 98–112)
CO2 SERPL-SCNC: 26 MMOL/L (ref 21–32)
CREAT BLD-MCNC: 7.78 MG/DL
DEPRECATED RDW RBC AUTO: 48.4 FL (ref 35.1–46.3)
EOSINOPHIL # BLD AUTO: 0.16 X10(3) UL (ref 0–0.7)
EOSINOPHIL NFR BLD AUTO: 2.3 %
ERYTHROCYTE [DISTWIDTH] IN BLOOD BY AUTOMATED COUNT: 13.2 % (ref 11–15)
GLOBULIN PLAS-MCNC: 3.1 G/DL (ref 2.8–4.4)
GLUCOSE BLD-MCNC: 141 MG/DL (ref 70–99)
GLUCOSE BLDC GLUCOMTR-MCNC: 118 MG/DL (ref 70–99)
GLUCOSE BLDC GLUCOMTR-MCNC: 129 MG/DL (ref 70–99)
GLUCOSE BLDC GLUCOMTR-MCNC: 142 MG/DL (ref 70–99)
GLUCOSE BLDC GLUCOMTR-MCNC: 147 MG/DL (ref 70–99)
GLUCOSE BLDC GLUCOMTR-MCNC: 148 MG/DL (ref 70–99)
GLUCOSE BLDC GLUCOMTR-MCNC: 198 MG/DL (ref 70–99)
HAV IGM SER QL: 1.7 MG/DL (ref 1.6–2.6)
HCT VFR BLD AUTO: 23.2 %
HGB BLD-MCNC: 7.9 G/DL
IMM GRANULOCYTES # BLD AUTO: 0.03 X10(3) UL (ref 0–1)
IMM GRANULOCYTES NFR BLD: 0.4 %
INR BLD: 1.61 (ref 0.9–1.2)
LYMPHOCYTES # BLD AUTO: 0.63 X10(3) UL (ref 1–4)
LYMPHOCYTES NFR BLD AUTO: 8.9 %
M PROTEIN MFR SERPL ELPH: 5.9 G/DL (ref 6.4–8.2)
MCH RBC QN AUTO: 34.2 PG (ref 26–34)
MCHC RBC AUTO-ENTMCNC: 34.1 G/DL (ref 31–37)
MCV RBC AUTO: 100.4 FL
MONOCYTES # BLD AUTO: 0.84 X10(3) UL (ref 0.1–1)
MONOCYTES NFR BLD AUTO: 11.9 %
NEUTROPHILS # BLD AUTO: 5.37 X10 (3) UL (ref 1.5–7.7)
NEUTROPHILS # BLD AUTO: 5.37 X10(3) UL (ref 1.5–7.7)
NEUTROPHILS NFR BLD AUTO: 76.4 %
OSMOLALITY SERPL CALC.SUM OF ELEC: 303 MOSM/KG (ref 275–295)
PHOSPHATE SERPL-MCNC: 4.9 MG/DL (ref 2.5–4.9)
PLATELET # BLD AUTO: 75 10(3)UL (ref 150–450)
POTASSIUM SERPL-SCNC: 4 MMOL/L (ref 3.5–5.1)
PROTHROMBIN TIME: 18.9 SECONDS (ref 11.8–14.5)
RBC # BLD AUTO: 2.31 X10(6)UL
SODIUM SERPL-SCNC: 130 MMOL/L (ref 136–145)
WBC # BLD AUTO: 7 X10(3) UL (ref 4–11)

## 2021-08-22 PROCEDURE — 99233 SBSQ HOSP IP/OBS HIGH 50: CPT | Performed by: INTERNAL MEDICINE

## 2021-08-22 PROCEDURE — 99232 SBSQ HOSP IP/OBS MODERATE 35: CPT | Performed by: INTERNAL MEDICINE

## 2021-08-22 PROCEDURE — 5A1D70Z PERFORMANCE OF URINARY FILTRATION, INTERMITTENT, LESS THAN 6 HOURS PER DAY: ICD-10-PCS | Performed by: INTERNAL MEDICINE

## 2021-08-22 PROCEDURE — 71045 X-RAY EXAM CHEST 1 VIEW: CPT | Performed by: INTERNAL MEDICINE

## 2021-08-22 RX ORDER — SUCRALFATE ORAL 1 G/10ML
1 SUSPENSION ORAL
Status: DISCONTINUED | OUTPATIENT
Start: 2021-08-22 | End: 2021-09-02

## 2021-08-22 RX ORDER — WARFARIN SODIUM 5 MG/1
5 TABLET ORAL
Status: COMPLETED | OUTPATIENT
Start: 2021-08-22 | End: 2021-08-22

## 2021-08-22 RX ORDER — HEPARIN SODIUM 1000 [USP'U]/ML
INJECTION, SOLUTION INTRAVENOUS; SUBCUTANEOUS
Status: COMPLETED
Start: 2021-08-22 | End: 2021-08-22

## 2021-08-22 NOTE — PROGRESS NOTES
[facility]      Subjective:  No pain dyspnea palpitations.   Plans for HD today    Objective:  /59 (BP Location: Right arm)   Pulse 91   Temp 97.6 °F (36.4 °C) (Oral)   Resp 18   Ht 5' 6.93\" (1.7 m)   Wt 158 lb 12.8 oz (72 kg) < > 13.20*   < > 8.65*   < > 6.73*  --  7.21*  --  7.78*   GFRAA  --  3*  --    < > 4*   < > 6*   < > 8*  --  7*  --  7*   GFRNAA  --  3*  --    < > 3*   < > 5*   < > 7*  --  6*  --  6*   CA  --  8.1*  --    < > 7.3*   < > 7.2*   < > 7.2*  --  7.3*  --  7. continue with treating medically at this time    Atrial fibrillation permanent   Rates ~ 100 on Coreg won't increase with lower BPs  Coumadin resumed INR pending     Elevated troponin with history of CAD and prior bypass  Elevated troponin does not suggest

## 2021-08-22 NOTE — PLAN OF CARE
No acute changes. Dialysis done today. Fluid restriction and electrolyte monitoring education reinforced with patient and wife at bedside. No signs of bleeding today. All safety measures in place. Continue to monitor.      Problem: METABOLIC/FLUID AND ELECT appropriate pain scale  - Administer analgesics based on type and severity of pain and evaluate response  - Implement non-pharmacological measures as appropriate and evaluate response  - Consider cultural and social influences on pain and pain management order or complex needs related to functional status, cognitive ability or social support system  Outcome: Progressing     Problem: CARDIOVASCULAR - ADULT  Goal: Maintains optimal cardiac output and hemodynamic stability  Description: INTERVENTIONS:  - Beatriz

## 2021-08-22 NOTE — PLAN OF CARE
Problem: Patient Centered Care  Goal: Patient preferences are identified and integrated in the patient's plan of care  Description: Interventions:  - What would you like us to know as we care for you?  I live at home with my wife  - Provide timely, comple emergency measures for life threatening arrhythmias  - Monitor electrolytes and administer replacement therapy as ordered  Outcome: Progressing     Problem: RESPIRATORY - ADULT  Goal: Achieves optimal ventilation and oxygenation  Description: INTERVENTIONS imbalances  - Administer electrolyte replacement as ordered  - Monitor response to electrolyte replacements, including rhythm and repeat lab results as appropriate  - Fluid restriction as ordered  - Instruct patient on fluid and nutrition restrictions as a assistance  - Assess pain using appropriate pain scale  - Administer analgesics based on type and severity of pain and evaluate response  - Implement non-pharmacological measures as appropriate and evaluate response  - Consider cultural and social influenc services based on physician/LIP order or complex needs related to functional status, cognitive ability or social support system  Outcome: Progressing       Patient is alert and oriented  x 4, wife at bedside. Pt denies any pain or discomfort at this time.

## 2021-08-22 NOTE — PROGRESS NOTES
[facility]  Nephrology Daily Progress Note    Wu Reilly  [id number]  80year old      HPI:   Wu Reilly is a 80year old male. Hemodialysis nearing completion. 1.5 L fluid removal.  Tolerated well.   Overall feels a tip for age reflexes and motor skills appropriate for age    Labs:  Lab Results   Component Value Date    WBC 7.0 08/22/2021    HGB 7.9 08/22/2021    HCT 23.2 08/22/2021    PLT 75.0 08/22/2021    CREATSERUM 7.78 08/22/2021    BUN 99 08/22/2021     08/22/ focal airspace disease or significant pleural effusion. 3. Left chest wall dual lead pacemaker/AICD. 4. Sternotomy.    Dictated by (CST): Ashtyn Pelayo MD on 8/22/2021 at 7:45 AM     Finalized by (CST): Ashtyn Pelayo MD on 8/22/2021 at 7:49 AM 5 mg, 5 mg, Intravenous, Q8H PRN  •  epoetin joel-epbx (RETACRIT) 5,000 Units injection, 5,000 Units, Subcutaneous, Once per day on Tue Thu Sat  •  Albumin Human (ALBUMINAR) 25 % solution 100 mL, 100 mL, Intravenous, PRN  •  allopurinol (ZYLOPRIM) tab 100 Output 200 ml   Net 160 ml          ASSESSMENT/PLAN:   Assessment   Patient Active Problem List:     CKD (chronic kidney disease), stage III ([facility] [address]     Coronary artery disease involving native heart without angina pectoris     Dyslipidemia     HTN (hypert (chronic)     Anemia due to chronic blood loss     Sensorineural hearing loss (SNHL) of both ears     Acute renal failure (ARF) (HCC)     Acute renal failure superimposed on chronic kidney disease, unspecified CKD stage, unspecified acute renal failure typ

## 2021-08-22 NOTE — PROGRESS NOTES
[facility]    Progress Note    [place] Clause Patient Status:  Inpatient    3/28/1940 MRN [id number]   Location [facility] 2W/SW Attending Kristina Clayton MD   Hosp Day # 4 PCP Sahra Herr MD     Subjective:   Still with clear sclera b/l  HENT: NCAT, Moist mucous membranes, Oropharynx without erythema or exudates; hard of hearing  Cardiovascular: S1, S2, no S3, no S4, Regular rate and rhythm, No murmurs/gallops/rubs. Respiratory: Clear to auscultation bilaterally.   No wh Assessment and Plan:     Acute renal failure  CKD  Baseline creatinine usually 2.5-2.6; most recent creatinine 3.12 on 9/23/20; re-check BMP; F/U Dr. Aakash Kenney  -Started on a bicarbonate drip persistently elevated BUN/creatinine and persistent metabolic lisinopril was stopped; tried spironolactone 12.5 mg po qD in Nov 2017 and now stopped for unclear reasons (?elevated creatinine); s/p ICD/ Biv on 11/3/17; s/p RHC in Sept 201 per Dr Pawan Brink carvedilol 9.375 mg po BID, Bumex 1 mg po qD,  Isordil 10 mg T diverticulosis of the descending and sigmoid colon and medium-sized internal hemorrhoids but no active bleeding.    -had outpatient given video capsule enteroscopy on 8/27/20 performed to rule out small intestinal bleeding; study showed significant erosive hyperparathyroidism   in April 2016; repeat PTH is 107 in April 2019 (was 72 in Oct 2016); on Vitamin D 2000 IU po every day  - iPTH 441. 9     Diabetic nephropathy associated with diabetes mellitus  Urine microalbumin abnormal in April 2016 with liliana

## 2021-08-22 NOTE — PROGRESS NOTES
[facility]     Gastroenterology Progress Note    Jodee Naomi Patient Status:  Inpatient    3/28/1940 MRN [id number]   [facility] 5SW/SE Attending Donald Liao, [address] [facility] Day # 4 PCP Veda Jones MD overt signs of GI bleeding. Continue IV Protonix and Carafate today. Patient started hemodialysis.   Iron levels checked and appear normal. INR has been corrected.     Recommend:  -Soft diet  -IV protonix  -Carafate TID  -Trend HGb   -Will reserve EGD for

## 2021-08-23 LAB
ALBUMIN SERPL ELPH-MCNC: 3.54 G/DL (ref 3.75–5.21)
ALBUMIN SERPL-MCNC: 2.8 G/DL (ref 3.4–5)
ALBUMIN/GLOB SERPL: 1.5 {RATIO} (ref 1–2)
ALPHA1 GLOB SERPL ELPH-MCNC: 0.32 G/DL (ref 0.19–0.46)
ALPHA2 GLOB SERPL ELPH-MCNC: 0.68 G/DL (ref 0.48–1.05)
ANION GAP SERPL CALC-SCNC: 9 MMOL/L (ref 0–18)
APTT PPP: 43.9 SECONDS (ref 23.2–35.3)
B-GLOBULIN SERPL ELPH-MCNC: 0.62 G/DL (ref 0.68–1.23)
BASOPHILS # BLD AUTO: 0.02 X10(3) UL (ref 0–0.2)
BASOPHILS NFR BLD AUTO: 0.3 %
BUN BLD-MCNC: 77 MG/DL (ref 7–18)
BUN/CREAT SERPL: 12.5 (ref 10–20)
CALCIUM BLD-MCNC: 7.9 MG/DL (ref 8.5–10.1)
CHLORIDE SERPL-SCNC: 99 MMOL/L (ref 98–112)
CO2 SERPL-SCNC: 25 MMOL/L (ref 21–32)
CREAT BLD-MCNC: 6.15 MG/DL
DEPRECATED RDW RBC AUTO: 48.2 FL (ref 35.1–46.3)
EOSINOPHIL # BLD AUTO: 0.22 X10(3) UL (ref 0–0.7)
EOSINOPHIL NFR BLD AUTO: 3.2 %
ERYTHROCYTE [DISTWIDTH] IN BLOOD BY AUTOMATED COUNT: 12.9 % (ref 11–15)
GAMMA GLOB SERPL ELPH-MCNC: 0.74 G/DL (ref 0.62–1.7)
GLUCOSE BLD-MCNC: 117 MG/DL (ref 70–99)
GLUCOSE BLDC GLUCOMTR-MCNC: 122 MG/DL (ref 70–99)
GLUCOSE BLDC GLUCOMTR-MCNC: 137 MG/DL (ref 70–99)
GLUCOSE BLDC GLUCOMTR-MCNC: 146 MG/DL (ref 70–99)
HAV IGM SER QL: 1.8 MG/DL (ref 1.6–2.6)
HCT VFR BLD AUTO: 24.2 %
HGB BLD-MCNC: 8.2 G/DL
IMM GRANULOCYTES # BLD AUTO: 0.03 X10(3) UL (ref 0–1)
IMM GRANULOCYTES NFR BLD: 0.4 %
INR BLD: 1.49 (ref 0.9–1.2)
LYMPHOCYTES # BLD AUTO: 0.58 X10(3) UL (ref 1–4)
LYMPHOCYTES NFR BLD AUTO: 8.4 %
M PROTEIN MFR SERPL ELPH: 5.9 G/DL (ref 6.4–8.2)
MCH RBC QN AUTO: 34.5 PG (ref 26–34)
MCHC RBC AUTO-ENTMCNC: 33.9 G/DL (ref 31–37)
MCV RBC AUTO: 101.7 FL
MONOCYTES # BLD AUTO: 0.8 X10(3) UL (ref 0.1–1)
MONOCYTES NFR BLD AUTO: 11.6 %
NEUTROPHILS # BLD AUTO: 5.22 X10 (3) UL (ref 1.5–7.7)
NEUTROPHILS # BLD AUTO: 5.22 X10(3) UL (ref 1.5–7.7)
NEUTROPHILS NFR BLD AUTO: 76.1 %
OSMOLALITY SERPL CALC.SUM OF ELEC: 300 MOSM/KG (ref 275–295)
PHOSPHATE SERPL-MCNC: 4.7 MG/DL (ref 2.5–4.9)
PLATELET # BLD AUTO: 94 10(3)UL (ref 150–450)
POTASSIUM SERPL-SCNC: 4.1 MMOL/L (ref 3.5–5.1)
PROTHROMBIN TIME: 17.8 SECONDS (ref 11.8–14.5)
RBC # BLD AUTO: 2.38 X10(6)UL
SODIUM SERPL-SCNC: 133 MMOL/L (ref 136–145)
WBC # BLD AUTO: 6.9 X10(3) UL (ref 4–11)

## 2021-08-23 PROCEDURE — 99232 SBSQ HOSP IP/OBS MODERATE 35: CPT | Performed by: PHYSICIAN ASSISTANT

## 2021-08-23 PROCEDURE — 99232 SBSQ HOSP IP/OBS MODERATE 35: CPT | Performed by: INTERNAL MEDICINE

## 2021-08-23 PROCEDURE — 99233 SBSQ HOSP IP/OBS HIGH 50: CPT | Performed by: INTERNAL MEDICINE

## 2021-08-23 RX ORDER — ALBUMIN (HUMAN) 12.5 G/50ML
100 SOLUTION INTRAVENOUS AS NEEDED
Status: DISCONTINUED | OUTPATIENT
Start: 2021-08-23 | End: 2021-08-27

## 2021-08-23 RX ORDER — HEPARIN SODIUM 1000 [USP'U]/ML
1.5 INJECTION, SOLUTION INTRAVENOUS; SUBCUTANEOUS ONCE
Status: COMPLETED | OUTPATIENT
Start: 2021-08-23 | End: 2021-08-23

## 2021-08-23 RX ORDER — HEPARIN SODIUM AND DEXTROSE 10000; 5 [USP'U]/100ML; G/100ML
INJECTION INTRAVENOUS CONTINUOUS
Status: DISCONTINUED | OUTPATIENT
Start: 2021-08-24 | End: 2021-08-30

## 2021-08-23 RX ORDER — LIDOCAINE AND PRILOCAINE 25; 25 MG/G; MG/G
CREAM TOPICAL AS NEEDED
Status: DISCONTINUED | OUTPATIENT
Start: 2021-08-23 | End: 2021-09-02

## 2021-08-23 RX ORDER — HEPARIN SODIUM AND DEXTROSE 10000; 5 [USP'U]/100ML; G/100ML
12 INJECTION INTRAVENOUS ONCE
Status: COMPLETED | OUTPATIENT
Start: 2021-08-23 | End: 2021-08-24

## 2021-08-23 RX ORDER — HEPARIN SODIUM 1000 [USP'U]/ML
60 INJECTION, SOLUTION INTRAVENOUS; SUBCUTANEOUS ONCE
Status: COMPLETED | OUTPATIENT
Start: 2021-08-23 | End: 2021-08-23

## 2021-08-23 NOTE — PROGRESS NOTES
[place] [facility]    Progress Note    Bib Crespo Patient Status:  Inpatient    3/28/1940 MRN [id number]   [facility] 5SW/SE Attending Rj Trivedi, [facility] Day # 5 PCP Karen Payne MD         Assessment and Pl 1 mg po qD,  Isordil 10 mg TID, and hydralazine 10 mg po TID, Kdur 20 mEQ po qD, and metolazone 2.5 mg prn weight gain; outpt Rx per CHF clinic and Dr Gail Dennis.  seen by Dr. Raiza Reddy MRI-compatible  -Elevated troponin likely due to acute renal fa bleeding.    -had outpatient given video capsule enteroscopy on 8/27/20 performed to rule out small intestinal bleeding; study showed significant erosive gastritis/duodenitis and what appears to be multiple AVM lesions likely cecum and proximal colon, like regurgitation, peak gradient 7 mm Hg  –Continue medical management for now     Mild aortic stenosis  As seen on ECHO in Aug 2019   –Redemonstrated on echo on this admission     Health Maintenance  Had Pneumovax in May 2019; he declines flu vax     Disposit (ALBUMINAR) 25 % solution 100 mL, 100 mL, Intravenous, PRN  metoclopramide (REGLAN) injection 5 mg, 5 mg, Intravenous, Q8H PRN  epoetin joel-epbx (RETACRIT) 5,000 Units injection, 5,000 Units, Subcutaneous, Once per day on Tue Thu Sat  Albumin Human (ALBUM 08/23/2021    PHOS 4.7 08/23/2021       Recent Labs   Lab 08/23/21  0535   WBC 6.9   HGB 8.2*   HCT 24.2*   .7*   MCH 34.5*   MCHC 33.9   RDW 12.9   NEPRELIM 5.22   PLT 94.0*       Recent Labs   Lab 08/18/21 2043   COLORUR Yellow   CLARITY Cloudy*

## 2021-08-23 NOTE — PLAN OF CARE
Problem: Patient Centered Care  Goal: Patient preferences are identified and integrated in the patient's plan of care  Description: Interventions:  - What would you like us to know as we care for you?  I live at home with my wife  - Provide timely, comple emergency measures for life threatening arrhythmias  - Monitor electrolytes and administer replacement therapy as ordered  Outcome: Progressing     Problem: RESPIRATORY - ADULT  Goal: Achieves optimal ventilation and oxygenation  Description: INTERVENTIONS imbalances  - Administer electrolyte replacement as ordered  - Monitor response to electrolyte replacements, including rhythm and repeat lab results as appropriate  - Fluid restriction as ordered  - Instruct patient on fluid and nutrition restrictions as a assistance  - Assess pain using appropriate pain scale  - Administer analgesics based on type and severity of pain and evaluate response  - Implement non-pharmacological measures as appropriate and evaluate response  - Consider cultural and social influenc services based on physician/LIP order or complex needs related to functional status, cognitive ability or social support system  Outcome: Progressing     Patient is alert and oriented  x 4, wife at bedside. Pt denies any pain or discomfort at this time.  VS

## 2021-08-23 NOTE — PROGRESS NOTES
Progress Note  Freddie Buys Patient Status:  Inpatient    3/28/1940 MRN [id number]   [facility] 5SW/SE Attending Yulissa Campa, [address] [facility] Day # 5 PCP Kate Avilez MD     Subjective:  Shortness of breath has dramatically imp ejection fraction was 40-45%, by biplane method of      disks. Wall motion was normal; there were no regional wall      motion abnormalities. 2. Aortic valve: There was mild stenosis. 3. Mitral valve: Mildly calcified annulus.  Mildly thickened, mildly CC             Assessment:  • Acute on chronic kidney disease - creatinine 6.15 today, new to HD  • Shortness of breath, improved with HD  • Chronic atrial fibrillation, rates, INR 1.49  • Prolonged QRS  • Elevated troponin, not c/w ACS.  Likely secondary t

## 2021-08-23 NOTE — CM/SW NOTE
SW following for discharge planning. Per chart review, plan is acute rehab and outpatient dialysis. SW met with patient and wife at bedside to discuss discharge planning. Patient and wife confirmed plan for acute rehab.  SW provided patient and wife with discussed during rounds and per RN, heparin drip has been discontinued. Notified SRAL liaison. Liaison reports that insurance authorization is pending and requesting updated PT/OT notes. Notified therapy. Updated therapy notes sent via Aidin.  Confirmed Outpatient HD at [address] [place] [address] to remain available for support and/or discharge planning.      JUANITA Gan, [facility]   [id number]

## 2021-08-23 NOTE — PROGRESS NOTES
[facility]    [facility] Note    Freddie Buys Patient Status:  Inpatient    3/28/1940 MRN [id number]   [facility] 5SW/SE Attending Yulissa Campa, [address] [facility] Day # 5 PCP Kate Avilez MD       Subjective:   Barrett Barry noted  Back/Spine: no abnormalities noted  Musculoskeletal: full ROM all extremities good strength  no deformities  Extremities: no edema, cyanosis  Neurological:  Grossly normal    Results:     Laboratory Data:  Lab Results   Component Value Date    WBC 6

## 2021-08-23 NOTE — PROGRESS NOTES
[facility]     Gastroenterology Progress Note    Ha Juarez Patient Status:  Inpatient    3/28/1940 MRN [id number]   [facility] 5SW/SE Attending Timothy Hooks, [address] [facility] Day # 5 PCP Linette Aragon MD corrected. Recommend:  -soft diet as tolerated  -IV Protonix  -Carafate TID  -trend Hgb  -reserve EGD for significant bleeding  -see above    Case d/w Dr. Goodman Estimable and RN. GI to sign off. Please call/page with questions.     KARIN Joaquin

## 2021-08-23 NOTE — CONSULTS
Physician Medicine & Rehabilitation Consult Note         SUBJECTIVE:    Chief Complaint:  To assess rehabilitation needs following Mobility and ADL dysfunction s/p Acute renal failure superimposed on chronic kidney disease, unspecified CKD stage, unspecifie Gait Assistance:  Moderate assistance  Distance (ft): 6 ft x 2   Assistive Device: Rolling walker  Stoop/Curb Assistance: Not tested       Past Medical History:   Diagnosis Date   • Age-related nuclear cataract of both eyes 6/21/2016   • ARMD (age-related 100 mg Oral BID   • atorvastatin  20 mg Oral Nightly   • Sevelamer  800 mg Oral TID CC   • Insulin Aspart Pen  1-5 Units Subcutaneous TID CC       lidocaine-prilocaine, sodium chloride 0.9%, Albumin Human, metoclopramide, acetaminophen, PEG 3350, magnesiu increased urinary frequency or hematuria  Musculoskeletal: Denies myalgias or arthralgias  Integumentary: Denies rash  Neurological: Denies weakness , denies headache  Psychiatric: Denies depression or anxiety  Endocrine denies any creased thirst  Hematolo --   --  7.0 6.9   PLT 76.0*   < > 87.0*  --   --  75.0* 94.0*    < > = values in this interval not displayed.      Recent Labs   Lab 08/21/21  1112 08/22/21  0523 08/23/21  0534   * 141* 117*   BUN 92* 99* 77*   CREATSERUM 7.21* 7.78* 6.15*   GFRAA heart failure (CHF) (HCC)     Acute renal failure (HCC)     Vitamin D deficiency     Other specified forms of chronic ischemic heart disease     Iliotibial band syndrome of right side     Lumbar spondylosis     Abdominal pain, acute     Anemia, unspecified strengthening and Ambulation  · There are at least two functional impairments requiring at least minimum assistance.  Physical Therapy , Speech Therapy  and Occupational Therapy   · This patient should be able to tolerate at least 3 hours per day of skilled

## 2021-08-24 LAB
ALBUMIN SERPL-MCNC: 2.8 G/DL (ref 3.4–5)
ANION GAP SERPL CALC-SCNC: 12 MMOL/L (ref 0–18)
APTT PPP: 145.3 SECONDS (ref 23.2–35.3)
APTT PPP: 55.7 SECONDS (ref 23.2–35.3)
BASOPHILS # BLD AUTO: 0.01 X10(3) UL (ref 0–0.2)
BASOPHILS NFR BLD AUTO: 0.1 %
BUN BLD-MCNC: 94 MG/DL (ref 7–18)
BUN/CREAT SERPL: 13.2 (ref 10–20)
CALCIUM BLD-MCNC: 8 MG/DL (ref 8.5–10.1)
CHLORIDE SERPL-SCNC: 96 MMOL/L (ref 98–112)
CO2 SERPL-SCNC: 23 MMOL/L (ref 21–32)
CREAT BLD-MCNC: 7.14 MG/DL
DEPRECATED RDW RBC AUTO: 47.7 FL (ref 35.1–46.3)
EOSINOPHIL # BLD AUTO: 0.15 X10(3) UL (ref 0–0.7)
EOSINOPHIL NFR BLD AUTO: 2 %
ERYTHROCYTE [DISTWIDTH] IN BLOOD BY AUTOMATED COUNT: 12.8 % (ref 11–15)
GLUCOSE BLD-MCNC: 123 MG/DL (ref 70–99)
GLUCOSE BLDC GLUCOMTR-MCNC: 124 MG/DL (ref 70–99)
GLUCOSE BLDC GLUCOMTR-MCNC: 139 MG/DL (ref 70–99)
GLUCOSE BLDC GLUCOMTR-MCNC: 144 MG/DL (ref 70–99)
GLUCOSE BLDC GLUCOMTR-MCNC: 180 MG/DL (ref 70–99)
GLUCOSE BLDC GLUCOMTR-MCNC: 197 MG/DL (ref 70–99)
HCT VFR BLD AUTO: 24.2 %
HGB BLD-MCNC: 8 G/DL
IMM GRANULOCYTES # BLD AUTO: 0.03 X10(3) UL (ref 0–1)
IMM GRANULOCYTES NFR BLD: 0.4 %
INR BLD: 1.47 (ref 0.9–1.2)
LYMPHOCYTES # BLD AUTO: 0.68 X10(3) UL (ref 1–4)
LYMPHOCYTES NFR BLD AUTO: 9 %
MCH RBC QN AUTO: 33.8 PG (ref 26–34)
MCHC RBC AUTO-ENTMCNC: 33.1 G/DL (ref 31–37)
MCV RBC AUTO: 102.1 FL
MONOCYTES # BLD AUTO: 0.81 X10(3) UL (ref 0.1–1)
MONOCYTES NFR BLD AUTO: 10.7 %
NEUTROPHILS # BLD AUTO: 5.91 X10 (3) UL (ref 1.5–7.7)
NEUTROPHILS # BLD AUTO: 5.91 X10(3) UL (ref 1.5–7.7)
NEUTROPHILS NFR BLD AUTO: 77.8 %
OSMOLALITY SERPL CALC.SUM OF ELEC: 302 MOSM/KG (ref 275–295)
PHOSPHATE SERPL-MCNC: 5 MG/DL (ref 2.5–4.9)
PLATELET # BLD AUTO: 112 10(3)UL (ref 150–450)
POTASSIUM SERPL-SCNC: 4.1 MMOL/L (ref 3.5–5.1)
PROTHROMBIN TIME: 17.6 SECONDS (ref 11.8–14.5)
RBC # BLD AUTO: 2.37 X10(6)UL
SODIUM SERPL-SCNC: 131 MMOL/L (ref 136–145)
WBC # BLD AUTO: 7.6 X10(3) UL (ref 4–11)

## 2021-08-24 PROCEDURE — 99233 SBSQ HOSP IP/OBS HIGH 50: CPT | Performed by: INTERNAL MEDICINE

## 2021-08-24 PROCEDURE — 99232 SBSQ HOSP IP/OBS MODERATE 35: CPT | Performed by: INTERNAL MEDICINE

## 2021-08-24 NOTE — PROGRESS NOTES
[facility]    Progress Note    Ha Juarez Patient Status:  Inpatient    3/28/1940 MRN [id number]   [facility] 5SW/SE Attending Timothy Hooks, [address] [facility]  Day # 6 PCP Linette Aragon MD     Subjective:   Subject type    Elevated troponin    AVM (arteriovenous malformation) of colon    ESRD on hemodialysis [facility])    Patient tolerating dialysis well for acute renal failure. To receive another treatment of dialysis today. No longer having any shortness of breath.   P

## 2021-08-24 NOTE — PLAN OF CARE
HD done today, per HD RN pt educated on fluid overload, potassium levels, and fluid intake-education reinforced by this RN at the bedside. All needs attended to, call light within reach.      Problem: METABOLIC/FLUID AND ELECTROLYTES - ADULT  Goal: Glucose

## 2021-08-24 NOTE — PROGRESS NOTES
[facility]     Cardiology Progress Note    Subjective:  No chest pain or shortness of breath. Objective:  /58 (BP Location: Left arm)   Pulse 79   Temp 98.2 °F (36.8 °C) (Oral)   Resp 18   Ht 5' 6.93\" (1.7 m)   Wt 160 lb 8 oz (72. LVEF 40-45% with severe MR. No ACE/aRB with acute renal failure. On hydrazine currently and no nitrates, BB  · Anemia - hgb 8    Plan:  · Continue BB, ASA, statin,hydralazine  · Hold coumadin for PermCath placement per IRManisha Wang, BELINDA  8/24/202

## 2021-08-24 NOTE — DIETARY NOTE
ADULT NUTRITION REASSESSMENT    Pt is at moderate nutrition risk. Pt meets moderate malnutrition criteria.       CRITERIA FOR MALNUTRITION DIAGNOSIS:  Criteria for non-severe malnutrition diagnosis: acute illness/injury related to energy intake less than 7 per pt wife, reports UBW of 170# ~6 months ago indicating 7.6% wt loss. Per MD note, pt will need dialysis. 8/24 update: Pt was seen today for reassessment. Pt and wife at the bedside. Pt reports a slight improvement in his appetite.   His intake is goo Units Subcutaneous TID CC     LABS: noted; PHOS 5.0   Recent Labs     08/22/21  0523 08/23/21  0534 08/24/21  0511   * 117* 123*   BUN 99* 77* 94*   CREATSERUM 7.78* 6.15* 7.14*   CA 7.6* 7.9* 8.0*   MG 1.7 1.8  --    * 133* 131*   K 4.0 4.1 4 Fluid Restriction 1500 ml  - RD Malnutrition Care Plan: Adjusted diet to least restrictive to maximize intake and Encouraged increased PO intake  - Meals and snacks: Encouraged adequate PO intake  - Medical Food Supplements-RD added Nepro (425 calories/ 19

## 2021-08-24 NOTE — PHYSICAL THERAPY NOTE
PHYSICAL THERAPY TREATMENT NOTE - INPATIENT     Room Number: 533/533-A       Presenting Problem: renal failure, fatigue, SOB, elevated troponin, Afib, dailysis    Problem List  Principal Problem:    Acute renal failure superimposed on chronic kidney diseas management and boosted to [facility]. Pt left in bed, all needs in place, ALARM ON, RN aware. The patient's Approx Degree of Impairment: 50.57% has been calculated based on documentation in the [facility] '6 clicks' Inpatient Basic Mobility Short Form.   Research currently need. ..   -   Moving to and from a bed to a chair (including a wheelchair)?: A Little   -   Need to walk in hospital room?: A Little   -   Climbing 3-5 steps with a railing?: A Lot     AM-PAC Score:  Raw Score: 17   Approx Degree of Impairment: 5

## 2021-08-24 NOTE — PROGRESS NOTES
[facility]  Nephrology Daily Progress Note    Petty Barger  [id number]  80year old      HPI:   Petty Barger is a 80year old male. HD in progress. BP drifting down. Otherwise feeling well. Appetite improved. On RA.        R appropriate for age    Labs:  Lab Results   Component Value Date    WBC 7.6 08/24/2021    HGB 8.0 08/24/2021    HCT 24.2 08/24/2021    .0 08/24/2021    CREATSERUM 7.14 08/24/2021    BUN 94 08/24/2021     08/24/2021    K 4.1 08/24/2021    CL 96 Medications:    Current Facility-Administered Medications:   •  lidocaine-prilocaine (EMLA) cream, , Topical, PRN  •  Albumin Human (ALBUMINAR) 25 % solution 100 mL, 100 mL, Intravenous, PRN  •  heparin (PORCINE) drip [id number]/250mL infusion CONT Oral, Q15 Min PRN **OR** dextrose 50 % injection 50 mL, 50 mL, Intravenous, Q15 Min PRN **OR** glucose (DEX4) oral liquid 30 g, 30 g, Oral, Q15 Min PRN **OR** glucose-vitamin C (DEX-4) chewable tab 8 tablet, 8 tablet, Oral, Q15 Min PRN  •  Insulin Aspart P failure (HCC)     Vitamin D deficiency     Other specified forms of chronic ischemic heart disease     Iliotibial band syndrome of right side     Lumbar spondylosis     Abdominal pain, acute     Anemia, unspecified type     Gastrointestinal hemorrhage, uns

## 2021-08-25 LAB
ALBUMIN SERPL-MCNC: 3.3 G/DL (ref 3.4–5)
ANION GAP SERPL CALC-SCNC: 11 MMOL/L (ref 0–18)
APTT PPP: 75.1 SECONDS (ref 23.2–35.3)
BASOPHILS # BLD AUTO: 0.02 X10(3) UL (ref 0–0.2)
BASOPHILS NFR BLD AUTO: 0.2 %
BUN BLD-MCNC: 61 MG/DL (ref 7–18)
BUN/CREAT SERPL: 11.6 (ref 10–20)
CALCIUM BLD-MCNC: 7.9 MG/DL (ref 8.5–10.1)
CHLORIDE SERPL-SCNC: 97 MMOL/L (ref 98–112)
CO2 SERPL-SCNC: 27 MMOL/L (ref 21–32)
CREAT BLD-MCNC: 5.28 MG/DL
DEPRECATED RDW RBC AUTO: 47.9 FL (ref 35.1–46.3)
EOSINOPHIL # BLD AUTO: 0.12 X10(3) UL (ref 0–0.7)
EOSINOPHIL NFR BLD AUTO: 1.2 %
ERYTHROCYTE [DISTWIDTH] IN BLOOD BY AUTOMATED COUNT: 12.8 % (ref 11–15)
GLUCOSE BLD-MCNC: 123 MG/DL (ref 70–99)
GLUCOSE BLDC GLUCOMTR-MCNC: 125 MG/DL (ref 70–99)
GLUCOSE BLDC GLUCOMTR-MCNC: 182 MG/DL (ref 70–99)
GLUCOSE BLDC GLUCOMTR-MCNC: 185 MG/DL (ref 70–99)
GLUCOSE BLDC GLUCOMTR-MCNC: 193 MG/DL (ref 70–99)
HAV IGM SER QL: 1.9 MG/DL (ref 1.6–2.6)
HCT VFR BLD AUTO: 24.2 %
HGB BLD-MCNC: 8.1 G/DL
IMM GRANULOCYTES # BLD AUTO: 0.06 X10(3) UL (ref 0–1)
IMM GRANULOCYTES NFR BLD: 0.6 %
INR BLD: 1.34 (ref 0.9–1.2)
LYMPHOCYTES # BLD AUTO: 0.6 X10(3) UL (ref 1–4)
LYMPHOCYTES NFR BLD AUTO: 6.2 %
MCH RBC QN AUTO: 34.6 PG (ref 26–34)
MCHC RBC AUTO-ENTMCNC: 33.5 G/DL (ref 31–37)
MCV RBC AUTO: 103.4 FL
MONOCYTES # BLD AUTO: 0.86 X10(3) UL (ref 0.1–1)
MONOCYTES NFR BLD AUTO: 8.8 %
NEUTROPHILS # BLD AUTO: 8.09 X10 (3) UL (ref 1.5–7.7)
NEUTROPHILS # BLD AUTO: 8.09 X10(3) UL (ref 1.5–7.7)
NEUTROPHILS NFR BLD AUTO: 83 %
OSMOLALITY SERPL CALC.SUM OF ELEC: 299 MOSM/KG (ref 275–295)
PHOSPHATE SERPL-MCNC: 3.6 MG/DL (ref 2.5–4.9)
PLATELET # BLD AUTO: 118 10(3)UL (ref 150–450)
POTASSIUM SERPL-SCNC: 4 MMOL/L (ref 3.5–5.1)
PROTHROMBIN TIME: 16.4 SECONDS (ref 11.8–14.5)
RBC # BLD AUTO: 2.34 X10(6)UL
SARS-COV-2 RNA RESP QL NAA+PROBE: NOT DETECTED
SODIUM SERPL-SCNC: 135 MMOL/L (ref 136–145)
WBC # BLD AUTO: 9.8 X10(3) UL (ref 4–11)

## 2021-08-25 PROCEDURE — 99232 SBSQ HOSP IP/OBS MODERATE 35: CPT | Performed by: INTERNAL MEDICINE

## 2021-08-25 PROCEDURE — 99233 SBSQ HOSP IP/OBS HIGH 50: CPT | Performed by: INTERNAL MEDICINE

## 2021-08-25 RX ORDER — PANTOPRAZOLE SODIUM 40 MG/1
40 TABLET, DELAYED RELEASE ORAL
Status: DISCONTINUED | OUTPATIENT
Start: 2021-08-25 | End: 2021-09-02

## 2021-08-25 RX ORDER — ALBUMIN (HUMAN) 12.5 G/50ML
100 SOLUTION INTRAVENOUS AS NEEDED
Status: DISCONTINUED | OUTPATIENT
Start: 2021-08-25 | End: 2021-08-27

## 2021-08-25 RX ORDER — HEPARIN SODIUM 1000 [USP'U]/ML
1.5 INJECTION, SOLUTION INTRAVENOUS; SUBCUTANEOUS ONCE
Status: COMPLETED | OUTPATIENT
Start: 2021-08-25 | End: 2021-08-26

## 2021-08-25 RX ORDER — CARVEDILOL 6.25 MG/1
6.25 TABLET ORAL ONCE
Status: COMPLETED | OUTPATIENT
Start: 2021-08-25 | End: 2021-08-25

## 2021-08-25 NOTE — PROGRESS NOTES
[place] [facility]    Progress Note    Dre Signs Patient Status:  Inpatient    3/28/1940 MRN [id number]   [facility] 5SW/SE Attending Trever Shipman,  [facility] Day # 7 PCP Johny Moulton MD         Assessment and Pl on 11/3/17; s/p RHC in Sept 201 per Dr Zelda Reed carvedilol 9.375 mg po BID, Bumex 1 mg po qD,  Isordil 10 mg TID, and hydralazine 10 mg po TID, Kdur 20 mEQ po qD, and metolazone 2.5 mg prn weight gain as outpt Rx per CHF clinic and Dr Tamie Ward.  seen by FARHANA enteroscopy on 8/27/20 performed to rule out small intestinal bleeding; study showed significant erosive gastritis/duodenitis and what appears to be multiple AVM lesions likely cecum and proximal colon, likely ascending colon; colonoscopy on 9/8/20 by Dr CARO stenosis  As seen on ECHO in Aug 2021     Health Maintenance  Had Pneumovax in May 2019     Disposition  PT/OT following, acute rehab upon discharge; plan [facility]  at [employer] authorization and [address] [address] [facility] Once  metoclopramide (REGLAN) injection 5 mg, 5 mg, Intravenous, Q8H PRN  epoetin joel-epbx (RETACRIT) 5,000 Units injection, 5,000 Units, Subcutaneous, Once per day on Tue Thu Sat  allopurinol (ZYLOPRIM) tab 100 mg, 100 mg, Oral, Daily  aspirin chewable t 8.09*   .0*       Recent Labs   Lab 08/18/21 2043   COLORUR Yellow   CLARITY Cloudy*   SPECGRAVITY 1.013   GLUUR 50 *   BILUR Negative   KETUR Negative   BLOODURINE Large*   PHURINE 5.0   PROUR 100 *   UROBILINOGEN <2.0   NITRITE Negative   LEUUR L

## 2021-08-25 NOTE — OCCUPATIONAL THERAPY NOTE
OCCUPATIONAL THERAPY TREATMENT NOTE - INPATIENT        Room Number: 533/533-A           Presenting Problem:  (acute renal failure)    Problem List  Principal Problem:    Acute renal failure superimposed on chronic kidney disease, unspecified CKD stage, uns RECOMMENDATIONS  OT Discharge Recommendations: Acute rehabilitation  OT Device Recommendations: Reacher;Sock aid;Grab bars; Shower chair     PLAN  OT Treatment Plan: Balance activities; ADL training;Functional transfer training; Endurance training    Þmaryjane Heredia with RW  LE dressing. Patient End of Session: Up in chair;Needs met;Call light within reach;RN aware of session/findings; Alarm set; Family present    OT Goals:     Patients self stated goal is: does not state       Patient will complete functional transf

## 2021-08-25 NOTE — PLAN OF CARE
Problem: Patient Centered Care  Goal: Patient preferences are identified and integrated in the patient's plan of care  Description: Interventions:  - What would you like us to know as we care for you?  I live at home with my wife  - Provide timely, comple effort  - Oxygen supplementation based on oxygen saturation or ABGs  - Provide Smoking Cessation handout, if applicable  - Encourage broncho-pulmonary hygiene including cough, deep breathe, Incentive Spirometry  - Assess the need for suctioning and perform Assess patient stability and activity tolerance for standing, transferring and ambulating w/ or w/o assistive devices  - Assist with transfers and ambulation using safe patient handling equipment as needed  - Ensure adequate protection for wounds/incisions chart. Call light within reach, pt calls appropriately. Family at bedside.

## 2021-08-25 NOTE — PROGRESS NOTES
[facility] Note: Route Optimization for Pantoprazole (PROTONIX)    Patient is currently on Pantoprazole (PROTONIX) 40 mg IV every 24 hours.    The patient meets the criteria to convert to the oral equivalent as established by the IV to Oral conversion pro

## 2021-08-25 NOTE — PROGRESS NOTES
[facility]     Cardiology Progress Note    Subjective:  No chest pain or shortness of breath. [place] speaking.     Objective:  /66 (BP Location: Right arm)   Pulse 104   Temp 98.3 °F (36.8 °C) (Oral)   Resp 20   Ht 5' 6.93\" (1.7 m) severe MR. No ACE/aRB with acute renal failure. No nitrates, continue BB  · Anemia - hgb 8.1    Plan:  · Doing better post HD run yesterday, BP on the low side. Ok to stop hydralazine to allow for better fluid removal.  · Continue coumadin/heparin gtts.   ·

## 2021-08-25 NOTE — PROGRESS NOTES
[facility]    Progress Note    Oly Ruby Patient Status:  Inpatient    3/28/1940 MRN [id number]   [facility] 5SW/SE Attending Thad Farrell, [address] [facility]  Day # 7 PCP Dimas Flores MD       Subjective:   Hemal Ly abnormalities noted  Musculoskeletal: full ROM all extremities good strength  no deformities  Extremities: no edema, cyanosis  Neurological:  Grossly normal    Results:     Laboratory Data:  Lab Results   Component Value Date    WBC 9.8 08/25/2021    HGB 8

## 2021-08-25 NOTE — PLAN OF CARE
Problem: Patient Centered Care  Goal: Patient preferences are identified and integrated in the patient's plan of care  Description: Interventions:  - What would you like us to know as we care for you?  I live at home with my wife  - Provide timely, comple emergency measures for life threatening arrhythmias  - Monitor electrolytes and administer replacement therapy as ordered  Outcome: Progressing     Problem: RESPIRATORY - ADULT  Goal: Achieves optimal ventilation and oxygenation  Description: INTERVENTIONS and symptoms of volume excess or deficit  - Monitor intake, output and patient weight  - Monitor urine specific gravity, serum osmolarity and serum sodium as indicated or ordered  - Monitor response to interventions for patient's volume status, including l INTERVENTIONS:  - Identify barriers to discharge w/pt and caregiver  - Include patient/family/discharge partner in discharge planning  - Arrange for needed discharge resources and transportation as appropriate  - Identify discharge learning needs (meds, wo

## 2021-08-26 ENCOUNTER — APPOINTMENT (OUTPATIENT)
Dept: INTERVENTIONAL RADIOLOGY/VASCULAR | Facility: HOSPITAL | Age: 81
DRG: 673 | End: 2021-08-26
Attending: CLINICAL NURSE SPECIALIST
Payer: MEDICARE

## 2021-08-26 ENCOUNTER — HOSPITAL ENCOUNTER (INPATIENT)
Dept: GENERAL RADIOLOGY | Facility: HOSPITAL | Age: 81
Discharge: HOME | DRG: 673 | End: 2021-08-26
Attending: RADIOLOGY
Payer: MEDICARE

## 2021-08-26 LAB
ALBUMIN SERPL-MCNC: 2.8 G/DL (ref 3.4–5)
ANION GAP SERPL CALC-SCNC: 9 MMOL/L (ref 0–18)
APTT PPP: 71.4 SECONDS (ref 23.2–35.3)
BASOPHILS # BLD AUTO: 0 X10(3) UL (ref 0–0.2)
BASOPHILS NFR BLD AUTO: 0 %
BUN BLD-MCNC: 72 MG/DL (ref 7–18)
BUN/CREAT SERPL: 11.3 (ref 10–20)
CALCIUM BLD-MCNC: 7.9 MG/DL (ref 8.5–10.1)
CHLORIDE SERPL-SCNC: 97 MMOL/L (ref 98–112)
CO2 SERPL-SCNC: 27 MMOL/L (ref 21–32)
CREAT BLD-MCNC: 6.4 MG/DL
DEPRECATED RDW RBC AUTO: 48.2 FL (ref 35.1–46.3)
EOSINOPHIL # BLD AUTO: 0.04 X10(3) UL (ref 0–0.7)
EOSINOPHIL NFR BLD AUTO: 0.4 %
ERYTHROCYTE [DISTWIDTH] IN BLOOD BY AUTOMATED COUNT: 12.7 % (ref 11–15)
GLUCOSE BLD-MCNC: 134 MG/DL (ref 70–99)
GLUCOSE BLDC GLUCOMTR-MCNC: 120 MG/DL (ref 70–99)
GLUCOSE BLDC GLUCOMTR-MCNC: 144 MG/DL (ref 70–99)
GLUCOSE BLDC GLUCOMTR-MCNC: 151 MG/DL (ref 70–99)
GLUCOSE BLDC GLUCOMTR-MCNC: 195 MG/DL (ref 70–99)
GLUCOSE BLDC GLUCOMTR-MCNC: 205 MG/DL (ref 70–99)
HCT VFR BLD AUTO: 23.8 %
HGB BLD-MCNC: 7.9 G/DL
IMM GRANULOCYTES # BLD AUTO: 0.06 X10(3) UL (ref 0–1)
IMM GRANULOCYTES NFR BLD: 0.5 %
INR BLD: 1.5 (ref 0.9–1.2)
LYMPHOCYTES # BLD AUTO: 0.34 X10(3) UL (ref 1–4)
LYMPHOCYTES NFR BLD AUTO: 3.1 %
MCH RBC QN AUTO: 34.3 PG (ref 26–34)
MCHC RBC AUTO-ENTMCNC: 33.2 G/DL (ref 31–37)
MCV RBC AUTO: 103.5 FL
MONOCYTES # BLD AUTO: 0.83 X10(3) UL (ref 0.1–1)
MONOCYTES NFR BLD AUTO: 7.5 %
NEUTROPHILS # BLD AUTO: 9.79 X10 (3) UL (ref 1.5–7.7)
NEUTROPHILS # BLD AUTO: 9.79 X10(3) UL (ref 1.5–7.7)
NEUTROPHILS NFR BLD AUTO: 88.5 %
OSMOLALITY SERPL CALC.SUM OF ELEC: 299 MOSM/KG (ref 275–295)
PHOSPHATE SERPL-MCNC: 3.6 MG/DL (ref 2.5–4.9)
PLATELET # BLD AUTO: 112 10(3)UL (ref 150–450)
POTASSIUM SERPL-SCNC: 3.8 MMOL/L (ref 3.5–5.1)
PROTHROMBIN TIME: 17.9 SECONDS (ref 11.8–14.5)
RBC # BLD AUTO: 2.3 X10(6)UL
SODIUM SERPL-SCNC: 133 MMOL/L (ref 136–145)
WBC # BLD AUTO: 11.1 X10(3) UL (ref 4–11)

## 2021-08-26 PROCEDURE — 99232 SBSQ HOSP IP/OBS MODERATE 35: CPT | Performed by: INTERNAL MEDICINE

## 2021-08-26 PROCEDURE — 71045 X-RAY EXAM CHEST 1 VIEW: CPT | Performed by: RADIOLOGY

## 2021-08-26 PROCEDURE — 99233 SBSQ HOSP IP/OBS HIGH 50: CPT | Performed by: INTERNAL MEDICINE

## 2021-08-26 PROCEDURE — 02HV33Z INSERTION OF INFUSION DEVICE INTO SUPERIOR VENA CAVA, PERCUTANEOUS APPROACH: ICD-10-PCS | Performed by: RADIOLOGY

## 2021-08-26 PROCEDURE — 0JH63XZ INSERTION OF TUNNELED VASCULAR ACCESS DEVICE INTO CHEST SUBCUTANEOUS TISSUE AND FASCIA, PERCUTANEOUS APPROACH: ICD-10-PCS | Performed by: RADIOLOGY

## 2021-08-26 PROCEDURE — 02PY33Z REMOVAL OF INFUSION DEVICE FROM GREAT VESSEL, PERCUTANEOUS APPROACH: ICD-10-PCS | Performed by: RADIOLOGY

## 2021-08-26 RX ORDER — HEPARIN SODIUM 1000 [USP'U]/ML
INJECTION, SOLUTION INTRAVENOUS; SUBCUTANEOUS
Status: COMPLETED
Start: 2021-08-26 | End: 2021-08-26

## 2021-08-26 RX ORDER — CEFAZOLIN SODIUM/WATER 2 G/20 ML
SYRINGE (ML) INTRAVENOUS
Status: COMPLETED
Start: 2021-08-26 | End: 2021-08-26

## 2021-08-26 RX ORDER — MIDAZOLAM HYDROCHLORIDE 1 MG/ML
INJECTION INTRAMUSCULAR; INTRAVENOUS
Status: COMPLETED
Start: 2021-08-26 | End: 2021-08-26

## 2021-08-26 RX ORDER — LIDOCAINE HYDROCHLORIDE 20 MG/ML
INJECTION, SOLUTION EPIDURAL; INFILTRATION; INTRACAUDAL; PERINEURAL
Status: COMPLETED
Start: 2021-08-26 | End: 2021-08-26

## 2021-08-26 RX ORDER — WARFARIN SODIUM 5 MG/1
5 TABLET ORAL NIGHTLY
Status: DISCONTINUED | OUTPATIENT
Start: 2021-08-26 | End: 2021-08-27

## 2021-08-26 NOTE — PROGRESS NOTES
[facility]     Cardiology Progress Note    Subjective:  No chest pain or shortness of breath. [place] speaking.      Objective:  BP 99/47 (BP Location: Right arm)   Pulse 103   Temp 99.3 °F (37.4 °C) (Oral)   Resp 18   Ht 5' 6.93\" (1.7 m) QRS  · Elevated troponin, not c/w ACS. Likely secondary to renal failure. No ischemic work up indicated at this time  · CAD, cABG  · CM, LVEF 40-45% with severe MR. No ACE/aRB with acute renal failure.  No nitrates, continue BB  · Anemia - hgb 7.9    Plan:

## 2021-08-26 NOTE — PROGRESS NOTES
Pt was to have HD at 0600 today. Dialysis RN called at 0100 and inquired if could do pt's dialysis @0400 instead today, this RN agreed and dialysis RN was to move forward with treatment at 0400.   HD RN called back at approx 0300 stating had emergent case

## 2021-08-26 NOTE — PLAN OF CARE
Pt NPO since midnight for HD cath placement. Low grade temp during night, Tylenol given. 0.9%  ml given. Dialysis scheduled between 4AM and 6AM today.     Problem: Patient Centered Care  Goal: Patient preferences are identified and integrated in the p monitor vital signs, obtain 12 lead EKG if indicated  - Evaluate effectiveness of antiarrhythmic and heart rate control medications as ordered  - Initiate emergency measures for life threatening arrhythmias  - Monitor electrolytes and administer replacemen Electrolytes maintained within normal limits  Description: INTERVENTIONS:  - Monitor labs and rhythm and assess patient for signs and symptoms of electrolyte imbalances  - Administer electrolyte replacement as ordered  - Monitor response to electrolyte rep measures as appropriate and evaluate response  - Consider cultural and social influences on pain and pain management  - Manage/alleviate anxiety  - Utilize distraction and/or relaxation techniques  - Monitor for opioid side effects  - Notify MD/LIP if inte

## 2021-08-26 NOTE — PROGRESS NOTES
[facility]    Progress Note    [facility] Patient Status:  Inpatient    3/28/1940 MRN [id number]   [facility] 5SW/SE Attending Gilberto Najjar,  [facility] Day # 8 PCP Beth Birmingham MD     Subjective:   Subject Lumbar spondylosis    Anemia, unspecified type    Elevated troponin    AVM (arteriovenous malformation) of colon    ESRD on hemodialysis ([facility] [address]    Tolerating dialysis well for management of acute renal failure. Currently receiving dialysis now.   Plan for p

## 2021-08-26 NOTE — IVS NOTE
Patient c/o shortness of breath post procedure. exhibiting labored breathing. O2 sats remained stable, placed on 3L O2 for tachypnea. Dr. Polly Andrews notified. STAT portable chest x ray & EKG ordered.  Dr. Polly Andrews examined x ray & EKG at bedside- no new orders, okay

## 2021-08-26 NOTE — PHYSICAL THERAPY NOTE
Chart reviewed pt for PT treatment. Pt currently receiving dialysis and then scheduled for a permacath placement. Will re-attempt once pt is available and medically appropriate.      Te Koch, PT, DPT

## 2021-08-27 LAB
ANION GAP SERPL CALC-SCNC: 7 MMOL/L (ref 0–18)
APTT PPP: 58.9 SECONDS (ref 23.2–35.3)
BASOPHILS # BLD AUTO: 0.01 X10(3) UL (ref 0–0.2)
BASOPHILS NFR BLD AUTO: 0.1 %
BUN BLD-MCNC: 52 MG/DL (ref 7–18)
BUN/CREAT SERPL: 9.9 (ref 10–20)
CALCIUM BLD-MCNC: 8.4 MG/DL (ref 8.5–10.1)
CHLORIDE SERPL-SCNC: 97 MMOL/L (ref 98–112)
CO2 SERPL-SCNC: 29 MMOL/L (ref 21–32)
CREAT BLD-MCNC: 5.23 MG/DL
DEPRECATED RDW RBC AUTO: 48.6 FL (ref 35.1–46.3)
EOSINOPHIL # BLD AUTO: 0.01 X10(3) UL (ref 0–0.7)
EOSINOPHIL NFR BLD AUTO: 0.1 %
ERYTHROCYTE [DISTWIDTH] IN BLOOD BY AUTOMATED COUNT: 13.1 % (ref 11–15)
GLUCOSE BLD-MCNC: 127 MG/DL (ref 70–99)
GLUCOSE BLDC GLUCOMTR-MCNC: 131 MG/DL (ref 70–99)
GLUCOSE BLDC GLUCOMTR-MCNC: 134 MG/DL (ref 70–99)
GLUCOSE BLDC GLUCOMTR-MCNC: 136 MG/DL (ref 70–99)
GLUCOSE BLDC GLUCOMTR-MCNC: 167 MG/DL (ref 70–99)
HCT VFR BLD AUTO: 21.9 %
HGB BLD-MCNC: 7.3 G/DL
IMM GRANULOCYTES # BLD AUTO: 0.09 X10(3) UL (ref 0–1)
IMM GRANULOCYTES NFR BLD: 0.9 %
INR BLD: 1.75 (ref 0.9–1.2)
LYMPHOCYTES # BLD AUTO: 0.48 X10(3) UL (ref 1–4)
LYMPHOCYTES NFR BLD AUTO: 4.6 %
MCH RBC QN AUTO: 34.8 PG (ref 26–34)
MCHC RBC AUTO-ENTMCNC: 33.3 G/DL (ref 31–37)
MCV RBC AUTO: 104.3 FL
MONOCYTES # BLD AUTO: 1.09 X10(3) UL (ref 0.1–1)
MONOCYTES NFR BLD AUTO: 10.4 %
NEUTROPHILS # BLD AUTO: 8.81 X10 (3) UL (ref 1.5–7.7)
NEUTROPHILS # BLD AUTO: 8.81 X10(3) UL (ref 1.5–7.7)
NEUTROPHILS NFR BLD AUTO: 83.9 %
OSMOLALITY SERPL CALC.SUM OF ELEC: 292 MOSM/KG (ref 275–295)
PLATELET # BLD AUTO: 105 10(3)UL (ref 150–450)
POTASSIUM SERPL-SCNC: 3.8 MMOL/L (ref 3.5–5.1)
PROTHROMBIN TIME: 20.2 SECONDS (ref 11.8–14.5)
RBC # BLD AUTO: 2.1 X10(6)UL
SODIUM SERPL-SCNC: 133 MMOL/L (ref 136–145)
WBC # BLD AUTO: 10.5 X10(3) UL (ref 4–11)

## 2021-08-27 PROCEDURE — 99232 SBSQ HOSP IP/OBS MODERATE 35: CPT | Performed by: INTERNAL MEDICINE

## 2021-08-27 PROCEDURE — 99233 SBSQ HOSP IP/OBS HIGH 50: CPT | Performed by: INTERNAL MEDICINE

## 2021-08-27 RX ORDER — HEPARIN SODIUM 1000 [USP'U]/ML
1.5 INJECTION, SOLUTION INTRAVENOUS; SUBCUTANEOUS ONCE
Status: COMPLETED | OUTPATIENT
Start: 2021-08-27 | End: 2021-08-28

## 2021-08-27 RX ORDER — ALBUMIN (HUMAN) 12.5 G/50ML
100 SOLUTION INTRAVENOUS AS NEEDED
Status: DISCONTINUED | OUTPATIENT
Start: 2021-08-27 | End: 2021-09-02

## 2021-08-27 NOTE — PROGRESS NOTES
[facility]    Progress Note    Clydeora Fitting Patient Status:  Inpatient    3/28/1940 MRN [id number]   [facility] 5SW/SE Attending Lynnette Webb, [facility]  Day # 9 PCP Anna Murcia MD         Assessment and Pl so med was changed to lisinopril 5 mg po qD, though creatinine increased to 1.9, so lisinopril was stopped; tried spironolactone 12.5 mg po qD in Nov 2017 and now stopped for unclear reasons (?elevated creatinine); s/p ICD/ Biv on 11/3/17; s/p RHC in Sept mm polyps removed from the proximal mid ascending colon by cold snare polypectomy. Jude Donning was moderate diverticulosis of the descending and sigmoid colon and medium-sized internal hemorrhoids but no active bleeding.    -had outpatient given video capsule e April 2016 with microalbumin/creat ratio 191.3 mg/dL     Mitral regurgitation  severe, though currently asymptomatic; ECHO in Aug 2021 shows EF 40-45% ; not on ACEI due to CKD;  Rx per Dr Marsha Carmona     Mild aortic stenosis  As seen on ECHO in Aug 2021     He (PROTONIX) EC tab 40 mg, 40 mg, Oral, QAM AC  Albumin Human (ALBUMINAR) 25 % solution 100 mL, 100 mL, Intravenous, PRN  lidocaine-prilocaine (EMLA) cream, , Topical, PRN  Albumin Human (ALBUMINAR) 25 % solution 100 mL, 100 mL, Intravenous, PRN  heparin (PO 3.8 08/27/2021    CL 97 08/27/2021    CO2 29.0 08/27/2021    BUN 52 08/27/2021    CREATSERUM 5.23 08/27/2021    CA 8.4 08/27/2021    PTT 58.9 08/27/2021    INR 1.75 08/27/2021       Recent Labs   Lab 08/27/21  0519   WBC 10.5   HGB 7.3*   HCT 21.9*   MCV 1

## 2021-08-27 NOTE — PROGRESS NOTES
[facility]      Subjective:  No pain or dyspnea      Objective:  BP (!) 86/49 (BP Location: Left arm)   Pulse 115   Temp 97.8 °F (36.6 °C) (Oral)   Resp 20   Ht 5' 6.93\" (1.7 m)   Wt 156 lb 1.6 oz (70.8 kg)   SpO2 92%   BMI 24.50 kg/ at Rehab when bed available  · Return to see Dr. Erin Kearney after Rehab   · Will sign off please call if needed    Jessee Saucedo NP

## 2021-08-27 NOTE — PROGRESS NOTES
[facility]    Progress Note    Clydeora Fitting Patient Status:  Inpatient    3/28/1940 MRN [id number]   [facility] 5SW/SE Attending Lynnette Webb,  [facility]  Day # 8 PCP Anna Murcia MD       Subjective:   [place] Boards noted  Back/Spine: no abnormalities noted  Musculoskeletal: full ROM all extremities good strength  no deformities  Extremities: no edema, cyanosis  Neurological:  Grossly normal    Results:     Laboratory Data:  Lab Results   Component Value Date    WBC 1 110 defer to cardiology for beta-blocker control    #3 anemia on Epogen    #4 anticoagulation back on Coumadin    To nursing home when INR appropriate               8/26/2021  Wan Yang MD

## 2021-08-27 NOTE — PLAN OF CARE
Problem: Patient Centered Care  Goal: Patient preferences are identified and integrated in the patient's plan of care  Description: Interventions:  - What would you like us to know as we care for you?  I live at home with my wife  - Provide timely, comple oxygen saturation or ABGs  - Provide Smoking Cessation handout, if applicable  - Encourage broncho-pulmonary hygiene including cough, deep breathe, Incentive Spirometry  - Assess the need for suctioning and perform as needed  - Assess and instruct to repor Problem: GENITOURINARY - ADULT  Goal: Absence of urinary retention  Description: INTERVENTIONS:  - Assess patient’s ability to void and empty bladder  - Monitor intake/output and perform bladder scan as needed  - Follow urinary retention protocol/standar

## 2021-08-27 NOTE — PHYSICAL THERAPY NOTE
Discussed with RN Mahendra Noriega. Patient has been hypotensive and per physician is to remain in bed today. Will follow up on 8/28.

## 2021-08-27 NOTE — PLAN OF CARE
Problem: Patient Centered Care  Goal: Patient preferences are identified and integrated in the patient's plan of care  Description: Interventions:  - What would you like us to know as we care for you?  I live at home with my wife  - Provide timely, comple emergency measures for life threatening arrhythmias  - Monitor electrolytes and administer replacement therapy as ordered  Outcome: Progressing     Problem: RESPIRATORY - ADULT  Goal: Achieves optimal ventilation and oxygenation  Description: INTERVENTIONS imbalances  - Administer electrolyte replacement as ordered  - Monitor response to electrolyte replacements, including rhythm and repeat lab results as appropriate  - Fluid restriction as ordered  - Instruct patient on fluid and nutrition restrictions as a assistance  - Assess pain using appropriate pain scale  - Administer analgesics based on type and severity of pain and evaluate response  - Implement non-pharmacological measures as appropriate and evaluate response  - Consider cultural and social influenc services based on physician/LIP order or complex needs related to functional status, cognitive ability or social support system  Outcome: Progressing       Patient is a/ox4. Low BP, 600ml bolus, Albumin PRN.  Fall risk, all needs attended to, frequent round

## 2021-08-27 NOTE — PROGRESS NOTES
Dr. Juice Zarate made of aware of am hypotension. Orders given and will be carried out. Patient and wife informed on plan of care.

## 2021-08-28 ENCOUNTER — APPOINTMENT (OUTPATIENT)
Dept: GENERAL RADIOLOGY | Facility: HOSPITAL | Age: 81
DRG: 673 | End: 2021-08-28
Attending: INTERNAL MEDICINE
Payer: MEDICARE

## 2021-08-28 LAB
APTT PPP: 67.1 SECONDS (ref 23.2–35.3)
BASOPHILS # BLD AUTO: 0.01 X10(3) UL (ref 0–0.2)
BASOPHILS NFR BLD AUTO: 0.1 %
DEPRECATED RDW RBC AUTO: 49.7 FL (ref 35.1–46.3)
EOSINOPHIL # BLD AUTO: 0.03 X10(3) UL (ref 0–0.7)
EOSINOPHIL NFR BLD AUTO: 0.4 %
ERYTHROCYTE [DISTWIDTH] IN BLOOD BY AUTOMATED COUNT: 13.4 % (ref 11–15)
GLUCOSE BLDC GLUCOMTR-MCNC: 112 MG/DL (ref 70–99)
GLUCOSE BLDC GLUCOMTR-MCNC: 116 MG/DL (ref 70–99)
GLUCOSE BLDC GLUCOMTR-MCNC: 123 MG/DL (ref 70–99)
GLUCOSE BLDC GLUCOMTR-MCNC: 149 MG/DL (ref 70–99)
HCT VFR BLD AUTO: 23.1 %
HEPARIN AB PPP QL PL AGG: NEGATIVE
HGB BLD-MCNC: 7.6 G/DL
IMM GRANULOCYTES # BLD AUTO: 0.04 X10(3) UL (ref 0–1)
IMM GRANULOCYTES NFR BLD: 0.5 %
INR BLD: 1.5 (ref 0.9–1.2)
LYMPHOCYTES # BLD AUTO: 0.33 X10(3) UL (ref 1–4)
LYMPHOCYTES NFR BLD AUTO: 4.4 %
MCH RBC QN AUTO: 34.7 PG (ref 26–34)
MCHC RBC AUTO-ENTMCNC: 32.9 G/DL (ref 31–37)
MCV RBC AUTO: 105.5 FL
MONOCYTES # BLD AUTO: 0.68 X10(3) UL (ref 0.1–1)
MONOCYTES NFR BLD AUTO: 9 %
NEUTROPHILS # BLD AUTO: 6.44 X10 (3) UL (ref 1.5–7.7)
NEUTROPHILS # BLD AUTO: 6.44 X10(3) UL (ref 1.5–7.7)
NEUTROPHILS NFR BLD AUTO: 85.6 %
PLATELET # BLD AUTO: 111 10(3)UL (ref 150–450)
PROTHROMBIN TIME: 17.9 SECONDS (ref 11.8–14.5)
RBC # BLD AUTO: 2.19 X10(6)UL
WBC # BLD AUTO: 7.5 X10(3) UL (ref 4–11)

## 2021-08-28 PROCEDURE — 99232 SBSQ HOSP IP/OBS MODERATE 35: CPT | Performed by: INTERNAL MEDICINE

## 2021-08-28 PROCEDURE — 71045 X-RAY EXAM CHEST 1 VIEW: CPT | Performed by: INTERNAL MEDICINE

## 2021-08-28 PROCEDURE — 99233 SBSQ HOSP IP/OBS HIGH 50: CPT | Performed by: INTERNAL MEDICINE

## 2021-08-28 RX ORDER — PSEUDOEPHEDRINE HCL 30 MG
100 TABLET ORAL 2 TIMES DAILY
Qty: 60 CAPSULE | Refills: 0 | Status: SHIPPED | COMMUNITY
Start: 2021-08-28 | End: 2021-09-02

## 2021-08-28 RX ORDER — SIMETHICONE 80 MG
80 TABLET,CHEWABLE ORAL 4 TIMES DAILY PRN
Status: DISCONTINUED | OUTPATIENT
Start: 2021-08-28 | End: 2021-09-02

## 2021-08-28 RX ORDER — CALCITRIOL 0.25 UG/1
0.25 CAPSULE, LIQUID FILLED ORAL DAILY
Status: DISCONTINUED | OUTPATIENT
Start: 2021-08-28 | End: 2021-09-02

## 2021-08-28 RX ORDER — WARFARIN SODIUM 5 MG/1
10 TABLET ORAL
Status: COMPLETED | OUTPATIENT
Start: 2021-08-28 | End: 2021-08-28

## 2021-08-28 RX ORDER — SUCRALFATE ORAL 1 G/10ML
1 SUSPENSION ORAL
Qty: 30 EACH | Refills: 0 | Status: SHIPPED | COMMUNITY
Start: 2021-08-28 | End: 2021-09-02

## 2021-08-28 RX ORDER — MELATONIN
3 NIGHTLY PRN
Status: DISCONTINUED | OUTPATIENT
Start: 2021-08-28 | End: 2021-09-02

## 2021-08-28 RX ORDER — SEVELAMER CARBONATE 800 MG/1
800 TABLET, FILM COATED ORAL
Qty: 90 TABLET | Refills: 0 | Status: SHIPPED | COMMUNITY
Start: 2021-08-28 | End: 2021-09-02

## 2021-08-28 RX ORDER — CALCITRIOL 0.25 UG/1
0.25 CAPSULE, LIQUID FILLED ORAL DAILY
Qty: 30 CAPSULE | Refills: 0 | Status: SHIPPED | COMMUNITY
Start: 2021-08-28 | End: 2021-09-02

## 2021-08-28 RX ORDER — ALBUMIN (HUMAN) 12.5 G/50ML
SOLUTION INTRAVENOUS
Status: COMPLETED
Start: 2021-08-28 | End: 2021-08-28

## 2021-08-28 RX ORDER — WARFARIN SODIUM 5 MG/1
7.5 TABLET ORAL DAILY
Qty: 120 TABLET | Refills: 1 | Status: SHIPPED | COMMUNITY
Start: 2021-08-29 | End: 2021-09-02

## 2021-08-28 RX ORDER — WARFARIN SODIUM 10 MG/1
10 TABLET ORAL ONCE
Qty: 1 TABLET | Refills: 0 | Status: SHIPPED | COMMUNITY
Start: 2021-08-28 | End: 2021-08-28

## 2021-08-28 NOTE — PLAN OF CARE
Patient went for dialysis today. Education provided to patient on  fluid balance and electrolyte balance by dialysis RN and reinforced by this RN. Plan of care updated with patient's son and patient. Safety measures are in place.  GI, nephrology, and cardio Progressing  Goal: Absence of cardiac arrhythmias or at baseline  Description: INTERVENTIONS:  - Continuous cardiac monitoring, monitor vital signs, obtain 12 lead EKG if indicated  - Evaluate effectiveness of antiarrhythmic and heart rate control medicati intake and initiate nutrition consult as needed  - Instruct patient on self management of diabetes  Outcome: Progressing  Goal: Electrolytes maintained within normal limits  Description: INTERVENTIONS:  - Monitor labs and rhythm and assess patient for sign assistive device and precautions (e.g. spinal or hip dislocation precautions)  Outcome: Progressing     Problem: PAIN - ADULT  Goal: Verbalizes/displays adequate comfort level or patient's stated pain goal  Description: INTERVENTIONS:  - Encourage pt to mo post-discharge preferences of patient/family/discharge partner  - Complete POLST form as appropriate  - Assess patient's ability to be responsible for managing their own health  - Refer to Case Management Department for coordinating discharge planning if t

## 2021-08-28 NOTE — PROGRESS NOTES
[facility]    Progress Note    Darek Ivey Patient Status:  Inpatient    3/28/1940 MRN [id number]   [facility] 5SW/SE Attending Baldemar Conner, [address] [facility]  Day # 9 PCP Tana Saini MD       Subjective:   Ganesh Rubio abnormalities noted  Musculoskeletal: full ROM all extremities good strength  no deformities  Extremities: no edema, cyanosis  Neurological:  Grossly normal    Results:     Laboratory Data:  Lab Results   Component Value Date    WBC 10.5 08/27/2021    HGB meds defer to cardiology is on Toprol  #4 anemia increased Epogen  #5 A. Fib rate a little quick defer to cardiology    Discussed with wife not a transplant candidate             8/27/2021  Wan Leon MD

## 2021-08-28 NOTE — PROGRESS NOTES
[facility]    Progress Note    Clau Arredondo Patient Status:  Inpatient    3/28/1940 MRN [id number]   [facility] 5SW/SE Attending Girish Hayden, [address] [facility] Day # 10 PCP Seda Campa MD       SUBJECTIVE:  Pt no studies are advised. No focal airspace consolidation.     Dictated by (CST): Geoff Lloyd MD on 8/26/2021 at 4:48 PM     Finalized by (CST): Geoff Lloyd MD on 8/26/2021 at 4:50 PM          IR TUNNELED CV CATH INSERTION EXCHANGE CHECK    Result Date: 8 4. Left atrium: The atrium was mildly to moderately dilated. 5. Right atrium: The atrium was mildly dilated. 6. Tricuspid valve: Mild-moderate regurgitation. 7. Pulmonary arteries: Systolic pressure was within the normal    range, estimated to be 30mm Hg. in size. Mitral valve:   Mildly calcified annulus. Mildly thickened, mildly calcified leaflets. Mobility was not restricted. Doppler: There was no evidence for stenosis. Severe regurgitation. Peak gradient (D): 7mm Hg.  Left atrium:  The atrium was ml     21 - 61  LV ejection fraction, 2-p           (L)     47    %      52 - 72  LV end-diastolic volume, Teichholz          141   ml     67 - 155  MM  LV end-diastolic volume/bsa,        (H)     77    ml/m^2 35 - 75  Teichholz MM  LV e', lateral ventricle                             Value        Reference  RV ID, ED, PLAX                             3.9   cm     ---------  RV pressure, S, DP                          28    mm Hg  --------- Legend: (L)  and  (H)  radha values outside specified refere Or  glucose-vitamin C (DEX-4) chewable tab 4 tablet, 4 tablet, Oral, Q15 Min PRN   Or  dextrose 50 % injection 50 mL, 50 mL, Intravenous, Q15 Min PRN   Or  glucose (DEX4) oral liquid 30 g, 30 g, Oral, Q15 Min PRN   Or  glucose-vitamin C (DEX-4) chewable ta with LVEF 25-30%, hypokinesis of inferior area, mod MR;  LVEF closer to 20% on CATH, with chronic CAD on 11/2/17; unable to afford Entresto 24/26 one tab po BID, so med was changed to lisinopril 5 mg po qD, though creatinine increased to 1.9, so lisinopril improved     History of GI bleed 6/2020  Patient was recently admitted to the hospital in June 2020 for suspected GI blood loss with a hemoglobin of 6.1.  Patient received 2 units PRBCs on 6/22/2020.  EGD on 623 was unremarkable with no source of bleeding in Oct 2019     Secondary hyperparathyroidism   in April 2016; repeat PTH is 107 in April 2019 (was 72 in Oct 2016); on Vitamin D 2000 IU po every day  - iPTH 441.9     Mitral regurgitation  severe, though currently asymptomatic; ECHO in Aug 2021 sh

## 2021-08-28 NOTE — PLAN OF CARE
Pt had sepsis BPA alert at 0500. Remote tele called regarding pt's heart rate fluctuating from 120s-130s,nonsustaining. Pt stated that he's been having shortness of breath, VS taken and recorded. HR: 126 RR: 25, labored breathing, O2: 90-91% RA.  Pt wa

## 2021-08-28 NOTE — PLAN OF CARE
Problem: Patient Centered Care  Goal: Patient preferences are identified and integrated in the patient's plan of care  Description: Interventions:  - What would you like us to know as we care for you?  I live at home with my wife  - Provide timely, comple emergency measures for life threatening arrhythmias  - Monitor electrolytes and administer replacement therapy as ordered  Outcome: Progressing     Problem: RESPIRATORY - ADULT  Goal: Achieves optimal ventilation and oxygenation  Description: INTERVENTIONS imbalances  - Administer electrolyte replacement as ordered  - Monitor response to electrolyte replacements, including rhythm and repeat lab results as appropriate  - Fluid restriction as ordered  - Instruct patient on fluid and nutrition restrictions as a assistance  - Assess pain using appropriate pain scale  - Administer analgesics based on type and severity of pain and evaluate response  - Implement non-pharmacological measures as appropriate and evaluate response  - Consider cultural and social influenc services based on physician/LIP order or complex needs related to functional status, cognitive ability or social support system  Outcome: Progressing    Patient is alert and oriented  x 4. Pt denies any pain or discomfort at this time.  VS taken and recorde

## 2021-08-28 NOTE — PROGRESS NOTES
[facility]    Progress Note    Patient was seen via video and audio telecommunication     Subjective:     Patient lying comfortably - on NC. No apparent distress.  Denies any cp or sob or NV    Review of Systems:     Constitutional: negati mL, Intravenous, Once  Albumin Human (ALBUMINAR) 25 % solution 100 mL, 100 mL, Intravenous, PRN  insulin detemir (LEVEMIR) 100 UNIT/ML flextouch 8 Units, 8 Units, Subcutaneous, Daily  metoprolol tartrate (LOPRESSOR) tab 25 mg, 25 mg, Oral, 2x Daily(Beta Bl 105.5*   NEPRELIM 9.79* 8.81* 6.44   WBC 11.1* 10.5 7.5   .0* 105.0* 111.0*     Recent Labs   Lab 08/22/21  0523 08/23/21  0534 08/24/21  0511 08/24/21  0511 08/25/21  0522 08/26/21  0532 08/27/21  0519   *   < > 123*   < > 123* 134* 127*   B < > = values in this interval not displayed. Recent Labs   Lab 08/24/21  0511 08/25/21  0522 08/26/21  0532   PHOS 5.0* 3.6 3.6   ALB 2.8* 3.3* 2.8*       XR CHEST AP PORTABLE  (CPT=71045)    Result Date: 8/28/2021  CONCLUSION:  1.  No acute findi daily dose   - BP lowish - on metoprolol BID     2.  CAD s/p CABG with ischemic CMP -EF 40-45%   - on coumadin - INR pending today   - on metoprolol     Discussed with Nursing       Zack Silva MD  8/28/2021

## 2021-08-29 LAB
ALBUMIN SERPL-MCNC: 3.5 G/DL (ref 3.4–5)
ANION GAP SERPL CALC-SCNC: 10 MMOL/L (ref 0–18)
APTT PPP: 57.7 SECONDS (ref 23.2–35.3)
BUN BLD-MCNC: 52 MG/DL (ref 7–18)
BUN/CREAT SERPL: 10.4 (ref 10–20)
CALCIUM BLD-MCNC: 8.6 MG/DL (ref 8.5–10.1)
CHLORIDE SERPL-SCNC: 96 MMOL/L (ref 98–112)
CO2 SERPL-SCNC: 28 MMOL/L (ref 21–32)
CREAT BLD-MCNC: 5.02 MG/DL
GLUCOSE BLD-MCNC: 93 MG/DL (ref 70–99)
GLUCOSE BLDC GLUCOMTR-MCNC: 102 MG/DL (ref 70–99)
GLUCOSE BLDC GLUCOMTR-MCNC: 139 MG/DL (ref 70–99)
GLUCOSE BLDC GLUCOMTR-MCNC: 146 MG/DL (ref 70–99)
GLUCOSE BLDC GLUCOMTR-MCNC: 193 MG/DL (ref 70–99)
INR BLD: 1.7 (ref 0.9–1.2)
OSMOLALITY SERPL CALC.SUM OF ELEC: 292 MOSM/KG (ref 275–295)
PHOSPHATE SERPL-MCNC: 4 MG/DL (ref 2.5–4.9)
PHOSPHATE SERPL-MCNC: 4 MG/DL (ref 2.5–4.9)
POTASSIUM SERPL-SCNC: 4 MMOL/L (ref 3.5–5.1)
PROTHROMBIN TIME: 19.7 SECONDS (ref 11.8–14.5)
SODIUM SERPL-SCNC: 134 MMOL/L (ref 136–145)

## 2021-08-29 PROCEDURE — 99232 SBSQ HOSP IP/OBS MODERATE 35: CPT | Performed by: INTERNAL MEDICINE

## 2021-08-29 PROCEDURE — 99233 SBSQ HOSP IP/OBS HIGH 50: CPT | Performed by: INTERNAL MEDICINE

## 2021-08-29 RX ORDER — WARFARIN SODIUM 5 MG/1
10 TABLET ORAL
Status: COMPLETED | OUTPATIENT
Start: 2021-08-29 | End: 2021-08-29

## 2021-08-29 NOTE — PROGRESS NOTES
[place] [facility]    Progress Note    Visit conducted via audio telecommunication     Subjective:     Denies any cp or sob or NV - wife at bedside     Review of Systems:     Constitutional: negative for fatigue, fevers and weight loss  Eyes: neg (ALBUMINAR) 25 % solution 100 mL, 100 mL, Intravenous, PRN  insulin detemir (LEVEMIR) 100 UNIT/ML flextouch 8 Units, 8 Units, Subcutaneous, Daily  metoprolol tartrate (LOPRESSOR) tab 25 mg, 25 mg, Oral, 2x Daily(Beta Blocker)  pantoprazole (PROTONIX) EC ta WBC 11.1* 10.5 7.5   .0* 105.0* 111.0*     Recent Labs   Lab 08/24/21  0511 08/24/21  0511 08/25/21  0522 08/26/21  0532 08/27/21  0519   *   < > 123* 134* 127*   BUN 94*   < > 61* 72* 52*   CREATSERUM 7.14*   < > 5.28* 6.40* 5.23*   GFRAA MD on 8/28/2021 at 8:04 AM                Assessment and Plan:       1.  ESRD -   - HD yesterday with UF 1.2 liters - next HD on Tuesday   - HD thru right internal jugular tuneled catheter   - serum phos wnl - on renvela  - fluid restriction - low NA at 133

## 2021-08-29 NOTE — PLAN OF CARE
Patient sat in chair for meals. Wife at bedside for support and help. Plan of care updated with patient and wife. Safety measures are in in place.      Problem: Patient Centered Care  Goal: Patient preferences are identified and integrated in the patient's vital signs, obtain 12 lead EKG if indicated  - Evaluate effectiveness of antiarrhythmic and heart rate control medications as ordered  - Initiate emergency measures for life threatening arrhythmias  - Monitor electrolytes and administer replacement therap maintained within normal limits  Description: INTERVENTIONS:  - Monitor labs and rhythm and assess patient for signs and symptoms of electrolyte imbalances  - Administer electrolyte replacement as ordered  - Monitor response to electrolyte replacements, in Verbalizes/displays adequate comfort level or patient's stated pain goal  Description: INTERVENTIONS:  - Encourage pt to monitor pain and request assistance  - Assess pain using appropriate pain scale  - Administer analgesics based on type and severity of responsible for managing their own health  - Refer to Case Management Department for coordinating discharge planning if the patient needs post-hospital services based on physician/LIP order or complex needs related to functional status, cognitive ability o

## 2021-08-29 NOTE — PLAN OF CARE
Problem: Patient Centered Care  Goal: Patient preferences are identified and integrated in the patient's plan of care  Description: Interventions:  - What would you like us to know as we care for you?  I live at home with my wife  - Provide timely, comple emergency measures for life threatening arrhythmias  - Monitor electrolytes and administer replacement therapy as ordered  Outcome: Progressing     Problem: RESPIRATORY - ADULT  Goal: Achieves optimal ventilation and oxygenation  Description: INTERVENTIONS imbalances  - Administer electrolyte replacement as ordered  - Monitor response to electrolyte replacements, including rhythm and repeat lab results as appropriate  - Fluid restriction as ordered  - Instruct patient on fluid and nutrition restrictions as a assistance  - Assess pain using appropriate pain scale  - Administer analgesics based on type and severity of pain and evaluate response  - Implement non-pharmacological measures as appropriate and evaluate response  - Consider cultural and social influenc services based on physician/LIP order or complex needs related to functional status, cognitive ability or social support system  Outcome: Progressing     Patient is alert and oriented  x 4. Pt denies any pain or discomfort at this time.  VS taken and record

## 2021-08-29 NOTE — PROGRESS NOTES
[place] [facility]    Progress Note    [place] Clause Patient Status:  Inpatient    3/28/1940 MRN [id number]   [facility] 5SW/SE Attending Kristina Clayton MD   [facility] Day # 6 PCP Sahra Herr MD       SUBJECTIVE:  Pt fe M 81 03/28/1940 H: [id number]                                E: [id number] Study date: Aug 19 2021 12:49PM              Room: 818171-K Accession: [id number] HT:(66in) WT:(156.7lb)                       BP: 95 / 78 ----------------------------- Study completion:  The patient tolerated the procedure well. There were no complications. Transthoracic echocardiography. M-mode, complete 2D, complete spectral Doppler, color Doppler, and intravenous contrast injection. Patient YOB: 1940.  Donna Townsend Hg. Normal pulmonary artery pressure. Right atrium:  The atrium was mildly dilated. Pacer wire or catheter noted in right atrium. Pericardium:  There was no pericardial effusion. Quality of study:  Image quality was adequate.  Systemic veins: Inferior vena 2.3   cm     ---------  Stroke volume (SV), LVOT DP                 87    ml     ---------  Stroke index (SV/bsa), LVOT DP              47    ml/m^2 ---------   Left atrium                                 Value        Reference  LA tartrate (LOPRESSOR) tab 25 mg, 25 mg, Oral, 2x Daily(Beta Blocker)  pantoprazole (PROTONIX) EC tab 40 mg, 40 mg, Oral, QAM AC  lidocaine-prilocaine (EMLA) cream, , Topical, PRN  heparin (PORCINE) drip 36983dtyeu/250mL infusion CONTINUOUS, 200-3,000 Units/ have outpatient hemodialysis center at [address] [place]; Rx pr Dr Evelyn Lord placed 8/26 per IR;     Anemia   s/p Venofer infusion once per week at Infusion center at [facility] with goal Hgb >10  –Hemoglobin stable  at 7.9> 8.2> 8.0> 8.1> 7. function, EF 40-45% with normal wall motion abnormalities.  No regional wall motion abnormalities.  Improved from prior assessment.   –Currently off of diuretics due to acute renal failure  --hydralazine stopped  –was on Coreg 9.375 twice daily--> on 8/26, colonoscopy on 9/8/20 by Dr Miguel Faria showed 6-8 mm compound AVM lesion visualized at the lip of the cecum pretty much identical to what was seen with the capsule study.   AVM was not bleeding, nor was bleeding provoked by vigorous washer irrigation     Diarrh

## 2021-08-30 LAB
APTT PPP: 67.5 SECONDS (ref 23.2–35.3)
BASOPHILS # BLD AUTO: 0.01 X10(3) UL (ref 0–0.2)
BASOPHILS NFR BLD AUTO: 0.1 %
DEPRECATED RDW RBC AUTO: 49.1 FL (ref 35.1–46.3)
EOSINOPHIL # BLD AUTO: 0.04 X10(3) UL (ref 0–0.7)
EOSINOPHIL NFR BLD AUTO: 0.6 %
ERYTHROCYTE [DISTWIDTH] IN BLOOD BY AUTOMATED COUNT: 13.2 % (ref 11–15)
GLUCOSE BLDC GLUCOMTR-MCNC: 133 MG/DL (ref 70–99)
GLUCOSE BLDC GLUCOMTR-MCNC: 147 MG/DL (ref 70–99)
GLUCOSE BLDC GLUCOMTR-MCNC: 147 MG/DL (ref 70–99)
GLUCOSE BLDC GLUCOMTR-MCNC: 150 MG/DL (ref 70–99)
GLUCOSE BLDC GLUCOMTR-MCNC: 167 MG/DL (ref 70–99)
HCT VFR BLD AUTO: 24.4 %
HGB BLD-MCNC: 7.9 G/DL
IMM GRANULOCYTES # BLD AUTO: 0.03 X10(3) UL (ref 0–1)
IMM GRANULOCYTES NFR BLD: 0.4 %
INR BLD: 2.02 (ref 0.9–1.2)
LYMPHOCYTES # BLD AUTO: 0.64 X10(3) UL (ref 1–4)
LYMPHOCYTES NFR BLD AUTO: 9.4 %
MCH RBC QN AUTO: 33.6 PG (ref 26–34)
MCHC RBC AUTO-ENTMCNC: 32.4 G/DL (ref 31–37)
MCV RBC AUTO: 103.8 FL
MONOCYTES # BLD AUTO: 0.87 X10(3) UL (ref 0.1–1)
MONOCYTES NFR BLD AUTO: 12.8 %
NEUTROPHILS # BLD AUTO: 5.22 X10 (3) UL (ref 1.5–7.7)
NEUTROPHILS # BLD AUTO: 5.22 X10(3) UL (ref 1.5–7.7)
NEUTROPHILS NFR BLD AUTO: 76.7 %
PLATELET # BLD AUTO: 122 10(3)UL (ref 150–450)
PROTHROMBIN TIME: 22.6 SECONDS (ref 11.8–14.5)
RBC # BLD AUTO: 2.35 X10(6)UL
SARS-COV-2 RNA RESP QL NAA+PROBE: DETECTED
WBC # BLD AUTO: 6.8 X10(3) UL (ref 4–11)

## 2021-08-30 PROCEDURE — 99232 SBSQ HOSP IP/OBS MODERATE 35: CPT | Performed by: INTERNAL MEDICINE

## 2021-08-30 PROCEDURE — 99233 SBSQ HOSP IP/OBS HIGH 50: CPT | Performed by: INTERNAL MEDICINE

## 2021-08-30 RX ORDER — ALBUMIN (HUMAN) 12.5 G/50ML
100 SOLUTION INTRAVENOUS AS NEEDED
Status: DISCONTINUED | OUTPATIENT
Start: 2021-08-30 | End: 2021-09-02

## 2021-08-30 RX ORDER — HEPARIN SODIUM 1000 [USP'U]/ML
1.5 INJECTION, SOLUTION INTRAVENOUS; SUBCUTANEOUS ONCE
Status: DISCONTINUED | OUTPATIENT
Start: 2021-08-30 | End: 2021-09-02

## 2021-08-30 NOTE — PROGRESS NOTES
[facility]    Progress Note    Andrade Almonte Patient Status:  Inpatient    3/28/1940 MRN [id number]   [facility] 5SW/SE Attending Elyssa Blanton MD   [facility] Day # 12 PCP Cathie Valdivia MD       Subjective:   Sharan Hernandez good strength  no deformities  Extremities: no edema, cyanosis  Neurological:  Grossly normal    Results:     Laboratory Data:  Lab Results   Component Value Date    WBC 6.8 08/30/2021    HGB 7.9 (L) 08/30/2021    HCT 24.4 (L) 08/30/2021    .0 (L) 0

## 2021-08-30 NOTE — DIETARY NOTE
ADULT NUTRITION REASSESSMENT    Pt is at moderate nutrition risk. Pt meets moderate malnutrition criteria.       CRITERIA FOR MALNUTRITION DIAGNOSIS:  Criteria for non-severe malnutrition diagnosis: acute illness/injury related to energy intake less than 7 per pt wife, reports UBW of 170# ~6 months ago indicating 7.6% wt loss. Per MD note, pt will need dialysis. 8/24 update: Pt was seen today for reassessment. Pt and wife at the bedside. Pt reports a slight improvement in his appetite.   His intake is goo Intravenous Once   • allopurinol  100 mg Oral Daily   • [Held by provider] aspirin  81 mg Oral Daily   • docusate sodium  100 mg Oral BID   • Sevelamer  800 mg Oral TID CC   • Insulin Aspart Pen  1-5 Units Subcutaneous TID CC     LABS: reviewed; noted hyp days and muscle mass mild depletion. NUTRITION DIAGNOSIS PROGRESS:  Improvement (unresolved) - Good tray and ONS intake.      ESTIMATED NUTRITION NEEDS: Dosing wt: 69.8 kg (current wt)  Calories: 8736-5616 calories/day (25-30 calories per kg Current wt)  P

## 2021-08-30 NOTE — PLAN OF CARE
Problem: Patient Centered Care  Goal: Patient preferences are identified and integrated in the patient's plan of care  Description: Interventions:  - What would you like us to know as we care for you?  I live at home with my wife  - Provide timely, comple emergency measures for life threatening arrhythmias  - Monitor electrolytes and administer replacement therapy as ordered  Outcome: Progressing     Problem: RESPIRATORY - ADULT  Goal: Achieves optimal ventilation and oxygenation  Description: INTERVENTIONS imbalances  - Administer electrolyte replacement as ordered  - Monitor response to electrolyte replacements, including rhythm and repeat lab results as appropriate  - Fluid restriction as ordered  - Instruct patient on fluid and nutrition restrictions as a assistance  - Assess pain using appropriate pain scale  - Administer analgesics based on type and severity of pain and evaluate response  - Implement non-pharmacological measures as appropriate and evaluate response  - Consider cultural and social influenc

## 2021-08-30 NOTE — PROGRESS NOTES
[facility]    Progress Note    Delle Party Patient Status:  Inpatient    3/28/1940 MRN [id number]   [facility] 5SW/SE Attending Zak Rossi, [address] [facility]  Day # 12 PCP Queen Zeny MD         Assessment and P 11/3/17; s/p RHC in Sept 201 per Dr Ren Snell carvedilol 9.375 mg po BID, Bumex 1 mg po qD,  Isordil 10 mg TID, and hydralazine 10 mg po TID, Kdur 20 mEQ po qD, and metolazone 2.5 mg prn weight gain as outpt Rx per CHF clinic and Dr Neris Christopher.  seen by  colon by cold snare polypectomy. Claretta Minus was moderate diverticulosis of the descending and sigmoid colon and medium-sized internal hemorrhoids but no active bleeding.    -had outpatient given video capsule enteroscopy on 8/27/20 performed to rule out small 2021     Health Maintenance  Had Pneumovax in May 2019     Disposition  PT/OT following, acute rehab upon discharge; plan [facility] work assistance i Blocker)  pantoprazole (PROTONIX) EC tab 40 mg, 40 mg, Oral, QAM AC  lidocaine-prilocaine (EMLA) cream, , Topical, PRN  sucralfate (CARAFATE) 1 GM/10ML suspension 1 g, 1 g, Oral, TID AC and HS  0.9% NaCl infusion, , Intravenous, Once  metoclopramide (MELISSA hours.      Imaging:  XR CHEST AP PORTABLE  (CPT=71045)    Result Date: 8/28/2021  CONCLUSION:  1. No acute findings. 2. Findings were described by [facility].     Dictated by (CST): Boo Merchant MD on 8/28/2021 at 8:02 AM     Finalized by

## 2021-08-30 NOTE — PLAN OF CARE
Received call from tele at [phone]  that pt had 19 bts of Vtach. Pt is sleeping comfortably, denies any chest pain or discomfort, VS taken and recorded. Placed paged to on-call cardiologist, Dr. Jefferson Ruelas at [phone].  Received call from him at [phone], and he stated n

## 2021-08-30 NOTE — PHYSICAL THERAPY NOTE
PHYSICAL THERAPY TREATMENT NOTE - INPATIENT     Room Number: 533/533-A       Presenting Problem: renal failure, fatigue, SOB, elevated troponin, Afib, dailysis    Problem List  Principal Problem:    Acute renal failure superimposed on chronic kidney diseas reach. The patient's Approx Degree of Impairment: 50.57% has been calculated based on documentation in the [facility] '6 clicks' Inpatient Basic Mobility Short Form. Research supports that patients with this level of impairment may benefit from Subacute Rehab. Impairment: 50.57%   Standardized Score (AM-PAC Scale): 42.13   CMS Modifier (G-Code): CK    Patient End of Session: In bed;Needs met;Call light within reach;RN aware of session/findings; All patient questions and concerns addressed; Alarm set    CURRENT GOA

## 2021-08-30 NOTE — OCCUPATIONAL THERAPY NOTE
OCCUPATIONAL THERAPY TREATMENT NOTE - INPATIENT        Room Number: 533/533-A           Presenting Problem: ARF    Problem List  Principal Problem:    Acute renal failure superimposed on chronic kidney disease, unspecified CKD stage, unspecified acute daysi limited by abdominal pain, dizziness, and hypotension this session. The patient's Approx Degree of Impairment: 46.65% has been calculated based on documentation in the [facility] '6 clicks' Inpatient Daily Activity Short Form.   Research supports that lazara assistance  Sit to Stand: Contact guard assist  Toilet Transfer: NT  Shower Transfer: NT  Chair Transfer: Min A    Bedroom Mobility: CGA with RW x a few steps to chair    BALANCE ASSESSMENT  Static Sitting: SBA  Dynamic Sitting: CGA  Static Standing: CGA

## 2021-08-31 LAB
ALBUMIN SERPL-MCNC: 3.1 G/DL (ref 3.4–5)
ALT SERPL-CCNC: 17 U/L
ANION GAP SERPL CALC-SCNC: 12 MMOL/L (ref 0–18)
APTT PPP: 94.3 SECONDS (ref 23.2–35.3)
AST SERPL-CCNC: 24 U/L (ref 15–37)
BASOPHILS # BLD AUTO: 0.01 X10(3) UL (ref 0–0.2)
BASOPHILS NFR BLD AUTO: 0.2 %
BUN BLD-MCNC: 83 MG/DL (ref 7–18)
BUN/CREAT SERPL: 11.8 (ref 10–20)
CALCIUM BLD-MCNC: 8.2 MG/DL (ref 8.5–10.1)
CHLORIDE SERPL-SCNC: 92 MMOL/L (ref 98–112)
CO2 SERPL-SCNC: 27 MMOL/L (ref 21–32)
CREAT BLD-MCNC: 7.06 MG/DL
CRP SERPL-MCNC: 4.95 MG/DL (ref ?–0.3)
D DIMER PPP FEU-MCNC: 1.01 UG/ML FEU (ref ?–0.81)
DEPRECATED HBV CORE AB SER IA-ACNC: 1008.4 NG/ML
DEPRECATED RDW RBC AUTO: 49.1 FL (ref 35.1–46.3)
EOSINOPHIL # BLD AUTO: 0.04 X10(3) UL (ref 0–0.7)
EOSINOPHIL NFR BLD AUTO: 0.7 %
ERYTHROCYTE [DISTWIDTH] IN BLOOD BY AUTOMATED COUNT: 13.5 % (ref 11–15)
GLUCOSE BLD-MCNC: 100 MG/DL (ref 70–99)
GLUCOSE BLDC GLUCOMTR-MCNC: 115 MG/DL (ref 70–99)
GLUCOSE BLDC GLUCOMTR-MCNC: 122 MG/DL (ref 70–99)
GLUCOSE BLDC GLUCOMTR-MCNC: 129 MG/DL (ref 70–99)
GLUCOSE BLDC GLUCOMTR-MCNC: 182 MG/DL (ref 70–99)
HCT VFR BLD AUTO: 22.6 %
HGB BLD-MCNC: 7.5 G/DL
IMM GRANULOCYTES # BLD AUTO: 0.03 X10(3) UL (ref 0–1)
IMM GRANULOCYTES NFR BLD: 0.5 %
INR BLD: 2.53 (ref 0.9–1.2)
LYMPHOCYTES # BLD AUTO: 0.67 X10(3) UL (ref 1–4)
LYMPHOCYTES NFR BLD AUTO: 11.5 %
MCH RBC QN AUTO: 34.7 PG (ref 26–34)
MCHC RBC AUTO-ENTMCNC: 33.2 G/DL (ref 31–37)
MCV RBC AUTO: 104.6 FL
MONOCYTES # BLD AUTO: 0.69 X10(3) UL (ref 0.1–1)
MONOCYTES NFR BLD AUTO: 11.8 %
NEUTROPHILS # BLD AUTO: 4.4 X10 (3) UL (ref 1.5–7.7)
NEUTROPHILS # BLD AUTO: 4.4 X10(3) UL (ref 1.5–7.7)
NEUTROPHILS NFR BLD AUTO: 75.3 %
OSMOLALITY SERPL CALC.SUM OF ELEC: 297 MOSM/KG (ref 275–295)
PHOSPHATE SERPL-MCNC: 3.9 MG/DL (ref 2.5–4.9)
PLATELET # BLD AUTO: 125 10(3)UL (ref 150–450)
POTASSIUM SERPL-SCNC: 4.2 MMOL/L (ref 3.5–5.1)
PROTHROMBIN TIME: 27 SECONDS (ref 11.8–14.5)
RBC # BLD AUTO: 2.16 X10(6)UL
SODIUM SERPL-SCNC: 131 MMOL/L (ref 136–145)
WBC # BLD AUTO: 5.8 X10(3) UL (ref 4–11)

## 2021-08-31 PROCEDURE — 90935 HEMODIALYSIS ONE EVALUATION: CPT | Performed by: INTERNAL MEDICINE

## 2021-08-31 PROCEDURE — 99232 SBSQ HOSP IP/OBS MODERATE 35: CPT | Performed by: INTERNAL MEDICINE

## 2021-08-31 PROCEDURE — XW033G6 INTRODUCTION OF REGN-COV2 MONOCLONAL ANTIBODY INTO PERIPHERAL VEIN, PERCUTANEOUS APPROACH, NEW TECHNOLOGY GROUP 6: ICD-10-PCS | Performed by: INTERNAL MEDICINE

## 2021-08-31 NOTE — PLAN OF CARE
No complaints of pain. HD today. This RN reinforced education provided by HD RN regarding infection prevention and HD treatment. Up to chair today. PCR covid test complete. Denies SOB. Safety precautions in place and call light within reach.    Problem: Linda Santana arrhythmias or at baseline  Description: INTERVENTIONS:  - Continuous cardiac monitoring, monitor vital signs, obtain 12 lead EKG if indicated  - Evaluate effectiveness of antiarrhythmic and heart rate control medications as ordered  - Initiate emergency m needed  - Instruct patient on self management of diabetes  Outcome: Progressing  Goal: Electrolytes maintained within normal limits  Description: INTERVENTIONS:  - Monitor labs and rhythm and assess patient for signs and symptoms of electrolyte imbalances spinal or hip dislocation precautions)  Outcome: Progressing     Problem: PAIN - ADULT  Goal: Verbalizes/displays adequate comfort level or patient's stated pain goal  Description: INTERVENTIONS:  - Encourage pt to monitor pain and request assistance  - As patient/family/discharge partner  - Complete POLST form as appropriate  - Assess patient's ability to be responsible for managing their own health  - Refer to Case Management Department for coordinating discharge planning if the patient needs post-hospital

## 2021-08-31 NOTE — PHYSICAL THERAPY NOTE
PHYSICAL THERAPY TREATMENT NOTE - INPATIENT     Room Number: 533/533-A       Presenting Problem: renal failure, fatigue, SOB, elevated troponin, Afib, dailysis    Problem List  Principal Problem:    Acute renal failure superimposed on chronic kidney diseas session:  Pt finished session up in chair. Call light in reach, chair alarm on, RN notified of pt's functional mobility.  Recommended to RN to use on assist. The patient's Approx Degree of Impairment: 50.57% has been calculated based on documentation in the and from a bed to a chair (including a wheelchair)?: A Little   -   Need to walk in hospital room?: A Little   -   Climbing 3-5 steps with a railing?: A Lot     AM-PAC Score:  Raw Score: 17   Approx Degree of Impairment: 50.57%   Standardized Score (AM-PAC

## 2021-08-31 NOTE — PROGRESS NOTES
[facility]    Progress Note    Sae Newby Patient Status:  Inpatient    3/28/1940 MRN [id number]   [facility] 5SW/SE Attending Magy Islas MD   [facility] Day # 15 PCP Mariam Byrd MD       SUBJECTIVE:  F Echocardiography M-mode, complete 2D, complete spectral Doppler, color Doppler, and IV contrast ------------------------------------------------------------------- Garcia Gore 81 03/28/1940 H: [id number] due to poor acoustic window availability and body habitus. Scanning was performed from the parasternal, apical, and subcostal acoustic windows. Intravenous contrast (Definity) was administered. Study completion:  The patient tolerated the procedure well. Mobility was not restricted. Doppler: There was no evidence for stenosis. Mild-moderate regurgitation. Pulmonary artery:   Systolic pressure was within the normal range, estimated to be 30mm Hg. Normal pulmonary artery pressure.  Right atrium:  The atriu Value        Reference  IVS thickness, ED                           0.9   cm     0.6 - 1.0   LVOT                                        Value        Reference  LVOT ID, S                                  2.3   cm     ---------  Stroke volume [facility]) chewable tab 80 mg, 80 mg, Oral, QID PRN  melatonin tab 3 mg, 3 mg, Oral, Nightly PRN  epoetin joel-epbx (RETACRIT) 61627 UNIT/ML injection 10,000 Units, 10,000 Units, Subcutaneous, Once per day on Tue Thu Sat  Albumin Human (ALBUMINAR) 25 % solu admission;--Permacath placed 8/26 per IR   –on Renvela 800 mg po TID  –last HD 8/28; schedule TuThSat; next HD today  –will have outpatient hemodialysis center at  [facility];  Rx pr Dr Marisa Sahu infection  -- found incidentally on pre-admissio changed to lisinopril 5 mg po qD, though creatinine increased to 1.9, so lisinopril was stopped; tried spironolactone 12.5 mg po qD in Nov 2017 and now stopped for unclear reasons (?elevated creatinine); s/p ICD/ Biv on 11/3/17; s/p RHC in Sept 201 per  2020 for suspected GI blood loss with a hemoglobin of 6.1.  Patient received 2 units PRBCs on 6/22/2020.  EGD on 623 was unremarkable with no source of bleeding identified.  Colonoscopy on 6/26 revealed 2 sessile 10 to 12 mm polyps removed from the proxima 2019 (was 72 in Oct 2016); on Vitamin D 2000 IU po every day  - iPTH 441.9     Mitral regurgitation  severe, though currently asymptomatic; ECHO in Aug 2021 shows EF 40-45% ; not on ACEI due to CKD;  Rx per Dr Pat Talavera     Mild aortic stenosis  As seen on E

## 2021-08-31 NOTE — OCCUPATIONAL THERAPY NOTE
OCCUPATIONAL THERAPY TREATMENT NOTE - INPATIENT        Room Number: 533/533-A           Presenting Problem: ARF    Problem List  Principal Problem:    Acute renal failure superimposed on chronic kidney disease, unspecified CKD stage, unspecified acute daysi Form.  Research supports that patients with this level of impairment may benefit from home with [facility] although this OT recommends Acute rehab.      DISCHARGE RECOMMENDATIONS  OT Discharge Recommendations: Acute rehabilitation  OT Device Recommendations: TBD NT  Toileting: NT  Upper Extremity Dressing: NT    Education Provided: role of OT  Patient End of Session: Up in chair    OT Goals:           Patient will complete functional transfer with min a   Comment: sit to stand from bed level with CGA    Patient wi

## 2021-08-31 NOTE — PLAN OF CARE
Problem: Patient Centered Care  Goal: Patient preferences are identified and integrated in the patient's plan of care  Description: Interventions:  - What would you like us to know as we care for you?  I live at home with my wife  - Provide timely, comple pt/family on patient safety including physical limitations  - Instruct pt to call for assistance with activity based on assessment  - Modify environment to reduce risk of injury  - Provide assistive devices as appropriate  - Consider OT/PT consult to roldan and oxygenation  Description: INTERVENTIONS:  - Assess for changes in respiratory status  - Assess for changes in mentation and behavior  - Position to facilitate oxygenation and minimize respiratory effort  - Oxygen supplementation based on oxygen saturat ambulating w/ or w/o assistive devices  - Assist with transfers and ambulation using safe patient handling equipment as needed  - Ensure adequate protection for wounds/incisions during mobilization  - Obtain PT/OT consults as needed  - Advance activity as Arrange for interpreters to assist at discharge as needed  - Consider post-discharge preferences of patient/family/discharge partner  - Complete POLST form as appropriate  - Assess patient's ability to be responsible for managing their own health  - Refer

## 2021-08-31 NOTE — PROGRESS NOTES
[place] [facility]    Progress Note    Visit conducted via audio and video telecommunication     Subjective:     Diagnosed with COVID and now on isolation     Denies any sob or cp or cough.  Feels bit weak    Review of Systems:     Constitutional: 0.25 mcg, Oral, Daily  simethicone (MYLICON) chewable tab 80 mg, 80 mg, Oral, QID PRN  melatonin tab 3 mg, 3 mg, Oral, Nightly PRN  epoetin joel-epbx (RETACRIT) 24094 UNIT/ML injection 10,000 Units, 10,000 Units, Subcutaneous, Once per day on Tue Thu Sat 08/28/21  0545 08/30/21  0533 08/31/21  0505   RBC 2.19* 2.35* 2.16*   HGB 7.6* 7.9* 7.5*   HCT 23.1* 24.4* 22.6*   .5* 103.8* 104.6*   NEPRELIM 6.44 5.22 4.40   WBC 7.5 6.8 5.8   .0* 122.0* 125.0*     Recent Labs   Lab 08/26/21  0532 08/26/2 -------------------------- Paced rhythm with underlying atrial fibrillation -With run of ventricular ectopic beats  No further analysis ABNORMAL When compared with ECG of 08/26/2021 16:19:54 Pacer spikes seen.  Electronically signed on 08/30/2021 at 17:09 b

## 2021-09-01 ENCOUNTER — TELEPHONE (OUTPATIENT)
Dept: INTERNAL MEDICINE CLINIC | Facility: CLINIC | Age: 81
End: 2021-09-01

## 2021-09-01 LAB
GLUCOSE BLDC GLUCOMTR-MCNC: 118 MG/DL (ref 70–99)
GLUCOSE BLDC GLUCOMTR-MCNC: 118 MG/DL (ref 70–99)
GLUCOSE BLDC GLUCOMTR-MCNC: 121 MG/DL (ref 70–99)
GLUCOSE BLDC GLUCOMTR-MCNC: 159 MG/DL (ref 70–99)
INR BLD: 2.51 (ref 0.9–1.2)
PROTHROMBIN TIME: 26.8 SECONDS (ref 11.8–14.5)

## 2021-09-01 PROCEDURE — 99233 SBSQ HOSP IP/OBS HIGH 50: CPT | Performed by: INTERNAL MEDICINE

## 2021-09-01 PROCEDURE — 99232 SBSQ HOSP IP/OBS MODERATE 35: CPT | Performed by: INTERNAL MEDICINE

## 2021-09-01 RX ORDER — HEPARIN SODIUM 1000 [USP'U]/ML
1.5 INJECTION, SOLUTION INTRAVENOUS; SUBCUTANEOUS ONCE
Status: COMPLETED | OUTPATIENT
Start: 2021-09-01 | End: 2021-09-02

## 2021-09-01 RX ORDER — ALBUMIN (HUMAN) 12.5 G/50ML
100 SOLUTION INTRAVENOUS AS NEEDED
Status: DISCONTINUED | OUTPATIENT
Start: 2021-09-01 | End: 2021-09-02

## 2021-09-01 NOTE — PROGRESS NOTES
[facility]    Progress Note    Sae Newby Patient Status:  Inpatient    3/28/1940 MRN [id number]   [facility] 5SW/SE Attending Magy Islas MD   [facility] Day # 15 PCP Mariam Byrd MD         Assessment a Thrombocytopenia  Platelets 981>>055VL; HIPA negative     Acute on chronic systolic CHF  CAD with ischemic cardiomyopathy  S/p 3v CABG in 1995, s/p stent x 3 in 2008  TARIQ in Aug 2019 with LVEF 25-30%, hypokinesis of inferior area, mod MR;  LVEF closer to 90's-110's).   HR      Acute gout of right foot  Seen in ED on 8/1/20; pain resolved on Medrol Dose Gabo x 6d; uric acid 8.1; pt has been taking allopurinol 100 mg po qD though he only takes \"as needed\"     History of GI bleed 6/2020  Patient was rec Lipitor 40 mg po qHS  –Lipitor decreased to 20 mg due to need for HD     Macular degeneration  Early and mild; saw ophtho Dr Judge Doran 2019; annual visit     Cataract   saw ophtho Dr Shalini Jones in Oct 2019     Secondary hyperparathyroidism   in (DULCOLAX) EC tab 10 mg, 10 mg, Oral, Once  heparin sodium 1000 UNIT/ML injection 1,500 Units, 1.5 mL, Intravenous, Once  Albumin Human (ALBUMINAR) 25 % solution 100 mL, 100 mL, Intravenous, PRN  calcitriol (ROCALTROL) cap 0.25 mcg, 0.25 mcg, Oral, Daily PRN   Or  glucose-vitamin C (DEX-4) chewable tab 8 tablet, 8 tablet, Oral, Q15 Min PRN  Insulin Aspart Pen (NOVOLOG) 100 UNIT/ML flexpen 1-5 Units, 1-5 Units, Subcutaneous, TID CC             Data Review:     Labs:   Lab Results   Component Value Date    I

## 2021-09-01 NOTE — PAYOR COMM NOTE
--------------  CONTINUED STAY REVIEW    Jayson Pollard MA [facility]  Subscriber #:  [id number]  Authorization Number: [id number]    Admit date: 8/18/21  Admit time:  2:19 PM    Admitting Physician: Keegan Gong MD  Attending Physician:  Hudson Pena MD  Pr Oral Vilma Wilson, RN      sucralfate (CARAFATE) 1 GM/10ML suspension 1 g     Date Action Dose Route User    9/1/2021 0704 Given 1 g Oral Tsanley Mealyung, RN    8/31/2021 2149 Given 1 g Oral Stanley Mealyung, RN    8/31/2021 1751 Given 1 g Oral Lawernce Blank

## 2021-09-01 NOTE — PLAN OF CARE
No acute changes. PCR still pending. Pt up to chair with assist and tolerating diet. Gillis with minimal output today. All safety measures in place. Wife updated via phone on plan of care. Continue to monitor.     Problem: Patient/Family Goals  Goal: Patient minimize respiratory effort  - Oxygen supplementation based on oxygen saturation or ABGs  - Provide Smoking Cessation handout, if applicable  - Encourage broncho-pulmonary hygiene including cough, deep breathe, Incentive Spirometry  - Assess the need for s interpreters to assist at discharge as needed  - Consider post-discharge preferences of patient/family/discharge partner  - Complete POLST form as appropriate  - Assess patient's ability to be responsible for managing their own health  - Refer to [facility]AB [facility]

## 2021-09-01 NOTE — TELEPHONE ENCOUNTER
To Dr. Angel Cam----    RN unsure on hospital protocol for wife to see patient again inpatient? Are you able to advise?

## 2021-09-01 NOTE — TELEPHONE ENCOUNTER
Wife called back. She said she called the [address] [place] [address] nurse several times and she never gets a call back. She would like to speak with Dr. Lily Henry or the nurse.

## 2021-09-01 NOTE — TELEPHONE ENCOUNTER
Pt admitted to inpatient [facility]. Spouse to contact bedside [facility] RN for direction/instructions.      Attempted to call, line disconnected - FD OK to advise patient on the above

## 2021-09-01 NOTE — TELEPHONE ENCOUNTER
Please call pt wife  Pt is hospitalized, has tested positive for COVID  Winifred is not getting information  How long will pt be quarantined?   She cannot visit him  Tasked to nursing

## 2021-09-01 NOTE — PLAN OF CARE
Problem: Patient Centered Care  Goal: Patient preferences are identified and integrated in the patient's plan of care  Description: Interventions:  - What would you like us to know as we care for you?  I live at home with my wife  - Provide timely, comple emergency measures for life threatening arrhythmias  - Monitor electrolytes and administer replacement therapy as ordered  Outcome: Progressing     Problem: RESPIRATORY - ADULT  Goal: Achieves optimal ventilation and oxygenation  Description: INTERVENTIONS imbalances  - Administer electrolyte replacement as ordered  - Monitor response to electrolyte replacements, including rhythm and repeat lab results as appropriate  - Fluid restriction as ordered  - Instruct patient on fluid and nutrition restrictions as a assistance  - Assess pain using appropriate pain scale  - Administer analgesics based on type and severity of pain and evaluate response  - Implement non-pharmacological measures as appropriate and evaluate response  - Consider cultural and social influenc services based on physician/LIP order or complex needs related to functional status, cognitive ability or social support system  Outcome: Progressing     No c/o pain. Gillis intact.  Call light within reach, bed in lowest position, alarms on, and nonskid soc

## 2021-09-02 ENCOUNTER — TELEPHONE (OUTPATIENT)
Dept: INTERNAL MEDICINE CLINIC | Facility: CLINIC | Age: 81
End: 2021-09-02

## 2021-09-02 ENCOUNTER — PATIENT OUTREACH (OUTPATIENT)
Dept: CASE MANAGEMENT | Age: 81
End: 2021-09-02

## 2021-09-02 VITALS
SYSTOLIC BLOOD PRESSURE: 90 MMHG | TEMPERATURE: 98 F | OXYGEN SATURATION: 95 % | HEIGHT: 66.93 IN | HEART RATE: 111 BPM | WEIGHT: 166.5 LBS | DIASTOLIC BLOOD PRESSURE: 54 MMHG | RESPIRATION RATE: 18 BRPM | BODY MASS INDEX: 26.13 KG/M2

## 2021-09-02 LAB
ALBUMIN SERPL-MCNC: 3.3 G/DL (ref 3.4–5)
ANION GAP SERPL CALC-SCNC: 9 MMOL/L (ref 0–18)
BASOPHILS # BLD AUTO: 0.02 X10(3) UL (ref 0–0.2)
BASOPHILS NFR BLD AUTO: 0.3 %
BUN BLD-MCNC: 72 MG/DL (ref 7–18)
BUN/CREAT SERPL: 12 (ref 10–20)
CALCIUM BLD-MCNC: 8.6 MG/DL (ref 8.5–10.1)
CHLORIDE SERPL-SCNC: 95 MMOL/L (ref 98–112)
CO2 SERPL-SCNC: 28 MMOL/L (ref 21–32)
CREAT BLD-MCNC: 5.99 MG/DL
D DIMER PPP FEU-MCNC: 1.68 UG/ML FEU (ref ?–0.81)
DEPRECATED HBV CORE AB SER IA-ACNC: 860.7 NG/ML
DEPRECATED RDW RBC AUTO: 51.6 FL (ref 35.1–46.3)
EOSINOPHIL # BLD AUTO: 0.08 X10(3) UL (ref 0–0.7)
EOSINOPHIL NFR BLD AUTO: 1.3 %
ERYTHROCYTE [DISTWIDTH] IN BLOOD BY AUTOMATED COUNT: 14.3 % (ref 11–15)
GLUCOSE BLD-MCNC: 119 MG/DL (ref 70–99)
GLUCOSE BLDC GLUCOMTR-MCNC: 105 MG/DL (ref 70–99)
GLUCOSE BLDC GLUCOMTR-MCNC: 112 MG/DL (ref 70–99)
GLUCOSE BLDC GLUCOMTR-MCNC: 153 MG/DL (ref 70–99)
GLUCOSE BLDC GLUCOMTR-MCNC: 61 MG/DL (ref 70–99)
GLUCOSE BLDC GLUCOMTR-MCNC: 67 MG/DL (ref 70–99)
GLUCOSE BLDC GLUCOMTR-MCNC: 83 MG/DL (ref 70–99)
HCT VFR BLD AUTO: 24.8 %
HGB BLD-MCNC: 7.9 G/DL
IMM GRANULOCYTES # BLD AUTO: 0.04 X10(3) UL (ref 0–1)
IMM GRANULOCYTES NFR BLD: 0.6 %
INR BLD: 2.48 (ref 0.9–1.2)
LDH SERPL L TO P-CCNC: 231 U/L
LYMPHOCYTES # BLD AUTO: 1.16 X10(3) UL (ref 1–4)
LYMPHOCYTES NFR BLD AUTO: 18.7 %
MCH RBC QN AUTO: 34.2 PG (ref 26–34)
MCHC RBC AUTO-ENTMCNC: 31.9 G/DL (ref 31–37)
MCV RBC AUTO: 107.4 FL
MONOCYTES # BLD AUTO: 0.7 X10(3) UL (ref 0.1–1)
MONOCYTES NFR BLD AUTO: 11.3 %
NEUTROPHILS # BLD AUTO: 4.2 X10 (3) UL (ref 1.5–7.7)
NEUTROPHILS # BLD AUTO: 4.2 X10(3) UL (ref 1.5–7.7)
NEUTROPHILS NFR BLD AUTO: 67.8 %
OSMOLALITY SERPL CALC.SUM OF ELEC: 296 MOSM/KG (ref 275–295)
PHOSPHATE SERPL-MCNC: 3.3 MG/DL (ref 2.5–4.9)
PLATELET # BLD AUTO: 155 10(3)UL (ref 150–450)
POTASSIUM SERPL-SCNC: 4.9 MMOL/L (ref 3.5–5.1)
PROTHROMBIN TIME: 26.6 SECONDS (ref 11.8–14.5)
RBC # BLD AUTO: 2.31 X10(6)UL
SARS-COV-2 RNA RESP QL NAA+PROBE: NOT DETECTED
SODIUM SERPL-SCNC: 132 MMOL/L (ref 136–145)
WBC # BLD AUTO: 6.2 X10(3) UL (ref 4–11)

## 2021-09-02 PROCEDURE — 99239 HOSP IP/OBS DSCHRG MGMT >30: CPT | Performed by: INTERNAL MEDICINE

## 2021-09-02 PROCEDURE — 99233 SBSQ HOSP IP/OBS HIGH 50: CPT | Performed by: INTERNAL MEDICINE

## 2021-09-02 RX ORDER — PSEUDOEPHEDRINE HCL 30 MG
100 TABLET ORAL 2 TIMES DAILY PRN
Qty: 60 CAPSULE | Refills: 0 | Status: ON HOLD | OUTPATIENT
Start: 2021-09-02 | End: 2022-01-18

## 2021-09-02 RX ORDER — WARFARIN SODIUM 7.5 MG/1
7.5 TABLET ORAL NIGHTLY
Qty: 90 TABLET | Refills: 3 | Status: SHIPPED | OUTPATIENT
Start: 2021-09-02 | End: 2021-10-13

## 2021-09-02 RX ORDER — SUCRALFATE ORAL 1 G/10ML
1 SUSPENSION ORAL
Qty: 800 ML | Refills: 0 | Status: SHIPPED | OUTPATIENT
Start: 2021-09-02 | End: 2021-09-03

## 2021-09-02 RX ORDER — WARFARIN SODIUM 7.5 MG/1
7.5 TABLET ORAL NIGHTLY
Qty: 30 TABLET | Refills: 0 | Status: SHIPPED | OUTPATIENT
Start: 2021-09-02 | End: 2021-09-02

## 2021-09-02 RX ORDER — ATORVASTATIN CALCIUM 20 MG/1
40 TABLET, FILM COATED ORAL NIGHTLY
Qty: 90 TABLET | Refills: 3 | Status: SHIPPED | OUTPATIENT
Start: 2021-09-02 | End: 2021-12-14

## 2021-09-02 RX ORDER — SEVELAMER CARBONATE 800 MG/1
800 TABLET, FILM COATED ORAL
Qty: 270 TABLET | Refills: 3 | Status: SHIPPED | OUTPATIENT
Start: 2021-09-02

## 2021-09-02 RX ORDER — CALCITRIOL 0.25 UG/1
0.25 CAPSULE, LIQUID FILLED ORAL DAILY
Qty: 90 CAPSULE | Refills: 3 | Status: SHIPPED | OUTPATIENT
Start: 2021-09-02

## 2021-09-02 RX ORDER — MELATONIN
Qty: 30 TABLET | Refills: 0 | Status: SHIPPED | OUTPATIENT
Start: 2021-09-02 | End: 2021-09-02

## 2021-09-02 NOTE — OCCUPATIONAL THERAPY NOTE
OCCUPATIONAL THERAPY TREATMENT NOTE - INPATIENT        Room Number: 533/533-A           Presenting Problem: ARF    Problem List  Principal Problem:    Acute renal failure superimposed on chronic kidney disease, unspecified CKD stage, unspecified acute daysi participation, safety, and independence with basic ADL and everyday activities. Jose Enrique Monzon DISCHARGE RECOMMENDATIONS  OT Discharge Recommendations: Acute rehabilitation  OT Device Recommendations: TBD     PLAN  OT Treatment Plan: Balance activities; Energy cons minutes in prep for adls with cga   Comment: MET

## 2021-09-02 NOTE — HOME CARE LIAISON
Patient provided with list of [facility] providers from [address], patient choice is [address]. Agency reserved in [address] and contact information placed on AVS. Financial interest disclosure provided to patient. GUDELIA Solorzano updated.

## 2021-09-02 NOTE — PROGRESS NOTES
[facility]    Progress Note    Olga Correa Patient Status:  Inpatient    3/28/1940 MRN [id number]   [facility] 5SW/SE Attending Donavon Dance, MD   [facility] Day # 15 PCP Frankie Cedeno MD         Assessment a indicated at this time, tolerating renal diet  -on pantoprazole 40 mg po daily, carafate 1 gm po TID and at bedtime; plan stop Carafate after 1 month.   -Hgb stable at 7.5      Thrombocytopenia  Platelets 269>>443OH; HIPA negative     Acute on chronic syst warfarin 7.5 mg po at bedtime; stopped heparin gtt 8/30  - HR's 90's-100's, up to 160s with walking. Coreg 9.375mg BID was changed to metoprolol 25mg BID by cardiology due to borderline low BP's (now SBP 90's-110's).   HR      Acute gout of right deann declined Victoza Rx, or insulin  -currently diet-controlled  - on Levemir 8 units every morning, insulin sliding scale;  's-180's  –Diabetic/renal diet     Hypercholesterolemia  LDL 27 in Feb 2020 on Lipitor 40 mg po qHS  –Lipitor decreased to 20 mg (71.7 kg)        Constitutional: alert and oriented x3 in no acute distress  HEENT- EOMI, PERRL  Nose/Mouth/Throat: pharynx without erythema  Neck/Thyroid: neck supple; no thyromegaly  Cardiovascular: RRR, S1, S2, no S3 or murmur  Respiratory: lungs withou PRN  magnesium hydroxide (MILK OF MAGNESIA) 400 MG/5ML suspension 30 mL, 30 mL, Oral, Daily PRN  bisacodyl (DULCOLAX) rectal suppository 10 mg, 10 mg, Rectal, Daily PRN  Fleet Enema (FLEET) 7-19 GM/118ML enema 133 mL, 1 enema, Rectal, Once PRN  ondansetron

## 2021-09-02 NOTE — PLAN OF CARE
Problem: Patient Centered Care  Goal: Patient preferences are identified and integrated in the patient's plan of care  Description: Interventions:  - What would you like us to know as we care for you?  I live at home with my wife  - Provide timely, comple emergency measures for life threatening arrhythmias  - Monitor electrolytes and administer replacement therapy as ordered  Outcome: Progressing     Problem: RESPIRATORY - ADULT  Goal: Achieves optimal ventilation and oxygenation  Description: INTERVENTIONS imbalances  - Administer electrolyte replacement as ordered  - Monitor response to electrolyte replacements, including rhythm and repeat lab results as appropriate  - Fluid restriction as ordered  - Instruct patient on fluid and nutrition restrictions as a assistance  - Assess pain using appropriate pain scale  - Administer analgesics based on type and severity of pain and evaluate response  - Implement non-pharmacological measures as appropriate and evaluate response  - Consider cultural and social influenc services based on physician/LIP order or complex needs related to functional status, cognitive ability or social support system  Outcome: Progressing     No acute changes. PCR test pending. Gillis cath intact. Dialysis scheduled for today.  Remains on fluid

## 2021-09-02 NOTE — TELEPHONE ENCOUNTER
Patient wife Gisselle Braswell returning Dr. Sharma Later call. Asking how is patient getting home from hospital?  Does she need to pick him up or is he being brought home by ambulance. Patient will be discharged today.     Best call back number for Winifred [phone]

## 2021-09-02 NOTE — TELEPHONE ENCOUNTER
Dania Ruth, patient being discharged today. I advised 1-2 weeks follow-up with PT/INR    Currently on Coumadin 7.5 mg nightly, last INR 2.48 on 9/2    Was admitted for new start HD per Dr. Stephan Allen.  Of note COVID positive 8/30

## 2021-09-02 NOTE — PROGRESS NOTES
[facility]    Progress Note    Brunilda Saha Patient Status:  Inpatient    3/28/1940 MRN [id number]   [facility] 5SW/SE Attending Kaykay Zaragoza MD   [facility] Day # 15 PCP Naomie Banks MD       Subjective:   Chuy Teixeira present, no abnormal bruising noted  Back/Spine: no abnormalities noted  Musculoskeletal: full ROM all extremities good strength  no deformities  Extremities: no edema, cyanosis  Neurological:  Grossly normal    Results:     Laboratory Data:  Lab Results

## 2021-09-02 NOTE — PHYSICAL THERAPY NOTE
PHYSICAL THERAPY TREATMENT NOTE - INPATIENT     Room Number: 533/533-A       Presenting Problem: renal failure, fatigue, SOB, elevated troponin, Afib, dailysis    Problem List  Principal Problem:    Acute renal failure superimposed on chronic kidney diseas '6 clicks' Inpatient Basic Mobility Short Form. Research supports that patients with this level of impairment may benefit from Home with home health PT. RN aware of patient status post session.     DISCHARGE RECOMMENDATIONS  PT Discharge Recommendations: H by: 9/2/21  Patient Goal Patient's self-stated goal is: not stated   Goal #1 Patient is able to demonstrate supine - sit EOB @ level: modified independent      Goal #1   Current Status Supine to sit, SBA   Goal #2 Patient is able to demonstrate transfers S

## 2021-09-02 NOTE — PROGRESS NOTES
1st attempt at scheduling       [facility]  [address]  [phone]  [address]      Spoke with patients wife Winifred advised shed like a 10 or an a 11 o'clock appointment for a video visit yakelin

## 2021-09-03 ENCOUNTER — PATIENT OUTREACH (OUTPATIENT)
Dept: CASE MANAGEMENT | Age: 81
End: 2021-09-03

## 2021-09-03 ENCOUNTER — TELEPHONE (OUTPATIENT)
Dept: INTERNAL MEDICINE CLINIC | Facility: CLINIC | Age: 81
End: 2021-09-03

## 2021-09-03 DIAGNOSIS — N18.30 STAGE 3 CHRONIC KIDNEY DISEASE, UNSPECIFIED WHETHER STAGE 3A OR 3B CKD (HCC): ICD-10-CM

## 2021-09-03 DIAGNOSIS — I50.23 ACUTE ON CHRONIC SYSTOLIC CHF (CONGESTIVE HEART FAILURE) (HCC): Primary | ICD-10-CM

## 2021-09-03 DIAGNOSIS — I48.91 ATRIAL FIBRILLATION, UNSPECIFIED TYPE (HCC): ICD-10-CM

## 2021-09-03 DIAGNOSIS — I50.9 CHRONIC CONGESTIVE HEART FAILURE, UNSPECIFIED HEART FAILURE TYPE (HCC): ICD-10-CM

## 2021-09-03 DIAGNOSIS — Z02.9 ENCOUNTERS FOR UNSPECIFIED ADMINISTRATIVE PURPOSE: ICD-10-CM

## 2021-09-03 DIAGNOSIS — I10 PRIMARY HYPERTENSION: ICD-10-CM

## 2021-09-03 DIAGNOSIS — Z79.01 ANTICOAGULANT LONG-TERM USE: ICD-10-CM

## 2021-09-03 DIAGNOSIS — E11.9 DIET-CONTROLLED DIABETES MELLITUS (HCC): ICD-10-CM

## 2021-09-03 DIAGNOSIS — D64.9 ANEMIA, UNSPECIFIED TYPE: ICD-10-CM

## 2021-09-03 PROCEDURE — 1111F DSCHRG MED/CURRENT MED MERGE: CPT

## 2021-09-03 RX ORDER — SUCRALFATE 1 G/1
1 TABLET ORAL
Qty: 80 TABLET | Refills: 0 | Status: SHIPPED | OUTPATIENT
Start: 2021-09-03 | End: 2021-10-13

## 2021-09-03 NOTE — PROGRESS NOTES
Initial Post Discharge Follow Up   Discharge Date: 9/2/21  Contact Date: 9/3/2021    Consent Verification:  Assessment Completed With: Patient  HIPAA Verified?   Yes    Discharge Dx:     ESRD  Covid infection  Anemia   Melena  Thrombocytopenia  Acute on  no  • How well was your pain managed while in the hospital? N/A  • When you were leaving the hospital were your discharge instructions reviewed with you? yes  • How well were your discharge instructions explained to you?   o On a scale of 1-5   1- Very Poo directed:  STOP taking these medications      bumetanide 1 MG Tabs  Commonly known as: BUMEX          carvedilol 3.125 MG Tabs  Commonly known as: COREG          carvedilol 6.25 MG Tabs  Commonly known as: COREG          hydrALAZINE 10 MG Tabs  Commonly kn once daily 100 each 3   • ONETOUCH DELICA LANCETS 84A Does not apply Misc Use daily 100 each 3   • Glucose Blood (ONETOUCH VERIO) In Vitro Strip Use daily 100 strip 3   • Blood Glucose Calibration (ONETOUCH ULTRA CONTROL) In Vitro Solution Use as directed ordered at D/C? Yes   What? RIJ Perm-A-Cath  Have you received your (DME)? yes; The patient has continued deep breathing exercises as directed. The patient has an ICD placed and has a quad cane/Free Style Winchester Lite glucometer at home.         SDOH: pollution from heating and cooking with poor ventilation   no    Mold      no    Pets        no    Extreme heat no      Needs post D/C:   Now that you are home, are there any needs or concerns you need addressed before your next visit with your PCP?  (DME, with a focus on COVID/CHF/COPD management, Perm-a-cath sxs to monitor, BP log, medication changes, and fall precuations. All medications were reviewed and educated on the importance of taking the medications as directed.  A call was made to the pharmacy to

## 2021-09-03 NOTE — PLAN OF CARE
Problem: Patient Centered Care  Goal: Patient preferences are identified and integrated in the patient's plan of care  Description: Interventions:  - What would you like us to know as we care for you?  I live at home with my wife  - Provide timely, comple signs, obtain 12 lead EKG if indicated  - Evaluate effectiveness of antiarrhythmic and heart rate control medications as ordered  - Initiate emergency measures for life threatening arrhythmias  - Monitor electrolytes and administer replacement therapy as o needed  - Instruct patient on self management of diabetes  9/2/2021 2034 by Mark Feng RN  Outcome: Completed    Goal: Electrolytes maintained within normal limits  Description: INTERVENTIONS:  - Monitor labs and rhythm and assess patient for signs and alignment per provider's orders  - Instruct and reinforce with patient and family use of appropriate assistive device and precautions (e.g. spinal or hip dislocation precautions)  9/2/2021 2034 by Roscoe Sanchez RN  Outcome: Completed       Problem: PAIN - for needed discharge resources and transportation as appropriate  - Identify discharge learning needs (meds, wound care, etc)  - Arrange for interpreters to assist at discharge as needed  - Consider post-discharge preferences of patient/family/discharge pa

## 2021-09-03 NOTE — PLAN OF CARE
Pt in stable condition. Dialysis done and pt educated on fluid restriction and salt intake. All safety measures in place. Continue to monitor.      Problem: Patient Centered Care  Goal: Patient preferences are identified and integrated in the patient's plan signs, obtain 12 lead EKG if indicated  - Evaluate effectiveness of antiarrhythmic and heart rate control medications as ordered  - Initiate emergency measures for life threatening arrhythmias  - Monitor electrolytes and administer replacement therapy as o Progressing  Goal: Hemodynamic stability and optimal renal function maintained  Description: INTERVENTIONS:  - Monitor labs and assess for signs and symptoms of volume excess or deficit  - Monitor intake, output and patient weight  - Monitor urine specific

## 2021-09-03 NOTE — TELEPHONE ENCOUNTER
To Dr. Gamaliel Hdz----    Please see message below from TCM RN:  \"The pharmacist reported the following medications are not approved by the insurance:   calcitriol 0.25 MCG Oral Cap  Sig:   Take 1 capsule (0.25 mcg total) by mouth daily.   sucralfate 1 GM/10ML Oral

## 2021-09-03 NOTE — TELEPHONE ENCOUNTER
Appointment request: Spoke to the pt today for TCM to follow up on the recent hospitalization for COIVD/ESRD/Anemia/Melena. The patient does not have a virtual HFU appt scheduled at this time, and declined scheduling when offered by the [facility].  A virtual TCM/

## 2021-09-03 NOTE — PROGRESS NOTES
Pt hypoglycemic with blood sugar 61. MD notified. Per MD pt okay to discharge if blood sugar >80 after 1 hour of dinner. Protocol followed q15 minutes. Care endorsed to [facility].

## 2021-09-03 NOTE — PROGRESS NOTES
[facility]    Progress Note    Freddie Buys Patient Status:  Inpatient    3/28/1940 MRN [id number]   [facility] 5SW/SE Attending Óscar Dominique MD   [facility] Day # 13 PCP Kate Avilez MD       Subjective:   Emmanuel Rosario abnormalities noted  Musculoskeletal: full ROM all extremities good strength  no deformities  Extremities: no edema, cyanosis  Neurological:  Grossly normal    Results:     Laboratory Data:  Lab Results   Component Value Date    WBC 6.2 09/02/2021    HGB 7

## 2021-09-03 NOTE — DISCHARGE SUMMARY
[facility]    Discharge Summary    Sebastian Love Patient Status:  Inpatient    3/28/1940 MRN [id number]   Location [facility] 5SW/SE Attending No att. providers found   [facility] PCP Julienne Reddy MD     Date of Admi sounds, soft, non-tender and non-distended  Extremities: no clubbing, cyanosis or edema    Hospital Course:     Patient is an 25-year-old white male with history of chronic atrial fibrillation, chronic kidney disease, coronary artery disease with ischemic PPI his hemoglobin remained stable. He tolerated bridging from heparin back to his therapeutic Coumadin. PT/OT initially recommended acute rehab, however patient progressed well in subacute rehab as recommended.   Covid test was done to screen prior to protonix 40 mg po daily, and carafate 1 gm TID-AC and HS.  -Hgb 7.3>7.6> 7.9>7.5>7.9     Melena, resolved  Reported melanotic stool on 8/21.  Hx of AVMs noted on prior C-scope 6-8 mm compound AVM lesion at cecum that was not active at that time.   - FOBT po outpatient     Chronic atrial fibrillation  On metoprolol 25 mg po BID.  aspirin 81 mg daily; on coumadin 7.5 mg po at bedtime as outpt.   - s/p FFP x 2 due to oozing from RIJ as inpatient  - INR 2.48; on warfarin 7.5 mg po at bedtime; stopped heparin gtt 8 lungs; saw pulmonologist, Dr Annalisa Vargas; PFTs not consistent with COPD; he never took Combivent 1 p QID; Flovent stopped; on no meds;     Type II Diabetes mellitus, with kidney complication, CKD stage III, without insulin  accucheck ac/hs; A1c 7.1% in June nightly., Normal, Disp-90 tablet, R-3    calcitriol 0.25 MCG Oral Cap  Take 1 capsule (0.25 mcg total) by mouth daily. , Normal, Disp-90 capsule, R-3    docusate sodium 100 MG Oral Cap  Take 100 mg by mouth 2 (two) times daily as needed., Normal, Disp-60 ca CONTROL) In Vitro Solution  Use as directed, Normal, Disp-1 vial, R-11DX:  E11.9 non insulin using    aspirin 81 MG Oral Chew Tab  Chew 81 mg by mouth daily. , Historical    !! - Potential duplicate medications found. Please discuss with provider. phosphorus  - Coumadin 1 tablet, 7.5 mg nightly for blood thinning agent. This will be the new dose of your Coumadin  - Carafate 1 gram three times a day with meals to protect your stomach.  This is a temporary medication that can be stopped 9/19/2021 as lo

## 2021-09-03 NOTE — PROGRESS NOTES
2ND attempt     [facility]  [phone]  [address]  No availability that works with patients schedule         NICK Champion 7

## 2021-09-03 NOTE — TELEPHONE ENCOUNTER
Change carafate suspension to 1 gn tab po QID #80, no RF; ERx sent    ERx sent for metoprolol 25 mg po BID #60, 5RF

## 2021-09-06 NOTE — PAYOR COMM NOTE
Discharge Notification    Patient Name: Porter Quiroz  [employer] HMO  Subscriber #: [id number]  Authorization Number: [id number]  Admit Date/Time: 8/18/2021 10:40 AM  Discharge Date/Time: 9/2/2021 8:28 PM

## 2021-09-07 NOTE — PROGRESS NOTES
NCM checked patient chart to confirm [facility] start of care. The PCP was updated the start of care is scheduled for 9/8/2021.

## 2021-09-08 ENCOUNTER — TELEPHONE (OUTPATIENT)
Dept: INTERNAL MEDICINE CLINIC | Facility: CLINIC | Age: 81
End: 2021-09-08

## 2021-09-08 DIAGNOSIS — I48.20 CHRONIC ATRIAL FIBRILLATION (HCC): ICD-10-CM

## 2021-09-08 LAB — INR: 4 (ref 0.8–1.2)

## 2021-09-08 NOTE — TELEPHONE ENCOUNTER
Mikey Bosworth / [facility] calling with PT/INR results from 9/8/2021    PT 48.6  INR 4.0    Coumadin patient was supposed to be taking 7.5 mg  By mistake for the last 2 nights he took 1 1/2 tab of the 7.5 mg, he thought it was a 5 mg tab    Phone [phone]

## 2021-09-08 NOTE — TELEPHONE ENCOUNTER
Salima Rasheed / [facility] is calling to confirm patient has tested positive for Covid, his family is telling her he is negative    Also needs to clarify medications    Clarify dose of Carafate    Is patient still taking?  He doesn't have medications at home  Prot

## 2021-09-09 ENCOUNTER — TELEPHONE (OUTPATIENT)
Dept: INTERNAL MEDICINE CLINIC | Facility: CLINIC | Age: 81
End: 2021-09-09

## 2021-09-09 NOTE — TELEPHONE ENCOUNTER
Nathalia Cotton / [facility] is calling back to confirm if patient has tested positive for covid does he still need to be in isolation?

## 2021-09-09 NOTE — TELEPHONE ENCOUNTER
Called patient spoke with spouse alejandra dye and relayed DR. JJ message - verbalized understanding

## 2021-09-09 NOTE — TELEPHONE ENCOUNTER
[facility] called; RN not available; LMVM; re-check PT/INR in 3 day    Advise hold coumadin until further notice; please call pt

## 2021-09-09 NOTE — TELEPHONE ENCOUNTER
formerly [facility] for missed [facility] [phone]

## 2021-09-09 NOTE — TELEPHONE ENCOUNTER
LM on Bibi's/HH VM that 8/31/21 covid test was negative. Asked her to call back if pt has had + test recently. Advised we will call back with medication information below after hearing from Dr. Adam Gorss.     To Dr. Adam Gross

## 2021-09-10 ENCOUNTER — TELEPHONE (OUTPATIENT)
Dept: INTERNAL MEDICINE CLINIC | Facility: CLINIC | Age: 81
End: 2021-09-10

## 2021-09-10 NOTE — TELEPHONE ENCOUNTER
Kalpesh Hood from [facility] is calling to inform Dr. Abhi Henry that pt. Has an OT evaluation next Tuesday at 1:15pm   Routed to clinical ph.  # [phone]

## 2021-09-10 NOTE — TELEPHONE ENCOUNTER
Winifred is calling because she received a call from [facility] and they want to know why Claudine Canales was discharged from the hospital one week earlier they paid for three weeks. Please advise ph.  # [phone]  Routed to clinical

## 2021-09-12 ENCOUNTER — TELEPHONE (OUTPATIENT)
Dept: INTERNAL MEDICINE CLINIC | Facility: CLINIC | Age: 81
End: 2021-09-12

## 2021-09-14 LAB — INR: 1.3 (ref 0.8–1.2)

## 2021-09-14 NOTE — TELEPHONE ENCOUNTER
Please call home health to resume take 1 1/2 tablet today; then resume taking 1 tablet daily. Repeat INR on 9/17.      FYI to Dr. Zaire Diaz      Note to self:  9/8 INR 4 (pt had been taking 1 1/2 tabs of 7.5mg x 2 nights by mistake instead of 1 tab daily)--- advised to hold coumadin  9/12 INR 2.9 (hold coumadin)  9/14 INR 1.3

## 2021-09-14 NOTE — TELEPHONE ENCOUNTER
Dr. Ayden Patricia message relayed to [facility] RN ([phone]) who verbalized understanding.

## 2021-09-15 NOTE — TELEPHONE ENCOUNTER
Spoke to patient's wife Jaleesa Rea should be contacting our office. When [facility] called she did direct them to contact Dr. Nereida Noland for any information. Wife understood and states if [facility] calls again, will ask them to contact physician office.

## 2021-09-16 ENCOUNTER — TELEPHONE (OUTPATIENT)
Dept: INTERNAL MEDICINE CLINIC | Facility: CLINIC | Age: 81
End: 2021-09-16

## 2021-09-16 DIAGNOSIS — I48.20 CHRONIC ATRIAL FIBRILLATION (HCC): ICD-10-CM

## 2021-09-16 LAB — INR: 1.8 (ref 0.8–1.2)

## 2021-09-16 NOTE — TELEPHONE ENCOUNTER
Mayra Schofield from [facility] calling with INR and condition update. INR 1.8  17.8 seconds    BP has been running mid to upper 90s. Patient feels tired at times. Sunday patient stopped taking Sucralfate and   Sevelamer Carbonate do to constipation.     Mayra Schofield [phone]

## 2021-09-16 NOTE — TELEPHONE ENCOUNTER
LM---HHRN called--- subtherapeutic---pt s/p hosp and on ESRD dialysis;  some med changes (unlikely to change INR) see records;---resume prior steady dose and recheck 1 week  Per DR.O west: BP and meds; HHRN to Fwd to DR. Edel Mcdermott

## 2021-09-17 NOTE — TELEPHONE ENCOUNTER
Please call pt regarding Sucralfate and Sevelamer, these were started when patient in the hospital  They cause constipation and patient blood pressure drops after he eats. 110 in morning, drops 10-30 points after eating.  Pt also very fatigued, weak  Please call pt to discuss/advise  Tasked to nursing

## 2021-09-17 NOTE — TELEPHONE ENCOUNTER
I spoke with patient and relayed Dr. Chappell Fore message. He verbalized understanding. He says that when he is constipated he will take docusate and Senakot. He feel that these are working well for him. Explained that I would relay this to Dr. Dawna Laguna. Patient says he may call the nephrologist on Monday.

## 2021-09-17 NOTE — TELEPHONE ENCOUNTER
May stop sucralfate    Advise he continue Sevelamer as his nephrologist, Dr Anca Kessler, has prescribed this medication    Please call pt

## 2021-09-17 NOTE — TELEPHONE ENCOUNTER
Spoke to Jyothi Morton  He thinks Sucralfate and Sevelamer are causing him constipation and low BP    Says he checks his BP 12-15 times a day  In the morning when he first wakes its around 115/60, sometimes in the 90s. After he eats breakfast, systolic drops 50-83 points. He has had a number as low as 76, but usually it is mid [de-identified]. Says he has not taken any medication yet when he checks this BP. States he is not dizzy when low BP happens but feels very fatigued and weak, especially in his shoulders and arms. States he is walking well. Therapist told him he is doing well. No falls    States he has been taking brandin daily for years and did not have trouble with constipation. Now is also taking docusate sodium twice a day but is constipated. Passes very small amounts of stool at a time. Takes 7-8 times to pass all the stool.     Confirmed that he is taking metoprolol tartrate 25 mg BID    To Dr Cullen Franco to please advise

## 2021-09-17 NOTE — TELEPHONE ENCOUNTER
[facility] called  Patient declined scheduled OT session on Tuesday  Rescheduled for Monday, 9/20/21  Tasked to nursing as JoriBoy Putnam

## 2021-09-24 ENCOUNTER — TELEPHONE (OUTPATIENT)
Dept: NEPHROLOGY | Facility: CLINIC | Age: 81
End: 2021-09-24

## 2021-09-24 ENCOUNTER — TELEPHONE (OUTPATIENT)
Dept: INTERNAL MEDICINE CLINIC | Facility: CLINIC | Age: 81
End: 2021-09-24

## 2021-09-24 ENCOUNTER — TELEPHONE (OUTPATIENT)
Dept: GASTROENTEROLOGY | Facility: CLINIC | Age: 81
End: 2021-09-24

## 2021-09-24 DIAGNOSIS — R14.0 BLOATING: ICD-10-CM

## 2021-09-24 DIAGNOSIS — K59.00 CONSTIPATION, UNSPECIFIED CONSTIPATION TYPE: Primary | ICD-10-CM

## 2021-09-24 NOTE — TELEPHONE ENCOUNTER
Awaiting response from supervisor covering today to see if patient can be added to 1pm appointment with Dr. Kash Gottlieb.

## 2021-09-24 NOTE — TELEPHONE ENCOUNTER
Pt has bloating of intestines that is interfering with his breathing - he was just released from hospital - asking for appt today

## 2021-09-24 NOTE — TELEPHONE ENCOUNTER
Dr. Kinza Petty calling, patient has appointment Monday 9/27 at noon with Dr. Kinza Petty  Dx. Bloating, constipation    Asking for referral to be entered.

## 2021-09-24 NOTE — TELEPHONE ENCOUNTER
Noted.  Thank you Ever Anderson. Very complicated gentleman as you learned. I diagnosed him with that COVID infection last July! Could he come at 1 PM this coming Monday [facility]?     Please ask if he is still taking the iron pills, those can aggravate his symptoms

## 2021-09-24 NOTE — TELEPHONE ENCOUNTER
Patient requesting appointment to be seen for his constipation. Patient has dialysis on Tuesday, Wednesday and Saturday request not to be put on the schedule for those days. Please call at [phone],[phone].

## 2021-09-24 NOTE — TELEPHONE ENCOUNTER
Spoke with patient, pt was trying to get ahold of Dr. Luis M Knutson office to schedule an appt. Provided pt with their phone number to schedule a consultation.      Looking through the notes, Dr. Rosemary Castellon wrote Mariam Marcos he continue Rodney as his nephrologist,

## 2021-09-24 NOTE — TELEPHONE ENCOUNTER
Dr. Arian Garcia    Patient was recently discharged after being in the hospital for 2 weeks with kidney failure. He is now on dialysis. He reports that he has the same bloating that he had 1.5-2 years ago.   He reports that Dr. Sascha Zamudio told him to discuss wi

## 2021-09-24 NOTE — TELEPHONE ENCOUNTER
Patient contacted. I reviewed below complete message from Dr. Nathan Ugarte with the patient and he voiced understanding. He told me that he stopped iron 1 month ago. He accepted appointment with Dr. Nathan Ugarte on Monday.   However, he has an HMO and asked kassandra

## 2021-09-24 NOTE — TELEPHONE ENCOUNTER
To Dr. Desi Olson to please advise on referral-- I don't see previous referral for Dr. Gene Mccullough. Thanks!

## 2021-09-27 ENCOUNTER — OFFICE VISIT (OUTPATIENT)
Dept: GASTROENTEROLOGY | Facility: CLINIC | Age: 81
End: 2021-09-27
Payer: MEDICARE

## 2021-09-27 VITALS — HEART RATE: 118 BPM | DIASTOLIC BLOOD PRESSURE: 60 MMHG | SYSTOLIC BLOOD PRESSURE: 99 MMHG

## 2021-09-27 DIAGNOSIS — R14.0 BLOATING: ICD-10-CM

## 2021-09-27 DIAGNOSIS — D50.0 ANEMIA DUE TO CHRONIC BLOOD LOSS: Primary | ICD-10-CM

## 2021-09-27 DIAGNOSIS — Z99.2 ESRD (END STAGE RENAL DISEASE) ON DIALYSIS (HCC): ICD-10-CM

## 2021-09-27 DIAGNOSIS — Z79.01 ANTICOAGULANT LONG-TERM USE: ICD-10-CM

## 2021-09-27 DIAGNOSIS — K55.20 AVM (ARTERIOVENOUS MALFORMATION) OF COLON: ICD-10-CM

## 2021-09-27 DIAGNOSIS — N18.6 ESRD (END STAGE RENAL DISEASE) ON DIALYSIS (HCC): ICD-10-CM

## 2021-09-27 PROCEDURE — 3078F DIAST BP <80 MM HG: CPT | Performed by: INTERNAL MEDICINE

## 2021-09-27 PROCEDURE — 3074F SYST BP LT 130 MM HG: CPT | Performed by: INTERNAL MEDICINE

## 2021-09-27 PROCEDURE — 99215 OFFICE O/P EST HI 40 MIN: CPT | Performed by: INTERNAL MEDICINE

## 2021-09-27 NOTE — TELEPHONE ENCOUNTER
Spoke with Khalida Florian who will work on authorization now so patient does not have to cancel appt. Notified patient.

## 2021-09-27 NOTE — TELEPHONE ENCOUNTER
Spoke with Angelita Oh at [facility], she will forward this on to referral specialist.

## 2021-09-27 NOTE — PROGRESS NOTES
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 As per Dr. Marky Pompa consultation, the anemia is back. Unclear what to make of that with the severe acute on chronic renal failure and recent initiation of hemodialysis.   He continues on warfarin and aspirin 81 mg anticoagulation as 8/21/2021:      Assessment & Plan   Leobardo Robert is a a(n) 80year old male w/ a history of multiple medical problems including acute on chronic renal failure, heart disease, A. fib, anticoagulation use, who presents with renal failure, dark stools, epigastric abdominal pain.  Symptoms have become intolerable.     Initial labs in the ED show normocytic anemia with hemoglobin 7.7g, INR 3.74      s/p EGD examination 6/23 shows patchy inflammatory changes, enteropathy of small bowel, question of healing ADRENALS: No defined mass or abnormal enlargement.     KIDNEYS: No hydronephrosis or calculi. AORTA/VASCULAR:   Abdominal aorta is normal in caliber.    ABDOMINAL NODES: No mass or adenopathy.     BOWEL/MESENTERY: No dilated loops of bowel or definite a of the duodenum, on to the jejunum, pretty much advancing all the way up to the endoscope wheels. Retroflexion was performed in the stomach.      EGD FINDINGS:    Esophagus and GE junction: Normal esophagus and GE junction, Z line.     Stomach: Clear secre PROCEDURE:   After the nature and risks of colonoscopy examination under MAC anesthesia were discussed with the patient and all questions answered, informed consent was obtained.   The patient was sedated as above.       Digital rectal exam was performed wh night before this procedure with a diet of clear liquids and one bottle of magnesium citrate.   On the morning of the procedure, a dose of simethicone medication was administered and the patient was fitted with the recorder vest.  The Given capsule was acti detail with Mr. Burk. Limitations of forward-viewing colonoscopy examination including missed lesions behind folds, or in the cecum discussed.   I discussed observation alone on the pantoprazole and iron replacement therapy versus repeat colonoscop the entire length of the colon up to the cecum and terminal ileum. Retroflex exam performed up the ascending colon around the hepatic flexure across most of the transverse colon.   The cecum was confirmed by landmarks including appendiceal orifice, cecal t total) by mouth nightly.  90 tablet 3   • ALLOPURINOL 100 MG Oral Tab Take 1 tablet by mouth once daily 90 tablet 1   • Pantoprazole Sodium 40 MG Oral Tab EC Take 1 tablet (40 mg total) by mouth every morning before breakfast. 90 tablet 3   • Zinc 50 MG Ora events of 1 year ago with history type 2 diabetes complicated by diabetic nephropathy; heart disease with severe ischemic cardiomyopathy; chronic kidney disease, coronary artery disease possible cardiac pacemaker and atrial fibrillation on Warfarin/aspirin make of that with the severe acute on chronic renal failure and recent initiation of hemodialysis. He continues on warfarin and aspirin 81 mg anticoagulation as above.   Hemoglobin was up to 12.6g on 6/17/2021; then 8.4g day after admission 8/19/2021, fatoumata oral/enteral iron due to severe abdominal bloating, constipation symptoms  · If hospitalized, would administer more of the intravenous iron. · Discuss with Dr. Paola Carr whether iron to be administered during dialysis    3.   Chronic idiopathic constipation an

## 2021-09-27 NOTE — TELEPHONE ENCOUNTER
Pt.. called to check status on referral.  He has an appt. With GI today at 12:00 and his Charlene Yasmanii has to come in From [place] to bring him. Has the referral been authorized?

## 2021-09-27 NOTE — PATIENT INSTRUCTIONS
Plan from Dr Reji Skelton:    1. Stop the Sucralfate pills for now  2. Start over-the-counter \"Gas X\" pills 2-3 times per day for gas/bloating  3. Try adding one capful MIRALAX powder (in juice/water) per day for laxative  4.  Cut back to 2-3 pills per day of S

## 2021-09-28 NOTE — TELEPHONE ENCOUNTER
Patient is around. He has an appointment on 11/8/21 with PCP and has had many phone calls from other departments.

## 2021-09-29 ENCOUNTER — TELEPHONE (OUTPATIENT)
Dept: INTERNAL MEDICINE CLINIC | Facility: CLINIC | Age: 81
End: 2021-09-29

## 2021-09-29 DIAGNOSIS — I48.20 CHRONIC ATRIAL FIBRILLATION (HCC): ICD-10-CM

## 2021-09-29 NOTE — TELEPHONE ENCOUNTER
Haylee Mcfarlane. ... Carrie Plan, as I told you, I saw Mr. Burk in the office yesterday. I know him well from summer and autumn 2020. As you probably know, he is again severely anemic. I think that is aggravating his weakness and probably his heart failure. Last year, he seemed to respond really well to 1 inpatient + 8 outpatient iron infusions. Would it be safe, reasonable to give him iron infusions with his dialysis treatments?       You can read all about it in my encyclopaedic office note of 9/27/2021.    - cb

## 2021-09-29 NOTE — TELEPHONE ENCOUNTER
To Marlin Alexander to please advise, thanks! Also, I do not see repeat INR after low reading 9/16. Did you receive this?

## 2021-09-29 NOTE — TELEPHONE ENCOUNTER
Alva from Dr. Zaki Reyes's office called to speak with Jenaro Woodall. Pt. Will be having a AV Node Oblation on 10-11-21 with Dr. Lolis Coe. Patient will need to hold his warfarin for 3 days prior to procedure.   He may possibly resume the Warfarin on the evening of

## 2021-09-30 ENCOUNTER — TELEPHONE (OUTPATIENT)
Dept: GASTROENTEROLOGY | Facility: CLINIC | Age: 81
End: 2021-09-30

## 2021-09-30 NOTE — TELEPHONE ENCOUNTER
Spoke to [facility] [phone] - follow up INR missed and will be done tomorrow    Spoke to Carole - pt having AV ablation 10/11; will hold x3 prior and restart with 10 mg x1 and recheck INR in 1 week; will likely still have HHRN;  Will give pt instruction

## 2021-09-30 NOTE — TELEPHONE ENCOUNTER
Dr Safia Alcantara just seen on 09/27/21    Last CBC was 09/02/21. He would like to know the plan for taking iron and when you want blood work again.     Per your OV note 09/02/21:    Iron deficiency anemia due to chronic blood loss; history colonic AV

## 2021-09-30 NOTE — TELEPHONE ENCOUNTER
Hugo Loredo is calling back after not receiving a call back yesterday. Hugo Loredo was informed Sherron Lara was not in the office yesterday. Hugo Loredo is hoping for a call back today please.      Alva direct ph #[phone]

## 2021-10-04 ENCOUNTER — APPOINTMENT (OUTPATIENT)
Dept: GENERAL RADIOLOGY | Facility: HOSPITAL | Age: 81
DRG: 273 | End: 2021-10-04
Attending: EMERGENCY MEDICINE
Payer: MEDICARE

## 2021-10-04 ENCOUNTER — HOSPITAL ENCOUNTER (INPATIENT)
Facility: HOSPITAL | Age: 81
LOS: 9 days | Discharge: HOME HEALTH | DRG: 273 | End: 2021-10-13
Attending: EMERGENCY MEDICINE | Admitting: INTERNAL MEDICINE
Payer: MEDICARE

## 2021-10-04 DIAGNOSIS — R79.1 SUPRATHERAPEUTIC INR: ICD-10-CM

## 2021-10-04 DIAGNOSIS — I47.2 VENTRICULAR TACHYCARDIA (HCC): Primary | ICD-10-CM

## 2021-10-04 PROBLEM — I47.20 VENTRICULAR TACHYCARDIA (HCC): Status: ACTIVE | Noted: 2021-01-01

## 2021-10-04 PROCEDURE — 99223 1ST HOSP IP/OBS HIGH 75: CPT | Performed by: INTERNAL MEDICINE

## 2021-10-04 PROCEDURE — 71045 X-RAY EXAM CHEST 1 VIEW: CPT | Performed by: EMERGENCY MEDICINE

## 2021-10-04 RX ORDER — CALCITRIOL 0.25 UG/1
0.25 CAPSULE, LIQUID FILLED ORAL DAILY
Status: DISCONTINUED | OUTPATIENT
Start: 2021-10-05 | End: 2021-10-13

## 2021-10-04 RX ORDER — HEPARIN SODIUM 1000 [USP'U]/ML
1.5 INJECTION, SOLUTION INTRAVENOUS; SUBCUTANEOUS ONCE
Status: DISCONTINUED | OUTPATIENT
Start: 2021-10-04 | End: 2021-10-07

## 2021-10-04 RX ORDER — LORAZEPAM 2 MG/ML
INJECTION INTRAMUSCULAR
Status: DISPENSED
Start: 2021-10-04 | End: 2021-10-04

## 2021-10-04 RX ORDER — CALCIUM CARBONATE 200(500)MG
2000 TABLET,CHEWABLE ORAL NIGHTLY
Status: DISCONTINUED | OUTPATIENT
Start: 2021-10-05 | End: 2021-10-13

## 2021-10-04 RX ORDER — DOCUSATE SODIUM 100 MG/1
100 CAPSULE, LIQUID FILLED ORAL 2 TIMES DAILY PRN
Status: DISCONTINUED | OUTPATIENT
Start: 2021-10-04 | End: 2021-10-13

## 2021-10-04 RX ORDER — ATORVASTATIN CALCIUM 40 MG/1
40 TABLET, FILM COATED ORAL NIGHTLY
Status: DISCONTINUED | OUTPATIENT
Start: 2021-10-04 | End: 2021-10-13

## 2021-10-04 RX ORDER — ALBUMIN (HUMAN) 12.5 G/50ML
100 SOLUTION INTRAVENOUS AS NEEDED
Status: DISCONTINUED | OUTPATIENT
Start: 2021-10-04 | End: 2021-10-07

## 2021-10-04 RX ORDER — LORAZEPAM 2 MG/ML
1 INJECTION INTRAMUSCULAR ONCE
Status: COMPLETED | OUTPATIENT
Start: 2021-10-04 | End: 2021-10-04

## 2021-10-04 RX ORDER — METOPROLOL TARTRATE 5 MG/5ML
5 INJECTION INTRAVENOUS ONCE
Status: DISCONTINUED | OUTPATIENT
Start: 2021-10-04 | End: 2021-10-04

## 2021-10-04 RX ORDER — CALCIUM CARBONATE 200(500)MG
500 TABLET,CHEWABLE ORAL ONCE
Status: COMPLETED | OUTPATIENT
Start: 2021-10-04 | End: 2021-10-04

## 2021-10-04 RX ORDER — POTASSIUM CHLORIDE 20 MEQ/1
20 TABLET, EXTENDED RELEASE ORAL ONCE
Status: COMPLETED | OUTPATIENT
Start: 2021-10-04 | End: 2021-10-04

## 2021-10-04 RX ORDER — SEVELAMER CARBONATE 800 MG/1
800 TABLET, FILM COATED ORAL
Status: DISCONTINUED | OUTPATIENT
Start: 2021-10-04 | End: 2021-10-12

## 2021-10-04 RX ORDER — PANTOPRAZOLE SODIUM 40 MG/1
40 TABLET, DELAYED RELEASE ORAL
Status: DISCONTINUED | OUTPATIENT
Start: 2021-10-05 | End: 2021-10-13

## 2021-10-04 RX ORDER — ASPIRIN 81 MG/1
81 TABLET, CHEWABLE ORAL DAILY
Status: DISCONTINUED | OUTPATIENT
Start: 2021-10-05 | End: 2021-10-13

## 2021-10-04 RX ORDER — CALCIUM CARBONATE 200(500)MG
1500 TABLET,CHEWABLE ORAL NIGHTLY
Status: DISCONTINUED | OUTPATIENT
Start: 2021-10-04 | End: 2021-10-04

## 2021-10-04 RX ORDER — SODIUM CHLORIDE 9 MG/ML
INJECTION, SOLUTION INTRAVENOUS CONTINUOUS
Status: DISCONTINUED | OUTPATIENT
Start: 2021-10-04 | End: 2021-10-07

## 2021-10-04 NOTE — ED PROVIDER NOTES
Patient Seen in: [facility] AND [facility] Emergency Department      History   Patient presents with:  Arrythmia/Palpitations    Stated Complaint: vtach    Subjective:   HPI  77-year-old male with diabetes, CAD status post CABG in 1995 and subsequent PCI, ischem infarction) [facility]) 2008    cardiologist Dr Yan Pro at [facility]   • Renal disorder    • S/P cholecystectomy 2005    [facility]   • Shortness of breath               Past Surgical History:   Procedure Laterality Date   • CABG     • CHOLECYSTECTOMY     • COLONOSCO Pulmonary:      Effort: Pulmonary effort is normal.      Breath sounds: Normal breath sounds. Abdominal:      General: There is no distension. Palpations: Abdomen is soft. Tenderness: There is no abdominal tenderness.    Musculoskeletal: - Abnormal; Notable for the following components:    HDL Cholesterol 39 (*)     All other components within normal limits   CBC W/ DIFFERENTIAL - Abnormal; Notable for the following components:    RBC 2.96 (*)     HGB 9.5 (*)     HCT 30.3 (*)     . 4 evaluated by EP Dr. Samantha Walsh who adjusted his AICD settings. We will plan for admission to the hospital for further management. In addition his Coumadin is supratherapeutic without active bleeding.   He will be given small dose vitamin K and Coumadin will be

## 2021-10-04 NOTE — TELEPHONE ENCOUNTER
Steven Dudley, unfortunately Mr. Burk is again hospitalized, now in the ICU. He was having multiple runs of ventricular tachycardia and his ICD was going off yesterday and this morning. He is very sick.     The plan which you saw is for him to receive

## 2021-10-04 NOTE — TELEPHONE ENCOUNTER
Kelly Smith from [facility] called. Pt. Was taken to the ED today. He was having pacemaker problems.

## 2021-10-04 NOTE — ED INITIAL ASSESSMENT (HPI)
Per medics pt was having chest pain all day yesterday but him and his family decided not to go to the ER at that time because they were on an outing with their family. Then this morning pt's defibrillator went off several times.

## 2021-10-04 NOTE — CONSULTS
[facility] NOTE    Freddie Buys Patient Status:  Emergency    3/28/1940 MRN [id number]   Location [address] Attending Buster Pratt MD   Hosp Day # 0 PCP Kate Avilez MD     Date 9.5    Recommendations:    IV bolus of amiodarone and then drip 1 mg/min  Monitor blood pressure for hypotension  ICD reprogrammed as described  ICU monitoring  Continue cardiac medications  Keep n.p.o. at midnight for possible AV node ablation tomorrow  V VT up to 230 beats a minute more ATP attempts were given and it was successful although one time it was not a 36 and then 40 J shock had to be delivered.   We then increased to the pacing ATP from 85 to 80% increase the number of attempts and left scanning High blood pressure    • High cholesterol    • Hypercholesteremia    • MI (myocardial infarction) [facility]) 2008    cardiologist Dr Rama Roblero at [facility]   • Renal disorder    • S/P cholecystectomy 2005    [facility]   • Shortness of breath        Past Surgical Hist any unusual skin lesions or rashes  RESPIRATORY: denies shortness of breath with exertion  CARDIOVASCULAR: no active chest pain, no claudication  GI: denies abdominal pain and denies heartburn  : no dysuria or hematuria  NEURO: denies headaches, focal we 09/02/2021    TROP 0.242 () 10/04/2021     07/10/2020         Thank you for allowing me to participate in the care of your patient.     Total critical care time for this patient was 60 minutes exclusive of separately billable procedures, but in obta

## 2021-10-05 ENCOUNTER — APPOINTMENT (OUTPATIENT)
Dept: INTERVENTIONAL RADIOLOGY/VASCULAR | Facility: HOSPITAL | Age: 81
DRG: 273 | End: 2021-10-05
Attending: INTERNAL MEDICINE
Payer: MEDICARE

## 2021-10-05 ENCOUNTER — APPOINTMENT (OUTPATIENT)
Dept: GENERAL RADIOLOGY | Facility: HOSPITAL | Age: 81
DRG: 273 | End: 2021-10-05
Attending: INTERNAL MEDICINE
Payer: MEDICARE

## 2021-10-05 PROCEDURE — 02583ZZ DESTRUCTION OF CONDUCTION MECHANISM, PERCUTANEOUS APPROACH: ICD-10-PCS | Performed by: INTERNAL MEDICINE

## 2021-10-05 PROCEDURE — 71045 X-RAY EXAM CHEST 1 VIEW: CPT | Performed by: INTERNAL MEDICINE

## 2021-10-05 PROCEDURE — 5A0945A ASSISTANCE WITH RESPIRATORY VENTILATION, 24-96 CONSECUTIVE HOURS, HIGH NASAL FLOW/VELOCITY: ICD-10-PCS | Performed by: INTERNAL MEDICINE

## 2021-10-05 PROCEDURE — 99222 1ST HOSP IP/OBS MODERATE 55: CPT | Performed by: INTERNAL MEDICINE

## 2021-10-05 PROCEDURE — 4B02XTZ MEASUREMENT OF CARDIAC DEFIBRILLATOR, EXTERNAL APPROACH: ICD-10-PCS | Performed by: INTERNAL MEDICINE

## 2021-10-05 PROCEDURE — 99233 SBSQ HOSP IP/OBS HIGH 50: CPT | Performed by: INTERNAL MEDICINE

## 2021-10-05 PROCEDURE — 5A1D70Z PERFORMANCE OF URINARY FILTRATION, INTERMITTENT, LESS THAN 6 HOURS PER DAY: ICD-10-PCS | Performed by: INTERNAL MEDICINE

## 2021-10-05 RX ORDER — DEXTROSE MONOHYDRATE 25 G/50ML
50 INJECTION, SOLUTION INTRAVENOUS
Status: DISCONTINUED | OUTPATIENT
Start: 2021-10-05 | End: 2021-10-13

## 2021-10-05 RX ORDER — MIDAZOLAM HYDROCHLORIDE 1 MG/ML
INJECTION INTRAMUSCULAR; INTRAVENOUS
Status: DISCONTINUED
Start: 2021-10-05 | End: 2021-10-05 | Stop reason: WASHOUT

## 2021-10-05 RX ORDER — ALBUMIN (HUMAN) 12.5 G/50ML
25 SOLUTION INTRAVENOUS
Status: DISCONTINUED | OUTPATIENT
Start: 2021-10-05 | End: 2021-10-13

## 2021-10-05 RX ORDER — DIPHENHYDRAMINE HYDROCHLORIDE 50 MG/ML
INJECTION INTRAMUSCULAR; INTRAVENOUS
Status: COMPLETED
Start: 2021-10-05 | End: 2021-10-05

## 2021-10-05 RX ORDER — GUAIFENESIN/DEXTROMETHORPHAN 100-10MG/5
100 SYRUP ORAL EVERY 4 HOURS PRN
Status: DISCONTINUED | OUTPATIENT
Start: 2021-10-05 | End: 2021-10-13

## 2021-10-05 RX ORDER — MIDODRINE HYDROCHLORIDE 5 MG/1
5 TABLET ORAL 3 TIMES DAILY PRN
Status: DISCONTINUED | OUTPATIENT
Start: 2021-10-05 | End: 2021-10-12

## 2021-10-05 RX ORDER — LIDOCAINE HYDROCHLORIDE 20 MG/ML
INJECTION, SOLUTION EPIDURAL; INFILTRATION; INTRACAUDAL; PERINEURAL
Status: COMPLETED
Start: 2021-10-05 | End: 2021-10-05

## 2021-10-05 RX ORDER — AMIODARONE HYDROCHLORIDE 200 MG/1
400 TABLET ORAL 2 TIMES DAILY WITH MEALS
Status: DISPENSED | OUTPATIENT
Start: 2021-10-05 | End: 2021-10-13

## 2021-10-05 RX ORDER — HEPARIN SODIUM 1000 [USP'U]/ML
1.5 INJECTION, SOLUTION INTRAVENOUS; SUBCUTANEOUS ONCE
Status: COMPLETED | OUTPATIENT
Start: 2021-10-05 | End: 2021-10-05

## 2021-10-05 NOTE — PROCEDURES
Procedures performed:  1. RFA of AV node  2. Biv ICD interrogation with reprogramming.     : Monique Pham MD    Indication: Chronic AF with difficult to control rates, intolerance to medicines; resulting CHF, recurrent VT and icd shock, also with

## 2021-10-05 NOTE — DIETARY NOTE
ADULT NUTRITION INITIAL ASSESSMENT    Pt is at moderate nutrition risk. Pt meets moderate malnutrition criteria.       CRITERIA FOR MALNUTRITION DIAGNOSIS:  Criteria for non-severe malnutrition diagnosis: chronic illness related to body fat mild depletion without trying. Indicating 11.7% wt loss.      FOOD/NUTRITION RELATED HISTORY:  Appetite: Fair  Intake: N/A  Intake Meeting Needs: No, but oral nutrition supplements (ONS) to maximize  Percent Meals Eaten (last 3 days)     None         Food Allergies: No Kn Encounters:  10/04/21 : 68 kg (150 lb)  09/02/21 : 75.5 kg (166 lb 8 oz)  07/19/21 : 71.7 kg (158 lb)  07/07/21 : 71.7 kg (158 lb)  12/15/20 : 69.4 kg (153 lb)  11/17/20 : 69.4 kg (153 lb)  10/01/20 : 72.6 kg (160 lb)  09/08/20 : 68.9 kg (152 lb)  08/27/20 monitor nutrition status      Lanette Jefferson  MS, [address] [facility], C/ Anupam Sandhu

## 2021-10-05 NOTE — PROGRESS NOTES
[address] NOTE      Wu Reilly Patient Status:  Inpatient    3/28/1940 MRN [id number]   [facility] 2W/SW Attending Xavier Bejarano MD   Hosp Day # 1 PCP Robyn Mederos MD         Assessmen 9.3     Recommendations:     IV amiodarone to 0.5 mg/min, then dc 3pm  Load po amio 400 bid x 1 week, then 400 every day  Vit K 2.5 mg today  Monitor blood pressure for hypotension  ICD reprogrammed as described  Ok trf to floor after iv amio  Continue car 0.8 10/05/2021    TP 6.8 10/05/2021    AST 2,676 (H) 10/05/2021    ALT 1,680 (H) 10/05/2021    PTT 94.3 (H) 08/31/2021    INR 3.72 (H) 10/05/2021    PT 41.6 (H) 04/18/2016    T4F 1.1 10/05/2021    TSH 3.470 10/05/2021    PSA 3.4 12/12/2018    DDIMER 1.68 (

## 2021-10-05 NOTE — H&P
[facility] HOSP - [facility]    History and Physical    Freddie Buys Patient Status:  Inpatient    3/28/1940 MRN [id number]   [facility] 2W/SW Attending Óscar Dominique MD   Hosp Day # 1 PCP Kate Avilez MD     Date:  10/5/2 He was also noted to have elevation of troponin.     Nephrology was consulted with ultrasound showing no obstructive uropathy. He was started on a bicarb drip and hyperkalemia was corrected medically.   Cardiology was consulted and elevated troponins were kidney disease)     creatinine 1.8 in Nov 2015; nephrologist Dr Angela Evans   • Congestive heart disease [facility])    • Diabetes [facility])    • Diabetes mellitus ([facility]    • Diverticulosis     colonoscopy 3/10/15 by GI Dr Donald Buchanan   • Floaters 6/21/2016   • Go Tab EC, Take 1 tablet (40 mg total) by mouth every morning before breakfast.  aspirin 81 MG Oral Chew Tab, Chew 81 mg by mouth daily. sucralfate 1 g Oral Tab, Take 1 tablet (1 g total) by mouth 4 (four) times daily before meals and nightly.  (Patient not (H) 10/05/2021    BILT 0.8 10/05/2021    TP 6.8 10/05/2021    AST 2,676 (H) 10/05/2021    ALT 1,680 (H) 10/05/2021    PTT 94.3 (H) 08/31/2021    INR 3.72 (H) 10/05/2021    PT 41.6 (H) 04/18/2016    T4F 1.1 10/05/2021    TSH 3.470 10/05/2021    PSA 3.4 12/1 cont to follow closely    Acute on chronic systolic CHF  CAD with ischemic cardiomyopathy  S/p 3v CABG in 1995, s/p stent x 3 in 2008  TARIQ in Aug 2019 with LVEF 25-30%, hypokinesis of inferior area, mod MR;  LVEF closer to 20% on CATH, with chronic CAD on currently asymptomatic; ECHO in Aug 2021 shows EF 40-45% ; not on ACEI due to CKD;  Rx per Dr Siddharth Molina     Mild aortic stenosis  As seen on ECHO in Aug 2121        **Certification      PHYSICIAN Certification of Need for Inpatient Hospitalization - Initial

## 2021-10-05 NOTE — PLAN OF CARE
Patient went for ablation this AM, accessed through the right groin. Dressing clean dry and intact. Ethel Pina has been hypotensive throughout the day. At 1400, bp dropped to 70's/40's. Cardiology consulted and received verbal order for 250 bolus.  Given with m

## 2021-10-05 NOTE — PROGRESS NOTES
[place] [facility]  Nephrology Daily Progress Note    Iona Elizondo  [id number]  80year old      HPI:   Iona Elizondo is a 80year old male. Back from cath lab s/p ablation. Weak but awake and talking.    No CP or SOB but BP low 80's age    Labs:  Lab Results   Component Value Date    WBC 17.4 10/05/2021    HGB 9.3 10/05/2021    HCT 29.7 10/05/2021    .0 10/05/2021    CREATSERUM 7.78 10/05/2021    BUN 96 10/05/2021     10/05/2021    K 4.7 10/05/2021    CL 96 10/05/2021 BY** Amiodarone HCl (CORDARONE) 450 mg in dextrose 5 % 250 mL infusion, 0.5 mg/min, Intravenous, Continuous **FOLLOWED BY** [DISCONTINUED] Amiodarone HCl (CORDARONE) 450 mg in dextrose 5 % 250 mL infusion, 0.5 mg/min, Intravenous, Continuous  •  0.9% NaCl insulin ([facility]     Macular degeneration     Hypercholesteremia     Ischemic cardiomyopathy     Coronary artery disease involving native coronary artery of native heart without angina pectoris     Erectile dysfunction     Chronic atrial fibrillation ([facility] 10/5/2021  Sheridan Astorga MD

## 2021-10-05 NOTE — PAYOR COMM NOTE
--------------  ADMISSION REVIEW     Cinthia Hameed MA [facility]  Subscriber #:  [id number]  Authorization Number: [id number]    Admit date: 10/4/21  Admit time:  2:37 PM       REVIEW DOCUMENTATION:    CARDIOLOGY NOTE        Ventricular tachycardia  Multiple ATP ep amiodarone to 0.5 mg/min, then dc 3pm  Load po amio 400 bid x 1 week, then 400 every day  Vit K 2.5 mg today  Monitor blood pressure for hypotension  ICD reprogrammed as described  Ok trf to floor after iv amio  Continue cardiac medications  Until tomorrow s/p 3v CABG 1995   • CAD (coronary artery disease)     s/p stent x3 in 2008   • CHF (congestive heart failure) ([facility] 2014    occurred when off Bumex   • CKD (chronic kidney disease)     creatinine 1.8 in Nov 2015; nephrologist Dr Roxy Preston 68 kg   SpO2 98%   BMI 23.49 kg/m²         Physical Exam  Vitals and nursing note reviewed. Constitutional:       Appearance: He is well-developed. HENT:      Head: Normocephalic and atraumatic. Nose: Nose normal.      Mouth/Throat:      Mouth:  Mu PANEL (7) - Abnormal; Notable for the following components:    Albumin 2.8 (*)     All other components within normal limits   TROPONIN I - Abnormal; Notable for the following components:    Troponin 0.242 (*)     All other components within normal limits changes. 4. Atherosclerosis. 5. Scarring/atelectasis. 6. Hyperinflation. 7. Demineralization. 8. Scoliosis. 9. Osteoarthritis. 10. Right rotator cuff tear. 11. Catheter in superior vena cava. 12. Defibrillator. 13. Gastric distention.     Dictated by (CST): Prescribed:  Current Discharge Medication List                          Hospital Problems             Present on Admission  Date Reviewed: 9/28/2021          ICD-10-CM Noted POA    * (Principal) Ventricular tachycardia ([facility] I47.2 10/4/2021 Unknown consulted and pt was admitted for further eval and treatment. Of note, the pt was recently seen in the office by EP Dr. Lindsey Pretty on 9/29/21 for increasing VT and CHF sx, as well as uncontrolled AF.   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       History     Past Medical History:   Diagnosis Date   • Age Never used    Alcohol use: Not Currently      Alcohol/week: 0.0 standard drinks    Drug use: No    Allergies/Medications: Allergies: No Known Allergies  metoprolol tartrate 25 MG Oral Tab, Take 1 tablet (25 mg total) by mouth 2 (two) times daily.   calcit Temporal, resp. rate 22, height 5' 7\" (1.702 m), weight 150 lb (68 kg), SpO2 93 %.   Physical Exam  Gen: Pt A&O x 3, NAD  CV: RRR normal S1S2  Lungs: bibasilar crackles  Abd: soft, NT, NT, NABS  Ext: trace bilateral LE edema distally (L>R)    Results: INR down to 3.72.     ESRD on hemodialysis [facility])  Receives HD on TTS per Dr. Michael Shoemaker on HD 8/19/21    Anemia   s/p Venofer infusion once per week at [facility]fusion [facility] with goal Hgb >10  on EPO 10,000 U TuThSat per Dr. Desi Vazquez  On [facility] Diabetes mellitus, with kidney complication, CKD stage III, without insulin  -currently diet-controlled  -HgbA1c 6.8 (8/18/21)  –Diabetic/renal diet; glucose 158 today; cont SS insulin     Hypercholesterolemia  -hold Lipitor due to elevated transaminases Mustapha Martinez, RN      calcitriol (ROCALTROL) cap 0.25 mcg     Date Action Dose Route User    10/5/2021 1016 Given  Oral Matilde Vanegas, SAGRARIO      calcium carbonate antacid (TUMS) chewable tab 1,500 mg     Date Action Dose Route User    10/4/2021 10/05/21 0100 — 96 29 84/61 93 % — None (Room air) —     10/05/21 0015 — 95 31 89/74 95 % — None (Room air) —     10/05/21 0000 97.2 °F (36.2 °C) 96 21 82/57 98 % — None (Room air) —     10/04/21 2300 — 92 29 103/64 — — — —     10/04/21 2200 — 6

## 2021-10-06 ENCOUNTER — APPOINTMENT (OUTPATIENT)
Dept: CV DIAGNOSTICS | Facility: HOSPITAL | Age: 81
DRG: 273 | End: 2021-10-06
Attending: INTERNAL MEDICINE
Payer: MEDICARE

## 2021-10-06 ENCOUNTER — APPOINTMENT (OUTPATIENT)
Dept: GENERAL RADIOLOGY | Facility: HOSPITAL | Age: 81
DRG: 273 | End: 2021-10-06
Attending: INTERNAL MEDICINE
Payer: MEDICARE

## 2021-10-06 PROCEDURE — 71045 X-RAY EXAM CHEST 1 VIEW: CPT | Performed by: INTERNAL MEDICINE

## 2021-10-06 PROCEDURE — 99233 SBSQ HOSP IP/OBS HIGH 50: CPT | Performed by: INTERNAL MEDICINE

## 2021-10-06 PROCEDURE — 93306 TTE W/DOPPLER COMPLETE: CPT | Performed by: INTERNAL MEDICINE

## 2021-10-06 RX ORDER — HEPARIN SODIUM 1000 [USP'U]/ML
1.5 INJECTION, SOLUTION INTRAVENOUS; SUBCUTANEOUS ONCE
Status: DISCONTINUED | OUTPATIENT
Start: 2021-10-06 | End: 2021-10-07

## 2021-10-06 RX ORDER — ALBUMIN (HUMAN) 12.5 G/50ML
100 SOLUTION INTRAVENOUS AS NEEDED
Status: DISCONTINUED | OUTPATIENT
Start: 2021-10-06 | End: 2021-10-07

## 2021-10-06 NOTE — PROGRESS NOTES
[facility]    Progress Note    Yokodonnie Sky Patient Status:  Inpatient    3/28/1940 MRN [id number]   Location [facility] 2W/SW Attending Colton Redd MD   Hosp Day # 2 PCP Umer Andrea MD       Subjective:   [facility] extremities good strength  no deformities  Extremities: Trace edema  Neurological:  Grossly normal    Results:     Laboratory Data:  Lab Results   Component Value Date    WBC 14.4 (H) 10/06/2021    HGB 8.9 (L) 10/06/2021    HCT 28.7 (L) 10/06/2021    PLT 9 yesterday remove 2 L as tolerated per request of cardiology      #2 V tach had an ablation yesterday  AV node is on amiodarone    #3 anemia is on Epogen iron levels okay    #4 heart failure we will remove fluid to help volume status today  Remains on 40% o

## 2021-10-06 NOTE — PLAN OF CARE
Problem: CARDIOVASCULAR - ADULT  Goal: Maintains optimal cardiac output and hemodynamic stability  Description: INTERVENTIONS:  - Monitor vital signs, rhythm, and trends  - Monitor for bleeding, hypotension and signs of decreased cardiac output  - Evalua based on patient's tolerance. No patient placed upright in bed in chair position.

## 2021-10-06 NOTE — PROGRESS NOTES
Shirley Swanson Patient Status:  Inpatient    3/28/1940 MRN [id number]   [facility] 2W/SW Attending Colton Redd MD   Hosp Day # 2 PCP Umer Andrea MD         Assessment and Plan:       Ventricular tachycardia  Mul may be related to amiodarone that was started last week  Trend INR is after vitamin K and amiodarone     Anemia  Hemoglobin 9.5, now 9.3     Recommendations:     Continue p.o. amiodarone  Recommend additional dialysis  Repeat echocardiogram    Rex Hoang, TP 6.8 10/06/2021    AST 2,365 (H) 10/06/2021    ALT 2,295 (H) 10/06/2021    PTT 94.3 (H) 08/31/2021    INR 3.90 (H) 10/06/2021    PT 41.6 (H) 04/18/2016    T4F 1.1 10/05/2021    TSH 3.470 10/05/2021    PSA 3.4 12/12/2018    DDIMER 1.68 (H) 09/02/2021    C

## 2021-10-06 NOTE — PLAN OF CARE
Problem: Patient Centered Care  Goal: Patient preferences are identified and integrated in the patient's plan of care  Description: Interventions:  - What would you like us to know as we care for you?   - Provide timely, complete, and accurate informatio Monitor electrolytes and administer replacement therapy as ordered  Outcome: Progressing     Problem: RESPIRATORY - ADULT  Goal: Achieves optimal ventilation and oxygenation  Description: INTERVENTIONS:  - Assess for changes in respiratory status  - Assess

## 2021-10-06 NOTE — CONSULTS
Critical Care H&P/Consult     NAME: Jodee Salgado - ROOM: 682544-A - MRN: [id number] - Age: 80year old - :  3/28/1940    Date of Admission: 10/4/2021  8:53 AM  Admission Diagnosis: Ventricular tachycardia ([address] [I47.2]  Supratherapeutic INR [R79 amiodarone 200mg daily by his cardiologist.  Then just prior to admission started to have shocks delivered from his PPM for detected VT.   After admission to hospital he was started on IV amiodarone infusion and then rapidly had deterioration in oxygenation Years since quittin.4      Smokeless tobacco: Never Used    Alcohol use: Not Currently      Alcohol/week: 0.0 standard drinks    Family History:  He indicated that the status of his mother is unknown.  He indicated that the status of his father is unkn sodium chloride 0.9% 50 mL IVPB, 2 mg, Intravenous, Once  [COMPLETED] phytonadione (VITAMIN K) oral syringe 5 mg, 5 mg, Oral, Once  0.9% NaCl infusion, , Intravenous, Continuous  aspirin chewable tab 81 mg, 81 mg, Oral, Daily  [Held by provider] atorvastat (base>apex), +cough/congestion, no distress   Chest wall: No tenderness or deformity. Heart: Regular rate and rhythm, normal S1S2, no murmur. Abdomen: soft, non-tender, non-distended, positive BS. Extremity: No clubbing or cyanosis.  Trace LE edema

## 2021-10-06 NOTE — PROGRESS NOTES
[facility]    Progress Note    Clau Arredondo Patient Status:  Inpatient    3/28/1940 MRN [id number]   Location [facility] 2W/SW Attending Sandro Harrison MD   Hosp Day # 2 PCP Sead Campa MD         Assessment and cardiomyopathy  S/p 3v CABG in 1995, s/p stent x 3 in 2008  TARIQ in Aug 2019 with LVEF 25-30%, hypokinesis of inferior area, mod MR;  LVEF closer to 20% on CATH, with chronic CAD on 11/2/17; unable to afford Entresto 24/26 one tab po BID, so med was changed due to CKD;  Rx per Dr Jacy Berkowitz     Mild aortic stenosis  As seen on ECHO in Aug 2021        SUBJECTIVE:    On HF oxygen 50 LPM; mild cough; no CP; no nausea        OBJECTIVE:  PHYSICAL EXAM:     Vital signs in last 24 hours:  BP 96/55 (BP Location: Right a mL, 100 mg, Oral, Q4H PRN  Amiodarone HCl (CORDARONE) 150 mg in dextrose 5 % 100 mL bolus, 150 mg, Intravenous, Once  0.9% NaCl infusion, , Intravenous, Continuous  aspirin chewable tab 81 mg, 81 mg, Oral, Daily  [Held by provider] atorvastatin (LIPITOR) t mild-moderate interstitial edema. No significant pleural effusion. 2. Left basilar atelectasis/scarring. 3. Sternotomy. 4. Left chest wall dual lead pacemaker/AICD.    Dictated by (CST): Nikole Weeks MD on 10/05/2021 at 9:03 PM     Finalized by (CST): S

## 2021-10-07 PROCEDURE — 99233 SBSQ HOSP IP/OBS HIGH 50: CPT | Performed by: INTERNAL MEDICINE

## 2021-10-07 PROCEDURE — 99232 SBSQ HOSP IP/OBS MODERATE 35: CPT | Performed by: INTERNAL MEDICINE

## 2021-10-07 RX ORDER — POTASSIUM CHLORIDE 750 MG/1
10 TABLET, EXTENDED RELEASE ORAL ONCE
Status: DISCONTINUED | OUTPATIENT
Start: 2021-10-07 | End: 2021-10-13

## 2021-10-07 RX ORDER — HEPARIN SODIUM 1000 [USP'U]/ML
1.5 INJECTION, SOLUTION INTRAVENOUS; SUBCUTANEOUS ONCE
Status: COMPLETED | OUTPATIENT
Start: 2021-10-07 | End: 2021-10-08

## 2021-10-07 RX ORDER — ALBUMIN (HUMAN) 12.5 G/50ML
100 SOLUTION INTRAVENOUS AS NEEDED
Status: DISCONTINUED | OUTPATIENT
Start: 2021-10-07 | End: 2021-10-13

## 2021-10-07 NOTE — PROGRESS NOTES
[facility]    Progress Note    Daisy Perales Patient Status:  Inpatient    3/28/1940 MRN [id number]   [facility] 2W/SW Attending Catrachito Castelan MD   Hosp Day # 3 PCP Marivel Barajas MD       SUBJECTIVE:  Alanna Ariza Finalized by (CST): Enrrique Parker MD on 10/06/2021 at 12:43 PM          XR CHEST AP PORTABLE  (CPT=71045)    Result Date: 10/5/2021  CONCLUSION:  1.  Cardiomegaly with central pulmonary vascular congestion and probable mild-moderate interstitial edema thickened    leaflets. Moderate to severe regurgitation. 3. Left atrium: The atrium was severely dilated. 4. Right ventricle: The cavity size was mildly dilated. Pacer wire    or catheter noted in right ventricle. RV systolic pressure (S,    est): 53mm Hg. stenosis. Stenosis was probably underestimated due to poor left ventricular function. No regurgitation. Valve area (VTI): 1.15cm^2. Indexed valve area (VTI): 0.64cm^2/m^2. Peak velocity ratio of LVOT to aortic valve: 0.37. Valve area (Vmax): 1.28cm^2. cm       8.2        ---------  A4C  LV PW thickness, ED            0.8   cm       0.9        0.6 - 1.0  IVS/LV PW ratio, ED            1.25           1          ---------  LV end-diastolic               097   ml       155        62 - 150  volume, 2-p  LV 1.28  cm^2     ---------- ---------  peak velocity  Aortic valve area/bsa,         0.71  cm^2/m^2 ---------- ---------  peak velocity  Velocity ratio, mean,          0.35           ---------- ---------  LVOT/AV   Aorta                          Valu discharge  heparin sodium 1000 UNIT/ML injection 1,500 Units, 1.5 mL, Intravenous, Once  Albumin Human (ALBUMINAR) 25 % solution 100 mL, 100 mL, Intravenous, PRN  amiodarone (PACERONE) tab 400 mg, 400 mg, Oral, BID with meals  Albumin Human (ALBUMINAR) 25 Eric/ Jo     Permanent AF w/RVR  Started on amiodarone 200mg/day on 9/29/21 by Dr. Lolis Coe.  Transiently on amio gtt in ED.  now on amiodarone 400 mg po BID.  On coumadin.  TSH normal     Acute hypoxemic respiratory failure  PCXR 10/5/21 shows cardiomegaly intermittent hypotension for past 2 months.  Possibly due to VT/ICD shocks? Will cont to follow closely     Thrombocytopenia  Platelets 09E>30P; (decreased from 210K); etiology? Unlikely 2/2 protonix as he was taking at home.   Check HIPA     Chronic obstr

## 2021-10-07 NOTE — PLAN OF CARE
Patient remained comfortable today and tolerated being titrated off oxygen. He ambulated to bathroom and sat in chair stating he felt good there. He is to transfer to the telemetry unit this evening and might have dialysis in the evening as well.  Wife at b RESPIRATORY - ADULT  Goal: Achieves optimal ventilation and oxygenation  Description: INTERVENTIONS:  - Assess for changes in respiratory status  - Assess for changes in mentation and behavior  - Position to facilitate oxygenation and minimize respiratory

## 2021-10-07 NOTE — PROGRESS NOTES
Pulmonary Progress Note     Assessment / Plan:  1.  Acute hypoxic respiratory failure in the setting of pulmonary edema  - Supplemental oxygen, wean as tolerated; currently 5L  - Doubt amiodarone toxicity as length of time and dose (200mg/day) not typical

## 2021-10-07 NOTE — CM/SW NOTE
10/07/21 1100   CM/SW Referral Data   Referral Source Physician   Reason for Referral Discharge planning   Informant Patient; Spouse/Significant Other   Patient Info   Patient's Current Mental Status at Time of Assessment Alert; Oriented   Patient Commu

## 2021-10-07 NOTE — PROGRESS NOTES
[address]      Chief complaint:  ICD shocks    Subjective:  So much better - no pain or dyspnea     Objective:  BP 95/57 (BP Location: Right arm)   Pulse 72   Temp 97.6 °F (36.4 °C) (Temporal)   Resp 18   Ht 5' 7\" (1.702 m)   Wt 150 JVD  Cardiac: S1 S2 regular rate rhythm with systolic murmur  Lungs: crackles left base  Abdomen: Soft, nontender, nondistended  Extremities: no clubbing cyanosis or edema  Skin: warm, dry right groin soft/dry    • insulin detemir  5 Units Subcutaneous Nig Electrophysiology  10/7/2021

## 2021-10-07 NOTE — PROGRESS NOTES
[facility]    Progress Note    Debra Torre Patient Status:  Inpatient    3/28/1940 MRN [id number]   Location [facility] 2W/SW Attending Wendy Plummer MD   Hosp Day # 3 PCP Sharon Hugo MD       Subjective:   [facility] abnormalities noted  Musculoskeletal: full ROM all extremities good strength  no deformities  Extremities: no edema, cyanosis  Neurological:  Grossly normal    Results:     Laboratory Data:  Lab Results   Component Value Date    WBC 10.4 10/07/2021    HGB oral amiodarone had a few beats of V. tach today    #2 ESRD plan dialysis tomorrow remove 1/2 L as tolerated  #3 anemia Epogen ordered    #4 low potassium replaced  Transfer to floor discussed with nurse patient and wife           10/7/2021  Alessia Rajan.  Coh

## 2021-10-07 NOTE — PLAN OF CARE
Problem: CARDIOVASCULAR - ADULT  Goal: Maintains optimal cardiac output and hemodynamic stability  Description: INTERVENTIONS:  - Monitor vital signs, rhythm, and trends  - Monitor for bleeding, hypotension and signs of decreased cardiac output  - Evalua Patient's cough remains present but is improved. Patient looking forward to chest PT. Has requested cough medication less frequently.

## 2021-10-08 ENCOUNTER — TELEPHONE (OUTPATIENT)
Dept: INTERNAL MEDICINE CLINIC | Facility: CLINIC | Age: 81
End: 2021-10-08

## 2021-10-08 ENCOUNTER — APPOINTMENT (OUTPATIENT)
Dept: GENERAL RADIOLOGY | Facility: HOSPITAL | Age: 81
DRG: 273 | End: 2021-10-08
Attending: STUDENT IN AN ORGANIZED HEALTH CARE EDUCATION/TRAINING PROGRAM
Payer: MEDICARE

## 2021-10-08 PROCEDURE — 99233 SBSQ HOSP IP/OBS HIGH 50: CPT | Performed by: INTERNAL MEDICINE

## 2021-10-08 PROCEDURE — 71045 X-RAY EXAM CHEST 1 VIEW: CPT | Performed by: STUDENT IN AN ORGANIZED HEALTH CARE EDUCATION/TRAINING PROGRAM

## 2021-10-08 RX ORDER — AZITHROMYCIN 250 MG/1
500 TABLET, FILM COATED ORAL
Status: DISCONTINUED | OUTPATIENT
Start: 2021-10-08 | End: 2021-10-12

## 2021-10-08 RX ORDER — HEPARIN SODIUM 1000 [USP'U]/ML
INJECTION, SOLUTION INTRAVENOUS; SUBCUTANEOUS
Status: COMPLETED
Start: 2021-10-08 | End: 2021-10-08

## 2021-10-08 NOTE — TELEPHONE ENCOUNTER
Please call [facility] at [phone]  Requesting verbal order  Will Dr Jefferson Carcamo follow and sign Home Health orders?   Start of care is Monday, 10/11/21  Tasked to nursing

## 2021-10-08 NOTE — PROGRESS NOTES
[facility]    Progress Note    Sae Newby Patient Status:  Inpatient    3/28/1940 MRN [id number]   Location [facility] 3W/SW Attending Magy Islas MD   [address] Day # 4 PCP Mariam Byrd MD       Subjective:   [facility] noted  Musculoskeletal: full ROM all extremities good strength  no deformities  Extremities: no edema, cyanosis  Neurological:  Grossly normal    Results:     Laboratory Data:  Lab Results   Component Value Date    WBC 12.2 (H) 10/08/2021    HGB 8.5 (L) 10

## 2021-10-08 NOTE — PLAN OF CARE
Double RN skin check done prior to transfer off Unit. Skin check performed by this RN and Joe Ayala. Wounds are as follows: redness to buttocks but blanching, dry flaking skin. Will remain available for any further questions or concerns.      Pt. Will b

## 2021-10-08 NOTE — TELEPHONE ENCOUNTER
Provided Ernestina Cowart with Verbal OK     She is asking if patient can be seen sooner that 11/8; I informed her he is currently IP at [facility] and we could fax her the DC summary once he is out of the hospital. She thanks us for this and will be in touch.

## 2021-10-08 NOTE — PROGRESS NOTES
[facility]    Progress Note    Freddie Buys Patient Status:  Inpatient    3/28/1940 MRN [id number]   [facility] 2W/SW Attending Óscar Dominique MD   [facility] Day # 4 PCP Kate Avilez MD         Assessment and today     Anemia   s/p Venofer infusion once per week at [facility]fusion center at [facility] [place] with goal Hgb >10  on EPO 10,000 U TuThSat per Dr. Karina Solis  On Protonix 40mg/day; carafate stopped recently  Iron levels normal; Fe 115, TIBC, 203, iron sat 57%  Hgb  7 oxygen 3 L NC; +SOB; no CP        OBJECTIVE:  PHYSICAL EXAM:     Vital signs in last 24 hours:  /58 (BP Location: Left arm)   Pulse 68   Temp 97.2 °F (36.2 °C)   Resp 26   Ht 5' 7\" (1.702 m)   Wt 150 lb (68 kg)   SpO2 95%   BMI 23.49 kg/m²     Intak Min PRN   Or  glucose-vitamin C (DEX-4) chewable tab 4 tablet, 4 tablet, Oral, Q15 Min PRN   Or  dextrose 50 % injection 50 mL, 50 mL, Intravenous, Q15 Min PRN   Or  glucose (DEX4) oral liquid 30 g, 30 g, Oral, Q15 Min PRN   Or  glucose-vitamin C (DEX-4) c Mild cardiomegaly and mild pulmonary vascular congestion. Negative for focal consolidation, pleural effusion, or pneumothorax. No significant interval change.     Dictated by (CST): Magdy Ayala MD on 10/08/2021 at 7:19 AM     Finalized by (CST): Eric

## 2021-10-08 NOTE — PHYSICAL THERAPY NOTE
Attempted to see pt for PT evaluation X2 today. In AM pt was receiving dialysis. In PM pt refused reporting he was extremely fatigued. Will re-attempt PT evaluation tomorrow as able/appropriate.     Rosa Herman, [address] [facility]

## 2021-10-08 NOTE — PROGRESS NOTES
[address] NOTE      Trino Franco Patient Status:  Inpatient    3/28/1940 MRN [id number]   [facility] 3W/ Attending Jarret Spears MD   Hosp Day # 4 PCP Lawrence Pardo MD         Assessmen metoprolol tartrate  25 mg Oral BID   • pantoprazole  40 mg Oral QAM AC   • Sevelamer  800 mg Oral TID CC   • Insulin Aspart Pen  1-7 Units Subcutaneous TID CC   • calcium carbonate antacid  2,000 mg Oral Nightly       Continuous Infusions:     Results:

## 2021-10-09 ENCOUNTER — APPOINTMENT (OUTPATIENT)
Dept: CT IMAGING | Facility: HOSPITAL | Age: 81
DRG: 273 | End: 2021-10-09
Attending: INTERNAL MEDICINE
Payer: MEDICARE

## 2021-10-09 PROCEDURE — 99232 SBSQ HOSP IP/OBS MODERATE 35: CPT | Performed by: INTERNAL MEDICINE

## 2021-10-09 PROCEDURE — 99233 SBSQ HOSP IP/OBS HIGH 50: CPT | Performed by: INTERNAL MEDICINE

## 2021-10-09 PROCEDURE — 71250 CT THORAX DX C-: CPT | Performed by: INTERNAL MEDICINE

## 2021-10-09 RX ORDER — WARFARIN SODIUM 5 MG/1
5 TABLET ORAL
Status: COMPLETED | OUTPATIENT
Start: 2021-10-09 | End: 2021-10-09

## 2021-10-09 RX ORDER — HEPARIN SODIUM 1000 [USP'U]/ML
1.5 INJECTION, SOLUTION INTRAVENOUS; SUBCUTANEOUS ONCE
Status: COMPLETED | OUTPATIENT
Start: 2021-10-09 | End: 2021-10-10

## 2021-10-09 NOTE — PLAN OF CARE
Problem: Patient Centered Care  Goal: Patient preferences are identified and integrated in the patient's plan of care  Description: Interventions:  - What would you like us to know as we care for you?   - Provide timely, complete, and accurate informatio administer replacement therapy as ordered  Outcome: Progressing     Problem: RESPIRATORY - ADULT  Goal: Achieves optimal ventilation and oxygenation  Description: INTERVENTIONS:  - Assess for changes in respiratory status  - Assess for changes in mentation

## 2021-10-09 NOTE — PROGRESS NOTES
Duyen [address] NOTE  Delle Party Patient Status:  Inpatient    3/28/1940 MRN [id number]   [facility]  At Warm and dry.      Labs:  Lab Results   Component Value Date    WBC 10.6 10/09/2021    HGB 8.4 10/09/2021    HCT 26.6 10/09/2021    PLT 80.0 10/09/2021     Lab Results   Component Value Date    PT 41.6 (H) 04/18/2016    INR 2.40 (H) 10/09/2021     Lab Resul

## 2021-10-09 NOTE — PROGRESS NOTES
[facility]    Progress Note    Cleveland Correa Patient Status:  Inpatient    3/28/1940 MRN [id number]   Location [facility] 3W/SW Attending Donavon Dance, MD   Hosp Day # 5 PCP Frankie Cedeno MD         Assessment and additional UF 10/6 due to fluid overload; HD today    Anemia   s/p Venofer infusion once per week at [facility] with goal Hgb >10  on EPO 10,000 U TuThSat per Dr. Peterson Letters  On Protonix 40mg/day; carafate stopped recently  Iron levels nor wife at bedside           SUBJECTIVE:     Appears weak; on oxygen 3 L NC; +SOB; no CP           OBJECTIVE:  PHYSICAL EXAM:     Vital signs in last 24 hours:  BP (!) 89/50 (BP Location: Left arm)   Pulse 90   Temp 97.7 °F (36.5 °C) (Oral)   Resp 24   Ht 5' Oral, Q15 Min PRN   Or  glucose-vitamin C (DEX-4) chewable tab 4 tablet, 4 tablet, Oral, Q15 Min PRN   Or  dextrose 50 % injection 50 mL, 50 mL, Intravenous, Q15 Min PRN   Or  glucose (DEX4) oral liquid 30 g, 30 g, Oral, Q15 Min PRN   Or  glucose-vitamin C AICD/pacemaker in place. Mild cardiomegaly and mild pulmonary vascular congestion. Negative for focal consolidation, pleural effusion, or pneumothorax. No significant interval change.     Dictated by (CST): Elbert Calvo MD on 10/08/2021 at 7:19 AM     F

## 2021-10-09 NOTE — PROGRESS NOTES
Petty Barger Patient Status:  Inpatient    3/28/1940 MRN [id number]   [facility] 3W/SW Attending Melany Lorenzo MD   Hosp Day # 5 PCP Denise Ma MD     SUBJECTIVE: Pt complains of productive cough with thick yellow mu glucose-vitamin C (DEX-4) chewable tab 8 tablet, 8 tablet, Oral, Q15 Min PRN  •  guaiFENesin-DM (ROBITUSSIN DM) 100-10 MG/5ML syrup 5 mL, 100 mg, Oral, Q4H PRN  •  aspirin chewable tab 81 mg, 81 mg, Oral, Daily  •  [Held by provider] atorvastatin (LIPITOR) Results   Component Value Date    PT 41.6 (H) 04/18/2016    INR 2.40 (H) 10/09/2021    INR 3.54 (H) 10/08/2021    INR 3.35 (H) 10/07/2021          Imaging: I have independently visualized all relevant chest imaging in PACS.   I agree with the radiology inte

## 2021-10-09 NOTE — PROGRESS NOTES
[facility] - [facility]    Progress Note      Subjective:     Patient lying comfortably - receiving chest PT       Review of Systems:     Constitutional: negative for fatigue, fevers and weight loss  Eyes: negative for irritation, redness and visual d 10 mEq, 10 mEq, Oral, Once  Albumin Human (ALBUMINAR) 25 % solution 100 mL, 100 mL, Intravenous, PRN  melatonin cap/tab 5 mg, 5 mg, Oral, Nightly PRN  influenza vaccine (PF) (FLUZONE HD) high dose for 65 yrs & older inj 0.7ml, 0.7 mL, Intramuscular, Prior BUN 73* 99* 65*   CREATSERUM 6.41* 7.44* 5.44*   GFRAA 9* 7* 10*   GFRNAA 7* 6* 9*   CA 9.4 9.2 9.1   ALB 3.9 3.4 3.3*    134* 139   K 3.6 3.9 3.4*    100 100   CO2 22.0 20.0* 26.0   ALKPHO 136* 142* 132*   * 458* 215*   ALT 1,424* 1,1 10/08/2021 at 7:21 AM                Assessment and Plan:      1. ESRD on HD:   - HD yesterday - tolerated well   - next HD tomm - will give albumin to help UF   - phos acceptable  - albumin low normal   - monitor BP - low this am    -anemia - on jaylyn    2.

## 2021-10-09 NOTE — OCCUPATIONAL THERAPY NOTE
OCCUPATIONAL THERAPY EVALUATION - INPATIENT     Room Number: 306/306-A  Evaluation Date: 10/9/2021  Type of Evaluation: Initial       Physician Order: IP Consult to Occupational Therapy  Reason for Therapy: ADL/IADL Dysfunction and Discharge Planning    OC PT/OT  OT Device Recommendations: TBD    PLAN  OT Treatment Plan: Energy conservation/work simplification techniques; ADL training; Functional transfer training; Endurance training; Patient/Family education; Patient/Family training;  Compensatory technique • COLONOSCOPY N/A 9/8/2020    Procedure: COLONOSCOPY;  Surgeon: Mao Vega MD;  [facility]        HOME SITUATION  Type of Home: House  Home Layout: Two level  Lives With: Spouse  Toilet and Equipment: Standard height toilet  S Goals  Patients self stated goal is: to return home     Patient will complete functional transfer with SPV   Comment:     Patient will complete toileting with MI  Comment:     Patient will tolerate standing for 3-5 minutes in prep for adls with SPV   Dimas

## 2021-10-10 PROCEDURE — 99232 SBSQ HOSP IP/OBS MODERATE 35: CPT | Performed by: INTERNAL MEDICINE

## 2021-10-10 PROCEDURE — 99233 SBSQ HOSP IP/OBS HIGH 50: CPT | Performed by: INTERNAL MEDICINE

## 2021-10-10 RX ORDER — POLYETHYLENE GLYCOL 3350 17 G/17G
17 POWDER, FOR SOLUTION ORAL DAILY
Status: DISCONTINUED | OUTPATIENT
Start: 2021-10-10 | End: 2021-10-13

## 2021-10-10 NOTE — PROGRESS NOTES
[facility]    Progress Note    Trino Franco Patient Status:  Inpatient    3/28/1940 MRN [id number]   Location [facility] 3W/SW Attending Jarret Spears MD   [facility] Day [facility] PCP Lawrence Pardo MD         Assessment and today     Anemia   s/p Venofer infusion once per week at [facility]fusion center at [facility] [place] with goal Hgb >10  on EPO 10,000 U TuThSat per Dr. Oumar Dunham  On Protonix 40mg/day; carafate stopped recently  Iron levels normal; Fe 115, TIBC, 203, iron sat 57%  Hgb  7 assess     D/w pt, wife at bedside           SUBJECTIVE:     Appears weak; on oxygen 3 L NC; +SOB; no CP           OBJECTIVE:  PHYSICAL EXAM:     Vital signs in last 24 hours:  BP 94/63 (BP Location: Right arm)   Pulse 72   Temp 98.3 °F (36.8 °C) (Oral) Sat  midodrine (PROAMATINE) tab 5 mg, 5 mg, Oral, TID PRN  glucose (DEX4) oral liquid 15 g, 15 g, Oral, Q15 Min PRN   Or  glucose-vitamin C (DEX-4) chewable tab 4 tablet, 4 tablet, Oral, Q15 Min PRN   Or  dextrose 50 % injection 50 mL, 50 mL, Intravenous, left lower lobe adjacent consolidation which may reflect adjacent passive atelectasis with mild superimposed pneumonia not excluded.   Additional minimal patchy subpleural consolidation in the posterior right upper lobe also potentially reflecting an area o

## 2021-10-10 NOTE — PROGRESS NOTES
Darek Ivey Patient Status:  Inpatient    3/28/1940 MRN [id number]   [facility] 3W/SW Attending Meryle Raveling, MD   Hosp Day # 6 PCP Tana Saini MD     SUBJECTIVE: Pt states that he was up all night coughing.     [address] chewable tab 8 tablet, 8 tablet, Oral, Q15 Min PRN  •  guaiFENesin-DM (ROBITUSSIN DM) 100-10 MG/5ML syrup 5 mL, 100 mg, Oral, Q4H PRN  •  aspirin chewable tab 81 mg, 81 mg, Oral, Daily  •  [Held by provider] atorvastatin (LIPITOR) tab 40 mg, 40 mg, Oral, N Imaging: I have independently visualized all relevant chest imaging in PACS. I agree with the radiology interpretation except where noted. ASSESSMENT/PLAN:  1. Acute hypoxic respiratory failure in the setting of pulmonary edema now with ? XIOMARA, le

## 2021-10-10 NOTE — PROGRESS NOTES
[facility] - [facility]     Cardiology Progress Note    Subjective:  No chest pain or shortness of breath.     Objective:  BP 94/63 (BP Location: Right arm)   Pulse 72   Temp 98.3 °F (36.8 °C) (Oral)   Resp 20   Ht 170.2 cm (5' 7\")   Wt 144 lb 9.6 oz (6 amiodarone load. · HD today per nephrology     BELINDA Greenwood  10/10/2021  12:50 PM    CARDIOLOGY ATTENDING    Note reviewed. Patient in dialysis currently. Chart reviewed. Rhythm stable on current therapy. Mostly paced rhythm.     Valvular he

## 2021-10-10 NOTE — PHYSICAL THERAPY NOTE
PT order received and chart reviewed. Patient has been hypotensive and was up coughing all night. Currently gone for dialysis. Will check back tomorrow pending availability.

## 2021-10-10 NOTE — PROGRESS NOTES
[facility] - [facility]    Progress Note      Subjective:     Patient sitting comfortably - s/p midodrine    Finished his BF    Wife at bedside    Improve leg swelling and sob    Review of Systems:     Constitutional: negative for fatigue, fevers and chloride (K-DUR M20) CR tab 10 mEq, 10 mEq, Oral, Once  Albumin Human (ALBUMINAR) 25 % solution 100 mL, 100 mL, Intravenous, PRN  melatonin cap/tab 5 mg, 5 mg, Oral, Nightly PRN  influenza vaccine (PF) (FLUZONE HD) high dose for 65 yrs & older inj 0.7ml, 0 10/09/21  0744   * 175* 130*   BUN 73* 99* 65*   CREATSERUM 6.41* 7.44* 5.44*   GFRAA 9* 7* 10*   GFRNAA 7* 6* 9*   CA 9.4 9.2 9.1   ALB 3.9 3.4 3.3*    134* 139   K 3.6 3.9 3.4*    100 100   CO2 22.0 20.0* 26.0   ALKPHO 136* 142* 132* an area of atelectasis possibly minimal pneumonia. Mild bilateral multifocal reticulonodular opacity which also may be infectious/inflammatory in etiology. Consider short-term follow-up chest CT in 3 months. Lesser incidental findings as above.       Dic

## 2021-10-10 NOTE — PLAN OF CARE
Blood pressure still on the lower side (80's-90's). Held Metoprolol last night. Patient has no complaints of dizziness, palpitation or chest pain. For dialysis today.      Problem: Patient Centered Care  Goal: Patient preferences are identified and integrat facilitate oxygenation and minimize respiratory effort  - Oxygen supplementation based on oxygen saturation or ABGs  - Provide Smoking Cessation handout, if applicable  - Encourage broncho-pulmonary hygiene including cough, deep breathe, Incentive Spiromet

## 2021-10-10 NOTE — PLAN OF CARE
3L O2/HFNC. Noted some shortness of breath with exertion. Encouraged deep breathing and coughing. Noted wet cough. Cough medicine given as needed. HD today as ordered. Administered Midodrine this morning prior to HD treatment.  Pre - HD, BP was reported at

## 2021-10-10 NOTE — TELEPHONE ENCOUNTER
Pt. Is requesting a referral for Dr. Sherri Cam for a follow up pt. Has an appt. 09/03 [facility] ph. # [phone]   Dr. Gisselle Boudreaux fax.  # [phone]  Routed high to clinical There are no Wet Read(s) to document.

## 2021-10-11 ENCOUNTER — HOSPITAL ENCOUNTER (OUTPATIENT)
Dept: INTERVENTIONAL RADIOLOGY/VASCULAR | Facility: HOSPITAL | Age: 81
Discharge: HOME | End: 2021-10-11
Attending: INTERNAL MEDICINE
Payer: MEDICARE

## 2021-10-11 PROCEDURE — 99233 SBSQ HOSP IP/OBS HIGH 50: CPT | Performed by: INTERNAL MEDICINE

## 2021-10-11 PROCEDURE — 99232 SBSQ HOSP IP/OBS MODERATE 35: CPT | Performed by: INTERNAL MEDICINE

## 2021-10-11 RX ORDER — HEPARIN SODIUM 1000 [USP'U]/ML
1.5 INJECTION, SOLUTION INTRAVENOUS; SUBCUTANEOUS ONCE
Status: COMPLETED | OUTPATIENT
Start: 2021-10-11 | End: 2021-10-12

## 2021-10-11 RX ORDER — HYDROCORTISONE 25 MG/G
CREAM TOPICAL 2 TIMES DAILY
Status: DISCONTINUED | OUTPATIENT
Start: 2021-10-11 | End: 2021-10-13

## 2021-10-11 RX ORDER — ALBUMIN (HUMAN) 12.5 G/50ML
100 SOLUTION INTRAVENOUS AS NEEDED
Status: DISCONTINUED | OUTPATIENT
Start: 2021-10-11 | End: 2021-10-13

## 2021-10-11 RX ORDER — WARFARIN SODIUM 5 MG/1
5 TABLET ORAL NIGHTLY
Status: DISCONTINUED | OUTPATIENT
Start: 2021-10-11 | End: 2021-10-13

## 2021-10-11 NOTE — PLAN OF CARE
RN O2 Eval    Patient's O2 sat on room air is 88 % at rest. Pt's O2 sat on room is 84% when ambulating, and 90% on 2liter while ambulating.

## 2021-10-11 NOTE — PLAN OF CARE
Patient is alert and oriented. CO constipation. Daily miralax ordered, effective. Dialysis Pt (T, TH, Sa). Remains on 3L of oxygen.    Problem: Patient Centered Care  Goal: Patient preferences are identified and integrated in the patient's plan of care  Hoang Continuous cardiac monitoring, monitor vital signs, obtain 12 lead EKG if indicated  - Evaluate effectiveness of antiarrhythmic and heart rate control medications as ordered  - Initiate emergency measures for life threatening arrhythmias  - Monitor electro

## 2021-10-11 NOTE — PROGRESS NOTES
[place] [facility] - [facility]    Progress Note    Amanda Ruth Patient Status:  Inpatient    3/28/1940 MRN [id number]   Location [facility] 3W/SW Attending Sergio Anderson MD   [facility] Day # 7 PCP Daljit Benites MD       Subjective:   [facility] abnormal bruising noted  Back/Spine: no abnormalities noted  Musculoskeletal: full ROM all extremities good strength  no deformities  Extremities: no edema, cyanosis  Neurological:  Grossly normal    Results:     Laboratory Data:  Lab Results   Component V

## 2021-10-11 NOTE — PHYSICAL THERAPY NOTE
PHYSICAL THERAPY EVALUATION - INPATIENT     Room Number: 306/306-A  Evaluation Date: 10/11/2021  Type of Evaluation: Initial   Physician Order: PT Eval and Treat    Presenting Problem: Ventricular tachycardia and hypotension  Reason for Therapy: Mobility Discharge Recommendations: Home with home health PT; 24 hour care/supervision    PLAN  PT Treatment Plan: Bed mobility; Body mechanics; Endurance; Energy conservation; Patient education;  Family education; Gait training; Strengthening; Stair training; [employer] CABG     • CHOLECYSTECTOMY     • COLONOSCOPY N/A 6/26/2020    Procedure: COLONOSCOPY;  Surgeon: Samuel De La O MD;  Location: [address] [place] [address] ENDOSCOPY   • COLONOSCOPY N/A 9/8/2020    Procedure: COLONOSCOPY;  Surgeon: Samuel De La O MD;  Location from lying on back to sitting on the side of the bed?: A Little   How much help from another person does the patient currently need. ..   -   Moving to and from a bed to a chair (including a wheelchair)?: A Little   -   Need to walk in hospital room?: A Lit

## 2021-10-11 NOTE — PROGRESS NOTES
Pulmonary Progress Note     Assessment / Plan:  1.  Acute hypoxic respiratory failure in the setting of pulmonary edema  - Supplemental oxygen, wean as tolerated; currently 2L  - Doubt amiodarone toxicity as length of time and dose (200mg/day) not typical

## 2021-10-11 NOTE — CM/SW NOTE
10: 00AM  Received MDO for home O2 and DC Planning. Per MDO, pt and wife reluctant to SNF and would like HH. Per AM report, pt is pending w/ [facility] services and will be finalized for [facility] upon DC from [facility].     SW contacted [place] w/ E and joseat

## 2021-10-11 NOTE — DIETARY NOTE
ADULT NUTRITION REASSESSMENT    Pt is at moderate nutrition risk. Pt meets moderate malnutrition criteria.       CRITERIA FOR MALNUTRITION DIAGNOSIS:  Criteria for non-severe malnutrition diagnosis: chronic illness related to body fat mild depletion and mu days)     Date/Time Percent Meals Eaten (%)    10/08/21 1403 100 %    10/08/21 1821 0 %    10/09/21 0945 100 %    10/10/21 1026 100 %    10/10/21 1536 0 %    10/10/21 1700 100 %    10/11/21 0945 100 %      Food Allergies: No Known Food Allergies (NKFA)  Cu 4.2#(2.85) over past week. Less fluid on board  BMI: Body mass index is 22.84 kg/m².   BMI CLASSIFICATION: 19-24.9 kg/m2 - WNL  IBW: 148 lbs        Now 98% IBW  Usual Body Wt: 160 lbs       Now 91% UBW  WEIGHT HISTORY:  Patient Weight(s) for the past 336 h fluid losses, PO and supplement greater than 75% of needs, labs WNL, euglycemia, prevent skin breakdown and monitor fluid status    DIETITIAN FOLLOW UP: RD to follow and monitor nutrition status    Zari Mills RDN, LDN  Clinical Nutrition  Ext [phone]

## 2021-10-12 PROCEDURE — 99233 SBSQ HOSP IP/OBS HIGH 50: CPT | Performed by: INTERNAL MEDICINE

## 2021-10-12 RX ORDER — VANCOMYCIN HYDROCHLORIDE 125 MG/1
125 CAPSULE ORAL DAILY
Status: DISCONTINUED | OUTPATIENT
Start: 2021-10-12 | End: 2021-10-12

## 2021-10-12 RX ORDER — HEPARIN SODIUM 1000 [USP'U]/ML
INJECTION, SOLUTION INTRAVENOUS; SUBCUTANEOUS
Status: DISPENSED
Start: 2021-10-12 | End: 2021-10-12

## 2021-10-12 RX ORDER — AMIODARONE HYDROCHLORIDE 200 MG/1
400 TABLET ORAL DAILY
Status: DISCONTINUED | OUTPATIENT
Start: 2021-10-13 | End: 2021-10-13

## 2021-10-12 RX ORDER — VANCOMYCIN HYDROCHLORIDE 125 MG/1
125 CAPSULE ORAL DAILY
Status: DISCONTINUED | OUTPATIENT
Start: 2021-10-12 | End: 2021-10-13

## 2021-10-12 RX ORDER — MIDODRINE HYDROCHLORIDE 5 MG/1
5 TABLET ORAL 3 TIMES DAILY
Status: DISCONTINUED | OUTPATIENT
Start: 2021-10-12 | End: 2021-10-13

## 2021-10-12 NOTE — PROGRESS NOTES
[facility]  Nephrology Daily Progress Note    Daisy Perales  [id number]  80year old      HPI:   Daisy Perales is a 80year old male. HD just completed. BP holding OK. No dizziness. Removed 1.5 L. Still on 2 L O2.   Eating OK appropriate for age reflexes and motor skills appropriate for age    Labs:  Lab Results   Component Value Date    WBC 8.4 10/12/2021    HGB 8.7 10/12/2021    HCT 28.2 10/12/2021    PLT 71.0 10/12/2021    CREATSERUM 3.33 10/12/2021    BUN 46 10/12/2021    N also may be infectious/inflammatory in etiology. Consider short-term follow-up chest CT in 3 months. Lesser incidental findings as above.       Dictated by (CST): Caldwell Cushing, MD on 10/09/2021 at 1:11 PM     Finalized by (CST): Caldwell Cushing, MD o g, Oral, Q15 Min PRN **OR** glucose-vitamin C (DEX-4) chewable tab 8 tablet, 8 tablet, Oral, Q15 Min PRN  •  guaiFENesin-DM (ROBITUSSIN DM) 100-10 MG/5ML syrup 5 mL, 100 mg, Oral, Q4H PRN  •  aspirin chewable tab 81 mg, 81 mg, Oral, Daily  •  [Held by prov Atrial fibrillation with rapid ventricular response (HCC)     Hyperglycemia     Acute hypoxemic respiratory failure (HCC)     Bloating     Floaters, bilateral     Atherosclerosis of aorta (HCC)     ICD (implantable cardioverter-defibrillator) in place

## 2021-10-12 NOTE — PROGRESS NOTES
[facility]    Progress Note    Oly Ruby Patient Status:  Inpatient    3/28/1940 MRN [id number]   Location [facility] 3W/SW Attending Abe Levin MD   [address] Day # 8 PCP Dimas Flores MD     Subjective:   Lawrence Recio no thyromegaly  Cardiovascular: RRR, S1, S2, no S3 or murmur  Respiratory: lungs without coarse BS at bases;  no wheezes; 2 L NC  Abdomen: normoactive bowel sounds, soft, non-tender and non-distended  Extremities: no clubbing, cyanosis or edema         Res ceftriaxone 1 gm IV [id number], azithromycin 500 mg po daily, d#5;  Rx per pulmonary Dr David Wagner;  -may need home oxygen; social work to assist  -received chest PT     Acute on chronic HFrEF  CAD with ischemic cardiomyopathy  S/p 3v CABG in 1995, s/p stent x 3 stopped; on no meds; Pulm following  - Will need outpatient PFTs     Type II Diabetes mellitus, with kidney complication, CKD stage III, without insulin  -currently diet-controlled  -HgbA1c 6.8 (8/18/21)  –Diabetic/renal diet; cont SS insulin;   -on Levemi

## 2021-10-12 NOTE — PLAN OF CARE
Pt a/ox4 on 2L HFNC. Dialysis today removed 1.5 L . Sustaining BP on in the 80's. Plan for discharge tomorrow with home o2. Call light and belongings at reach.    Problem: Patient Centered Care  Goal: Patient preferences are identified and integrated in the baseline  Description: INTERVENTIONS:  - Continuous cardiac monitoring, monitor vital signs, obtain 12 lead EKG if indicated  - Evaluate effectiveness of antiarrhythmic and heart rate control medications as ordered  - Initiate emergency measures for life t

## 2021-10-12 NOTE — PROGRESS NOTES
[facility]     Cardiology Progress Note    Subjective:  No chest pain or shortness of breath.     Objective:  BP (!) 89/50 (BP Location: Right arm)   Pulse 71   Temp 98 °F (36.7 °C) (Oral)   Resp 17   Ht 170.2 cm (5' 7\")   Wt 145 lb 4.8 oz 30%.  · Moderate to severe mitral regurgitation with moderate pulmonary HTN  · ESRD on HD - dialyzed yesterday 10/10/21  · Hypotension - on midodrine and albumin given during dialysis  · Productive cough and frothy sputum - CT trace left pleural effusion w

## 2021-10-12 NOTE — PROGRESS NOTES
Pulmonary Progress Note     Assessment / Plan:  1.  Acute hypoxic respiratory failure in the setting of pulmonary edema and possible PNA  - wean supplemental O2 as able  - doubt amiodarone toxicity as length of time and dose (200mg/day) not typical  - chest

## 2021-10-12 NOTE — CM/SW NOTE
GUDELIA obtained O2 sats and cosigned MDO from yesterday 10/11 and sent to [facility] via [employer]. GUDELIA notified [facility] liaison Cris Kapadia of above.  She confirmed she will send to verification team ASAP and deliver portable O2 tank once insurance verification has been confirm

## 2021-10-12 NOTE — PHYSICAL THERAPY NOTE
PT AM note: Pt is away from room for dialysis at this time. PT will continue to follow patient and progress as schedule and pt progress and tolerance allows.

## 2021-10-12 NOTE — PROGRESS NOTES
[facility] [facility]    CDI Prediction Tool Protocol (Vancomycin Initiated)    OVP (oral vancomycin prophylaxis) 125 mg PO Daily is being started in this patient based on a score of 13.             This patient is currently at high risk for developing CDI

## 2021-10-12 NOTE — TELEPHONE ENCOUNTER
Patient's wife David Webb is calling she received a message stating patient will not be coming home today  She wants to bring her  home today    Patient states the Pulmonologist or the Cardiologist has not been in to see him today    Will the doctor on call be able to address why patient can't come home today  Wife is very anxious she wants him to come home, phone [phone]

## 2021-10-13 VITALS
RESPIRATION RATE: 20 BRPM | TEMPERATURE: 98 F | DIASTOLIC BLOOD PRESSURE: 48 MMHG | HEIGHT: 67 IN | OXYGEN SATURATION: 96 % | WEIGHT: 145.69 LBS | SYSTOLIC BLOOD PRESSURE: 86 MMHG | BODY MASS INDEX: 22.87 KG/M2 | HEART RATE: 70 BPM

## 2021-10-13 PROCEDURE — 99232 SBSQ HOSP IP/OBS MODERATE 35: CPT | Performed by: INTERNAL MEDICINE

## 2021-10-13 PROCEDURE — 99239 HOSP IP/OBS DSCHRG MGMT >30: CPT | Performed by: INTERNAL MEDICINE

## 2021-10-13 RX ORDER — INSULIN DETEMIR 100 [IU]/ML
8 INJECTION, SOLUTION SUBCUTANEOUS NIGHTLY
Qty: 3 ML | Refills: 5 | Status: SHIPPED | OUTPATIENT
Start: 2021-10-13 | End: 2021-10-19

## 2021-10-13 RX ORDER — HYDROCORTISONE 10 MG/G
1 CREAM TOPICAL 2 TIMES DAILY
Qty: 28 G | Refills: 0 | Status: SHIPPED | OUTPATIENT
Start: 2021-10-13 | End: 2021-10-19

## 2021-10-13 RX ORDER — WARFARIN SODIUM 5 MG/1
5 TABLET ORAL NIGHTLY
Qty: 30 TABLET | Refills: 5 | Status: ON HOLD | OUTPATIENT
Start: 2021-10-13 | End: 2022-01-19

## 2021-10-13 RX ORDER — CALCIUM CARBONATE 200(500)MG
2000 TABLET,CHEWABLE ORAL NIGHTLY
Qty: 120 TABLET | Refills: 3 | Status: SHIPPED | OUTPATIENT
Start: 2021-10-13 | End: 2021-10-19

## 2021-10-13 RX ORDER — LANCING DEVICE/LANCETS
KIT MISCELLANEOUS
Qty: 1 KIT | Refills: 0 | Status: SHIPPED | OUTPATIENT
Start: 2021-10-13 | End: 2021-10-19

## 2021-10-13 RX ORDER — POLYETHYLENE GLYCOL 3350 17 G/17G
17 POWDER, FOR SOLUTION ORAL DAILY
Qty: 30 EACH | Refills: 5 | Status: SHIPPED | OUTPATIENT
Start: 2021-10-14 | End: 2021-10-19

## 2021-10-13 RX ORDER — MIDODRINE HYDROCHLORIDE 5 MG/1
5 TABLET ORAL 3 TIMES DAILY
Qty: 90 TABLET | Refills: 5 | Status: ON HOLD | OUTPATIENT
Start: 2021-10-13 | End: 2022-01-18

## 2021-10-13 RX ORDER — AMIODARONE HYDROCHLORIDE 400 MG/1
400 TABLET ORAL DAILY
Qty: 30 TABLET | Refills: 0 | Status: SHIPPED | OUTPATIENT
Start: 2021-10-14 | End: 2021-10-19

## 2021-10-13 RX ORDER — PEN NEEDLE, DIABETIC 32 GX 1/4"
NEEDLE, DISPOSABLE MISCELLANEOUS
Qty: 100 EACH | Refills: 3 | Status: SHIPPED | OUTPATIENT
Start: 2021-10-13 | End: 2021-10-19

## 2021-10-13 RX ORDER — LANCETS
EACH MISCELLANEOUS
Qty: 100 EACH | Refills: 5 | Status: SHIPPED | OUTPATIENT
Start: 2021-10-13 | End: 2021-10-19

## 2021-10-13 NOTE — TELEPHONE ENCOUNTER
To Dr. Eugenie Najjar, are you able to advise on this? It appears that pt is inpatient at [address] [place] [address] currently.

## 2021-10-13 NOTE — PLAN OF CARE
Pt is alert and oriented X4. 2L nasal cannula. Pt denies any discomfort. Midodrine given last night for low BP. Plan is monitor BP and back to home with supplemental oxygen.      Problem: Patient Centered Care  Goal: Patient preferences are identified and i Absence of cardiac arrhythmias or at baseline  Description: INTERVENTIONS:  - Continuous cardiac monitoring, monitor vital signs, obtain 12 lead EKG if indicated  - Evaluate effectiveness of antiarrhythmic and heart rate control medications as ordered  - I

## 2021-10-13 NOTE — DISCHARGE SUMMARY
[facility] AND [facility]  Discharge Summary    Andrade Almonte Patient Status:  Inpatient    3/28/1940 MRN [id number]   [facility] 3W/SW Attending Elyssa Blanton MD   [facility] Day # 9 PCP Cathie Valdivia MD     Date of Admission: 10/4/202 type     GI bleed     Acute kidney injury superimposed on CKD ([facility]     Anemia due to acute blood loss     Coagulopathy (HCC)     Acute on chronic systolic CHF (congestive heart failure) (HCC)     NSVT (nonsustained ventricular tachycardia) ([facility]     COVID the office by EP Dr. Amanda Mullen on 9/29/21 for increasing VT and CHF sx, as well as uncontrolled AF. He was scheduled for AVN ablation and was started on amiodarone 200mg/day. Above sx occurred before pt could undergo the procedure.   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     Physical exam:    PHYSICAL EXAM:   Blood pressure 93/44, pulse 70, temperature 98.1 °F (36.7 °C), temperature source Oral, resp.  rate 20, height 5' 7\" (1.702 m), PT     Acute on chronic HFrEF  CAD with ischemic cardiomyopathy  S/p 3v CABG in 1995, s/p stent x 3 in 2008  TARIQ in Aug 2019 with LVEF 25-30%, hypokinesis of inferior area, mod MR;  LVEF closer to 20% on CATH, with chronic CAD on 11/2/17; unable to afford  with kidney complication, CKD stage III, without insulin  -currently diet-controlled  -HgbA1c 6.8 (8/18/21)  –Diabetic/renal diet; cont SS insulin;   -on Levemir 8 units at bedtime; sugars 140-180     Hypercholesterolemia  -Lipitor held due to elevated tra Pen-injector  Inject 8 Units into the skin nightly. Qty: 3 mL Refills: 5    Insulin Pen Needle (NOVOFINE PEN NEEDLE) 32G X 6 MM Does not apply Misc  Use with Levemir pen;  Dx- E11.9  Qty: 100 each Refills: 3    midodrine 5 MG Oral Tab  Take 1 tablet (5 mg In Vitro Strip  Check BS once daily  Qty: 100 strip Refills: 3    !! ACCU-CHEK SOFTCLIX LANCETS Does not apply Misc  Check BS once daily  Qty: 100 each Refills: 3    !! ONETOUCH DELICA LANCETS 15V Does not apply Misc  Use daily  Qty: 100 each Refills: 3

## 2021-10-13 NOTE — PROGRESS NOTES
[address] NOTE      Wu Reilly Patient Status:  Inpatient    3/28/1940 MRN [id number]   [facility] 3W/ Attending Xavier Bejarano MD   [facility] Day # 9 PCP Robyn Mederos MD         Assessmen Subcutaneous Once per day on Tue Thu Sat   • aspirin  81 mg Oral Daily   • [Held by provider] atorvastatin  40 mg Oral Nightly   • calcitriol  0.25 mcg Oral Daily   • metoprolol tartrate  25 mg Oral BID   • pantoprazole  40 mg Oral QAM AC   • Insulin Aspar

## 2021-10-13 NOTE — CM/SW NOTE
10/13/21 1000   Discharge disposition   Expected discharge disposition Home-Health   Post Acute Care Provider [facility]   DME/Infusion Providers [facility]  (Home O2 at 2L)   Discharge transportation Private car     Per chart review, pt has

## 2021-10-13 NOTE — PHYSICAL THERAPY NOTE
PT note: D/C pending home today PT treatment offered pt requesting rest in chair at this time to allow time to get ready for PM discharge. Therex reviewed in chair. Mati Tomlin PT with home 24 hr family assist is recommended.

## 2021-10-13 NOTE — DISCHARGE PLANNING
Patient was provided with discharge instructions, education, and follow up information. Printed prescriptions for tums and miralax were given to patient.  I faxed prescription for his Epoetin injections to [facility] fax# [phone] ph# [phone]

## 2021-10-13 NOTE — PLAN OF CARE
Pt ready for discharge on 2L NC with home O2. Wife at beside. Discharge instructions reviewed with patient and wife.    Problem: Patient Centered Care  Goal: Patient preferences are identified and integrated in the patient's plan of care  Description: Inter arrhythmias or at baseline  Description: INTERVENTIONS:  - Continuous cardiac monitoring, monitor vital signs, obtain 12 lead EKG if indicated  - Evaluate effectiveness of antiarrhythmic and heart rate control medications as ordered  - Initiate emergency m

## 2021-10-14 ENCOUNTER — PATIENT OUTREACH (OUTPATIENT)
Dept: CASE MANAGEMENT | Age: 81
End: 2021-10-14

## 2021-10-14 NOTE — PROGRESS NOTES
[facility]    Progress Note    Trino Franco Patient Status:  Inpatient    3/28/1940 MRN [id number]   [facility] 3W/ Attending No att. providers found   Hosp Day # 9 PCP Lawrence Pardo MD       Subjective:   Jaki Florian no abnormalities noted  Musculoskeletal: full ROM all extremities good strength  no deformities  Extremities: no edema, cyanosis  Neurological:  Grossly normal    Results:     Laboratory Data:  Lab Results   Component Value Date    WBC 8.3 10/13/2021    HG

## 2021-10-14 NOTE — PAYOR COMM NOTE
Discharge Notification    Patient Name: Merary Tannermeli Hernandez MA O  Subscriber #: [id number]  Authorization Number: [id number]  Admit Date/Time: 10/4/2021 8:53 AM  Discharge Date/Time: 10/13/2021 3:19 PM

## 2021-10-14 NOTE — PROGRESS NOTES
Attempted to reach the patient to complete a [facility] FU call. Left a message for the pt to call the [facility] back at, [phone].

## 2021-10-14 NOTE — TELEPHONE ENCOUNTER
Received fax from [facility]    Please include sig  Phone# [phone], or fax [phone]    Fax in blue folder

## 2021-10-15 ENCOUNTER — TELEPHONE (OUTPATIENT)
Dept: NEPHROLOGY | Facility: CLINIC | Age: 81
End: 2021-10-15

## 2021-10-15 ENCOUNTER — TELEPHONE (OUTPATIENT)
Dept: INTERNAL MEDICINE CLINIC | Facility: CLINIC | Age: 81
End: 2021-10-15

## 2021-10-15 DIAGNOSIS — I48.20 CHRONIC ATRIAL FIBRILLATION (HCC): ICD-10-CM

## 2021-10-15 RX ORDER — BLOOD SUGAR DIAGNOSTIC
STRIP MISCELLANEOUS
Qty: 100 STRIP | Refills: 3 | Status: SHIPPED | OUTPATIENT
Start: 2021-10-15 | End: 2022-01-19

## 2021-10-15 NOTE — TELEPHONE ENCOUNTER
[facility] is calling and states that he was at at pt's house yesterday. He states the pt got home from hospital on Wednesday. Pt was sent home with oxygen . Pts oxygen level was 93% for room air.  Pt thinks he does not need the oxygen

## 2021-10-15 NOTE — TELEPHONE ENCOUNTER
I spoke with patient and transferred him to the  to schedule a hospital follow up appointment. To Dr. Indigo Vivas just [facility]. [phone] Carole [address]rera for patient to see another provider for TCM? Thank you.

## 2021-10-15 NOTE — TELEPHONE ENCOUNTER
Spoke with Nelsy with [facility]. He saw pt yesterday and reports that he is refusing to wear oxygen. Was discharged home on 3L NC. His SpO2 at visit was 90-94%.  Per Nelsy, pt was having some mild SOB but was still able to hold a conversation and move aroun

## 2021-10-15 NOTE — TELEPHONE ENCOUNTER
Multiple attempts made to reach the patient for TCM that were unsuccessful. The patient was recently hospitalized for ventricular tachycardia, CHF, and PNA.  The pt has an appointment for a MA super visit on 11/8/2021, but does not have a HFU appt scheduled

## 2021-10-15 NOTE — TELEPHONE ENCOUNTER
LM for [facility]--- supratherapeutic --see hosp notes for many changes; most importantly pt is on amiodarone 400 mg daily; will increase INRs for several weeks---hold x2 adjusted dose and recheck 3 days

## 2021-10-15 NOTE — PROGRESS NOTES
Attempted to reach the patient to complete a [facility] FU call. Left a message for the pt to call the NCM back at, [phone].  A TE was sent to the PCP office for an appointment request.

## 2021-10-15 NOTE — TELEPHONE ENCOUNTER
Received Certificate of Medical Necessity for oxygen from [facility]. Completed by MD. Bessy Alberto to [phone]. Confirmation received. To scanning.

## 2021-10-18 NOTE — TELEPHONE ENCOUNTER
Patient is calling when he was hospitalized he was prescribed 2 new medications  Amiodarone  Epoetin Filipe - Epbx    Patient thinks this is the correct names of the medications, they are for his Heart & Insulin    Patient is requesting these be called [facility], it will be cheaper for him    Any questions please call patient [phone]

## 2021-10-18 NOTE — TELEPHONE ENCOUNTER
Spoke to [place] [facility] -  He spoke to pt on Friday; Was unable to confirm visit today so he will call pt now;    Warfarin dose 2.5 mg tonight and INR tomorrow;   [place] ([facility]) will call my cell and I will follow (will not be in office)

## 2021-10-18 NOTE — TELEPHONE ENCOUNTER
Patient is calling he had his INR drawn on 10/15, he said he has not received any instructions for his Warfarin  Please call patient [phone]

## 2021-10-19 ENCOUNTER — TELEPHONE (OUTPATIENT)
Dept: INTERNAL MEDICINE CLINIC | Facility: CLINIC | Age: 81
End: 2021-10-19

## 2021-10-19 DIAGNOSIS — I48.20 CHRONIC ATRIAL FIBRILLATION (HCC): ICD-10-CM

## 2021-10-19 NOTE — TELEPHONE ENCOUNTER
Message noted. Appreciate the details. Discussed with Qi ZABALA. It appears that patient is fairly asymptomatic for now. Dialysis will be the treatment for his edema. As mentioned hopefully he will continue his oxygen. Patient does have his current medications and follow-up appointments. I did review Dr. Marylou Bryan discharge summary October 13, 2021.

## 2021-10-19 NOTE — TELEPHONE ENCOUNTER
Left message to call back. Does patient have enough medication at this time? Spoke with patient. Patient is asking for new Rxs to be sent to THE [facility]. He has not obtained any of these medications yet. See other encounter for more information. To marietta Ellington advise on the followin. Epoetin Filipe-Epbx Rx; Epic is indicating it cannot be e-prescribed. [facility] states they have no record of this Rx, though they received all others. Patient indicates that this Rx is too expensive for him and requests input from pharmacist. [facility]. It does not have any inexpensive options.

## 2021-10-19 NOTE — TELEPHONE ENCOUNTER
Graciela Clarity / [facility] is calling he is with the patient  Patient has bilateral feet edema plus [address] got patient on oxygen Stat is 99% with 3 liters of Oxygen  Before he had him on the Oxygen he was at 80 - 90%    Patient was told to stop medication for fluid retention was told by his Kidney Doctor to stop the medication

## 2021-10-19 NOTE — TELEPHONE ENCOUNTER
Tej Berman called with pt INR today 1.7;  dosaes reviewed and documented;  TAMIKA for Wilver SCHUSTER---subtherapeutic---still careful monitoring d/t amiodarone---adjusted dose and recheck 3 days

## 2021-10-19 NOTE — TELEPHONE ENCOUNTER
Spoke with Javan Mooney, [facility] nurse. He reports that patient was d/c'd from hospital last week. Last Thursday patient was started on O2 during [facility] visit. Javan Mooney reports that \"after a while\", he was able to get patient up to 92-93% on RA. Patient declined using O2 following [facility] visit, as he thought it would be costly. Today patient's O2 sat was 87% on RA when [address] arrived. Javan Mooney was able to persuade patient to use the O2. On 3 L, patient's O2 sat went up to 97-98%. He felt better with O2 on, so Javan Mooney is hopeful patient will continue to use it. Javan Mooney also notes clubbing of nails. He educated patient on O2 use. Javan Mooney also notes 3+ pitting edema to patient's bilateral feet. He reports patient is off Bumex per nephrology. Per last telephone encounter with Dr. Lilly Handy, nephrologist, patient has not been seen recently. Patient unable to afford new medications from hospital stay, so is working with a  through the dialysis center in order to obtain his medications. Has not obtained any medications ordered during hospitalization. Spoke with patient. He states he has been off the Bumex x 1.5 mos. He has been elevating his feet to help the edema. States he is \"breathing good\" and is still using O2. Reports BP of 120/61 with pulse 70 today. States, \"I feel pretty good. \"     Patient reports he is receiving assistance from the dialysis center. He is requesting all new Rx's be re-sent to THE [facility], as [facility] is too expensive for him. It does not seem that THE [facility] provides Epoetin Filipe-epbx--will check with Jessica Ulloa on this. Patient also states that he has enough Warfarin at this time. I informed patient that he should let office know 1-2 weeks ahead of when next RFs are needed. He verbalized understanding. Has enough of all other medications. New Rx's sent to [facility], with the exception of Warfarin, Epoetin, and Amiodarone. Same Rx's sent were also canceled with Robb Rodriguez by phone.  Rep states they have no record of the Epoetin Rx. Patient has the following f/u appts scheduled:   Dr. Rivas Seen (cardiol) on 10/26  Dr. Shavon Huntley (nephrol) on 10/28  Dr. Thompson Medina on 11/4  Dr. Ofe Azar (pulm) on 11/5    To Dr. Gutierrez Higginbotham to advise if any further interventions needed r/t foot edema prior to these upcoming appts. Also, okay to send Amiodarone Rx to [facility]? See high med alert. Med pended. Also to Dr. Thompson Medina. Will discuss additional med question with Tara Burnett in alternate encounter.

## 2021-10-20 NOTE — TELEPHONE ENCOUNTER
[facility] requesting NEW RX for signature for:  Accu-Chek FastClix lancet kit  \"lancet device correct ? \"  Fax placed in purple folder  Tasked to John Solomon

## 2021-10-21 ENCOUNTER — TELEPHONE (OUTPATIENT)
Dept: INTERNAL MEDICINE CLINIC | Facility: CLINIC | Age: 81
End: 2021-10-21

## 2021-10-21 RX ORDER — LANCETS
EACH MISCELLANEOUS
Qty: 200 EACH | Refills: 3 | Status: SHIPPED | OUTPATIENT
Start: 2021-10-21

## 2021-10-21 NOTE — TELEPHONE ENCOUNTER
Please call pt   He said he was expecting a  and no one has come  Please call to discuss/advise  Tasked to nursing

## 2021-10-21 NOTE — TELEPHONE ENCOUNTER
Per 10/19/21 telephone encounter: Patient unable to afford new medications from hospital stay, so is working with a  through the dialysis center in order to obtain his medications.  Has not obtained any medications ordered during hospitalizatio

## 2021-10-21 NOTE — TELEPHONE ENCOUNTER
Update:    Gwendolyn Cantrell from care coordination called back. Since patient is currently receiving [facility] services, a  can be added to services. I called Aviva Castillo and left a message to call back.      You can disregard Dr. Breana Carpio

## 2021-10-22 ENCOUNTER — TELEPHONE (OUTPATIENT)
Dept: INTERNAL MEDICINE CLINIC | Facility: CLINIC | Age: 81
End: 2021-10-22

## 2021-10-22 NOTE — TELEPHONE ENCOUNTER
Discussed with pt ---will check with DR. JJ upon return to office if this is supposed to be done at dialysis

## 2021-10-22 NOTE — TELEPHONE ENCOUNTER
Smita/[facility]/OT called will she will be seeing pt on Wednesday of next week  Fyi, no call back needed  Tasked to nursing

## 2021-10-25 NOTE — TELEPHONE ENCOUNTER
LANDEN PAL,[phone], reports INR 1.6;    Spoke to Tammie Knutson and pt continued 5 mg daily over the weekend and INR will be done by venipuncture tomorrow (pt refuses today)  I will follow up

## 2021-10-25 NOTE — TELEPHONE ENCOUNTER
[facility] sending another request for  [facility] [employer] Fastclix Lancet Kit    Requesting Signature  Placed in purple folder

## 2021-10-27 ENCOUNTER — TELEPHONE (OUTPATIENT)
Dept: INTERNAL MEDICINE CLINIC | Facility: CLINIC | Age: 81
End: 2021-10-27

## 2021-10-27 DIAGNOSIS — N18.4 CKD (CHRONIC KIDNEY DISEASE), STAGE IV (HCC): Primary | ICD-10-CM

## 2021-10-27 DIAGNOSIS — I48.20 CHRONIC ATRIAL FIBRILLATION (HCC): ICD-10-CM

## 2021-10-27 NOTE — TELEPHONE ENCOUNTER
Pt. Called stating he had called asking for  services and he has not heard back yet. He can be reached at [phone] or at [phone].

## 2021-10-27 NOTE — TELEPHONE ENCOUNTER
[facility] called with INR 2.2 10/26/21---HHRN called---therapeutic---adjusted dose and recheck 6 days (amiodarone intxn)

## 2021-10-28 NOTE — TELEPHONE ENCOUNTER
Calling Viviane Navarro to verbalize  add on, Carolina Gayle verbalized understanding and stated it would be added on and discussed with patient when he see's him this week. Called patient and advised about add on , pt verbalized understanding.

## 2021-10-29 ENCOUNTER — TELEPHONE (OUTPATIENT)
Dept: NEPHROLOGY | Facility: CLINIC | Age: 81
End: 2021-10-29

## 2021-10-29 NOTE — TELEPHONE ENCOUNTER
Spoke with home health nurse, states he is at pt's house and his legs are very swollen plus 3. Pt has been off the water pill for 3 months and requesting if he should be back on it? Denies sob, chest pain, lightheadedness, discoloration to legs.  Pt states

## 2021-10-29 NOTE — TELEPHONE ENCOUNTER
Pt. States that he is still waiting to get a call back from the nurse to find out plan for taking iron and confirm if he should take 1 pill or 2 pills, or stop taking the medication?

## 2021-10-29 NOTE — TELEPHONE ENCOUNTER
Nurse states that he is with the pt. And the pt continues to have edema in both feet plus 3. Nurse states that the pt is telling him that Dr Saleem Parrish instructed the pt.  To stop taking the water, so the nurse wants to know what the dr recommends for the swelli

## 2021-10-29 NOTE — TELEPHONE ENCOUNTER
Spoke with patient regarding message below. He is wondering if he should be on a water pill as his feet and legs are always swollen, especially on the days that he has dialysis.

## 2021-10-29 NOTE — TELEPHONE ENCOUNTER
Dr. Hortencia Russo,    I contacted patient who called to see if he needs to be taking iron pills. I informed patient from previous notes, patient was to receive this with dialysis.  I advised him to follow up with Dr. Naya Dumont as patient was unaware if still receiving

## 2021-11-01 RX ORDER — BUMETANIDE 2 MG/1
2 TABLET ORAL DAILY
Qty: 30 TABLET | Refills: 2 | Status: CANCELLED | OUTPATIENT
Start: 2021-11-01

## 2021-11-01 NOTE — TELEPHONE ENCOUNTER
Left detailed message on confidential message of [address]. Spoke with Priyanka Lozada, discussed below message. Pt verbalizes understanding and states that he has bumex at home and doesn't need a refill at this time.

## 2021-11-01 NOTE — TELEPHONE ENCOUNTER
Dr. Louise Murillo, just clarifying-    There was a message for this patient to be on bumex 2mg in the AM on non dialysis days. I spoke with pt and discussed this with him. Verbalizes understanding. Do you want pt on torsemide as well? Please let me know and I will order to pharmacy.

## 2021-11-02 ENCOUNTER — TELEPHONE (OUTPATIENT)
Dept: CARDIOLOGY CLINIC | Facility: HOSPITAL | Age: 81
End: 2021-11-02

## 2021-11-02 ENCOUNTER — TELEPHONE (OUTPATIENT)
Dept: INTERNAL MEDICINE CLINIC | Facility: CLINIC | Age: 81
End: 2021-11-02

## 2021-11-02 DIAGNOSIS — I48.20 CHRONIC ATRIAL FIBRILLATION (HCC): ICD-10-CM

## 2021-11-02 NOTE — TELEPHONE ENCOUNTER
I called the pt. He is in dialysis this morning    I spoke to his wife Loren Kanner & let her know her  should be taking one iron pill/day. No need for blood tests with Dr Gene Mccullough either. Can f/u with Dr Edel Mcdermott.    She voices understanding and will let him

## 2021-11-02 NOTE — TELEPHONE ENCOUNTER
Thanks so. Please advise Mr. Burk that he should be taking 1 iron pill per day. Most of his blood tests at this point would be to monitor his heart and his kidneys.   I suspect that his heart and abdominal/chest fluid overload is one of the ma

## 2021-11-02 NOTE — TELEPHONE ENCOUNTER
Mitraclip process/procedure explained. Offered consult date of 11/11, MD's see patient's on Thursdays. Pt states he has dialysis Tues/Thurs/Sat and is worn out post. Will d/w MD's and get him another date that works with his dialysis schedule.

## 2021-11-02 NOTE — TELEPHONE ENCOUNTER
Carole Bang called INR of 1.3 11/1/21   for HHRN---subtherapeutic---last INR therapeutic; now INR fluctuating again;  pt reports confirmed doses taken---adjusted dose and recheck 3 days

## 2021-11-04 ENCOUNTER — TELEPHONE (OUTPATIENT)
Dept: CARDIOLOGY CLINIC | Facility: HOSPITAL | Age: 81
End: 2021-11-04

## 2021-11-04 NOTE — TELEPHONE ENCOUNTER
Offered mitraclip consult date of 11/15 but he has another appt that day. Scheduled for 11/22 at 1 pm in [id number] [facility] at  THE [facility]. Letter and education packet mailed to patient.

## 2021-11-05 ENCOUNTER — HOSPITAL ENCOUNTER (EMERGENCY)
Facility: HOSPITAL | Age: 81
Discharge: HOME | End: 2021-11-05
Attending: EMERGENCY MEDICINE
Payer: MEDICARE

## 2021-11-05 ENCOUNTER — APPOINTMENT (OUTPATIENT)
Dept: GENERAL RADIOLOGY | Facility: HOSPITAL | Age: 81
End: 2021-11-05
Attending: EMERGENCY MEDICINE
Payer: MEDICARE

## 2021-11-05 VITALS
DIASTOLIC BLOOD PRESSURE: 66 MMHG | OXYGEN SATURATION: 100 % | HEART RATE: 70 BPM | TEMPERATURE: 97 F | SYSTOLIC BLOOD PRESSURE: 116 MMHG | RESPIRATION RATE: 24 BRPM

## 2021-11-05 DIAGNOSIS — K92.0 COFFEE GROUND EMESIS: Primary | ICD-10-CM

## 2021-11-05 PROCEDURE — 99284 EMERGENCY DEPT VISIT MOD MDM: CPT

## 2021-11-05 PROCEDURE — 85610 PROTHROMBIN TIME: CPT | Performed by: EMERGENCY MEDICINE

## 2021-11-05 PROCEDURE — 71045 X-RAY EXAM CHEST 1 VIEW: CPT | Performed by: EMERGENCY MEDICINE

## 2021-11-05 PROCEDURE — 86901 BLOOD TYPING SEROLOGIC RH(D): CPT | Performed by: EMERGENCY MEDICINE

## 2021-11-05 PROCEDURE — 36415 COLL VENOUS BLD VENIPUNCTURE: CPT

## 2021-11-05 PROCEDURE — 43753 TX GASTRO INTUB W/ASP: CPT

## 2021-11-05 PROCEDURE — 80048 BASIC METABOLIC PNL TOTAL CA: CPT | Performed by: EMERGENCY MEDICINE

## 2021-11-05 PROCEDURE — 85025 COMPLETE CBC W/AUTO DIFF WBC: CPT | Performed by: EMERGENCY MEDICINE

## 2021-11-05 PROCEDURE — 86900 BLOOD TYPING SEROLOGIC ABO: CPT | Performed by: EMERGENCY MEDICINE

## 2021-11-05 PROCEDURE — 82272 OCCULT BLD FECES 1-3 TESTS: CPT

## 2021-11-05 PROCEDURE — 86850 RBC ANTIBODY SCREEN: CPT | Performed by: EMERGENCY MEDICINE

## 2021-11-05 RX ORDER — PANTOPRAZOLE SODIUM 40 MG/1
40 TABLET, DELAYED RELEASE ORAL DAILY
Qty: 30 TABLET | Refills: 0 | Status: SHIPPED | OUTPATIENT
Start: 2021-11-05 | End: 2021-11-26

## 2021-11-05 NOTE — ED PROVIDER NOTES
Patient Seen in: [facility] AND [facility] Emergency Department      History   Patient presents with:  Hypotension  GI Bleeding    Stated Complaint: bloody emesis     Subjective:   HPI    54-year-old male on Coumadin presents for evaluation for 2 episodes of \"c Procedure: COLONOSCOPY;  Surgeon: Jenniffer Ledesma MD;  Location: [facility] ENDOSCOPY                Social History    Tobacco Use      Smoking status: Former Smoker        Packs/day: 1.00        Years: 35.00        Pack years: 35        Types: Cigarett Rectum: Guaiac result negative. Musculoskeletal:         General: No deformity. Normal range of motion. Cervical back: Full passive range of motion without pain and normal range of motion. Skin:     General: Skin is warm and dry.    Neurological: these tests on the individual orders. TYPE AND SCREEN    Narrative: The following orders were created for panel order Type and screen.   Procedure                               Abnormality         Status                     --------- just beyond the cardioesophageal junction. * It can be advanced about 5-6 cm. * Heart is mildly enlarged. * No gross consolidation. * Defibrillator is noted. * Catheter is seen in the superior vena cava. Dictated by (CST):  Chrissie Jones MD on 11/05/

## 2021-11-05 NOTE — TELEPHONE ENCOUNTER
I spoke with patient's home health nurse Nelsy. He is with the patient now. Patient is alert and oriented and Houston feels he looks okay. Blood pressure was low at 82/46 this morning. Patient reported to Nelsy that he had some blurred vision this morning. Two days ago patient reports he vomited and it looked like coffee grounds. Explained that patient should be seen in the emergency room today. Nelsy put the phone on speak phone. I relayed to the patient that an ambulance should be called so that he can be taken to the hospital. Patient is agreeable. [place] says he will be able to call 911 for an ambulance. Explained that I would relay this to Dr. Novella Koyanagi. Patient would like to tell Dr. Novella Koyanagi that he will go to the hospital and he would like to be home by Monday. To Dr. Novella Koyanagi. Nursing please follow up on Monday.

## 2021-11-05 NOTE — ED INITIAL ASSESSMENT (HPI)
Patient to ed via ems patient co of hematemesis +black and coffee ground in nature per patient. +llq abd pain. Denies bloody stools.  Patient on coumadin

## 2021-11-08 ENCOUNTER — OFFICE VISIT (OUTPATIENT)
Dept: INTERNAL MEDICINE CLINIC | Facility: CLINIC | Age: 81
End: 2021-11-08
Payer: MEDICARE

## 2021-11-08 VITALS
TEMPERATURE: 98 F | SYSTOLIC BLOOD PRESSURE: 80 MMHG | HEART RATE: 70 BPM | BODY MASS INDEX: 24.62 KG/M2 | OXYGEN SATURATION: 100 % | HEIGHT: 64 IN | DIASTOLIC BLOOD PRESSURE: 50 MMHG | WEIGHT: 144.19 LBS

## 2021-11-08 DIAGNOSIS — I25.10 CORONARY ARTERY DISEASE INVOLVING NATIVE CORONARY ARTERY OF NATIVE HEART WITHOUT ANGINA PECTORIS: ICD-10-CM

## 2021-11-08 DIAGNOSIS — D63.1 ANEMIA DUE TO CHRONIC KIDNEY DISEASE, UNSPECIFIED CKD STAGE: ICD-10-CM

## 2021-11-08 DIAGNOSIS — M47.26 OSTEOARTHRITIS OF SPINE WITH RADICULOPATHY, LUMBAR REGION: ICD-10-CM

## 2021-11-08 DIAGNOSIS — N18.6 TYPE 2 DIABETES MELLITUS WITH CHRONIC KIDNEY DISEASE ON CHRONIC DIALYSIS, WITH LONG-TERM CURRENT USE OF INSULIN (HCC): ICD-10-CM

## 2021-11-08 DIAGNOSIS — I47.2 VENTRICULAR TACHYCARDIA (HCC): ICD-10-CM

## 2021-11-08 DIAGNOSIS — I48.20 CHRONIC ATRIAL FIBRILLATION (HCC): ICD-10-CM

## 2021-11-08 DIAGNOSIS — D69.6 THROMBOCYTOPENIA (HCC): ICD-10-CM

## 2021-11-08 DIAGNOSIS — H35.30 MACULAR DEGENERATION, UNSPECIFIED LATERALITY, UNSPECIFIED TYPE: ICD-10-CM

## 2021-11-08 DIAGNOSIS — Z00.00 PHYSICAL EXAM, ANNUAL: Primary | ICD-10-CM

## 2021-11-08 DIAGNOSIS — Z99.2 ESRD ON HEMODIALYSIS (HCC): ICD-10-CM

## 2021-11-08 DIAGNOSIS — N18.6 ESRD ON HEMODIALYSIS (HCC): ICD-10-CM

## 2021-11-08 DIAGNOSIS — I48.21 PERMANENT ATRIAL FIBRILLATION (HCC): ICD-10-CM

## 2021-11-08 DIAGNOSIS — Z99.2 TYPE 2 DIABETES MELLITUS WITH CHRONIC KIDNEY DISEASE ON CHRONIC DIALYSIS, WITH LONG-TERM CURRENT USE OF INSULIN (HCC): ICD-10-CM

## 2021-11-08 DIAGNOSIS — I70.0 ATHEROSCLEROSIS OF AORTA (HCC): ICD-10-CM

## 2021-11-08 DIAGNOSIS — E78.00 HYPERCHOLESTEREMIA: ICD-10-CM

## 2021-11-08 DIAGNOSIS — D68.9 COAGULATION DEFECT, UNSPECIFIED (HCC): ICD-10-CM

## 2021-11-08 DIAGNOSIS — Z79.4 TYPE 2 DIABETES MELLITUS WITH CHRONIC KIDNEY DISEASE ON CHRONIC DIALYSIS, WITH LONG-TERM CURRENT USE OF INSULIN (HCC): ICD-10-CM

## 2021-11-08 DIAGNOSIS — J96.11 HYPOXEMIC RESPIRATORY FAILURE, CHRONIC (HCC): ICD-10-CM

## 2021-11-08 DIAGNOSIS — I50.23 ACUTE ON CHRONIC HFREF (HEART FAILURE WITH REDUCED EJECTION FRACTION) (HCC): ICD-10-CM

## 2021-11-08 DIAGNOSIS — J44.9 CHRONIC OBSTRUCTIVE PULMONARY DISEASE, UNSPECIFIED COPD TYPE (HCC): ICD-10-CM

## 2021-11-08 DIAGNOSIS — I35.0 AORTIC VALVE STENOSIS, ETIOLOGY OF CARDIAC VALVE DISEASE UNSPECIFIED: ICD-10-CM

## 2021-11-08 DIAGNOSIS — I25.5 ISCHEMIC CARDIOMYOPATHY: ICD-10-CM

## 2021-11-08 DIAGNOSIS — E11.22 TYPE 2 DIABETES MELLITUS WITH CHRONIC KIDNEY DISEASE ON CHRONIC DIALYSIS, WITH LONG-TERM CURRENT USE OF INSULIN (HCC): ICD-10-CM

## 2021-11-08 DIAGNOSIS — N18.9 ANEMIA DUE TO CHRONIC KIDNEY DISEASE, UNSPECIFIED CKD STAGE: ICD-10-CM

## 2021-11-08 DIAGNOSIS — R74.01 ELEVATED TRANSAMINASE LEVEL: ICD-10-CM

## 2021-11-08 PROCEDURE — 96160 PT-FOCUSED HLTH RISK ASSMT: CPT | Performed by: INTERNAL MEDICINE

## 2021-11-08 PROCEDURE — 99397 PER PM REEVAL EST PAT 65+ YR: CPT | Performed by: INTERNAL MEDICINE

## 2021-11-08 PROCEDURE — G0439 PPPS, SUBSEQ VISIT: HCPCS | Performed by: INTERNAL MEDICINE

## 2021-11-08 PROCEDURE — 3074F SYST BP LT 130 MM HG: CPT | Performed by: INTERNAL MEDICINE

## 2021-11-08 PROCEDURE — 3008F BODY MASS INDEX DOCD: CPT | Performed by: INTERNAL MEDICINE

## 2021-11-08 PROCEDURE — 3078F DIAST BP <80 MM HG: CPT | Performed by: INTERNAL MEDICINE

## 2021-11-08 RX ORDER — BLOOD SUGAR DIAGNOSTIC
STRIP MISCELLANEOUS
Qty: 50 STRIP | Refills: 5 | Status: SHIPPED | OUTPATIENT
Start: 2021-11-08 | End: 2022-01-19

## 2021-11-08 NOTE — TELEPHONE ENCOUNTER
Per record review, patient was seen at [facility] ER 11/5/21  Clinical Impression:  Coffee ground emesis  (primary encounter diagnosis)     Follow-up:  MD Christiano Mccullough. [phone] [id number]  [address] [place] [address]  Schedule an appointment as soon as possible for a visit in 2 days  For follow up and re-evaluation    To : please call patient to schedule f/u office visit. Last office visit 10/1/20.

## 2021-11-08 NOTE — PROGRESS NOTES
Iona Elizondo is a 80year old male.     HPI:   Patient presents with:  Physical: medicare annual supervisit  Medication Question: is not taking Insulin       81 y/o M here for subsequent Medicare annual wellness exam; has Osteoarthritis of lumbar spi Fabi Ruvalcaba MD;  Location: [address] [place] [address] ENDOSCOPY      Family History   Problem Relation Age of Onset   • Hypertension Father    • Diabetes Mother    • Glaucoma Neg    • Macular degeneration Neg       Social History: Social History    Tobacco Use 2000 UNIT/ML SOLN 2,000 Units, epoetin joel-epbx 3000 UNIT/ML SOLN 3,000 Units Inject 5,000 Units into the skin 3 (three) times a week (Tuesday, Thursday, Saturday) at 1600. 6 mL 5   • midodrine 5 MG Oral Tab Take 1 tablet (5 mg total) by mouth 3 (three) t energy level?: Other    How would you describe your daily physical activity?: Light    How would you describe your current health state?: Good    How do you maintain positive mental well-being?: Social Interaction; Visiting Family    If you are a male age 3 not be covered, or covered at this frequency, by your insurer. Please check with your insurance carrier before scheduling to verify coverage.     PREVENTATIVE SERVICES  INDICATIONS AND SCHEDULE Internal Lab or Procedure External Lab or Procedure   Diabetes Annual Monitoring of Persistent     Medications (ACE/ARB, digoxin, diuretics)    Potassium  Annually Potassium (mmol/L)   Date Value   11/05/2021 3.2 (L)     POTASSIUM (P) (mmol/L)   Date Value   04/18/2016 3.4    No flowsheet data found.     Creatinine %.  Constitutional: alert and oriented x3 in no acute distress  HEENT- EOMI, PERRL  Nose/Mouth/Throat: pharynx without erythema; no oral lesions  Neck/Thyroid: neck supple; no thyromegaly  Lymphatics: no lymphadenopathy of neck or groin  Cardiovascular: RR work to assist  -received chest PT     Acute on chronic HFrEF  CAD with ischemic cardiomyopathy  S/p 3v CABG in 1995, s/p stent x 3 in 2008  TARIQ in Aug 2019 with LVEF 25-30%, hypokinesis of inferior area, mod MR;  LVEF closer to 20% on CATH, with chronic C III, without insulin  -HgbA1c 6.8 (8/18/21)  -was on Levemir 8 units at bedtime; sugars 140-180     Hypercholesterolemia  -Lipitor held due to elevated transaminases, now normalizing  -may resume Lipitor 40 mg po qHS      Macular degeneration  Early and mi

## 2021-11-11 NOTE — TELEPHONE ENCOUNTER
Patient is calling he would like to know if he should take his Insulin 1 time a day or 2 times a day    Patient would also like his script transferred from [facility] to [facility] Sucralfate 1mg

## 2021-11-11 NOTE — TELEPHONE ENCOUNTER
To [facility],      Pt called, states insurance changed, needs scripts sent from [employer] to [facility].

## 2021-11-12 ENCOUNTER — TELEPHONE (OUTPATIENT)
Dept: INTERNAL MEDICINE CLINIC | Facility: CLINIC | Age: 81
End: 2021-11-12

## 2021-11-12 DIAGNOSIS — I48.20 CHRONIC ATRIAL FIBRILLATION (HCC): ICD-10-CM

## 2021-11-12 RX ORDER — BLOOD SUGAR DIAGNOSTIC
STRIP MISCELLANEOUS
Qty: 100 STRIP | Refills: 3 | Status: SHIPPED | OUTPATIENT
Start: 2021-11-12

## 2021-11-12 NOTE — TELEPHONE ENCOUNTER
INR reported today 2.9;  See AC note  Ulyess Sheldon called---INR therapeutic---pt was in ER last week; INR therapeutic; pt reports 5 mg daily dose to Aleksandra Yu; we will reiterate 2.5 mg once weekly and recheck 1 week

## 2021-11-12 NOTE — TELEPHONE ENCOUNTER
[facility] called  Patient needs new prescription sent to [facility] in [facility] for test strips  Patient picked up accu chek test strips & he has a Freestyle Freedom Meter

## 2021-11-15 ENCOUNTER — TELEPHONE (OUTPATIENT)
Dept: INTERNAL MEDICINE CLINIC | Facility: CLINIC | Age: 81
End: 2021-11-15

## 2021-11-15 ENCOUNTER — TELEPHONE (OUTPATIENT)
Dept: NEUROLOGY | Facility: CLINIC | Age: 81
End: 2021-11-15

## 2021-11-15 ENCOUNTER — OFFICE VISIT (OUTPATIENT)
Dept: PHYSICAL MEDICINE AND REHAB | Facility: CLINIC | Age: 81
End: 2021-11-15
Payer: MEDICARE

## 2021-11-15 ENCOUNTER — TELEPHONE (OUTPATIENT)
Dept: CARDIOLOGY CLINIC | Facility: HOSPITAL | Age: 81
End: 2021-11-15

## 2021-11-15 DIAGNOSIS — K92.2 GASTROINTESTINAL HEMORRHAGE, UNSPECIFIED GASTROINTESTINAL HEMORRHAGE TYPE: ICD-10-CM

## 2021-11-15 DIAGNOSIS — Z95.810 ICD (IMPLANTABLE CARDIOVERTER-DEFIBRILLATOR) IN PLACE: ICD-10-CM

## 2021-11-15 DIAGNOSIS — M54.6 ACUTE MIDLINE THORACIC BACK PAIN: ICD-10-CM

## 2021-11-15 DIAGNOSIS — Z79.01 ANTICOAGULANT LONG-TERM USE: ICD-10-CM

## 2021-11-15 DIAGNOSIS — M54.50 ACUTE MIDLINE LOW BACK PAIN WITHOUT SCIATICA: Primary | ICD-10-CM

## 2021-11-15 DIAGNOSIS — E11.9 TYPE 2 DIABETES MELLITUS WITHOUT COMPLICATION, WITHOUT LONG-TERM CURRENT USE OF INSULIN (HCC): ICD-10-CM

## 2021-11-15 DIAGNOSIS — Z99.2 ESRD ON HEMODIALYSIS (HCC): ICD-10-CM

## 2021-11-15 DIAGNOSIS — I25.10 CORONARY ARTERY DISEASE INVOLVING NATIVE CORONARY ARTERY OF NATIVE HEART WITHOUT ANGINA PECTORIS: ICD-10-CM

## 2021-11-15 DIAGNOSIS — N18.6 ESRD ON HEMODIALYSIS (HCC): ICD-10-CM

## 2021-11-15 PROCEDURE — 99214 OFFICE O/P EST MOD 30 MIN: CPT | Performed by: PHYSICAL MEDICINE & REHABILITATION

## 2021-11-15 RX ORDER — ISOPROPYL ALCOHOL 0.75 G/1
SWAB TOPICAL
Qty: 100 EACH | Refills: 3 | Status: SHIPPED | OUTPATIENT
Start: 2021-11-15

## 2021-11-15 NOTE — TELEPHONE ENCOUNTER
Hank requesting NEW RX for:  [facility] True Metrix Test Strip  True Metrix Blood Glucose MTR  TruePlus 33G lancets  Fax placed in blue folder  Tasked to [employer]

## 2021-11-15 NOTE — TELEPHONE ENCOUNTER
Yue for authorization of approval for CT SPINE THORACIC (CPT=72128). Authorization Number [id number] effective Nov 15 2021 - Dec 15 2021. Pt. informed.  Transferred call to scheduling for appt,

## 2021-11-15 NOTE — PROGRESS NOTES
[address]  Progress Note    CHIEF COMPLAINT:  Patient presents with:  Back Pain: LOV 7/29/21 Pt comes in with back pain. No N/T. Pt in in wheelchair. Takes Tylenol for pain which does nothing for pain.  Rate • Renal disorder    • S/P cholecystectomy 2005    [facility]   • Shortness of breath        SURGICAL HISTORY:  Past Surgical History:   Procedure Laterality Date   • CABG     • CHOLECYSTECTOMY     • COLONOSCOPY N/A 6/26/2020    Procedure: COLONOSCOPY;  Surgeon mouth nightly. 30 tablet 5   • midodrine 5 MG Oral Tab Take 1 tablet (5 mg total) by mouth 3 (three) times daily. 90 tablet 5   • metoprolol tartrate 25 MG Oral Tab Take 1 tablet (25 mg total) by mouth 2 (two) times daily.  60 tablet 5   • Zinc 50 MG Oral C every morning before breakfast. (Patient not taking: No sig reported) 90 tablet 3   • Acidophilus/Pectin Oral Cap Take 1 capsule by mouth 2 (two) times daily. • B Complex Vitamins (B COMPLEX-B12 OR) Take 1 tablet by mouth daily.      • aspirin 81 MG Ora thoracic back pain  We will obtain a CT. No problems with the decision tool for the thoracic spine.  - CT SPINE THORACIC (CPT=72128); Future    3. ICD (implantable cardioverter-defibrillator) in place  No MRIs    4.  Coronary artery disease involving nativ

## 2021-11-17 ENCOUNTER — TELEPHONE (OUTPATIENT)
Dept: NEUROLOGY | Facility: CLINIC | Age: 81
End: 2021-11-17

## 2021-11-17 ENCOUNTER — HOSPITAL ENCOUNTER (OUTPATIENT)
Dept: GENERAL RADIOLOGY | Facility: HOSPITAL | Age: 81
Discharge: HOME | End: 2021-11-17
Attending: PHYSICAL MEDICINE & REHABILITATION
Payer: MEDICARE

## 2021-11-17 ENCOUNTER — HOSPITAL ENCOUNTER (OUTPATIENT)
Dept: CT IMAGING | Facility: HOSPITAL | Age: 81
Discharge: HOME | End: 2021-11-17
Attending: PHYSICAL MEDICINE & REHABILITATION
Payer: MEDICARE

## 2021-11-17 ENCOUNTER — TELEPHONE (OUTPATIENT)
Dept: PHYSICAL MEDICINE AND REHAB | Facility: CLINIC | Age: 81
End: 2021-11-17

## 2021-11-17 ENCOUNTER — HOSPITAL ENCOUNTER (OUTPATIENT)
Dept: CT IMAGING | Facility: HOSPITAL | Age: 81
End: 2021-11-17
Attending: PHYSICAL MEDICINE & REHABILITATION
Payer: MEDICARE

## 2021-11-17 DIAGNOSIS — M54.50 ACUTE MIDLINE LOW BACK PAIN WITHOUT SCIATICA: ICD-10-CM

## 2021-11-17 DIAGNOSIS — M47.816 LUMBAR SPONDYLOSIS: Primary | ICD-10-CM

## 2021-11-17 PROCEDURE — 72100 X-RAY EXAM L-S SPINE 2/3 VWS: CPT | Performed by: PHYSICAL MEDICINE & REHABILITATION

## 2021-11-17 PROCEDURE — 72128 CT CHEST SPINE W/O DYE: CPT | Performed by: PHYSICAL MEDICINE & REHABILITATION

## 2021-11-17 NOTE — TELEPHONE ENCOUNTER
Please let the patient know that I reviewed his x-ray and CAT scan. Good news is that there are no fractures. If he still has back pain, I would recommend facet injections. Please see the order below.

## 2021-11-17 NOTE — TELEPHONE ENCOUNTER
Lizeth/[facility]/on behalf of [employer] called    Pt had an in home health visit & circulation/PAD exam     Results were severe, copy will be faxed & mailed to Dr Harrington Simmonds

## 2021-11-17 NOTE — TELEPHONE ENCOUNTER
Jorge Sharma for authorization of approval for Bilateral L4-5 and L5-S1 facet injections cpt codes [id number], [id number], [id number]. Authorization # [id number] effective 12/02/2021- 01/16/2022. Will inform Nursing.

## 2021-11-18 NOTE — TELEPHONE ENCOUNTER
Per Dr. Katia Sears     Please let the patient know that I reviewed his x-ray and CAT scan. Good news is that there are no fractures. If he still has back pain, I would recommend facet injections. Please see the order below.

## 2021-11-19 ENCOUNTER — TELEPHONE (OUTPATIENT)
Dept: INTERNAL MEDICINE CLINIC | Facility: CLINIC | Age: 81
End: 2021-11-19

## 2021-11-19 NOTE — TELEPHONE ENCOUNTER
Reviewed results    Patient has been scheduled for Bilateral L4-5 and L5-S1 facet injections on 12/2/21 at the [facility] with Dr. Tiara Gonzalez.   -Anesthesia type: Local.  -If receiving MAC or IVC sedation patient will need to get COVID tested 3 days prior even if alr Nasalis-Muscle-Based Myocutaneous Island Pedicle Flap Text: Using a #15 blade, an incision was made around the donor flap to the level of the nasalis muscle. Wide lateral undermining was then performed in both the subcutaneous plane above the nasalis muscle, and in a submuscular plane just above periosteum. This allowed the formation of a free nasalis muscle axial pedicle (based on the angular artery) which was still attached to the actual cutaneous flap, increasing its mobility and vascular viability. Hemostasis was obtained with pinpoint electrocoagulation. The flap was mobilized into position and the pivotal anchor points positioned and stabilized with buried interrupted sutures. Subcutaneous and dermal tissues were closed in a multilayered fashion with sutures. Tissue redundancies were excised, and the epidermal edges were apposed without significant tension and sutured with sutures.

## 2021-11-19 NOTE — TELEPHONE ENCOUNTER
Clarified that prescriptions were transfered from [place] to [facility] due to change in insurance. And clarified that he is to take his Levemir 8u once a day at night.   Pt verbalized understanding

## 2021-11-19 NOTE — TELEPHONE ENCOUNTER
Pt is calling and states he is returning a call but does not know when the call was made to him.       Could be from encounter on 11/17/2021     Please call and advise

## 2021-11-22 ENCOUNTER — TELEPHONE (OUTPATIENT)
Dept: INTERNAL MEDICINE CLINIC | Facility: CLINIC | Age: 81
End: 2021-11-22

## 2021-11-22 DIAGNOSIS — I48.20 CHRONIC ATRIAL FIBRILLATION (HCC): ICD-10-CM

## 2021-11-22 NOTE — TELEPHONE ENCOUNTER
[facility]  (Home Health Provider) [phone]     [facility] called---INR therapeutic 11/19/21;  2.7---no changes Braxton Becker-- we will reiterate 2.5 mg once weekly and recheck 2-3 weks (holiday)

## 2021-11-23 ENCOUNTER — TELEPHONE (OUTPATIENT)
Dept: INTERNAL MEDICINE CLINIC | Facility: CLINIC | Age: 81
End: 2021-11-23

## 2021-11-26 ENCOUNTER — TELEPHONE (OUTPATIENT)
Dept: INTERNAL MEDICINE CLINIC | Facility: CLINIC | Age: 81
End: 2021-11-26

## 2021-11-26 ENCOUNTER — OFFICE VISIT (OUTPATIENT)
Dept: NEPHROLOGY | Facility: CLINIC | Age: 81
End: 2021-11-26
Payer: MEDICARE

## 2021-11-26 VITALS
WEIGHT: 150 LBS | BODY MASS INDEX: 25.61 KG/M2 | HEIGHT: 64 IN | DIASTOLIC BLOOD PRESSURE: 64 MMHG | SYSTOLIC BLOOD PRESSURE: 116 MMHG | HEART RATE: 70 BPM

## 2021-11-26 DIAGNOSIS — N18.6 ESRD ON HEMODIALYSIS (HCC): ICD-10-CM

## 2021-11-26 DIAGNOSIS — I50.9 CHRONIC CONGESTIVE HEART FAILURE, UNSPECIFIED HEART FAILURE TYPE (HCC): ICD-10-CM

## 2021-11-26 DIAGNOSIS — I48.91 ATRIAL FIBRILLATION WITH RAPID VENTRICULAR RESPONSE (HCC): Primary | ICD-10-CM

## 2021-11-26 DIAGNOSIS — Z99.2 ESRD ON HEMODIALYSIS (HCC): ICD-10-CM

## 2021-11-26 DIAGNOSIS — I48.20 CHRONIC ATRIAL FIBRILLATION (HCC): ICD-10-CM

## 2021-11-26 DIAGNOSIS — D50.0 IRON DEFICIENCY ANEMIA SECONDARY TO BLOOD LOSS (CHRONIC): ICD-10-CM

## 2021-11-26 PROCEDURE — 3074F SYST BP LT 130 MM HG: CPT | Performed by: INTERNAL MEDICINE

## 2021-11-26 PROCEDURE — 3008F BODY MASS INDEX DOCD: CPT | Performed by: INTERNAL MEDICINE

## 2021-11-26 PROCEDURE — 3078F DIAST BP <80 MM HG: CPT | Performed by: INTERNAL MEDICINE

## 2021-11-26 PROCEDURE — 99213 OFFICE O/P EST LOW 20 MIN: CPT | Performed by: INTERNAL MEDICINE

## 2021-11-26 RX ORDER — PANTOPRAZOLE SODIUM 40 MG/1
40 TABLET, DELAYED RELEASE ORAL
Qty: 90 TABLET | Refills: 3 | Status: SHIPPED | OUTPATIENT
Start: 2021-11-26

## 2021-11-26 NOTE — TELEPHONE ENCOUNTER
Per  OV note 11/8/21  -advise F/U physiatrist Dr Katia Sears for possible add'l facet injections; may cold coumadin x 5 d before any injections; no bridging therapy needed    [facility]   Provider) [phone]     called with instructions;   Pt procedure sche

## 2021-11-26 NOTE — PROGRESS NOTES
Progress Note     Ace Asya    Is here with his wife he is doing good his breathing is good his weight is good    No signs of CHF he does have CHF with reduced ejection fraction of about 25%  He has history of A. fib rate is controlled at 70 Pack years: 35        Types: Cigarettes        Quit date: 1991        Years since quittin.5      Smokeless tobacco: Never Used    Alcohol use: Not Currently      Alcohol/week: 0.0 standard drinks        Medications (Active prior to today's visit) midodrine 5 MG Oral Tab Take 1 tablet (5 mg total) by mouth 3 (three) times daily. 90 tablet 5   • calcitriol 0.25 MCG Oral Cap Take 1 capsule (0.25 mcg total) by mouth daily.  90 capsule 3   • docusate sodium 100 MG Oral Cap Take 100 mg by mouth 2 (two) ti patterns  Hema/Lymph:  Negative for easy bleeding and easy bruising  Integumentary:  Negative for pruritus and rash  Musculoskeletal:  Negative for bone/joint symptoms  Neurological:  Negative for gait disturbance  Psychiatric:  Negative for inappropriate

## 2021-11-29 ENCOUNTER — TELEPHONE (OUTPATIENT)
Dept: INTERNAL MEDICINE CLINIC | Facility: CLINIC | Age: 81
End: 2021-11-29

## 2021-11-29 RX ORDER — AMIODARONE HYDROCHLORIDE 400 MG/1
TABLET ORAL
Qty: 30 TABLET | Refills: 0 | OUTPATIENT
Start: 2021-11-29

## 2021-11-29 NOTE — TELEPHONE ENCOUNTER
Left message to call back. wHAT test strips is he using?  Free Style strips were sent to [facility] on 11/12/21

## 2021-11-30 ENCOUNTER — HOSPITAL ENCOUNTER (OUTPATIENT)
Dept: CT IMAGING | Facility: HOSPITAL | Age: 81
Discharge: HOME | End: 2021-11-30
Attending: PHYSICAL MEDICINE & REHABILITATION
Payer: MEDICARE

## 2021-11-30 ENCOUNTER — TELEPHONE (OUTPATIENT)
Dept: INTERNAL MEDICINE CLINIC | Facility: CLINIC | Age: 81
End: 2021-11-30

## 2021-11-30 ENCOUNTER — TELEPHONE (OUTPATIENT)
Dept: PHYSICAL MEDICINE AND REHAB | Facility: CLINIC | Age: 81
End: 2021-11-30

## 2021-11-30 DIAGNOSIS — M54.6 ACUTE MIDLINE THORACIC BACK PAIN: ICD-10-CM

## 2021-11-30 NOTE — TELEPHONE ENCOUNTER
Please call [facility] at [phone]  How often would Dr Ronnie Ramirez like patient to test his blood sugar?   Pt is out of test strips and pharmacy is telling pt it is too early for a refill  Pt may need new RX based on testing frequency  Tas

## 2021-11-30 NOTE — TELEPHONE ENCOUNTER
Left Dr. Hannah Hanley message for [facility] health nurse. Relayed Dr. Hannah Hanley message. Explained that test strips were sent on 11/12/21 to [address] [place] [address] in [place]. Invited [facility] to call back with any questions or concerns. I [facility] with Aury Sher  [facility]

## 2021-11-30 NOTE — TELEPHONE ENCOUNTER
Spoke with Jess Jordan RN states patient had stopped taking Warfarin/Aspirin. Notified patient he did not have to stop and continue dosage on rx. Patient was never notified to stop rx due to being facet and BMI <35. Patient verbalized understanding.

## 2021-11-30 NOTE — TELEPHONE ENCOUNTER
On Lantus insulin 8 U at bedtime    Advise check sugar once daily in morning    Please call [facility] [place] and send Rx

## 2021-11-30 NOTE — TELEPHONE ENCOUNTER
Received call from [facility], pt has been holding Coumadin since 11/25 w/o instructions to do so from Dr. Prosper Mosher office staff.  Telephone encounter on 11/26 by Pharmacist Precious Britton advises pt \"may cold coumadin x 5 d before any injections; no bridging therapy

## 2021-11-30 NOTE — TELEPHONE ENCOUNTER
Yes, amiodarone should be prescribed by cardiologist, Dr Kameron William; please call pt; please forward to Dr Carolyne Smith office at phone [phone]

## 2021-11-30 NOTE — TELEPHONE ENCOUNTER
We have conflicting frequency on file for how often patient should be checking BS. Lancet script states BID, glucose testing strips state daily. To Dr. Madeleine Brunner to clarify BS testing. Left message to call back.  Please verify what glucometer (brand) patient

## 2021-12-01 NOTE — TELEPHONE ENCOUNTER
Pt. Called stating he stopped taking Amiodorone about 3-4 days ago. He also stoppped taking Metoprolol, Sucralfate and Sevelamer about 1 month ago. He states he stopped them due to side effects from these meds. Mainly constipation and one of them lowers his BP when he needs something to raise his BP. The Amiodorone causes excess saliva in his mouth, light headed, loss of taste and smell, blurred vision and constipation, and it's a $100 copay per month. So he stopped the meds and now wants  Dr. Edita George to know and to prescribe him other meds. that will raise his BP. Pt. States he feels great now and wants to keep it that way. Pt. Can be reached at [phone].

## 2021-12-01 NOTE — TELEPHONE ENCOUNTER
To Dr Gardenia Oliveros  Please advise    Pt. Called stating he stopped taking Amiodorone about 3-4 days ago. He also stoppped taking Metoprolol, Sucralfate and Sevelamer about 1 month ago. He states he stopped them due to side effects from these meds. Mainly constipation and one of them lowers his BP when he needs something to raise his BP. The Amiodorone causes excess saliva in his mouth, light headed, loss of taste and smell, blurred vision and constipation, and it's a $100 copay per month. So he stopped the meds and now wants  Dr. Gardenia Oliveros to know and to prescribe him other meds. that will raise his BP. Pt. States he feels great now and wants to keep it that way. Pt. Can be reached at [phone].

## 2021-12-02 ENCOUNTER — TELEPHONE (OUTPATIENT)
Dept: PHYSICAL MEDICINE AND REHAB | Facility: CLINIC | Age: 81
End: 2021-12-02

## 2021-12-02 ENCOUNTER — OFFICE VISIT (OUTPATIENT)
Dept: SURGERY | Facility: CLINIC | Age: 81
End: 2021-12-02

## 2021-12-02 DIAGNOSIS — M47.816 LUMBAR SPONDYLOSIS: Primary | ICD-10-CM

## 2021-12-02 PROCEDURE — 64494 INJ PARAVERT F JNT L/S 2 LEV: CPT | Performed by: PHYSICAL MEDICINE & REHABILITATION

## 2021-12-02 PROCEDURE — 64493 INJ PARAVERT F JNT L/S 1 LEV: CPT | Performed by: PHYSICAL MEDICINE & REHABILITATION

## 2021-12-02 NOTE — TELEPHONE ENCOUNTER
Spoke to [facility] 11/30/21 - Amber Witt reports pt stopped his warfarin early independently for injection procedure; Pt was then instructed by pain center he does not need to hold warfarin;    At this point pt will restart warfarin as follows:  11/30  7.5 mg  12/1

## 2021-12-03 ENCOUNTER — TELEPHONE (OUTPATIENT)
Dept: INTERNAL MEDICINE CLINIC | Facility: CLINIC | Age: 81
End: 2021-12-03

## 2021-12-03 DIAGNOSIS — I48.20 CHRONIC ATRIAL FIBRILLATION (HCC): ICD-10-CM

## 2021-12-03 NOTE — TELEPHONE ENCOUNTER
Patient is calling to speak with a nurse or Dr Nataliya Goss. Patient states he is seeing a home health nurse but is requesting a  from home health as well. Patient states he needs some help at home.

## 2021-12-03 NOTE — TELEPHONE ENCOUNTER
Called Lowella Dakin form [facility] and gave verbal approval for  to see patient - left on confidential VM  Called patient and relayed message that we would contact [place] to get  - as FYI to DR. JJ

## 2021-12-03 NOTE — TELEPHONE ENCOUNTER
Jeet Ruelas called--INR 1.0  [address] called--- subtherapeutic---pt confirms taking 7.5 mg 12/1 and 12/2 (records corrected from prior message); no other issues---adjusted dose and recheck 3 days

## 2021-12-06 ENCOUNTER — TELEPHONE (OUTPATIENT)
Dept: INTERNAL MEDICINE CLINIC | Facility: CLINIC | Age: 81
End: 2021-12-06

## 2021-12-06 DIAGNOSIS — M54.50 ACUTE MIDLINE LOW BACK PAIN WITHOUT SCIATICA: Primary | ICD-10-CM

## 2021-12-06 DIAGNOSIS — R93.89 ABNORMAL RADIOGRAPH: ICD-10-CM

## 2021-12-06 DIAGNOSIS — M81.8 OTHER OSTEOPOROSIS, UNSPECIFIED PATHOLOGICAL FRACTURE PRESENCE: ICD-10-CM

## 2021-12-06 NOTE — TELEPHONE ENCOUNTER
Trinidad Rojas [phone]---INR 1.2--- LANDEN Pettit---subtherapeutic---no problems/ bleeds;  pt has stopped his amiodarone;  likely contributing---increased dose and recheck 3-4 days

## 2021-12-06 NOTE — TELEPHONE ENCOUNTER
Spoke to patient who reports he got a spinal injection on 12/2/21 and his pain is not improved. He would rate his back pain (R>L) at an 8/10 currently.  He does not feel he needs to go to ER for pain management as this pain has been going on for a long time

## 2021-12-06 NOTE — TELEPHONE ENCOUNTER
Pt. Called stating he had an injection in his spine last Thursday. He said it did not hardly help him at all. He is still having really bad pain. Having trouble walking.   He is asking if Dr. Keely Holden can refer him to a Neurologist [facility]

## 2021-12-08 ENCOUNTER — TELEPHONE (OUTPATIENT)
Dept: INTERNAL MEDICINE CLINIC | Facility: CLINIC | Age: 81
End: 2021-12-08

## 2021-12-08 NOTE — TELEPHONE ENCOUNTER
Please advise on referral for neurologist - sciatica pain- to DR. JJ  Had steroid injection - not working

## 2021-12-08 NOTE — TELEPHONE ENCOUNTER
Pt called to request refills thru    [address] for     Atorvastatin 20 mg, takes 2 Tablets daily    Amiodarone - pt has 400 mg tablets that he is cutting in half    dose was changed by Dr Eliseo Banda to 200 mg    Please send Rx with updated dos

## 2021-12-08 NOTE — TELEPHONE ENCOUNTER
Pt  Is calling checking on the Neurology referral.  Pt is a lot of pain and would like the referral as soon as possible. Please call and advise.   [phone]

## 2021-12-09 NOTE — TELEPHONE ENCOUNTER
Emmie 23 please consult on this  Labs done 10/11/21  CR 4.58    AST 73    Per last note pt to F/U with Dr. Amanda Mullen r/e [email or web address]

## 2021-12-10 NOTE — TELEPHONE ENCOUNTER
To DR. АННА Toney pls review atorvastatin d/t LFTs and is dose 20 or 40 mg;  I can call pt if needed

## 2021-12-13 NOTE — TELEPHONE ENCOUNTER
Patient is calling to check on the referral status below. Patient is looking for an [facility] Physician in Neurology. Patient states he is in lots of pain and is looking for the referral sooner rather than later.  Patient was informed that a referra

## 2021-12-14 RX ORDER — DULOXETIN HYDROCHLORIDE 30 MG/1
30 CAPSULE, DELAYED RELEASE ORAL DAILY
Qty: 30 CAPSULE | Refills: 3 | Status: SHIPPED | OUTPATIENT
Start: 2021-12-14

## 2021-12-14 RX ORDER — ATORVASTATIN CALCIUM 40 MG/1
40 TABLET, FILM COATED ORAL NIGHTLY
Qty: 90 TABLET | Refills: 3 | Status: SHIPPED | OUTPATIENT
Start: 2021-12-14

## 2021-12-14 NOTE — TELEPHONE ENCOUNTER
This pt is under care of physiatrist, Dr Elisabeth Salas; s/p facet injections 12/2/21; he has a F/U appt 12/29/21    Pt reports chronic back pain; taking Tylenol prn    Will prescribe Cymbalta 30 mg daily #30, 3 RF    pt called; he verbalized understanding

## 2021-12-15 ENCOUNTER — TELEPHONE (OUTPATIENT)
Dept: INTERNAL MEDICINE CLINIC | Facility: CLINIC | Age: 81
End: 2021-12-15

## 2021-12-15 DIAGNOSIS — I48.20 CHRONIC ATRIAL FIBRILLATION (HCC): ICD-10-CM

## 2021-12-15 PROBLEM — M81.0 OSTEOPOROSIS: Status: ACTIVE | Noted: 2021-01-01

## 2021-12-15 PROBLEM — R93.89 ABNORMAL RADIOGRAPH: Status: ACTIVE | Noted: 2021-01-01

## 2021-12-15 PROBLEM — M81.0 OSTEOPOROSIS: Status: ACTIVE | Noted: 2021-12-15

## 2021-12-15 PROBLEM — R93.89 ABNORMAL RADIOGRAPH: Status: ACTIVE | Noted: 2021-12-15

## 2021-12-15 NOTE — TELEPHONE ENCOUNTER
Helen from [facility] and relayed DR. JJ message - verbalized understanding

## 2021-12-15 NOTE — TELEPHONE ENCOUNTER
Hold coumadin x 3d; re-check Sat 12/18; please call [facility] [place]

## 2021-12-15 NOTE — TELEPHONE ENCOUNTER
The patient is not better after his injection. Please call and ask if he is able to get an MRI scan. If so, please pend me an order for a lumbar MRI. If not, please pend me an order for a lumbar CT. Thank you.

## 2021-12-15 NOTE — TELEPHONE ENCOUNTER
Spoke to Nelsy from [facility] per Catalina's request. He informs me that Zenaida Lyon had instructed Nelsy to recheck Zach's INR on Friday but patient was a no show and Nelsy was unable to reach him over the weekend. Therefore, patient continued taking 7.5mg daily.  Nelsy tavarez

## 2021-12-16 ENCOUNTER — TELEPHONE (OUTPATIENT)
Dept: INTERNAL MEDICINE CLINIC | Facility: CLINIC | Age: 81
End: 2021-12-16

## 2021-12-16 DIAGNOSIS — D68.9 COAGULATION DEFECT, UNSPECIFIED (HCC): ICD-10-CM

## 2021-12-16 DIAGNOSIS — I48.20 CHRONIC ATRIAL FIBRILLATION (HCC): Primary | ICD-10-CM

## 2021-12-16 NOTE — TELEPHONE ENCOUNTER
[facility]     Patient will have his INR done at the lab on Saturday not by home health  Please add order if needed

## 2021-12-16 NOTE — TELEPHONE ENCOUNTER
[facility] called regarding patient INR  Dr Jose Maldonado wanted patient to have INR checked on Saturday, pt has dialysis on Saturday  Pt will have INR checked at lab on Saturday, will not have [address] check  Please enter order for patient

## 2021-12-16 NOTE — TELEPHONE ENCOUNTER
This is a duplicate encounter- previous message has already been routed to PCP. Nothing further in this encounter.

## 2021-12-21 ENCOUNTER — TELEPHONE (OUTPATIENT)
Dept: INTERNAL MEDICINE CLINIC | Facility: CLINIC | Age: 81
End: 2021-12-21

## 2021-12-21 ENCOUNTER — TELEPHONE (OUTPATIENT)
Dept: GASTROENTEROLOGY | Facility: CLINIC | Age: 81
End: 2021-12-21

## 2021-12-21 DIAGNOSIS — R14.0 BLOATING: ICD-10-CM

## 2021-12-21 DIAGNOSIS — I48.20 CHRONIC ATRIAL FIBRILLATION (HCC): ICD-10-CM

## 2021-12-21 DIAGNOSIS — I50.23 ACUTE ON CHRONIC SYSTOLIC CHF (CONGESTIVE HEART FAILURE) (HCC): ICD-10-CM

## 2021-12-21 DIAGNOSIS — Z99.2 ESRD (END STAGE RENAL DISEASE) ON DIALYSIS (HCC): ICD-10-CM

## 2021-12-21 DIAGNOSIS — D50.0 IRON DEFICIENCY ANEMIA DUE TO CHRONIC BLOOD LOSS: ICD-10-CM

## 2021-12-21 DIAGNOSIS — N18.6 ESRD (END STAGE RENAL DISEASE) ON DIALYSIS (HCC): ICD-10-CM

## 2021-12-21 DIAGNOSIS — D50.0 ANEMIA DUE TO CHRONIC BLOOD LOSS: Primary | ICD-10-CM

## 2021-12-21 DIAGNOSIS — Z79.01 ANTICOAGULANT LONG-TERM USE: ICD-10-CM

## 2021-12-21 NOTE — TELEPHONE ENCOUNTER
Spoke with Nelia Izaguirre and his wife. Accepted appointment for tomorrow, Wednesday 12/22/2021 at 11:30 am and greatly appreciative for MD's accomodation. Reviewed lab orders placed which MD would like done prior to visit.  Planning to come in early tomorr

## 2021-12-21 NOTE — TELEPHONE ENCOUNTER
To On Call Dr. Paul Case to please advise-- Areli Holley not back until Thursday and Dr. Salinas Body off for today.

## 2021-12-21 NOTE — TELEPHONE ENCOUNTER
Thanks Miranda. Most of his symptoms may be due to his heart failure and kidney failure. He is approaching end-of-life. I believe he is on dialysis 3x/ week. Please ask Mr. Burk and his wife if they could come in tomorrow Wednesday at 11:30

## 2021-12-21 NOTE — TELEPHONE ENCOUNTER
Pt and wife on call. Wife states pts saliva is very thick and sweet. Wife states sweetness causes pt to become dizzy. Wife also states pt has a lot of bloating after eating.  Please call pt [phone]

## 2021-12-21 NOTE — TELEPHONE ENCOUNTER
Dr. Deb Torres--    Spoke with Casey Manzo and his wife. Last seen in clinic 09/27/2021 and recent hospitalization 10/04/2021  (v tach and discharge of ICD). Symptoms started approximately one month ago and have quickly progressed.      His mouth is constant

## 2021-12-21 NOTE — TELEPHONE ENCOUNTER
[facility] calling with PT/INR results from 12/21/21  INR 1.1  PT 13.1   Coumadin held on Friday & Saturday,  Sunday & Monday 2.5 mg    Phone [phone]

## 2021-12-21 NOTE — TELEPHONE ENCOUNTER
Tre Tenorio called---pt missed INR on Sat;   Has dialysis today and HHRN will try to get to pt for INR today;

## 2021-12-21 NOTE — TELEPHONE ENCOUNTER
Spoke to Wilkinson and relayed MD orders. Wilkinson read-back orders correctly and verbalized understanding.

## 2021-12-22 ENCOUNTER — OFFICE VISIT (OUTPATIENT)
Dept: GASTROENTEROLOGY | Facility: CLINIC | Age: 81
End: 2021-12-22
Payer: MEDICARE

## 2021-12-22 ENCOUNTER — LAB ENCOUNTER (OUTPATIENT)
Dept: LAB | Facility: HOSPITAL | Age: 81
End: 2021-12-22
Attending: INTERNAL MEDICINE
Payer: MEDICARE

## 2021-12-22 ENCOUNTER — TELEPHONE (OUTPATIENT)
Dept: GASTROENTEROLOGY | Facility: CLINIC | Age: 81
End: 2021-12-22

## 2021-12-22 VITALS
BODY MASS INDEX: 23.39 KG/M2 | HEIGHT: 67 IN | SYSTOLIC BLOOD PRESSURE: 120 MMHG | WEIGHT: 149 LBS | TEMPERATURE: 99 F | HEART RATE: 70 BPM | DIASTOLIC BLOOD PRESSURE: 72 MMHG

## 2021-12-22 DIAGNOSIS — D50.0 ANEMIA DUE TO CHRONIC BLOOD LOSS: ICD-10-CM

## 2021-12-22 DIAGNOSIS — I50.23 ACUTE ON CHRONIC SYSTOLIC CHF (CONGESTIVE HEART FAILURE) (HCC): ICD-10-CM

## 2021-12-22 DIAGNOSIS — N18.6 ESRD (END STAGE RENAL DISEASE) ON DIALYSIS (HCC): ICD-10-CM

## 2021-12-22 DIAGNOSIS — I48.20 CHRONIC ATRIAL FIBRILLATION (HCC): ICD-10-CM

## 2021-12-22 DIAGNOSIS — R14.0 BLOATING: ICD-10-CM

## 2021-12-22 DIAGNOSIS — N18.6 ESRD ON HEMODIALYSIS (HCC): ICD-10-CM

## 2021-12-22 DIAGNOSIS — I50.23 ACUTE ON CHRONIC SYSTOLIC CHF (CONGESTIVE HEART FAILURE) (HCC): Primary | ICD-10-CM

## 2021-12-22 DIAGNOSIS — Z79.01 ANTICOAGULANT LONG-TERM USE: ICD-10-CM

## 2021-12-22 DIAGNOSIS — R42 LIGHTHEADED: ICD-10-CM

## 2021-12-22 DIAGNOSIS — R11.0 NAUSEA: ICD-10-CM

## 2021-12-22 DIAGNOSIS — I50.9 CHRONIC CONGESTIVE HEART FAILURE, UNSPECIFIED HEART FAILURE TYPE (HCC): ICD-10-CM

## 2021-12-22 DIAGNOSIS — R42 DIZZINESSES: Primary | ICD-10-CM

## 2021-12-22 DIAGNOSIS — D50.0 IRON DEFICIENCY ANEMIA DUE TO CHRONIC BLOOD LOSS: ICD-10-CM

## 2021-12-22 DIAGNOSIS — Z99.2 ESRD (END STAGE RENAL DISEASE) ON DIALYSIS (HCC): ICD-10-CM

## 2021-12-22 DIAGNOSIS — Z99.2 ESRD ON HEMODIALYSIS (HCC): ICD-10-CM

## 2021-12-22 PROCEDURE — 3078F DIAST BP <80 MM HG: CPT | Performed by: INTERNAL MEDICINE

## 2021-12-22 PROCEDURE — 85027 COMPLETE CBC AUTOMATED: CPT

## 2021-12-22 PROCEDURE — 80053 COMPREHEN METABOLIC PANEL: CPT

## 2021-12-22 PROCEDURE — 83880 ASSAY OF NATRIURETIC PEPTIDE: CPT

## 2021-12-22 PROCEDURE — 99215 OFFICE O/P EST HI 40 MIN: CPT | Performed by: INTERNAL MEDICINE

## 2021-12-22 PROCEDURE — 36415 COLL VENOUS BLD VENIPUNCTURE: CPT

## 2021-12-22 PROCEDURE — 3008F BODY MASS INDEX DOCD: CPT | Performed by: INTERNAL MEDICINE

## 2021-12-22 PROCEDURE — 3074F SYST BP LT 130 MM HG: CPT | Performed by: INTERNAL MEDICINE

## 2021-12-22 NOTE — TELEPHONE ENCOUNTER
Noted message below. Will follow up with patient tomorrow to provide information on how to schedule CT.     Placed reminder in Pt Outreach to contact patient next Monday 12/27 or Tuesday 12/28 to follow up if he is taking miralax and if not to start taking

## 2021-12-22 NOTE — PLAN OF CARE
Problem: Patient Centered Care  Goal: Patient preferences are identified and integrated in the patient's plan of care  Description: Interventions:  - What would you like us to know as we care for you?  I don't want to go to rehab  - Provide timely, comple Pt informed   heart rate control medications as ordered  - Initiate emergency measures for life threatening arrhythmias  - Monitor electrolytes and administer replacement therapy as ordered  Outcome: Progressing     Problem: RESPIRATORY - ADULT  Goal: Achieves optimal v

## 2021-12-22 NOTE — PROGRESS NOTES
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    No headaches. He has seen ENT regarding poor hearing and hearing aids but not this concern. Cardiac/respiratory symptoms appear to be at baseline.   He was able to get from the car across the parking lot to a wheelchair fibrillation with rapid ventricular response (HCC)     Hyperglycemia     Acute hypoxemic respiratory failure (HCC)     Bloating     Floaters, bilateral     Atherosclerosis of aorta (HCC)     ICD (implantable cardioverter-defibrillator) in place     Anticoa severe sudden onset of chest pain.  He did not know the cause of the pain, which eventually resolved.  On the morning of admission, he again experienced acute onset of several episodes of chest pain; states the noise sounded like a gun shot; he realized th fibrillation.     Gastroenterology was consulted during that admission due to FOBT positive and melanotic stool.  He was optimized medically with Benjaman [place] was continued on PPI and his hemoglobin remained stable.  He tolerated bridging from heparin b with chronic CAD on 11/2/17; unable to afford Entresto   -Biv ICD is MRI-compatible  –Coreg changed to metoprolol per cardiology  -echo on 10/6/21 - LVEF 30% (down from 40-45% in 8/2021, though unchanged from previous echocardiograms in 8/19 and 12/18)  -r held due to elevated transaminases, now normalizing  -may resume Lipitor 40 mg po qHS      Macular degeneration  Early and mild; saw ophtho Dr Hickman [place] 2019; annual visit     Cataract   saw ophtho Dr Presley Kumari in Oct 2019     Secondary hyperparathyro Son is in the armed services, possibly Fifi Milian? Remarkable young man. Mr. Maru Nugent looks and sounds very weak. Extremely hard of hearing.   He states that the syndrome of exactly 1 year ago, severe abdominal pressure discomfort is starting all ove kg)  12/03/21 : 156 lb (70.8 kg)  11/26/21 : 150 lb (68 kg)  11/08/21 : 144 lb 3.2 oz (65.4 kg)  10/13/21 : 145 lb 11.2 oz (66.1 kg)  09/02/21 : 166 lb 8 oz (75.5 kg)  07/19/21 : 158 lb (71.7 kg)  07/07/21 : 158 lb (71.7 kg)  12/15/20 : 153 lb (69.4 kg)  1 artery disease possible cardiac pacemaker and atrial fibrillation on Coumadin anticoagulation therapy; just saw my partner Dr. Zara Dandy last week regarding abdominal pain including epigastrium, change in bowel habits with rectal bleeding.  Dr. Eleni eason this patient.  More than 50% at that time was spent in counseling.      ===================    6/21/2020     CT ABDOMEN+PELVIS(CPT=74176)       COMPARISON:   None.       INDICATIONS:   Pt c/o abdominal pain for last 2 weeks progressively getting worse.     M.D. SEDATION:    MAC anesthesia provided by the Anesthesia Service.   MAC anesthesia requested due to complex cardiac history ASA 3, anticipated intolerance of EGD examination under safe doses conscious sedation medications        EGD PROCEDURE:    Aft admission. · Poor tolerance of today's exam under MAC anesthesia with high oxygen requirements, very narrow window between sedation and apnea.   Mr. Amanda Copeland would be extremely high risk for colonoscopy examination.      ===================    COLONOSC technique. · Moderate diverticulosis descending and sigmoid colon. · Medium sized internal hemorrhoids. · Melanosis coli.     RECOMMENDATIONS:  · High fiber diet. · Follow-up above colon polyp pathology results.   · Repeat colonoscopy examination in ONE suggestive of possible slow bleeding, but no gross blood or clots and green bilious chyme present in the colon. Unprepped colon made these fairly emili views.   Remainder of the capsule study of the colon was of green stool only.     Recommendations:     · impression. SURGEON:  Thomas Moreno M.D.     [place] anesthesia provided by the Anesthesia Service. MAC anesthesia requested due to complex history of atrial fibrillation, heart failure, recent COVID-19 syndrome; ASA class IV.      CO coli  · Moderate severity colonic diverticulosis ascending and sigmoid colon  · Small internal hemorrhoids.     RECOMMENDATIONS:  · High fiber diet. · Resume anticoagulation in 2 days. · Continue close monitoring of H&H, iron studies.          Current Out daily 100 strip 3   • warfarin 5 MG Oral Tab Take 1 tablet (5 mg total) by mouth nightly.  30 tablet 5   • epoetin joel-epbx 2000 UNIT/ML SOLN 2,000 Units, epoetin joel-epbx 3000 UNIT/ML SOLN 3,000 Units Inject 5,000 Units into the skin 3 (three) times a we hospitalized 8/18/2021 with concern for acute on chronic renal failure, creatinine up from 3.12 to 13.6 just 1 year later. Apparently Mr. Kelly Marcelino had not been coming in for regular follow-ups or labs due to concern about the COVID-19 pandemic.     Sta ?? decompensation from the return of the anemia  · Likely component of constipation and gas bloating  · Labs today 12/22/2021 in this hemodialysis patient show elevated pro-BNP 99,265  · I called and discussed today's clinical scenario and labs with Dr. Brooke Valenzuela Gas-X  Follow-up next week; unclear if prescription laxatives would be safe in such a tenuous cardiac/renal situation    4.   Strange atypical sweet taste in mouth  Unsure if this relates to above abdominal syndrome, abdominal mesenteric venous congestion

## 2021-12-22 NOTE — TELEPHONE ENCOUNTER
Betty Kasper see today's office visit. Hemal Ly is not doing well. We are going to try stepping up his dialysis treatments. GI RNs, I ordered today a CT head for Mr. Burk - not urgent. Please call them tomorrow Thursday and advise how to order.

## 2021-12-23 ENCOUNTER — TELEPHONE (OUTPATIENT)
Dept: INTERNAL MEDICINE CLINIC | Facility: CLINIC | Age: 81
End: 2021-12-23

## 2021-12-23 DIAGNOSIS — I48.20 CHRONIC ATRIAL FIBRILLATION (HCC): ICD-10-CM

## 2021-12-23 NOTE — TELEPHONE ENCOUNTER
Patient contacted, verified and message from Dr. Ezequiel Trujillo given. Phone number for [employer] given  Patient voiced understanding.

## 2021-12-23 NOTE — TELEPHONE ENCOUNTER
HHRN called---INR confirmed at 1.5--- subtherapeutic--- pt reports doses correctly---adjusted dose and recheck 5 days

## 2021-12-27 NOTE — TELEPHONE ENCOUNTER
Dr Gary Martinez I spoke to pt this morning    He is taking his MiraLax every night before going to bed and this is helping him have a bowel movement every morning.     He is using thee MiraLax in addition to the Senna    He has both a CT of the head/brain and CT

## 2021-12-27 NOTE — TELEPHONE ENCOUNTER
Thanks Galina Tucker. Mr. Barbara Kiran is approaching end-of-life. I am not sure if we can do much more than relieve his constipation. CT scans as ordered sound like a very good idea.

## 2021-12-28 ENCOUNTER — TELEPHONE (OUTPATIENT)
Dept: INTERNAL MEDICINE CLINIC | Facility: CLINIC | Age: 81
End: 2021-12-28

## 2021-12-28 DIAGNOSIS — I48.20 CHRONIC ATRIAL FIBRILLATION (HCC): ICD-10-CM

## 2021-12-28 NOTE — TELEPHONE ENCOUNTER
HHRN called---therapeutic---no problems/ bleeds; pt reports doses taken---adjusted dose and recheck 1 week or last [facility] visit

## 2021-12-29 ENCOUNTER — OFFICE VISIT (OUTPATIENT)
Dept: PHYSICAL MEDICINE AND REHAB | Facility: CLINIC | Age: 81
End: 2021-12-29
Payer: MEDICARE

## 2021-12-29 ENCOUNTER — HOSPITAL ENCOUNTER (OUTPATIENT)
Dept: CT IMAGING | Facility: HOSPITAL | Age: 81
Discharge: HOME | End: 2021-12-29
Attending: PHYSICAL MEDICINE & REHABILITATION
Payer: MEDICARE

## 2021-12-29 VITALS
SYSTOLIC BLOOD PRESSURE: 128 MMHG | WEIGHT: 149 LBS | HEIGHT: 67 IN | BODY MASS INDEX: 23.39 KG/M2 | DIASTOLIC BLOOD PRESSURE: 64 MMHG

## 2021-12-29 DIAGNOSIS — Z79.01 ANTICOAGULANT LONG-TERM USE: ICD-10-CM

## 2021-12-29 DIAGNOSIS — E11.9 TYPE 2 DIABETES MELLITUS WITHOUT COMPLICATION, WITHOUT LONG-TERM CURRENT USE OF INSULIN (HCC): ICD-10-CM

## 2021-12-29 DIAGNOSIS — Z95.810 ICD (IMPLANTABLE CARDIOVERTER-DEFIBRILLATOR) IN PLACE: ICD-10-CM

## 2021-12-29 DIAGNOSIS — I25.10 CORONARY ARTERY DISEASE INVOLVING NATIVE CORONARY ARTERY OF NATIVE HEART WITHOUT ANGINA PECTORIS: ICD-10-CM

## 2021-12-29 DIAGNOSIS — M47.816 LUMBAR SPONDYLOSIS: ICD-10-CM

## 2021-12-29 DIAGNOSIS — M54.50 ACUTE MIDLINE LOW BACK PAIN WITHOUT SCIATICA: ICD-10-CM

## 2021-12-29 DIAGNOSIS — S32.010A COMPRESSION FRACTURE OF L1 LUMBAR VERTEBRA, CLOSED, INITIAL ENCOUNTER (HCC): Primary | ICD-10-CM

## 2021-12-29 PROCEDURE — 3008F BODY MASS INDEX DOCD: CPT | Performed by: PHYSICAL MEDICINE & REHABILITATION

## 2021-12-29 PROCEDURE — 72131 CT LUMBAR SPINE W/O DYE: CPT | Performed by: PHYSICAL MEDICINE & REHABILITATION

## 2021-12-29 PROCEDURE — 99214 OFFICE O/P EST MOD 30 MIN: CPT | Performed by: PHYSICAL MEDICINE & REHABILITATION

## 2021-12-29 PROCEDURE — 3078F DIAST BP <80 MM HG: CPT | Performed by: PHYSICAL MEDICINE & REHABILITATION

## 2021-12-29 PROCEDURE — 3074F SYST BP LT 130 MM HG: CPT | Performed by: PHYSICAL MEDICINE & REHABILITATION

## 2021-12-29 RX ORDER — CALCITONIN SALMON 200 [IU]/.09ML
1 SPRAY, METERED NASAL DAILY
Qty: 1 EACH | Refills: 1 | Status: SHIPPED | OUTPATIENT
Start: 2021-12-29 | End: 2022-01-28

## 2021-12-29 RX ORDER — TRAMADOL HYDROCHLORIDE 50 MG/1
50 TABLET ORAL EVERY 6 HOURS PRN
Qty: 60 TABLET | Refills: 0 | Status: ON HOLD | OUTPATIENT
Start: 2021-12-29 | End: 2022-01-18

## 2021-12-29 NOTE — TELEPHONE ENCOUNTER
[facility] calling patient will be discharged from home care on 1/4/22  fyi no call back needed

## 2021-12-29 NOTE — PROGRESS NOTES
[address]  Progress Note    CHIEF COMPLAINT:  Patient presents with:  Low Back Pain: LOV: 12/2/2021. Patient is f.u on lower back pain left side is worse. Pain is usually a 7/10.  Pain is wrose when he walks Procedure: COLONOSCOPY;  Surgeon: Delores Marie MD;  Location: [address] [place] [address] ENDOSCOPY   • COLONOSCOPY N/A 9/8/2020    Procedure: COLONOSCOPY;  Surgeon: Delores Marie MD;  Location: [address] [place] [address] ENDOSCOPY   • DIALYSIS PROCEDURE  08/2021    3 times wee Hydrocortisone, Perianal, (PROCTO-JAVIER) 1 % External Cream Place 1 Application rectally 2 (two) times daily. 28 g 0   • insulin detemir (LEVEMIR FLEXTOUCH) 100 UNIT/ML Subcutaneous Solution Pen-injector Inject 8 Units into the skin nightly.  3 mL 5   • Insul by mouth daily. 90 capsule 3   • docusate sodium 100 MG Oral Cap Take 100 mg by mouth 2 (two) times daily as needed. 60 capsule 0   • B Complex Vitamins (B COMPLEX-B12 OR) Take 1 tablet by mouth daily.          ALLERGIES:   No Known Allergies    REVIEW OF S (implantable cardioverter-defibrillator) in place  No MRIs    4. Coronary artery disease involving native coronary artery of native heart without angina pectoris  Avoiding NSAIDs    5.  Type 2 diabetes mellitus without complication, without long-term curren

## 2021-12-31 ENCOUNTER — HOSPITAL ENCOUNTER (OUTPATIENT)
Dept: CT IMAGING | Age: 81
Discharge: HOME | End: 2021-12-31
Attending: INTERNAL MEDICINE
Payer: MEDICARE

## 2021-12-31 DIAGNOSIS — R42 LIGHTHEADED: ICD-10-CM

## 2021-12-31 DIAGNOSIS — R11.0 NAUSEA: ICD-10-CM

## 2021-12-31 DIAGNOSIS — R42 DIZZINESSES: ICD-10-CM

## 2021-12-31 PROCEDURE — 70450 CT HEAD/BRAIN W/O DYE: CPT | Performed by: INTERNAL MEDICINE

## 2022-01-01 ENCOUNTER — TELEPHONE (OUTPATIENT)
Dept: CASE MANAGEMENT | Age: 82
End: 2022-01-01

## 2022-01-01 ENCOUNTER — TELEPHONE (OUTPATIENT)
Dept: INTERNAL MEDICINE CLINIC | Facility: CLINIC | Age: 82
End: 2022-01-01

## 2022-01-01 DIAGNOSIS — T82.42XA DISPLACEMENT OF VASCULAR DIALYSIS CATHETER, INITIAL ENCOUNTER (HCC): Primary | ICD-10-CM

## 2022-01-01 DIAGNOSIS — I48.20 CHRONIC ATRIAL FIBRILLATION (HCC): ICD-10-CM

## 2022-01-01 LAB — INR: 5 (ref 0.8–1.2)

## 2022-01-04 NOTE — TELEPHONE ENCOUNTER
To on call Dr. Cali Fontenot for coumadin instructions in PCP absence.      Current INR goal:  2.0-3.0     Message routed on high

## 2022-01-04 NOTE — TELEPHONE ENCOUNTER
Called Radha from [facility] who states [facility] , relayed DR. DELCID message and instructed Radha to let patient know to schedule visit with Mayda Elizabeth fro Thursday for INR - verbalized understanding

## 2022-01-04 NOTE — TELEPHONE ENCOUNTER
I recommend he hold his Coumadin tonight and tomorrow and recheck INR on Thursday with home health with a call here to the office, FYI to [place] and Dr. Nelson Hairston for thurs    -nursing call home health

## 2022-01-04 NOTE — TELEPHONE ENCOUNTER
Radha/John called    INR today was 5    Pt takes 7.5 mg coumadin Mon/Wed/Fri  5 mg all other days    Call back # [phone]    Pt will be discharged from home health today

## 2022-01-05 ENCOUNTER — TELEPHONE (OUTPATIENT)
Dept: INTERNAL MEDICINE CLINIC | Facility: CLINIC | Age: 82
End: 2022-01-05

## 2022-01-05 RX ORDER — ALLOPURINOL 100 MG/1
100 TABLET ORAL DAILY
Qty: 90 TABLET | Refills: 1 | Status: ON HOLD | OUTPATIENT
Start: 2022-01-05 | End: 2022-01-18

## 2022-01-05 RX ORDER — ALLOPURINOL 100 MG/1
TABLET ORAL
Qty: 90 TABLET | Refills: 0 | Status: SHIPPED | OUTPATIENT
Start: 2022-01-05

## 2022-01-05 NOTE — TELEPHONE ENCOUNTER
To  - In December, amiodarone off and on per pt (also he reported cutting tab in half); Do you want us to forward refill to cardiology?

## 2022-01-05 NOTE — TELEPHONE ENCOUNTER
[facility] faxed over a New Rx request on 1/5/22 for the following:    Pacerone 200 MG Tablet

## 2022-01-07 ENCOUNTER — ANTI-COAG VISIT (OUTPATIENT)
Dept: INTERNAL MEDICINE CLINIC | Facility: CLINIC | Age: 82
End: 2022-01-07
Payer: MEDICARE

## 2022-01-07 DIAGNOSIS — I48.20 CHRONIC ATRIAL FIBRILLATION (HCC): ICD-10-CM

## 2022-01-07 LAB — INR: 1.9 (ref 0.8–1.2)

## 2022-01-07 PROCEDURE — 85610 PROTHROMBIN TIME: CPT

## 2022-01-07 PROCEDURE — 99211 OFF/OP EST MAY X REQ PHY/QHP: CPT

## 2022-01-10 ENCOUNTER — TELEPHONE (OUTPATIENT)
Dept: CARDIOLOGY CLINIC | Facility: HOSPITAL | Age: 82
End: 2022-01-10

## 2022-01-10 ENCOUNTER — ANTI-COAG VISIT (OUTPATIENT)
Dept: INTERNAL MEDICINE CLINIC | Facility: CLINIC | Age: 82
End: 2022-01-10
Payer: MEDICARE

## 2022-01-10 ENCOUNTER — TELEPHONE (OUTPATIENT)
Dept: NEPHROLOGY | Facility: CLINIC | Age: 82
End: 2022-01-10

## 2022-01-10 DIAGNOSIS — I48.20 CHRONIC ATRIAL FIBRILLATION (HCC): ICD-10-CM

## 2022-01-10 LAB — INR: 1.6 (ref 0.8–1.2)

## 2022-01-10 PROCEDURE — 99211 OFF/OP EST MAY X REQ PHY/QHP: CPT

## 2022-01-10 PROCEDURE — 85610 PROTHROMBIN TIME: CPT

## 2022-01-10 RX ORDER — BUMETANIDE 1 MG/1
TABLET ORAL
Qty: 48 TABLET | Refills: 1 | Status: ON HOLD | OUTPATIENT
Start: 2022-01-10 | End: 2022-01-18

## 2022-01-10 NOTE — TELEPHONE ENCOUNTER
Pt requesting refill on bumex- states he takes 1mg BID on his nondialysis days. Ok to fill rx? LOV 11/26/21. No F/U scheduled.

## 2022-01-13 ENCOUNTER — TELEPHONE (OUTPATIENT)
Dept: INTERNAL MEDICINE CLINIC | Facility: CLINIC | Age: 82
End: 2022-01-13

## 2022-01-13 NOTE — TELEPHONE ENCOUNTER
Pt here for [place] - [place] plac[place] ;   To  to complete and then let pt know for  (next INR 1/17/22)

## 2022-01-16 NOTE — TELEPHONE ENCOUNTER
GI RNs,    Please call Mr. Burk and his wife to advise that the CT scan of his head 2 weeks ago looked good. This was to evaluate the nausea lightheadedness and dizziness.   There were normal age-related changes in his brain but no sign of bleeding

## 2022-01-17 ENCOUNTER — ANTI-COAG VISIT (OUTPATIENT)
Dept: INTERNAL MEDICINE CLINIC | Facility: CLINIC | Age: 82
End: 2022-01-17
Payer: MEDICARE

## 2022-01-17 ENCOUNTER — MED REC SCAN ONLY (OUTPATIENT)
Dept: PHYSICAL MEDICINE AND REHAB | Facility: CLINIC | Age: 82
End: 2022-01-17

## 2022-01-17 DIAGNOSIS — I48.20 CHRONIC ATRIAL FIBRILLATION (HCC): ICD-10-CM

## 2022-01-17 LAB — INR: 2 (ref 0.8–1.2)

## 2022-01-17 PROCEDURE — 85610 PROTHROMBIN TIME: CPT

## 2022-01-17 PROCEDURE — 93793 ANTICOAG MGMT PT WARFARIN: CPT

## 2022-01-17 NOTE — TELEPHONE ENCOUNTER
Of note ; Pt reported taking both pacerone and amiodarone at INR visit 1/10/22; Pt therapy corrected and pt followed up with me 1/17/22 that he corrected meds;    He also reports taste disturbance is now cleared without the pacerone

## 2022-01-18 ENCOUNTER — APPOINTMENT (OUTPATIENT)
Dept: CT IMAGING | Facility: HOSPITAL | Age: 82
End: 2022-01-18
Attending: EMERGENCY MEDICINE
Payer: MEDICARE

## 2022-01-18 ENCOUNTER — APPOINTMENT (OUTPATIENT)
Dept: GENERAL RADIOLOGY | Facility: HOSPITAL | Age: 82
End: 2022-01-18
Attending: EMERGENCY MEDICINE
Payer: MEDICARE

## 2022-01-18 ENCOUNTER — HOSPITAL ENCOUNTER (OUTPATIENT)
Facility: HOSPITAL | Age: 82
Setting detail: OBSERVATION
Discharge: HOME | End: 2022-01-19
Attending: EMERGENCY MEDICINE | Admitting: INTERNAL MEDICINE
Payer: MEDICARE

## 2022-01-18 DIAGNOSIS — N18.6 ESRD (END STAGE RENAL DISEASE) (HCC): ICD-10-CM

## 2022-01-18 DIAGNOSIS — R11.2 NAUSEA AND VOMITING, UNSPECIFIED VOMITING TYPE: ICD-10-CM

## 2022-01-18 DIAGNOSIS — R42 DIZZINESS: Primary | ICD-10-CM

## 2022-01-18 DIAGNOSIS — K85.90 ACUTE PANCREATITIS, UNSPECIFIED COMPLICATION STATUS, UNSPECIFIED PANCREATITIS TYPE: ICD-10-CM

## 2022-01-18 LAB
ALBUMIN SERPL-MCNC: 3.1 G/DL (ref 3.4–5)
ALP LIVER SERPL-CCNC: 139 U/L
ALT SERPL-CCNC: 19 U/L
ANION GAP SERPL CALC-SCNC: 16 MMOL/L (ref 0–18)
AST SERPL-CCNC: 20 U/L (ref 15–37)
BASOPHILS # BLD AUTO: 0.01 X10(3) UL (ref 0–0.2)
BASOPHILS NFR BLD AUTO: 0.2 %
BILIRUB DIRECT SERPL-MCNC: 0.3 MG/DL (ref 0–0.2)
BILIRUB SERPL-MCNC: 0.6 MG/DL (ref 0.1–2)
BUN BLD-MCNC: 56 MG/DL (ref 7–18)
BUN/CREAT SERPL: 7.2 (ref 10–20)
CALCIUM BLD-MCNC: 8.7 MG/DL (ref 8.5–10.1)
CHLORIDE SERPL-SCNC: 89 MMOL/L (ref 98–112)
CO2 SERPL-SCNC: 23 MMOL/L (ref 21–32)
CREAT BLD-MCNC: 7.81 MG/DL
DEPRECATED HBV CORE AB SER IA-ACNC: 1801.5 NG/ML
DEPRECATED RDW RBC AUTO: 71.1 FL (ref 35.1–46.3)
EOSINOPHIL # BLD AUTO: 0.01 X10(3) UL (ref 0–0.7)
EOSINOPHIL NFR BLD AUTO: 0.2 %
ERYTHROCYTE [DISTWIDTH] IN BLOOD BY AUTOMATED COUNT: 18.8 % (ref 11–15)
EST. AVERAGE GLUCOSE BLD GHB EST-MCNC: 120 MG/DL (ref 68–126)
GLUCOSE BLD-MCNC: 137 MG/DL (ref 70–99)
GLUCOSE BLDC GLUCOMTR-MCNC: 113 MG/DL (ref 70–99)
HBA1C MFR BLD: 5.8 % (ref ?–5.7)
HBV SURFACE AG SER-ACNC: <0.1 [IU]/L
HBV SURFACE AG SERPL QL IA: NONREACTIVE
HCT VFR BLD AUTO: 32.5 %
HGB BLD-MCNC: 10.7 G/DL
IMM GRANULOCYTES # BLD AUTO: 0.02 X10(3) UL (ref 0–1)
IMM GRANULOCYTES NFR BLD: 0.3 %
IRON SATN MFR SERPL: 20 %
IRON SERPL-MCNC: 45 UG/DL
LIPASE SERPL-CCNC: 448 U/L (ref 73–393)
LYMPHOCYTES # BLD AUTO: 0.37 X10(3) UL (ref 1–4)
LYMPHOCYTES NFR BLD AUTO: 5.7 %
MCH RBC QN AUTO: 33.6 PG (ref 26–34)
MCHC RBC AUTO-ENTMCNC: 32.9 G/DL (ref 31–37)
MCV RBC AUTO: 102.2 FL
MONOCYTES # BLD AUTO: 0.36 X10(3) UL (ref 0.1–1)
MONOCYTES NFR BLD AUTO: 5.5 %
NEUTROPHILS # BLD AUTO: 5.74 X10 (3) UL (ref 1.5–7.7)
NEUTROPHILS # BLD AUTO: 5.74 X10(3) UL (ref 1.5–7.7)
NEUTROPHILS NFR BLD AUTO: 88.1 %
OSMOLALITY SERPL CALC.SUM OF ELEC: 284 MOSM/KG (ref 275–295)
PLATELET # BLD AUTO: 164 10(3)UL (ref 150–450)
PLATELET MORPHOLOGY: NORMAL
POTASSIUM SERPL-SCNC: 4.4 MMOL/L (ref 3.5–5.1)
PROT SERPL-MCNC: 7 G/DL (ref 6.4–8.2)
RBC # BLD AUTO: 3.18 X10(6)UL
SARS-COV-2 RNA RESP QL NAA+PROBE: NOT DETECTED
SODIUM SERPL-SCNC: 128 MMOL/L (ref 136–145)
TIBC SERPL-MCNC: 228 UG/DL (ref 240–450)
TRANSFERRIN SERPL-MCNC: 153 MG/DL (ref 200–360)
VIT B12 SERPL-MCNC: >2000 PG/ML (ref 193–986)
WBC # BLD AUTO: 6.5 X10(3) UL (ref 4–11)

## 2022-01-18 PROCEDURE — 71045 X-RAY EXAM CHEST 1 VIEW: CPT | Performed by: EMERGENCY MEDICINE

## 2022-01-18 PROCEDURE — 74176 CT ABD & PELVIS W/O CONTRAST: CPT | Performed by: EMERGENCY MEDICINE

## 2022-01-18 PROCEDURE — 99223 1ST HOSP IP/OBS HIGH 75: CPT | Performed by: INTERNAL MEDICINE

## 2022-01-18 RX ORDER — CALCIUM CARBONATE 200(500)MG
2000 TABLET,CHEWABLE ORAL NIGHTLY
Status: DISCONTINUED | OUTPATIENT
Start: 2022-01-18 | End: 2022-01-19

## 2022-01-18 RX ORDER — CALCITONIN SALMON 200 [IU]/.09ML
1 SPRAY, METERED NASAL DAILY
Status: DISCONTINUED | OUTPATIENT
Start: 2022-01-19 | End: 2022-01-19

## 2022-01-18 RX ORDER — AMIODARONE HYDROCHLORIDE 200 MG/1
400 TABLET ORAL DAILY
Status: DISCONTINUED | OUTPATIENT
Start: 2022-01-19 | End: 2022-01-19

## 2022-01-18 RX ORDER — GARLIC EXTRACT 500 MG
1 CAPSULE ORAL 2 TIMES DAILY
Status: DISCONTINUED | OUTPATIENT
Start: 2022-01-18 | End: 2022-01-19

## 2022-01-18 RX ORDER — MECLIZINE HYDROCHLORIDE 25 MG/1
25 TABLET ORAL ONCE
Status: COMPLETED | OUTPATIENT
Start: 2022-01-18 | End: 2022-01-18

## 2022-01-18 RX ORDER — CALCITRIOL 0.25 UG/1
0.25 CAPSULE, LIQUID FILLED ORAL DAILY
Status: DISCONTINUED | OUTPATIENT
Start: 2022-01-19 | End: 2022-01-19

## 2022-01-18 RX ORDER — ATORVASTATIN CALCIUM 40 MG/1
40 TABLET, FILM COATED ORAL NIGHTLY
Status: DISCONTINUED | OUTPATIENT
Start: 2022-01-18 | End: 2022-01-19

## 2022-01-18 RX ORDER — ALLOPURINOL 100 MG/1
100 TABLET ORAL DAILY
Status: DISCONTINUED | OUTPATIENT
Start: 2022-01-19 | End: 2022-01-19

## 2022-01-18 RX ORDER — DULOXETIN HYDROCHLORIDE 30 MG/1
30 CAPSULE, DELAYED RELEASE ORAL DAILY
Status: DISCONTINUED | OUTPATIENT
Start: 2022-01-19 | End: 2022-01-19

## 2022-01-18 RX ORDER — HEPARIN SODIUM 1000 [USP'U]/ML
4000 INJECTION, SOLUTION INTRAVENOUS; SUBCUTANEOUS
Status: DISCONTINUED | OUTPATIENT
Start: 2022-01-18 | End: 2022-01-19

## 2022-01-18 RX ORDER — ASPIRIN 81 MG/1
81 TABLET, CHEWABLE ORAL DAILY
Status: DISCONTINUED | OUTPATIENT
Start: 2022-01-19 | End: 2022-01-19

## 2022-01-18 RX ORDER — SEVELAMER CARBONATE 800 MG/1
800 TABLET, FILM COATED ORAL
Status: DISCONTINUED | OUTPATIENT
Start: 2022-01-18 | End: 2022-01-19

## 2022-01-18 RX ORDER — PANTOPRAZOLE SODIUM 40 MG/1
40 TABLET, DELAYED RELEASE ORAL
Status: DISCONTINUED | OUTPATIENT
Start: 2022-01-19 | End: 2022-01-19

## 2022-01-18 RX ORDER — WARFARIN SODIUM 5 MG/1
5 TABLET ORAL NIGHTLY
Status: DISCONTINUED | OUTPATIENT
Start: 2022-01-18 | End: 2022-01-19

## 2022-01-18 RX ORDER — MECLIZINE HCL 12.5 MG/1
12.5 TABLET ORAL 3 TIMES DAILY
Status: DISCONTINUED | OUTPATIENT
Start: 2022-01-18 | End: 2022-01-19

## 2022-01-18 RX ORDER — ASCORBIC ACID 500 MG
500 TABLET ORAL 2 TIMES DAILY
Status: DISCONTINUED | OUTPATIENT
Start: 2022-01-18 | End: 2022-01-19

## 2022-01-18 RX ORDER — ONDANSETRON 2 MG/ML
4 INJECTION INTRAMUSCULAR; INTRAVENOUS ONCE
Status: COMPLETED | OUTPATIENT
Start: 2022-01-18 | End: 2022-01-18

## 2022-01-18 RX ORDER — DEXTROSE MONOHYDRATE 25 G/50ML
50 INJECTION, SOLUTION INTRAVENOUS
Status: DISCONTINUED | OUTPATIENT
Start: 2022-01-18 | End: 2022-01-19

## 2022-01-18 RX ORDER — ZINC SULFATE 50(220)MG
220 CAPSULE ORAL 2 TIMES DAILY
Status: DISCONTINUED | OUTPATIENT
Start: 2022-01-18 | End: 2022-01-19

## 2022-01-18 NOTE — ED QUICK NOTES
Orders for admission, patient is aware of plan and ready to go upstairs. Any questions, please call ED SAGRARIO COLEMAN  at extension [phone].    Type of COVID test sent:Rapid  COVID Suspicion level: Low    LOC at time of transport:  A/ox3  Other pertinent informatio

## 2022-01-18 NOTE — ED PROVIDER NOTES
Patient Seen in: [facility] AND [facility] Emergency Department      History   Patient presents with:  Dizziness    Stated Complaint: vomiting, vertigo    Subjective:   HPI    80-year-old male with extensive past medical history due for dialysis today when he we Packs/day: 1.00        Years: 35.00        Pack years: 28        Types: Cigarettes        Quit date: 1991        Years since quittin.7      Smokeless tobacco: Never Used    Vaping Use      Vaping Use: Never used    Alcohol use: Not Currently gastroenteritis, ileus and small bowel obstruction.       ED Course     Labs Reviewed   BASIC METABOLIC PANEL (8) - Abnormal; Notable for the following components:       Result Value    Glucose 137 (*)     Sodium 128 (*)     Chloride 89 (*)     BUN 56 (*) COMPARISON: [facility], CT BRAIN OR HEAD (CPT=70450), 4/08/2018, 7:45 PM.  INDICATIONS: R42 Lightheaded R11.0 Nausea R42 Dizzinesses  TECHNIQUE: CT images were obtained without contrast material.  Automated exposure control for dose reducti INDICATIONS: M54.50 Acute midline low back pain without sciatica  TECHNIQUE: Multi-planar CT images were obtained without intravenous contrast material. Automated exposure control for dose reduction was used.  Adjustment of the mA and/or kV was done based o with no substantial spinal canal compromise. OTHER: Atherosclerosis with probable bilateral renal hilar vascular calcifications. Probable left renal cysts, which are incompletely characterized on this nondedicated exam; bilateral renal atrophy suspected. Adjustment of the mA and/or kV was done based on the patient's size. Use of iterative reconstruction technique for dose reduction was used.   Dose information is transmitted to the [facility] ([employer] [facility]) [address] ([address]) OTHER: Negative. CONCLUSION:  Colonic diverticulosis without definite acute diverticulitis. Cholecystectomy. Trace right pleural effusion.   Small left pleural effusion with adjacent patchy mild left lower lobe consolidation which may reflect adj to be dialyzed. He is mildly hypoxic. I spoke with renal and the patient's covering primary doctor. I will admit the patient overnight to the hospital for dialysis and further monitoring and treatment.   Admission disposition: 1/18/2022  1:34 PM

## 2022-01-18 NOTE — ED INITIAL ASSESSMENT (HPI)
Pt reports vertigo starting last night, feels like the room is spinning causing him to become nauseous and vomit. Denies CP, headache or blurred vision.  Pt due for dialysis today but was sent here for eval. Dialysis requesting COVID test, per patient

## 2022-01-19 VITALS
HEIGHT: 67 IN | HEART RATE: 93 BPM | BODY MASS INDEX: 23.54 KG/M2 | WEIGHT: 150 LBS | DIASTOLIC BLOOD PRESSURE: 63 MMHG | RESPIRATION RATE: 28 BRPM | OXYGEN SATURATION: 95 % | TEMPERATURE: 98 F | SYSTOLIC BLOOD PRESSURE: 119 MMHG

## 2022-01-19 LAB
ALBUMIN SERPL-MCNC: 3 G/DL (ref 3.4–5)
ANION GAP SERPL CALC-SCNC: 7 MMOL/L (ref 0–18)
BASOPHILS # BLD AUTO: 0.02 X10(3) UL (ref 0–0.2)
BASOPHILS NFR BLD AUTO: 0.3 %
BUN BLD-MCNC: 26 MG/DL (ref 7–18)
BUN/CREAT SERPL: 5.4 (ref 10–20)
CALCIUM BLD-MCNC: 8.9 MG/DL (ref 8.5–10.1)
CHLORIDE SERPL-SCNC: 101 MMOL/L (ref 98–112)
CO2 SERPL-SCNC: 27 MMOL/L (ref 21–32)
CREAT BLD-MCNC: 4.78 MG/DL
DEPRECATED RDW RBC AUTO: 72.5 FL (ref 35.1–46.3)
EOSINOPHIL # BLD AUTO: 0.03 X10(3) UL (ref 0–0.7)
EOSINOPHIL NFR BLD AUTO: 0.5 %
ERYTHROCYTE [DISTWIDTH] IN BLOOD BY AUTOMATED COUNT: 18.8 % (ref 11–15)
GLUCOSE BLD-MCNC: 96 MG/DL (ref 70–99)
GLUCOSE BLDC GLUCOMTR-MCNC: 102 MG/DL (ref 70–99)
GLUCOSE BLDC GLUCOMTR-MCNC: 129 MG/DL (ref 70–99)
GLUCOSE BLDC GLUCOMTR-MCNC: 153 MG/DL (ref 70–99)
HCT VFR BLD AUTO: 34.6 %
HGB BLD-MCNC: 11.1 G/DL
IMM GRANULOCYTES # BLD AUTO: 0.02 X10(3) UL (ref 0–1)
IMM GRANULOCYTES NFR BLD: 0.3 %
INR BLD: 3.03 (ref 0.8–1.2)
LYMPHOCYTES # BLD AUTO: 0.55 X10(3) UL (ref 1–4)
LYMPHOCYTES NFR BLD AUTO: 9.3 %
MAGNESIUM SERPL-MCNC: 2.6 MG/DL (ref 1.6–2.6)
MCH RBC QN AUTO: 33.6 PG (ref 26–34)
MCHC RBC AUTO-ENTMCNC: 32.1 G/DL (ref 31–37)
MCV RBC AUTO: 104.8 FL
MONOCYTES # BLD AUTO: 0.7 X10(3) UL (ref 0.1–1)
MONOCYTES NFR BLD AUTO: 11.9 %
NEUTROPHILS # BLD AUTO: 4.58 X10 (3) UL (ref 1.5–7.7)
NEUTROPHILS # BLD AUTO: 4.58 X10(3) UL (ref 1.5–7.7)
NEUTROPHILS NFR BLD AUTO: 77.7 %
OSMOLALITY SERPL CALC.SUM OF ELEC: 285 MOSM/KG (ref 275–295)
PHOSPHATE SERPL-MCNC: 3.9 MG/DL (ref 2.5–4.9)
PLATELET # BLD AUTO: 166 10(3)UL (ref 150–450)
POTASSIUM SERPL-SCNC: 4.7 MMOL/L (ref 3.5–5.1)
PROTHROMBIN TIME: 31.8 SECONDS (ref 11.6–14.8)
RBC # BLD AUTO: 3.3 X10(6)UL
SODIUM SERPL-SCNC: 135 MMOL/L (ref 136–145)
WBC # BLD AUTO: 5.9 X10(3) UL (ref 4–11)

## 2022-01-19 PROCEDURE — 99223 1ST HOSP IP/OBS HIGH 75: CPT | Performed by: INTERNAL MEDICINE

## 2022-01-19 RX ORDER — WARFARIN SODIUM 5 MG/1
TABLET ORAL
Qty: 30 TABLET | Refills: 5 | Status: SHIPPED | OUTPATIENT
Start: 2022-01-19

## 2022-01-19 RX ORDER — MECLIZINE HCL 12.5 MG/1
12.5 TABLET ORAL 3 TIMES DAILY
Qty: 90 TABLET | Refills: 3 | Status: SHIPPED | OUTPATIENT
Start: 2022-01-19

## 2022-01-19 RX ORDER — AMIODARONE HYDROCHLORIDE 200 MG/1
200 TABLET ORAL DAILY
Qty: 90 TABLET | Refills: 0 | Status: SHIPPED | COMMUNITY
Start: 2022-01-19

## 2022-01-19 NOTE — CM/SW NOTE
CM met w/pt at bedside. MDO: advance directives. HCPOA paperwork explained at provided to pt. Pt is home w/spouse. CM/SW to remain available for support and/or discharge planning.     Jeff Us RN, [facility]    Ext. [phone]

## 2022-01-19 NOTE — PAYOR COMM NOTE
--------------  ADMISSION REVIEW     Emiliana Mcintosh [place] [facility] [place] #:  [id number]  Authorization Number: [id number]    Admit date: 1/18/22  Admit time:  4:46 PM       REVIEW DOCUMENTATION:     ED Provider Notes      ED Provider Notes signed by Po Cuevas of breath      Past Surgical History:   Procedure Laterality Date   • CABG     • CHOLECYSTECTOMY     • COLONOSCOPY N/A 6/26/2020    Procedure: COLONOSCOPY;  Surgeon: Earlean Harada, MD;  Location: [address] [place] [address] ENDOSCOPY   • COLONOSCOPY N/A 9/8/2020    Pro components:       Result Value    Glucose 137 (*)     Sodium 128 (*)     Chloride 89 (*)     BUN 56 (*)     Creatinine 7.81 (*)     BUN/CREA Ratio 7.2 (*)     GFR, Non- 6 (*)     GFR, -American 7 (*)     All other components within n Moderate to severe levoscoliosis of the lumbar spine. 3. Advanced multilevel degenerative changes of the lumbar spine as detailed. Notable levels as follows:  4. L3-L4:  Severe right and mild left neural foraminal with moderate spinal canal stenosis.  5. CONCLUSION:  1. Mild fluid overload/CHF.     Dictated by (CST): Smita Valentino MD on 1/18/2022 at 1:49 PM     Finalized by (CST): Smita Valentino MD on 1/18/2022 at 1:51 PM            Disposition and Plan     Clinical Impression:  Dizziness  (primary enc regurgitation. Patient also has underlying atrial fibrillation and is status post ICD for ventricular tachycardia. 4.       Adult-onset diabetes mellitus. 5.       Anemia.     PLAN:  Will arrange for routine hemodialysis treatment today.   Mild fluid rem 1/19/2022 0800 New Bag 200 mg Intravenous Curt Martinez RN      meclizine (ANTIVERT) tab 12.5 mg     Date Action Dose Route User    1/19/2022 0801 Given 12.5 mg Oral Curt Martinez RN    1/19/2022 0007 Given 12.5 mg Oral SAGRARIO Baires

## 2022-01-19 NOTE — PLAN OF CARE
Patient AxOx4, VSS. No complaints of pain. Plans for dialysis tonight.     Problem: Patient Centered Care  Goal: Patient preferences are identified and integrated in the patient's plan of care  Description: Interventions:  - What would you like us to know a skin color and temperature  - Assess for signs of decreased coronary artery perfusion - ex.  Angina  - Evaluate fluid balance, assess for edema, trend weights  Outcome: Progressing  Goal: Absence of cardiac arrhythmias or at baseline  Description: INTERVENT and medications as ordered and appropriate  - Administer supportive blood products/factors as ordered and appropriate  Outcome: Progressing  Goal: Free from bleeding injury  Description: (Example usage: patient with low platelets)  INTERVENTIONS:  - Avoid

## 2022-01-19 NOTE — DISCHARGE PLANNING
Patient was provided with discharge instructions, education, and follow up information. Prescriptions were already sent electronically to patient's pharmacy.  Patient verbalizes understanding of follow up information, specifically medication prescribed (mec

## 2022-01-19 NOTE — CONSULTS
[facility]    PATIENT'S NAME: GERARD MICHAELS   ATTENDING PHYSICIAN: Jennifer Schwarz.  Izzy Grey MD   CONSULTING PHYSICIAN: Josh Mccarthy MD   PATIENT ACCOUNT#:   [id number]    LOCATION:  [address] [place] [address]  MEDICAL RECORD #:   [id number]       DATE allopurinol, amiodarone, vitamin C, 81 mg aspirin, atorvastatin, Miacalcin, Rocaltrol, calcium carbonate, Cymbalta, NovoLog, Levemir, meclizine, Zofran, Protonix, Renvela, Coumadin, and zinc.    ALLERGIES:  There are no known allergies.     SOCIAL HISTORY: has underlying atrial fibrillation and is status post ICD for ventricular tachycardia. 4.   Adult-onset diabetes mellitus. 5.   Anemia. PLAN:  Will arrange for routine hemodialysis treatment today.   Mild fluid removal.  Will follow with FLORENCE's and АННА's,da

## 2022-01-19 NOTE — PHYSICAL THERAPY NOTE
PHYSICAL THERAPY EVALUATION - INPATIENT     Room Number: XXU66/XLT86-Q  Evaluation Date: 1/19/2022  Type of Evaluation: Initial   Physician Order: PT Eval and Treat    Presenting Problem: dizziness, nausea/vomiting     Reason for Therapy: Mobility Dysfunc RECOMMENDATIONS  PT Discharge Recommendations: 24 hour care/supervision;Home with home health PT (vs OP PT)    PLAN    Patient has been evaluated and presents with no skilled Physical Therapy needs at this time.   Patient will be discharged from Physical Th COLONOSCOPY N/A 9/8/2020    Procedure: COLONOSCOPY;  Surgeon: Jeff Harry MD;  Location: [address] [place] [address] ENDOSCOPY   • DIALYSIS PROCEDURE  08/2021    3 times weekly       HOME SITUATION  Home Situation  Type of Home: House  Home Layout: One level  Stairs chair with arms (e.g., wheelchair, bedside commode, etc.): None   Patient Difficulty: Moving from lying on back to sitting on the side of the bed?: None   How much help from another person does the patient currently need. ..    Help from Another: Moving to a

## 2022-01-19 NOTE — PLAN OF CARE
Patirnt a/o x4 had dylaisis 2 lt removed,   no compalints of pain.     Problem: Diabetes/Glucose Control  Goal: Glucose maintained within prescribed range  Description: INTERVENTIONS:  - Monitor Blood Glucose as ordered  - Assess for signs and symptoms of h Fluid restriction as ordered  - Instruct patient on fluid and nutrition restrictions as appropriate  Outcome: Progressing  Goal: Hemodynamic stability and optimal renal function maintained  Description: INTERVENTIONS:  - Monitor labs and assess for signs a

## 2022-01-20 ENCOUNTER — PATIENT OUTREACH (OUTPATIENT)
Dept: CASE MANAGEMENT | Age: 82
End: 2022-01-20

## 2022-01-20 DIAGNOSIS — Z02.9 ENCOUNTERS FOR UNSPECIFIED ADMINISTRATIVE PURPOSE: ICD-10-CM

## 2022-01-20 PROCEDURE — 1111F DSCHRG MED/CURRENT MED MERGE: CPT

## 2022-01-20 NOTE — H&P
[facility] Patient Status:  Inpatient    3/28/1940 MRN [id number]   Location [facility] 3W/SW Attending No att. providers found   Hosp Day # 1 PCP Cesar Young MD     Date:   Surgeon: Corbin Krabbe, MD;  Location: [address] [place] [address] ENDOSCOPY   • DIALYSIS PROCEDURE  08/2021    3 times weekly       Allergies:  No Known Allergies    Home Medications:  Current Outpatient Medications   Medication Sig Dispense Refill   • amiodarone 200 Sevelamer 800 MG Oral Tab Take 1 tablet (800 mg total) by mouth 3 (three) times daily with meals. 270 tablet 3   • Zinc 50 MG Oral Cap Take 1 tablet by mouth 2 (two) times a day.      • Acidophilus/Pectin Oral Cap Take 1 capsule by mouth 2 (two) times daily rash  Neurological:  Negative for gait disturbance; negative for paresthesias   All other review of systems are negative. Physical Exam:  PHYSICAL EXAM:   Blood pressure 119/63, pulse 93, temperature 98 °F (36.7 °C), temperature source Oral, resp. with urinalysis. Prominent prostate. Mild L1 vertebral compression fracture involving the superior endplate with similar findings described on prior CT lumbar spine 12/29/2021 demonstrating an acute or subacute appearing fracture.   Lesser incidental find edema.  No significant pleural effusion.  Additional fluid removal 10/6 via UF; improvement in oxygenation (decreased O2 from 50L HF to 3L NC)  - Sputum culture: 2+WBCs, 1+ Gram positive cocci  -on ceftriaxone 1 gm IV f87cycli, azithromycin 500 mg po daily, 2019     Secondary hyperparathyroidism   in April 2016; repeat PTH is 107 in April 2019 (was 72 in Oct 2016); on Vitamin D 2000 IU po every day  - iPTH 441.9; on calcitriol 0.25 mcg po daily     Mitral regurgitation  Mod-severe MR on Echo 10/6/21

## 2022-01-20 NOTE — PROGRESS NOTES
Initial Post Discharge Follow Up   Discharge Date: 1/19/22  Contact Date: 1/20/2022    Consent Verification:  Assessment Completed With: Patient  HIPAA Verified?   Yes    Discharge Dx:     Dizziness  Nausea & Vomiting, unspecified vomiting type  HTN  A-F with patient, he verbalized understanding of these. Patient denies any further questions or needs at this time.    • Do you have any pain since discharge?  yes   If Yes:  o Where: lower back   Rating on pain scale 1-10, 10 being the worst pain you have ever tablet (40 mg total) by mouth every morning before breakfast. 90 tablet 3   • Alcohol Swabs (BD SWAB SINGLE USE REGULAR) Does not apply Pads Use once daily when checking  each 3   • Glucose Blood (FREESTYLE TEST) In Vitro Strip Check BS daily 100 str yes  o Do you have any questions about your new medication? No  • Did you  your discharge medications when you left the hospital? No, patient states he is waiting for the Meclizine to be delivered through his mail order pharmacy.   • May I go over yo Chandler Ty)        Mar 02, 2022 11:00 AM CST Exam - Established with Humberto Ivey MD [address] [place] [address], [facility]

## 2022-01-20 NOTE — PAYOR COMM NOTE
--------------  DISCHARGE REVIEW    Francisco Rosario MA [facility] [place]  Subscriber #:  [id number]  Authorization Number: [id number]    Admit date: 1/18/22  Admit time:   4:46 PM  Discharge Date: 1/19/2022  2:00 PM     Admitting Physician: MD Cathie Paige P

## 2022-01-24 ENCOUNTER — OFFICE VISIT (OUTPATIENT)
Dept: INTERNAL MEDICINE CLINIC | Facility: CLINIC | Age: 82
End: 2022-01-24
Payer: MEDICARE

## 2022-01-24 VITALS
TEMPERATURE: 98 F | DIASTOLIC BLOOD PRESSURE: 50 MMHG | BODY MASS INDEX: 22.6 KG/M2 | WEIGHT: 144 LBS | HEART RATE: 70 BPM | OXYGEN SATURATION: 98 % | SYSTOLIC BLOOD PRESSURE: 114 MMHG | HEIGHT: 67 IN

## 2022-01-24 DIAGNOSIS — N18.6 ESRD ON HEMODIALYSIS (HCC): ICD-10-CM

## 2022-01-24 DIAGNOSIS — R11.0 NAUSEA: ICD-10-CM

## 2022-01-24 DIAGNOSIS — I25.10 CORONARY ARTERY DISEASE INVOLVING NATIVE CORONARY ARTERY OF NATIVE HEART WITHOUT ANGINA PECTORIS: ICD-10-CM

## 2022-01-24 DIAGNOSIS — D63.1 ANEMIA DUE TO CHRONIC KIDNEY DISEASE, UNSPECIFIED CKD STAGE: ICD-10-CM

## 2022-01-24 DIAGNOSIS — N18.9 ANEMIA DUE TO CHRONIC KIDNEY DISEASE, UNSPECIFIED CKD STAGE: ICD-10-CM

## 2022-01-24 DIAGNOSIS — E78.00 HYPERCHOLESTEREMIA: ICD-10-CM

## 2022-01-24 DIAGNOSIS — R42 DIZZINESS: Primary | ICD-10-CM

## 2022-01-24 DIAGNOSIS — Z99.2 ESRD ON HEMODIALYSIS (HCC): ICD-10-CM

## 2022-01-24 DIAGNOSIS — I48.21 PERMANENT ATRIAL FIBRILLATION (HCC): ICD-10-CM

## 2022-01-24 DIAGNOSIS — M47.26 OSTEOARTHRITIS OF SPINE WITH RADICULOPATHY, LUMBAR REGION: ICD-10-CM

## 2022-01-24 DIAGNOSIS — I50.23 ACUTE ON CHRONIC HFREF (HEART FAILURE WITH REDUCED EJECTION FRACTION) (HCC): ICD-10-CM

## 2022-01-24 DIAGNOSIS — E11.22 TYPE 2 DIABETES MELLITUS WITH CHRONIC KIDNEY DISEASE ON CHRONIC DIALYSIS, WITH LONG-TERM CURRENT USE OF INSULIN (HCC): ICD-10-CM

## 2022-01-24 DIAGNOSIS — Z79.4 TYPE 2 DIABETES MELLITUS WITH CHRONIC KIDNEY DISEASE ON CHRONIC DIALYSIS, WITH LONG-TERM CURRENT USE OF INSULIN (HCC): ICD-10-CM

## 2022-01-24 DIAGNOSIS — Z99.2 TYPE 2 DIABETES MELLITUS WITH CHRONIC KIDNEY DISEASE ON CHRONIC DIALYSIS, WITH LONG-TERM CURRENT USE OF INSULIN (HCC): ICD-10-CM

## 2022-01-24 DIAGNOSIS — N18.6 TYPE 2 DIABETES MELLITUS WITH CHRONIC KIDNEY DISEASE ON CHRONIC DIALYSIS, WITH LONG-TERM CURRENT USE OF INSULIN (HCC): ICD-10-CM

## 2022-01-24 DIAGNOSIS — I47.2 VENTRICULAR TACHYCARDIA (HCC): ICD-10-CM

## 2022-01-24 DIAGNOSIS — J44.9 CHRONIC OBSTRUCTIVE PULMONARY DISEASE, UNSPECIFIED COPD TYPE (HCC): ICD-10-CM

## 2022-01-24 PROCEDURE — 3008F BODY MASS INDEX DOCD: CPT | Performed by: INTERNAL MEDICINE

## 2022-01-24 PROCEDURE — 3074F SYST BP LT 130 MM HG: CPT | Performed by: INTERNAL MEDICINE

## 2022-01-24 PROCEDURE — 99496 TRANSJ CARE MGMT HIGH F2F 7D: CPT | Performed by: INTERNAL MEDICINE

## 2022-01-24 PROCEDURE — 3078F DIAST BP <80 MM HG: CPT | Performed by: INTERNAL MEDICINE

## 2022-01-24 RX ORDER — BUMETANIDE 1 MG/1
1 TABLET ORAL 2 TIMES DAILY
COMMUNITY

## 2022-01-24 NOTE — PROGRESS NOTES
HPI:    Grzegorz Grider is a 80year old male here today for hospital follow up.    He was discharged from Inpatient hospital, [facility] AND [facility]  to Home   Admission Date: 1/18/22   Discharge Date: 1/19/22  Hospital Discharge Diagnoses (since 12/25/2021 Mondays  meclizine 12.5 MG Oral Tab, Take 1 tablet (12.5 mg total) by mouth in the morning, at noon, and at bedtime.  As needed for dizziness  ALLOPURINOL 100 MG Oral Tab, Take 1 tablet by mouth once daily  atorvastatin 40 MG Oral Tab, Take 1 tablet (40 mg Reported on 1/24/2022)  DULoxetine 30 MG Oral Cap DR Particles, Take 1 capsule (30 mg total) by mouth daily. (Patient not taking: Reported on 1/24/2022)  calcium carbonate antacid 500 MG Oral Chew Tab, Chew 4 tablets (2,000 mg total) by mouth nightly.  Abram Hickman palpitations  GI: denies abdominal pain, denies heartburn, denies diarrhea  MUSCULOSKELETAL: denies pain, normal range of motion of extremities  NEURO: denies headaches, denies dizziness, denies weakness  PSYCHE: denies depression or anxiety  HEMATOLOGIC: dialysis, with long-term current use of insulin (HCC)    Hypercholesteremia        No orders of the defined types were placed in this encounter.       Meds & Refills for this Visit:  Requested Prescriptions      No prescriptions requested or ordered in this afford Entresto   -Biv ICD is MRI-compatible  –Coreg changed to metoprolol per cardiology  -echo on 10/6/21 - LVEF 30% (down from 40-45% in 8/2021, though unchanged from previous echocardiograms in 8/19 and 12/18)  -received additional UF with removal of 1 above   Medical Decision Making- Based on service period of discharge to 30 days:   · Number of Possible Diagnoses and/or Management Options: severe  · Amount and/or Complexity of Data to Be Reviewed: severe  · Risk of Significant Complications, Morbidity,

## 2022-01-31 ENCOUNTER — ANTI-COAG VISIT (OUTPATIENT)
Dept: INTERNAL MEDICINE CLINIC | Facility: CLINIC | Age: 82
End: 2022-01-31
Payer: MEDICARE

## 2022-01-31 DIAGNOSIS — I48.20 CHRONIC ATRIAL FIBRILLATION (HCC): ICD-10-CM

## 2022-01-31 LAB — INR: 1.4 (ref 0.8–1.2)

## 2022-01-31 PROCEDURE — 99211 OFF/OP EST MAY X REQ PHY/QHP: CPT

## 2022-01-31 PROCEDURE — 85610 PROTHROMBIN TIME: CPT

## 2022-02-02 ENCOUNTER — TELEPHONE (OUTPATIENT)
Dept: INTERNAL MEDICINE CLINIC | Facility: CLINIC | Age: 82
End: 2022-02-02

## 2022-02-02 NOTE — TELEPHONE ENCOUNTER
Referral entered to Dr. Garry Brown per protocol. To [facility] for authorization. Thank you.

## 2022-02-02 NOTE — TELEPHONE ENCOUNTER
Patient calling for referral to     Jennifer Tejeda MD   Ophthalmology  Annual check up for diabetes. Has appointment April 27th. Please call patient when referral has been approved.

## 2022-02-04 NOTE — TELEPHONE ENCOUNTER
Called patient who has dialysis in [facility] e tomorrow - he will call back with number for US renal . RN needs to call there to find out what is  Needed ?  Awaiting patient to call back

## 2022-02-04 NOTE — TELEPHONE ENCOUNTER
Patient is calling with the phone number for [address] renal  Phone # [phone]

## 2022-02-04 NOTE — TELEPHONE ENCOUNTER
Called Renal US in [facility] - spoke with Leah Alcantara who states patient needs referral for creation of Fistula by [facility] Access care - DX code given and entered - to DR. JJ

## 2022-02-07 ENCOUNTER — ANTI-COAG VISIT (OUTPATIENT)
Dept: INTERNAL MEDICINE CLINIC | Facility: CLINIC | Age: 82
End: 2022-02-07
Payer: MEDICARE

## 2022-02-07 DIAGNOSIS — I48.20 CHRONIC ATRIAL FIBRILLATION (HCC): ICD-10-CM

## 2022-02-07 LAB — INR: 2 (ref 0.8–1.2)

## 2022-02-07 PROCEDURE — 85610 PROTHROMBIN TIME: CPT

## 2022-02-07 PROCEDURE — 99211 OFF/OP EST MAY X REQ PHY/QHP: CPT

## 2022-02-15 ENCOUNTER — TELEPHONE (OUTPATIENT)
Dept: NEPHROLOGY | Facility: CLINIC | Age: 82
End: 2022-02-15

## 2022-02-15 RX ORDER — BUMETANIDE 1 MG/1
1 TABLET ORAL DAILY
Qty: 30 TABLET | Refills: 0 | Status: SHIPPED | OUTPATIENT
Start: 2022-02-15 | End: 2022-03-31 | Stop reason: DRUGHIGH

## 2022-02-15 NOTE — TELEPHONE ENCOUNTER
Left message for patient to call back. This medication is not on his active medication list nor did we receive a request for it.

## 2022-02-15 NOTE — TELEPHONE ENCOUNTER
Patient indicates he spoke with [facility] mail order pharmacy regarding rx Budesonide and was informed that it was denied. Please call patient to update at [phone],NYEBXI.   *patient only has 2 pills left, asking if 20 pills can be sent to [facility]

## 2022-02-15 NOTE — TELEPHONE ENCOUNTER
Spoke with patient regarding message below. Per patient bumetanide 1mg is the medication he needs refilled. Patient was told that he has refills with his [facility] mail order pharmacy. Patient verbalized understanding. Patient states he needs a 1 month supply sent to his local pharmacy because he has only 2 pills left. Rx sent.

## 2022-02-18 NOTE — TELEPHONE ENCOUNTER
Pt left voicemail message  Would like to speak to Cynthia Nunn  He mentioned he was hoping to pick something up    [employer] spoke with Cynthia Nunn  Pt was referring to his application for handicap sticker application  She will give to patient at appt on Monday  Pt contacted, agrees with plan

## 2022-02-21 ENCOUNTER — ANTI-COAG VISIT (OUTPATIENT)
Dept: INTERNAL MEDICINE CLINIC | Facility: CLINIC | Age: 82
End: 2022-02-21
Payer: MEDICARE

## 2022-02-21 DIAGNOSIS — I48.20 CHRONIC ATRIAL FIBRILLATION (HCC): ICD-10-CM

## 2022-02-21 LAB — INR: 4.5 (ref 0.8–1.2)

## 2022-02-21 PROCEDURE — 99211 OFF/OP EST MAY X REQ PHY/QHP: CPT

## 2022-02-21 PROCEDURE — 85610 PROTHROMBIN TIME: CPT

## 2022-02-25 RX ORDER — HYDROCORTISONE 10 MG/G
CREAM TOPICAL
Qty: 1 EACH | Refills: 0 | Status: SHIPPED | OUTPATIENT
Start: 2022-02-25 | End: 2022-03-28

## 2022-02-28 ENCOUNTER — ANTI-COAG VISIT (OUTPATIENT)
Dept: INTERNAL MEDICINE CLINIC | Facility: CLINIC | Age: 82
End: 2022-02-28
Payer: MEDICARE

## 2022-02-28 DIAGNOSIS — I48.20 CHRONIC ATRIAL FIBRILLATION (HCC): ICD-10-CM

## 2022-02-28 LAB — INR: 1.7 (ref 0.8–1.2)

## 2022-02-28 PROCEDURE — 99211 OFF/OP EST MAY X REQ PHY/QHP: CPT

## 2022-02-28 PROCEDURE — 85610 PROTHROMBIN TIME: CPT

## 2022-03-02 ENCOUNTER — OFFICE VISIT (OUTPATIENT)
Dept: PHYSICAL MEDICINE AND REHAB | Facility: CLINIC | Age: 82
End: 2022-03-02
Payer: MEDICARE

## 2022-03-02 VITALS — BODY MASS INDEX: 23.07 KG/M2 | HEIGHT: 67 IN | WEIGHT: 147 LBS

## 2022-03-02 DIAGNOSIS — K92.2 GASTROINTESTINAL HEMORRHAGE, UNSPECIFIED GASTROINTESTINAL HEMORRHAGE TYPE: ICD-10-CM

## 2022-03-02 DIAGNOSIS — E11.9 TYPE 2 DIABETES MELLITUS WITHOUT COMPLICATION, WITHOUT LONG-TERM CURRENT USE OF INSULIN (HCC): ICD-10-CM

## 2022-03-02 DIAGNOSIS — N18.6 ESRD ON HEMODIALYSIS (HCC): ICD-10-CM

## 2022-03-02 DIAGNOSIS — Z95.810 ICD (IMPLANTABLE CARDIOVERTER-DEFIBRILLATOR) IN PLACE: ICD-10-CM

## 2022-03-02 DIAGNOSIS — M47.816 LUMBAR SPONDYLOSIS: ICD-10-CM

## 2022-03-02 DIAGNOSIS — Z79.01 ANTICOAGULANT LONG-TERM USE: ICD-10-CM

## 2022-03-02 DIAGNOSIS — S32.010A COMPRESSION FRACTURE OF L1 LUMBAR VERTEBRA, CLOSED, INITIAL ENCOUNTER (HCC): ICD-10-CM

## 2022-03-02 DIAGNOSIS — Z99.2 ESRD ON HEMODIALYSIS (HCC): ICD-10-CM

## 2022-03-02 DIAGNOSIS — I25.10 CORONARY ARTERY DISEASE INVOLVING NATIVE CORONARY ARTERY OF NATIVE HEART WITHOUT ANGINA PECTORIS: ICD-10-CM

## 2022-03-02 DIAGNOSIS — G89.4 CHRONIC PAIN SYNDROME: Primary | ICD-10-CM

## 2022-03-02 PROCEDURE — 99214 OFFICE O/P EST MOD 30 MIN: CPT | Performed by: PHYSICAL MEDICINE & REHABILITATION

## 2022-03-02 PROCEDURE — 3008F BODY MASS INDEX DOCD: CPT | Performed by: PHYSICAL MEDICINE & REHABILITATION

## 2022-03-02 RX ORDER — BUPRENORPHINE 5 UG/H
1 PATCH TRANSDERMAL WEEKLY
Qty: 4 PATCH | Refills: 0 | OUTPATIENT
Start: 2022-03-02 | End: 2022-03-09

## 2022-03-03 ENCOUNTER — TELEPHONE (OUTPATIENT)
Dept: INTERNAL MEDICINE CLINIC | Facility: CLINIC | Age: 82
End: 2022-03-03

## 2022-03-03 NOTE — TELEPHONE ENCOUNTER
Called and spoke to pt as our records indicate that Pt rec'd printed script that should still be good. Pt is asking to use mail order but states he does not need a refill at this time, he should have about a month worth. Do you want to pend release date?

## 2022-03-03 NOTE — TELEPHONE ENCOUNTER
To [facility]---    Are you able to assist with auth of vascular referral? Thanks!

## 2022-03-04 NOTE — TELEPHONE ENCOUNTER
Good Morning Dr Briana Alfredo and staff,    Ref# [id number]    In order to process this referral with [facility] I need additional information. Please provide name of physician and all CPT codes needed. Is this for a office visit or procedure? Keely I am not familiar with vascular referrals to [facility]. Is referral for [facility] or [facility]? Thank you for your help.     [facility]   [phone]

## 2022-03-04 NOTE — TELEPHONE ENCOUNTER
Patient call to request a refill for ( calcitonin, salmon, 200 UNIT/ACT Nasal Solution ). Please send this med trough [facility].     Please assist

## 2022-03-06 RX ORDER — WARFARIN SODIUM 5 MG/1
TABLET ORAL
Qty: 120 TABLET | Refills: 0 | OUTPATIENT
Start: 2022-03-06

## 2022-03-07 NOTE — TELEPHONE ENCOUNTER
Spoke to patient, states he needs Rx on pain patches be sent to [facility]. States he would also like Rx for nasal spray for bones, he would like to try now. States did not fill in past due to cost. Would like sent to [facility]. Butrans patch written at [address] [place] [address] 3/2/22 as call in. No documentation Rx was called into [facility]. I spoke to [facility] needs addition X CHRISTOPHER number prior to filling Butrans Rx. Forwarded to  for [facility] information.

## 2022-03-08 RX ORDER — CALCITONIN SALMON 200 [IU]/.09ML
1 SPRAY, METERED NASAL DAILY
Qty: 1 EACH | Refills: 1 | Status: ON HOLD | OUTPATIENT
Start: 2022-03-08 | End: 2022-04-07

## 2022-03-08 NOTE — TELEPHONE ENCOUNTER
Gave butrans Rx written on 3/2/22 to [facility] verbally. Calcitonin Rx remains pend to Dr Riley Alfaro for signature.

## 2022-03-10 ENCOUNTER — TELEPHONE (OUTPATIENT)
Dept: NEUROLOGY | Facility: CLINIC | Age: 82
End: 2022-03-10

## 2022-03-10 NOTE — TELEPHONE ENCOUNTER
In coming [facility] that Authorization has been started for Calcitonin (Cos Cob) [address], form placed in 's folder by nurses bin

## 2022-03-11 ENCOUNTER — ANTI-COAG VISIT (OUTPATIENT)
Dept: INTERNAL MEDICINE CLINIC | Facility: CLINIC | Age: 82
End: 2022-03-11
Payer: MEDICARE

## 2022-03-11 ENCOUNTER — TELEPHONE (OUTPATIENT)
Dept: GASTROENTEROLOGY | Facility: CLINIC | Age: 82
End: 2022-03-11

## 2022-03-11 DIAGNOSIS — I48.20 CHRONIC ATRIAL FIBRILLATION (HCC): ICD-10-CM

## 2022-03-11 LAB
INR: 2.7 (ref 0.8–1.2)
TEST STRIP EXPIRATION DATE: ABNORMAL DATE

## 2022-03-11 PROCEDURE — 85610 PROTHROMBIN TIME: CPT

## 2022-03-11 PROCEDURE — 93793 ANTICOAG MGMT PT WARFARIN: CPT

## 2022-03-11 RX ORDER — WARFARIN SODIUM 5 MG/1
5 TABLET ORAL DAILY
Qty: 90 TABLET | Refills: 1 | Status: ON HOLD | OUTPATIENT
Start: 2022-03-11

## 2022-03-15 RX ORDER — ATORVASTATIN CALCIUM 20 MG/1
TABLET, FILM COATED ORAL
Qty: 180 TABLET | Refills: 0 | OUTPATIENT
Start: 2022-03-15

## 2022-03-17 RX ORDER — ATORVASTATIN CALCIUM 20 MG/1
TABLET, FILM COATED ORAL
Qty: 180 TABLET | Refills: 0 | OUTPATIENT
Start: 2022-03-17

## 2022-03-18 NOTE — TELEPHONE ENCOUNTER
Pt called and would like the meds to be sent to [facility]- calcitonin, salmon, 200 UNIT/ACT Nasal Solution- please call to advise and notify when completed.

## 2022-03-21 ENCOUNTER — TELEPHONE (OUTPATIENT)
Dept: PHYSICAL MEDICINE AND REHAB | Facility: CLINIC | Age: 82
End: 2022-03-21

## 2022-03-21 NOTE — TELEPHONE ENCOUNTER
S/w pt, requesting Calcitonin spray to be sent to [address]. S/w Tangela at [facility] not covered and needs Prior authorization. PA was initiated but denied due to medication was ordered for an off label use. Appeal for redetermination needed. Will set up time when Dr. Roxana Syed schedule permits. Denial letter received via fax. Placed in Dr. Lexi fontaine for now.

## 2022-03-21 NOTE — TELEPHONE ENCOUNTER
Please send to [facility] [phone]-calcitonin, salmon, 200 UNIT/ACT Nasal Solution- only one in stock.

## 2022-03-21 NOTE — TELEPHONE ENCOUNTER
Wife Lolita Catsro also called to check status on refill    Winifred also wants to clarify if pt should be taking 1 or 2 tablets a day    Call back# [phone]

## 2022-03-21 NOTE — TELEPHONE ENCOUNTER
[facility] faxed refill request for     Atorvastatin 20 mg tab   Qty - 90   Take 2 tablets (40mg) by mouth once daily at night    Notes - Pt takes 2/day,  so Rx only last for 6 months   please consider refill request

## 2022-03-25 ENCOUNTER — TELEPHONE (OUTPATIENT)
Dept: NEPHROLOGY | Facility: CLINIC | Age: 82
End: 2022-03-25

## 2022-03-25 RX ORDER — ATORVASTATIN CALCIUM 40 MG/1
40 TABLET, FILM COATED ORAL NIGHTLY
Qty: 90 TABLET | Refills: 3 | Status: ON HOLD | OUTPATIENT
Start: 2022-03-25

## 2022-03-25 NOTE — TELEPHONE ENCOUNTER
Rx request for Bumetanide 1mg. Please review and sign off if appropriate. Thank you. Last seen: 11/26/21  Last refill: not on patients current med list. Per 2/15/22 encounter patient is taking Bumetanide 1 mg twice a day.

## 2022-03-25 NOTE — TELEPHONE ENCOUNTER
Wife called back to check status  Pt is taking 20 mg of Atorvastatin    Transferred call to Mayda Elizabeth

## 2022-03-25 NOTE — TELEPHONE ENCOUNTER
Patients wife Winifred calling to request rx Vumetanibe 1 mg, takes 2 per day, for 3 month supply to be sent to the [facility] mail order pharmacy. Please patients wife at [phone],RDLCVX.

## 2022-03-28 RX ORDER — HYDROCORTISONE 10 MG/G
CREAM TOPICAL
Qty: 1 EACH | Refills: 0 | Status: ON HOLD | OUTPATIENT
Start: 2022-03-28

## 2022-03-28 RX ORDER — TRAMADOL HYDROCHLORIDE 50 MG/1
TABLET ORAL
Qty: 60 TABLET | Refills: 0 | Status: ON HOLD | OUTPATIENT
Start: 2022-03-28

## 2022-03-29 ENCOUNTER — HOSPITAL ENCOUNTER (EMERGENCY)
Facility: HOSPITAL | Age: 82
Discharge: HOME | End: 2022-03-29
Attending: EMERGENCY MEDICINE
Payer: MEDICARE

## 2022-03-29 ENCOUNTER — APPOINTMENT (OUTPATIENT)
Dept: GENERAL RADIOLOGY | Facility: HOSPITAL | Age: 82
End: 2022-03-29
Payer: MEDICARE

## 2022-03-29 VITALS
HEIGHT: 66 IN | DIASTOLIC BLOOD PRESSURE: 47 MMHG | BODY MASS INDEX: 23.03 KG/M2 | OXYGEN SATURATION: 95 % | RESPIRATION RATE: 10 BRPM | WEIGHT: 143.31 LBS | SYSTOLIC BLOOD PRESSURE: 107 MMHG | TEMPERATURE: 99 F | HEART RATE: 70 BPM

## 2022-03-29 DIAGNOSIS — R11.2 NAUSEA AND VOMITING IN ADULT: Primary | ICD-10-CM

## 2022-03-29 LAB
ALBUMIN SERPL-MCNC: 2.5 G/DL (ref 3.4–5)
ALP LIVER SERPL-CCNC: 98 U/L
ALT SERPL-CCNC: 17 U/L
ANION GAP SERPL CALC-SCNC: 10 MMOL/L (ref 0–18)
AST SERPL-CCNC: 26 U/L (ref 15–37)
BASOPHILS NFR BLD AUTO: 0.3 %
BILIRUB DIRECT SERPL-MCNC: 0.3 MG/DL (ref 0–0.2)
BILIRUB SERPL-MCNC: 0.6 MG/DL (ref 0.1–2)
BUN BLD-MCNC: 52 MG/DL (ref 7–18)
BUN/CREAT SERPL: 11 (ref 10–20)
CALCIUM BLD-MCNC: 7.8 MG/DL (ref 8.5–10.1)
CHLORIDE SERPL-SCNC: 91 MMOL/L (ref 98–112)
CHOLEST SERPL-MCNC: 87 MG/DL (ref ?–200)
CO2 SERPL-SCNC: 33 MMOL/L (ref 21–32)
CREAT BLD-MCNC: 4.71 MG/DL
DEPRECATED RDW RBC AUTO: 63.5 FL (ref 35.1–46.3)
EOSINOPHIL # BLD AUTO: 0.02 X10(3) UL (ref 0–0.7)
EOSINOPHIL NFR BLD AUTO: 0.3 %
ERYTHROCYTE [DISTWIDTH] IN BLOOD BY AUTOMATED COUNT: 16.4 % (ref 11–15)
GLUCOSE BLD-MCNC: 114 MG/DL (ref 70–99)
HCT VFR BLD AUTO: 28.1 %
HDLC SERPL-MCNC: 37 MG/DL (ref 40–59)
HGB BLD-MCNC: 9.2 G/DL
IMM GRANULOCYTES # BLD AUTO: 0.03 X10(3) UL (ref 0–1)
IMM GRANULOCYTES NFR BLD: 0.5 %
LDLC SERPL CALC-MCNC: 30 MG/DL (ref ?–100)
LIPASE SERPL-CCNC: 151 U/L (ref 73–393)
LYMPHOCYTES # BLD AUTO: 0.76 X10(3) UL (ref 1–4)
LYMPHOCYTES NFR BLD AUTO: 12.3 %
MCH RBC QN AUTO: 35 PG (ref 26–34)
MCHC RBC AUTO-ENTMCNC: 32.7 G/DL (ref 31–37)
MCV RBC AUTO: 106.8 FL
MONOCYTES # BLD AUTO: 0.74 X10(3) UL (ref 0.1–1)
MONOCYTES NFR BLD AUTO: 12 %
NEUTROPHILS # BLD AUTO: 4.6 X10 (3) UL (ref 1.5–7.7)
NEUTROPHILS # BLD AUTO: 4.6 X10(3) UL (ref 1.5–7.7)
NEUTROPHILS NFR BLD AUTO: 74.6 %
NONHDLC SERPL-MCNC: 50 MG/DL (ref ?–130)
NT-PROBNP SERPL-MCNC: ABNORMAL PG/ML (ref ?–450)
OSMOLALITY SERPL CALC.SUM OF ELEC: 293 MOSM/KG (ref 275–295)
PLATELET # BLD AUTO: 129 10(3)UL (ref 150–450)
POTASSIUM SERPL-SCNC: 3.9 MMOL/L (ref 3.5–5.1)
PROT SERPL-MCNC: 5.9 G/DL (ref 6.4–8.2)
RBC # BLD AUTO: 2.63 X10(6)UL
SODIUM SERPL-SCNC: 134 MMOL/L (ref 136–145)
TROPONIN I HIGH SENSITIVITY: 121 NG/L
VLDLC SERPL CALC-MCNC: 14 MG/DL (ref 0–30)
WBC # BLD AUTO: 6.2 X10(3) UL (ref 4–11)

## 2022-03-29 PROCEDURE — 84484 ASSAY OF TROPONIN QUANT: CPT | Performed by: EMERGENCY MEDICINE

## 2022-03-29 PROCEDURE — 71045 X-RAY EXAM CHEST 1 VIEW: CPT | Performed by: EMERGENCY MEDICINE

## 2022-03-29 PROCEDURE — 84484 ASSAY OF TROPONIN QUANT: CPT

## 2022-03-29 PROCEDURE — 93005 ELECTROCARDIOGRAM TRACING: CPT

## 2022-03-29 PROCEDURE — 93010 ELECTROCARDIOGRAM REPORT: CPT | Performed by: EMERGENCY MEDICINE

## 2022-03-29 PROCEDURE — 83690 ASSAY OF LIPASE: CPT | Performed by: EMERGENCY MEDICINE

## 2022-03-29 PROCEDURE — 96374 THER/PROPH/DIAG INJ IV PUSH: CPT

## 2022-03-29 PROCEDURE — 80061 LIPID PANEL: CPT | Performed by: EMERGENCY MEDICINE

## 2022-03-29 PROCEDURE — 85025 COMPLETE CBC W/AUTO DIFF WBC: CPT | Performed by: EMERGENCY MEDICINE

## 2022-03-29 PROCEDURE — 80048 BASIC METABOLIC PNL TOTAL CA: CPT | Performed by: EMERGENCY MEDICINE

## 2022-03-29 PROCEDURE — 83880 ASSAY OF NATRIURETIC PEPTIDE: CPT

## 2022-03-29 PROCEDURE — 80048 BASIC METABOLIC PNL TOTAL CA: CPT

## 2022-03-29 PROCEDURE — 80076 HEPATIC FUNCTION PANEL: CPT | Performed by: EMERGENCY MEDICINE

## 2022-03-29 PROCEDURE — 85025 COMPLETE CBC W/AUTO DIFF WBC: CPT

## 2022-03-29 PROCEDURE — 83880 ASSAY OF NATRIURETIC PEPTIDE: CPT | Performed by: EMERGENCY MEDICINE

## 2022-03-29 PROCEDURE — 99284 EMERGENCY DEPT VISIT MOD MDM: CPT

## 2022-03-29 RX ORDER — ONDANSETRON 4 MG/1
4 TABLET, ORALLY DISINTEGRATING ORAL EVERY 6 HOURS PRN
Qty: 18 TABLET | Refills: 0 | Status: ON HOLD | OUTPATIENT
Start: 2022-03-29 | End: 2022-04-05

## 2022-03-29 RX ORDER — BUMETANIDE 1 MG/1
1 TABLET ORAL 2 TIMES DAILY
Qty: 180 TABLET | Refills: 0 | Status: ON HOLD | OUTPATIENT
Start: 2022-03-29

## 2022-03-29 RX ORDER — ONDANSETRON 2 MG/ML
4 INJECTION INTRAMUSCULAR; INTRAVENOUS ONCE
Status: COMPLETED | OUTPATIENT
Start: 2022-03-29 | End: 2022-03-29

## 2022-03-29 NOTE — ED INITIAL ASSESSMENT (HPI)
Dialysis pt with hx CHF, pacemaker, received via [facility] from home with c/o dyspnea pt was on RA upon arrival pt states he is on 2L baseline, arrives awake and alert, respirations nonlabored, denies any pain.

## 2022-03-30 ENCOUNTER — TELEPHONE (OUTPATIENT)
Dept: INTERNAL MEDICINE CLINIC | Facility: CLINIC | Age: 82
End: 2022-03-30

## 2022-03-30 NOTE — TELEPHONE ENCOUNTER
[facility] requesting that Prior Authorization be initiated for:  Calcitonin nasal spray  Pt paid out of pocket last time he needed a refill  Please call [phone], pt ID#:  [id number]    Tasked to [employer]

## 2022-03-30 NOTE — TELEPHONE ENCOUNTER
It appears that this medication was prescribed by Dr. Wilman Lee.  Disregjuanita ZAZUETA as it was not prescribed by our office

## 2022-03-31 ENCOUNTER — APPOINTMENT (OUTPATIENT)
Dept: GENERAL RADIOLOGY | Facility: HOSPITAL | Age: 82
End: 2022-03-31
Attending: EMERGENCY MEDICINE
Payer: MEDICARE

## 2022-03-31 ENCOUNTER — APPOINTMENT (OUTPATIENT)
Dept: PICC SERVICES | Facility: HOSPITAL | Age: 82
End: 2022-03-31
Attending: INTERNAL MEDICINE
Payer: MEDICARE

## 2022-03-31 ENCOUNTER — HOSPITAL ENCOUNTER (INPATIENT)
Facility: HOSPITAL | Age: 82
LOS: 6 days | Discharge: NURSING, REHAB OR LONG-TERM CARE | End: 2022-04-06
Attending: EMERGENCY MEDICINE | Admitting: INTERNAL MEDICINE
Payer: MEDICARE

## 2022-03-31 ENCOUNTER — APPOINTMENT (OUTPATIENT)
Dept: GENERAL RADIOLOGY | Facility: HOSPITAL | Age: 82
End: 2022-03-31
Attending: INTERNAL MEDICINE
Payer: MEDICARE

## 2022-03-31 DIAGNOSIS — D64.9 ANEMIA, UNSPECIFIED TYPE: ICD-10-CM

## 2022-03-31 DIAGNOSIS — E86.0 DEHYDRATION: ICD-10-CM

## 2022-03-31 DIAGNOSIS — I95.9 HYPOTENSION, UNSPECIFIED HYPOTENSION TYPE: Primary | ICD-10-CM

## 2022-03-31 DIAGNOSIS — R79.1 SUPRATHERAPEUTIC INR: ICD-10-CM

## 2022-03-31 DIAGNOSIS — K92.2 GASTROINTESTINAL HEMORRHAGE, UNSPECIFIED GASTROINTESTINAL HEMORRHAGE TYPE: ICD-10-CM

## 2022-03-31 PROBLEM — D62 ACUTE BLOOD LOSS ANEMIA: Status: ACTIVE | Noted: 2020-06-22

## 2022-03-31 LAB
ALBUMIN SERPL-MCNC: 2.5 G/DL (ref 3.4–5)
ALP LIVER SERPL-CCNC: 95 U/L
ALT SERPL-CCNC: 19 U/L
ANION GAP SERPL CALC-SCNC: 19 MMOL/L (ref 0–18)
ANTIBODY SCREEN: NEGATIVE
AST SERPL-CCNC: 35 U/L (ref 15–37)
BASOPHILS # BLD AUTO: 0.02 X10(3) UL (ref 0–0.2)
BASOPHILS NFR BLD AUTO: 0.3 %
BILIRUB DIRECT SERPL-MCNC: 0.4 MG/DL (ref 0–0.2)
BILIRUB SERPL-MCNC: 0.7 MG/DL (ref 0.1–2)
BUN BLD-MCNC: 127 MG/DL (ref 7–18)
BUN/CREAT SERPL: 16.6 (ref 10–20)
CALCIUM BLD-MCNC: 8 MG/DL (ref 8.5–10.1)
CHLORIDE SERPL-SCNC: 91 MMOL/L (ref 98–112)
CO2 SERPL-SCNC: 27 MMOL/L (ref 21–32)
CREAT BLD-MCNC: 7.65 MG/DL
DEPRECATED RDW RBC AUTO: 67.6 FL (ref 35.1–46.3)
EOSINOPHIL # BLD AUTO: 0.01 X10(3) UL (ref 0–0.7)
EOSINOPHIL NFR BLD AUTO: 0.1 %
ERYTHROCYTE [DISTWIDTH] IN BLOOD BY AUTOMATED COUNT: 17.5 % (ref 11–15)
GLUCOSE BLD-MCNC: 150 MG/DL (ref 70–99)
GLUCOSE BLDC GLUCOMTR-MCNC: 106 MG/DL (ref 70–99)
GLUCOSE BLDC GLUCOMTR-MCNC: 130 MG/DL (ref 70–99)
GLUCOSE BLDC GLUCOMTR-MCNC: 99 MG/DL (ref 70–99)
HCT VFR BLD AUTO: 22.2 %
HGB BLD-MCNC: 7.2 G/DL
IMM GRANULOCYTES # BLD AUTO: 0.03 X10(3) UL (ref 0–1)
IMM GRANULOCYTES NFR BLD: 0.4 %
INR BLD: 11.67 (ref 0.8–1.2)
INR BLD: 2.04 (ref 0.8–1.2)
LACTATE SERPL-SCNC: 3.7 MMOL/L (ref 0.4–2)
LACTATE SERPL-SCNC: 4.5 MMOL/L (ref 0.4–2)
LACTATE SERPL-SCNC: 6.2 MMOL/L (ref 0.4–2)
LACTATE SERPL-SCNC: 6.4 MMOL/L (ref 0.4–2)
LYMPHOCYTES # BLD AUTO: 0.81 X10(3) UL (ref 1–4)
LYMPHOCYTES NFR BLD AUTO: 10.3 %
MCH RBC QN AUTO: 35.8 PG (ref 26–34)
MCHC RBC AUTO-ENTMCNC: 32.4 G/DL (ref 31–37)
MCV RBC AUTO: 110.4 FL
MONOCYTES # BLD AUTO: 0.75 X10(3) UL (ref 0.1–1)
MONOCYTES NFR BLD AUTO: 9.6 %
NEUTROPHILS # BLD AUTO: 6.23 X10 (3) UL (ref 1.5–7.7)
NEUTROPHILS # BLD AUTO: 6.23 X10(3) UL (ref 1.5–7.7)
NEUTROPHILS NFR BLD AUTO: 79.3 %
OSMOLALITY SERPL CALC.SUM OF ELEC: 328 MOSM/KG (ref 275–295)
PLATELET # BLD AUTO: 174 10(3)UL (ref 150–450)
PLATELET MORPHOLOGY: NORMAL
POTASSIUM SERPL-SCNC: 4.2 MMOL/L (ref 3.5–5.1)
PROT SERPL-MCNC: 5.7 G/DL (ref 6.4–8.2)
PROTHROMBIN TIME: 23.3 SECONDS (ref 11.6–14.8)
PROTHROMBIN TIME: 92 SECONDS (ref 11.6–14.8)
RBC # BLD AUTO: 2.01 X10(6)UL
RH BLOOD TYPE: POSITIVE
SARS-COV-2 RNA RESP QL NAA+PROBE: NOT DETECTED
SODIUM SERPL-SCNC: 137 MMOL/L (ref 136–145)
WBC # BLD AUTO: 7.9 X10(3) UL (ref 4–11)

## 2022-03-31 PROCEDURE — 86920 COMPATIBILITY TEST SPIN: CPT

## 2022-03-31 PROCEDURE — 85610 PROTHROMBIN TIME: CPT | Performed by: EMERGENCY MEDICINE

## 2022-03-31 PROCEDURE — 85060 BLOOD SMEAR INTERPRETATION: CPT | Performed by: EMERGENCY MEDICINE

## 2022-03-31 PROCEDURE — 86927 PLASMA FRESH FROZEN: CPT

## 2022-03-31 PROCEDURE — 86901 BLOOD TYPING SEROLOGIC RH(D): CPT | Performed by: EMERGENCY MEDICINE

## 2022-03-31 PROCEDURE — 96365 THER/PROPH/DIAG IV INF INIT: CPT

## 2022-03-31 PROCEDURE — 71101 X-RAY EXAM UNILAT RIBS/CHEST: CPT | Performed by: EMERGENCY MEDICINE

## 2022-03-31 PROCEDURE — 86850 RBC ANTIBODY SCREEN: CPT | Performed by: EMERGENCY MEDICINE

## 2022-03-31 PROCEDURE — 82962 GLUCOSE BLOOD TEST: CPT

## 2022-03-31 PROCEDURE — 73502 X-RAY EXAM HIP UNI 2-3 VIEWS: CPT | Performed by: EMERGENCY MEDICINE

## 2022-03-31 PROCEDURE — 85025 COMPLETE CBC W/AUTO DIFF WBC: CPT | Performed by: EMERGENCY MEDICINE

## 2022-03-31 PROCEDURE — 96361 HYDRATE IV INFUSION ADD-ON: CPT

## 2022-03-31 PROCEDURE — 76937 US GUIDE VASCULAR ACCESS: CPT

## 2022-03-31 PROCEDURE — 36430 TRANSFUSION BLD/BLD COMPNT: CPT

## 2022-03-31 PROCEDURE — C9113 INJ PANTOPRAZOLE SODIUM, VIA: HCPCS | Performed by: INTERNAL MEDICINE

## 2022-03-31 PROCEDURE — 83605 ASSAY OF LACTIC ACID: CPT | Performed by: EMERGENCY MEDICINE

## 2022-03-31 PROCEDURE — 83605 ASSAY OF LACTIC ACID: CPT | Performed by: INTERNAL MEDICINE

## 2022-03-31 PROCEDURE — 85025 COMPLETE CBC W/AUTO DIFF WBC: CPT | Performed by: INTERNAL MEDICINE

## 2022-03-31 PROCEDURE — 85610 PROTHROMBIN TIME: CPT | Performed by: INTERNAL MEDICINE

## 2022-03-31 PROCEDURE — 86900 BLOOD TYPING SEROLOGIC ABO: CPT | Performed by: EMERGENCY MEDICINE

## 2022-03-31 PROCEDURE — 80048 BASIC METABOLIC PNL TOTAL CA: CPT | Performed by: EMERGENCY MEDICINE

## 2022-03-31 PROCEDURE — 93005 ELECTROCARDIOGRAM TRACING: CPT

## 2022-03-31 PROCEDURE — 99291 CRITICAL CARE FIRST HOUR: CPT

## 2022-03-31 PROCEDURE — 93010 ELECTROCARDIOGRAM REPORT: CPT | Performed by: EMERGENCY MEDICINE

## 2022-03-31 PROCEDURE — 80076 HEPATIC FUNCTION PANEL: CPT | Performed by: INTERNAL MEDICINE

## 2022-03-31 PROCEDURE — 36410 VNPNXR 3YR/> PHY/QHP DX/THER: CPT

## 2022-03-31 PROCEDURE — 71045 X-RAY EXAM CHEST 1 VIEW: CPT | Performed by: INTERNAL MEDICINE

## 2022-03-31 PROCEDURE — 85060 BLOOD SMEAR INTERPRETATION: CPT | Performed by: INTERNAL MEDICINE

## 2022-03-31 RX ORDER — CALCIUM CARBONATE 200(500)MG
2000 TABLET,CHEWABLE ORAL NIGHTLY
Status: DISCONTINUED | OUTPATIENT
Start: 2022-03-31 | End: 2022-04-06

## 2022-03-31 RX ORDER — ALBUMIN (HUMAN) 12.5 G/50ML
100 SOLUTION INTRAVENOUS AS NEEDED
Status: DISCONTINUED | OUTPATIENT
Start: 2022-03-31 | End: 2022-04-06

## 2022-03-31 RX ORDER — DEXTROSE MONOHYDRATE 25 G/50ML
50 INJECTION, SOLUTION INTRAVENOUS
Status: DISCONTINUED | OUTPATIENT
Start: 2022-03-31 | End: 2022-04-06

## 2022-03-31 RX ORDER — ONDANSETRON 2 MG/ML
4 INJECTION INTRAMUSCULAR; INTRAVENOUS EVERY 6 HOURS PRN
Status: DISCONTINUED | OUTPATIENT
Start: 2022-03-31 | End: 2022-04-06

## 2022-03-31 RX ORDER — HEPARIN SODIUM 1000 [USP'U]/ML
1.5 INJECTION, SOLUTION INTRAVENOUS; SUBCUTANEOUS ONCE
Status: COMPLETED | OUTPATIENT
Start: 2022-03-31 | End: 2022-04-01

## 2022-03-31 RX ORDER — SODIUM CHLORIDE 9 MG/ML
INJECTION, SOLUTION INTRAVENOUS ONCE
Status: DISCONTINUED | OUTPATIENT
Start: 2022-03-31 | End: 2022-04-06

## 2022-03-31 RX ORDER — CALCITONIN SALMON 200 [IU]/.09ML
1 SPRAY, METERED NASAL DAILY
Status: DISCONTINUED | OUTPATIENT
Start: 2022-03-31 | End: 2022-04-06

## 2022-03-31 RX ORDER — SODIUM CHLORIDE 9 MG/ML
INJECTION, SOLUTION INTRAVENOUS ONCE
Status: COMPLETED | OUTPATIENT
Start: 2022-03-31 | End: 2022-03-31

## 2022-03-31 RX ORDER — IPRATROPIUM BROMIDE AND ALBUTEROL SULFATE 2.5; .5 MG/3ML; MG/3ML
3 SOLUTION RESPIRATORY (INHALATION) EVERY 6 HOURS PRN
Status: DISCONTINUED | OUTPATIENT
Start: 2022-03-31 | End: 2022-04-06

## 2022-03-31 RX ORDER — NICOTINE POLACRILEX 4 MG
15 LOZENGE BUCCAL
Status: DISCONTINUED | OUTPATIENT
Start: 2022-03-31 | End: 2022-04-06

## 2022-03-31 RX ORDER — FUROSEMIDE 10 MG/ML
40 INJECTION INTRAMUSCULAR; INTRAVENOUS ONCE
Status: DISCONTINUED | OUTPATIENT
Start: 2022-03-31 | End: 2022-03-31

## 2022-03-31 RX ORDER — CALCITRIOL 0.25 UG/1
0.25 CAPSULE, LIQUID FILLED ORAL DAILY
Status: DISCONTINUED | OUTPATIENT
Start: 2022-03-31 | End: 2022-04-06

## 2022-03-31 RX ORDER — NICOTINE POLACRILEX 4 MG
30 LOZENGE BUCCAL
Status: DISCONTINUED | OUTPATIENT
Start: 2022-03-31 | End: 2022-04-06

## 2022-03-31 RX ORDER — ATORVASTATIN CALCIUM 40 MG/1
40 TABLET, FILM COATED ORAL NIGHTLY
Status: DISCONTINUED | OUTPATIENT
Start: 2022-03-31 | End: 2022-04-06

## 2022-03-31 RX ORDER — AMIODARONE HYDROCHLORIDE 200 MG/1
200 TABLET ORAL DAILY
Status: DISCONTINUED | OUTPATIENT
Start: 2022-03-31 | End: 2022-04-06

## 2022-03-31 RX ORDER — LIDOCAINE AND PRILOCAINE 25; 25 MG/G; MG/G
CREAM TOPICAL AS NEEDED
Status: DISCONTINUED | OUTPATIENT
Start: 2022-03-31 | End: 2022-04-06

## 2022-03-31 NOTE — ED INITIAL ASSESSMENT (HPI)
Pt to ED arrived via EMS from home, noticed his legs have been swelling the past few days, hx of Dialysis.

## 2022-04-01 ENCOUNTER — APPOINTMENT (OUTPATIENT)
Dept: GENERAL RADIOLOGY | Facility: HOSPITAL | Age: 82
End: 2022-04-01
Attending: INTERNAL MEDICINE
Payer: MEDICARE

## 2022-04-01 LAB
ALBUMIN SERPL-MCNC: 2.7 G/DL (ref 3.4–5)
ALBUMIN/GLOB SERPL: 0.9 {RATIO} (ref 1–2)
ALP LIVER SERPL-CCNC: 89 U/L
ALT SERPL-CCNC: 22 U/L
ANION GAP SERPL CALC-SCNC: 13 MMOL/L (ref 0–18)
AST SERPL-CCNC: 40 U/L (ref 15–37)
BASOPHILS # BLD AUTO: 0.01 X10(3) UL (ref 0–0.2)
BASOPHILS # BLD AUTO: 0.04 X10(3) UL (ref 0–0.2)
BASOPHILS NFR BLD AUTO: 0.1 %
BASOPHILS NFR BLD AUTO: 0.4 %
BILIRUB SERPL-MCNC: 0.7 MG/DL (ref 0.1–2)
BLOOD TYPE BARCODE: 6200
BUN BLD-MCNC: 138 MG/DL (ref 7–18)
BUN/CREAT SERPL: 16.6 (ref 10–20)
CALCIUM BLD-MCNC: 7.4 MG/DL (ref 8.5–10.1)
CHLORIDE SERPL-SCNC: 95 MMOL/L (ref 98–112)
CO2 SERPL-SCNC: 27 MMOL/L (ref 21–32)
CREAT BLD-MCNC: 8.3 MG/DL
DEPRECATED RDW RBC AUTO: 66.2 FL (ref 35.1–46.3)
DEPRECATED RDW RBC AUTO: 69.9 FL (ref 35.1–46.3)
EOSINOPHIL # BLD AUTO: 0.01 X10(3) UL (ref 0–0.7)
EOSINOPHIL # BLD AUTO: 0.07 X10(3) UL (ref 0–0.7)
EOSINOPHIL NFR BLD AUTO: 0.1 %
EOSINOPHIL NFR BLD AUTO: 0.8 %
ERYTHROCYTE [DISTWIDTH] IN BLOOD BY AUTOMATED COUNT: 18.9 % (ref 11–15)
ERYTHROCYTE [DISTWIDTH] IN BLOOD BY AUTOMATED COUNT: 20.8 % (ref 11–15)
GLOBULIN PLAS-MCNC: 2.9 G/DL (ref 2.8–4.4)
GLUCOSE BLD-MCNC: 123 MG/DL (ref 70–99)
GLUCOSE BLDC GLUCOMTR-MCNC: 129 MG/DL (ref 70–99)
GLUCOSE BLDC GLUCOMTR-MCNC: 81 MG/DL (ref 70–99)
HBV SURFACE AG SER-ACNC: <0.1 [IU]/L
HBV SURFACE AG SERPL QL IA: NONREACTIVE
HCT VFR BLD AUTO: 20.6 %
HCT VFR BLD AUTO: 24.8 %
HCT VFR BLD AUTO: 25.7 %
HGB BLD-MCNC: 6.8 G/DL
HGB BLD-MCNC: 8.3 G/DL
HGB BLD-MCNC: 8.6 G/DL
IMM GRANULOCYTES # BLD AUTO: 0.03 X10(3) UL (ref 0–1)
IMM GRANULOCYTES # BLD AUTO: 0.04 X10(3) UL (ref 0–1)
IMM GRANULOCYTES NFR BLD: 0.4 %
IMM GRANULOCYTES NFR BLD: 0.4 %
INR BLD: 1.59 (ref 0.8–1.2)
INR BLD: 1.61 (ref 0.8–1.2)
LACTATE SERPL-SCNC: 1.9 MMOL/L (ref 0.4–2)
LYMPHOCYTES # BLD AUTO: 0.71 X10(3) UL (ref 1–4)
LYMPHOCYTES # BLD AUTO: 1.21 X10(3) UL (ref 1–4)
LYMPHOCYTES NFR BLD AUTO: 13 %
LYMPHOCYTES NFR BLD AUTO: 9.1 %
MCH RBC QN AUTO: 33.7 PG (ref 26–34)
MCH RBC QN AUTO: 34.7 PG (ref 26–34)
MCHC RBC AUTO-ENTMCNC: 33 G/DL (ref 31–37)
MCHC RBC AUTO-ENTMCNC: 33.5 G/DL (ref 31–37)
MCV RBC AUTO: 100.8 FL
MCV RBC AUTO: 105.1 FL
MONOCYTES # BLD AUTO: 0.87 X10(3) UL (ref 0.1–1)
MONOCYTES # BLD AUTO: 1.1 X10(3) UL (ref 0.1–1)
MONOCYTES NFR BLD AUTO: 11.2 %
MONOCYTES NFR BLD AUTO: 11.9 %
NEUTROPHILS # BLD AUTO: 6.15 X10 (3) UL (ref 1.5–7.7)
NEUTROPHILS # BLD AUTO: 6.15 X10(3) UL (ref 1.5–7.7)
NEUTROPHILS # BLD AUTO: 6.82 X10 (3) UL (ref 1.5–7.7)
NEUTROPHILS # BLD AUTO: 6.82 X10(3) UL (ref 1.5–7.7)
NEUTROPHILS NFR BLD AUTO: 73.5 %
NEUTROPHILS NFR BLD AUTO: 79.1 %
OSMOLALITY SERPL CALC.SUM OF ELEC: 326 MOSM/KG (ref 275–295)
PLATELET # BLD AUTO: 111 10(3)UL (ref 150–450)
PLATELET # BLD AUTO: 116 10(3)UL (ref 150–450)
POTASSIUM SERPL-SCNC: 4.4 MMOL/L (ref 3.5–5.1)
PROT SERPL-MCNC: 5.6 G/DL (ref 6.4–8.2)
PROTHROMBIN TIME: 19.2 SECONDS (ref 11.6–14.8)
PROTHROMBIN TIME: 19.4 SECONDS (ref 11.6–14.8)
RBC # BLD AUTO: 1.96 X10(6)UL
RBC # BLD AUTO: 2.46 X10(6)UL
SODIUM SERPL-SCNC: 135 MMOL/L (ref 136–145)
WBC # BLD AUTO: 7.8 X10(3) UL (ref 4–11)
WBC # BLD AUTO: 9.3 X10(3) UL (ref 4–11)

## 2022-04-01 PROCEDURE — C9113 INJ PANTOPRAZOLE SODIUM, VIA: HCPCS | Performed by: INTERNAL MEDICINE

## 2022-04-01 PROCEDURE — 87340 HEPATITIS B SURFACE AG IA: CPT | Performed by: INTERNAL MEDICINE

## 2022-04-01 PROCEDURE — 84100 ASSAY OF PHOSPHORUS: CPT | Performed by: INTERNAL MEDICINE

## 2022-04-01 PROCEDURE — 85014 HEMATOCRIT: CPT | Performed by: INTERNAL MEDICINE

## 2022-04-01 PROCEDURE — 71045 X-RAY EXAM CHEST 1 VIEW: CPT | Performed by: INTERNAL MEDICINE

## 2022-04-01 PROCEDURE — 83605 ASSAY OF LACTIC ACID: CPT | Performed by: INTERNAL MEDICINE

## 2022-04-01 PROCEDURE — 82962 GLUCOSE BLOOD TEST: CPT

## 2022-04-01 PROCEDURE — 90935 HEMODIALYSIS ONE EVALUATION: CPT

## 2022-04-01 PROCEDURE — 85025 COMPLETE CBC W/AUTO DIFF WBC: CPT | Performed by: INTERNAL MEDICINE

## 2022-04-01 PROCEDURE — 85018 HEMOGLOBIN: CPT | Performed by: INTERNAL MEDICINE

## 2022-04-01 PROCEDURE — 85610 PROTHROMBIN TIME: CPT | Performed by: INTERNAL MEDICINE

## 2022-04-01 PROCEDURE — 80053 COMPREHEN METABOLIC PANEL: CPT | Performed by: INTERNAL MEDICINE

## 2022-04-01 PROCEDURE — 5A1D70Z PERFORMANCE OF URINARY FILTRATION, INTERMITTENT, LESS THAN 6 HOURS PER DAY: ICD-10-PCS | Performed by: INTERNAL MEDICINE

## 2022-04-01 RX ORDER — HEPARIN SODIUM 1000 [USP'U]/ML
1.5 INJECTION, SOLUTION INTRAVENOUS; SUBCUTANEOUS ONCE
Status: COMPLETED | OUTPATIENT
Start: 2022-04-01 | End: 2022-04-02

## 2022-04-01 RX ORDER — ACETAMINOPHEN 325 MG/1
650 TABLET ORAL EVERY 6 HOURS PRN
Status: DISCONTINUED | OUTPATIENT
Start: 2022-04-01 | End: 2022-04-06

## 2022-04-01 RX ORDER — ALBUMIN (HUMAN) 12.5 G/50ML
100 SOLUTION INTRAVENOUS AS NEEDED
Status: DISCONTINUED | OUTPATIENT
Start: 2022-04-01 | End: 2022-04-06

## 2022-04-01 RX ORDER — SIMETHICONE 80 MG
80 TABLET,CHEWABLE ORAL
Status: DISCONTINUED | OUTPATIENT
Start: 2022-04-01 | End: 2022-04-06

## 2022-04-01 RX ORDER — ETOMIDATE 2 MG/ML
INJECTION INTRAVENOUS
Status: DISPENSED
Start: 2022-04-01 | End: 2022-04-01

## 2022-04-01 RX ORDER — HEPARIN SODIUM 1000 [USP'U]/ML
INJECTION, SOLUTION INTRAVENOUS; SUBCUTANEOUS
Status: COMPLETED
Start: 2022-04-01 | End: 2022-04-01

## 2022-04-01 NOTE — CM/SW NOTE
04/01/22 1500   CM/SW Referral Data   Referral Source Social Work (self-referral)   Reason for Referral Discharge planning   Informant Patient;Spouse/Significant Other   Pertinent Medical Hx   Does patient have an established PCP? Yes   Patient Info   Patient's Current Mental Status at Time of Assessment Alert;Oriented   Patient Communication Issues Hearing impairment   Patient's [address]   Number of Levels in Home 1   Number of Stair in Home 0   Patient lives with Spouse/Significant other   Patient Status Prior to Admission   Independent with ADLs and Mobility No   Pt. requires assistance with Driving; Ambulating   Services in place prior to admission DME/Supplies at home;Dialysis   DME Provider/Supplier [facility]   Type of DME/Supplies Home Oxygen   Dialysis Clinic [facility]   Scheduled Dialysis days T-TH-SAT   Discharge Needs   Anticipated D/C needs To be determined;Home health care     SW initiated self referral for DC planning. SW met with pt and spouse at bedside to complete assessment above. Pt presents from home with spouse. PTA pt was up walking with cane/walker. Pt's spouse is primary caregiver. Pt has a son who is very supportive as well. Pt does not drive. Spouse provides all transportation. Pt goes to [address] RENAL  - T/TR/S. HX of Res HH. No HX of [facility]. Per chart review, current with [facility] 2L baseline. SW discussed options for safe DC planning. Therapy is pending at this time. Pt and spouse have goal of discharging home when medically cleared. Pt/spouse agreeable to [facility]. [facility] referrals sent, F2F entered. SW received permission to send [facility] referrals as back up option. GUDELIA requested for pt and spouse to f/u with RN/SW if they change their mind in regards to [facility] placement. PLAN: pending medical clearance & therapy assessments - home with home health, resume HD @  Renal  T/TR/S    SW remains available for support and/or discharge planning.  Please do not hesitate to call/chat SW if further DC needs arise.      Morgan Serrano [facility], [facility]  Ext [phone]

## 2022-04-01 NOTE — CM/SW NOTE
Department  notified of request for [facility], bipin referrals started. Assigned CM/SW to follow up with pt/family on further discharge planning.      Lennie Vidal   April 01, 2022   11:29

## 2022-04-01 NOTE — CM/SW NOTE
Department  notified of request for bipin [facility] referrals started. Assigned CM/SW to follow up with pt/family on further discharge planning.        [facility]   April 01, 2022   15:36

## 2022-04-01 NOTE — PLAN OF CARE
Problem: Diabetes/Glucose Control  Goal: Glucose maintained within prescribed range  Description: INTERVENTIONS:  - Monitor Blood Glucose as ordered  - Assess for signs and symptoms of hyperglycemia and hypoglycemia  - Administer ordered medications to maintain glucose within target range  - Assess barriers to adequate nutritional intake and initiate nutrition consult as needed  - Instruct patient on self management of diabetes  Outcome: Progressing     Problem: CARDIOVASCULAR - ADULT  Goal: Maintains optimal cardiac output and hemodynamic stability  Description: INTERVENTIONS:  - Monitor vital signs, rhythm, and trends  - Monitor for bleeding, hypotension and signs of decreased cardiac output  - Evaluate effectiveness of vasoactive medications to optimize hemodynamic stability  - Monitor arterial and/or venous puncture sites for bleeding and/or hematoma  - Assess quality of pulses, skin color and temperature  - Assess for signs of decreased coronary artery perfusion - ex. Angina  - Evaluate fluid balance, assess for edema, trend weights  Outcome: Progressing  Note: Pt admitted with hypotensive, came in with c/o weakness and black stools, Hgb dropped from baseline of 9 to 7, received 2 units of PRBC, latest Hgb is 8.6. GI on consult, no plans for EGD or colonoscopy at this time and keep Hgb > 8. NSR noted on tele monitor. VS being monitored, BP improved after receiving 2 units of PRBC. Problem: HEMATOLOGIC - ADULT  Goal: Maintains hematologic stability  Description: INTERVENTIONS  - Assess for signs and symptoms of bleeding or hemorrhage  - Monitor labs and vital signs for trends  - Administer supportive blood products/factors, fluids and medications as ordered and appropriate  - Administer supportive blood products/factors as ordered and appropriate  Outcome: Progressing  Note: Pt with INR of 11.6 on admission, was given vit. K 10 mg and 2 units of FFP. INR improved to 2.04.  GI on consult for GI bleed, goal to keep Hgb > 8. Problem: METABOLIC/FLUID AND ELECTROLYTES - ADULT  Goal: Hemodynamic stability and optimal renal function maintained  Description: INTERVENTIONS:  - Monitor labs and assess for signs and symptoms of volume excess or deficit  - Monitor intake, output and patient weight  - Monitor urine specific gravity, serum osmolarity and serum sodium as indicated or ordered  - Monitor response to interventions for patient's volume status, including labs, urine output, blood pressure (other measures as available)  - Encourage oral intake as appropriate  - Instruct patient on fluid and nutrition restrictions as appropriate  Outcome: Not Progressing  Note: Pt with hx of ESRD (T/TH/S), nephrology on consult, HD treatment held yesterday d/t hypotension, plan to do HD today instead.

## 2022-04-01 NOTE — PROGRESS NOTES
[facility]    Progress Note    Timmie Buerger Patient Status:  Inpatient    3/28/1940 MRN [id number]   Location [facility] 2W/SW Attending Gabriel Mann MD   Hosp Day # 1 PCP Ru Wilder MD       Subjective:   Timmie Buerger is a(n) 80year old male     ROS:     Constitutional: Feels a little better but still weak  ENMT:  Negative for ear drainage, hearing loss and nasal drainage  Eyes:  Negative for eye discharge and vision loss  Cardiovascular:  Negative for chest pain, sob, irregular heartbeat/palpitations  Respiratory:  Negative for cough, dyspnea and wheezing  Gastrointestinal:  Negative for abdominal pain, constipation, decreased appetite, diarrhea and vomiting denies any GI bleeding today  Genitourinary:  Negative for dysuria and hematuria  Endocrine:  Negative for abnormal sleep patterns, increased activity, polydipsia and polyphagia  Hema/Lymph:  Negative for easy bleeding and easy bruising  Integumentary:  Negative for pruritus and rash  Musculoskeletal:  Negative for bone/joint symptoms  Neurological:  Negative for gait disturbance  Psychiatric:  Negative for inappropriate interaction and psychiatric symptoms       22  1400   BP: 99/52   Pulse: 71   Resp: 25   Temp:            PHYSICAL EXAM:   Constitutional: appears well hydrated alert and responsive no acute distress noted  Head/Face: normocephalic  Eyes/Vision: normal extraocular motion is intact  Nose/Mouth/Throat:mucous membranes are moist and no oral lesions are noted  Neck/Thyroid: neck is supple without adenopathy  Lymphatic: no abnormal cervical, supraclavicular adenopathy is noted  Respiratory:  lungs are clear to auscultation bilaterally, normal respiratory effort  Cardiovascular: regular rate and rhythm no murmurs, gallups, or rubs  Abdomen: soft, non-tender, non-distended, BS normal  Vascular: well perfused femoral, and pedal pulses normal  Skin/Hair: no unusual rashes present, no abnormal bruising noted  Back/Spine: no abnormalities noted  Musculoskeletal: full ROM all extremities good strength  no deformities  Extremities: no edema, cyanosis  Neurological:  Grossly normal    Results:     Laboratory Data:  Lab Results   Component Value Date    WBC 9.3 04/01/2022    HGB 8.3 (L) 04/01/2022    HCT 24.8 (L) 04/01/2022    .0 (L) 04/01/2022    CREATSERUM 8.30 (H) 04/01/2022     (H) 04/01/2022     (L) 04/01/2022    K 4.4 04/01/2022    CL 95 (L) 04/01/2022    CO2 27.0 04/01/2022     (H) 04/01/2022    CA 7.4 (L) 04/01/2022    ALB 2.7 (L) 04/01/2022    ALKPHO 89 04/01/2022    BILT 0.7 04/01/2022    TP 5.6 (L) 04/01/2022    AST 40 (H) 04/01/2022    ALT 22 04/01/2022    PTT 94.3 (H) 08/31/2021    INR 1.61 (H) 04/01/2022    INR 1.59 (H) 04/01/2022    PT 41.6 (H) 04/18/2016    T4F 1.1 10/05/2021    TSH 3.470 10/05/2021     03/29/2022    PSA 3.4 12/12/2018    DDIMER 1.68 (H) 09/02/2021    CRP 4.95 (H) 08/31/2021    MG 2.6 01/19/2022    PHOS 3.9 01/19/2022    TROP 0.242 (HH) 10/04/2021     07/10/2020    B12 >2,000 (H) 01/18/2022       Imaging:  @EEAqu[employer]iscoveryIMAGING@   XR CHEST AP PORTABLE  (CPT=71045)    Result Date: 4/1/2022  CONCLUSION:  1. No acute findings. Dictated by (CST): Sosa Merchant MD on 4/01/2022 at 8:01 AM     Finalized by (CST): Sosa Merchant MD on 4/01/2022 at 8:02 AM          XR CHEST AP PORTABLE  (CPT=71045)    Result Date: 3/31/2022  CONCLUSION:  1. Cardiomegaly. Tortuous aorta. CABG. 2. Right IJ double-lumen catheter with the tip in the distal SVC. No pneumothorax. Dictated by (CST): Zenaida Talamantes MD on 3/31/2022 at 2:15 PM     Finalized by (CST): Zenaida Talamantes MD on 3/31/2022 at 2:17 PM          XR RIBS WITH CHEST (3 VIEWS), RIGHT  (CPT=71101)    Result Date: 3/31/2022  CONCLUSION:  1. Old healed fracture deformities lateral margins right 8th 9th and 10th ribs. 2. Borderline cardiomegaly. CABG.  3. Left basilar atelectasis and/or scarring. Small left-sided effusion    Dictated by (CST): Nikita Ya MD on 3/31/2022 at 11:08 AM     Finalized by (CST): Nikita Ya MD on 3/31/2022 at 11:12 AM          XR HIP W OR WO PELVIS 2 OR 3 VIEWS, RIGHT (CPT=73502)    Result Date: 3/31/2022  CONCLUSION:  1. No acute fracture dislocation. Mild bilateral hip osteoarthritis. 2. Extensive Vascular calcifications noted. Dictated by (CST): Nikita Ya MD on 3/31/2022 at 11:13 AM     Finalized by (CST): Nikita Ya MD on 3/31/2022 at 11:15 AM              ASSESSMENT/PLAN:   Assessment       1 ESRD is on dialysis today will do again tomorrow to get back on schedule only remove 1 L of fluid    #2 hypotension better  #3 coagulopathy INR 1.3 now it has been corrected use Coumadin judiciously    #4 GI bleeding stable on Protonix no need for scope    cpm         4/1/2022  Wan Davis MD

## 2022-04-02 LAB
ANION GAP SERPL CALC-SCNC: 9 MMOL/L (ref 0–18)
BASOPHILS # BLD AUTO: 0.01 X10(3) UL (ref 0–0.2)
BASOPHILS NFR BLD AUTO: 0.1 %
BLOOD TYPE BARCODE: 6200
BUN BLD-MCNC: 81 MG/DL (ref 7–18)
BUN/CREAT SERPL: 14.2 (ref 10–20)
CALCIUM BLD-MCNC: 8.1 MG/DL (ref 8.5–10.1)
CHLORIDE SERPL-SCNC: 100 MMOL/L (ref 98–112)
CO2 SERPL-SCNC: 29 MMOL/L (ref 21–32)
CREAT BLD-MCNC: 5.72 MG/DL
DEPRECATED RDW RBC AUTO: 75.2 FL (ref 35.1–46.3)
EOSINOPHIL # BLD AUTO: 0.03 X10(3) UL (ref 0–0.7)
EOSINOPHIL NFR BLD AUTO: 0.4 %
ERYTHROCYTE [DISTWIDTH] IN BLOOD BY AUTOMATED COUNT: 22.1 % (ref 11–15)
GLUCOSE BLD-MCNC: 139 MG/DL (ref 70–99)
GLUCOSE BLDC GLUCOMTR-MCNC: 114 MG/DL (ref 70–99)
GLUCOSE BLDC GLUCOMTR-MCNC: 143 MG/DL (ref 70–99)
GLUCOSE BLDC GLUCOMTR-MCNC: 149 MG/DL (ref 70–99)
GLUCOSE BLDC GLUCOMTR-MCNC: 157 MG/DL (ref 70–99)
HCT VFR BLD AUTO: 23.9 %
HGB BLD-MCNC: 8 G/DL
IMM GRANULOCYTES # BLD AUTO: 0.04 X10(3) UL (ref 0–1)
IMM GRANULOCYTES NFR BLD: 0.5 %
INR BLD: 1.53 (ref 0.8–1.2)
LYMPHOCYTES # BLD AUTO: 0.84 X10(3) UL (ref 1–4)
LYMPHOCYTES NFR BLD AUTO: 10.7 %
MCH RBC QN AUTO: 34.8 PG (ref 26–34)
MCHC RBC AUTO-ENTMCNC: 33.5 G/DL (ref 31–37)
MCV RBC AUTO: 103.9 FL
MONOCYTES # BLD AUTO: 1.03 X10(3) UL (ref 0.1–1)
MONOCYTES NFR BLD AUTO: 13.1 %
NEUTROPHILS # BLD AUTO: 5.91 X10 (3) UL (ref 1.5–7.7)
NEUTROPHILS # BLD AUTO: 5.91 X10(3) UL (ref 1.5–7.7)
NEUTROPHILS NFR BLD AUTO: 75.2 %
OSMOLALITY SERPL CALC.SUM OF ELEC: 313 MOSM/KG (ref 275–295)
PHOSPHATE SERPL-MCNC: 3.8 MG/DL (ref 2.5–4.9)
PHOSPHATE SERPL-MCNC: 4.9 MG/DL (ref 2.5–4.9)
PLATELET # BLD AUTO: 69 10(3)UL (ref 150–450)
POTASSIUM SERPL-SCNC: 4.2 MMOL/L (ref 3.5–5.1)
PROTHROMBIN TIME: 18.6 SECONDS (ref 11.6–14.8)
RBC # BLD AUTO: 2.3 X10(6)UL
SODIUM SERPL-SCNC: 138 MMOL/L (ref 136–145)
WBC # BLD AUTO: 7.9 X10(3) UL (ref 4–11)

## 2022-04-02 PROCEDURE — C9113 INJ PANTOPRAZOLE SODIUM, VIA: HCPCS | Performed by: INTERNAL MEDICINE

## 2022-04-02 PROCEDURE — 82962 GLUCOSE BLOOD TEST: CPT

## 2022-04-02 PROCEDURE — 84100 ASSAY OF PHOSPHORUS: CPT | Performed by: INTERNAL MEDICINE

## 2022-04-02 PROCEDURE — 90935 HEMODIALYSIS ONE EVALUATION: CPT

## 2022-04-02 PROCEDURE — 97163 PT EVAL HIGH COMPLEX 45 MIN: CPT

## 2022-04-02 PROCEDURE — 85025 COMPLETE CBC W/AUTO DIFF WBC: CPT | Performed by: INTERNAL MEDICINE

## 2022-04-02 PROCEDURE — 85610 PROTHROMBIN TIME: CPT | Performed by: INTERNAL MEDICINE

## 2022-04-02 PROCEDURE — P9047 ALBUMIN (HUMAN), 25%, 50ML: HCPCS | Performed by: INTERNAL MEDICINE

## 2022-04-02 PROCEDURE — 80048 BASIC METABOLIC PNL TOTAL CA: CPT | Performed by: INTERNAL MEDICINE

## 2022-04-02 RX ORDER — MIDODRINE HYDROCHLORIDE 5 MG/1
5 TABLET ORAL 3 TIMES DAILY PRN
Status: DISCONTINUED | OUTPATIENT
Start: 2022-04-02 | End: 2022-04-06

## 2022-04-02 RX ORDER — BUPRENORPHINE 5 UG/H
5 PATCH TRANSDERMAL WEEKLY
Status: DISCONTINUED | OUTPATIENT
Start: 2022-04-06 | End: 2022-04-06

## 2022-04-02 NOTE — PLAN OF CARE
Patient up to chair with walker. 1 assist and shaky. HD today with no issues. Appetite better today. Wife bedside and updated. Problem: Diabetes/Glucose Control  Goal: Glucose maintained within prescribed range  Description: INTERVENTIONS:  - Monitor Blood Glucose as ordered  - Assess for signs and symptoms of hyperglycemia and hypoglycemia  - Administer ordered medications to maintain glucose within target range  - Assess barriers to adequate nutritional intake and initiate nutrition consult as needed  - Instruct patient on self management of diabetes  Outcome: Progressing     Problem: HEMATOLOGIC - ADULT  Goal: Maintains hematologic stability  Description: INTERVENTIONS  - Assess for signs and symptoms of bleeding or hemorrhage  - Monitor labs and vital signs for trends  - Administer supportive blood products/factors, fluids and medications as ordered and appropriate  - Administer supportive blood products/factors as ordered and appropriate  Outcome: Progressing     Problem: CARDIOVASCULAR - ADULT  Goal: Maintains optimal cardiac output and hemodynamic stability  Description: INTERVENTIONS:  - Monitor vital signs, rhythm, and trends  - Monitor for bleeding, hypotension and signs of decreased cardiac output  - Evaluate effectiveness of vasoactive medications to optimize hemodynamic stability  - Monitor arterial and/or venous puncture sites for bleeding and/or hematoma  - Assess quality of pulses, skin color and temperature  - Assess for signs of decreased coronary artery perfusion - ex.  Angina  - Evaluate fluid balance, assess for edema, trend weights  Outcome: Not Progressing     Problem: METABOLIC/FLUID AND ELECTROLYTES - ADULT  Goal: Hemodynamic stability and optimal renal function maintained  Description: INTERVENTIONS:  - Monitor labs and assess for signs and symptoms of volume excess or deficit  - Monitor intake, output and patient weight  - Monitor urine specific gravity, serum osmolarity and serum sodium as indicated or ordered  - Monitor response to interventions for patient's volume status, including labs, urine output, blood pressure (other measures as available)  - Encourage oral intake as appropriate  - Instruct patient on fluid and nutrition restrictions as appropriate  Outcome: Not Progressing

## 2022-04-02 NOTE — PROGRESS NOTES
Pt requesting to ambulate to the rest room. Walker utilized however pt very unstable and unable to ambulate more than a few steps. Additional RN called into room and pt placed on rolling commode. At this time this RN observed a pain patch (buprenorphine 5 mcg) on pt's thoracic spine. This RN discussed with pt that there is no order for him to have the pain patch therefore I would have to remove it but the patient refused, stating he just put in on this past Wednesday.

## 2022-04-02 NOTE — CM/SW NOTE
SW received call from Dr. Novella Koyanagi stating pt is interested in short term rehab stay. SW completed PASRR and uploaded into aidin. PT/OT evals are pending. Pt will require insurance auth. [facility] referral sent and pending. SW called and left VM for pt's spouse Iona to discuss [facility] facility choice. Pt is extremely hard of hearing. Pt is established HD pt at [address] [facility] Renal T/TR/S. Pt will be able to admit to [facility] with onsite HD and MD confirmed pt would be able to be transferred to dialysis X3 days a week. Pt is not vaccinated against covid-19. SW included this in [facility] referral.     PLAN: pending medical clearance, therapy assessments and insurance auth -- [facility]  Need:  - accepting [facility] facilities  - choice of [facility]    SW remains available for support and/or discharge planning. Please do not hesitate to call/chat SW if further DC needs arise.      Abdullahi Rodriguez MSW, [facility]  Ext [phone]

## 2022-04-02 NOTE — PLAN OF CARE
Problem: Patient Centered Care  Goal: Patient preferences are identified and integrated in the patient's plan of care  Description: Interventions:  - What would you like us to know as we care for you?   - Provide timely, complete, and accurate information to patient/family  - Incorporate patient and family knowledge, values, beliefs, and cultural backgrounds into the planning and delivery of care  - Encourage patient/family to participate in care and decision-making at the level they choose  - Honor patient and family perspectives and choices  Outcome: Progressing     Problem: Diabetes/Glucose Control  Goal: Glucose maintained within prescribed range  Description: INTERVENTIONS:  - Monitor Blood Glucose as ordered  - Assess for signs and symptoms of hyperglycemia and hypoglycemia  - Administer ordered medications to maintain glucose within target range  - Assess barriers to adequate nutritional intake and initiate nutrition consult as needed  - Instruct patient on self management of diabetes  Outcome: Progressing     Problem: CARDIOVASCULAR - ADULT  Goal: Maintains optimal cardiac output and hemodynamic stability  Description: INTERVENTIONS:  - Monitor vital signs, rhythm, and trends  - Monitor for bleeding, hypotension and signs of decreased cardiac output  - Evaluate effectiveness of vasoactive medications to optimize hemodynamic stability  - Monitor arterial and/or venous puncture sites for bleeding and/or hematoma  - Assess quality of pulses, skin color and temperature  - Assess for signs of decreased coronary artery perfusion - ex.  Angina  - Evaluate fluid balance, assess for edema, trend weights  Outcome: Progressing     Problem: METABOLIC/FLUID AND ELECTROLYTES - ADULT  Goal: Hemodynamic stability and optimal renal function maintained  Description: INTERVENTIONS:  - Monitor labs and assess for signs and symptoms of volume excess or deficit  - Monitor intake, output and patient weight  - Monitor urine specific gravity, serum osmolarity and serum sodium as indicated or ordered  - Monitor response to interventions for patient's volume status, including labs, urine output, blood pressure (other measures as available)  - Encourage oral intake as appropriate  - Instruct patient on fluid and nutrition restrictions as appropriate  Outcome: Progressing     Problem: HEMATOLOGIC - ADULT  Goal: Maintains hematologic stability  Description: INTERVENTIONS  - Assess for signs and symptoms of bleeding or hemorrhage  - Monitor labs and vital signs for trends  - Administer supportive blood products/factors, fluids and medications as ordered and appropriate  - Administer supportive blood products/factors as ordered and appropriate  Outcome: Progressing

## 2022-04-02 NOTE — PLAN OF CARE
Pt vitals stable throughout shift, remains 1 - 2 L NC. Hemodialysis performed, 1 L taken off. PT/OT worked w/ pt, see note. Tolerating diet. Transfer orders placed. Safety measures implemented w/ call light within reach and bed alarm on. Problem: Patient Centered Care  Goal: Patient preferences are identified and integrated in the patient's plan of care  Description: Interventions:  - What would you like us to know as we care for you?   - Provide timely, complete, and accurate information to patient/family  - Incorporate patient and family knowledge, values, beliefs, and cultural backgrounds into the planning and delivery of care  - Encourage patient/family to participate in care and decision-making at the level they choose  - Honor patient and family perspectives and choices  Outcome: Progressing     Problem: Diabetes/Glucose Control  Goal: Glucose maintained within prescribed range  Description: INTERVENTIONS:  - Monitor Blood Glucose as ordered  - Assess for signs and symptoms of hyperglycemia and hypoglycemia  - Administer ordered medications to maintain glucose within target range  - Assess barriers to adequate nutritional intake and initiate nutrition consult as needed  - Instruct patient on self management of diabetes  Outcome: Progressing     Problem: CARDIOVASCULAR - ADULT  Goal: Maintains optimal cardiac output and hemodynamic stability  Description: INTERVENTIONS:  - Monitor vital signs, rhythm, and trends  - Monitor for bleeding, hypotension and signs of decreased cardiac output  - Evaluate effectiveness of vasoactive medications to optimize hemodynamic stability  - Monitor arterial and/or venous puncture sites for bleeding and/or hematoma  - Assess quality of pulses, skin color and temperature  - Assess for signs of decreased coronary artery perfusion - ex.  Angina  - Evaluate fluid balance, assess for edema, trend weights  Outcome: Progressing     Problem: METABOLIC/FLUID AND ELECTROLYTES - ADULT  Goal: Hemodynamic stability and optimal renal function maintained  Description: INTERVENTIONS:  - Monitor labs and assess for signs and symptoms of volume excess or deficit  - Monitor intake, output and patient weight  - Monitor urine specific gravity, serum osmolarity and serum sodium as indicated or ordered  - Monitor response to interventions for patient's volume status, including labs, urine output, blood pressure (other measures as available)  - Encourage oral intake as appropriate  - Instruct patient on fluid and nutrition restrictions as appropriate  Outcome: Progressing     Problem: HEMATOLOGIC - ADULT  Goal: Maintains hematologic stability  Description: INTERVENTIONS  - Assess for signs and symptoms of bleeding or hemorrhage  - Monitor labs and vital signs for trends  - Administer supportive blood products/factors, fluids and medications as ordered and appropriate  - Administer supportive blood products/factors as ordered and appropriate  Outcome: Progressing

## 2022-04-03 LAB
ALBUMIN SERPL-MCNC: 2.7 G/DL (ref 3.4–5)
ANION GAP SERPL CALC-SCNC: 8 MMOL/L (ref 0–18)
BASOPHILS # BLD AUTO: 0.04 X10(3) UL (ref 0–0.2)
BASOPHILS NFR BLD AUTO: 0.4 %
BUN BLD-MCNC: 52 MG/DL (ref 7–18)
BUN/CREAT SERPL: 11.6 (ref 10–20)
CALCIUM BLD-MCNC: 7.9 MG/DL (ref 8.5–10.1)
CHLORIDE SERPL-SCNC: 102 MMOL/L (ref 98–112)
CO2 SERPL-SCNC: 26 MMOL/L (ref 21–32)
CREAT BLD-MCNC: 4.49 MG/DL
DEPRECATED RDW RBC AUTO: 74.1 FL (ref 35.1–46.3)
EOSINOPHIL # BLD AUTO: 0.04 X10(3) UL (ref 0–0.7)
EOSINOPHIL NFR BLD AUTO: 0.4 %
ERYTHROCYTE [DISTWIDTH] IN BLOOD BY AUTOMATED COUNT: 22.5 % (ref 11–15)
GLUCOSE BLD-MCNC: 129 MG/DL (ref 70–99)
GLUCOSE BLDC GLUCOMTR-MCNC: 105 MG/DL (ref 70–99)
GLUCOSE BLDC GLUCOMTR-MCNC: 117 MG/DL (ref 70–99)
GLUCOSE BLDC GLUCOMTR-MCNC: 138 MG/DL (ref 70–99)
GLUCOSE BLDC GLUCOMTR-MCNC: 148 MG/DL (ref 70–99)
HCT VFR BLD AUTO: 25.6 %
HGB BLD-MCNC: 8.2 G/DL
IMM GRANULOCYTES # BLD AUTO: 0.03 X10(3) UL (ref 0–1)
IMM GRANULOCYTES NFR BLD: 0.3 %
INR BLD: 1.5 (ref 0.8–1.2)
LYMPHOCYTES # BLD AUTO: 1.09 X10(3) UL (ref 1–4)
LYMPHOCYTES NFR BLD AUTO: 12.2 %
MCH RBC QN AUTO: 34.3 PG (ref 26–34)
MCHC RBC AUTO-ENTMCNC: 32 G/DL (ref 31–37)
MCV RBC AUTO: 107.1 FL
MONOCYTES # BLD AUTO: 1.21 X10(3) UL (ref 0.1–1)
MONOCYTES NFR BLD AUTO: 13.5 %
NEUTROPHILS # BLD AUTO: 6.53 X10 (3) UL (ref 1.5–7.7)
NEUTROPHILS # BLD AUTO: 6.53 X10(3) UL (ref 1.5–7.7)
NEUTROPHILS NFR BLD AUTO: 73.2 %
OSMOLALITY SERPL CALC.SUM OF ELEC: 298 MOSM/KG (ref 275–295)
PHOSPHATE SERPL-MCNC: 2.5 MG/DL (ref 2.5–4.9)
PLATELET # BLD AUTO: 57 10(3)UL (ref 150–450)
PLATELET MORPHOLOGY: NORMAL
POTASSIUM SERPL-SCNC: 4.4 MMOL/L (ref 3.5–5.1)
PROTHROMBIN TIME: 18.3 SECONDS (ref 11.6–14.8)
RBC # BLD AUTO: 2.39 X10(6)UL
SODIUM SERPL-SCNC: 136 MMOL/L (ref 136–145)
WBC # BLD AUTO: 8.9 X10(3) UL (ref 4–11)

## 2022-04-03 PROCEDURE — 82962 GLUCOSE BLOOD TEST: CPT

## 2022-04-03 PROCEDURE — 85610 PROTHROMBIN TIME: CPT | Performed by: INTERNAL MEDICINE

## 2022-04-03 PROCEDURE — C9113 INJ PANTOPRAZOLE SODIUM, VIA: HCPCS | Performed by: INTERNAL MEDICINE

## 2022-04-03 PROCEDURE — 80069 RENAL FUNCTION PANEL: CPT | Performed by: INTERNAL MEDICINE

## 2022-04-03 PROCEDURE — 85025 COMPLETE CBC W/AUTO DIFF WBC: CPT | Performed by: INTERNAL MEDICINE

## 2022-04-03 RX ORDER — SENNOSIDES 8.6 MG
8.6 TABLET ORAL 2 TIMES DAILY PRN
Status: DISCONTINUED | OUTPATIENT
Start: 2022-04-03 | End: 2022-04-06

## 2022-04-03 RX ORDER — WARFARIN SODIUM 5 MG/1
5 TABLET ORAL NIGHTLY
Status: DISCONTINUED | OUTPATIENT
Start: 2022-04-03 | End: 2022-04-06

## 2022-04-03 RX ORDER — SENNOSIDES 8.6 MG
17.2 TABLET ORAL DAILY
Status: DISCONTINUED | OUTPATIENT
Start: 2022-04-04 | End: 2022-04-06

## 2022-04-03 RX ORDER — SENNOSIDES 8.8 MG/5ML
5 LIQUID ORAL DAILY
Status: DISCONTINUED | OUTPATIENT
Start: 2022-04-03 | End: 2022-04-03

## 2022-04-03 RX ORDER — DOCUSATE SODIUM 100 MG/1
100 CAPSULE, LIQUID FILLED ORAL 2 TIMES DAILY
Status: DISCONTINUED | OUTPATIENT
Start: 2022-04-03 | End: 2022-04-06

## 2022-04-03 NOTE — PLAN OF CARE
A&Ox4, hard of hearing. From home with wife. Unclear how patient ambulates. On 1L NC. No BM since admission. Recieves dialysis T/ TH/ Sa. Pt and family updated on plan of care. Problem: Patient Centered Care  Goal: Patient preferences are identified and integrated in the patient's plan of care  Description: Interventions:  - What would you like us to know as we care for you?  From home with wife   - Provide timely, complete, and accurate information to patient/family  - Incorporate patient and family knowledge, values, beliefs, and cultural backgrounds into the planning and delivery of care  - Encourage patient/family to participate in care and decision-making at the level they choose  - Honor patient and family perspectives and choices  Outcome: Progressing     Problem: Diabetes/Glucose Control  Goal: Glucose maintained within prescribed range  Description: INTERVENTIONS:  - Monitor Blood Glucose as ordered  - Assess for signs and symptoms of hyperglycemia and hypoglycemia  - Administer ordered medications to maintain glucose within target range  - Assess barriers to adequate nutritional intake and initiate nutrition consult as needed  - Instruct patient on self management of diabetes  Outcome: Progressing     Problem: Patient/Family Goals  Goal: Patient/Family Long Term Goal  Description: Patient's Long Term Goal: return home    Interventions:  - follow plan of care   - See additional Care Plan goals for specific interventions  Outcome: Progressing  Goal: Patient/Family Short Term Goal  Description: Patient's Short Term Goal: have a BM    Interventions:   - take medication as directed, drink fluids, eat healthy meals  - See additional Care Plan goals for specific interventions  Outcome: Progressing     Problem: CARDIOVASCULAR - ADULT  Goal: Maintains optimal cardiac output and hemodynamic stability  Description: INTERVENTIONS:  - Monitor vital signs, rhythm, and trends  - Monitor for bleeding, hypotension and signs of decreased cardiac output  - Evaluate effectiveness of vasoactive medications to optimize hemodynamic stability  - Monitor arterial and/or venous puncture sites for bleeding and/or hematoma  - Assess quality of pulses, skin color and temperature  - Assess for signs of decreased coronary artery perfusion - ex.  Angina  - Evaluate fluid balance, assess for edema, trend weights  Outcome: Progressing     Problem: METABOLIC/FLUID AND ELECTROLYTES - ADULT  Goal: Hemodynamic stability and optimal renal function maintained  Description: INTERVENTIONS:  - Monitor labs and assess for signs and symptoms of volume excess or deficit  - Monitor intake, output and patient weight  - Monitor urine specific gravity, serum osmolarity and serum sodium as indicated or ordered  - Monitor response to interventions for patient's volume status, including labs, urine output, blood pressure (other measures as available)  - Encourage oral intake as appropriate  - Instruct patient on fluid and nutrition restrictions as appropriate  Outcome: Progressing     Problem: HEMATOLOGIC - ADULT  Goal: Maintains hematologic stability  Description: INTERVENTIONS  - Assess for signs and symptoms of bleeding or hemorrhage  - Monitor labs and vital signs for trends  - Administer supportive blood products/factors, fluids and medications as ordered and appropriate  - Administer supportive blood products/factors as ordered and appropriate  Outcome: Progressing

## 2022-04-03 NOTE — PLAN OF CARE
Problem: Patient Centered Care  Goal: Patient preferences are identified and integrated in the patient's plan of care  Description: Interventions:  - What would you like us to know as we care for you?   - Provide timely, complete, and accurate information to patient/family  - Incorporate patient and family knowledge, values, beliefs, and cultural backgrounds into the planning and delivery of care  - Encourage patient/family to participate in care and decision-making at the level they choose  - Honor patient and family perspectives and choices  Outcome: Progressing     Problem: Diabetes/Glucose Control  Goal: Glucose maintained within prescribed range  Description: INTERVENTIONS:  - Monitor Blood Glucose as ordered  - Assess for signs and symptoms of hyperglycemia and hypoglycemia  - Administer ordered medications to maintain glucose within target range  - Assess barriers to adequate nutritional intake and initiate nutrition consult as needed  - Instruct patient on self management of diabetes  Outcome: Progressing     Problem: CARDIOVASCULAR - ADULT  Goal: Maintains optimal cardiac output and hemodynamic stability  Description: INTERVENTIONS:  - Monitor vital signs, rhythm, and trends  - Monitor for bleeding, hypotension and signs of decreased cardiac output  - Evaluate effectiveness of vasoactive medications to optimize hemodynamic stability  - Monitor arterial and/or venous puncture sites for bleeding and/or hematoma  - Assess quality of pulses, skin color and temperature  - Assess for signs of decreased coronary artery perfusion - ex.  Angina  - Evaluate fluid balance, assess for edema, trend weights  Outcome: Progressing     Problem: METABOLIC/FLUID AND ELECTROLYTES - ADULT  Goal: Hemodynamic stability and optimal renal function maintained  Description: INTERVENTIONS:  - Monitor labs and assess for signs and symptoms of volume excess or deficit  - Monitor intake, output and patient weight  - Monitor urine specific gravity, serum osmolarity and serum sodium as indicated or ordered  - Monitor response to interventions for patient's volume status, including labs, urine output, blood pressure (other measures as available)  - Encourage oral intake as appropriate  - Instruct patient on fluid and nutrition restrictions as appropriate  Outcome: Progressing     Problem: HEMATOLOGIC - ADULT  Goal: Maintains hematologic stability  Description: INTERVENTIONS  - Assess for signs and symptoms of bleeding or hemorrhage  - Monitor labs and vital signs for trends  - Administer supportive blood products/factors, fluids and medications as ordered and appropriate  - Administer supportive blood products/factors as ordered and appropriate  Outcome: Progressing   Patient alert, oriented x4. Following commands. Complaining on intermittent pain. Tylenol PRN. On 2L NC. Vpaced. SBP . Midodrine given. Anuric. Repositioning with assistance.

## 2022-04-04 LAB
ALBUMIN SERPL-MCNC: 2.5 G/DL (ref 3.4–5)
ALBUMIN/GLOB SERPL: 0.8 {RATIO} (ref 1–2)
ALP LIVER SERPL-CCNC: 99 U/L
ALT SERPL-CCNC: 21 U/L
ANION GAP SERPL CALC-SCNC: 9 MMOL/L (ref 0–18)
AST SERPL-CCNC: 27 U/L (ref 15–37)
BASOPHILS # BLD AUTO: 0.02 X10(3) UL (ref 0–0.2)
BASOPHILS NFR BLD AUTO: 0.2 %
BILIRUB SERPL-MCNC: 0.6 MG/DL (ref 0.1–2)
BUN BLD-MCNC: 70 MG/DL (ref 7–18)
BUN/CREAT SERPL: 12.4 (ref 10–20)
CALCIUM BLD-MCNC: 8.1 MG/DL (ref 8.5–10.1)
CHLORIDE SERPL-SCNC: 99 MMOL/L (ref 98–112)
CO2 SERPL-SCNC: 26 MMOL/L (ref 21–32)
CREAT BLD-MCNC: 5.64 MG/DL
DEPRECATED RDW RBC AUTO: 74.5 FL (ref 35.1–46.3)
EOSINOPHIL # BLD AUTO: 0.05 X10(3) UL (ref 0–0.7)
EOSINOPHIL NFR BLD AUTO: 0.6 %
ERYTHROCYTE [DISTWIDTH] IN BLOOD BY AUTOMATED COUNT: 23.5 % (ref 11–15)
GLOBULIN PLAS-MCNC: 3.3 G/DL (ref 2.8–4.4)
GLUCOSE BLD-MCNC: 127 MG/DL (ref 70–99)
GLUCOSE BLDC GLUCOMTR-MCNC: 104 MG/DL (ref 70–99)
GLUCOSE BLDC GLUCOMTR-MCNC: 112 MG/DL (ref 70–99)
GLUCOSE BLDC GLUCOMTR-MCNC: 138 MG/DL (ref 70–99)
GLUCOSE BLDC GLUCOMTR-MCNC: 147 MG/DL (ref 70–99)
HCT VFR BLD AUTO: 26.1 %
HGB BLD-MCNC: 8.4 G/DL
IMM GRANULOCYTES # BLD AUTO: 0.04 X10(3) UL (ref 0–1)
IMM GRANULOCYTES NFR BLD: 0.5 %
INR BLD: 1.5 (ref 0.8–1.2)
IRON SATN MFR SERPL: 15 %
IRON SERPL-MCNC: 36 UG/DL
LYMPHOCYTES # BLD AUTO: 1.04 X10(3) UL (ref 1–4)
LYMPHOCYTES NFR BLD AUTO: 12.7 %
MAGNESIUM SERPL-MCNC: 2.2 MG/DL (ref 1.6–2.6)
MCH RBC QN AUTO: 34.7 PG (ref 26–34)
MCHC RBC AUTO-ENTMCNC: 32.2 G/DL (ref 31–37)
MCV RBC AUTO: 107.9 FL
MONOCYTES # BLD AUTO: 0.76 X10(3) UL (ref 0.1–1)
MONOCYTES NFR BLD AUTO: 9.3 %
NEUTROPHILS # BLD AUTO: 6.27 X10 (3) UL (ref 1.5–7.7)
NEUTROPHILS # BLD AUTO: 6.27 X10(3) UL (ref 1.5–7.7)
NEUTROPHILS NFR BLD AUTO: 76.7 %
OSMOLALITY SERPL CALC.SUM OF ELEC: 300 MOSM/KG (ref 275–295)
PHOSPHATE SERPL-MCNC: 2.9 MG/DL (ref 2.5–4.9)
PLATELET # BLD AUTO: 76 10(3)UL (ref 150–450)
POTASSIUM SERPL-SCNC: 4.9 MMOL/L (ref 3.5–5.1)
PROT SERPL-MCNC: 5.8 G/DL (ref 6.4–8.2)
PROTHROMBIN TIME: 18.3 SECONDS (ref 11.6–14.8)
RBC # BLD AUTO: 2.42 X10(6)UL
SODIUM SERPL-SCNC: 134 MMOL/L (ref 136–145)
TIBC SERPL-MCNC: 235 UG/DL (ref 240–450)
TRANSFERRIN SERPL-MCNC: 158 MG/DL (ref 200–360)
WBC # BLD AUTO: 8.2 X10(3) UL (ref 4–11)

## 2022-04-04 PROCEDURE — 85610 PROTHROMBIN TIME: CPT | Performed by: INTERNAL MEDICINE

## 2022-04-04 PROCEDURE — 85025 COMPLETE CBC W/AUTO DIFF WBC: CPT | Performed by: INTERNAL MEDICINE

## 2022-04-04 PROCEDURE — 97166 OT EVAL MOD COMPLEX 45 MIN: CPT

## 2022-04-04 PROCEDURE — 83540 ASSAY OF IRON: CPT | Performed by: INTERNAL MEDICINE

## 2022-04-04 PROCEDURE — 83735 ASSAY OF MAGNESIUM: CPT | Performed by: INTERNAL MEDICINE

## 2022-04-04 PROCEDURE — 82962 GLUCOSE BLOOD TEST: CPT

## 2022-04-04 PROCEDURE — 97116 GAIT TRAINING THERAPY: CPT

## 2022-04-04 PROCEDURE — 97530 THERAPEUTIC ACTIVITIES: CPT

## 2022-04-04 PROCEDURE — 80053 COMPREHEN METABOLIC PANEL: CPT | Performed by: INTERNAL MEDICINE

## 2022-04-04 PROCEDURE — 84100 ASSAY OF PHOSPHORUS: CPT | Performed by: INTERNAL MEDICINE

## 2022-04-04 PROCEDURE — 84466 ASSAY OF TRANSFERRIN: CPT | Performed by: INTERNAL MEDICINE

## 2022-04-04 PROCEDURE — C9113 INJ PANTOPRAZOLE SODIUM, VIA: HCPCS | Performed by: INTERNAL MEDICINE

## 2022-04-04 RX ORDER — BUPRENORPHINE 5 UG/H
5 PATCH TRANSDERMAL WEEKLY
Qty: 4 PATCH | Refills: 0 | Status: ON HOLD | OUTPATIENT
Start: 2022-04-06

## 2022-04-04 NOTE — PLAN OF CARE
Pt aox4. [place]. Stable hgb. No bm. On bowel regimen. Pain to left side/back from recent fall relieved with tylenol and warm pack. RA. BP stable. Problem: Patient Centered Care  Goal: Patient preferences are identified and integrated in the patient's plan of care  Description: Interventions:  - What would you like us to know as we care for you?  From home with wife   - Provide timely, complete, and accurate information to patient/family  - Incorporate patient and family knowledge, values, beliefs, and cultural backgrounds into the planning and delivery of care  - Encourage patient/family to participate in care and decision-making at the level they choose  - Honor patient and family perspectives and choices  Outcome: Progressing     Problem: Diabetes/Glucose Control  Goal: Glucose maintained within prescribed range  Description: INTERVENTIONS:  - Monitor Blood Glucose as ordered  - Assess for signs and symptoms of hyperglycemia and hypoglycemia  - Administer ordered medications to maintain glucose within target range  - Assess barriers to adequate nutritional intake and initiate nutrition consult as needed  - Instruct patient on self management of diabetes  Outcome: Progressing     Problem: Patient/Family Goals  Goal: Patient/Family Long Term Goal  Description: Patient's Long Term Goal: return home    Interventions:  - follow plan of care   - See additional Care Plan goals for specific interventions  Outcome: Progressing  Goal: Patient/Family Short Term Goal  Description: Patient's Short Term Goal: have a BM    Interventions:   - take medication as directed, drink fluids, eat healthy meals  - See additional Care Plan goals for specific interventions  Outcome: Progressing     Problem: CARDIOVASCULAR - ADULT  Goal: Maintains optimal cardiac output and hemodynamic stability  Description: INTERVENTIONS:  - Monitor vital signs, rhythm, and trends  - Monitor for bleeding, hypotension and signs of decreased cardiac output  - Evaluate effectiveness of vasoactive medications to optimize hemodynamic stability  - Monitor arterial and/or venous puncture sites for bleeding and/or hematoma  - Assess quality of pulses, skin color and temperature  - Assess for signs of decreased coronary artery perfusion - ex.  Angina  - Evaluate fluid balance, assess for edema, trend weights  Outcome: Progressing     Problem: METABOLIC/FLUID AND ELECTROLYTES - ADULT  Goal: Hemodynamic stability and optimal renal function maintained  Description: INTERVENTIONS:  - Monitor labs and assess for signs and symptoms of volume excess or deficit  - Monitor intake, output and patient weight  - Monitor urine specific gravity, serum osmolarity and serum sodium as indicated or ordered  - Monitor response to interventions for patient's volume status, including labs, urine output, blood pressure (other measures as available)  - Encourage oral intake as appropriate  - Instruct patient on fluid and nutrition restrictions as appropriate  Outcome: Progressing     Problem: HEMATOLOGIC - ADULT  Goal: Maintains hematologic stability  Description: INTERVENTIONS  - Assess for signs and symptoms of bleeding or hemorrhage  - Monitor labs and vital signs for trends  - Administer supportive blood products/factors, fluids and medications as ordered and appropriate  - Administer supportive blood products/factors as ordered and appropriate  Outcome: Progressing

## 2022-04-04 NOTE — CM/SW NOTE
SW obtained list of accepting [facility] facilities via [place]. SW met w/ pt in his room. SW discussed [facility] and confirmed pt is agreeable to this if it is needed at [place]. SW provided pt w/ [facility] list and quality data. Pt confirmed he will review the list and discuss w/ his wife. SW left instructions for f/up once [facility] choice is made - SW left pt w/ her direct phone # for f/up and/or questions. Need insurance authorization prior to d/c. PLAN: [facility], pending choice, pending ins auth & med clear      SW/CM to remain available for support and/or discharge planning.          Gina Avelar, MSW, [facility]

## 2022-04-05 LAB
ALBUMIN SERPL-MCNC: 2.6 G/DL (ref 3.4–5)
ANION GAP SERPL CALC-SCNC: 12 MMOL/L (ref 0–18)
BASOPHILS # BLD AUTO: 0.02 X10(3) UL (ref 0–0.2)
BASOPHILS NFR BLD AUTO: 0.2 %
BUN BLD-MCNC: 90 MG/DL (ref 7–18)
BUN/CREAT SERPL: 13.6 (ref 10–20)
CALCIUM BLD-MCNC: 8.3 MG/DL (ref 8.5–10.1)
CHLORIDE SERPL-SCNC: 96 MMOL/L (ref 98–112)
CO2 SERPL-SCNC: 25 MMOL/L (ref 21–32)
CREAT BLD-MCNC: 6.64 MG/DL
DEPRECATED RDW RBC AUTO: 75.8 FL (ref 35.1–46.3)
EOSINOPHIL # BLD AUTO: 0.03 X10(3) UL (ref 0–0.7)
EOSINOPHIL NFR BLD AUTO: 0.4 %
ERYTHROCYTE [DISTWIDTH] IN BLOOD BY AUTOMATED COUNT: 24.1 % (ref 11–15)
GLUCOSE BLD-MCNC: 121 MG/DL (ref 70–99)
GLUCOSE BLDC GLUCOMTR-MCNC: 106 MG/DL (ref 70–99)
GLUCOSE BLDC GLUCOMTR-MCNC: 114 MG/DL (ref 70–99)
GLUCOSE BLDC GLUCOMTR-MCNC: 115 MG/DL (ref 70–99)
GLUCOSE BLDC GLUCOMTR-MCNC: 169 MG/DL (ref 70–99)
GLUCOSE BLDC GLUCOMTR-MCNC: 182 MG/DL (ref 70–99)
GLUCOSE BLDC GLUCOMTR-MCNC: 53 MG/DL (ref 70–99)
GLUCOSE BLDC GLUCOMTR-MCNC: 57 MG/DL (ref 70–99)
HCT VFR BLD AUTO: 27.2 %
HGB BLD-MCNC: 8.7 G/DL
IMM GRANULOCYTES # BLD AUTO: 0.04 X10(3) UL (ref 0–1)
IMM GRANULOCYTES NFR BLD: 0.5 %
INR BLD: 1.66 (ref 0.8–1.2)
LYMPHOCYTES # BLD AUTO: 0.69 X10(3) UL (ref 1–4)
LYMPHOCYTES NFR BLD AUTO: 8.3 %
MCH RBC QN AUTO: 34.5 PG (ref 26–34)
MCHC RBC AUTO-ENTMCNC: 32 G/DL (ref 31–37)
MCV RBC AUTO: 107.9 FL
MONOCYTES # BLD AUTO: 0.84 X10(3) UL (ref 0.1–1)
MONOCYTES NFR BLD AUTO: 10 %
NEUTROPHILS # BLD AUTO: 6.74 X10 (3) UL (ref 1.5–7.7)
NEUTROPHILS # BLD AUTO: 6.74 X10(3) UL (ref 1.5–7.7)
NEUTROPHILS NFR BLD AUTO: 80.6 %
OSMOLALITY SERPL CALC.SUM OF ELEC: 305 MOSM/KG (ref 275–295)
PHOSPHATE SERPL-MCNC: 3.6 MG/DL (ref 2.5–4.9)
PLATELET # BLD AUTO: 94 10(3)UL (ref 150–450)
POTASSIUM SERPL-SCNC: 4.8 MMOL/L (ref 3.5–5.1)
PROTHROMBIN TIME: 19.8 SECONDS (ref 11.6–14.8)
RBC # BLD AUTO: 2.52 X10(6)UL
SODIUM SERPL-SCNC: 133 MMOL/L (ref 136–145)
WBC # BLD AUTO: 8.4 X10(3) UL (ref 4–11)

## 2022-04-05 PROCEDURE — 85610 PROTHROMBIN TIME: CPT | Performed by: INTERNAL MEDICINE

## 2022-04-05 PROCEDURE — 80069 RENAL FUNCTION PANEL: CPT | Performed by: INTERNAL MEDICINE

## 2022-04-05 PROCEDURE — 82962 GLUCOSE BLOOD TEST: CPT

## 2022-04-05 PROCEDURE — C9113 INJ PANTOPRAZOLE SODIUM, VIA: HCPCS | Performed by: INTERNAL MEDICINE

## 2022-04-05 PROCEDURE — 90935 HEMODIALYSIS ONE EVALUATION: CPT

## 2022-04-05 PROCEDURE — 85025 COMPLETE CBC W/AUTO DIFF WBC: CPT | Performed by: INTERNAL MEDICINE

## 2022-04-05 RX ORDER — ALBUMIN (HUMAN) 12.5 G/50ML
100 SOLUTION INTRAVENOUS AS NEEDED
Status: DISCONTINUED | OUTPATIENT
Start: 2022-04-05 | End: 2022-04-06

## 2022-04-05 RX ORDER — HEPARIN SODIUM 1000 [USP'U]/ML
1.5 INJECTION, SOLUTION INTRAVENOUS; SUBCUTANEOUS ONCE
Status: COMPLETED | OUTPATIENT
Start: 2022-04-05 | End: 2022-04-05

## 2022-04-05 NOTE — PLAN OF CARE
Pt alert and oriented x4. Pt forgetful at times. Pt on room air. IV iron given per MD. Potential plan to discharge to [facility] once medically cleared. Problem: Patient Centered Care  Goal: Patient preferences are identified and integrated in the patient's plan of care  Description: Interventions:  - What would you like us to know as we care for you?  From home with wife   - Provide timely, complete, and accurate information to patient/family  - Incorporate patient and family knowledge, values, beliefs, and cultural backgrounds into the planning and delivery of care  - Encourage patient/family to participate in care and decision-making at the level they choose  - Honor patient and family perspectives and choices  Outcome: Progressing     Problem: Diabetes/Glucose Control  Goal: Glucose maintained within prescribed range  Description: INTERVENTIONS:  - Monitor Blood Glucose as ordered  - Assess for signs and symptoms of hyperglycemia and hypoglycemia  - Administer ordered medications to maintain glucose within target range  - Assess barriers to adequate nutritional intake and initiate nutrition consult as needed  - Instruct patient on self management of diabetes  Outcome: Progressing     Problem: Patient/Family Goals  Goal: Patient/Family Long Term Goal  Description: Patient's Long Term Goal: return home    Interventions:  - follow plan of care   - See additional Care Plan goals for specific interventions  Outcome: Progressing  Goal: Patient/Family Short Term Goal  Description: Patient's Short Term Goal: have a BM    Interventions:   - take medication as directed, drink fluids, eat healthy meals  - See additional Care Plan goals for specific interventions  Outcome: Progressing     Problem: CARDIOVASCULAR - ADULT  Goal: Maintains optimal cardiac output and hemodynamic stability  Description: INTERVENTIONS:  - Monitor vital signs, rhythm, and trends  - Monitor for bleeding, hypotension and signs of decreased cardiac output  - Evaluate effectiveness of vasoactive medications to optimize hemodynamic stability  - Monitor arterial and/or venous puncture sites for bleeding and/or hematoma  - Assess quality of pulses, skin color and temperature  - Assess for signs of decreased coronary artery perfusion - ex.  Angina  - Evaluate fluid balance, assess for edema, trend weights  Outcome: Progressing     Problem: METABOLIC/FLUID AND ELECTROLYTES - ADULT  Goal: Hemodynamic stability and optimal renal function maintained  Description: INTERVENTIONS:  - Monitor labs and assess for signs and symptoms of volume excess or deficit  - Monitor intake, output and patient weight  - Monitor urine specific gravity, serum osmolarity and serum sodium as indicated or ordered  - Monitor response to interventions for patient's volume status, including labs, urine output, blood pressure (other measures as available)  - Encourage oral intake as appropriate  - Instruct patient on fluid and nutrition restrictions as appropriate  Outcome: Progressing     Problem: METABOLIC/FLUID AND ELECTROLYTES - ADULT  Goal: Hemodynamic stability and optimal renal function maintained  Description: INTERVENTIONS:  - Monitor labs and assess for signs and symptoms of volume excess or deficit  - Monitor intake, output and patient weight  - Monitor urine specific gravity, serum osmolarity and serum sodium as indicated or ordered  - Monitor response to interventions for patient's volume status, including labs, urine output, blood pressure (other measures as available)  - Encourage oral intake as appropriate  - Instruct patient on fluid and nutrition restrictions as appropriate  Outcome: Progressing     Problem: HEMATOLOGIC - ADULT  Goal: Maintains hematologic stability  Description: INTERVENTIONS  - Assess for signs and symptoms of bleeding or hemorrhage  - Monitor labs and vital signs for trends  - Administer supportive blood products/factors, fluids and medications as ordered and appropriate  - Administer supportive blood products/factors as ordered and appropriate  Outcome: Progressing

## 2022-04-05 NOTE — PLAN OF CARE
Pt is aox4, ambulating x1 with a walker. Voiding up to bathroom. Pt denies pain. Disease process discussed with pt. Bed in lowest position, call light and personal possessions within reach. Pt instructed to call for assistance before getting up. Pt received hemodialysis today, see flowsheet for ouptut. Plan to discharge to Rehab pending medical clearance. Pt had hypoglycemic episode at shift change. Protocol followed and endorsed to next nurse. Problem: Patient Centered Care  Goal: Patient preferences are identified and integrated in the patient's plan of care  Description: Interventions:  - What would you like us to know as we care for you?  From home with wife   - Provide timely, complete, and accurate information to patient/family  - Incorporate patient and family knowledge, values, beliefs, and cultural backgrounds into the planning and delivery of care  - Encourage patient/family to participate in care and decision-making at the level they choose  - Honor patient and family perspectives and choices  Outcome: Progressing     Problem: Diabetes/Glucose Control  Goal: Glucose maintained within prescribed range  Description: INTERVENTIONS:  - Monitor Blood Glucose as ordered  - Assess for signs and symptoms of hyperglycemia and hypoglycemia  - Administer ordered medications to maintain glucose within target range  - Assess barriers to adequate nutritional intake and initiate nutrition consult as needed  - Instruct patient on self management of diabetes  Outcome: Progressing     Problem: Patient/Family Goals  Goal: Patient/Family Long Term Goal  Description: Patient's Long Term Goal: return home    Interventions:  - follow plan of care   - See additional Care Plan goals for specific interventions  Outcome: Progressing  Goal: Patient/Family Short Term Goal  Description: Patient's Short Term Goal: have a BM    Interventions:   - take medication as directed, drink fluids, eat healthy meals  - See additional Care Plan goals for specific interventions  Outcome: Progressing     Problem: CARDIOVASCULAR - ADULT  Goal: Maintains optimal cardiac output and hemodynamic stability  Description: INTERVENTIONS:  - Monitor vital signs, rhythm, and trends  - Monitor for bleeding, hypotension and signs of decreased cardiac output  - Evaluate effectiveness of vasoactive medications to optimize hemodynamic stability  - Monitor arterial and/or venous puncture sites for bleeding and/or hematoma  - Assess quality of pulses, skin color and temperature  - Assess for signs of decreased coronary artery perfusion - ex.  Angina  - Evaluate fluid balance, assess for edema, trend weights  Outcome: Progressing     Problem: METABOLIC/FLUID AND ELECTROLYTES - ADULT  Goal: Hemodynamic stability and optimal renal function maintained  Description: INTERVENTIONS:  - Monitor labs and assess for signs and symptoms of volume excess or deficit  - Monitor intake, output and patient weight  - Monitor urine specific gravity, serum osmolarity and serum sodium as indicated or ordered  - Monitor response to interventions for patient's volume status, including labs, urine output, blood pressure (other measures as available)  - Encourage oral intake as appropriate  - Instruct patient on fluid and nutrition restrictions as appropriate  Outcome: Progressing     Problem: HEMATOLOGIC - ADULT  Goal: Maintains hematologic stability  Description: INTERVENTIONS  - Assess for signs and symptoms of bleeding or hemorrhage  - Monitor labs and vital signs for trends  - Administer supportive blood products/factors, fluids and medications as ordered and appropriate  - Administer supportive blood products/factors as ordered and appropriate  Outcome: Progressing

## 2022-04-05 NOTE — CM/SW NOTE
Submitted clinical via [employer] portal.  [employer] Case/Auth ID is [id number]. Final insurance authorization is pending at this time. SW/CM assigned to the case will continue to follow auth status.       Ann Berumen   April 05, 2022   10:27

## 2022-04-05 NOTE — CM/SW NOTE
10: 05AM  GUDELIA received notice from RN/[place] - pt's top choice for [facility]ERYL is [facility] in [place]. GUDELIA reserved BCV via Aidin. GUDELIA requested [facility] submit insurance approval via [employer] portal - potential DC tomorrow 4/6, pending med clear. [employer] ID: [id number]    Need insurance authorization prior to d/c.    11:10AM  Received notice from [address] [facility] approved pt for CHERYL placement. Dates of approval: 4/6/2022 to 4/8/2022 w/ next review being 4/8/2022. GUDELIA sent update to [facility] via Aidin. GUDELIA updated pt's SAGRARIO/Paradise and DC RN Daphne via secure chat in [place]. GUDELIA spoke to Cecilia Cardenas w/ [place] [facility] set on [address] [place] [address] & [facility] for 4/6 and 4/7. PCS completed in [facility] and will need date added day of actual DC. PLAN: [facility], [facility] on WILL CALL, PCS completed (needs date) - pending med clear     GUDELIA/CM to remain available for support and/or discharge planning.          Tara Ro, MSW, [address] [facility]

## 2022-04-05 NOTE — PLAN OF CARE
Problem: Patient Centered Care  Goal: Patient preferences are identified and integrated in the patient's plan of care  Description: Interventions:  - What would you like us to know as we care for you?  From home with wife   - Provide timely, complete, and accurate information to patient/family  - Incorporate patient and family knowledge, values, beliefs, and cultural backgrounds into the planning and delivery of care  - Encourage patient/family to participate in care and decision-making at the level they choose  - Honor patient and family perspectives and choices  Outcome: Progressing     Problem: Diabetes/Glucose Control  Goal: Glucose maintained within prescribed range  Description: INTERVENTIONS:  - Monitor Blood Glucose as ordered  - Assess for signs and symptoms of hyperglycemia and hypoglycemia  - Administer ordered medications to maintain glucose within target range  - Assess barriers to adequate nutritional intake and initiate nutrition consult as needed  - Instruct patient on self management of diabetes  Outcome: Progressing     Problem: Patient/Family Goals  Goal: Patient/Family Long Term Goal  Description: Patient's Long Term Goal: return home    Interventions:  - follow plan of care   - See additional Care Plan goals for specific interventions  Outcome: Progressing  Goal: Patient/Family Short Term Goal  Description: Patient's Short Term Goal: have a BM    Interventions:   - take medication as directed, drink fluids, eat healthy meals  - See additional Care Plan goals for specific interventions  Outcome: Progressing     Problem: CARDIOVASCULAR - ADULT  Goal: Maintains optimal cardiac output and hemodynamic stability  Description: INTERVENTIONS:  - Monitor vital signs, rhythm, and trends  - Monitor for bleeding, hypotension and signs of decreased cardiac output  - Evaluate effectiveness of vasoactive medications to optimize hemodynamic stability  - Monitor arterial and/or venous puncture sites for bleeding and/or hematoma  - Assess quality of pulses, skin color and temperature  - Assess for signs of decreased coronary artery perfusion - ex. Angina  - Evaluate fluid balance, assess for edema, trend weights  Outcome: Progressing     Problem: METABOLIC/FLUID AND ELECTROLYTES - ADULT  Goal: Hemodynamic stability and optimal renal function maintained  Description: INTERVENTIONS:  - Monitor labs and assess for signs and symptoms of volume excess or deficit  - Monitor intake, output and patient weight  - Monitor urine specific gravity, serum osmolarity and serum sodium as indicated or ordered  - Monitor response to interventions for patient's volume status, including labs, urine output, blood pressure (other measures as available)  - Encourage oral intake as appropriate  - Instruct patient on fluid and nutrition restrictions as appropriate  Outcome: Progressing     Problem: HEMATOLOGIC - ADULT  Goal: Maintains hematologic stability  Description: INTERVENTIONS  - Assess for signs and symptoms of bleeding or hemorrhage  - Monitor labs and vital signs for trends  - Administer supportive blood products/factors, fluids and medications as ordered and appropriate  - Administer supportive blood products/factors as ordered and appropriate  Outcome: Progressing   Patient resting in bed. Vital signs stable. As needed tylenol given for pain. Safety measures and fall precautions in place, call light with in reach, bed locked in lowest position, bed alarm on. Frequent rounding by nursing staff.

## 2022-04-06 VITALS
RESPIRATION RATE: 18 BRPM | BODY MASS INDEX: 26 KG/M2 | HEART RATE: 70 BPM | DIASTOLIC BLOOD PRESSURE: 49 MMHG | TEMPERATURE: 98 F | OXYGEN SATURATION: 94 % | SYSTOLIC BLOOD PRESSURE: 91 MMHG | WEIGHT: 163.63 LBS

## 2022-04-06 LAB
ALBUMIN SERPL-MCNC: 2.5 G/DL (ref 3.4–5)
ANION GAP SERPL CALC-SCNC: 8 MMOL/L (ref 0–18)
BASOPHILS # BLD AUTO: 0.01 X10(3) UL (ref 0–0.2)
BASOPHILS NFR BLD AUTO: 0.1 %
BUN BLD-MCNC: 49 MG/DL (ref 7–18)
BUN/CREAT SERPL: 10.9 (ref 10–20)
CALCIUM BLD-MCNC: 8.2 MG/DL (ref 8.5–10.1)
CHLORIDE SERPL-SCNC: 99 MMOL/L (ref 98–112)
CO2 SERPL-SCNC: 29 MMOL/L (ref 21–32)
CREAT BLD-MCNC: 4.51 MG/DL
DEPRECATED RDW RBC AUTO: 84.4 FL (ref 35.1–46.3)
EOSINOPHIL # BLD AUTO: 0.05 X10(3) UL (ref 0–0.7)
EOSINOPHIL NFR BLD AUTO: 0.6 %
ERYTHROCYTE [DISTWIDTH] IN BLOOD BY AUTOMATED COUNT: 24.9 % (ref 11–15)
GLUCOSE BLD-MCNC: 124 MG/DL (ref 70–99)
GLUCOSE BLDC GLUCOMTR-MCNC: 128 MG/DL (ref 70–99)
GLUCOSE BLDC GLUCOMTR-MCNC: 83 MG/DL (ref 70–99)
HCT VFR BLD AUTO: 26.5 %
HGB BLD-MCNC: 8.4 G/DL
IMM GRANULOCYTES # BLD AUTO: 0.03 X10(3) UL (ref 0–1)
IMM GRANULOCYTES NFR BLD: 0.4 %
INR BLD: 1.99 (ref 0.8–1.2)
LYMPHOCYTES # BLD AUTO: 0.49 X10(3) UL (ref 1–4)
LYMPHOCYTES NFR BLD AUTO: 6.2 %
MAGNESIUM SERPL-MCNC: 2.2 MG/DL (ref 1.6–2.6)
MCH RBC QN AUTO: 34.3 PG (ref 26–34)
MCHC RBC AUTO-ENTMCNC: 31.7 G/DL (ref 31–37)
MCV RBC AUTO: 108.2 FL
MONOCYTES # BLD AUTO: 0.94 X10(3) UL (ref 0.1–1)
MONOCYTES NFR BLD AUTO: 11.9 %
NEUTROPHILS # BLD AUTO: 6.4 X10 (3) UL (ref 1.5–7.7)
NEUTROPHILS # BLD AUTO: 6.4 X10(3) UL (ref 1.5–7.7)
NEUTROPHILS NFR BLD AUTO: 80.8 %
OSMOLALITY SERPL CALC.SUM OF ELEC: 296 MOSM/KG (ref 275–295)
PHOSPHATE SERPL-MCNC: 2.9 MG/DL (ref 2.5–4.9)
PLATELET # BLD AUTO: 100 10(3)UL (ref 150–450)
POTASSIUM SERPL-SCNC: 4.2 MMOL/L (ref 3.5–5.1)
PROTHROMBIN TIME: 22.9 SECONDS (ref 11.6–14.8)
RBC # BLD AUTO: 2.45 X10(6)UL
SARS-COV-2 RNA RESP QL NAA+PROBE: NOT DETECTED
SODIUM SERPL-SCNC: 136 MMOL/L (ref 136–145)
WBC # BLD AUTO: 7.9 X10(3) UL (ref 4–11)

## 2022-04-06 PROCEDURE — C9113 INJ PANTOPRAZOLE SODIUM, VIA: HCPCS | Performed by: INTERNAL MEDICINE

## 2022-04-06 PROCEDURE — 85025 COMPLETE CBC W/AUTO DIFF WBC: CPT | Performed by: INTERNAL MEDICINE

## 2022-04-06 PROCEDURE — 83735 ASSAY OF MAGNESIUM: CPT | Performed by: INTERNAL MEDICINE

## 2022-04-06 PROCEDURE — 97535 SELF CARE MNGMENT TRAINING: CPT

## 2022-04-06 PROCEDURE — 80069 RENAL FUNCTION PANEL: CPT | Performed by: INTERNAL MEDICINE

## 2022-04-06 PROCEDURE — 82962 GLUCOSE BLOOD TEST: CPT

## 2022-04-06 PROCEDURE — 85610 PROTHROMBIN TIME: CPT | Performed by: INTERNAL MEDICINE

## 2022-04-06 RX ORDER — TRAMADOL HYDROCHLORIDE 50 MG/1
50 TABLET ORAL EVERY 6 HOURS PRN
Qty: 60 TABLET | Refills: 1 | Status: ON HOLD | OUTPATIENT
Start: 2022-04-06

## 2022-04-06 RX ORDER — SIMETHICONE 80 MG
80 TABLET,CHEWABLE ORAL
Qty: 120 TABLET | Refills: 1 | Status: ON HOLD | OUTPATIENT
Start: 2022-04-06

## 2022-04-06 RX ORDER — PSEUDOEPHEDRINE HCL 30 MG
100 TABLET ORAL 2 TIMES DAILY
Qty: 60 CAPSULE | Refills: 3 | Status: ON HOLD | OUTPATIENT
Start: 2022-04-06

## 2022-04-06 RX ORDER — MIDODRINE HYDROCHLORIDE 5 MG/1
5 TABLET ORAL 3 TIMES DAILY PRN
Qty: 90 TABLET | Refills: 3 | Status: ON HOLD | OUTPATIENT
Start: 2022-04-06

## 2022-04-06 RX ORDER — ACETAMINOPHEN 325 MG/1
650 TABLET ORAL EVERY 6 HOURS PRN
Qty: 60 TABLET | Refills: 0 | Status: ON HOLD | OUTPATIENT
Start: 2022-04-06

## 2022-04-06 RX ORDER — WARFARIN SODIUM 5 MG/1
TABLET ORAL
Qty: 30 TABLET | Refills: 3 | Status: ON HOLD | OUTPATIENT
Start: 2022-04-06

## 2022-04-06 RX ORDER — IPRATROPIUM BROMIDE AND ALBUTEROL SULFATE 2.5; .5 MG/3ML; MG/3ML
3 SOLUTION RESPIRATORY (INHALATION) EVERY 6 HOURS PRN
Qty: 120 ML | Refills: 1 | Status: ON HOLD | OUTPATIENT
Start: 2022-04-06

## 2022-04-06 NOTE — CM/SW NOTE
04/05/22 1117   Discharge disposition   Expected discharge disposition [address] Brandi Provider [address]   Discharge transportation [address] [facility]     Confirmed w/ Dr. Deysi Jon - pt medically cleared today 4/6. DC order entered. GUDELIA consulted w/ Bethanie Pérez from [address] - confirmed they can accept at approx 2PM today. SW updated pt's RN/Aster and DC RN Brianna Bowman - requested rapid COVID test prior to DC. SW updated pt's son Sanna Tee and confirmed family will be available to transport pt to/from  tomorrow Thursday 4/7 as normally scheduled. SW discussed [facility] transport from [address] [place] [address] to [address] and son is agreeable. Son also agreeable to 2PM DC time.     [facility] set for Mike Woody. PCS completed in Epic - pt's RN to print w/ pt's AVS.    PLAN: Smita Fernandez, [address] set for Mike Woody, PCS completed        JUANITA Morales, [address] [facility]

## 2022-04-06 NOTE — DISCHARGE PLANNING
I called and gave report to nurse Silvio Montelongo at [facility] subacute rehab facility. Patient's physical and history were relayed to nursing staff and included past medical history, admitting diagnosis of hypotension. Patient will be discharging at 2pm as arranged by social work/case management. Patient's diet: Regular/General diet Calorie Restriction/Carb Controlled: 1500 kcal/60 grams; Sodium Restriction: 1.5 GM NA; Fluid Consistency: Thin Liquids ; Is Patient on Accuchecks? Yes   Patient takes medication whole with thin liquids. Patient's mobility status is one person assist with rolling walker.     Sheilah Crigler, Discharge Leader [id number]

## 2022-04-06 NOTE — PROGRESS NOTES
[facility]    Progress Note    Chrissy Mejia Patient Status:  Inpatient    3/28/1940 MRN [id number]   Location [facility] 3W/SW Attending No att. providers found   Hosp Day # 6 PCP Sonia Ayon MD       Subjective:   Chrissy Mejia is a(n) 80year old male     ROS:     Constitutional:  Negative for decreased activity, fever, irritability and lethargy  ENMT:  Negative for ear drainage, hearing loss and nasal drainage  Eyes:  Negative for eye discharge and vision loss  Cardiovascular:  Negative for chest pain, sob, irregular heartbeat/palpitations  Respiratory:  Negative for cough, dyspnea and wheezing  Gastrointestinal:  Negative for abdominal pain, constipation, decreased appetite, diarrhea and vomiting  Genitourinary:  Negative for dysuria and hematuria  Endocrine:  Negative for abnormal sleep patterns, increased activity, polydipsia and polyphagia  Hema/Lymph:  Negative for easy bleeding and easy bruising  Integumentary:  Negative for pruritus and rash  Musculoskeletal:  Negative for bone/joint symptoms  Neurological:  Negative for gait disturbance  Psychiatric:  Negative for inappropriate interaction and psychiatric symptoms       22  1101   BP: 91/49   Pulse: 70   Resp:    Temp:            PHYSICAL EXAM:   Constitutional: appears well hydrated alert and responsive no acute distress noted  Head/Face: normocephalic  Eyes/Vision: normal extraocular motion is intact  Nose/Mouth/Throat:mucous membranes are moist and no oral lesions are noted  Neck/Thyroid: neck is supple without adenopathy  Lymphatic: no abnormal cervical, supraclavicular adenopathy is noted  Respiratory:  lungs are clear to auscultation bilaterally, normal respiratory effort  Cardiovascular: Irregular rhythm  Abdomen: soft, non-tender, non-distended, BS normal  Vascular: well perfused femoral, and pedal pulses normal  Skin/Hair: no unusual rashes present, no abnormal bruising noted  Back/Spine: no abnormalities noted  Musculoskeletal: full ROM all extremities good strength  no deformities  Extremities: no edema, cyanosis  Neurological:  Grossly normal    Results:     Laboratory Data:  Lab Results   Component Value Date    WBC 7.9 04/06/2022    HGB 8.4 (L) 04/06/2022    HCT 26.5 (L) 04/06/2022    .0 (L) 04/06/2022    CREATSERUM 4.51 (H) 04/06/2022    BUN 49 (H) 04/06/2022     04/06/2022    K 4.2 04/06/2022    CL 99 04/06/2022    CO2 29.0 04/06/2022     (H) 04/06/2022    CA 8.2 (L) 04/06/2022    ALB 2.5 (L) 04/06/2022    ALKPHO 99 04/04/2022    BILT 0.6 04/04/2022    TP 5.8 (L) 04/04/2022    AST 27 04/04/2022    ALT 21 04/04/2022    PTT 94.3 (H) 08/31/2021    INR 1.99 (H) 04/06/2022    PT 41.6 (H) 04/18/2016    T4F 1.1 10/05/2021    TSH 3.470 10/05/2021     03/29/2022    PSA 3.4 12/12/2018    DDIMER 1.68 (H) 09/02/2021    CRP 4.95 (H) 08/31/2021    MG 2.2 04/06/2022    PHOS 2.9 04/06/2022    TROP 0.242 (HH) 10/04/2021     07/10/2020    B12 >2,000 (H) 01/18/2022       Imaging:  [unfilled]   No results found. ASSESSMENT/PLAN:   Assessment       1 A. fib back on Coumadin rate controlled    #2 ESRD plan dialysis tomorrow as outpatient Francine    #3 coagulopathy resolved INR of 2    #4 hypotension better is on midodrine    #5 anemia is on Epogen    To go to [facility] today for rehab discussed with son Ashley Griffin             4/6/2022  Verlean Snellen.  Shawn Hagen MD

## 2022-04-13 NOTE — TELEPHONE ENCOUNTER
Good Morning Dr Jeremías Weller and staff,    Referral# [id number]  SURGERY, VASCULAR    In order to process this [facility] with [facility] I need additional information. Please provide name of physician and all CPT codes needed. Is this for a office visit or procedure?      Thank you for your help,    ROSA BAI

## 2022-04-14 NOTE — TELEPHONE ENCOUNTER
I am unaware; is this referral for an AV fistula for ESRD for dialysis access?; please contract nephrologist, Dr Stephen Smith

## 2022-04-18 ENCOUNTER — HOSPITAL ENCOUNTER (OUTPATIENT)
Facility: HOSPITAL | Age: 82
Setting detail: OBSERVATION
LOS: 3 days | Discharge: NURSING, REHAB OR LONG-TERM CARE | End: 2022-04-22
Attending: EMERGENCY MEDICINE | Admitting: INTERNAL MEDICINE
Payer: MEDICARE

## 2022-04-18 ENCOUNTER — APPOINTMENT (OUTPATIENT)
Dept: GENERAL RADIOLOGY | Facility: HOSPITAL | Age: 82
End: 2022-04-18
Attending: EMERGENCY MEDICINE
Payer: MEDICARE

## 2022-04-18 DIAGNOSIS — M79.672 FOOT PAIN, BILATERAL: ICD-10-CM

## 2022-04-18 DIAGNOSIS — N18.6 ESRD (END STAGE RENAL DISEASE) ON DIALYSIS (HCC): Primary | ICD-10-CM

## 2022-04-18 DIAGNOSIS — M79.671 FOOT PAIN, BILATERAL: ICD-10-CM

## 2022-04-18 DIAGNOSIS — Z79.01 WARFARIN ANTICOAGULATION: ICD-10-CM

## 2022-04-18 DIAGNOSIS — R60.9 PERIPHERAL EDEMA: ICD-10-CM

## 2022-04-18 DIAGNOSIS — R09.02 HYPOXIA: ICD-10-CM

## 2022-04-18 DIAGNOSIS — Z99.2 ESRD (END STAGE RENAL DISEASE) ON DIALYSIS (HCC): Primary | ICD-10-CM

## 2022-04-18 LAB
ANION GAP SERPL CALC-SCNC: 9 MMOL/L (ref 0–18)
BASOPHILS # BLD AUTO: 0.02 X10(3) UL (ref 0–0.2)
BASOPHILS NFR BLD AUTO: 0.4 %
BUN BLD-MCNC: 39 MG/DL (ref 7–18)
BUN/CREAT SERPL: 6.1 (ref 10–20)
CALCIUM BLD-MCNC: 8.6 MG/DL (ref 8.5–10.1)
CHLORIDE SERPL-SCNC: 95 MMOL/L (ref 98–112)
CO2 SERPL-SCNC: 30 MMOL/L (ref 21–32)
CREAT BLD-MCNC: 6.41 MG/DL
DEPRECATED RDW RBC AUTO: 87 FL (ref 35.1–46.3)
EOSINOPHIL # BLD AUTO: 0.02 X10(3) UL (ref 0–0.7)
EOSINOPHIL NFR BLD AUTO: 0.4 %
ERYTHROCYTE [DISTWIDTH] IN BLOOD BY AUTOMATED COUNT: 21.2 % (ref 11–15)
EST. AVERAGE GLUCOSE BLD GHB EST-MCNC: 97 MG/DL (ref 68–126)
GLUCOSE BLD-MCNC: 124 MG/DL (ref 70–99)
GLUCOSE BLDC GLUCOMTR-MCNC: 155 MG/DL (ref 70–99)
HBA1C MFR BLD: 5 % (ref ?–5.7)
HCT VFR BLD AUTO: 36.1 %
HGB BLD-MCNC: 11.2 G/DL
IMM GRANULOCYTES # BLD AUTO: 0 X10(3) UL (ref 0–1)
IMM GRANULOCYTES NFR BLD: 0 %
INR BLD: 2.48 (ref 0.8–1.2)
LYMPHOCYTES # BLD AUTO: 0.65 X10(3) UL (ref 1–4)
LYMPHOCYTES NFR BLD AUTO: 12.3 %
MCH RBC QN AUTO: 34.6 PG (ref 26–34)
MCHC RBC AUTO-ENTMCNC: 31 G/DL (ref 31–37)
MCV RBC AUTO: 111.4 FL
MONOCYTES # BLD AUTO: 0.43 X10(3) UL (ref 0.1–1)
MONOCYTES NFR BLD AUTO: 8.1 %
NEUTROPHILS # BLD AUTO: 4.16 X10 (3) UL (ref 1.5–7.7)
NEUTROPHILS # BLD AUTO: 4.16 X10(3) UL (ref 1.5–7.7)
NEUTROPHILS NFR BLD AUTO: 78.8 %
OSMOLALITY SERPL CALC.SUM OF ELEC: 289 MOSM/KG (ref 275–295)
PLATELET # BLD AUTO: 146 10(3)UL (ref 150–450)
PLATELET MORPHOLOGY: NORMAL
POTASSIUM SERPL-SCNC: 4.1 MMOL/L (ref 3.5–5.1)
PROTHROMBIN TIME: 27.2 SECONDS (ref 11.6–14.8)
RBC # BLD AUTO: 3.24 X10(6)UL
SARS-COV-2 RNA RESP QL NAA+PROBE: NOT DETECTED
SODIUM SERPL-SCNC: 134 MMOL/L (ref 136–145)
WBC # BLD AUTO: 5.3 X10(3) UL (ref 4–11)

## 2022-04-18 PROCEDURE — 71045 X-RAY EXAM CHEST 1 VIEW: CPT | Performed by: EMERGENCY MEDICINE

## 2022-04-18 PROCEDURE — 73630 X-RAY EXAM OF FOOT: CPT | Performed by: EMERGENCY MEDICINE

## 2022-04-18 RX ORDER — POLYETHYLENE GLYCOL 3350 17 G/17G
17 POWDER, FOR SOLUTION ORAL DAILY
Status: DISCONTINUED | OUTPATIENT
Start: 2022-04-18 | End: 2022-04-22

## 2022-04-18 RX ORDER — DOCUSATE SODIUM 100 MG/1
100 CAPSULE, LIQUID FILLED ORAL 2 TIMES DAILY
Status: DISCONTINUED | OUTPATIENT
Start: 2022-04-18 | End: 2022-04-22

## 2022-04-18 RX ORDER — ACETAMINOPHEN 325 MG/1
325 TABLET ORAL EVERY 6 HOURS PRN
Status: DISCONTINUED | OUTPATIENT
Start: 2022-04-18 | End: 2022-04-22

## 2022-04-18 RX ORDER — AMIODARONE HYDROCHLORIDE 200 MG/1
200 TABLET ORAL DAILY
Status: DISCONTINUED | OUTPATIENT
Start: 2022-04-18 | End: 2022-04-22

## 2022-04-18 RX ORDER — CALCITRIOL 0.25 UG/1
0.25 CAPSULE, LIQUID FILLED ORAL DAILY
Status: DISCONTINUED | OUTPATIENT
Start: 2022-04-18 | End: 2022-04-22

## 2022-04-18 RX ORDER — SENNOSIDES 8.6 MG
17.2 TABLET ORAL DAILY
Status: DISCONTINUED | OUTPATIENT
Start: 2022-04-18 | End: 2022-04-22

## 2022-04-18 RX ORDER — ALLOPURINOL 100 MG/1
100 TABLET ORAL DAILY
Status: DISCONTINUED | OUTPATIENT
Start: 2022-04-18 | End: 2022-04-22

## 2022-04-18 RX ORDER — ZINC SULFATE 50(220)MG
220 CAPSULE ORAL 2 TIMES DAILY
Status: DISCONTINUED | OUTPATIENT
Start: 2022-04-18 | End: 2022-04-22

## 2022-04-18 RX ORDER — TRAMADOL HYDROCHLORIDE 50 MG/1
50 TABLET ORAL EVERY 6 HOURS PRN
Status: DISCONTINUED | OUTPATIENT
Start: 2022-04-18 | End: 2022-04-22

## 2022-04-18 RX ORDER — NICOTINE POLACRILEX 4 MG
15 LOZENGE BUCCAL
Status: DISCONTINUED | OUTPATIENT
Start: 2022-04-18 | End: 2022-04-22

## 2022-04-18 RX ORDER — ATORVASTATIN CALCIUM 40 MG/1
40 TABLET, FILM COATED ORAL NIGHTLY
Status: DISCONTINUED | OUTPATIENT
Start: 2022-04-18 | End: 2022-04-22

## 2022-04-18 RX ORDER — SIMETHICONE 80 MG
80 TABLET,CHEWABLE ORAL
Status: DISCONTINUED | OUTPATIENT
Start: 2022-04-18 | End: 2022-04-22

## 2022-04-18 RX ORDER — IPRATROPIUM BROMIDE AND ALBUTEROL SULFATE 2.5; .5 MG/3ML; MG/3ML
3 SOLUTION RESPIRATORY (INHALATION) EVERY 6 HOURS PRN
Status: DISCONTINUED | OUTPATIENT
Start: 2022-04-18 | End: 2022-04-22

## 2022-04-18 RX ORDER — NICOTINE POLACRILEX 4 MG
30 LOZENGE BUCCAL
Status: DISCONTINUED | OUTPATIENT
Start: 2022-04-18 | End: 2022-04-22

## 2022-04-18 RX ORDER — DEXTROSE MONOHYDRATE 25 G/50ML
50 INJECTION, SOLUTION INTRAVENOUS
Status: DISCONTINUED | OUTPATIENT
Start: 2022-04-18 | End: 2022-04-22

## 2022-04-18 RX ORDER — CALCIUM CARBONATE 200(500)MG
2000 TABLET,CHEWABLE ORAL NIGHTLY
Status: DISCONTINUED | OUTPATIENT
Start: 2022-04-18 | End: 2022-04-22

## 2022-04-18 RX ORDER — MIDODRINE HYDROCHLORIDE 5 MG/1
5 TABLET ORAL 3 TIMES DAILY PRN
Status: DISCONTINUED | OUTPATIENT
Start: 2022-04-18 | End: 2022-04-22

## 2022-04-18 RX ORDER — POLYETHYLENE GLYCOL 3350 17 G/17G
17 POWDER, FOR SOLUTION ORAL DAILY
COMMUNITY

## 2022-04-18 RX ORDER — TRAMADOL HYDROCHLORIDE 50 MG/1
50 TABLET ORAL ONCE
Status: COMPLETED | OUTPATIENT
Start: 2022-04-18 | End: 2022-04-18

## 2022-04-18 RX ORDER — PANTOPRAZOLE SODIUM 40 MG/1
40 TABLET, DELAYED RELEASE ORAL
Status: DISCONTINUED | OUTPATIENT
Start: 2022-04-19 | End: 2022-04-22

## 2022-04-18 RX ORDER — BUMETANIDE 0.25 MG/ML
2 INJECTION, SOLUTION INTRAMUSCULAR; INTRAVENOUS ONCE
Status: COMPLETED | OUTPATIENT
Start: 2022-04-18 | End: 2022-04-18

## 2022-04-18 RX ORDER — SEVELAMER CARBONATE 800 MG/1
800 TABLET, FILM COATED ORAL
Status: DISCONTINUED | OUTPATIENT
Start: 2022-04-19 | End: 2022-04-22

## 2022-04-18 RX ORDER — ASPIRIN 81 MG/1
81 TABLET, CHEWABLE ORAL DAILY
Status: DISCONTINUED | OUTPATIENT
Start: 2022-04-18 | End: 2022-04-22

## 2022-04-18 NOTE — ED INITIAL ASSESSMENT (HPI)
Pt arrive in ER per [facility] from [facility] with c/o pain + swelling to BLE, pt on dialysis T, TH, Sat

## 2022-04-18 NOTE — ED QUICK NOTES
Orders for admission, patient is aware of plan and ready to go upstairs.  Any questions, please call ED SAGRARIO Sabillon at extension [phone]    Patient Covid vaccination status: Unvaccinated     COVID Test Ordered in ED: Rapid SARS-CoV-2 by PCR    COVID Suspicion at Admission: Low clinical suspicion for COVID    Running Infusions:  None    Mental Status/LOC at time of transport:aox3    Other pertinent information: Pt from [facility], normally on room air, started on oxygen in ER, uses wheelchair  CIWA score: N/A   NIH score:  N/A      Pt on dialysis every Tuesday,Thursday and Saturday

## 2022-04-19 LAB
ANION GAP SERPL CALC-SCNC: 8 MMOL/L (ref 0–18)
BASOPHILS # BLD AUTO: 0.03 X10(3) UL (ref 0–0.2)
BASOPHILS NFR BLD AUTO: 0.5 %
BUN BLD-MCNC: 44 MG/DL (ref 7–18)
BUN/CREAT SERPL: 6.4 (ref 10–20)
CALCIUM BLD-MCNC: 7.8 MG/DL (ref 8.5–10.1)
CHLORIDE SERPL-SCNC: 95 MMOL/L (ref 98–112)
CO2 SERPL-SCNC: 33 MMOL/L (ref 21–32)
CREAT BLD-MCNC: 6.85 MG/DL
DEPRECATED RDW RBC AUTO: 81.5 FL (ref 35.1–46.3)
EOSINOPHIL # BLD AUTO: 0.06 X10(3) UL (ref 0–0.7)
EOSINOPHIL NFR BLD AUTO: 1 %
ERYTHROCYTE [DISTWIDTH] IN BLOOD BY AUTOMATED COUNT: 20.2 % (ref 11–15)
GLUCOSE BLD-MCNC: 134 MG/DL (ref 70–99)
GLUCOSE BLDC GLUCOMTR-MCNC: 116 MG/DL (ref 70–99)
GLUCOSE BLDC GLUCOMTR-MCNC: 122 MG/DL (ref 70–99)
GLUCOSE BLDC GLUCOMTR-MCNC: 128 MG/DL (ref 70–99)
GLUCOSE BLDC GLUCOMTR-MCNC: 161 MG/DL (ref 70–99)
GLUCOSE BLDC GLUCOMTR-MCNC: 63 MG/DL (ref 70–99)
HCT VFR BLD AUTO: 28.9 %
HGB BLD-MCNC: 9 G/DL
IMM GRANULOCYTES # BLD AUTO: 0.02 X10(3) UL (ref 0–1)
IMM GRANULOCYTES NFR BLD: 0.3 %
LYMPHOCYTES # BLD AUTO: 0.73 X10(3) UL (ref 1–4)
LYMPHOCYTES NFR BLD AUTO: 11.8 %
MCH RBC QN AUTO: 34.7 PG (ref 26–34)
MCHC RBC AUTO-ENTMCNC: 31.1 G/DL (ref 31–37)
MCV RBC AUTO: 111.6 FL
MONOCYTES # BLD AUTO: 0.68 X10(3) UL (ref 0.1–1)
MONOCYTES NFR BLD AUTO: 11 %
NEUTROPHILS # BLD AUTO: 4.67 X10 (3) UL (ref 1.5–7.7)
NEUTROPHILS # BLD AUTO: 4.67 X10(3) UL (ref 1.5–7.7)
NEUTROPHILS NFR BLD AUTO: 75.4 %
OSMOLALITY SERPL CALC.SUM OF ELEC: 295 MOSM/KG (ref 275–295)
PLATELET # BLD AUTO: 133 10(3)UL (ref 150–450)
POTASSIUM SERPL-SCNC: 4 MMOL/L (ref 3.5–5.1)
RBC # BLD AUTO: 2.59 X10(6)UL
SODIUM SERPL-SCNC: 136 MMOL/L (ref 136–145)
WBC # BLD AUTO: 6.2 X10(3) UL (ref 4–11)

## 2022-04-19 PROCEDURE — 99222 1ST HOSP IP/OBS MODERATE 55: CPT | Performed by: INTERNAL MEDICINE

## 2022-04-19 PROCEDURE — 99223 1ST HOSP IP/OBS HIGH 75: CPT | Performed by: INTERNAL MEDICINE

## 2022-04-19 PROCEDURE — 5A1D70Z PERFORMANCE OF URINARY FILTRATION, INTERMITTENT, LESS THAN 6 HOURS PER DAY: ICD-10-PCS | Performed by: INTERNAL MEDICINE

## 2022-04-19 NOTE — PROGRESS NOTES
Discussed medications with nurse at [address]. Patient still needs coumadin dose tonight, he receives it nightly.

## 2022-04-19 NOTE — PHYSICAL THERAPY NOTE
PT evaluation orders received and chart reviewed. Patient off floor for dialysis. Will re-attempt as schedule permits.      Thank you,  Alex Pate, PT, DPT

## 2022-04-19 NOTE — PLAN OF CARE
Patient started on fluid restrictions per protocol orders. HD done today, 3L removed per HD RN. Per HD RN patient educated on fluid overload, potassium levels, and fluid intake. Education reinforced by this RN at the bedside. Call light within reach, needs met.      Problem: METABOLIC/FLUID AND ELECTROLYTES - ADULT  Goal: Glucose maintained within prescribed range  Description: INTERVENTIONS:  - Monitor Blood Glucose as ordered  - Assess for signs and symptoms of hyperglycemia and hypoglycemia  - Administer ordered medications to maintain glucose within target range  - Assess barriers to adequate nutritional intake and initiate nutrition consult as needed  - Instruct patient on self management of diabetes  Outcome: Progressing  Goal: Electrolytes maintained within normal limits  Description: INTERVENTIONS:  - Monitor labs and rhythm and assess patient for signs and symptoms of electrolyte imbalances  - Administer electrolyte replacement as ordered  - Monitor response to electrolyte replacements, including rhythm and repeat lab results as appropriate  - Fluid restriction as ordered  - Instruct patient on fluid and nutrition restrictions as appropriate  Outcome: Progressing  Goal: Hemodynamic stability and optimal renal function maintained  Description: INTERVENTIONS:  - Monitor labs and assess for signs and symptoms of volume excess or deficit  - Monitor intake, output and patient weight  - Monitor urine specific gravity, serum osmolarity and serum sodium as indicated or ordered  - Monitor response to interventions for patient's volume status, including labs, urine output, blood pressure (other measures as available)  - Encourage oral intake as appropriate  - Instruct patient on fluid and nutrition restrictions as appropriate  Outcome: Progressing     Problem: HEMATOLOGIC - ADULT  Goal: Maintains hematologic stability  Description: INTERVENTIONS  - Assess for signs and symptoms of bleeding or hemorrhage  - Monitor labs and vital signs for trends  - Administer supportive blood products/factors, fluids and medications as ordered and appropriate  - Administer supportive blood products/factors as ordered and appropriate  Outcome: Progressing  Goal: Free from bleeding injury  Description: (Example usage: patient with low platelets)  INTERVENTIONS:  - Avoid intramuscular injections, enemas and rectal medication administration  - Ensure safe mobilization of patient  - Hold pressure on venipuncture sites to achieve adequate hemostasis  - Assess for signs and symptoms of internal bleeding  - Monitor lab trends  - Patient is to report abnormal signs of bleeding to staff  - Avoid use of toothpicks and dental floss  - Use electric shaver for shaving  - Use soft bristle tooth brush  - Limit straining and forceful nose blowing  Outcome: Progressing

## 2022-04-19 NOTE — PLAN OF CARE
Patient admitted to unit, from [facility]. Wounds traced and wound placed on consult. Med rec complete with MD over phone. Bed alarm on and call light within reach. [facility] called and dialysis scheduled for today. Problem: Patient Centered Care  Goal: Patient preferences are identified and integrated in the patient's plan of care  Description: Interventions:  - What would you like us to know as we care for you? From [facility]  - Provide timely, complete, and accurate information to patient/family  - Incorporate patient and family knowledge, values, beliefs, and cultural backgrounds into the planning and delivery of care  - Encourage patient/family to participate in care and decision-making at the level they choose  - Honor patient and family perspectives and choices  Outcome: Progressing     Problem: Patient/Family Goals  Goal: Patient/Family Long Term Goal  Description: Patient's Long Term Goal: to go home    Interventions:  - hemodialysis  - See additional Care Plan goals for specific interventions  Outcome: Progressing  Goal: Patient/Family Short Term Goal  Description: Patient's Short Term Goal: feel better    Interventions:   - oxygen as needed  - See additional Care Plan goals for specific interventions  Outcome: Progressing     Problem: RISK FOR INFECTION - ADULT  Goal: Absence of fever/infection during anticipated neutropenic period  Description: INTERVENTIONS  - Monitor WBC  - Administer growth factors as ordered  - Implement neutropenic guidelines  Outcome: Progressing     Problem: SAFETY ADULT - FALL  Goal: Free from fall injury  Description: INTERVENTIONS:  - Assess pt frequently for physical needs  - Identify cognitive and physical deficits and behaviors that affect risk of falls.   - Jack fall precautions as indicated by assessment.  - Educate pt/family on patient safety including physical limitations  - Instruct pt to call for assistance with activity based on assessment  - Modify environment to reduce risk of injury  - Provide assistive devices as appropriate  - Consider OT/PT consult to assist with strengthening/mobility  - Encourage toileting schedule  Outcome: Progressing     Problem: DISCHARGE PLANNING  Goal: Discharge to home or other facility with appropriate resources  Description: INTERVENTIONS:  - Identify barriers to discharge w/pt and caregiver  - Include patient/family/discharge partner in discharge planning  - Arrange for needed discharge resources and transportation as appropriate  - Identify discharge learning needs (meds, wound care, etc)  - Arrange for interpreters to assist at discharge as needed  - Consider post-discharge preferences of patient/family/discharge partner  - Complete POLST form as appropriate  - Assess patient's ability to be responsible for managing their own health  - Refer to Case Management Department for coordinating discharge planning if the patient needs post-hospital services based on physician/LIP order or complex needs related to functional status, cognitive ability or social support system  Outcome: Progressing

## 2022-04-19 NOTE — CM/SW NOTE
Department  notified of request for bipin LUNA referrals started. Assigned CM/SW to follow up with pt/family on further discharge planning.      Matt Park

## 2022-04-19 NOTE — OCCUPATIONAL THERAPY NOTE
OT attempted evaluation; patient going off the floor for HD; will continue to follow.      [facility], OTR/L ext [phone]

## 2022-04-20 LAB
ALBUMIN SERPL-MCNC: 2.5 G/DL (ref 3.4–5)
ANION GAP SERPL CALC-SCNC: 7 MMOL/L (ref 0–18)
BASOPHILS # BLD AUTO: 0.03 X10(3) UL (ref 0–0.2)
BASOPHILS NFR BLD AUTO: 0.4 %
BUN BLD-MCNC: 33 MG/DL (ref 7–18)
BUN/CREAT SERPL: 6.5 (ref 10–20)
CALCIUM BLD-MCNC: 8.2 MG/DL (ref 8.5–10.1)
CHLORIDE SERPL-SCNC: 99 MMOL/L (ref 98–112)
CO2 SERPL-SCNC: 31 MMOL/L (ref 21–32)
CREAT BLD-MCNC: 5.07 MG/DL
DEPRECATED RDW RBC AUTO: 84.6 FL (ref 35.1–46.3)
EOSINOPHIL # BLD AUTO: 0.06 X10(3) UL (ref 0–0.7)
EOSINOPHIL NFR BLD AUTO: 0.9 %
ERYTHROCYTE [DISTWIDTH] IN BLOOD BY AUTOMATED COUNT: 20.1 % (ref 11–15)
GLUCOSE BLD-MCNC: 128 MG/DL (ref 70–99)
GLUCOSE BLDC GLUCOMTR-MCNC: 104 MG/DL (ref 70–99)
GLUCOSE BLDC GLUCOMTR-MCNC: 170 MG/DL (ref 70–99)
GLUCOSE BLDC GLUCOMTR-MCNC: 79 MG/DL (ref 70–99)
GLUCOSE BLDC GLUCOMTR-MCNC: 88 MG/DL (ref 70–99)
HCT VFR BLD AUTO: 30.8 %
HGB BLD-MCNC: 9.4 G/DL
IMM GRANULOCYTES # BLD AUTO: 0.03 X10(3) UL (ref 0–1)
IMM GRANULOCYTES NFR BLD: 0.4 %
INR BLD: 2.8 (ref 0.8–1.2)
LYMPHOCYTES # BLD AUTO: 0.64 X10(3) UL (ref 1–4)
LYMPHOCYTES NFR BLD AUTO: 9.5 %
MCH RBC QN AUTO: 34.4 PG (ref 26–34)
MCHC RBC AUTO-ENTMCNC: 30.5 G/DL (ref 31–37)
MCV RBC AUTO: 112.8 FL
MONOCYTES # BLD AUTO: 0.7 X10(3) UL (ref 0.1–1)
MONOCYTES NFR BLD AUTO: 10.4 %
NEUTROPHILS # BLD AUTO: 5.3 X10 (3) UL (ref 1.5–7.7)
NEUTROPHILS # BLD AUTO: 5.3 X10(3) UL (ref 1.5–7.7)
NEUTROPHILS NFR BLD AUTO: 78.4 %
OSMOLALITY SERPL CALC.SUM OF ELEC: 293 MOSM/KG (ref 275–295)
PHOSPHATE SERPL-MCNC: 2.9 MG/DL (ref 2.5–4.9)
PLATELET # BLD AUTO: 139 10(3)UL (ref 150–450)
POTASSIUM SERPL-SCNC: 4.9 MMOL/L (ref 3.5–5.1)
PROTHROMBIN TIME: 29.9 SECONDS (ref 11.6–14.8)
RBC # BLD AUTO: 2.73 X10(6)UL
SODIUM SERPL-SCNC: 137 MMOL/L (ref 136–145)
WBC # BLD AUTO: 6.8 X10(3) UL (ref 4–11)

## 2022-04-20 PROCEDURE — 99233 SBSQ HOSP IP/OBS HIGH 50: CPT | Performed by: INTERNAL MEDICINE

## 2022-04-20 PROCEDURE — 99232 SBSQ HOSP IP/OBS MODERATE 35: CPT | Performed by: INTERNAL MEDICINE

## 2022-04-20 RX ORDER — WARFARIN SODIUM 3 MG/1
4.5 TABLET ORAL NIGHTLY
Qty: 45 TABLET | Refills: 5 | Status: SHIPPED | OUTPATIENT
Start: 2022-04-20 | End: 2022-04-21

## 2022-04-20 RX ORDER — BUMETANIDE 1 MG/1
1 TABLET ORAL
Status: DISCONTINUED | OUTPATIENT
Start: 2022-04-20 | End: 2022-04-22

## 2022-04-20 RX ORDER — ALBUMIN (HUMAN) 12.5 G/50ML
100 SOLUTION INTRAVENOUS AS NEEDED
Status: DISCONTINUED | OUTPATIENT
Start: 2022-04-20 | End: 2022-04-22

## 2022-04-20 RX ORDER — HEPARIN SODIUM 1000 [USP'U]/ML
1.5 INJECTION, SOLUTION INTRAVENOUS; SUBCUTANEOUS ONCE
Status: COMPLETED | OUTPATIENT
Start: 2022-04-20 | End: 2022-04-21

## 2022-04-20 NOTE — CM/SW NOTE
Pt discussed during nursing rounds. Pt is stable for dc today. MD dc order entered. CM notified RN and MD that patient requires ins auth for return to [facility]. Dr. Anat Rees confirmed with patient's spouse is requesting return to [address] [place] [address] despite patient requesting to dc home. PT/OT notified that new therapy notes will be needed to submit for ins auth, both agreeable to see patient ASAP. CM requested that  Dina initiate ins auth. 1200: Lang at [address] [place] [address] notified CM that facility submitted for ins auth this morning. CM notified Dina to cancel newly submitted auth in lieu of auth that was already initiated by facility. 1330: PT/OT notes sent in [address]. CM requested that facility notified CM once ins Anthony Winthrop has been obtained. 1610: [address] notified CM that ins Anthony Winthrop has been approved for CHERYL at [place] [address]. Representative confirmed  that she will conatct CHERYL with ins auth approval details. Facility confirmed bed is available today. Pt's spouse notified of transfer today. [facility] scheduled for 6:30pm .    1645: Patient notified CM that he would like to appeal his dc. RN and MD notfified. Pt provided IM and instructed to call the number provided to make formal appeal.     1710: Pt notified CM that he was unable to successfully make appeal via phone due his hearing deficit. Pt stating that [employer] Solorzano representative hung up on him. CM attempted to call spouse w/no success. CM able to contact patient's son Evaristo Huertas who confirmed he will make appeal on his father's behalf. CM provided Evaristo Huertas with number for appeal. Evaristo Huertas agreed to contact CM after appeal is completed. 1745: CM completed DND per protocol. Copy of DND provided to patient, copy placed in patient's chart. CM emailed both IM and DND to [email or web address], as well as Supervisor Kuldeep Shoemaker, Shruthi Rubio, and ED CM.  CM still waiting on call back from patient's son Evaristo Huertas as of the time if this note. Plan: [facility] for CHERYL pending result of [address] appeal.      / to remain available for support and/or discharge planning.      AMY Burgos    [phone]

## 2022-04-20 NOTE — CM/SW NOTE
Submitted clinical via [employer] portal.  [employer] Case/Auth ID is [id number]. Final insurance authorization is pending at this time. SW/CM assigned to the case will continue to follow auth status.         Arianna Nails   April 20, 2022   12:20

## 2022-04-20 NOTE — PLAN OF CARE
Problem: Patient Centered Care  Goal: Patient preferences are identified and integrated in the patient's plan of care  Description: Interventions:  - What would you like us to know as we care for you? \" I was here before\".   - Provide timely, complete, and accurate information to patient/family  - Incorporate patient and family knowledge, values, beliefs, and cultural backgrounds into the planning and delivery of care  - Encourage patient/family to participate in care and decision-making at the level they choose  - Honor patient and family perspectives and choices  Outcome: Progressing     Problem: Patient/Family Goals  Goal: Patient/Family Long Term Goal  Description: Patient's Long Term Goal: To be discharged    Interventions:  - Monitor vital signs  - Monitor appropriate labs  - Monitor blood glucose levels  - Administer medications per order  - Pain management as needed   - Follow MD orders  - Diagnostics per order  - Assist with ADLs as needed  - Update / inform patient and family on plan of care  - Discharge planning  - See additional Care Plan goals for specific interventions  Outcome: Progressing  Goal: Patient/Family Short Term Goal  Description: Patient's Short Term Goal: To be discharged    Interventions:   - Monitor vital signs  - Monitor appropriate labs  - Monitor blood glucose levels  - Administer medications per order  - Pain management as needed   - Follow MD orders  - Diagnostics per order  - Assist with ADLs as needed  - Update / inform patient and family on plan of care  - Discharge planning  - See additional Care Plan goals for specific interventions  Outcome: Progressing     Problem: RISK FOR INFECTION - ADULT  Goal: Absence of fever/infection during anticipated neutropenic period  Description: INTERVENTIONS  - Monitor WBC, temp, culture  - Implement neutropenic guidelines  Outcome: Progressing     Problem: SAFETY ADULT - FALL  Goal: Free from fall injury  Description: INTERVENTIONS:  - Assess pt frequently for physical needs  - Identify cognitive and physical deficits and behaviors that affect risk of falls.   - [place] fall precautions as indicated by assessment.  - Educate pt/family on patient safety including physical limitations  - Instruct pt to call for assistance with activity based on assessment  - Modify environment to reduce risk of injury  - Provide assistive devices as appropriate  - Consider OT/PT consult to assist with strengthening/mobility  - Encourage toileting schedule  Outcome: Progressing     Problem: DISCHARGE PLANNING  Goal: Discharge to home or other facility with appropriate resources  Description: INTERVENTIONS:  - Identify barriers to discharge w/pt and caregiver  - Include patient/family/discharge partner in discharge planning  - Arrange for needed discharge resources and transportation as appropriate  - Identify discharge learning needs (meds, wound care, etc)  - Arrange for interpreters to assist at discharge as needed  - Consider post-discharge preferences of patient/family/discharge partner  - Complete POLST form as appropriate  - Assess patient's ability to be responsible for managing their own health  - Refer to Case Management Department for coordinating discharge planning if the patient needs post-hospital services based on physician/LIP order or complex needs related to functional status, cognitive ability or social support system  Outcome: Progressing     Problem: METABOLIC/FLUID AND ELECTROLYTES - ADULT  Goal: Electrolytes maintained within normal limits  Description: INTERVENTIONS:  - Monitor labs and rhythm and assess patient for signs and symptoms of electrolyte imbalances  - Administer electrolyte replacement as ordered  - Monitor response to electrolyte replacements, including rhythm and repeat lab results as appropriate  - Fluid restriction as ordered  - Instruct patient on fluid and nutrition restrictions as appropriate  Outcome: Progressing  Goal: Hemodynamic stability and optimal renal function maintained  Description: INTERVENTIONS:  - Monitor labs and assess for signs and symptoms of volume excess or deficit  - Monitor intake, output and patient weight  - Monitor urine specific gravity, serum osmolarity and serum sodium as indicated or ordered  - Monitor response to interventions for patient's volume status, including labs, urine output, blood pressure (other measures as available)  - Encourage oral intake as appropriate  - Instruct patient on fluid and nutrition restrictions as appropriate  Outcome: Progressing     Problem: HEMATOLOGIC - ADULT  Goal: Maintains hematologic stability  Description: INTERVENTIONS  - Assess for signs and symptoms of bleeding or hemorrhage  - Monitor labs and vital signs for trends  - Administer supportive blood products/factors, fluids and medications as ordered and appropriate  - Administer supportive blood products/factors as ordered and appropriate  Outcome: Progressing  Goal: Free from bleeding injury  Description: (Example usage: patient with low platelets)  INTERVENTIONS:  - Avoid intramuscular injections, enemas and rectal medication administration  - Ensure safe mobilization of patient  - Hold pressure on venipuncture sites to achieve adequate hemostasis  - Assess for signs and symptoms of internal bleeding  - Monitor lab trends  - Patient is to report abnormal signs of bleeding to staff  - Avoid use of toothpicks and dental floss  - Use electric shaver for shaving  - Use soft bristle tooth brush  - Limit straining and forceful nose blowing  Outcome: Progressing     Problem: METABOLIC/FLUID AND ELECTROLYTES - ADULT  Goal: Glucose maintained within prescribed range  Description: INTERVENTIONS:  - Monitor Blood Glucose as ordered  - Assess for signs and symptoms of hyperglycemia and hypoglycemia  - Administer ordered medications to maintain glucose within target range  - Assess barriers to adequate nutritional intake and initiate nutrition consult as needed  - Instruct patient on self management of diabetes  Outcome: Not Progressing   Had hypoglycemic episode yesterday, hs insulin decreased, wound care done, weaned o2 down to 1L/nc, plan of care discussed/reiterated, all needs attended.

## 2022-04-20 NOTE — PROGRESS NOTES
[facility] HOSP - [facility]    Progress Note    Mary Anne Paulson Patient Status:  Inpatient    3/28/1940 MRN [id number]   Location [facility]5W Attending Janee Olson MD   Hosp Day # 2 PCP Kalli Huff MD       Subjective:   Mary Anne Paulson is a(n) 80year old male     ROS:     Constitutional:  Negative for decreased activity, fever, irritability and lethargy  ENMT:  Negative for ear drainage, hearing loss and nasal drainage  Eyes:  Negative for eye discharge and vision loss  Cardiovascular:  Negative for chest pain, sob, irregular heartbeat/palpitations  Respiratory: breathign better  Gastrointestinal:  Negative for abdominal pain, constipation, decreased appetite, diarrhea and vomiting  Genitourinary:  Negative for dysuria and hematuria  Endocrine:  Negative for abnormal sleep patterns, increased activity, polydipsia and polyphagia  Hema/Lymph:  Negative for easy bleeding and easy bruising  Integumentary:  Negative for pruritus and rash  Musculoskeletal:  Negative for bone/joint symptoms  Neurological:  Negative for gait disturbance  Psychiatric:  Negative for inappropriate interaction and psychiatric symptoms       22  1300   BP: 117/59   Pulse: 69   Resp:    Temp:            PHYSICAL EXAM:   Constitutional: appears well hydrated alert and responsive no acute distress noted  Head/Face: normocephalic  Eyes/Vision: normal extraocular motion is intact  Nose/Mouth/Throat:mucous membranes are moist and no oral lesions are noted  Neck/Thyroid: neck is supple without adenopathy  Lymphatic: no abnormal cervical, supraclavicular adenopathy is noted  Respiratory:  lungs are clear to auscultation bilaterally, normal respiratory effort  Cardiovascular: a fib   Abdomen: soft, non-tender, non-distended, BS normal  Vascular: well perfused femoral, and pedal pulses normal  Skin/Hair: no unusual rashes present, no abnormal bruising noted  Back/Spine: no abnormalities noted  Musculoskeletal: full ROM all extremities good strength  no deformities  Extremities: no edema, cyanosis  Neurological:  Grossly normal    Results:     Laboratory Data:  Lab Results   Component Value Date    WBC 6.8 04/20/2022    HGB 9.4 (L) 04/20/2022    HCT 30.8 (L) 04/20/2022    .0 (L) 04/20/2022    CREATSERUM 5.07 (H) 04/20/2022    BUN 33 (H) 04/20/2022     04/20/2022    K 4.9 04/20/2022    CL 99 04/20/2022    CO2 31.0 04/20/2022     (H) 04/20/2022    CA 8.2 (L) 04/20/2022    ALB 2.5 (L) 04/20/2022    ALKPHO 99 04/04/2022    BILT 0.6 04/04/2022    TP 5.8 (L) 04/04/2022    AST 27 04/04/2022    ALT 21 04/04/2022    PTT 94.3 (H) 08/31/2021    INR 2.80 (H) 04/20/2022    PT 41.6 (H) 04/18/2016    T4F 1.1 10/05/2021    TSH 3.470 10/05/2021     03/29/2022    PSA 3.4 12/12/2018    DDIMER 1.68 (H) 09/02/2021    CRP 4.95 (H) 08/31/2021    MG 2.2 04/06/2022    PHOS 2.9 04/20/2022    TROP 0.242 (HH) 10/04/2021     07/10/2020    B12 >2,000 (H) 01/18/2022       Imaging:  [unfilled]   XR FOOT, COMPLETE (MIN 3 VIEWS), LEFT (CPT=73630)    Result Date: 4/18/2022  CONCLUSION:  1. No radiographic evidence of osteomyelitis. Dictated by (CST): Deidre Merchant MD on 4/18/2022 at 3:54 PM     Finalized by (CST): Deider Merchant MD on 4/18/2022 at 3:58 PM          XR FOOT, COMPLETE (MIN 3 VIEWS), RIGHT (CPT=73630)    Result Date: 4/18/2022  CONCLUSION:  1. No acute appearing fracture or dislocation. No radiographic evidence of osteomyelitis or bone destruction. Flexion deformities of the 1st through 5th toes. Moderately extensive soft tissue swelling of the dorsum of the midfoot. Correlate clinically to this site for possible inflammatory or posttraumatic response. Moderate calcaneal plantar spur, and mild enthesopathy of the Achilles tendon insertion.     Dictated by (CST): Reji Lopez MD on 4/18/2022 at 3:38 PM     Finalized by (CST): Reji Lopez MD on 4/18/2022 at 3:41 PM          XR CHEST AP PORTABLE  (CPT=71045)    Result Date: 4/18/2022  CONCLUSION:  1. Mild CHF. Dictated by (CST): Hermes Merchant MD on 4/18/2022 at 3:50 PM     Finalized by (CST): Hermes Merchant MD on 4/18/2022 at 3:54 PM              ASSESSMENT/PLAN:   Assessment       #1 ESRD plan dialysis tomorrow    #2 fluid overload put him back on his Bumex 1 mg p.o. twice daily on nondialysis days remove fluid tomorrow to 1/2 L    #3 AManisha hoyt is on Coumadin    Home 7           4/20/2022  Wan Bradley MD

## 2022-04-20 NOTE — TELEPHONE ENCOUNTER
Please call pt  Not sure who he needs to see.  He was told at dialysis center     av fistula is out pt procedure at hospital

## 2022-04-20 NOTE — PHYSICAL THERAPY NOTE
Chart reviewed . I-messaging with wound care RN and primary RN requesting WB status. No restrictions noted in chart , pt seen by wound care with B heel wounds and painful B feet with inability to WB on LE per chart . Per Wound care RN recommend use of post op shoes. Primary RN aware of wound care recommendation and confirmed WBAT status. RN approve participation trial at this time --therapy discussed RN getting post op shoes from central supply to assess pt ability to participate in WB activity . Currently no post op shoes in room. From chart review: Pt prior acute admission from 3/31 thru 4/06 DC to rehab facility . [facility] Pt home short time with need for readmission due to pt inability to ambulate and family inability to care for pt in home and wounds . Pt received sitting at EOB eating . Pt reports unable to WB onto LE at this time due to pain. Pt spouse is present. Pt reports wants to remain admitted and does not want to go back to rehab . Spouse providing some information. Pt lives with spouse / family one level home 7 steps to enter home. Pt at rehab per chart was ambulatory with RW . Per chart pt unable to ambulate in home due to heel wounds / feet pain with need for admission--family unable to care for pt in home. Pt reports unable to stand or ambulate last week. Wound care following this admission. Pt with ESRD and dialysis needs. Pt worked with OT earlier in day sitting at EOB with painful feet unable to attempt any WB activity . PT will return as appropriate today for PT participation . Pt finishing lunch and RN to talk with pt regarding DC plans.

## 2022-04-20 NOTE — PLAN OF CARE
Problem: RISK FOR INFECTION - ADULT  Goal: Absence of fever/infection during anticipated neutropenic period  Description: INTERVENTIONS  - Monitor WBC  - Administer growth factors as ordered  - Implement neutropenic guidelines  Outcome: Progressing     Problem: SAFETY ADULT - FALL  Goal: Free from fall injury  Description: INTERVENTIONS:  - Assess pt frequently for physical needs  - Identify cognitive and physical deficits and behaviors that affect risk of falls.   - [place] fall precautions as indicated by assessment.  - Educate pt/family on patient safety including physical limitations  - Instruct pt to call for assistance with activity based on assessment  - Modify environment to reduce risk of injury  - Provide assistive devices as appropriate  - Consider OT/PT consult to assist with strengthening/mobility  - Encourage toileting schedule  Outcome: Progressing     Problem: DISCHARGE PLANNING  Goal: Discharge to home or other facility with appropriate resources  Description: INTERVENTIONS:  - Identify barriers to discharge w/pt and caregiver  - Include patient/family/discharge partner in discharge planning  - Arrange for needed discharge resources and transportation as appropriate  - Identify discharge learning needs (meds, wound care, etc)  - Arrange for interpreters to assist at discharge as needed  - Consider post-discharge preferences of patient/family/discharge partner  - Complete POLST form as appropriate  - Assess patient's ability to be responsible for managing their own health  - Refer to Case Management Department for coordinating discharge planning if the patient needs post-hospital services based on physician/LIP order or complex needs related to functional status, cognitive ability or social support system  Outcome: Progressing     Problem: METABOLIC/FLUID AND ELECTROLYTES - ADULT  Goal: Glucose maintained within prescribed range  Description: INTERVENTIONS:  - Monitor Blood Glucose as ordered  - Assess for signs and symptoms of hyperglycemia and hypoglycemia  - Administer ordered medications to maintain glucose within target range  - Assess barriers to adequate nutritional intake and initiate nutrition consult as needed  - Instruct patient on self management of diabetes  Outcome: Progressing  Goal: Electrolytes maintained within normal limits  Description: INTERVENTIONS:  - Monitor labs and rhythm and assess patient for signs and symptoms of electrolyte imbalances  - Administer electrolyte replacement as ordered  - Monitor response to electrolyte replacements, including rhythm and repeat lab results as appropriate  - Fluid restriction as ordered  - Instruct patient on fluid and nutrition restrictions as appropriate  Outcome: Progressing  Goal: Hemodynamic stability and optimal renal function maintained  Description: INTERVENTIONS:  - Monitor labs and assess for signs and symptoms of volume excess or deficit  - Monitor intake, output and patient weight  - Monitor urine specific gravity, serum osmolarity and serum sodium as indicated or ordered  - Monitor response to interventions for patient's volume status, including labs, urine output, blood pressure (other measures as available)  - Encourage oral intake as appropriate  - Instruct patient on fluid and nutrition restrictions as appropriate  Outcome: Progressing     Problem: HEMATOLOGIC - ADULT  Goal: Maintains hematologic stability  Description: INTERVENTIONS  - Assess for signs and symptoms of bleeding or hemorrhage  - Monitor labs and vital signs for trends  - Administer supportive blood products/factors, fluids and medications as ordered and appropriate  - Administer supportive blood products/factors as ordered and appropriate  Outcome: Progressing  Goal: Free from bleeding injury  Description: (Example usage: patient with low platelets)  INTERVENTIONS:  - Avoid intramuscular injections, enemas and rectal medication administration  - Ensure safe mobilization of patient  - Hold pressure on venipuncture sites to achieve adequate hemostasis  - Assess for signs and symptoms of internal bleeding  - Monitor lab trends  - Patient is to report abnormal signs of bleeding to staff  - Avoid use of toothpicks and dental floss  - Use electric shaver for shaving  - Use soft bristle tooth brush  - Limit straining and forceful nose blowing  Outcome: Progressing

## 2022-04-20 NOTE — CDS QUERY
How to answer this Query:  1.) Click \"Edit button\" on the toolbar.  2.) Type an \"X\" in the bracket for the diagnosis that applies. (You may also add additional clinical details as you feel necessary to substantiate your response). 3.) Finally click \"Sign\" to complete response. Thank You  UNDERLYING CAUSE OF FLUID OVERLOAD  CLINICAL DOCUMENTATION CLARIFICATION FORM  Dear Yolanda Yang information (provided below) includes a fluid overload. For accurate ICD-10-CM code assignment to reflect severity of illness and risk of mortality,  PLEASE (X) ALL DIAGNOSES THAT APPLY. (     )  Non cardiogenic  (    ) Acute Systolic Heart Failure   (    ) Acute on Chronic Systolic Heart Failure    (    ) Chronic Systolic Heart Failure  (  x  ) Other - please specify: combined systolic and diastolic heart failure, acute on chronic; fluid overload from ESRD  (    ) Unable to determine - please comment:         Documentation from the Medical Record: 4/19 dr Marivel Fontaine hypoxic respiratory failure 2/2 fluid overload  -CXR c/w CHF though pt is asymptomatic  -Nephrology Dr. Philip Strong consulted; pt to have HD today (receives TTSat as outpt)    Chronic HFrEF  CAD with ischemic cardiomyopathy      CXR=-1. Mild CHF. If you have any questions, please contact Clinical :  Ludwin Chadwick RN at [phone]     Thank You!      THIS FORM IS A PERMANENT PART OF THE MEDICAL RECORD

## 2022-04-21 LAB
ALBUMIN SERPL-MCNC: 2.5 G/DL (ref 3.4–5)
ANION GAP SERPL CALC-SCNC: 7 MMOL/L (ref 0–18)
BUN BLD-MCNC: 46 MG/DL (ref 7–18)
BUN/CREAT SERPL: 7.3 (ref 10–20)
CALCIUM BLD-MCNC: 8.6 MG/DL (ref 8.5–10.1)
CHLORIDE SERPL-SCNC: 96 MMOL/L (ref 98–112)
CO2 SERPL-SCNC: 30 MMOL/L (ref 21–32)
CREAT BLD-MCNC: 6.27 MG/DL
GLUCOSE BLD-MCNC: 81 MG/DL (ref 70–99)
GLUCOSE BLDC GLUCOMTR-MCNC: 136 MG/DL (ref 70–99)
GLUCOSE BLDC GLUCOMTR-MCNC: 163 MG/DL (ref 70–99)
GLUCOSE BLDC GLUCOMTR-MCNC: 73 MG/DL (ref 70–99)
GLUCOSE BLDC GLUCOMTR-MCNC: 91 MG/DL (ref 70–99)
INR BLD: 2.89 (ref 0.8–1.2)
OSMOLALITY SERPL CALC.SUM OF ELEC: 287 MOSM/KG (ref 275–295)
PHOSPHATE SERPL-MCNC: 3.2 MG/DL (ref 2.5–4.9)
POTASSIUM SERPL-SCNC: 4.9 MMOL/L (ref 3.5–5.1)
PROTHROMBIN TIME: 30.6 SECONDS (ref 11.6–14.8)
SODIUM SERPL-SCNC: 133 MMOL/L (ref 136–145)

## 2022-04-21 PROCEDURE — 99212 OFFICE O/P EST SF 10 MIN: CPT | Performed by: INTERNAL MEDICINE

## 2022-04-21 PROCEDURE — 5A1D70Z PERFORMANCE OF URINARY FILTRATION, INTERMITTENT, LESS THAN 6 HOURS PER DAY: ICD-10-PCS | Performed by: INTERNAL MEDICINE

## 2022-04-21 PROCEDURE — 99232 SBSQ HOSP IP/OBS MODERATE 35: CPT | Performed by: INTERNAL MEDICINE

## 2022-04-21 RX ORDER — BISACODYL 10 MG
10 SUPPOSITORY, RECTAL RECTAL NIGHTLY PRN
Status: DISCONTINUED | OUTPATIENT
Start: 2022-04-21 | End: 2022-04-22

## 2022-04-21 RX ORDER — WARFARIN SODIUM 2 MG/1
2 TABLET ORAL ONCE
Qty: 1 TABLET | Refills: 0 | Status: SHIPPED | COMMUNITY
Start: 2022-04-21 | End: 2022-04-30 | Stop reason: DRUGHIGH

## 2022-04-21 RX ORDER — BUMETANIDE 1 MG/1
TABLET ORAL
Qty: 1 TABLET | Refills: 0 | Status: ON HOLD | OUTPATIENT
Start: 2022-04-21 | End: 2022-05-02

## 2022-04-21 RX ORDER — WARFARIN SODIUM 3 MG/1
4.5 TABLET ORAL NIGHTLY
Qty: 45 TABLET | Refills: 5 | Status: SHIPPED | COMMUNITY
Start: 2022-04-22 | End: 2022-05-13

## 2022-04-21 NOTE — PLAN OF CARE
Problem: Patient Centered Care  Goal: Patient preferences are identified and integrated in the patient's plan of care  Description: Interventions:  - What would you like us to know as we care for you? \" I was here before\". - Provide timely, complete, and accurate information to patient/family  - Incorporate patient and family knowledge, values, beliefs, and cultural backgrounds into the planning and delivery of care  - Encourage patient/family to participate in care and decision-making at the level they choose  - Honor patient and family perspectives and choices  Outcome: Progressing     Problem: Patient/Family Goals  Goal: Patient/Family Long Term Goal  Description: Patient's Long Term Goal: To be discharged.    Interventions:  - Monitor vital signs  - Monitor appropriate labs  - Monitor blood glucose levels  - Administer medications per order  - Pain management as needed   - Follow MD orders  - Diagnostics per order  - Assist with ADLs as needed  - Update / inform patient and family on plan of care  - Discharge planning    - See additional Care Plan goals for specific interventions  Outcome: Progressing  Goal: Patient/Family Short Term Goal  Description: Patient's Short Term Goal: To be discharged     Interventions:   - Monitor vital signs  - Monitor appropriate labs  - Monitor blood glucose levels  - Administer medications per order  - Pain management as needed   - Follow MD orders  - Diagnostics per order  - Assist with ADLs as needed  - Update / inform patient and family on plan of care  - Discharge planning  - See additional Care Plan goals for specific interventions  Outcome: Progressing     Problem: RISK FOR INFECTION - ADULT  Goal: Absence of fever/infection during anticipated neutropenic period  Description: INTERVENTIONS  - Monitor WBC  - Implement neutropenic guidelines  Outcome: Progressing     Problem: SAFETY ADULT - FALL  Goal: Free from fall injury  Description: INTERVENTIONS:  - Assess pt frequently for physical needs  - Identify cognitive and physical deficits and behaviors that affect risk of falls.   - [place] fall precautions as indicated by assessment.  - Educate pt/family on patient safety including physical limitations  - Instruct pt to call for assistance with activity based on assessment  - Modify environment to reduce risk of injury  - Provide assistive devices as appropriate  - Consider OT/PT consult to assist with strengthening/mobility  - Encourage toileting schedule  Outcome: Progressing     Problem: DISCHARGE PLANNING  Goal: Discharge to home or other facility with appropriate resources  Description: INTERVENTIONS:  - Identify barriers to discharge w/pt and caregiver  - Include patient/family/discharge partner in discharge planning  - Arrange for needed discharge resources and transportation as appropriate  - Identify discharge learning needs (meds, wound care, etc)  - Arrange for interpreters to assist at discharge as needed  - Consider post-discharge preferences of patient/family/discharge partner  - Complete POLST form as appropriate  - Assess patient's ability to be responsible for managing their own health  - Refer to Case Management Department for coordinating discharge planning if the patient needs post-hospital services based on physician/LIP order or complex needs related to functional status, cognitive ability or social support system  Outcome: Progressing     Problem: METABOLIC/FLUID AND ELECTROLYTES - ADULT  Goal: Glucose maintained within prescribed range  Description: INTERVENTIONS:  - Monitor Blood Glucose as ordered  - Assess for signs and symptoms of hyperglycemia and hypoglycemia  - Administer ordered medications to maintain glucose within target range  - Assess barriers to adequate nutritional intake and initiate nutrition consult as needed  - Instruct patient on self management of diabetes  Outcome: Progressing  Goal: Electrolytes maintained within normal limits  Description: INTERVENTIONS:  - Monitor labs and rhythm and assess patient for signs and symptoms of electrolyte imbalances  - Administer electrolyte replacement as ordered  - Monitor response to electrolyte replacements, including rhythm and repeat lab results as appropriate  - Fluid restriction as ordered  - Instruct patient on fluid and nutrition restrictions as appropriate  Outcome: Progressing  Goal: Hemodynamic stability and optimal renal function maintained  Description: INTERVENTIONS:  - Monitor labs and assess for signs and symptoms of volume excess or deficit  - Monitor intake, output and patient weight  - Monitor urine specific gravity, serum osmolarity and serum sodium as indicated or ordered  - Monitor response to interventions for patient's volume status, including labs, urine output, blood pressure (other measures as available)  - Encourage oral intake as appropriate  - Instruct patient on fluid and nutrition restrictions as appropriate  Outcome: Progressing     Problem: HEMATOLOGIC - ADULT  Goal: Maintains hematologic stability  Description: INTERVENTIONS  - Assess for signs and symptoms of bleeding or hemorrhage  - Monitor labs and vital signs for trends  - Administer supportive blood products/factors, fluids and medications as ordered and appropriate  - Administer supportive blood products/factors as ordered and appropriate  Outcome: Progressing  Goal: Free from bleeding injury  Description: (Example usage: patient with low platelets)  INTERVENTIONS:  - Avoid intramuscular injections, enemas and rectal medication administration  - Ensure safe mobilization of patient  - Hold pressure on venipuncture sites to achieve adequate hemostasis  - Assess for signs and symptoms of internal bleeding  - Monitor lab trends  - Patient is to report abnormal signs of bleeding to staff  - Avoid use of toothpicks and dental floss  - Use electric shaver for shaving  - Use soft bristle tooth brush  - Limit straining and forceful nose blowing  Outcome: Progressing   Medicated for pain, wound care done, for dialysis today, updated on plan of are and discharge. no

## 2022-04-21 NOTE — CM/SW NOTE
Per RN rounds, pt remains medically cleared for DC. Pt's son called medicare and left  for appeal yesterday afternoon. SW met with pt at bedside to discuss. Pt states he would like to remain inpatient until medicare appeal has been processed. SW notified RN. SW received call from Rizwan CASTELLON p: [phone], no response from [facility] at this time. SW will continue to follow updates re medicare appeal.     Plan: Jeff Folds for [facility] pending result of [address] appeal.      / to remain available for support and/or discharge planning.      Renetta Pineda [facility] [place], [facility]  Ext [phone]

## 2022-04-21 NOTE — CM/SW NOTE
Denial by [facility]. Second level of review completed and supports observation. UR committee in agreement. Discussed with Dr. Dawna Laguna who approves observation status. MOON  notice explained and  provided  to the patient . Copy placed in chart  Order for observation in place.     Nica REYES, Utilization Review   Ext [phone]

## 2022-04-21 NOTE — PLAN OF CARE
Patient resting in bed. No acute changes. Dialysis today, removed 2.5L. Patient educated on maintaining electrolytes and diet while on dialysis by HD RN Mo. This RN reinforced education. Safety measures maintained. Will continue to monitor. Problem: Patient Centered Care  Goal: Patient preferences are identified and integrated in the patient's plan of care  Description: Interventions:  - What would you like us to know as we care for you?   - Provide timely, complete, and accurate information to patient/family  - Incorporate patient and family knowledge, values, beliefs, and cultural backgrounds into the planning and delivery of care  - Encourage patient/family to participate in care and decision-making at the level they choose  - Honor patient and family perspectives and choices  Outcome: Progressing     Problem: Patient/Family Goals  Goal: Patient/Family Long Term Goal  Description: Patient's Long Term Goal:     Interventions:  -   - See additional Care Plan goals for specific interventions  Outcome: Progressing  Goal: Patient/Family Short Term Goal  Description: Patient's Short Term Goal:     Interventions:   -   - See additional Care Plan goals for specific interventions  Outcome: Progressing     Problem: RISK FOR INFECTION - ADULT  Goal: Absence of fever/infection during anticipated neutropenic period  Description: INTERVENTIONS  - Monitor WBC  - Administer growth factors as ordered  - Implement neutropenic guidelines  Outcome: Progressing     Problem: SAFETY ADULT - FALL  Goal: Free from fall injury  Description: INTERVENTIONS:  - Assess pt frequently for physical needs  - Identify cognitive and physical deficits and behaviors that affect risk of falls.   - [place] fall precautions as indicated by assessment.  - Educate pt/family on patient safety including physical limitations  - Instruct pt to call for assistance with activity based on assessment  - Modify environment to reduce risk of injury  - Provide assistive devices as appropriate  - Consider OT/PT consult to assist with strengthening/mobility  - Encourage toileting schedule  Outcome: Progressing     Problem: DISCHARGE PLANNING  Goal: Discharge to home or other facility with appropriate resources  Description: INTERVENTIONS:  - Identify barriers to discharge w/pt and caregiver  - Include patient/family/discharge partner in discharge planning  - Arrange for needed discharge resources and transportation as appropriate  - Identify discharge learning needs (meds, wound care, etc)  - Arrange for interpreters to assist at discharge as needed  - Consider post-discharge preferences of patient/family/discharge partner  - Complete POLST form as appropriate  - Assess patient's ability to be responsible for managing their own health  - Refer to Case Management Department for coordinating discharge planning if the patient needs post-hospital services based on physician/LIP order or complex needs related to functional status, cognitive ability or social support system  Outcome: Progressing     Problem: METABOLIC/FLUID AND ELECTROLYTES - ADULT  Goal: Glucose maintained within prescribed range  Description: INTERVENTIONS:  - Monitor Blood Glucose as ordered  - Assess for signs and symptoms of hyperglycemia and hypoglycemia  - Administer ordered medications to maintain glucose within target range  - Assess barriers to adequate nutritional intake and initiate nutrition consult as needed  - Instruct patient on self management of diabetes  Outcome: Progressing  Goal: Electrolytes maintained within normal limits  Description: INTERVENTIONS:  - Monitor labs and rhythm and assess patient for signs and symptoms of electrolyte imbalances  - Administer electrolyte replacement as ordered  - Monitor response to electrolyte replacements, including rhythm and repeat lab results as appropriate  - Fluid restriction as ordered  - Instruct patient on fluid and nutrition restrictions as appropriate  Outcome: Progressing  Goal: Hemodynamic stability and optimal renal function maintained  Description: INTERVENTIONS:  - Monitor labs and assess for signs and symptoms of volume excess or deficit  - Monitor intake, output and patient weight  - Monitor urine specific gravity, serum osmolarity and serum sodium as indicated or ordered  - Monitor response to interventions for patient's volume status, including labs, urine output, blood pressure (other measures as available)  - Encourage oral intake as appropriate  - Instruct patient on fluid and nutrition restrictions as appropriate  Outcome: Progressing     Problem: HEMATOLOGIC - ADULT  Goal: Maintains hematologic stability  Description: INTERVENTIONS  - Assess for signs and symptoms of bleeding or hemorrhage  - Monitor labs and vital signs for trends  - Administer supportive blood products/factors, fluids and medications as ordered and appropriate  - Administer supportive blood products/factors as ordered and appropriate  Outcome: Progressing  Goal: Free from bleeding injury  Description: (Example usage: patient with low platelets)  INTERVENTIONS:  - Avoid intramuscular injections, enemas and rectal medication administration  - Ensure safe mobilization of patient  - Hold pressure on venipuncture sites to achieve adequate hemostasis  - Assess for signs and symptoms of internal bleeding  - Monitor lab trends  - Patient is to report abnormal signs of bleeding to staff  - Avoid use of toothpicks and dental floss  - Use electric shaver for shaving  - Use soft bristle tooth brush  - Limit straining and forceful nose blowing  Outcome: Progressing

## 2022-04-22 VITALS
BODY MASS INDEX: 26.21 KG/M2 | HEART RATE: 70 BPM | OXYGEN SATURATION: 100 % | TEMPERATURE: 98 F | DIASTOLIC BLOOD PRESSURE: 54 MMHG | SYSTOLIC BLOOD PRESSURE: 108 MMHG | RESPIRATION RATE: 18 BRPM | WEIGHT: 157.31 LBS | HEIGHT: 65 IN

## 2022-04-22 LAB
GLUCOSE BLDC GLUCOMTR-MCNC: 138 MG/DL (ref 70–99)
GLUCOSE BLDC GLUCOMTR-MCNC: 147 MG/DL (ref 70–99)

## 2022-04-22 PROCEDURE — 99232 SBSQ HOSP IP/OBS MODERATE 35: CPT | Performed by: INTERNAL MEDICINE

## 2022-04-22 PROCEDURE — 99239 HOSP IP/OBS DSCHRG MGMT >30: CPT | Performed by: INTERNAL MEDICINE

## 2022-04-22 RX ORDER — PRAMOXINE HYDROCHLORIDE 10 MG/G
1 AEROSOL, FOAM TOPICAL EVERY 4 HOURS PRN
Status: DISCONTINUED | OUTPATIENT
Start: 2022-04-22 | End: 2022-04-22

## 2022-04-22 RX ORDER — TRAMADOL HYDROCHLORIDE 50 MG/1
50 TABLET ORAL EVERY 12 HOURS PRN
Status: DISCONTINUED | OUTPATIENT
Start: 2022-04-22 | End: 2022-04-22

## 2022-04-22 RX ORDER — BISACODYL 10 MG
10 SUPPOSITORY, RECTAL RECTAL NIGHTLY PRN
Qty: 10 SUPPOSITORY | Refills: 3 | Status: SHIPPED | OUTPATIENT
Start: 2022-04-22

## 2022-04-22 NOTE — PLAN OF CARE
Problem: Patient Centered Care  Goal: Patient preferences are identified and integrated in the patient's plan of care  Description: Interventions:  - What would you like us to know as we care for you?  - Provide timely, complete, and accurate information to patient/family  - Incorporate patient and family knowledge, values, beliefs, and cultural backgrounds into the planning and delivery of care  - Encourage patient/family to participate in care and decision-making at the level they choose  - Honor patient and family perspectives and choices  Outcome: Progressing     Problem: RISK FOR INFECTION - ADULT  Goal: Absence of fever/infection during anticipated neutropenic period  Description: INTERVENTIONS  - Monitor WBC  - Administer growth factors as ordered  - Implement neutropenic guidelines  Outcome: Progressing     Problem: SAFETY ADULT - FALL  Goal: Free from fall injury  Description: INTERVENTIONS:  - Assess pt frequently for physical needs  - Identify cognitive and physical deficits and behaviors that affect risk of falls.   - [place] fall precautions as indicated by assessment.  - Educate pt/family on patient safety including physical limitations  - Instruct pt to call for assistance with activity based on assessment  - Modify environment to reduce risk of injury  - Provide assistive devices as appropriate  - Consider OT/PT consult to assist with strengthening/mobility  - Encourage toileting schedule  Outcome: Progressing     Problem: DISCHARGE PLANNING  Goal: Discharge to home or other facility with appropriate resources  Description: INTERVENTIONS:  - Identify barriers to discharge w/pt and caregiver  - Include patient/family/discharge partner in discharge planning  - Arrange for needed discharge resources and transportation as appropriate  - Identify discharge learning needs (meds, wound care, etc)  - Arrange for interpreters to assist at discharge as needed  - Consider post-discharge preferences of patient/family/discharge partner  - Complete POLST form as appropriate  - Assess patient's ability to be responsible for managing their own health  - Refer to Case Management Department for coordinating discharge planning if the patient needs post-hospital services based on physician/LIP order or complex needs related to functional status, cognitive ability or social support system  Outcome: Progressing     Problem: METABOLIC/FLUID AND ELECTROLYTES - ADULT  Goal: Glucose maintained within prescribed range  Description: INTERVENTIONS:  - Monitor Blood Glucose as ordered  - Assess for signs and symptoms of hyperglycemia and hypoglycemia  - Administer ordered medications to maintain glucose within target range  - Assess barriers to adequate nutritional intake and initiate nutrition consult as needed  - Instruct patient on self management of diabetes  Outcome: Progressing  Goal: Electrolytes maintained within normal limits  Description: INTERVENTIONS:  - Monitor labs and rhythm and assess patient for signs and symptoms of electrolyte imbalances  - Administer electrolyte replacement as ordered  - Monitor response to electrolyte replacements, including rhythm and repeat lab results as appropriate  - Fluid restriction as ordered  - Instruct patient on fluid and nutrition restrictions as appropriate  Outcome: Progressing  Goal: Hemodynamic stability and optimal renal function maintained  Description: INTERVENTIONS:  - Monitor labs and assess for signs and symptoms of volume excess or deficit  - Monitor intake, output and patient weight  - Monitor urine specific gravity, serum osmolarity and serum sodium as indicated or ordered  - Monitor response to interventions for patient's volume status, including labs, urine output, blood pressure (other measures as available)  - Encourage oral intake as appropriate  - Instruct patient on fluid and nutrition restrictions as appropriate  Outcome: Progressing     Problem: HEMATOLOGIC - ADULT  Goal: Maintains hematologic stability  Description: INTERVENTIONS  - Assess for signs and symptoms of bleeding or hemorrhage  - Monitor labs and vital signs for trends  - Administer supportive blood products/factors, fluids and medications as ordered and appropriate  - Administer supportive blood products/factors as ordered and appropriate  Outcome: Progressing  Goal: Free from bleeding injury  Description: (Example usage: patient with low platelets)  INTERVENTIONS:  - Avoid intramuscular injections, enemas and rectal medication administration  - Ensure safe mobilization of patient  - Hold pressure on venipuncture sites to achieve adequate hemostasis  - Assess for signs and symptoms of internal bleeding  - Monitor lab trends  - Patient is to report abnormal signs of bleeding to staff  - Avoid use of toothpicks and dental floss  - Use electric shaver for shaving  - Use soft bristle tooth brush  - Limit straining and forceful nose blowing  Outcome: Progressing  Suppository given to pt at beginning of shift, pt experienced 3 BM. After BM's, pt c/o burning to anus, MD ordered preparation H BID and proctofoam PRN. Pt A&O x4. Vitals within normal limits. Pt on 1L NC. Patient has safety precautions in place bed in the lowest position, bed alarm on, and call light within reach. Right foot wounds causing pt pain, dressings changed.

## 2022-04-22 NOTE — PLAN OF CARE
Patient resting in bed. No acute changes. Safety measures maintained. Report given to \[facility] [place] [facility]\"" at [facility]. IV removed by this RN. Paperwork sent with EMS. Problem: Patient Centered Care  Goal: Patient preferences are identified and integrated in the patient's plan of care  Description: Interventions:  - What would you like us to know as we care for you?   - Provide timely, complete, and accurate information to patient/family  - Incorporate patient and family knowledge, values, beliefs, and cultural backgrounds into the planning and delivery of care  - Encourage patient/family to participate in care and decision-making at the level they choose  - Honor patient and family perspectives and choices  Outcome: Adequate for Discharge     Problem: Patient/Family Goals  Goal: Patient/Family Long Term Goal  Description: Patient's Long Term Goal:     Interventions:  -   - See additional Care Plan goals for specific interventions  Outcome: Adequate for Discharge  Goal: Patient/Family Short Term Goal  Description: Patient's Short Term Goal:     Interventions:   -   - See additional Care Plan goals for specific interventions  Outcome: Adequate for Discharge     Problem: RISK FOR INFECTION - ADULT  Goal: Absence of fever/infection during anticipated neutropenic period  Description: INTERVENTIONS  - Monitor WBC  - Administer growth factors as ordered  - Implement neutropenic guidelines  Outcome: Adequate for Discharge     Problem: SAFETY ADULT - FALL  Goal: Free from fall injury  Description: INTERVENTIONS:  - Assess pt frequently for physical needs  - Identify cognitive and physical deficits and behaviors that affect risk of falls.   - [place] fall precautions as indicated by assessment.  - Educate pt/family on patient safety including physical limitations  - Instruct pt to call for assistance with activity based on assessment  - Modify environment to reduce risk of injury  - Provide assistive devices as appropriate  - Consider OT/PT consult to assist with strengthening/mobility  - Encourage toileting schedule  Outcome: Adequate for Discharge     Problem: DISCHARGE PLANNING  Goal: Discharge to home or other facility with appropriate resources  Description: INTERVENTIONS:  - Identify barriers to discharge w/pt and caregiver  - Include patient/family/discharge partner in discharge planning  - Arrange for needed discharge resources and transportation as appropriate  - Identify discharge learning needs (meds, wound care, etc)  - Arrange for interpreters to assist at discharge as needed  - Consider post-discharge preferences of patient/family/discharge partner  - Complete POLST form as appropriate  - Assess patient's ability to be responsible for managing their own health  - Refer to Case Management Department for coordinating discharge planning if the patient needs post-hospital services based on physician/LIP order or complex needs related to functional status, cognitive ability or social support system  Outcome: Adequate for Discharge     Problem: METABOLIC/FLUID AND ELECTROLYTES - ADULT  Goal: Glucose maintained within prescribed range  Description: INTERVENTIONS:  - Monitor Blood Glucose as ordered  - Assess for signs and symptoms of hyperglycemia and hypoglycemia  - Administer ordered medications to maintain glucose within target range  - Assess barriers to adequate nutritional intake and initiate nutrition consult as needed  - Instruct patient on self management of diabetes  Outcome: Adequate for Discharge  Goal: Electrolytes maintained within normal limits  Description: INTERVENTIONS:  - Monitor labs and rhythm and assess patient for signs and symptoms of electrolyte imbalances  - Administer electrolyte replacement as ordered  - Monitor response to electrolyte replacements, including rhythm and repeat lab results as appropriate  - Fluid restriction as ordered  - Instruct patient on fluid and nutrition restrictions as appropriate  Outcome: Adequate for Discharge  Goal: Hemodynamic stability and optimal renal function maintained  Description: INTERVENTIONS:  - Monitor labs and assess for signs and symptoms of volume excess or deficit  - Monitor intake, output and patient weight  - Monitor urine specific gravity, serum osmolarity and serum sodium as indicated or ordered  - Monitor response to interventions for patient's volume status, including labs, urine output, blood pressure (other measures as available)  - Encourage oral intake as appropriate  - Instruct patient on fluid and nutrition restrictions as appropriate  Outcome: Adequate for Discharge     Problem: HEMATOLOGIC - ADULT  Goal: Maintains hematologic stability  Description: INTERVENTIONS  - Assess for signs and symptoms of bleeding or hemorrhage  - Monitor labs and vital signs for trends  - Administer supportive blood products/factors, fluids and medications as ordered and appropriate  - Administer supportive blood products/factors as ordered and appropriate  Outcome: Adequate for Discharge  Goal: Free from bleeding injury  Description: (Example usage: patient with low platelets)  INTERVENTIONS:  - Avoid intramuscular injections, enemas and rectal medication administration  - Ensure safe mobilization of patient  - Hold pressure on venipuncture sites to achieve adequate hemostasis  - Assess for signs and symptoms of internal bleeding  - Monitor lab trends  - Patient is to report abnormal signs of bleeding to staff  - Avoid use of toothpicks and dental floss  - Use electric shaver for shaving  - Use soft bristle tooth brush  - Limit straining and forceful nose blowing  Outcome: Adequate for Discharge

## 2022-04-22 NOTE — CM/SW NOTE
Per RN rounds, pt remains medically stable for DC today 4/22. [facility] willing to accept pt back today. GUDELIA called [facility] to notify them that pt will be discharging today and will be at HD tomorrow 4/23. Pt's son did not complete medicare appeal. Orquidea Tierney called son yesterday and son stated he was unable to call back d/t being too busy/time constraints. Pt has a safe DC plan and an appeal has not been made. GUDELIA discussed this with pt, son and spouse. Pt expressed concerns re outpatient appts, SW notified spouse that SW cannot re-arrange outpatient appts. Spouse expressed understanding. SW discussed DC plan with RN. PLAN: DC today [facility] PM [facility], PCS done - [address] [place] [address] & resume HD [facility]   Report: [phone]    SW remains available for support and/or discharge planning. Please do not hesitate to call/chat SW if further DC needs arise.      Mellisa GRANT, [place]  Ext [phone]

## 2022-04-22 NOTE — PROGRESS NOTES
[facility] Pharmacy Note:  Renal Dose Adjustment for Tramadol Liz Civil)    Zheng Manzanares has been prescribed Tramadol (ULTRAM) 50 mg orally every 6 hours as needed for pain. Estimated Creatinine Clearance: 7.9 mL/min (A) (based on SCr of 6.27 mg/dL (H)). His calculated creatinine clearance is < 30 ml/min, therefore, the dose of Tramadol (ULTRAM) has been changed to 50 mg every 12 hours as needed for pain per P&T approved protocol. Pharmacy will continue to follow, and if renal function improves, will resume the original order.      Thank you,  Danii Herrera, PharmD  4/22/2022 10:30 AM

## 2022-04-28 NOTE — TELEPHONE ENCOUNTER
Patient's wife Rene Rodriguez is calling to get a recommendation of a doctor that patient can see for circulation in his legs    Please call Winifred [phone]    She is aware Dr Gardenia Oliveros is out of the office until Monday, ok to wait

## 2022-04-30 ENCOUNTER — APPOINTMENT (OUTPATIENT)
Dept: GENERAL RADIOLOGY | Facility: HOSPITAL | Age: 82
End: 2022-04-30
Attending: EMERGENCY MEDICINE
Payer: MEDICARE

## 2022-04-30 ENCOUNTER — HOSPITAL ENCOUNTER (INPATIENT)
Facility: HOSPITAL | Age: 82
LOS: 13 days | Discharge: NURSING, REHAB OR LONG-TERM CARE | End: 2022-05-13
Attending: EMERGENCY MEDICINE | Admitting: INTERNAL MEDICINE
Payer: MEDICARE

## 2022-04-30 DIAGNOSIS — S91.302A NON HEALING LEFT HEEL WOUND: Primary | ICD-10-CM

## 2022-04-30 DIAGNOSIS — Z99.2 ESRD ON HEMODIALYSIS (HCC): ICD-10-CM

## 2022-04-30 DIAGNOSIS — N18.6 ESRD ON HEMODIALYSIS (HCC): ICD-10-CM

## 2022-04-30 DIAGNOSIS — L97.409: ICD-10-CM

## 2022-04-30 DIAGNOSIS — I50.9 ACUTE ON CHRONIC CONGESTIVE HEART FAILURE, UNSPECIFIED HEART FAILURE TYPE (HCC): ICD-10-CM

## 2022-04-30 PROBLEM — N17.9 ACUTE KIDNEY INJURY (HCC): Status: ACTIVE | Noted: 2022-01-01

## 2022-04-30 PROBLEM — D64.9 ANEMIA: Status: ACTIVE | Noted: 2022-04-30

## 2022-04-30 PROBLEM — D64.9 ANEMIA: Status: ACTIVE | Noted: 2022-01-01

## 2022-04-30 PROBLEM — N17.9 ACUTE KIDNEY INJURY (HCC): Status: ACTIVE | Noted: 2022-04-30

## 2022-04-30 LAB
ANION GAP SERPL CALC-SCNC: 9 MMOL/L (ref 0–18)
BASOPHILS # BLD AUTO: 0.02 X10(3) UL (ref 0–0.2)
BASOPHILS NFR BLD AUTO: 0.2 %
BUN BLD-MCNC: 56 MG/DL (ref 7–18)
BUN/CREAT SERPL: 8.8 (ref 10–20)
CALCIUM BLD-MCNC: 8.2 MG/DL (ref 8.5–10.1)
CHLORIDE SERPL-SCNC: 97 MMOL/L (ref 98–112)
CO2 SERPL-SCNC: 31 MMOL/L (ref 21–32)
CREAT BLD-MCNC: 6.35 MG/DL
DEPRECATED RDW RBC AUTO: 77.2 FL (ref 35.1–46.3)
EOSINOPHIL # BLD AUTO: 0.03 X10(3) UL (ref 0–0.7)
EOSINOPHIL NFR BLD AUTO: 0.3 %
ERYTHROCYTE [DISTWIDTH] IN BLOOD BY AUTOMATED COUNT: 19.1 % (ref 11–15)
EST. AVERAGE GLUCOSE BLD GHB EST-MCNC: 105 MG/DL (ref 68–126)
GLUCOSE BLD-MCNC: 158 MG/DL (ref 70–99)
GLUCOSE BLDC GLUCOMTR-MCNC: 151 MG/DL (ref 70–99)
GLUCOSE BLDC GLUCOMTR-MCNC: 167 MG/DL (ref 70–99)
HBA1C MFR BLD: 5.3 % (ref ?–5.7)
HCT VFR BLD AUTO: 35.9 %
HGB BLD-MCNC: 11.2 G/DL
IMM GRANULOCYTES # BLD AUTO: 0.04 X10(3) UL (ref 0–1)
IMM GRANULOCYTES NFR BLD: 0.4 %
INR BLD: 3.76 (ref 0.8–1.2)
LYMPHOCYTES # BLD AUTO: 0.54 X10(3) UL (ref 1–4)
LYMPHOCYTES NFR BLD AUTO: 5.3 %
MCH RBC QN AUTO: 34.5 PG (ref 26–34)
MCHC RBC AUTO-ENTMCNC: 31.2 G/DL (ref 31–37)
MCV RBC AUTO: 110.5 FL
MONOCYTES # BLD AUTO: 0.81 X10(3) UL (ref 0.1–1)
MONOCYTES NFR BLD AUTO: 7.9 %
NEUTROPHILS # BLD AUTO: 8.75 X10 (3) UL (ref 1.5–7.7)
NEUTROPHILS # BLD AUTO: 8.75 X10(3) UL (ref 1.5–7.7)
NEUTROPHILS NFR BLD AUTO: 85.9 %
OSMOLALITY SERPL CALC.SUM OF ELEC: 303 MOSM/KG (ref 275–295)
PLATELET # BLD AUTO: 167 10(3)UL (ref 150–450)
PLATELET MORPHOLOGY: NORMAL
POTASSIUM SERPL-SCNC: 5.1 MMOL/L (ref 3.5–5.1)
PROTHROMBIN TIME: 37.8 SECONDS (ref 11.6–14.8)
RBC # BLD AUTO: 3.25 X10(6)UL
SARS-COV-2 RNA RESP QL NAA+PROBE: NOT DETECTED
SODIUM SERPL-SCNC: 137 MMOL/L (ref 136–145)
WBC # BLD AUTO: 10.2 X10(3) UL (ref 4–11)

## 2022-04-30 PROCEDURE — 71045 X-RAY EXAM CHEST 1 VIEW: CPT | Performed by: EMERGENCY MEDICINE

## 2022-04-30 PROCEDURE — 73630 X-RAY EXAM OF FOOT: CPT | Performed by: EMERGENCY MEDICINE

## 2022-04-30 PROCEDURE — 99223 1ST HOSP IP/OBS HIGH 75: CPT | Performed by: INTERNAL MEDICINE

## 2022-04-30 RX ORDER — POLYETHYLENE GLYCOL 3350 17 G/17G
17 POWDER, FOR SOLUTION ORAL DAILY PRN
Status: DISCONTINUED | OUTPATIENT
Start: 2022-04-30 | End: 2022-04-30

## 2022-04-30 RX ORDER — SIMETHICONE 80 MG
80 TABLET,CHEWABLE ORAL
Status: DISCONTINUED | OUTPATIENT
Start: 2022-04-30 | End: 2022-05-13

## 2022-04-30 RX ORDER — NICOTINE POLACRILEX 4 MG
15 LOZENGE BUCCAL
Status: DISCONTINUED | OUTPATIENT
Start: 2022-04-30 | End: 2022-05-13

## 2022-04-30 RX ORDER — PANTOPRAZOLE SODIUM 40 MG/1
40 TABLET, DELAYED RELEASE ORAL
Status: DISCONTINUED | OUTPATIENT
Start: 2022-05-01 | End: 2022-05-13

## 2022-04-30 RX ORDER — VANCOMYCIN/0.9 % SOD CHLORIDE 1.75 G/5
25 PLASTIC BAG, INJECTION (ML) INTRAVENOUS ONCE
Status: COMPLETED | OUTPATIENT
Start: 2022-04-30 | End: 2022-04-30

## 2022-04-30 RX ORDER — MORPHINE SULFATE 2 MG/ML
2 INJECTION, SOLUTION INTRAMUSCULAR; INTRAVENOUS EVERY 2 HOUR PRN
Status: DISCONTINUED | OUTPATIENT
Start: 2022-04-30 | End: 2022-05-13

## 2022-04-30 RX ORDER — SENNOSIDES 8.6 MG
17.2 TABLET ORAL NIGHTLY PRN
Status: DISCONTINUED | OUTPATIENT
Start: 2022-04-30 | End: 2022-04-30

## 2022-04-30 RX ORDER — MORPHINE SULFATE 2 MG/ML
2 INJECTION, SOLUTION INTRAMUSCULAR; INTRAVENOUS ONCE
Status: COMPLETED | OUTPATIENT
Start: 2022-04-30 | End: 2022-04-30

## 2022-04-30 RX ORDER — MORPHINE SULFATE 4 MG/ML
4 INJECTION, SOLUTION INTRAMUSCULAR; INTRAVENOUS EVERY 2 HOUR PRN
Status: DISCONTINUED | OUTPATIENT
Start: 2022-04-30 | End: 2022-05-13

## 2022-04-30 RX ORDER — CALCITRIOL 0.25 UG/1
0.25 CAPSULE, LIQUID FILLED ORAL DAILY
Status: DISCONTINUED | OUTPATIENT
Start: 2022-05-01 | End: 2022-05-13

## 2022-04-30 RX ORDER — HEPARIN SODIUM 1000 [USP'U]/ML
1.5 INJECTION, SOLUTION INTRAVENOUS; SUBCUTANEOUS ONCE
Status: DISCONTINUED | OUTPATIENT
Start: 2022-05-01 | End: 2022-05-06

## 2022-04-30 RX ORDER — BUPRENORPHINE 5 UG/H
5 PATCH TRANSDERMAL WEEKLY
Status: DISCONTINUED | OUTPATIENT
Start: 2022-04-30 | End: 2022-05-04

## 2022-04-30 RX ORDER — CALCIUM CARBONATE 200(500)MG
2000 TABLET,CHEWABLE ORAL NIGHTLY
Status: DISCONTINUED | OUTPATIENT
Start: 2022-04-30 | End: 2022-05-13

## 2022-04-30 RX ORDER — ASPIRIN 81 MG/1
81 TABLET, CHEWABLE ORAL DAILY
Status: DISCONTINUED | OUTPATIENT
Start: 2022-05-01 | End: 2022-05-13

## 2022-04-30 RX ORDER — ALLOPURINOL 100 MG/1
100 TABLET ORAL DAILY
Status: DISCONTINUED | OUTPATIENT
Start: 2022-05-01 | End: 2022-05-13

## 2022-04-30 RX ORDER — ATORVASTATIN CALCIUM 40 MG/1
40 TABLET, FILM COATED ORAL NIGHTLY
Status: DISCONTINUED | OUTPATIENT
Start: 2022-04-30 | End: 2022-05-13

## 2022-04-30 RX ORDER — MORPHINE SULFATE 2 MG/ML
1 INJECTION, SOLUTION INTRAMUSCULAR; INTRAVENOUS EVERY 2 HOUR PRN
Status: DISCONTINUED | OUTPATIENT
Start: 2022-04-30 | End: 2022-05-13

## 2022-04-30 RX ORDER — TRAMADOL HYDROCHLORIDE 50 MG/1
50 TABLET ORAL EVERY 12 HOURS PRN
Status: DISCONTINUED | OUTPATIENT
Start: 2022-04-30 | End: 2022-05-11

## 2022-04-30 RX ORDER — BISACODYL 10 MG
10 SUPPOSITORY, RECTAL RECTAL
Status: DISCONTINUED | OUTPATIENT
Start: 2022-04-30 | End: 2022-05-13

## 2022-04-30 RX ORDER — POLYETHYLENE GLYCOL 3350 17 G/17G
17 POWDER, FOR SOLUTION ORAL DAILY
Status: DISCONTINUED | OUTPATIENT
Start: 2022-05-01 | End: 2022-05-13

## 2022-04-30 RX ORDER — AMIODARONE HYDROCHLORIDE 200 MG/1
200 TABLET ORAL DAILY
Status: DISCONTINUED | OUTPATIENT
Start: 2022-05-01 | End: 2022-05-13

## 2022-04-30 RX ORDER — IPRATROPIUM BROMIDE AND ALBUTEROL SULFATE 2.5; .5 MG/3ML; MG/3ML
3 SOLUTION RESPIRATORY (INHALATION) EVERY 6 HOURS PRN
Status: DISCONTINUED | OUTPATIENT
Start: 2022-04-30 | End: 2022-05-13

## 2022-04-30 RX ORDER — ONDANSETRON 2 MG/ML
4 INJECTION INTRAMUSCULAR; INTRAVENOUS EVERY 6 HOURS PRN
Status: DISCONTINUED | OUTPATIENT
Start: 2022-04-30 | End: 2022-05-13

## 2022-04-30 RX ORDER — SEVELAMER CARBONATE 800 MG/1
800 TABLET, FILM COATED ORAL
Status: DISCONTINUED | OUTPATIENT
Start: 2022-04-30 | End: 2022-05-13

## 2022-04-30 RX ORDER — BUMETANIDE 1 MG/1
1 TABLET ORAL
Status: DISCONTINUED | OUTPATIENT
Start: 2022-05-02 | End: 2022-05-02

## 2022-04-30 RX ORDER — ALBUMIN (HUMAN) 12.5 G/50ML
100 SOLUTION INTRAVENOUS AS NEEDED
Status: DISCONTINUED | OUTPATIENT
Start: 2022-04-30 | End: 2022-05-08

## 2022-04-30 RX ORDER — DEXTROSE MONOHYDRATE 25 G/50ML
50 INJECTION, SOLUTION INTRAVENOUS
Status: DISCONTINUED | OUTPATIENT
Start: 2022-04-30 | End: 2022-05-13

## 2022-04-30 RX ORDER — SENNOSIDES 8.6 MG
17.2 TABLET ORAL DAILY
Status: DISCONTINUED | OUTPATIENT
Start: 2022-05-01 | End: 2022-05-13

## 2022-04-30 RX ORDER — NICOTINE POLACRILEX 4 MG
30 LOZENGE BUCCAL
Status: DISCONTINUED | OUTPATIENT
Start: 2022-04-30 | End: 2022-05-13

## 2022-04-30 RX ORDER — METOCLOPRAMIDE HYDROCHLORIDE 5 MG/ML
5 INJECTION INTRAMUSCULAR; INTRAVENOUS EVERY 8 HOURS PRN
Status: DISCONTINUED | OUTPATIENT
Start: 2022-04-30 | End: 2022-05-13

## 2022-04-30 RX ORDER — MELATONIN
3 NIGHTLY PRN
Status: DISCONTINUED | OUTPATIENT
Start: 2022-04-30 | End: 2022-05-13

## 2022-04-30 RX ORDER — MIDODRINE HYDROCHLORIDE 5 MG/1
5 TABLET ORAL 3 TIMES DAILY PRN
Status: DISCONTINUED | OUTPATIENT
Start: 2022-04-30 | End: 2022-05-13

## 2022-04-30 RX ORDER — ACETAMINOPHEN 325 MG/1
650 TABLET ORAL EVERY 6 HOURS PRN
Status: DISCONTINUED | OUTPATIENT
Start: 2022-04-30 | End: 2022-05-13

## 2022-04-30 NOTE — ED QUICK NOTES
Patient stable for transfer to floor. A&Ox4, IV site patent and intact without complication. 2L O2 per nc for comfort. Patient ate 100% food tray provided. Vancomycin endorsed to [employer] on floor. All belongings to accompany patient at this time.

## 2022-04-30 NOTE — ED INITIAL ASSESSMENT (HPI)
Patient here for bilateral foot pain, left worse than right. Wounds on feet debrided yesterday per ems. Also reports he missed dialysis today due to transport not showing up. Denies any chest pain or shortness of breath.

## 2022-04-30 NOTE — PROGRESS NOTES
[facility] Pharmacy Note:  Renal Adjustment for cefepime (MAXIPIME)    Jitendra Ramey is a 80year old patient who has been prescribed cefepime (MAXIPIME) 1000 mg every 8 hrs. The estimated creatinine clearance is 8.1 mL/min (A) (based on SCr of 6.35 mg/dL (H)). The dose has been adjusted to cefepime (MAXIPIME) 1000 mg every 24 hrs per hospital renal dose adjustment protocol for treatment of osteomyelitis. Pharmacy will follow and adjust dose as warranted for additional renal function changes.     Thank you,    Sina Bowen, PharmD  4/30/2022  4:02 PM

## 2022-04-30 NOTE — PROGRESS NOTES
[facility] Pharmacy Note:  Renal Dose Adjustment for Tramadol Lucía Quiet)    Smita Lewis has been prescribed Tramadol (ULTRAM) 50 mg orally every 6 hours as needed for pain. Estimated Creatinine Clearance: 8.1 mL/min (A) (based on SCr of 6.35 mg/dL (H)). His calculated creatinine clearance is < 30 ml/min, therefore, the dose of Tramadol (ULTRAM) has been changed to 50 mg every 12 hours as needed for pain per P&T approved protocol. Pharmacy will continue to follow, and if renal function improves, will resume the original order.      Thank you,  Eva Mora, PharmD  4/30/2022 3:57 PM OUTPATIENT PROGRESS NOTE - ORTHOPEDICS    CHIEF COMPLAINT:  Follow-up of the Right Elbow      SUBJECTIVE:  Mandi is a 41 year old female who returns for reevaluation right elbow lateral epicondylitis.  She reports that she attended therapy for 2 months after her last visit, but felt she had no improvement.  She noted that the counterforce strap caused more pain.  She denies numbness and tingling.  She notes she still difficulty with ADLs such as holding up a phone.  She would like to consider a cortisone injection today for her right elbow.    Patient seen and examined.  History per the above.    MEDICATIONS:  Medications were reviewed and updated today.    HISTORIES:    ALLERGIES:  Codeine    No past medical history on file.    No past surgical history on file.     No family history on file.    Social History     Tobacco Use   • Smoking status: Not on file   Substance Use Topics   • Alcohol use: Not on file       REVIEW OF SYSTEMS:    All other systems are reviewed and are negative except as documented in the HPI (History of Present Illness)    PHYSICAL EXAM    There were no vitals taken for this visit.    Constitutional: Well developed, well nourished individual in no acute distress.    Skin: Warm, dry, intact without rash or lesion.    COR:  RRR    Lungs:  No labored respirations    Neurologic: Alert & oriented x 3, Normal gait.    Psychiatric: Speech and behavior appropriate.    Musculoskeletal:  Examination of right upper extremity reveals no gross deformity or swelling.  She is tender to palpation at the lateral epicondyle of the right elbow.  No tenderness to the medial epicondyle or olecranon process of the right elbow.  She is able make a full composite fist and flexing the fingers of her right hand.  She has full range of motion at the right wrist.  She has full range of motion at the forearm, but pain with terminal pronation and supination.  She has pain with terminal flexion and extension at the elbow.   She has pain with her arm fully extended and forced flexion at the wrist.  She has pain with resisted pronation at the forearm.  Motor and sensory function intact right hand.    IMAGING  No results found.    ASSESSMENT/PLAN:  M77.11 Lateral epicondylitis of right elbow  (primary encounter diagnosis)     I reviewed with Mandi the natural history and etiology of lateral epicondylitis..  We discussed that the symptoms often resolve on their own, but it may take months.  No other therapy often helps accelerate this process.  Reviewed that a cortisone injection may be of some benefit.  The risks and benefits of the cortisone injection were reviewed.  She would like to proceed with the injection today.     Under Sterile conditions I injected 1.0 mLs of 1% lidocaine plain buffered and 0.5 mLs of Depo-Medrol at a concentration of 40mg/mL into her right extensor tendon origin, there were no complications. A sterile Band-aid was applied after the injection. I also supplied the patient with an information sheet regarding the corticosteroid injection which details risks, typical post injection symptoms, and the typical timeframe until improvement of the symptoms can be expected.     Follow-up with us as needed if symptoms fail to improve in the future.    She was in agreement of the plan.      M Inj/Asp: R elbow on 1/22/2020 9:32 AM  Indications: pain  Details: 25 G needle, lateral approach  Medications: 20 mg methylPREDNISolone DEPOT 40 MG/ML; 1 mL lidocaine 1 %  Outcome: tolerated well, no immediate complications  Consent was given by the patient. Immediately prior to procedure a time out was called to verify the correct patient, procedure, equipment, support staff and site/side marked as required. Patient was prepped and draped in the usual sterile fashion.       Patient was seen and examined with physician assistant.  I agree with the assessment of: Right elbow lateral epicondylitis  We discussed the etiology natural  history of this particular problem.  Spontaneous resolution is the natural history.  Unfortunately she remains symptomatic for the last 6 months or so.  She has tried therapy.  She is very frustrated with her pain.  We discussed the above-noted corticosteroid injection which I provided.  Risks and benefits were detailed.  She will follow-up with us as needed going forward.  She was in agreement with this plan.

## 2022-04-30 NOTE — ED QUICK NOTES
Orders for admission, patient is aware of plan and ready to go upstairs. Any questions, please call ED RN Rachel Kraft at extension [phone]. Patient Covid vaccination status: Unvaccinated     COVID Test Ordered in ED: Rapid SARS-CoV-2 by PCR    COVID Suspicion at Admission: Low clinical suspicion for COVID    Running Infusions:  None    Mental Status/LOC at time of transport: A&Ox4    Other pertinent information: From [facility] in [facility]; bilateral heel wounds and right dorsal foot wound. [place].  O2 for comfort    CIWA score: N/A   NIH score:  N/A

## 2022-04-30 NOTE — PROGRESS NOTES
[facility] Dosing Service    Initial Pharmacokinetic Consult for Vancomycin Dosing     Grzegorz Grider is a 80year old patient who is being treated for osteomyelitis. Pharmacy has been asked to dose Vancomycin by Dr Jalyn Donnelly. Weights:  Ideal body weight: 63.8 kg (140 lb 10.5 oz)  Adjusted ideal body weight: 67.3 kg (148 lb 6.3 oz)  Actual weight:  72.6 kg (160 lb)    Dialysis Details:  Patient dialysis schedule is Tues, Thurs, Saturday  Last dialysis was 4/28    Based on the above:    1. This patient has received a loading dose of Vancomycin  1750 mg IVPB (25mg/kg, capped at 2000 mg) x 1 dose. This will be followed by 750 mg given after each dialysis session. 2.  Pharmacy will order a vancomycin random level prior to the 3rd dialysis session. Goal pre-dialysis level is 15-20 mcg/mL which is likely to achieve the goal AUC24 of 400 to 600 mg-h/L.    3. Pharmacy will follow and monitor renal function, toxicity and efficacy. We appreciate the opportunity to assist in the care of this patient.     Mary Mcnulty PharmD  4/30/2022  5:39 PM  [address] Extension: [phone]

## 2022-05-01 LAB
ALBUMIN SERPL-MCNC: 2.4 G/DL (ref 3.4–5)
ALBUMIN/GLOB SERPL: 0.6 {RATIO} (ref 1–2)
ALP LIVER SERPL-CCNC: 167 U/L
ALT SERPL-CCNC: 16 U/L
ANION GAP SERPL CALC-SCNC: 9 MMOL/L (ref 0–18)
AST SERPL-CCNC: 17 U/L (ref 15–37)
BASOPHILS # BLD AUTO: 0.02 X10(3) UL (ref 0–0.2)
BASOPHILS NFR BLD AUTO: 0.2 %
BILIRUB SERPL-MCNC: 0.6 MG/DL (ref 0.1–2)
BUN BLD-MCNC: 65 MG/DL (ref 7–18)
BUN/CREAT SERPL: 9.4 (ref 10–20)
CALCIUM BLD-MCNC: 8.4 MG/DL (ref 8.5–10.1)
CHLORIDE SERPL-SCNC: 96 MMOL/L (ref 98–112)
CO2 SERPL-SCNC: 30 MMOL/L (ref 21–32)
CREAT BLD-MCNC: 6.95 MG/DL
DEPRECATED RDW RBC AUTO: 73 FL (ref 35.1–46.3)
EOSINOPHIL # BLD AUTO: 0.03 X10(3) UL (ref 0–0.7)
EOSINOPHIL NFR BLD AUTO: 0.3 %
ERYTHROCYTE [DISTWIDTH] IN BLOOD BY AUTOMATED COUNT: 18.6 % (ref 11–15)
GLOBULIN PLAS-MCNC: 4.2 G/DL (ref 2.8–4.4)
GLUCOSE BLD-MCNC: 115 MG/DL (ref 70–99)
GLUCOSE BLDC GLUCOMTR-MCNC: 104 MG/DL (ref 70–99)
GLUCOSE BLDC GLUCOMTR-MCNC: 131 MG/DL (ref 70–99)
GLUCOSE BLDC GLUCOMTR-MCNC: 134 MG/DL (ref 70–99)
HCT VFR BLD AUTO: 34.6 %
HGB BLD-MCNC: 11 G/DL
IMM GRANULOCYTES # BLD AUTO: 0.04 X10(3) UL (ref 0–1)
IMM GRANULOCYTES NFR BLD: 0.4 %
INR BLD: 4.02 (ref 0.8–1.2)
LYMPHOCYTES # BLD AUTO: 0.79 X10(3) UL (ref 1–4)
LYMPHOCYTES NFR BLD AUTO: 8.2 %
MCH RBC QN AUTO: 34.6 PG (ref 26–34)
MCHC RBC AUTO-ENTMCNC: 31.8 G/DL (ref 31–37)
MCV RBC AUTO: 108.8 FL
MONOCYTES # BLD AUTO: 0.79 X10(3) UL (ref 0.1–1)
MONOCYTES NFR BLD AUTO: 8.2 %
NEUTROPHILS # BLD AUTO: 8.01 X10 (3) UL (ref 1.5–7.7)
NEUTROPHILS # BLD AUTO: 8.01 X10(3) UL (ref 1.5–7.7)
NEUTROPHILS NFR BLD AUTO: 82.7 %
OSMOLALITY SERPL CALC.SUM OF ELEC: 300 MOSM/KG (ref 275–295)
PLATELET # BLD AUTO: 166 10(3)UL (ref 150–450)
POTASSIUM SERPL-SCNC: 6.4 MMOL/L (ref 3.5–5.1)
PROT SERPL-MCNC: 6.6 G/DL (ref 6.4–8.2)
PROTHROMBIN TIME: 39.8 SECONDS (ref 11.6–14.8)
RBC # BLD AUTO: 3.18 X10(6)UL
SODIUM SERPL-SCNC: 135 MMOL/L (ref 136–145)
WBC # BLD AUTO: 9.7 X10(3) UL (ref 4–11)

## 2022-05-01 PROCEDURE — 99223 1ST HOSP IP/OBS HIGH 75: CPT | Performed by: INTERNAL MEDICINE

## 2022-05-01 PROCEDURE — 99232 SBSQ HOSP IP/OBS MODERATE 35: CPT | Performed by: INTERNAL MEDICINE

## 2022-05-01 NOTE — PLAN OF CARE
Patient is alert and oriented, on 3 L nasal cannula, to wean as tolerated. Day shift nurse notified. Pain being managed with PRN morphine. Dressing on both feet are clean, dry, and intact. No acute changes throughout shift. Plan for dialysis today. Call light within reach, using appropriately.      Problem: Patient Centered Care  Goal: Patient preferences are identified and integrated in the patient's plan of care  Description: Interventions:  - What would you like us to know as we care for you?   - Provide timely, complete, and accurate information to patient/family  - Incorporate patient and family knowledge, values, beliefs, and cultural backgrounds into the planning and delivery of care  - Encourage patient/family to participate in care and decision-making at the level they choose  - Honor patient and family perspectives and choices  5/1/2022 0615 by Garcia Quiroz RN  Outcome: Progressing  5/1/2022 0615 by Garcia Quiroz RN  Outcome: Progressing     Problem: Diabetes/Glucose Control  Goal: Glucose maintained within prescribed range  Description: INTERVENTIONS:  - Monitor Blood Glucose as ordered  - Assess for signs and symptoms of hyperglycemia and hypoglycemia  - Administer ordered medications to maintain glucose within target range  - Assess barriers to adequate nutritional intake and initiate nutrition consult as needed  - Instruct patient on self management of diabetes  5/1/2022 0615 by Garcia Quiroz RN  Outcome: Progressing  5/1/2022 0615 by Garcia Quiroz RN  Outcome: Progressing       Problem: PAIN - ADULT  Goal: Verbalizes/displays adequate comfort level or patient's stated pain goal  Description: INTERVENTIONS:  - Encourage pt to monitor pain and request assistance  - Assess pain using appropriate pain scale  - Administer analgesics based on type and severity of pain and evaluate response  - Implement non-pharmacological measures as appropriate and evaluate response  - Consider cultural and social influences on pain and pain management  - Manage/alleviate anxiety  - Utilize distraction and/or relaxation techniques  - Monitor for opioid side effects  - Notify MD/LIP if interventions unsuccessful or patient reports new pain  - Anticipate increased pain with activity and pre-medicate as appropriate  Outcome: Progressing

## 2022-05-01 NOTE — PHYSICAL THERAPY NOTE
Order received, chart review completed. Per xray, likely + osteomyelitis of L heel but awaiting MRI to confirm. Possible need for debridement per Dr. Daiana Garcia. RN updated orders to include NWB L LE per podiatry. Pt received in bed with wife, son and daughter in law at bedside. Pt [place]. He was previously seen by therapy during earlier admissions. He was ambulatory short distances at the end of March 2022 and per chart was independently ambulatory back in January of 2022. Pt has not been able to tolerate ambulation due to pain in B heels x 2 months. He has been at rehab facility working on upper body strengthening but prior to this was living at home with wife. Provided education to patient and family regarding weightbearing restriction, importance of offloading heels and encouragement to utilize [facility] lift to sit in recliner with LEs elevated to reduce risk for deconditioning and further skin breakdown. Pt with limited receptivity to information but family verbalized understanding. Will check back tomorrow 5/1 as pt is not agreeable to attempting any mobility today due to pain. Discussed with RN, encouraged use of [place] lift for out of bed mobility if pt is able to tolerate.

## 2022-05-01 NOTE — PLAN OF CARE
Problem: Patient Centered Care  Goal: Patient preferences are identified and integrated in the patient's plan of care  Description: Interventions:  - What would you like us to know as we care for you?   - Provide timely, complete, and accurate information to patient/family  - Incorporate patient and family knowledge, values, beliefs, and cultural backgrounds into the planning and delivery of care  - Encourage patient/family to participate in care and decision-making at the level they choose  - Honor patient and family perspectives and choices  Outcome: Progressing     Problem: Diabetes/Glucose Control  Goal: Glucose maintained within prescribed range  Description: INTERVENTIONS:  - Monitor Blood Glucose as ordered  - Assess for signs and symptoms of hyperglycemia and hypoglycemia  - Administer ordered medications to maintain glucose within target range  - Assess barriers to adequate nutritional intake and initiate nutrition consult as needed  - Instruct patient on self management of diabetes  Outcome: Progressing     Problem: Patient/Family Goals  Goal: Patient/Family Long Term Goal  Description: Patient's Long Term Goal: to return home    Interventions:  - wound care  -imaging  -podiatry consult  -MRI  -pain meds  - See additional Care Plan goals for specific interventions  Outcome: Progressing  Goal: Patient/Family Short Term Goal  Description: Patient's Short Term Goal: to feel better    Interventions:   - follow MD recommendations  - See additional Care Plan goals for specific interventions  Outcome: Progressing     Problem: PAIN - ADULT  Goal: Verbalizes/displays adequate comfort level or patient's stated pain goal  Description: INTERVENTIONS:  - Encourage pt to monitor pain and request assistance  - Assess pain using appropriate pain scale  - Administer analgesics based on type and severity of pain and evaluate response  - Implement non-pharmacological measures as appropriate and evaluate response  - Consider cultural and social influences on pain and pain management  - Manage/alleviate anxiety  - Utilize distraction and/or relaxation techniques  - Monitor for opioid side effects  - Notify MD/LIP if interventions unsuccessful or patient reports new pain  - Anticipate increased pain with activity and pre-medicate as appropriate  Outcome: Progressing   PT had dialysis, 2.4 liters removed. Dialysis RN educated patient on importance of not missing dialysis sessions, and this was re-enforced by this RN at bedside.

## 2022-05-02 ENCOUNTER — TELEPHONE (OUTPATIENT)
Dept: INTERNAL MEDICINE CLINIC | Facility: CLINIC | Age: 82
End: 2022-05-02

## 2022-05-02 LAB
ALBUMIN SERPL-MCNC: 2.2 G/DL (ref 3.4–5)
ANION GAP SERPL CALC-SCNC: 6 MMOL/L (ref 0–18)
BASOPHILS # BLD AUTO: 0.03 X10(3) UL (ref 0–0.2)
BASOPHILS NFR BLD AUTO: 0.4 %
BUN BLD-MCNC: 47 MG/DL (ref 7–18)
BUN/CREAT SERPL: 8.7 (ref 10–20)
CALCIUM BLD-MCNC: 8.1 MG/DL (ref 8.5–10.1)
CHLORIDE SERPL-SCNC: 98 MMOL/L (ref 98–112)
CO2 SERPL-SCNC: 32 MMOL/L (ref 21–32)
CREAT BLD-MCNC: 5.43 MG/DL
DEPRECATED RDW RBC AUTO: 73.3 FL (ref 35.1–46.3)
EOSINOPHIL # BLD AUTO: 0.07 X10(3) UL (ref 0–0.7)
EOSINOPHIL NFR BLD AUTO: 0.8 %
ERYTHROCYTE [DISTWIDTH] IN BLOOD BY AUTOMATED COUNT: 18.3 % (ref 11–15)
GLUCOSE BLD-MCNC: 134 MG/DL (ref 70–99)
GLUCOSE BLDC GLUCOMTR-MCNC: 117 MG/DL (ref 70–99)
GLUCOSE BLDC GLUCOMTR-MCNC: 119 MG/DL (ref 70–99)
GLUCOSE BLDC GLUCOMTR-MCNC: 135 MG/DL (ref 70–99)
GLUCOSE BLDC GLUCOMTR-MCNC: 148 MG/DL (ref 70–99)
GLUCOSE BLDC GLUCOMTR-MCNC: 160 MG/DL (ref 70–99)
GLUCOSE BLDC GLUCOMTR-MCNC: 91 MG/DL (ref 70–99)
HCT VFR BLD AUTO: 33.1 %
HGB BLD-MCNC: 10.3 G/DL
IMM GRANULOCYTES # BLD AUTO: 0.03 X10(3) UL (ref 0–1)
IMM GRANULOCYTES NFR BLD: 0.4 %
INR BLD: 2.83 (ref 0.8–1.2)
LYMPHOCYTES # BLD AUTO: 0.78 X10(3) UL (ref 1–4)
LYMPHOCYTES NFR BLD AUTO: 9.3 %
MCH RBC QN AUTO: 33.8 PG (ref 26–34)
MCHC RBC AUTO-ENTMCNC: 31.1 G/DL (ref 31–37)
MCV RBC AUTO: 108.5 FL
MONOCYTES # BLD AUTO: 0.81 X10(3) UL (ref 0.1–1)
MONOCYTES NFR BLD AUTO: 9.7 %
NEUTROPHILS # BLD AUTO: 6.67 X10 (3) UL (ref 1.5–7.7)
NEUTROPHILS # BLD AUTO: 6.67 X10(3) UL (ref 1.5–7.7)
NEUTROPHILS NFR BLD AUTO: 79.4 %
OSMOLALITY SERPL CALC.SUM OF ELEC: 296 MOSM/KG (ref 275–295)
PHOSPHATE SERPL-MCNC: 3.8 MG/DL (ref 2.5–4.9)
PLATELET # BLD AUTO: 159 10(3)UL (ref 150–450)
POTASSIUM SERPL-SCNC: 5.1 MMOL/L (ref 3.5–5.1)
PROTHROMBIN TIME: 30.2 SECONDS (ref 11.6–14.8)
RBC # BLD AUTO: 3.05 X10(6)UL
SODIUM SERPL-SCNC: 136 MMOL/L (ref 136–145)
WBC # BLD AUTO: 8.4 X10(3) UL (ref 4–11)

## 2022-05-02 PROCEDURE — 99232 SBSQ HOSP IP/OBS MODERATE 35: CPT | Performed by: INTERNAL MEDICINE

## 2022-05-02 PROCEDURE — 99233 SBSQ HOSP IP/OBS HIGH 50: CPT | Performed by: INTERNAL MEDICINE

## 2022-05-02 RX ORDER — ALBUMIN (HUMAN) 12.5 G/50ML
100 SOLUTION INTRAVENOUS AS NEEDED
Status: DISCONTINUED | OUTPATIENT
Start: 2022-05-02 | End: 2022-05-13

## 2022-05-02 RX ORDER — BUMETANIDE 1 MG/1
1 TABLET ORAL
Status: DISCONTINUED | OUTPATIENT
Start: 2022-05-03 | End: 2022-05-10

## 2022-05-02 RX ORDER — BUMETANIDE 1 MG/1
TABLET ORAL
Qty: 45 TABLET | Refills: 3 | Status: SHIPPED | OUTPATIENT
Start: 2022-05-02

## 2022-05-02 RX ORDER — HEPARIN SODIUM 1000 [USP'U]/ML
1.5 INJECTION, SOLUTION INTRAVENOUS; SUBCUTANEOUS ONCE
Status: DISCONTINUED | OUTPATIENT
Start: 2022-05-02 | End: 2022-05-06

## 2022-05-02 NOTE — CM/SW NOTE
Department  notified of request for bipin LUNA referrals started. Assigned CM/SW to follow up with pt/family on further discharge planning.      Mundo Ramos  [facility]

## 2022-05-02 NOTE — PLAN OF CARE
Problem: Patient Centered Care  Goal: Patient preferences are identified and integrated in the patient's plan of care  Description: Interventions:  - What would you like us to know as we care for you?  I want pain relief  - Provide timely, complete, and accurate information to patient/family  - Incorporate patient and family knowledge, values, beliefs, and cultural backgrounds into the planning and delivery of care  - Encourage patient/family to participate in care and decision-making at the level they choose  - Honor patient and family perspectives and choices  Outcome: Progressing     Problem: Diabetes/Glucose Control  Goal: Glucose maintained within prescribed range  Description: INTERVENTIONS:  - Monitor Blood Glucose as ordered  - Assess for signs and symptoms of hyperglycemia and hypoglycemia  - Administer ordered medications to maintain glucose within target range  - Assess barriers to adequate nutritional intake and initiate nutrition consult as needed  - Instruct patient on self management of diabetes  Outcome: Progressing     Problem: Patient/Family Goals  Goal: Patient/Family Long Term Goal  Description: Patient's Long Term Goal: to go home healthy    Interventions:  - Follow MD orders, frequently assess site, pain meds as needed  - See additional Care Plan goals for specific interventions  Outcome: Progressing  Goal: Patient/Family Short Term Goal  Description: Patient's Short Term Goal: Pain relief    Interventions:   - PRN morphine, frequent pain assessments  - See additional Care Plan goals for specific interventions  Outcome: Progressing     Problem: PAIN - ADULT  Goal: Verbalizes/displays adequate comfort level or patient's stated pain goal  Description: INTERVENTIONS:  - Encourage pt to monitor pain and request assistance  - Assess pain using appropriate pain scale  - Administer analgesics based on type and severity of pain and evaluate response  - Implement non-pharmacological measures as appropriate and evaluate response  - Consider cultural and social influences on pain and pain management  - Manage/alleviate anxiety  - Utilize distraction and/or relaxation techniques  - Monitor for opioid side effects  - Notify MD/LIP if interventions unsuccessful or patient reports new pain  - Anticipate increased pain with activity and pre-medicate as appropriate  Outcome: Progressing

## 2022-05-02 NOTE — TELEPHONE ENCOUNTER
[address] faxed over a Clarification for    Bumetanide (Bumex)    2nd request.  Md was to call back with Quantity clarification.     Please call pharmacy at [phone] and    Fax [phone]

## 2022-05-02 NOTE — PLAN OF CARE
Patient is alert and oriented x4, on 1.5 L nasal cannula. Pain being managed with PRN morphine. Heels elevated with pillows and wedge. No acute changes. Vitals within normal limits. Patient in lowest position, bed alarm on, call light within reach, using appropriately.      Problem: Patient Centered Care  Goal: Patient preferences are identified and integrated in the patient's plan of care  Description: Interventions:  - What would you like us to know as we care for you?   - Provide timely, complete, and accurate information to patient/family  - Incorporate patient and family knowledge, values, beliefs, and cultural backgrounds into the planning and delivery of care  - Encourage patient/family to participate in care and decision-making at the level they choose  - Honor patient and family perspectives and choices  Outcome: Progressing     Problem: Diabetes/Glucose Control  Goal: Glucose maintained within prescribed range  Description: INTERVENTIONS:  - Monitor Blood Glucose as ordered  - Assess for signs and symptoms of hyperglycemia and hypoglycemia  - Administer ordered medications to maintain glucose within target range  - Assess barriers to adequate nutritional intake and initiate nutrition consult as needed  - Instruct patient on self management of diabetes  Outcome: Progressing    Problem: PAIN - ADULT  Goal: Verbalizes/displays adequate comfort level or patient's stated pain goal  Description: INTERVENTIONS:  - Encourage pt to monitor pain and request assistance  - Assess pain using appropriate pain scale  - Administer analgesics based on type and severity of pain and evaluate response  - Implement non-pharmacological measures as appropriate and evaluate response  - Consider cultural and social influences on pain and pain management  - Manage/alleviate anxiety  - Utilize distraction and/or relaxation techniques  - Monitor for opioid side effects  - Notify MD/LIP if interventions unsuccessful or patient reports new pain  - Anticipate increased pain with activity and pre-medicate as appropriate  Outcome: Progressing

## 2022-05-02 NOTE — PROGRESS NOTES
[address] [facility] cardiology who received a form regarding the MRI this morning. Will fill out form and send it back to us regarding MRI compatibility.

## 2022-05-02 NOTE — PROGRESS NOTES
Called [facility]  support regarding ICD and spoke to representative Hermila. [facility] has record of ICD and 2 leads, the ICD usually has 3 leads. MRI would be conditional if anything other than atrial port plus was used. Dr. Lj Santana was cardiologist, will reach out to see if atrial port plug was used because if so patient can receive MRI.

## 2022-05-03 ENCOUNTER — APPOINTMENT (OUTPATIENT)
Dept: ULTRASOUND IMAGING | Facility: HOSPITAL | Age: 82
End: 2022-05-03
Attending: INTERNAL MEDICINE
Payer: MEDICARE

## 2022-05-03 ENCOUNTER — APPOINTMENT (OUTPATIENT)
Dept: MRI IMAGING | Facility: HOSPITAL | Age: 82
End: 2022-05-03
Attending: EMERGENCY MEDICINE
Payer: MEDICARE

## 2022-05-03 LAB
ALBUMIN SERPL-MCNC: 2.5 G/DL (ref 3.4–5)
ANION GAP SERPL CALC-SCNC: 8 MMOL/L (ref 0–18)
BASOPHILS # BLD AUTO: 0.03 X10(3) UL (ref 0–0.2)
BASOPHILS NFR BLD AUTO: 0.3 %
BUN BLD-MCNC: 59 MG/DL (ref 7–18)
BUN/CREAT SERPL: 9.2 (ref 10–20)
CALCIUM BLD-MCNC: 8.6 MG/DL (ref 8.5–10.1)
CHLORIDE SERPL-SCNC: 96 MMOL/L (ref 98–112)
CO2 SERPL-SCNC: 30 MMOL/L (ref 21–32)
CREAT BLD-MCNC: 6.39 MG/DL
DEPRECATED RDW RBC AUTO: 73.3 FL (ref 35.1–46.3)
EOSINOPHIL # BLD AUTO: 0.01 X10(3) UL (ref 0–0.7)
EOSINOPHIL NFR BLD AUTO: 0.1 %
ERYTHROCYTE [DISTWIDTH] IN BLOOD BY AUTOMATED COUNT: 18.1 % (ref 11–15)
GLUCOSE BLD-MCNC: 138 MG/DL (ref 70–99)
GLUCOSE BLDC GLUCOMTR-MCNC: 114 MG/DL (ref 70–99)
GLUCOSE BLDC GLUCOMTR-MCNC: 131 MG/DL (ref 70–99)
GLUCOSE BLDC GLUCOMTR-MCNC: 76 MG/DL (ref 70–99)
HBV CORE AB SERPL QL IA: NONREACTIVE
HBV SURFACE AB SER QL: NONREACTIVE
HBV SURFACE AB SERPL IA-ACNC: <3.1 MIU/ML
HBV SURFACE AG SER-ACNC: <0.1 [IU]/L
HBV SURFACE AG SERPL QL IA: NONREACTIVE
HCT VFR BLD AUTO: 36 %
HGB BLD-MCNC: 11.1 G/DL
IMM GRANULOCYTES # BLD AUTO: 0.04 X10(3) UL (ref 0–1)
IMM GRANULOCYTES NFR BLD: 0.4 %
INR BLD: 2.12 (ref 0.8–1.2)
LYMPHOCYTES # BLD AUTO: 0.59 X10(3) UL (ref 1–4)
LYMPHOCYTES NFR BLD AUTO: 6.5 %
MCH RBC QN AUTO: 33.4 PG (ref 26–34)
MCHC RBC AUTO-ENTMCNC: 30.8 G/DL (ref 31–37)
MCV RBC AUTO: 108.4 FL
MONOCYTES # BLD AUTO: 0.6 X10(3) UL (ref 0.1–1)
MONOCYTES NFR BLD AUTO: 6.6 %
NEUTROPHILS # BLD AUTO: 7.84 X10 (3) UL (ref 1.5–7.7)
NEUTROPHILS # BLD AUTO: 7.84 X10(3) UL (ref 1.5–7.7)
NEUTROPHILS NFR BLD AUTO: 86.1 %
OSMOLALITY SERPL CALC.SUM OF ELEC: 297 MOSM/KG (ref 275–295)
PHOSPHATE SERPL-MCNC: 4.4 MG/DL (ref 2.5–4.9)
PLATELET # BLD AUTO: 189 10(3)UL (ref 150–450)
POTASSIUM SERPL-SCNC: 5.9 MMOL/L (ref 3.5–5.1)
PROTHROMBIN TIME: 24 SECONDS (ref 11.6–14.8)
RBC # BLD AUTO: 3.32 X10(6)UL
SODIUM SERPL-SCNC: 134 MMOL/L (ref 136–145)
VANCOMYCIN SERPL-MCNC: 18.5 UG/ML (ref ?–40)
WBC # BLD AUTO: 9.1 X10(3) UL (ref 4–11)

## 2022-05-03 PROCEDURE — 99232 SBSQ HOSP IP/OBS MODERATE 35: CPT | Performed by: INTERNAL MEDICINE

## 2022-05-03 PROCEDURE — 93922 UPR/L XTREMITY ART 2 LEVELS: CPT | Performed by: INTERNAL MEDICINE

## 2022-05-03 PROCEDURE — 90935 HEMODIALYSIS ONE EVALUATION: CPT | Performed by: INTERNAL MEDICINE

## 2022-05-03 PROCEDURE — 5A1D70Z PERFORMANCE OF URINARY FILTRATION, INTERMITTENT, LESS THAN 6 HOURS PER DAY: ICD-10-PCS | Performed by: INTERNAL MEDICINE

## 2022-05-03 PROCEDURE — 93925 LOWER EXTREMITY STUDY: CPT | Performed by: INTERNAL MEDICINE

## 2022-05-03 PROCEDURE — 73718 MRI LOWER EXTREMITY W/O DYE: CPT | Performed by: EMERGENCY MEDICINE

## 2022-05-03 RX ORDER — LORAZEPAM 0.5 MG/1
0.5 TABLET ORAL ONCE
Status: COMPLETED | OUTPATIENT
Start: 2022-05-03 | End: 2022-05-03

## 2022-05-03 NOTE — PLAN OF CARE
Problem: Diabetes/Glucose Control  Goal: Glucose maintained within prescribed range  Description: INTERVENTIONS:  - Monitor Blood Glucose as ordered  - Assess for signs and symptoms of hyperglycemia and hypoglycemia  - Administer ordered medications to maintain glucose within target range  - Assess barriers to adequate nutritional intake and initiate nutrition consult as needed  - Instruct patient on self management of diabetes  Outcome: Progressing     Problem: PAIN - ADULT  Goal: Verbalizes/displays adequate comfort level or patient's stated pain goal  Description: INTERVENTIONS:  - Encourage pt to monitor pain and request assistance  - Assess pain using appropriate pain scale  - Administer analgesics based on type and severity of pain and evaluate response  - Implement non-pharmacological measures as appropriate and evaluate response  - Consider cultural and social influences on pain and pain management  - Manage/alleviate anxiety  - Utilize distraction and/or relaxation techniques  - Monitor for opioid side effects  - Notify MD/LIP if interventions unsuccessful or patient reports new pain  - Anticipate increased pain with activity and pre-medicate as appropriate  Outcome: Progressing     Patient alert, vitals stable, medications tolerated well, prn nausea medication given, no c/o pain. Patient assisted with needs to stay clean, dry, and comfortable, blood sugar monitored. Dialysis ordered for AM. Call light within reach.

## 2022-05-03 NOTE — IMAGING NOTE
Patient to MRI for exam with known pacemaker/ICD. [employer] representative here to program device into MRI safe mode. Heart rate set to 70 for duration of exam    1500: HR 70 BP 90/51 92% ON 1L O2  1510: HR 65 /53 91% ON 1L O2  1520: HR 70 BP 98/53 93% ON 1L O2  1530: HR 80 BP 96/50 94% ON 1L O2  1540: HR 69 BP 94/48 92% ON 1L O2    1344: MRI COMPLETE. [facility] representative here at bedside to return patient to home settings.     HR 70 BP 94/41 91% ON 1L O2

## 2022-05-04 PROBLEM — I73.9 PVD (PERIPHERAL VASCULAR DISEASE) (HCC): Status: ACTIVE | Noted: 2022-01-01

## 2022-05-04 PROBLEM — I73.9 PVD (PERIPHERAL VASCULAR DISEASE) (HCC): Status: ACTIVE | Noted: 2022-05-04

## 2022-05-04 LAB
ALBUMIN SERPL-MCNC: 2.3 G/DL (ref 3.4–5)
ANION GAP SERPL CALC-SCNC: 7 MMOL/L (ref 0–18)
BASOPHILS # BLD AUTO: 0.03 X10(3) UL (ref 0–0.2)
BASOPHILS NFR BLD AUTO: 0.3 %
BUN BLD-MCNC: 45 MG/DL (ref 7–18)
BUN/CREAT SERPL: 8.8 (ref 10–20)
CALCIUM BLD-MCNC: 8.1 MG/DL (ref 8.5–10.1)
CHLORIDE SERPL-SCNC: 98 MMOL/L (ref 98–112)
CO2 SERPL-SCNC: 28 MMOL/L (ref 21–32)
CREAT BLD-MCNC: 5.09 MG/DL
DEPRECATED RDW RBC AUTO: 71.5 FL (ref 35.1–46.3)
EOSINOPHIL # BLD AUTO: 0.03 X10(3) UL (ref 0–0.7)
EOSINOPHIL NFR BLD AUTO: 0.3 %
ERYTHROCYTE [DISTWIDTH] IN BLOOD BY AUTOMATED COUNT: 18 % (ref 11–15)
GLUCOSE BLD-MCNC: 128 MG/DL (ref 70–99)
GLUCOSE BLDC GLUCOMTR-MCNC: 130 MG/DL (ref 70–99)
GLUCOSE BLDC GLUCOMTR-MCNC: 132 MG/DL (ref 70–99)
GLUCOSE BLDC GLUCOMTR-MCNC: 136 MG/DL (ref 70–99)
GLUCOSE BLDC GLUCOMTR-MCNC: 200 MG/DL (ref 70–99)
HCT VFR BLD AUTO: 34.4 %
HGB BLD-MCNC: 10.7 G/DL
IMM GRANULOCYTES # BLD AUTO: 0.06 X10(3) UL (ref 0–1)
IMM GRANULOCYTES NFR BLD: 0.6 %
INR BLD: 2.09 (ref 0.8–1.2)
LYMPHOCYTES # BLD AUTO: 0.75 X10(3) UL (ref 1–4)
LYMPHOCYTES NFR BLD AUTO: 7.4 %
MAGNESIUM SERPL-MCNC: 2.5 MG/DL (ref 1.6–2.6)
MCH RBC QN AUTO: 33.9 PG (ref 26–34)
MCHC RBC AUTO-ENTMCNC: 31.1 G/DL (ref 31–37)
MCV RBC AUTO: 108.9 FL
MONOCYTES # BLD AUTO: 1.04 X10(3) UL (ref 0.1–1)
MONOCYTES NFR BLD AUTO: 10.3 %
NEUTROPHILS # BLD AUTO: 8.18 X10 (3) UL (ref 1.5–7.7)
NEUTROPHILS # BLD AUTO: 8.18 X10(3) UL (ref 1.5–7.7)
NEUTROPHILS NFR BLD AUTO: 81.1 %
OSMOLALITY SERPL CALC.SUM OF ELEC: 289 MOSM/KG (ref 275–295)
PHOSPHATE SERPL-MCNC: 3.6 MG/DL (ref 2.5–4.9)
PLATELET # BLD AUTO: 189 10(3)UL (ref 150–450)
POTASSIUM SERPL-SCNC: 5.3 MMOL/L (ref 3.5–5.1)
PROTHROMBIN TIME: 23.8 SECONDS (ref 11.6–14.8)
RBC # BLD AUTO: 3.16 X10(6)UL
SODIUM SERPL-SCNC: 133 MMOL/L (ref 136–145)
WBC # BLD AUTO: 10.1 X10(3) UL (ref 4–11)

## 2022-05-04 PROCEDURE — 99233 SBSQ HOSP IP/OBS HIGH 50: CPT | Performed by: INTERNAL MEDICINE

## 2022-05-04 PROCEDURE — 99232 SBSQ HOSP IP/OBS MODERATE 35: CPT | Performed by: INTERNAL MEDICINE

## 2022-05-04 RX ORDER — HEPARIN SODIUM 1000 [USP'U]/ML
1.5 INJECTION, SOLUTION INTRAVENOUS; SUBCUTANEOUS ONCE
Status: DISCONTINUED | OUTPATIENT
Start: 2022-05-04 | End: 2022-05-13

## 2022-05-04 RX ORDER — ALBUMIN (HUMAN) 12.5 G/50ML
100 SOLUTION INTRAVENOUS AS NEEDED
Status: DISCONTINUED | OUTPATIENT
Start: 2022-05-04 | End: 2022-05-13

## 2022-05-04 NOTE — PLAN OF CARE
No acute changes. Fall precautions in place, bed alarm on. Plan for angiogram tomorrow, patient and family aware of plan. Dressings to bilateral heels changed. Pain managed with morphine as ordered/ Pt resting comfortably, call light within reach pt calls appropriately. Problem: Patient Centered Care  Goal: Patient preferences are identified and integrated in the patient's plan of care  Description: Interventions:  - What would you like us to know as we care for you?  From [facility] [address]  - Provide timely, complete, and accurate information to patient/family  - Incorporate patient and family knowledge, values, beliefs, and cultural backgrounds into the planning and delivery of care  - Encourage patient/family to participate in care and decision-making at the level they choose  - Honor patient and family perspectives and choices  Outcome: Progressing     Problem: Diabetes/Glucose Control  Goal: Glucose maintained within prescribed range  Description: INTERVENTIONS:  - Monitor Blood Glucose as ordered  - Assess for signs and symptoms of hyperglycemia and hypoglycemia  - Administer ordered medications to maintain glucose within target range  - Assess barriers to adequate nutritional intake and initiate nutrition consult as needed  - Instruct patient on self management of diabetes  Outcome: Progressing     Problem: Patient/Family Goals  Goal: Patient/Family Long Term Goal  Description: Patient's Long Term Goal: be discharged    Interventions:  -monitor VS  -monitor appropriate labs  -update patient and family on plan of care  -follow MD orders     - See additional Care Plan goals for specific interventions  Outcome: Progressing     Problem: PAIN - ADULT  Goal: Verbalizes/displays adequate comfort level or patient's stated pain goal  Description: INTERVENTIONS:  - Encourage pt to monitor pain and request assistance  - Assess pain using appropriate pain scale  - Administer analgesics based on type and severity of pain and evaluate response  - Implement non-pharmacological measures as appropriate and evaluate response  - Consider cultural and social influences on pain and pain management  - Manage/alleviate anxiety  - Utilize distraction and/or relaxation techniques  - Monitor for opioid side effects  - Notify MD/LIP if interventions unsuccessful or patient reports new pain  - Anticipate increased pain with activity and pre-medicate as appropriate  Outcome: Progressing     Problem: GASTROINTESTINAL - ADULT  Goal: Minimal or absence of nausea and vomiting  Description: INTERVENTIONS:  - Maintain adequate hydration with IV or PO as ordered and tolerated  - Nasogastric tube to low intermittent suction as ordered  - Evaluate effectiveness of ordered antiemetic medications  - Provide nonpharmacologic comfort measures as appropriate  - Advance diet as tolerated, if ordered  - Obtain nutritional consult as needed  - Evaluate fluid balance  Outcome: Progressing     Problem: METABOLIC/FLUID AND ELECTROLYTES - ADULT  Goal: Glucose maintained within prescribed range  Description: INTERVENTIONS:  - Monitor Blood Glucose as ordered  - Assess for signs and symptoms of hyperglycemia and hypoglycemia  - Administer ordered medications to maintain glucose within target range  - Assess barriers to adequate nutritional intake and initiate nutrition consult as needed  - Instruct patient on self management of diabetes  Outcome: Progressing  Goal: Hemodynamic stability and optimal renal function maintained  Description: INTERVENTIONS:  - Monitor labs and assess for signs and symptoms of volume excess or deficit  - Monitor intake, output and patient weight  - Monitor urine specific gravity, serum osmolarity and serum sodium as indicated or ordered  - Monitor response to interventions for patient's volume status, including labs, urine output, blood pressure (other measures as available)  - Encourage oral intake as appropriate  - Instruct patient on fluid and nutrition restrictions as appropriate  Outcome: Progressing     Problem: Patient/Family Goals  Goal: Patient/Family Short Term Goal  Description: Patient's Short Term Goal: wounds to get better    Interventions:   -monitor VS  -monitor appropriate labs  -wound care  -update patient and family on plan of care  -discharge planning  -follow MD orders     - See additional Care Plan goals for specific interventions  5/4/2022 1832 by Eston Habermann, RN  Outcome: Not Progressing

## 2022-05-05 ENCOUNTER — APPOINTMENT (OUTPATIENT)
Dept: GENERAL RADIOLOGY | Facility: HOSPITAL | Age: 82
End: 2022-05-05
Attending: INTERNAL MEDICINE
Payer: MEDICARE

## 2022-05-05 ENCOUNTER — APPOINTMENT (OUTPATIENT)
Dept: INTERVENTIONAL RADIOLOGY/VASCULAR | Facility: HOSPITAL | Age: 82
End: 2022-05-05
Attending: SURGERY
Payer: MEDICARE

## 2022-05-05 LAB
ALBUMIN SERPL-MCNC: 2.2 G/DL (ref 3.4–5)
ANION GAP SERPL CALC-SCNC: 7 MMOL/L (ref 0–18)
BASOPHILS # BLD AUTO: 0.02 X10(3) UL (ref 0–0.2)
BASOPHILS NFR BLD AUTO: 0.2 %
BUN BLD-MCNC: 54 MG/DL (ref 7–18)
BUN/CREAT SERPL: 9.3 (ref 10–20)
CALCIUM BLD-MCNC: 8.4 MG/DL (ref 8.5–10.1)
CHLORIDE SERPL-SCNC: 98 MMOL/L (ref 98–112)
CO2 SERPL-SCNC: 30 MMOL/L (ref 21–32)
CREAT BLD-MCNC: 5.83 MG/DL
DEPRECATED RDW RBC AUTO: 70.3 FL (ref 35.1–46.3)
EOSINOPHIL # BLD AUTO: 0.04 X10(3) UL (ref 0–0.7)
EOSINOPHIL NFR BLD AUTO: 0.4 %
ERYTHROCYTE [DISTWIDTH] IN BLOOD BY AUTOMATED COUNT: 18 % (ref 11–15)
GLUCOSE BLD-MCNC: 143 MG/DL (ref 70–99)
GLUCOSE BLDC GLUCOMTR-MCNC: 109 MG/DL (ref 70–99)
GLUCOSE BLDC GLUCOMTR-MCNC: 134 MG/DL (ref 70–99)
GLUCOSE BLDC GLUCOMTR-MCNC: 87 MG/DL (ref 70–99)
HCT VFR BLD AUTO: 32.1 %
HGB BLD-MCNC: 10.2 G/DL
IMM GRANULOCYTES # BLD AUTO: 0.03 X10(3) UL (ref 0–1)
IMM GRANULOCYTES NFR BLD: 0.3 %
INR BLD: 1.79 (ref 0.8–1.2)
LYMPHOCYTES # BLD AUTO: 0.57 X10(3) UL (ref 1–4)
LYMPHOCYTES NFR BLD AUTO: 6.2 %
MCH RBC QN AUTO: 34.1 PG (ref 26–34)
MCHC RBC AUTO-ENTMCNC: 31.8 G/DL (ref 31–37)
MCV RBC AUTO: 107.4 FL
MONOCYTES # BLD AUTO: 0.67 X10(3) UL (ref 0.1–1)
MONOCYTES NFR BLD AUTO: 7.2 %
NEUTROPHILS # BLD AUTO: 7.93 X10 (3) UL (ref 1.5–7.7)
NEUTROPHILS # BLD AUTO: 7.93 X10(3) UL (ref 1.5–7.7)
NEUTROPHILS NFR BLD AUTO: 85.7 %
OSMOLALITY SERPL CALC.SUM OF ELEC: 297 MOSM/KG (ref 275–295)
PHOSPHATE SERPL-MCNC: 3.4 MG/DL (ref 2.5–4.9)
PLATELET # BLD AUTO: 184 10(3)UL (ref 150–450)
POTASSIUM SERPL-SCNC: 5.8 MMOL/L (ref 3.5–5.1)
PROTHROMBIN TIME: 21 SECONDS (ref 11.6–14.8)
RBC # BLD AUTO: 2.99 X10(6)UL
SARS-COV-2 RNA RESP QL NAA+PROBE: NOT DETECTED
SODIUM SERPL-SCNC: 135 MMOL/L (ref 136–145)
WBC # BLD AUTO: 9.3 X10(3) UL (ref 4–11)

## 2022-05-05 PROCEDURE — 99233 SBSQ HOSP IP/OBS HIGH 50: CPT | Performed by: INTERNAL MEDICINE

## 2022-05-05 PROCEDURE — 73630 X-RAY EXAM OF FOOT: CPT | Performed by: INTERNAL MEDICINE

## 2022-05-05 PROCEDURE — B41F1ZZ FLUOROSCOPY OF RIGHT LOWER EXTREMITY ARTERIES USING LOW OSMOLAR CONTRAST: ICD-10-PCS | Performed by: SURGERY

## 2022-05-05 PROCEDURE — 99232 SBSQ HOSP IP/OBS MODERATE 35: CPT | Performed by: INTERNAL MEDICINE

## 2022-05-05 PROCEDURE — B41G1ZZ FLUOROSCOPY OF LEFT LOWER EXTREMITY ARTERIES USING LOW OSMOLAR CONTRAST: ICD-10-PCS | Performed by: SURGERY

## 2022-05-05 RX ORDER — SODIUM CHLORIDE 0.9 % (FLUSH) 0.9 %
10 SYRINGE (ML) INJECTION AS NEEDED
Status: DISCONTINUED | OUTPATIENT
Start: 2022-05-05 | End: 2022-05-13

## 2022-05-05 RX ORDER — MIDAZOLAM HYDROCHLORIDE 1 MG/ML
INJECTION INTRAMUSCULAR; INTRAVENOUS
Status: DISCONTINUED
Start: 2022-05-05 | End: 2022-05-05 | Stop reason: WASHOUT

## 2022-05-05 RX ORDER — DIPHENHYDRAMINE HYDROCHLORIDE 50 MG/ML
INJECTION INTRAMUSCULAR; INTRAVENOUS
Status: COMPLETED
Start: 2022-05-05 | End: 2022-05-05

## 2022-05-05 RX ORDER — HEPARIN SODIUM 1000 [USP'U]/ML
INJECTION, SOLUTION INTRAVENOUS; SUBCUTANEOUS
Status: DISCONTINUED
Start: 2022-05-05 | End: 2022-05-05 | Stop reason: WASHOUT

## 2022-05-05 NOTE — PHYSICAL THERAPY NOTE
Attempted to see patient for follow up physical therapy session this afternoon. Pt declined participation in therapy due to continued pain. RN present with therapist, assessing vitals and offering more pain medication but pt does not feel he can tolerate the pain to participate today. Pt scheduled for angiogram and dialysis later today. Discussed with pt and wife the importance of out of bed mobility, they are both receptive and understanding. Will plan to f/u tomorrow. Per RN, optimal time would be around 10am after pt has had pain medication.

## 2022-05-05 NOTE — IVS NOTE
Hand-Off     Procedure hand off report given to Vamsi ZABALA. Pt's vital signs are stable. Procedural access site is dry and intact with no signs and symptoms of bleeding and hematoma. Dr. Maggi Oropeza spoke with patient/family post procedure.

## 2022-05-05 NOTE — PLAN OF CARE
Problem: Diabetes/Glucose Control  Goal: Glucose maintained within prescribed range  Description: INTERVENTIONS:  - Monitor Blood Glucose as ordered  - Assess for signs and symptoms of hyperglycemia and hypoglycemia  - Administer ordered medications to maintain glucose within target range  - Assess barriers to adequate nutritional intake and initiate nutrition consult as needed  - Instruct patient on self management of diabetes  Outcome: Progressing     Problem: PAIN - ADULT  Goal: Verbalizes/displays adequate comfort level or patient's stated pain goal  Description: INTERVENTIONS:  - Encourage pt to monitor pain and request assistance  - Assess pain using appropriate pain scale  - Administer analgesics based on type and severity of pain and evaluate response  - Implement non-pharmacological measures as appropriate and evaluate response  - Consider cultural and social influences on pain and pain management  - Manage/alleviate anxiety  - Utilize distraction and/or relaxation techniques  - Monitor for opioid side effects  - Notify MD/LIP if interventions unsuccessful or patient reports new pain  - Anticipate increased pain with activity and pre-medicate as appropriate  Outcome: Progressing     Problem: GASTROINTESTINAL - ADULT  Goal: Minimal or absence of nausea and vomiting  Description: INTERVENTIONS:  - Maintain adequate hydration with IV or PO as ordered and tolerated  - Nasogastric tube to low intermittent suction as ordered  - Evaluate effectiveness of ordered antiemetic medications  - Provide nonpharmacologic comfort measures as appropriate  - Advance diet as tolerated, if ordered  - Obtain nutritional consult as needed  - Evaluate fluid balance  Outcome: Progressing     Problem: METABOLIC/FLUID AND ELECTROLYTES - ADULT  Goal: Glucose maintained within prescribed range  Description: INTERVENTIONS:  - Monitor Blood Glucose as ordered  - Assess for signs and symptoms of hyperglycemia and hypoglycemia  - Administer ordered medications to maintain glucose within target range  - Assess barriers to adequate nutritional intake and initiate nutrition consult as needed  - Instruct patient on self management of diabetes  Outcome: Progressing  Goal: Hemodynamic stability and optimal renal function maintained  Description: INTERVENTIONS:  - Monitor labs and assess for signs and symptoms of volume excess or deficit  - Monitor intake, output and patient weight  - Monitor urine specific gravity, serum osmolarity and serum sodium as indicated or ordered  - Monitor response to interventions for patient's volume status, including labs, urine output, blood pressure (other measures as available)  - Encourage oral intake as appropriate  - Instruct patient on fluid and nutrition restrictions as appropriate  Outcome: Progressing     Vital signs stable, on 2 L nasal cannula. PRN morphine given for severe pain in bilateral feet. Went to cath lab for angiogram today. HD ordered and anticipated for tonight. Patient returned from cath lab, vital signs stable. On strict bedrest until 2105 per order. Right groin site intact, knee immobilizer in place. Safety precautions in place, comfort rounding completed, call light within reach.

## 2022-05-05 NOTE — PHYSICAL THERAPY NOTE
Attempted to see patient for physical therapy session. Patient declined participation in physical therapy due to increased pain, just given pain medication and understands the importance of moving but has too much pain to participate. Will attempt to see patient tomorrow for physical therapy session. RN aware and agreeable.

## 2022-05-05 NOTE — Clinical Note
Received call from [address] [place] [address] and [facility] liaison requesting updated clinical documents.

## 2022-05-05 NOTE — PLAN OF CARE
NPO post midnight for angiogram, consent signed. VSS. Vomited earlier on shift, relief by antiemetic. Safety precautions in place. will continue to monitor  Problem: Patient Centered Care  Goal: Patient preferences are identified and integrated in the patient's plan of care  Description: Interventions:  - What would you like us to know as we care for you?  From [address]  - Provide timely, complete, and accurate information to patient/family  - Incorporate patient and family knowledge, values, beliefs, and cultural backgrounds into the planning and delivery of care  - Encourage patient/family to participate in care and decision-making at the level they choose  - Honor patient and family perspectives and choices  Outcome: Progressing     Problem: Diabetes/Glucose Control  Goal: Glucose maintained within prescribed range  Description: INTERVENTIONS:  - Monitor Blood Glucose as ordered  - Assess for signs and symptoms of hyperglycemia and hypoglycemia  - Administer ordered medications to maintain glucose within target range  - Assess barriers to adequate nutritional intake and initiate nutrition consult as needed  - Instruct patient on self management of diabetes  Outcome: Progressing     Problem: PAIN - ADULT  Goal: Verbalizes/displays adequate comfort level or patient's stated pain goal  Description: INTERVENTIONS:  - Encourage pt to monitor pain and request assistance  - Assess pain using appropriate pain scale  - Administer analgesics based on type and severity of pain and evaluate response  - Implement non-pharmacological measures as appropriate and evaluate response  - Consider cultural and social influences on pain and pain management  - Manage/alleviate anxiety  - Utilize distraction and/or relaxation techniques  - Monitor for opioid side effects  - Notify MD/LIP if interventions unsuccessful or patient reports new pain  - Anticipate increased pain with activity and pre-medicate as appropriate  Outcome: Progressing     Problem: GASTROINTESTINAL - ADULT  Goal: Minimal or absence of nausea and vomiting  Description: INTERVENTIONS:  - Maintain adequate hydration with IV or PO as ordered and tolerated  - Nasogastric tube to low intermittent suction as ordered  - Evaluate effectiveness of ordered antiemetic medications  - Provide nonpharmacologic comfort measures as appropriate  - Advance diet as tolerated, if ordered  - Obtain nutritional consult as needed  - Evaluate fluid balance  Outcome:  not Progressing

## 2022-05-06 ENCOUNTER — APPOINTMENT (OUTPATIENT)
Dept: CT IMAGING | Facility: HOSPITAL | Age: 82
End: 2022-05-06
Attending: SURGERY
Payer: MEDICARE

## 2022-05-06 LAB
ANION GAP SERPL CALC-SCNC: 6 MMOL/L (ref 0–18)
APTT PPP: 41.2 SECONDS (ref 23.3–35.6)
APTT PPP: 43.3 SECONDS (ref 23.3–35.6)
BASOPHILS # BLD AUTO: 0.04 X10(3) UL (ref 0–0.2)
BASOPHILS NFR BLD AUTO: 0.6 %
BUN BLD-MCNC: 33 MG/DL (ref 7–18)
BUN/CREAT SERPL: 7.6 (ref 10–20)
CALCIUM BLD-MCNC: 8.5 MG/DL (ref 8.5–10.1)
CHLORIDE SERPL-SCNC: 98 MMOL/L (ref 98–112)
CO2 SERPL-SCNC: 30 MMOL/L (ref 21–32)
CREAT BLD-MCNC: 4.35 MG/DL
DEPRECATED RDW RBC AUTO: 69.8 FL (ref 35.1–46.3)
EOSINOPHIL # BLD AUTO: 0.04 X10(3) UL (ref 0–0.7)
EOSINOPHIL NFR BLD AUTO: 0.6 %
ERYTHROCYTE [DISTWIDTH] IN BLOOD BY AUTOMATED COUNT: 17.9 % (ref 11–15)
GLUCOSE BLD-MCNC: 148 MG/DL (ref 70–99)
GLUCOSE BLDC GLUCOMTR-MCNC: 123 MG/DL (ref 70–99)
GLUCOSE BLDC GLUCOMTR-MCNC: 125 MG/DL (ref 70–99)
GLUCOSE BLDC GLUCOMTR-MCNC: 156 MG/DL (ref 70–99)
GLUCOSE BLDC GLUCOMTR-MCNC: 171 MG/DL (ref 70–99)
HCT VFR BLD AUTO: 30.6 %
HGB BLD-MCNC: 9.8 G/DL
IMM GRANULOCYTES # BLD AUTO: 0.03 X10(3) UL (ref 0–1)
IMM GRANULOCYTES NFR BLD: 0.4 %
INR BLD: 1.64 (ref 0.8–1.2)
LYMPHOCYTES # BLD AUTO: 0.76 X10(3) UL (ref 1–4)
LYMPHOCYTES NFR BLD AUTO: 11 %
MCH RBC QN AUTO: 34 PG (ref 26–34)
MCHC RBC AUTO-ENTMCNC: 32 G/DL (ref 31–37)
MCV RBC AUTO: 106.3 FL
MONOCYTES # BLD AUTO: 0.77 X10(3) UL (ref 0.1–1)
MONOCYTES NFR BLD AUTO: 11.1 %
NEUTROPHILS # BLD AUTO: 5.27 X10 (3) UL (ref 1.5–7.7)
NEUTROPHILS # BLD AUTO: 5.27 X10(3) UL (ref 1.5–7.7)
NEUTROPHILS NFR BLD AUTO: 76.3 %
OSMOLALITY SERPL CALC.SUM OF ELEC: 288 MOSM/KG (ref 275–295)
PLATELET # BLD AUTO: 161 10(3)UL (ref 150–450)
POTASSIUM SERPL-SCNC: 4.6 MMOL/L (ref 3.5–5.1)
PROTHROMBIN TIME: 19.7 SECONDS (ref 11.6–14.8)
RBC # BLD AUTO: 2.88 X10(6)UL
SODIUM SERPL-SCNC: 134 MMOL/L (ref 136–145)
WBC # BLD AUTO: 6.9 X10(3) UL (ref 4–11)

## 2022-05-06 PROCEDURE — 99232 SBSQ HOSP IP/OBS MODERATE 35: CPT | Performed by: INTERNAL MEDICINE

## 2022-05-06 PROCEDURE — 75635 CT ANGIO ABDOMINAL ARTERIES: CPT | Performed by: SURGERY

## 2022-05-06 PROCEDURE — 99233 SBSQ HOSP IP/OBS HIGH 50: CPT | Performed by: INTERNAL MEDICINE

## 2022-05-06 RX ORDER — HEPARIN SODIUM AND DEXTROSE 10000; 5 [USP'U]/100ML; G/100ML
INJECTION INTRAVENOUS CONTINUOUS
Status: DISCONTINUED | OUTPATIENT
Start: 2022-05-06 | End: 2022-05-13

## 2022-05-06 RX ORDER — HEPARIN SODIUM AND DEXTROSE 10000; 5 [USP'U]/100ML; G/100ML
12 INJECTION INTRAVENOUS ONCE
Status: COMPLETED | OUTPATIENT
Start: 2022-05-06 | End: 2022-05-06

## 2022-05-06 NOTE — PLAN OF CARE
Problem: Patient Centered Care  Goal: Patient preferences are identified and integrated in the patient's plan of care  Description: Interventions:  - What would you like us to know as we care for you?  [facility]  - Provide timely, complete, and accurate information to patient/family  - Incorporate patient and family knowledge, values, beliefs, and cultural backgrounds into the planning and delivery of care  - Encourage patient/family to participate in care and decision-making at the level they choose  - Honor patient and family perspectives and choices  Outcome: Progressing     Problem: Diabetes/Glucose Control  Goal: Glucose maintained within prescribed range  Description: INTERVENTIONS:  - Monitor Blood Glucose as ordered  - Assess for signs and symptoms of hyperglycemia and hypoglycemia  - Administer ordered medications to maintain glucose within target range  - Assess barriers to adequate nutritional intake and initiate nutrition consult as needed  - Instruct patient on self management of diabetes  Outcome: Progressing     Problem: Patient/Family Goals  Goal: Patient/Family Long Term Goal  Description: Patient's Long Term Goal: be discharged    Interventions:  -monitor VS  -monitor appropriate labs  -update patient and family on plan of care  -follow MD orders     - See additional Care Plan goals for specific interventions  Outcome: Progressing  Goal: Patient/Family Short Term Goal  Description: Patient's Short Term Goal: wounds to get better    Interventions:   -monitor VS  -monitor appropriate labs  -wound care  -update patient and family on plan of care  -discharge planning  -follow MD orders     - See additional Care Plan goals for specific interventions  Outcome: Progressing     Problem: PAIN - ADULT  Goal: Verbalizes/displays adequate comfort level or patient's stated pain goal  Description: INTERVENTIONS:  - Encourage pt to monitor pain and request assistance  - Assess pain using appropriate pain scale  - Administer analgesics based on type and severity of pain and evaluate response  - Implement non-pharmacological measures as appropriate and evaluate response  - Consider cultural and social influences on pain and pain management  - Manage/alleviate anxiety  - Utilize distraction and/or relaxation techniques  - Monitor for opioid side effects  - Notify MD/LIP if interventions unsuccessful or patient reports new pain  - Anticipate increased pain with activity and pre-medicate as appropriate  Outcome: Progressing     Problem: GASTROINTESTINAL - ADULT  Goal: Minimal or absence of nausea and vomiting  Description: INTERVENTIONS:  - Maintain adequate hydration with IV or PO as ordered and tolerated  - Nasogastric tube to low intermittent suction as ordered  - Evaluate effectiveness of ordered antiemetic medications  - Provide nonpharmacologic comfort measures as appropriate  - Advance diet as tolerated, if ordered  - Obtain nutritional consult as needed  - Evaluate fluid balance  Outcome: Progressing     Problem: METABOLIC/FLUID AND ELECTROLYTES - ADULT  Goal: Glucose maintained within prescribed range  Description: INTERVENTIONS:  - Monitor Blood Glucose as ordered  - Assess for signs and symptoms of hyperglycemia and hypoglycemia  - Administer ordered medications to maintain glucose within target range  - Assess barriers to adequate nutritional intake and initiate nutrition consult as needed  - Instruct patient on self management of diabetes  Outcome: Progressing  Goal: Hemodynamic stability and optimal renal function maintained  Description: INTERVENTIONS:  - Monitor labs and assess for signs and symptoms of volume excess or deficit  - Monitor intake, output and patient weight  - Monitor urine specific gravity, serum osmolarity and serum sodium as indicated or ordered  - Monitor response to interventions for patient's volume status, including labs, urine output, blood pressure (other measures as available)  - Encourage oral intake as appropriate  - Instruct patient on fluid and nutrition restrictions as appropriate  Outcome: Progressing   Patient had HD last night, removed 1500 ml fluids, Albumin iv given during HD for low blood pressure,s/p angiogram, rt groin dressing intact and dry, fall precautions in place, call light within reach, pain medication as needed

## 2022-05-06 NOTE — WOUND PROGRESS NOTE
Wound Care Services  Follow up on the pt. he is back from his CT scan. His spouse is at the bedside and the pt. is eating, he notes he feels better today, toes are still cool. He notes his pain is better. I offered to change the dressings, he noted the MD just changed them. Chart reviewed and Dr. Luis Angel Maher was here, saw the pt. and dressings changed. Reviewed Dr. Gunjan Menchaca note. Heels are elevated off the bed.

## 2022-05-06 NOTE — IVS NOTE
Procedure hand off report given to Rogelio Eid RN. Procedural access site is dry and intact with no signs and symptoms of bleeding and hematoma. Dr Maggi Oropeza came to see pt /family at bedside post procedure. Groin check done with Rogelio Eid RN, upon transfer to floor. Pt is generally stable.

## 2022-05-07 LAB
ALBUMIN SERPL-MCNC: 2.5 G/DL (ref 3.4–5)
ANION GAP SERPL CALC-SCNC: 9 MMOL/L (ref 0–18)
APTT PPP: 170.1 SECONDS (ref 23.3–35.6)
APTT PPP: 69 SECONDS (ref 23.3–35.6)
APTT PPP: 84 SECONDS (ref 23.3–35.6)
BASOPHILS # BLD AUTO: 0.02 X10(3) UL (ref 0–0.2)
BASOPHILS NFR BLD AUTO: 0.2 %
BUN BLD-MCNC: 46 MG/DL (ref 7–18)
BUN/CREAT SERPL: 8.9 (ref 10–20)
CALCIUM BLD-MCNC: 8.7 MG/DL (ref 8.5–10.1)
CHLORIDE SERPL-SCNC: 97 MMOL/L (ref 98–112)
CO2 SERPL-SCNC: 28 MMOL/L (ref 21–32)
CREAT BLD-MCNC: 5.19 MG/DL
DEPRECATED RDW RBC AUTO: 70.3 FL (ref 35.1–46.3)
EOSINOPHIL # BLD AUTO: 0.03 X10(3) UL (ref 0–0.7)
EOSINOPHIL NFR BLD AUTO: 0.4 %
ERYTHROCYTE [DISTWIDTH] IN BLOOD BY AUTOMATED COUNT: 18.3 % (ref 11–15)
GLUCOSE BLD-MCNC: 118 MG/DL (ref 70–99)
GLUCOSE BLDC GLUCOMTR-MCNC: 117 MG/DL (ref 70–99)
GLUCOSE BLDC GLUCOMTR-MCNC: 124 MG/DL (ref 70–99)
GLUCOSE BLDC GLUCOMTR-MCNC: 265 MG/DL (ref 70–99)
GLUCOSE BLDC GLUCOMTR-MCNC: 96 MG/DL (ref 70–99)
HCT VFR BLD AUTO: 32.1 %
HGB BLD-MCNC: 9.9 G/DL
IMM GRANULOCYTES # BLD AUTO: 0.03 X10(3) UL (ref 0–1)
IMM GRANULOCYTES NFR BLD: 0.4 %
INR BLD: 1.56 (ref 0.8–1.2)
IRON SATN MFR SERPL: 18 %
IRON SERPL-MCNC: 36 UG/DL
LYMPHOCYTES # BLD AUTO: 0.58 X10(3) UL (ref 1–4)
LYMPHOCYTES NFR BLD AUTO: 6.9 %
MAGNESIUM SERPL-MCNC: 2.4 MG/DL (ref 1.6–2.6)
MCH RBC QN AUTO: 33.2 PG (ref 26–34)
MCHC RBC AUTO-ENTMCNC: 30.8 G/DL (ref 31–37)
MCV RBC AUTO: 107.7 FL
MONOCYTES # BLD AUTO: 0.85 X10(3) UL (ref 0.1–1)
MONOCYTES NFR BLD AUTO: 10.1 %
NEUTROPHILS # BLD AUTO: 6.88 X10 (3) UL (ref 1.5–7.7)
NEUTROPHILS # BLD AUTO: 6.88 X10(3) UL (ref 1.5–7.7)
NEUTROPHILS NFR BLD AUTO: 82 %
OSMOLALITY SERPL CALC.SUM OF ELEC: 291 MOSM/KG (ref 275–295)
PHOSPHATE SERPL-MCNC: 3.6 MG/DL (ref 2.5–4.9)
PLATELET # BLD AUTO: 191 10(3)UL (ref 150–450)
POTASSIUM SERPL-SCNC: 5.5 MMOL/L (ref 3.5–5.1)
PROTHROMBIN TIME: 18.9 SECONDS (ref 11.6–14.8)
RBC # BLD AUTO: 2.98 X10(6)UL
SODIUM SERPL-SCNC: 134 MMOL/L (ref 136–145)
TIBC SERPL-MCNC: 195 UG/DL (ref 240–450)
TRANSFERRIN SERPL-MCNC: 131 MG/DL (ref 200–360)
WBC # BLD AUTO: 8.4 X10(3) UL (ref 4–11)

## 2022-05-07 PROCEDURE — 99232 SBSQ HOSP IP/OBS MODERATE 35: CPT | Performed by: INTERNAL MEDICINE

## 2022-05-07 PROCEDURE — 90935 HEMODIALYSIS ONE EVALUATION: CPT | Performed by: INTERNAL MEDICINE

## 2022-05-07 RX ORDER — ALBUMIN (HUMAN) 12.5 G/50ML
100 SOLUTION INTRAVENOUS AS NEEDED
Status: DISCONTINUED | OUTPATIENT
Start: 2022-05-07 | End: 2022-05-13

## 2022-05-07 RX ORDER — SIMETHICONE 80 MG
80 TABLET,CHEWABLE ORAL 4 TIMES DAILY PRN
Status: DISCONTINUED | OUTPATIENT
Start: 2022-05-07 | End: 2022-05-13

## 2022-05-07 RX ORDER — HEPARIN SODIUM 1000 [USP'U]/ML
1.5 INJECTION, SOLUTION INTRAVENOUS; SUBCUTANEOUS
Status: DISCONTINUED | OUTPATIENT
Start: 2022-05-07 | End: 2022-05-13

## 2022-05-07 NOTE — PLAN OF CARE
Problem: Patient Centered Care  Goal: Patient preferences are identified and integrated in the patient's plan of care  Description: Interventions:  - What would you like us to know as we care for you?  From [address] [place] [address] [address]  - Provide timely, complete, and accurate information to patient/family  - Incorporate patient and family knowledge, values, beliefs, and cultural backgrounds into the planning and delivery of care  - Encourage patient/family to participate in care and decision-making at the level they choose  - Honor patient and family perspectives and choices  5/7/2022 1658 by Walt Allen RN  Outcome: Progressing  5/7/2022 1657 by Walt Allen RN  Outcome: Progressing     Problem: Diabetes/Glucose Control  Goal: Glucose maintained within prescribed range  Description: INTERVENTIONS:  - Monitor Blood Glucose as ordered  - Assess for signs and symptoms of hyperglycemia and hypoglycemia  - Administer ordered medications to maintain glucose within target range  - Assess barriers to adequate nutritional intake and initiate nutrition consult as needed  - Instruct patient on self management of diabetes  5/7/2022 1658 by Walt Allen RN  Outcome: Progressing  5/7/2022 1657 by Walt Allen RN  Outcome: Progressing     Problem: PAIN - ADULT  Goal: Verbalizes/displays adequate comfort level or patient's stated pain goal  Description: INTERVENTIONS:  - Encourage pt to monitor pain and request assistance  - Assess pain using appropriate pain scale  - Administer analgesics based on type and severity of pain and evaluate response  - Implement non-pharmacological measures as appropriate and evaluate response  - Consider cultural and social influences on pain and pain management  - Manage/alleviate anxiety  - Utilize distraction and/or relaxation techniques  - Monitor for opioid side effects  - Notify MD/LIP if interventions unsuccessful or patient reports new pain  - Anticipate increased pain with activity and pre-medicate as appropriate  5/7/2022 1658 by Corinna Cota RN  Outcome: Progressing  5/7/2022 1657 by Corinna Cota RN  Outcome: Progressing     Problem: GASTROINTESTINAL - ADULT  Goal: Minimal or absence of nausea and vomiting  Description: INTERVENTIONS:  - Maintain adequate hydration with IV or PO as ordered and tolerated  - Nasogastric tube to low intermittent suction as ordered  - Evaluate effectiveness of ordered antiemetic medications  - Provide nonpharmacologic comfort measures as appropriate  - Advance diet as tolerated, if ordered  - Obtain nutritional consult as needed  - Evaluate fluid balance  5/7/2022 1658 by Destiny Hare RN  Outcome: Progressing  5/7/2022 1657 by Corinna Cota RN  Outcome: Progressing     Problem: METABOLIC/FLUID AND ELECTROLYTES - ADULT  Goal: Glucose maintained within prescribed range  Description: INTERVENTIONS:  - Monitor Blood Glucose as ordered  - Assess for signs and symptoms of hyperglycemia and hypoglycemia  - Administer ordered medications to maintain glucose within target range  - Assess barriers to adequate nutritional intake and initiate nutrition consult as needed  - Instruct patient on self management of diabetes  5/7/2022 1658 by Destiny Hare RN  Outcome: Progressing  5/7/2022 1657 by Corinna Cota RN  Outcome: Progressing  Goal: Hemodynamic stability and optimal renal function maintained  Description: INTERVENTIONS:  - Monitor labs and assess for signs and symptoms of volume excess or deficit  - Monitor intake, output and patient weight  - Monitor urine specific gravity, serum osmolarity and serum sodium as indicated or ordered  - Monitor response to interventions for patient's volume status, including labs, urine output, blood pressure (other measures as available)  - Encourage oral intake as appropriate  - Instruct patient on fluid and nutrition restrictions as appropriate  5/7/2022 1658 by Corinna Cota RN  Outcome: Progressing  5/7/2022 1657 by Yen Trevino RN  Outcome: Progressing     Problem: HEMATOLOGIC - ADULT  Goal: Maintains hematologic stability  Description: INTERVENTIONS  - Assess for signs and symptoms of bleeding or hemorrhage  - Monitor labs and vital signs for trends  - Administer supportive blood products/factors, fluids and medications as ordered and appropriate  - Administer supportive blood products/factors as ordered and appropriate  5/7/2022 1658 by Destiny Tian RN  Outcome: Progressing  5/7/2022 1657 by Yen Trevino RN  Outcome: Progressing  Goal: Free from bleeding injury  Description: (Example usage: patient with low platelets)  INTERVENTIONS:  - Avoid intramuscular injections, enemas and rectal medication administration  - Ensure safe mobilization of patient  - Hold pressure on venipuncture sites to achieve adequate hemostasis  - Assess for signs and symptoms of internal bleeding  - Monitor lab trends  - Patient is to report abnormal signs of bleeding to staff  - Avoid use of toothpicks and dental floss  - Use electric shaver for shaving  - Use soft bristle tooth brush  - Limit straining and forceful nose blowing  5/7/2022 1658 by Yen Trevino RN  Outcome: Progressing  5/7/2022 1657 by Yen Trevino RN  Outcome: Progressing     Patient A/Ox4, resting in bed. No acute changes. Pain managed as needed, complaining of leg pain. Heparin gtt, PTT scheduled, and adjust heparin gtt per protocol. IV antibiotic. Dialysis today 1.5 liters removed. Iron IV. Dressing change. Fall precaution. Q2 turns. Oxygen therapy. Call light within reach.

## 2022-05-07 NOTE — PROGRESS NOTES
Patient was at dialysis   I did discuss my review of films and images and chart with son. Concerned limb may not be salvageable.      I will meet patient and son tomorrow morning

## 2022-05-07 NOTE — PLAN OF CARE
Problem: Patient Centered Care  Goal: Patient preferences are identified and integrated in the patient's plan of care  Description: Interventions:  - What would you like us to know as we care for you?  From [address] [place] [address] [address]  - Provide timely, complete, and accurate information to patient/family  - Incorporate patient and family knowledge, values, beliefs, and cultural backgrounds into the planning and delivery of care  - Encourage patient/family to participate in care and decision-making at the level they choose  - Honor patient and family perspectives and choices  Outcome: Progressing     Problem: Diabetes/Glucose Control  Goal: Glucose maintained within prescribed range  Description: INTERVENTIONS:  - Monitor Blood Glucose as ordered  - Assess for signs and symptoms of hyperglycemia and hypoglycemia  - Administer ordered medications to maintain glucose within target range  - Assess barriers to adequate nutritional intake and initiate nutrition consult as needed  - Instruct patient on self management of diabetes  Outcome: Progressing     Problem: PAIN - ADULT  Goal: Verbalizes/displays adequate comfort level or patient's stated pain goal  Description: INTERVENTIONS:  - Encourage pt to monitor pain and request assistance  - Assess pain using appropriate pain scale  - Administer analgesics based on type and severity of pain and evaluate response  - Implement non-pharmacological measures as appropriate and evaluate response  - Consider cultural and social influences on pain and pain management  - Manage/alleviate anxiety  - Utilize distraction and/or relaxation techniques  - Monitor for opioid side effects  - Notify MD/LIP if interventions unsuccessful or patient reports new pain  - Anticipate increased pain with activity and pre-medicate as appropriate  Outcome: Progressing     Problem: GASTROINTESTINAL - ADULT  Goal: Minimal or absence of nausea and vomiting  Description: INTERVENTIONS:  - Maintain adequate hydration with IV or PO as ordered and tolerated  - Nasogastric tube to low intermittent suction as ordered  - Evaluate effectiveness of ordered antiemetic medications  - Provide nonpharmacologic comfort measures as appropriate  - Advance diet as tolerated, if ordered  - Obtain nutritional consult as needed  - Evaluate fluid balance  Outcome: Progressing     Problem: METABOLIC/FLUID AND ELECTROLYTES - ADULT  Goal: Glucose maintained within prescribed range  Description: INTERVENTIONS:  - Monitor Blood Glucose as ordered  - Assess for signs and symptoms of hyperglycemia and hypoglycemia  - Administer ordered medications to maintain glucose within target range  - Assess barriers to adequate nutritional intake and initiate nutrition consult as needed  - Instruct patient on self management of diabetes  Outcome: Progressing  Goal: Hemodynamic stability and optimal renal function maintained  Description: INTERVENTIONS:  - Monitor labs and assess for signs and symptoms of volume excess or deficit  - Monitor intake, output and patient weight  - Monitor urine specific gravity, serum osmolarity and serum sodium as indicated or ordered  - Monitor response to interventions for patient's volume status, including labs, urine output, blood pressure (other measures as available)  - Encourage oral intake as appropriate  - Instruct patient on fluid and nutrition restrictions as appropriate  Outcome: Progressing     Problem: Patient/Family Goals  Goal: Patient/Family Long Term Goal  Description: Patient's Long Term Goal: be discharged    Interventions:  -monitor VS  -monitor appropriate labs  -update patient and family on plan of care  -follow MD orders     - See additional Care Plan goals for specific interventions  Outcome: Not Progressing  Goal: Patient/Family Short Term Goal  Description: Patient's Short Term Goal: wounds to get better    Interventions:   -monitor VS  -monitor appropriate labs  -wound care  -update patient and family on plan of care  -discharge planning  -follow MD orders     - See additional Care Plan goals for specific interventions  Outcome: Not Progressing     Problem: HEMATOLOGIC - ADULT  Goal: Maintains hematologic stability  Description: INTERVENTIONS  - Assess for signs and symptoms of bleeding or hemorrhage  - Monitor labs and vital signs for trends  - Administer supportive blood products/factors, fluids and medications as ordered and appropriate  - Administer supportive blood products/factors as ordered and appropriate  Outcome: Not Progressing  Goal: Free from bleeding injury  Description: (Example usage: patient with low platelets)  INTERVENTIONS:  - Avoid intramuscular injections, enemas and rectal medication administration  - Ensure safe mobilization of patient  - Hold pressure on venipuncture sites to achieve adequate hemostasis  - Assess for signs and symptoms of internal bleeding  - Monitor lab trends  - Patient is to report abnormal signs of bleeding to staff  - Avoid use of toothpicks and dental floss  - Use electric shaver for shaving  - Use soft bristle tooth brush  - Limit straining and forceful nose blowing  Outcome: Not Progressing   On heparin drip, rate adjusted per PTT results, all precautions are in place, all needs attended, for HD today.

## 2022-05-07 NOTE — PLAN OF CARE
Problem: Diabetes/Glucose Control  Goal: Glucose maintained within prescribed range  Description: INTERVENTIONS:  - Monitor Blood Glucose as ordered  - Assess for signs and symptoms of hyperglycemia and hypoglycemia  - Administer ordered medications to maintain glucose within target range  - Assess barriers to adequate nutritional intake and initiate nutrition consult as needed  - Instruct patient on self management of diabetes  Outcome: Progressing     Problem: PAIN - ADULT  Goal: Verbalizes/displays adequate comfort level or patient's stated pain goal  Description: INTERVENTIONS:  - Encourage pt to monitor pain and request assistance  - Assess pain using appropriate pain scale  - Administer analgesics based on type and severity of pain and evaluate response  - Implement non-pharmacological measures as appropriate and evaluate response  - Consider cultural and social influences on pain and pain management  - Manage/alleviate anxiety  - Utilize distraction and/or relaxation techniques  - Monitor for opioid side effects  - Notify MD/LIP if interventions unsuccessful or patient reports new pain  - Anticipate increased pain with activity and pre-medicate as appropriate  Outcome: Progressing     Problem: GASTROINTESTINAL - ADULT  Goal: Minimal or absence of nausea and vomiting  Description: INTERVENTIONS:  - Maintain adequate hydration with IV or PO as ordered and tolerated  - Nasogastric tube to low intermittent suction as ordered  - Evaluate effectiveness of ordered antiemetic medications  - Provide nonpharmacologic comfort measures as appropriate  - Advance diet as tolerated, if ordered  - Obtain nutritional consult as needed  - Evaluate fluid balance  Outcome: Progressing     Problem: METABOLIC/FLUID AND ELECTROLYTES - ADULT  Goal: Glucose maintained within prescribed range  Description: INTERVENTIONS:  - Monitor Blood Glucose as ordered  - Assess for signs and symptoms of hyperglycemia and hypoglycemia  - Administer ordered medications to maintain glucose within target range  - Assess barriers to adequate nutritional intake and initiate nutrition consult as needed  - Instruct patient on self management of diabetes  Outcome: Progressing  Goal: Hemodynamic stability and optimal renal function maintained  Description: INTERVENTIONS:  - Monitor labs and assess for signs and symptoms of volume excess or deficit  - Monitor intake, output and patient weight  - Monitor urine specific gravity, serum osmolarity and serum sodium as indicated or ordered  - Monitor response to interventions for patient's volume status, including labs, urine output, blood pressure (other measures as available)  - Encourage oral intake as appropriate  - Instruct patient on fluid and nutrition restrictions as appropriate  Outcome: Progressing     Vital signs stable, on 3 L nasal cannula. PRN medications given for pain, patient reporting increased pain relief today. Dressings change by Podiatry this AM.   Shanice Ayoub to CT angiogram today. Heparin gtt infusing at prescribed rate, double check verified with second RN at bedside. Safety precautions in place, comfort rounding completed, call light within reach.

## 2022-05-08 LAB
ALBUMIN SERPL-MCNC: 2.4 G/DL (ref 3.4–5)
ALBUMIN/GLOB SERPL: 0.6 {RATIO} (ref 1–2)
ALP LIVER SERPL-CCNC: 158 U/L
ALT SERPL-CCNC: 21 U/L
ANION GAP SERPL CALC-SCNC: 7 MMOL/L (ref 0–18)
APTT PPP: 58 SECONDS (ref 23.3–35.6)
APTT PPP: 80.9 SECONDS (ref 23.3–35.6)
AST SERPL-CCNC: 24 U/L (ref 15–37)
BILIRUB SERPL-MCNC: 0.6 MG/DL (ref 0.1–2)
BUN BLD-MCNC: 28 MG/DL (ref 7–18)
BUN/CREAT SERPL: 7 (ref 10–20)
CALCIUM BLD-MCNC: 8.6 MG/DL (ref 8.5–10.1)
CHLORIDE SERPL-SCNC: 100 MMOL/L (ref 98–112)
CO2 SERPL-SCNC: 28 MMOL/L (ref 21–32)
CREAT BLD-MCNC: 3.98 MG/DL
GLOBULIN PLAS-MCNC: 3.9 G/DL (ref 2.8–4.4)
GLUCOSE BLD-MCNC: 129 MG/DL (ref 70–99)
GLUCOSE BLDC GLUCOMTR-MCNC: 118 MG/DL (ref 70–99)
GLUCOSE BLDC GLUCOMTR-MCNC: 128 MG/DL (ref 70–99)
GLUCOSE BLDC GLUCOMTR-MCNC: 140 MG/DL (ref 70–99)
GLUCOSE BLDC GLUCOMTR-MCNC: 144 MG/DL (ref 70–99)
MAGNESIUM SERPL-MCNC: 2.2 MG/DL (ref 1.6–2.6)
OSMOLALITY SERPL CALC.SUM OF ELEC: 287 MOSM/KG (ref 275–295)
PHOSPHATE SERPL-MCNC: 3 MG/DL (ref 2.5–4.9)
POTASSIUM SERPL-SCNC: 4.8 MMOL/L (ref 3.5–5.1)
PROT SERPL-MCNC: 6.3 G/DL (ref 6.4–8.2)
SODIUM SERPL-SCNC: 135 MMOL/L (ref 136–145)

## 2022-05-08 PROCEDURE — 99232 SBSQ HOSP IP/OBS MODERATE 35: CPT | Performed by: INTERNAL MEDICINE

## 2022-05-08 RX ORDER — WARFARIN SODIUM 2.5 MG/1
5 TABLET ORAL NIGHTLY
Status: DISCONTINUED | OUTPATIENT
Start: 2022-05-08 | End: 2022-05-09

## 2022-05-08 NOTE — CM/SW NOTE
Per RN, family interested in discussing palliative and further GOC options. Per RN - Palliative Care is consulted. Palliative to follow up with family.      Eldon Lundborg MSW, [facility]  Ext [phone]

## 2022-05-08 NOTE — PLAN OF CARE
Problem: Patient Centered Care  Goal: Patient preferences are identified and integrated in the patient's plan of care  Description: Interventions:  - What would you like us to know as we care for you?  From [address] [place] [address] [address]  - Provide timely, complete, and accurate information to patient/family  - Incorporate patient and family knowledge, values, beliefs, and cultural backgrounds into the planning and delivery of care  - Encourage patient/family to participate in care and decision-making at the level they choose  - Honor patient and family perspectives and choices  Outcome: Progressing     Problem: Diabetes/Glucose Control  Goal: Glucose maintained within prescribed range  Description: INTERVENTIONS:  - Monitor Blood Glucose as ordered  - Assess for signs and symptoms of hyperglycemia and hypoglycemia  - Administer ordered medications to maintain glucose within target range  - Assess barriers to adequate nutritional intake and initiate nutrition consult as needed  - Instruct patient on self management of diabetes  Outcome: Progressing     Problem: PAIN - ADULT  Goal: Verbalizes/displays adequate comfort level or patient's stated pain goal  Description: INTERVENTIONS:  - Encourage pt to monitor pain and request assistance  - Assess pain using appropriate pain scale  - Administer analgesics based on type and severity of pain and evaluate response  - Implement non-pharmacological measures as appropriate and evaluate response  - Consider cultural and social influences on pain and pain management  - Manage/alleviate anxiety  - Utilize distraction and/or relaxation techniques  - Monitor for opioid side effects  - Notify MD/LIP if interventions unsuccessful or patient reports new pain  - Anticipate increased pain with activity and pre-medicate as appropriate  Outcome: Progressing     Problem: GASTROINTESTINAL - ADULT  Goal: Minimal or absence of nausea and vomiting  Description: INTERVENTIONS:  - Maintain adequate hydration with IV or PO as ordered and tolerated  - Nasogastric tube to low intermittent suction as ordered  - Evaluate effectiveness of ordered antiemetic medications  - Provide nonpharmacologic comfort measures as appropriate  - Advance diet as tolerated, if ordered  - Obtain nutritional consult as needed  - Evaluate fluid balance  Outcome: Progressing     Problem: METABOLIC/FLUID AND ELECTROLYTES - ADULT  Goal: Glucose maintained within prescribed range  Description: INTERVENTIONS:  - Monitor Blood Glucose as ordered  - Assess for signs and symptoms of hyperglycemia and hypoglycemia  - Administer ordered medications to maintain glucose within target range  - Assess barriers to adequate nutritional intake and initiate nutrition consult as needed  - Instruct patient on self management of diabetes  Outcome: Progressing  Goal: Hemodynamic stability and optimal renal function maintained  Description: INTERVENTIONS:  - Monitor labs and assess for signs and symptoms of volume excess or deficit  - Monitor intake, output and patient weight  - Monitor urine specific gravity, serum osmolarity and serum sodium as indicated or ordered  - Monitor response to interventions for patient's volume status, including labs, urine output, blood pressure (other measures as available)  - Encourage oral intake as appropriate  - Instruct patient on fluid and nutrition restrictions as appropriate  Outcome: Progressing     Problem: HEMATOLOGIC - ADULT  Goal: Maintains hematologic stability  Description: INTERVENTIONS  - Assess for signs and symptoms of bleeding or hemorrhage  - Monitor labs and vital signs for trends  - Administer supportive blood products/factors, fluids and medications as ordered and appropriate  - Administer supportive blood products/factors as ordered and appropriate  Outcome: Progressing  Goal: Free from bleeding injury  Description: (Example usage: patient with low platelets)  INTERVENTIONS:  - Avoid intramuscular injections, enemas and rectal medication administration  - Ensure safe mobilization of patient  - Hold pressure on venipuncture sites to achieve adequate hemostasis  - Assess for signs and symptoms of internal bleeding  - Monitor lab trends  - Patient is to report abnormal signs of bleeding to staff  - Avoid use of toothpicks and dental floss  - Use electric shaver for shaving  - Use soft bristle tooth brush  - Limit straining and forceful nose blowing  Outcome: Progressing     Patient A/Ox3-4, [place], no hearing aids in place. Family at bedside. Manage pain as needed, complaining of leg pain. Premedicated prior to dressing change. Q2 turns, skin care. Heparin gtt, PTT per protocol. Consult vascular, no plan of interventions. Palliative care consult. Coumadin restarted today, monitor INR. IV antibiotic. Fall precaution. Oxygen therapy as needed. Call light within reach.

## 2022-05-09 PROBLEM — Z71.89 GOALS OF CARE, COUNSELING/DISCUSSION: Status: ACTIVE | Noted: 2022-01-01

## 2022-05-09 PROBLEM — E46 MALNUTRITION (HCC): Status: ACTIVE | Noted: 2022-05-09

## 2022-05-09 PROBLEM — Z71.89 ADVANCE CARE PLANNING: Status: ACTIVE | Noted: 2022-05-09

## 2022-05-09 PROBLEM — E46 MALNUTRITION (HCC): Status: ACTIVE | Noted: 2022-01-01

## 2022-05-09 PROBLEM — Z71.89 ADVANCE CARE PLANNING: Status: ACTIVE | Noted: 2022-01-01

## 2022-05-09 PROBLEM — Z71.89 GOALS OF CARE, COUNSELING/DISCUSSION: Status: ACTIVE | Noted: 2022-05-09

## 2022-05-09 LAB
ALBUMIN SERPL-MCNC: 2.5 G/DL (ref 3.4–5)
ANION GAP SERPL CALC-SCNC: 8 MMOL/L (ref 0–18)
APTT PPP: 58.9 SECONDS (ref 23.3–35.6)
BASOPHILS # BLD AUTO: 0.04 X10(3) UL (ref 0–0.2)
BASOPHILS # BLD AUTO: 0.04 X10(3) UL (ref 0–0.2)
BASOPHILS NFR BLD AUTO: 0.5 %
BASOPHILS NFR BLD AUTO: 0.5 %
BUN BLD-MCNC: 33 MG/DL (ref 7–18)
BUN/CREAT SERPL: 7 (ref 10–20)
CALCIUM BLD-MCNC: 8.9 MG/DL (ref 8.5–10.1)
CHLORIDE SERPL-SCNC: 100 MMOL/L (ref 98–112)
CO2 SERPL-SCNC: 28 MMOL/L (ref 21–32)
CREAT BLD-MCNC: 4.7 MG/DL
DEPRECATED RDW RBC AUTO: 72.2 FL (ref 35.1–46.3)
DEPRECATED RDW RBC AUTO: 74.2 FL (ref 35.1–46.3)
EOSINOPHIL # BLD AUTO: 0.04 X10(3) UL (ref 0–0.7)
EOSINOPHIL # BLD AUTO: 0.04 X10(3) UL (ref 0–0.7)
EOSINOPHIL NFR BLD AUTO: 0.5 %
EOSINOPHIL NFR BLD AUTO: 0.5 %
ERYTHROCYTE [DISTWIDTH] IN BLOOD BY AUTOMATED COUNT: 18.6 % (ref 11–15)
ERYTHROCYTE [DISTWIDTH] IN BLOOD BY AUTOMATED COUNT: 18.6 % (ref 11–15)
GLUCOSE BLD-MCNC: 109 MG/DL (ref 70–99)
GLUCOSE BLDC GLUCOMTR-MCNC: 101 MG/DL (ref 70–99)
GLUCOSE BLDC GLUCOMTR-MCNC: 113 MG/DL (ref 70–99)
GLUCOSE BLDC GLUCOMTR-MCNC: 122 MG/DL (ref 70–99)
GLUCOSE BLDC GLUCOMTR-MCNC: 136 MG/DL (ref 70–99)
HBV SURFACE AG SER-ACNC: <0.1 [IU]/L
HBV SURFACE AG SERPL QL IA: NONREACTIVE
HCT VFR BLD AUTO: 32.3 %
HCT VFR BLD AUTO: 32.9 %
HGB BLD-MCNC: 10 G/DL
HGB BLD-MCNC: 10.1 G/DL
IMM GRANULOCYTES # BLD AUTO: 0.02 X10(3) UL (ref 0–1)
IMM GRANULOCYTES # BLD AUTO: 0.03 X10(3) UL (ref 0–1)
IMM GRANULOCYTES NFR BLD: 0.3 %
IMM GRANULOCYTES NFR BLD: 0.4 %
INR BLD: 1.37 (ref 0.8–1.2)
LYMPHOCYTES # BLD AUTO: 0.6 X10(3) UL (ref 1–4)
LYMPHOCYTES # BLD AUTO: 0.71 X10(3) UL (ref 1–4)
LYMPHOCYTES NFR BLD AUTO: 7.6 %
LYMPHOCYTES NFR BLD AUTO: 8.6 %
MAGNESIUM SERPL-MCNC: 2.4 MG/DL (ref 1.6–2.6)
MCH RBC QN AUTO: 33.4 PG (ref 26–34)
MCH RBC QN AUTO: 34.1 PG (ref 26–34)
MCHC RBC AUTO-ENTMCNC: 30.7 G/DL (ref 31–37)
MCHC RBC AUTO-ENTMCNC: 31 G/DL (ref 31–37)
MCV RBC AUTO: 108.9 FL
MCV RBC AUTO: 110.2 FL
MONOCYTES # BLD AUTO: 0.72 X10(3) UL (ref 0.1–1)
MONOCYTES # BLD AUTO: 0.77 X10(3) UL (ref 0.1–1)
MONOCYTES NFR BLD AUTO: 9.1 %
MONOCYTES NFR BLD AUTO: 9.4 %
NEUTROPHILS # BLD AUTO: 6.45 X10 (3) UL (ref 1.5–7.7)
NEUTROPHILS # BLD AUTO: 6.45 X10(3) UL (ref 1.5–7.7)
NEUTROPHILS # BLD AUTO: 6.62 X10 (3) UL (ref 1.5–7.7)
NEUTROPHILS # BLD AUTO: 6.62 X10(3) UL (ref 1.5–7.7)
NEUTROPHILS NFR BLD AUTO: 80.6 %
NEUTROPHILS NFR BLD AUTO: 82 %
OSMOLALITY SERPL CALC.SUM OF ELEC: 290 MOSM/KG (ref 275–295)
PHOSPHATE SERPL-MCNC: 3.1 MG/DL (ref 2.5–4.9)
PLATELET # BLD AUTO: 194 10(3)UL (ref 150–450)
PLATELET # BLD AUTO: 210 10(3)UL (ref 150–450)
PLATELET MORPHOLOGY: NORMAL
PLATELET MORPHOLOGY: NORMAL
POTASSIUM SERPL-SCNC: 5.1 MMOL/L (ref 3.5–5.1)
PROTHROMBIN TIME: 17.1 SECONDS (ref 11.6–14.8)
RBC # BLD AUTO: 2.93 X10(6)UL
RBC # BLD AUTO: 3.02 X10(6)UL
SODIUM SERPL-SCNC: 136 MMOL/L (ref 136–145)
WBC # BLD AUTO: 7.9 X10(3) UL (ref 4–11)
WBC # BLD AUTO: 8.2 X10(3) UL (ref 4–11)

## 2022-05-09 PROCEDURE — 99232 SBSQ HOSP IP/OBS MODERATE 35: CPT | Performed by: INTERNAL MEDICINE

## 2022-05-09 PROCEDURE — 99223 1ST HOSP IP/OBS HIGH 75: CPT | Performed by: NURSE PRACTITIONER

## 2022-05-09 RX ORDER — WARFARIN SODIUM 2.5 MG/1
5 TABLET ORAL
Status: DISCONTINUED | OUTPATIENT
Start: 2022-05-10 | End: 2022-05-12

## 2022-05-09 RX ORDER — WARFARIN SODIUM 2.5 MG/1
7.5 TABLET ORAL
Status: DISCONTINUED | OUTPATIENT
Start: 2022-05-09 | End: 2022-05-13

## 2022-05-09 NOTE — PLAN OF CARE
Problem: Patient Centered Care  Goal: Patient preferences are identified and integrated in the patient's plan of care  Description: Interventions:  - What would you like us to know as we care for you?  From [address] [place] [address] Drive  - Provide timely, complete, and accurate information to patient/family  - Incorporate patient and family knowledge, values, beliefs, and cultural backgrounds into the planning and delivery of care  - Encourage patient/family to participate in care and decision-making at the level they choose  - Honor patient and family perspectives and choices  Outcome: Progressing     Problem: Diabetes/Glucose Control  Goal: Glucose maintained within prescribed range  Description: INTERVENTIONS:  - Monitor Blood Glucose as ordered  - Assess for signs and symptoms of hyperglycemia and hypoglycemia  - Administer ordered medications to maintain glucose within target range  - Assess barriers to adequate nutritional intake and initiate nutrition consult as needed  - Instruct patient on self management of diabetes  Outcome: Progressing     Problem: Patient/Family Goals  Goal: Patient/Family Long Term Goal  Description: Patient's Long Term Goal: be discharged    Interventions:  -monitor VS  -monitor appropriate labs  -update patient and family on plan of care  -follow MD orders     - See additional Care Plan goals for specific interventions  Outcome: Progressing  Goal: Patient/Family Short Term Goal  Description: Patient's Short Term Goal: wounds to get better    Interventions:   -monitor VS  -monitor appropriate labs  -wound care  -update patient and family on plan of care  -discharge planning  -follow MD orders     - See additional Care Plan goals for specific interventions  Outcome: Progressing     Problem: GASTROINTESTINAL - ADULT  Goal: Minimal or absence of nausea and vomiting  Description: INTERVENTIONS:  - Maintain adequate hydration with IV or PO as ordered and tolerated  - Nasogastric tube to low intermittent suction as ordered  - Evaluate effectiveness of ordered antiemetic medications  - Provide nonpharmacologic comfort measures as appropriate  - Advance diet as tolerated, if ordered  - Obtain nutritional consult as needed  - Evaluate fluid balance  Outcome: Progressing     Problem: METABOLIC/FLUID AND ELECTROLYTES - ADULT  Goal: Glucose maintained within prescribed range  Description: INTERVENTIONS:  - Monitor Blood Glucose as ordered  - Assess for signs and symptoms of hyperglycemia and hypoglycemia  - Administer ordered medications to maintain glucose within target range  - Assess barriers to adequate nutritional intake and initiate nutrition consult as needed  - Instruct patient on self management of diabetes  Outcome: Progressing  Goal: Hemodynamic stability and optimal renal function maintained  Description: INTERVENTIONS:  - Monitor labs and assess for signs and symptoms of volume excess or deficit  - Monitor intake, output and patient weight  - Monitor urine specific gravity, serum osmolarity and serum sodium as indicated or ordered  - Monitor response to interventions for patient's volume status, including labs, urine output, blood pressure (other measures as available)  - Encourage oral intake as appropriate  - Instruct patient on fluid and nutrition restrictions as appropriate  Outcome: Progressing     Problem: HEMATOLOGIC - ADULT  Goal: Maintains hematologic stability  Description: INTERVENTIONS  - Assess for signs and symptoms of bleeding or hemorrhage  - Monitor labs and vital signs for trends  - Administer supportive blood products/factors, fluids and medications as ordered and appropriate  - Administer supportive blood products/factors as ordered and appropriate  Outcome: Progressing  Goal: Free from bleeding injury  Description: (Example usage: patient with low platelets)  INTERVENTIONS:  - Avoid intramuscular injections, enemas and rectal medication administration  - Ensure safe mobilization of patient  - Hold pressure on venipuncture sites to achieve adequate hemostasis  - Assess for signs and symptoms of internal bleeding  - Monitor lab trends  - Patient is to report abnormal signs of bleeding to staff  - Avoid use of toothpicks and dental floss  - Use electric shaver for shaving  - Use soft bristle tooth brush  - Limit straining and forceful nose blowing  Outcome: Progressing     Problem: PAIN - ADULT  Goal: Verbalizes/displays adequate comfort level or patient's stated pain goal  Description: INTERVENTIONS:  - Encourage pt to monitor pain and request assistance  - Assess pain using appropriate pain scale  - Administer analgesics based on type and severity of pain and evaluate response  - Implement non-pharmacological measures as appropriate and evaluate response  - Consider cultural and social influences on pain and pain management  - Manage/alleviate anxiety  - Utilize distraction and/or relaxation techniques  - Monitor for opioid side effects  - Notify MD/LIP if interventions unsuccessful or patient reports new pain  - Anticipate increased pain with activity and pre-medicate as appropriate  Outcome: Not Progressing     Patient resting in bed. Vital signs stable. As needed medication given for pain. Frequent turing. Safety measures and fall precautions in place, call light with in reach, bed locked in lowest position, bed alarm on. Frequent rounding by nursing staff.

## 2022-05-09 NOTE — PROGRESS NOTES
Palliative care consult noted  Patient to discharge to SNF with inpatient palliative care following   Vascular will sign off.

## 2022-05-09 NOTE — CM/SW NOTE
GUDELIA met with pt, spouse, son and niece at bedside to discus safe DC planning. Pt interested and agreeable to palliative + [facility]. Of note, pt admitted from [facility] and was being transferred to HD appointments off site. At this time, pt is not physically able to transfer in and out of w/c safely. [facility] with OFF site HD is no longer an option d/t increased weakness. Pt also noted he does not wish to return to [facility] anyway. GUDELIA discussed new [facility] referrals with ON site HD. Pt and family agreeable. GUDELIA sent [facility] referrals with on-site HD. flowsheets and chest xray uploaded into [employer]. New hep surface antigen ordered, RN aware. SW to provide list of available [facility] facilities to pt/family tomorrow. GUDELIA requested for [facility] program referrals to be sent. PLAN: pending medical clearance & insurance auth - [facility]  Need:  - list of available [facility] + onsite HD facilities  - choice of rehab  - onsite HD clearance      SW remains available for support and/or discharge planning. Please do not hesitate to call/chat GUDELIA if further DC needs arise.      Claudean Morning MSW, [facility]  Ext [phone]

## 2022-05-10 LAB
ALBUMIN SERPL-MCNC: 2.5 G/DL (ref 3.4–5)
ANION GAP SERPL CALC-SCNC: 6 MMOL/L (ref 0–18)
APTT PPP: 60.1 SECONDS (ref 23.3–35.6)
BASOPHILS # BLD AUTO: 0.05 X10(3) UL (ref 0–0.2)
BASOPHILS NFR BLD AUTO: 0.5 %
BUN BLD-MCNC: 41 MG/DL (ref 7–18)
BUN/CREAT SERPL: 7.4 (ref 10–20)
CALCIUM BLD-MCNC: 9 MG/DL (ref 8.5–10.1)
CHLORIDE SERPL-SCNC: 100 MMOL/L (ref 98–112)
CO2 SERPL-SCNC: 26 MMOL/L (ref 21–32)
CREAT BLD-MCNC: 5.54 MG/DL
DEPRECATED RDW RBC AUTO: 73.1 FL (ref 35.1–46.3)
EOSINOPHIL # BLD AUTO: 0.05 X10(3) UL (ref 0–0.7)
EOSINOPHIL NFR BLD AUTO: 0.5 %
ERYTHROCYTE [DISTWIDTH] IN BLOOD BY AUTOMATED COUNT: 19.2 % (ref 11–15)
GLUCOSE BLD-MCNC: 138 MG/DL (ref 70–99)
GLUCOSE BLDC GLUCOMTR-MCNC: 140 MG/DL (ref 70–99)
GLUCOSE BLDC GLUCOMTR-MCNC: 172 MG/DL (ref 70–99)
GLUCOSE BLDC GLUCOMTR-MCNC: 91 MG/DL (ref 70–99)
HCT VFR BLD AUTO: 34.1 %
HGB BLD-MCNC: 10.8 G/DL
IMM GRANULOCYTES # BLD AUTO: 0.05 X10(3) UL (ref 0–1)
IMM GRANULOCYTES NFR BLD: 0.5 %
INR BLD: 1.51 (ref 0.8–1.2)
LYMPHOCYTES # BLD AUTO: 0.86 X10(3) UL (ref 1–4)
LYMPHOCYTES NFR BLD AUTO: 9.1 %
MCH RBC QN AUTO: 34.6 PG (ref 26–34)
MCHC RBC AUTO-ENTMCNC: 31.7 G/DL (ref 31–37)
MCV RBC AUTO: 109.3 FL
MONOCYTES # BLD AUTO: 1.16 X10(3) UL (ref 0.1–1)
MONOCYTES NFR BLD AUTO: 12.3 %
NEUTROPHILS # BLD AUTO: 7.23 X10 (3) UL (ref 1.5–7.7)
NEUTROPHILS # BLD AUTO: 7.23 X10(3) UL (ref 1.5–7.7)
NEUTROPHILS NFR BLD AUTO: 77.1 %
OSMOLALITY SERPL CALC.SUM OF ELEC: 286 MOSM/KG (ref 275–295)
PHOSPHATE SERPL-MCNC: 3.8 MG/DL (ref 2.5–4.9)
PLATELET # BLD AUTO: 203 10(3)UL (ref 150–450)
POTASSIUM SERPL-SCNC: 5.5 MMOL/L (ref 3.5–5.1)
PROTHROMBIN TIME: 18.4 SECONDS (ref 11.6–14.8)
RBC # BLD AUTO: 3.12 X10(6)UL
SODIUM SERPL-SCNC: 132 MMOL/L (ref 136–145)
VANCOMYCIN SERPL-MCNC: 19.6 UG/ML (ref ?–40)
WBC # BLD AUTO: 9.4 X10(3) UL (ref 4–11)

## 2022-05-10 PROCEDURE — 99232 SBSQ HOSP IP/OBS MODERATE 35: CPT | Performed by: INTERNAL MEDICINE

## 2022-05-10 PROCEDURE — 99231 SBSQ HOSP IP/OBS SF/LOW 25: CPT | Performed by: NURSE PRACTITIONER

## 2022-05-10 PROCEDURE — 90935 HEMODIALYSIS ONE EVALUATION: CPT | Performed by: INTERNAL MEDICINE

## 2022-05-10 RX ORDER — BUMETANIDE 1 MG/1
1 TABLET ORAL
Status: DISCONTINUED | OUTPATIENT
Start: 2022-05-11 | End: 2022-05-13

## 2022-05-10 RX ORDER — MORPHINE SULFATE 20 MG/ML
4 SOLUTION ORAL EVERY 4 HOURS PRN
Status: DISCONTINUED | OUTPATIENT
Start: 2022-05-10 | End: 2022-05-11

## 2022-05-10 NOTE — PLAN OF CARE
No acute changes overnight. Heparin drip continued. PTT redraw later in AM. All safety measures are in place. Call light is within reach. Problem: Patient Centered Care  Goal: Patient preferences are identified and integrated in the patient's plan of care  Description: Interventions:  - What would you like us to know as we care for you?  From [address]  - Provide timely, complete, and accurate information to patient/family  - Incorporate patient and family knowledge, values, beliefs, and cultural backgrounds into the planning and delivery of care  - Encourage patient/family to participate in care and decision-making at the level they choose  - Honor patient and family perspectives and choices  Outcome: Progressing     Problem: Diabetes/Glucose Control  Goal: Glucose maintained within prescribed range  Description: INTERVENTIONS:  - Monitor Blood Glucose as ordered  - Assess for signs and symptoms of hyperglycemia and hypoglycemia  - Administer ordered medications to maintain glucose within target range  - Assess barriers to adequate nutritional intake and initiate nutrition consult as needed  - Instruct patient on self management of diabetes  Outcome: Progressing     Problem: Patient/Family Goals  Goal: Patient/Family Long Term Goal  Description: Patient's Long Term Goal: be discharged    Interventions:  -monitor VS  -monitor appropriate labs  -update patient and family on plan of care  -follow MD orders     - See additional Care Plan goals for specific interventions  Outcome: Progressing  Goal: Patient/Family Short Term Goal  Description: Patient's Short Term Goal: wounds to get better    Interventions:   -monitor VS  -monitor appropriate labs  -wound care  -update patient and family on plan of care  -discharge planning  -follow MD orders     - See additional Care Plan goals for specific interventions  Outcome: Progressing     Problem: PAIN - ADULT  Goal: Verbalizes/displays adequate comfort level or patient's stated pain goal  Description: INTERVENTIONS:  - Encourage pt to monitor pain and request assistance  - Assess pain using appropriate pain scale  - Administer analgesics based on type and severity of pain and evaluate response  - Implement non-pharmacological measures as appropriate and evaluate response  - Consider cultural and social influences on pain and pain management  - Manage/alleviate anxiety  - Utilize distraction and/or relaxation techniques  - Monitor for opioid side effects  - Notify MD/LIP if interventions unsuccessful or patient reports new pain  - Anticipate increased pain with activity and pre-medicate as appropriate  Outcome: Progressing     Problem: GASTROINTESTINAL - ADULT  Goal: Minimal or absence of nausea and vomiting  Description: INTERVENTIONS:  - Maintain adequate hydration with IV or PO as ordered and tolerated  - Nasogastric tube to low intermittent suction as ordered  - Evaluate effectiveness of ordered antiemetic medications  - Provide nonpharmacologic comfort measures as appropriate  - Advance diet as tolerated, if ordered  - Obtain nutritional consult as needed  - Evaluate fluid balance  Outcome: Progressing     Problem: METABOLIC/FLUID AND ELECTROLYTES - ADULT  Goal: Glucose maintained within prescribed range  Description: INTERVENTIONS:  - Monitor Blood Glucose as ordered  - Assess for signs and symptoms of hyperglycemia and hypoglycemia  - Administer ordered medications to maintain glucose within target range  - Assess barriers to adequate nutritional intake and initiate nutrition consult as needed  - Instruct patient on self management of diabetes  Outcome: Progressing  Goal: Hemodynamic stability and optimal renal function maintained  Description: INTERVENTIONS:  - Monitor labs and assess for signs and symptoms of volume excess or deficit  - Monitor intake, output and patient weight  - Monitor urine specific gravity, serum osmolarity and serum sodium as indicated or ordered  - Monitor response to interventions for patient's volume status, including labs, urine output, blood pressure (other measures as available)  - Encourage oral intake as appropriate  - Instruct patient on fluid and nutrition restrictions as appropriate  Outcome: Progressing     Problem: HEMATOLOGIC - ADULT  Goal: Maintains hematologic stability  Description: INTERVENTIONS  - Assess for signs and symptoms of bleeding or hemorrhage  - Monitor labs and vital signs for trends  - Administer supportive blood products/factors, fluids and medications as ordered and appropriate  - Administer supportive blood products/factors as ordered and appropriate  Outcome: Progressing  Goal: Free from bleeding injury  Description: (Example usage: patient with low platelets)  INTERVENTIONS:  - Avoid intramuscular injections, enemas and rectal medication administration  - Ensure safe mobilization of patient  - Hold pressure on venipuncture sites to achieve adequate hemostasis  - Assess for signs and symptoms of internal bleeding  - Monitor lab trends  - Patient is to report abnormal signs of bleeding to staff  - Avoid use of toothpicks and dental floss  - Use electric shaver for shaving  - Use soft bristle tooth brush  - Limit straining and forceful nose blowing  Outcome: Progressing

## 2022-05-10 NOTE — CM/SW NOTE
Submitted clinical via [employer] portal.  [employer] Case/Auth ID is [id number]. Final insurance authorization is pending at this time. SW/CM assigned to the case will continue to follow auth status.       Ann Berumen

## 2022-05-10 NOTE — CM/SW NOTE
5/10 329pm: After [facility] was reserved in [facility], they responded they are not in network with the pt's insurance. GUDELIA contacted the pt's wife and notified her of the above. She would like to reserved [address] GUDELIA also notified Tj Dudley via portal.    Palliative care referral made in [facility]. Will need follow up with accepting [facility] agencies. -----------------  5/10 243pm: The pt. And his wife chose [facility] in [place]. Reserved in [facility]. PASRR is within 90 days  Insurance Shamika Gil is pending and HD approval is pending  -------------------  5/10 139pm: GUDELIA met with the pt. And his wife at bedside. GUDELIA provided the pt's wife with the list of accepting SARs with in house HD. GUDELIA also uploaded the Hep B surface antigen to [facility]. The pt's wife is agreeable to going over the accepting [facility] list and getting back to  with a decision. [address] [place] [address] has been requested to start [facility] [address] for the pt. GUDELIA/CM will follow up regarding [facility] decision. Once facility is reserved, HD approval will also be needed.       Ramses Godfrey, [facility] ext [phone]

## 2022-05-10 NOTE — CM/SW NOTE
Department  notified of request for Palliative , aidin referrals started. Assigned CM/SW to follow up with pt/family on further discharge planning.      Anne Ceja

## 2022-05-11 LAB
ALBUMIN SERPL-MCNC: 2.5 G/DL (ref 3.4–5)
ANION GAP SERPL CALC-SCNC: 8 MMOL/L (ref 0–18)
APTT PPP: 57.8 SECONDS (ref 23.3–35.6)
BUN BLD-MCNC: 25 MG/DL (ref 7–18)
BUN/CREAT SERPL: 6.2 (ref 10–20)
CALCIUM BLD-MCNC: 9.1 MG/DL (ref 8.5–10.1)
CHLORIDE SERPL-SCNC: 103 MMOL/L (ref 98–112)
CO2 SERPL-SCNC: 25 MMOL/L (ref 21–32)
CREAT BLD-MCNC: 4.04 MG/DL
GLUCOSE BLD-MCNC: 121 MG/DL (ref 70–99)
GLUCOSE BLDC GLUCOMTR-MCNC: 137 MG/DL (ref 70–99)
GLUCOSE BLDC GLUCOMTR-MCNC: 150 MG/DL (ref 70–99)
GLUCOSE BLDC GLUCOMTR-MCNC: 154 MG/DL (ref 70–99)
GLUCOSE BLDC GLUCOMTR-MCNC: 170 MG/DL (ref 70–99)
INR BLD: 1.65 (ref 0.8–1.2)
OSMOLALITY SERPL CALC.SUM OF ELEC: 288 MOSM/KG (ref 275–295)
PHOSPHATE SERPL-MCNC: 2.8 MG/DL (ref 2.5–4.9)
POTASSIUM SERPL-SCNC: 4.7 MMOL/L (ref 3.5–5.1)
PROTHROMBIN TIME: 19.7 SECONDS (ref 11.6–14.8)
SODIUM SERPL-SCNC: 136 MMOL/L (ref 136–145)

## 2022-05-11 PROCEDURE — 99232 SBSQ HOSP IP/OBS MODERATE 35: CPT | Performed by: INTERNAL MEDICINE

## 2022-05-11 PROCEDURE — 99231 SBSQ HOSP IP/OBS SF/LOW 25: CPT | Performed by: NURSE PRACTITIONER

## 2022-05-11 PROCEDURE — 99233 SBSQ HOSP IP/OBS HIGH 50: CPT | Performed by: INTERNAL MEDICINE

## 2022-05-11 RX ORDER — HEPARIN SODIUM 1000 [USP'U]/ML
1.5 INJECTION, SOLUTION INTRAVENOUS; SUBCUTANEOUS ONCE
Status: DISCONTINUED | OUTPATIENT
Start: 2022-05-11 | End: 2022-05-13

## 2022-05-11 RX ORDER — ALBUMIN (HUMAN) 12.5 G/50ML
100 SOLUTION INTRAVENOUS AS NEEDED
Status: DISCONTINUED | OUTPATIENT
Start: 2022-05-11 | End: 2022-05-13

## 2022-05-11 RX ORDER — MORPHINE SULFATE 20 MG/ML
2 SOLUTION ORAL EVERY 6 HOURS PRN
Status: DISCONTINUED | OUTPATIENT
Start: 2022-05-11 | End: 2022-05-13

## 2022-05-11 RX ORDER — VANCOMYCIN HYDROCHLORIDE 125 MG/1
125 CAPSULE ORAL DAILY
Status: DISCONTINUED | OUTPATIENT
Start: 2022-05-11 | End: 2022-05-13

## 2022-05-11 NOTE — DOWNTIME EVENT NOTE
The EMR was down for 2 hours on 5/11/2022. Esther Church was responsible for completing the paper charting during this time period.      The following information was re-entered into the system by Esther Church RN: Barry Yanez RN  5/11/2022

## 2022-05-11 NOTE — PLAN OF CARE
Problem: Patient Centered Care  Goal: Patient preferences are identified and integrated in the patient's plan of care  Description: Interventions:  - What would you like us to know as we care for you?  From [address] [place] [address] [address]  - Provide timely, complete, and accurate information to patient/family  - Incorporate patient and family knowledge, values, beliefs, and cultural backgrounds into the planning and delivery of care  - Encourage patient/family to participate in care and decision-making at the level they choose  - Honor patient and family perspectives and choices  Outcome: Progressing     Problem: Diabetes/Glucose Control  Goal: Glucose maintained within prescribed range  Description: INTERVENTIONS:  - Monitor Blood Glucose as ordered  - Assess for signs and symptoms of hyperglycemia and hypoglycemia  - Administer ordered medications to maintain glucose within target range  - Assess barriers to adequate nutritional intake and initiate nutrition consult as needed  - Instruct patient on self management of diabetes  Outcome: Progressing     Problem: PAIN - ADULT  Goal: Verbalizes/displays adequate comfort level or patient's stated pain goal  Description: INTERVENTIONS:  - Encourage pt to monitor pain and request assistance  - Assess pain using appropriate pain scale  - Administer analgesics based on type and severity of pain and evaluate response  - Implement non-pharmacological measures as appropriate and evaluate response  - Consider cultural and social influences on pain and pain management  - Manage/alleviate anxiety  - Utilize distraction and/or relaxation techniques  - Monitor for opioid side effects  - Notify MD/LIP if interventions unsuccessful or patient reports new pain  - Anticipate increased pain with activity and pre-medicate as appropriate  Outcome: Progressing     Problem: GASTROINTESTINAL - ADULT  Goal: Minimal or absence of nausea and vomiting  Description: INTERVENTIONS:  - Maintain adequate hydration with IV or PO as ordered and tolerated  - Nasogastric tube to low intermittent suction as ordered  - Evaluate effectiveness of ordered antiemetic medications  - Provide nonpharmacologic comfort measures as appropriate  - Advance diet as tolerated, if ordered  - Obtain nutritional consult as needed  - Evaluate fluid balance  Outcome: Progressing     Problem: METABOLIC/FLUID AND ELECTROLYTES - ADULT  Goal: Glucose maintained within prescribed range  Description: INTERVENTIONS:  - Monitor Blood Glucose as ordered  - Assess for signs and symptoms of hypergly cemia and hypoglycemia  - Administer ordered medications to maintain glucose within target range  - Assess barriers to adequate nutritional intake and initiate nutrition consult as needed  - Instruct patient on self management of diabetes  Outcome: Progressing     Problem: HEMATOLOGIC - ADULT  Goal: Maintains hematologic stability  Description: INTERVENTIONS  - Assess for signs and symptoms of bleeding or hemorrhage  - Monitor labs and vital signs for trends  - Administer supportive blood products/factors, fluids and medications as ordered and appropriate  - Administer supportive blood products/factors as ordered and appropriate  Outcome: Progressing     Patient continues on Heparin drip and cefepime daily. Plan for patient to discharge to [facility].

## 2022-05-11 NOTE — PROGRESS NOTES
[facility]    CDI Prediction Tool Protocol (Vancomycin Initiated)    OVP (oral vancomycin prophylaxis) 125 mg PO Daily is being started in this patient based on a score of 15. Score Breakdown:  High risk antibiotic use (5 points)  Hospital length of stay > 7 days (3 points)  Long term care facility resident (1 point)  Hypoalbuminemia: < 3g/dL (1 point)  Age >/= 80 years (3 points)  PPI use in hospital (1 point)  Recently hospitalized: within 90 days (1 point)    This patient is currently at high risk for developing CDI due to his/her score being >/=13 points and is being started on prophylactic oral vancomycin to prevent Cdiff. This patient does not have C. difficile infection. All measures taken within this protocol are to decrease the risk of CDI development.     Manuella Dance, PharmD  5/11/2022  7:47 AM  [facility] Extension: [phone]

## 2022-05-11 NOTE — OCCUPATIONAL THERAPY NOTE
Attempted to see pt this date for OT session, pt in agreement , and once requesting to move to EOB, pt stating that he could not perform task 2/ fatigue, asking therapist to return early tomorrow. Will follow up with pt next date as schedule permits. Nursing notified.

## 2022-05-11 NOTE — CM/SW NOTE
DC PLANNING;    CM spoke w/wife. CM answered wife's questions on discharge to [facility] w/HD onsite. Per wife, pt received one Pfizer covid vaccine. Dr Deysi Jon call this writer for update with prior insurance auth. CM informed it is still pending. 3:30pm per Titus Salvador liaison at [facility], she will request an expedited insurance auth for SNF. PLAN:  [facility] in [facility], pending insurance Kvng Jones. CM/SW to remain available for support and/or discharge planning.     Ness Myers RN, [facility]    Ext. [phone]

## 2022-05-11 NOTE — PLAN OF CARE
No acute changes overnight. Heparin drip continued per protocol. All safety measures are in place. Call light is within reach. Problem: Patient Centered Care  Goal: Patient preferences are identified and integrated in the patient's plan of care  Description: Interventions:  - What would you like us to know as we care for you?  From [address]  - Provide timely, complete, and accurate information to patient/family  - Incorporate patient and family knowledge, values, beliefs, and cultural backgrounds into the planning and delivery of care  - Encourage patient/family to participate in care and decision-making at the level they choose  - Honor patient and family perspectives and choices  Outcome: Progressing     Problem: Diabetes/Glucose Control  Goal: Glucose maintained within prescribed range  Description: INTERVENTIONS:  - Monitor Blood Glucose as ordered  - Assess for signs and symptoms of hyperglycemia and hypoglycemia  - Administer ordered medications to maintain glucose within target range  - Assess barriers to adequate nutritional intake and initiate nutrition consult as needed  - Instruct patient on self management of diabetes  Outcome: Progressing     Problem: Patient/Family Goals  Goal: Patient/Family Long Term Goal  Description: Patient's Long Term Goal: be discharged    Interventions:  -monitor VS  -monitor appropriate labs  -update patient and family on plan of care  -follow MD orders     - See additional Care Plan goals for specific interventions  Outcome: Progressing  Goal: Patient/Family Short Term Goal  Description: Patient's Short Term Goal: wounds to get better    Interventions:   -monitor VS  -monitor appropriate labs  -wound care  -update patient and family on plan of care  -discharge planning  -follow MD orders     - See additional Care Plan goals for specific interventions  Outcome: Progressing     Problem: PAIN - ADULT  Goal: Verbalizes/displays adequate comfort level or patient's stated pain goal  Description: INTERVENTIONS:  - Encourage pt to monitor pain and request assistance  - Assess pain using appropriate pain scale  - Administer analgesics based on type and severity of pain and evaluate response  - Implement non-pharmacological measures as appropriate and evaluate response  - Consider cultural and social influences on pain and pain management  - Manage/alleviate anxiety  - Utilize distraction and/or relaxation techniques  - Monitor for opioid side effects  - Notify MD/LIP if interventions unsuccessful or patient reports new pain  - Anticipate increased pain with activity and pre-medicate as appropriate  Outcome: Progressing     Problem: GASTROINTESTINAL - ADULT  Goal: Minimal or absence of nausea and vomiting  Description: INTERVENTIONS:  - Maintain adequate hydration with IV or PO as ordered and tolerated  - Nasogastric tube to low intermittent suction as ordered  - Evaluate effectiveness of ordered antiemetic medications  - Provide nonpharmacologic comfort measures as appropriate  - Advance diet as tolerated, if ordered  - Obtain nutritional consult as needed  - Evaluate fluid balance  Outcome: Progressing     Problem: METABOLIC/FLUID AND ELECTROLYTES - ADULT  Goal: Glucose maintained within prescribed range  Description: INTERVENTIONS:  - Monitor Blood Glucose as ordered  - Assess for signs and symptoms of hyperglycemia and hypoglycemia  - Administer ordered medications to maintain glucose within target range  - Assess barriers to adequate nutritional intake and initiate nutrition consult as needed  - Instruct patient on self management of diabetes  Outcome: Progressing  Goal: Hemodynamic stability and optimal renal function maintained  Description: INTERVENTIONS:  - Monitor labs and assess for signs and symptoms of volume excess or deficit  - Monitor intake, output and patient weight  - Monitor urine specific gravity, serum osmolarity and serum sodium as indicated or ordered  - Monitor response to interventions for patient's volume status, including labs, urine output, blood pressure (other measures as available)  - Encourage oral intake as appropriate  - Instruct patient on fluid and nutrition restrictions as appropriate  Outcome: Progressing     Problem: HEMATOLOGIC - ADULT  Goal: Maintains hematologic stability  Description: INTERVENTIONS  - Assess for signs and symptoms of bleeding or hemorrhage  - Monitor labs and vital signs for trends  - Administer supportive blood products/factors, fluids and medications as ordered and appropriate  - Administer supportive blood products/factors as ordered and appropriate  Outcome: Progressing  Goal: Free from bleeding injury  Description: (Example usage: patient with low platelets)  INTERVENTIONS:  - Avoid intramuscular injections, enemas and rectal medication administration  - Ensure safe mobilization of patient  - Hold pressure on venipuncture sites to achieve adequate hemostasis  - Assess for signs and symptoms of internal bleeding  - Monitor lab trends  - Patient is to report abnormal signs of bleeding to staff  - Avoid use of toothpicks and dental floss  - Use electric shaver for shaving  - Use soft bristle tooth brush  - Limit straining and forceful nose blowing  Outcome: Progressing

## 2022-05-12 LAB
ALBUMIN SERPL-MCNC: 2.4 G/DL (ref 3.4–5)
ANION GAP SERPL CALC-SCNC: 7 MMOL/L (ref 0–18)
APTT PPP: 73 SECONDS (ref 23.3–35.6)
BUN BLD-MCNC: 34 MG/DL (ref 7–18)
BUN/CREAT SERPL: 6.8 (ref 10–20)
CALCIUM BLD-MCNC: 9 MG/DL (ref 8.5–10.1)
CHLORIDE SERPL-SCNC: 103 MMOL/L (ref 98–112)
CO2 SERPL-SCNC: 27 MMOL/L (ref 21–32)
CREAT BLD-MCNC: 5.03 MG/DL
GLUCOSE BLD-MCNC: 125 MG/DL (ref 70–99)
GLUCOSE BLDC GLUCOMTR-MCNC: 120 MG/DL (ref 70–99)
GLUCOSE BLDC GLUCOMTR-MCNC: 134 MG/DL (ref 70–99)
GLUCOSE BLDC GLUCOMTR-MCNC: 162 MG/DL (ref 70–99)
GLUCOSE BLDC GLUCOMTR-MCNC: 171 MG/DL (ref 70–99)
INR BLD: 1.87 (ref 0.8–1.2)
OSMOLALITY SERPL CALC.SUM OF ELEC: 293 MOSM/KG (ref 275–295)
PHOSPHATE SERPL-MCNC: 2.9 MG/DL (ref 2.5–4.9)
POTASSIUM SERPL-SCNC: 5 MMOL/L (ref 3.5–5.1)
PROTHROMBIN TIME: 21.7 SECONDS (ref 11.6–14.8)
SODIUM SERPL-SCNC: 137 MMOL/L (ref 136–145)

## 2022-05-12 PROCEDURE — 99233 SBSQ HOSP IP/OBS HIGH 50: CPT | Performed by: INTERNAL MEDICINE

## 2022-05-12 PROCEDURE — 99231 SBSQ HOSP IP/OBS SF/LOW 25: CPT | Performed by: NURSE PRACTITIONER

## 2022-05-12 PROCEDURE — 99232 SBSQ HOSP IP/OBS MODERATE 35: CPT | Performed by: INTERNAL MEDICINE

## 2022-05-12 RX ORDER — WARFARIN SODIUM 2.5 MG/1
7.5 TABLET ORAL
Status: COMPLETED | OUTPATIENT
Start: 2022-05-12 | End: 2022-05-12

## 2022-05-12 RX ORDER — MORPHINE SULFATE 20 MG/ML
2 SOLUTION ORAL EVERY 6 HOURS PRN
Qty: 15 ML | Refills: 0 | Status: SHIPPED | OUTPATIENT
Start: 2022-05-12

## 2022-05-12 RX ORDER — WARFARIN SODIUM 2.5 MG/1
5 TABLET ORAL
Status: DISCONTINUED | OUTPATIENT
Start: 2022-05-13 | End: 2022-05-13

## 2022-05-12 NOTE — CM/SW NOTE
MDO; [address]  Please ensure that a referral is made for community palliative care and please notify wife of the accepting provider. CM spoke w/Michelle GALEAS ext [phone] of [phone] order. Informed pt plan to dc to [facility] in [facility]. CM/GUDELIA to remain available for support and/or discharge planning.     Pat Lee RN, CCM    Ext.  [phone]

## 2022-05-12 NOTE — OCCUPATIONAL THERAPY NOTE
Attempt made for occupational therapy treatment. Pt at dialysis. Will re-attempt later in the day as schedule allows, pt is appropriate.

## 2022-05-12 NOTE — PLAN OF CARE
Problem: Patient Centered Care  Goal: Patient preferences are identified and integrated in the patient's plan of care  Description: Interventions:  - What would you like us to know as we care for you?  From [address] [place] [address] Drive  - Provide timely, complete, and accurate information to patient/family  - Incorporate patient and family knowledge, values, beliefs, and cultural backgrounds into the planning and delivery of care  - Encourage patient/family to participate in care and decision-making at the level they choose  - Honor patient and family perspectives and choices  Outcome: Progressing     Problem: Diabetes/Glucose Control  Goal: Glucose maintained within prescribed range  Description: INTERVENTIONS:  - Monitor Blood Glucose as ordered  - Assess for signs and symptoms of hyperglycemia and hypoglycemia  - Administer ordered medications to maintain glucose within target range  - Assess barriers to adequate nutritional intake and initiate nutrition consult as needed  - Instruct patient on self management of diabetes  Outcome: Progressing     Problem: Patient/Family Goals  Goal: Patient/Family Long Term Goal  Description: Patient's Long Term Goal: be discharged    Interventions:  -monitor VS  -monitor appropriate labs  -update patient and family on plan of care  -follow MD orders     - See additional Care Plan goals for specific interventions  Outcome: Progressing  Goal: Patient/Family Short Term Goal  Description: Patient's Short Term Goal: wounds to get better    Interventions:   -monitor VS  -monitor appropriate labs  -wound care  -update patient and family on plan of care  -discharge planning  -follow MD orders     - See additional Care Plan goals for specific interventions  Outcome: Progressing     Problem: PAIN - ADULT  Goal: Verbalizes/displays adequate comfort level or patient's stated pain goal  Description: INTERVENTIONS:  - Encourage pt to monitor pain and request assistance  - Assess pain using appropriate pain scale  - Administer analgesics based on type and severity of pain and evaluate response  - Implement non-pharmacological measures as appropriate and evaluate response  - Consider cultural and social influences on pain and pain management  - Manage/alleviate anxiety  - Utilize distraction and/or relaxation techniques  - Monitor for opioid side effects  - Notify MD/LIP if interventions unsuccessful or patient reports new pain  - Anticipate increased pain with activity and pre-medicate as appropriate  Outcome: Progressing     Problem: GASTROINTESTINAL - ADULT  Goal: Minimal or absence of nausea and vomiting  Description: INTERVENTIONS:  - Maintain adequate hydration with IV or PO as ordered and tolerated  - Nasogastric tube to low intermittent suction as ordered  - Evaluate effectiveness of ordered antiemetic medications  - Provide nonpharmacologic comfort measures as appropriate  - Advance diet as tolerated, if ordered  - Obtain nutritional consult as needed  - Evaluate fluid balance  Outcome: Progressing     Problem: METABOLIC/FLUID AND ELECTROLYTES - ADULT  Goal: Glucose maintained within prescribed range  Description: INTERVENTIONS:  - Monitor Blood Glucose as ordered  - Assess for signs and symptoms of hyperglycemia and hypoglycemia  - Administer ordered medications to maintain glucose within target range  - Assess barriers to adequate nutritional intake and initiate nutrition consult as needed  - Instruct patient on self management of diabetes  Outcome: Progressing  Goal: Hemodynamic stability and optimal renal function maintained  Description: INTERVENTIONS:  - Monitor labs and assess for signs and symptoms of volume excess or deficit  - Monitor intake, output and patient weight  - Monitor urine specific gravity, serum osmolarity and serum sodium as indicated or ordered  - Monitor response to interventions for patient's volume status, including labs, urine output, blood pressure (other measures as available)  - Encourage oral intake as appropriate  - Instruct patient on fluid and nutrition restrictions as appropriate  Outcome: Progressing     Problem: HEMATOLOGIC - ADULT  Goal: Maintains hematologic stability  Description: INTERVENTIONS  - Assess for signs and symptoms of bleeding or hemorrhage  - Monitor labs and vital signs for trends  - Administer supportive blood products/factors, fluids and medications as ordered and appropriate  - Administer supportive blood products/factors as ordered and appropriate  Outcome: Progressing  Goal: Free from bleeding injury  Description: (Example usage: patient with low platelets)  INTERVENTIONS:  - Avoid intramuscular injections, enemas and rectal medication administration  - Ensure safe mobilization of patient  - Hold pressure on venipuncture sites to achieve adequate hemostasis  - Assess for signs and symptoms of internal bleeding  - Monitor lab trends  - Patient is to report abnormal signs of bleeding to staff  - Avoid use of toothpicks and dental floss  - Use electric shaver for shaving  - Use soft bristle tooth brush  - Limit straining and forceful nose blowing  Outcome: Progressing  No acute changes overnight. Vitals stable. Heparin drip continued per protocol. PTT 73 this am. No changes to dose/rate. Went to hemodialysis this am. Safety measures in place. Frequent rounding by nursing staff.

## 2022-05-12 NOTE — PLAN OF CARE
Problem: Patient Centered Care  Goal: Patient preferences are identified and integrated in the patient's plan of care  Description: Interventions:  - What would you like us to know as we care for you?  From [address] [place] [address] [address]  - Provide timely, complete, and accurate information to patient/family  - Incorporate patient and family knowledge, values, beliefs, and cultural backgrounds into the planning and delivery of care  - Encourage patient/family to participate in care and decision-making at the level they choose  - Honor patient and family perspectives and choices  Outcome: Progressing     Problem: Diabetes/Glucose Control  Goal: Glucose maintained within prescribed range  Description: INTERVENTIONS:  - Monitor Blood Glucose as ordered  - Assess for signs and symptoms of hyperglycemia and hypoglycemia  - Administer ordered medications to maintain glucose within target range  - Assess barriers to adequate nutritional intake and initiate nutrition consult as needed  - Instruct patient on self management of diabetes  Outcome: Progressing     Problem: PAIN - ADULT  Goal: Verbalizes/displays adequate comfort level or patient's stated pain goal  Description: INTERVENTIONS:  - Encourage pt to monitor pain and request assistance  - Assess pain using appropriate pain scale  - Administer analgesics based on type and severity of pain and evaluate response  - Implement non-pharmacological measures as appropriate and evaluate response  - Consider cultural and social influences on pain and pain management  - Manage/alleviate anxiety  - Utilize distraction and/or relaxation techniques  - Monitor for opioid side effects  - Notify MD/LIP if interventions unsuccessful or patient reports new pain  - Anticipate increased pain with activity and pre-medicate as appropriate  Outcome: Progressing     Problem: GASTROINTESTINAL - ADULT  Goal: Minimal or absence of nausea and vomiting  Description: INTERVENTIONS:  - Maintain adequate hydration with IV or PO as ordered and tolerated  - Nasogastric tube to low intermittent suction as ordered  - Evaluate effectiveness of ordered antiemetic medications  - Provide nonpharmacologic comfort measures as appropriate  - Advance diet as tolerated, if ordered  - Obtain nutritional consult as needed  - Evaluate fluid balance  Outcome: Progressing     Problem: METABOLIC/FLUID AND ELECTROLYTES - ADULT  Goal: Glucose maintained within prescribed range  Description: INTERVENTIONS:  - Monitor Blood Glucose as ordered  - Assess for signs and symptoms of hyperglycemia and hypoglycemia  - Administer ordered medications to maintain glucose within target range  - Assess barriers to adequate nutritional intake and initiate nutrition consult as needed  - Instruct patient on self management of diabetes  Outcome: Progressing  Goal: Hemodynamic stability and optimal renal function maintained  Description: INTERVENTIONS:  - Monitor labs and assess for signs and symptoms of volume excess or deficit  - Monitor intake, output and patient weight  - Monitor urine specific gravity, serum osmolarity and serum sodium as indicated or ordered  - Monitor response to interventions for patient's volume status, including labs, urine output, blood pressure (other measures as available)  - Encourage oral intake as appropriate  - Instruct patient on fluid and nutrition restrictions as appropriate  Outcome: Progressing   HD done today, per HD RN pt educated on fluid overload and potassium levels and fluid intake-education reinforced by this RN at the bedside, call light left within a reach.     Problem: HEMATOLOGIC - ADULT  Goal: Maintains hematologic stability  Description: INTERVENTIONS  - Assess for signs and symptoms of bleeding or hemorrhage  - Monitor labs and vital signs for trends  - Administer supportive blood products/factors, fluids and medications as ordered and appropriate  - Administer supportive blood products/factors as ordered and appropriate  Outcome: Progressing  Goal: Free from bleeding injury  Description: (Example usage: patient with low platelets)  INTERVENTIONS:  - Avoid intramuscular injections, enemas and rectal medication administration  - Ensure safe mobilization of patient  - Hold pressure on venipuncture sites to achieve adequate hemostasis  - Assess for signs and symptoms of internal bleeding  - Monitor lab trends  - Patient is to report abnormal signs of bleeding to staff  - Avoid use of toothpicks and dental floss  - Use electric shaver for shaving  - Use soft bristle tooth brush  - Limit straining and forceful nose blowing  Outcome: Progressing

## 2022-05-12 NOTE — PROGRESS NOTES
[facility] is not contracted at [facility]. Liaison requested to have referral re-referred.     Jung Mcclelland  Residential [facility] Liaison  [phone]

## 2022-05-12 NOTE — CM/SW NOTE
PER nursing rounds, pt is on Heparin gtt. HD. Plan; [address], [facility]. 5/12 nsurance auth received    Pending medical clearance. 2:30pm  CM notified RNMando via epic secure message of order for HEP B PANEL needed to resume HD on site at chosen SNF at  [facility]. CM/SW to send Hep B panel results upon availability in aidin to SNF. CM/SW to remain available for support and/or discharge planning.     Arun Blake RN, [facility]    Ext. [phone]

## 2022-05-13 VITALS
WEIGHT: 169.31 LBS | RESPIRATION RATE: 16 BRPM | HEART RATE: 71 BPM | OXYGEN SATURATION: 92 % | BODY MASS INDEX: 27.21 KG/M2 | TEMPERATURE: 98 F | HEIGHT: 66 IN | SYSTOLIC BLOOD PRESSURE: 122 MMHG | DIASTOLIC BLOOD PRESSURE: 30 MMHG

## 2022-05-13 LAB
ANION GAP SERPL CALC-SCNC: 5 MMOL/L (ref 0–18)
APTT PPP: 66.7 SECONDS (ref 23.3–35.6)
BUN BLD-MCNC: 25 MG/DL (ref 7–18)
BUN/CREAT SERPL: 6.4 (ref 10–20)
CALCIUM BLD-MCNC: 9 MG/DL (ref 8.5–10.1)
CHLORIDE SERPL-SCNC: 103 MMOL/L (ref 98–112)
CO2 SERPL-SCNC: 28 MMOL/L (ref 21–32)
CREAT BLD-MCNC: 3.91 MG/DL
GLUCOSE BLD-MCNC: 132 MG/DL (ref 70–99)
GLUCOSE BLDC GLUCOMTR-MCNC: 142 MG/DL (ref 70–99)
GLUCOSE BLDC GLUCOMTR-MCNC: 143 MG/DL (ref 70–99)
HBV CORE AB SERPL QL IA: NONREACTIVE
HBV SURFACE AB SER QL: NONREACTIVE
HBV SURFACE AB SERPL IA-ACNC: <3.1 MIU/ML
HBV SURFACE AG SER-ACNC: <0.1 [IU]/L
HBV SURFACE AG SERPL QL IA: NONREACTIVE
INR BLD: 1.97 (ref 0.8–1.2)
OSMOLALITY SERPL CALC.SUM OF ELEC: 288 MOSM/KG (ref 275–295)
POTASSIUM SERPL-SCNC: 4.7 MMOL/L (ref 3.5–5.1)
PROTHROMBIN TIME: 22.7 SECONDS (ref 11.6–14.8)
SODIUM SERPL-SCNC: 136 MMOL/L (ref 136–145)

## 2022-05-13 PROCEDURE — 99239 HOSP IP/OBS DSCHRG MGMT >30: CPT | Performed by: INTERNAL MEDICINE

## 2022-05-13 PROCEDURE — 99233 SBSQ HOSP IP/OBS HIGH 50: CPT | Performed by: INTERNAL MEDICINE

## 2022-05-13 RX ORDER — WARFARIN SODIUM 7.5 MG/1
TABLET ORAL
Qty: 30 TABLET | Refills: 5 | Status: SHIPPED | OUTPATIENT
Start: 2022-05-13

## 2022-05-13 RX ORDER — VANCOMYCIN HYDROCHLORIDE 125 MG/1
125 CAPSULE ORAL DAILY
Qty: 14 CAPSULE | Refills: 0 | Status: SHIPPED | OUTPATIENT
Start: 2022-05-13 | End: 2022-05-13

## 2022-05-13 RX ORDER — BUMETANIDE 1 MG/1
TABLET ORAL
Qty: 60 TABLET | Refills: 5 | Status: SHIPPED | OUTPATIENT
Start: 2022-05-13

## 2022-05-13 RX ORDER — MELATONIN
Qty: 30 TABLET | Refills: 3 | Status: SHIPPED | OUTPATIENT
Start: 2022-05-13

## 2022-05-13 RX ORDER — DOXYCYCLINE HYCLATE 100 MG/1
100 CAPSULE ORAL 2 TIMES DAILY
Qty: 42 CAPSULE | Refills: 0 | Status: SHIPPED | OUTPATIENT
Start: 2022-05-13

## 2022-05-13 RX ORDER — ALBUMIN (HUMAN) 12.5 G/50ML
100 SOLUTION INTRAVENOUS AS NEEDED
Qty: 100 ML | Refills: 5 | Status: SHIPPED | OUTPATIENT
Start: 2022-05-13

## 2022-05-13 RX ORDER — WARFARIN SODIUM 5 MG/1
TABLET ORAL
Qty: 30 TABLET | Refills: 5 | Status: SHIPPED | OUTPATIENT
Start: 2022-05-13

## 2022-05-13 RX ORDER — VANCOMYCIN HYDROCHLORIDE 125 MG/1
125 CAPSULE ORAL DAILY
Qty: 21 CAPSULE | Refills: 0 | Status: SHIPPED | OUTPATIENT
Start: 2022-05-13

## 2022-05-13 RX ORDER — AMOXICILLIN AND CLAVULANATE POTASSIUM 500; 125 MG/1; MG/1
1 TABLET, FILM COATED ORAL DAILY
Qty: 21 TABLET | Refills: 0 | Status: SHIPPED | OUTPATIENT
Start: 2022-05-13

## 2022-05-13 NOTE — PLAN OF CARE
Called report to Radha Kim RN. Family at bedside. Wound dressings changed. All belongings gathered. Discharged to [facility]. Problem: Patient Centered Care  Goal: Patient preferences are identified and integrated in the patient's plan of care  Description: Interventions:  - What would you like us to know as we care for you?  From [facility] [address]  - Provide timely, complete, and accurate information to patient/family  - Incorporate patient and family knowledge, values, beliefs, and cultural backgrounds into the planning and delivery of care  - Encourage patient/family to participate in care and decision-making at the level they choose  - Honor patient and family perspectives and choices  Outcome: Adequate for Discharge     Problem: Diabetes/Glucose Control  Goal: Glucose maintained within prescribed range  Description: INTERVENTIONS:  - Monitor Blood Glucose as ordered  - Assess for signs and symptoms of hyperglycemia and hypoglycemia  - Administer ordered medications to maintain glucose within target range  - Assess barriers to adequate nutritional intake and initiate nutrition consult as needed  - Instruct patient on self management of diabetes  Outcome: Adequate for Discharge     Problem: Patient/Family Goals  Goal: Patient/Family Long Term Goal  Description: Patient's Long Term Goal: be discharged    Interventions:  -monitor VS  -monitor appropriate labs  -update patient and family on plan of care  -follow MD orders     - See additional Care Plan goals for specific interventions  Outcome: Adequate for Discharge  Goal: Patient/Family Short Term Goal  Description: Patient's Short Term Goal: wounds to get better    Interventions:   -monitor VS  -monitor appropriate labs  -wound care  -update patient and family on plan of care  -discharge planning  -follow MD orders     - See additional Care Plan goals for specific interventions  Outcome: Adequate for Discharge     Problem: PAIN - ADULT  Goal: Verbalizes/displays adequate comfort level or patient's stated pain goal  Description: INTERVENTIONS:  - Encourage pt to monitor pain and request assistance  - Assess pain using appropriate pain scale  - Administer analgesics based on type and severity of pain and evaluate response  - Implement non-pharmacological measures as appropriate and evaluate response  - Consider cultural and social influences on pain and pain management  - Manage/alleviate anxiety  - Utilize distraction and/or relaxation techniques  - Monitor for opioid side effects  - Notify MD/LIP if interventions unsuccessful or patient reports new pain  - Anticipate increased pain with activity and pre-medicate as appropriate  Outcome: Adequate for Discharge     Problem: GASTROINTESTINAL - ADULT  Goal: Minimal or absence of nausea and vomiting  Description: INTERVENTIONS:  - Maintain adequate hydration with IV or PO as ordered and tolerated  - Nasogastric tube to low intermittent suction as ordered  - Evaluate effectiveness of ordered antiemetic medications  - Provide nonpharmacologic comfort measures as appropriate  - Advance diet as tolerated, if ordered  - Obtain nutritional consult as needed  - Evaluate fluid balance  Outcome: Adequate for Discharge     Problem: METABOLIC/FLUID AND ELECTROLYTES - ADULT  Goal: Glucose maintained within prescribed range  Description: INTERVENTIONS:  - Monitor Blood Glucose as ordered  - Assess for signs and symptoms of hyperglycemia and hypoglycemia  - Administer ordered medications to maintain glucose within target range  - Assess barriers to adequate nutritional intake and initiate nutrition consult as needed  - Instruct patient on self management of diabetes  Outcome: Adequate for Discharge  Goal: Hemodynamic stability and optimal renal function maintained  Description: INTERVENTIONS:  - Monitor labs and assess for signs and symptoms of volume excess or deficit  - Monitor intake, output and patient weight  - Monitor urine specific gravity, serum osmolarity and serum sodium as indicated or ordered  - Monitor response to interventions for patient's volume status, including labs, urine output, blood pressure (other measures as available)  - Encourage oral intake as appropriate  - Instruct patient on fluid and nutrition restrictions as appropriate  Outcome: Adequate for Discharge     Problem: HEMATOLOGIC - ADULT  Goal: Maintains hematologic stability  Description: INTERVENTIONS  - Assess for signs and symptoms of bleeding or hemorrhage  - Monitor labs and vital signs for trends  - Administer supportive blood products/factors, fluids and medications as ordered and appropriate  - Administer supportive blood products/factors as ordered and appropriate  Outcome: Adequate for Discharge  Goal: Free from bleeding injury  Description: (Example usage: patient with low platelets)  INTERVENTIONS:  - Avoid intramuscular injections, enemas and rectal medication administration  - Ensure safe mobilization of patient  - Hold pressure on venipuncture sites to achieve adequate hemostasis  - Assess for signs and symptoms of internal bleeding  - Monitor lab trends  - Patient is to report abnormal signs of bleeding to staff  - Avoid use of toothpicks and dental floss  - Use electric shaver for shaving  - Use soft bristle tooth brush  - Limit straining and forceful nose blowing  Outcome: Adequate for Discharge

## 2022-05-13 NOTE — PLAN OF CARE
No acute changes overnight. Vitals stable. PRN pain medication provided. Safety measures in place. Frequent rounding by nursing staff. Problem: Patient Centered Care  Goal: Patient preferences are identified and integrated in the patient's plan of care  Description: Interventions:  - What would you like us to know as we care for you?  From [address]  - Provide timely, complete, and accurate information to patient/family  - Incorporate patient and family knowledge, values, beliefs, and cultural backgrounds into the planning and delivery of care  - Encourage patient/family to participate in care and decision-making at the level they choose  - Honor patient and family perspectives and choices  Outcome: Progressing     Problem: Diabetes/Glucose Control  Goal: Glucose maintained within prescribed range  Description: INTERVENTIONS:  - Monitor Blood Glucose as ordered  - Assess for signs and symptoms of hyperglycemia and hypoglycemia  - Administer ordered medications to maintain glucose within target range  - Assess barriers to adequate nutritional intake and initiate nutrition consult as needed  - Instruct patient on self management of diabetes  Outcome: Progressing     Problem: Patient/Family Goals  Goal: Patient/Family Long Term Goal  Description: Patient's Long Term Goal: be discharged    Interventions:  -monitor VS  -monitor appropriate labs  -update patient and family on plan of care  -follow MD orders     - See additional Care Plan goals for specific interventions  Outcome: Progressing  Goal: Patient/Family Short Term Goal  Description: Patient's Short Term Goal: wounds to get better    Interventions:   -monitor VS  -monitor appropriate labs  -wound care  -update patient and family on plan of care  -discharge planning  -follow MD orders     - See additional Care Plan goals for specific interventions  Outcome: Progressing     Problem: PAIN - ADULT  Goal: Verbalizes/displays adequate comfort level or patient's stated pain goal  Description: INTERVENTIONS:  - Encourage pt to monitor pain and request assistance  - Assess pain using appropriate pain scale  - Administer analgesics based on type and severity of pain and evaluate response  - Implement non-pharmacological measures as appropriate and evaluate response  - Consider cultural and social influences on pain and pain management  - Manage/alleviate anxiety  - Utilize distraction and/or relaxation techniques  - Monitor for opioid side effects  - Notify MD/LIP if interventions unsuccessful or patient reports new pain  - Anticipate increased pain with activity and pre-medicate as appropriate  Outcome: Progressing     Problem: GASTROINTESTINAL - ADULT  Goal: Minimal or absence of nausea and vomiting  Description: INTERVENTIONS:  - Maintain adequate hydration with IV or PO as ordered and tolerated  - Nasogastric tube to low intermittent suction as ordered  - Evaluate effectiveness of ordered antiemetic medications  - Provide nonpharmacologic comfort measures as appropriate  - Advance diet as tolerated, if ordered  - Obtain nutritional consult as needed  - Evaluate fluid balance  Outcome: Progressing     Problem: METABOLIC/FLUID AND ELECTROLYTES - ADULT  Goal: Glucose maintained within prescribed range  Description: INTERVENTIONS:  - Monitor Blood Glucose as ordered  - Assess for signs and symptoms of hyperglycemia and hypoglycemia  - Administer ordered medications to maintain glucose within target range  - Assess barriers to adequate nutritional intake and initiate nutrition consult as needed  - Instruct patient on self management of diabetes  Outcome: Progressing  Goal: Hemodynamic stability and optimal renal function maintained  Description: INTERVENTIONS:  - Monitor labs and assess for signs and symptoms of volume excess or deficit  - Monitor intake, output and patient weight  - Monitor urine specific gravity, serum osmolarity and serum sodium as indicated or ordered  - Monitor response to interventions for patient's volume status, including labs, urine output, blood pressure (other measures as available)  - Encourage oral intake as appropriate  - Instruct patient on fluid and nutrition restrictions as appropriate  Outcome: Progressing     Problem: HEMATOLOGIC - ADULT  Goal: Maintains hematologic stability  Description: INTERVENTIONS  - Assess for signs and symptoms of bleeding or hemorrhage  - Monitor labs and vital signs for trends  - Administer supportive blood products/factors, fluids and medications as ordered and appropriate  - Administer supportive blood products/factors as ordered and appropriate  Outcome: Progressing  Goal: Free from bleeding injury  Description: (Example usage: patient with low platelets)  INTERVENTIONS:  - Avoid intramuscular injections, enemas and rectal medication administration  - Ensure safe mobilization of patient  - Hold pressure on venipuncture sites to achieve adequate hemostasis  - Assess for signs and symptoms of internal bleeding  - Monitor lab trends  - Patient is to report abnormal signs of bleeding to staff  - Avoid use of toothpicks and dental floss  - Use electric shaver for shaving  - Use soft bristle tooth brush  - Limit straining and forceful nose blowing  Outcome: Progressing

## 2022-05-13 NOTE — CM/SW NOTE
DC order placed. GUDELIA sent DC summary to [facility]. GUDELIA spoke with Delfina/liaison with [facility]. Per Alexandra Koch, willing to accept pt today once HD has been approved. GUDELIA sent hep penal this AM for review. Insurance auth for [facility] has been approved. PLAN: DC to [facility] today 5/13 - pending HD approval     [facility] ([phone]) placed on will call, PCS done (5/13)    @12:30PM  GUDELIA received confirmation from Alexandra Koch with [facility] that pt has been approved for onsite HD and pt is able to admit at any time. Res Palliative does not follow at this facility. Alexandra Koch confirmed facility GUDELIA will ensure contracted [address] ([facility]) will f/up with pt/family upon admission. GUDELIA notified Charles/son of DC for today. GUDELIA discussed DC with MD and RN. PLAN: DC to [facility] today 5/13 - @ 1:30PM  Report # [phone] room 229-1  PCS done, ride w [facility]     SW remains available for support and/or discharge planning. Please do not hesitate to call/chat GUDELIA if further DC needs arise.      Mellisa GRANT, [facility]  Ext [phone]

## 2022-05-13 NOTE — SPIRITUAL CARE NOTE
Pt was sleeping in bed in a partially lit room. Pt is being treated L foot wound.  left SCT card at bedside.  prayed outside the room and will revisit pt.

## 2022-05-16 NOTE — CM/SW NOTE
SW received VM from pt's spouse. Spouse inquiring about transferring [facility] facilities.  SW directed spouse to discuss transfers directly with SW at [facility] (facility pt is currently at)    Abdullahi GRANT, [facility]  Ext [phone]

## 2022-05-31 NOTE — ED QUICK NOTES
Orders for admission, patient is aware of plan and ready to go upstairs. Any questions, please call ED RN Anival at extension [phone].      Patient Covid vaccination status: Unvaccinated     COVID Test Ordered in ED: Rapid SARS-CoV-2 by PCR    COVID Suspicion at Admission: Low clinical suspicion for COVID    Running Infusions:      Mental Status/LOC at time of transport: AXOX2-3    Other pertinent information:   CIWA score: N/A   NIH score:  N/A
PATIENT'S BLOOD CONSENT SIGNED BY FAMILY MEMBER WITNESSED WITH THIS RN AND PATIENT.
Pt and family at bedside reports pt fell yesterday onto R ribs and R hip, site tender to the touch, no bruising noted, or pain below R hip. No LOC and denies hitting head. MD notified, Espiridion Braver ordered.
Pt aware of need for urine sample, given urinal, encouraged to use call light if need to use bathroom, pt verbalized understanding of plan.
Pt to ED c.o swelling to BLE, 2+ pitting edema noted, pt skin is pale/cool, pt is on RD tues, thurs, sat, with perma cath to R chest. Denies dizziness, SOB. AXOX2-3, GCS 14.
Report given to PCU RN [facility] [place]. Will bring patient to floor.
Detail Level: Detailed

## 2022-06-01 ENCOUNTER — PATIENT OUTREACH (OUTPATIENT)
Dept: CASE MANAGEMENT | Age: 82
End: 2022-06-01

## 2023-03-02 NOTE — RESPIRATORY THERAPY NOTE
"    [facility] Cardiothoracic Surgery  PROGRESS NOTE   CC: Ascending aortic aneurysm    Subjective:     Mr. Campos is a 63-year-old man who I followed for a ascending aortic aneurysm, bicuspid aortic valve and moderate aortic insufficiency. At last visit, him and I discussed that he meets indication for aneurysm repair based on new AHA ACC guidelines that were published in December 2022. He has undergone heart cath since then that shows normal coronaries. He is asymptomatic from his aneurysmal disease. He returns today to further discuss surgical intervention on his aneurysm. The patient is accompanied by his sister.    In summary The patient followed up today to discuss surgical treatment for his ascending aortic aneurysm and has elected to have surgery. He has expressed his approval of his current care and maintains he would like to do the best thing he can for his health. The patient has confirmed he has dentures. The patient and his sister were advised on the length of time needed to complete the procedure after her inquiry. The patient inquired on what medication to take prior and was advised to continue taking baby aspirin. The patient notes he was supposed to move residences and maintains he is going to wait until his recovery is complete. The patient states he continues to smoke and maintains it is hard for him to quit.    ROS:   No fevers, chills or recent illness.    Objective:      /74   Pulse 77   Ht 160 cm (62.99\")   Wt 56.2 kg (124 lb)   SpO2 98%   BMI 21.97 kg/m²       PE:  Vitals:    03/02/23 0808   BP: 128/74   Pulse: 77   SpO2: 98%     GENERAL: NAD, resting comfortably, normal color  CARDIOVASCULAR: regular, regular rate, sinus  PULMONARY: Normal bilateral breath sounds, no labored breathing  ABDOMEN: soft, nontender/nondistended  EXTREMITIES: mild peripheral edema, normal pulses, normal ROM        Lab Results (last 72 hours)     ** No results found for the last 72 " Pt stated does not wear cpap/bipap hours. **            Assessment & Plan     Mr. Campos is a 63-year-old male who presented with an ascending aortic aneurysm and moderate aortic insufficiency of a bicuspid aortic valve. He is overall asymptomatic. His aortic area to height ratio is greater than 10 cm/m². Given this, he meets a class II a recommendation for aneurysmal disease and we'll treat his aortic valve at the time of surgery. He returns today to further discuss the surgical plan with his sister.     We discussed at length preoperative, operative and postoperative expectations of aortic root and hemiarch replacement with biologic valve conduit. We discussed the risk and surgery at length, including but not limited to bleeding, infection, injury to major organs or vessels, chronic heart failure, arrhythmias, need for pacemaker, prolonged ventilation, renal failure, stroke, risk of anesthesia, and risk of sternal wound or bone healing problems and or death. He understands the risks and agrees to proceed. We will plan on his surgery sometime in mid March. He has dentures and will not need dental clearance. I have reviewed his other preoperative testing. He is a good candidate for surgery.    Thank you for trust me his care of Mr. Campos. Please do not hesitate to call with questions or concerns.        Freddy Brasher MD   Cardiothoracic Surgeon    Transcribed from ambient dictation for Freddy Brasher MD by Maurilio Mathew.  03/02/23   10:00 CST    Patient or patient representative verbalized consent to the visit recording.  I have personally performed the services described in this document as transcribed by the above individual, and it is both accurate and complete.

## 2023-05-10 NOTE — CONSULTS
[facility]    Report of Consultation    Ace Asya Patient Status:  Inpatient    3/28/1940 MRN [id number]   Location [facility] 2W/SW Attending Woo Montoya MD   Hosp Day # 0 PCP Margarita Osuna MD     Date of Adm Procedure Laterality Date   • CABG     • CHOLECYSTECTOMY     • COLONOSCOPY N/A 6/26/2020    Procedure: COLONOSCOPY;  Surgeon: Pat Everett MD;  Location: [address] [place] [address] ENDOSCOPY   • COLONOSCOPY N/A 9/8/2020    Procedure: COLONOSCOPY;  Surgeon: Talat Patino, Insecurity:       Worried About Running Out of Food in the Last Year: Not on file      Ran Out of Food in the Last Year: Not on file  Transportation Needs:       Lack of Transportation (Medical): Not on file      Lack of Transportation (Non-Medical):  Not o 1-7 Units, 1-7 Units, Subcutaneous, TID CC      metoprolol tartrate 25 MG Oral Tab, Take 1 tablet (25 mg total) by mouth 2 (two) times daily. calcitriol 0.25 MCG Oral Cap, Take 1 capsule (0.25 mcg total) by mouth daily.   docusate sodium 100 MG Oral Cap, T hemoptysis and wheezing  Cardiovascular: negative for chest pain, exertional dyspnea, lower extremity edema and palpitations  Gastrointestinal: negative for abdominal pain, diarrhea and nausea  Genitourinary:negative for dysuria, frequency and hematuria  H 10/04/2021    CO2 22.0 10/04/2021     (H) 10/04/2021    CA 9.3 10/04/2021    ALB 2.8 (L) 10/04/2021    ALKPHO 104 10/04/2021    BILT 0.5 10/04/2021    TP 7.5 10/04/2021    AST 24 10/04/2021    ALT 27 10/04/2021    PTT 94.3 (H) 08/31/2021    INR 4.08 dysfunction     Chronic atrial fibrillation (HCC)     Atrial fibrillation with rapid ventricular response (HCC)     Hyperglycemia     Bloating     Floaters, bilateral     Atherosclerosis of aorta (HCC)     ICD (implantable cardioverter-defibrillator) in pl DISPLAY PLAN FREE TEXT

## 2023-06-19 NOTE — ANESTHESIA POSTPROCEDURE EVALUATION
Last OV 11/07/2022      Last fill  05/17/2023       Next OV none      Last Labs 11/07/2022       Patient: Oly Ruby    Procedure Summary     Date:  06/26/20 Room / Location:  [facility] ENDOSCOPY 01 / [facility] ENDOSCOPY    Anesthesia Start:  6694 Anesthesia Stop:      Procedure:  COLONOSCOPY (N/A ) Diagnosis:  (Polyps, diverticulosis)    Surgeon:  Lurdes Vaughan

## 2023-09-06 NOTE — TELEPHONE ENCOUNTER
Head, normocephalic, atraumatic, External nose normal appearance, , Head, normocephalic, atraumatic, External nose normal appearance, To Dr. Stef Park---    Do you have any further recommendations?  Patient is calling

## 2023-09-28 NOTE — TELEPHONE ENCOUNTER
Case Management Assessment  Initial Evaluation    Date/Time of Evaluation: 9/28/2023 9:40 AM  Assessment Completed by: Gabi Gamboa RN    If patient is discharged prior to next notation, then this note serves as note for discharge by case management. Patient Name: Minnie Brady                   YOB: 1952  Diagnosis: Back pain [M54.9]  Intractable back pain [M54.9]                   Date / Time: 9/27/2023 11:35 AM    Patient Admission Status: Observation   Readmission Risk (Low < 19, Mod (19-27), High > 27): Readmission Risk Score: 18.5    Current PCP: Rico Hook MD  PCP verified by CM? Yes    Chart Reviewed: Yes      History Provided by: Patient, Medical Record  Patient Orientation: Alert and Oriented    Patient Cognition: Alert    Hospitalization in the last 30 days (Readmission):  No    If yes, Readmission Assessment in  Navigator will be completed. Advance Directives:      Code Status: DNR   Patient's Primary Decision Maker is: Legal Next of Kin    Primary Decision Maker: Elida Lazcano - Child - [phone]    Secondary Decision Maker: Kira Flores - Niece/Nephew - [phone]    Supplemental (Other) Decision Maker: Destinee Rivers - [phone]    Discharge Planning:    Patient lives with: Spouse/Significant Other (ex-) Type of Home: Other (Comment) ([place])  Primary Care Giver: Self  Patient Support Systems include: Children, Family Members   Current Financial resources: Medicare  Current community resources: None  Current services prior to admission: Durable Medical Equipment            Current DME: Shower Chair, Cane, Walker (quad cane, RW)            Type of Home Care services:  None    ADLS  Prior functional level: Independent in ADLs/IADLs  Current functional level: Independent in ADLs/IADLs      Family can provide assistance at DC:  Yes  Would you like Case Management to discuss the discharge plan with any other family members/significant others, and if Pt notified (VM) that referrals have been done.

## 2023-10-25 NOTE — LETTER
20        To Whom It May Concern:      Khushi Lynn  :  3/28/1940    []  Patient has been cleared to hold Coumadin for 3-5 days prior to facet joint injections.   Holding the medication(s) may put the patient at an increased risk for stroke, Dapsone Pregnancy And Lactation Text: This medication is Pregnancy Category C and is not considered safe during pregnancy or breast feeding.

## 2024-01-02 NOTE — TELEPHONE ENCOUNTER
Physical Therapy    Visit Type: treatment  SUBJECTIVE  Patient agreed to participate in therapy this date.  Patient sitting in bedside chair, agreeable to work with PT   Patient / Family Goal: maximize function and return home    Pain   RN informed on pain level.    Pain severity now: not formally rated.     OBJECTIVE      Patient Activity Tolerance: 1 to 1 activity to rest       Sitting Balance  (LAURA = base of support)  Static      - Trial 1 details: modified independent  Dynamic      - Trial 1 details: modified independent    Standing Balance  (LAURA = base of support)  Firm Surface: Double Leg      - Static, Eyes Open       - Trial 1 details: modified independent and with double UE support     - Dynamic, Eyes Open       - Trial 1 details: with double UE support and supervision       Bed Mobility  Not observed   Transfers  Assistive devices: 2-wheeled walker, gait belt  - Sit to stand: modified independent  - Stand to sit: modified independent  No assist provided     Ambulation / Gait  - Assistive device: gait belt and two-wheeled walker  - Distance (feet unless otherwise indicated): 60  - Assist Level: supervision  - Surface: even  - Description: antalgic, decreased romaine/pace, step through and forward flexed at hips  No unsteadiness or loss of balance observed. Cues to maintain close proximity to her walker. Patient deferred further ambulation or additional trials due to increased fatigue and neuropathy.    Discussed with patient about taking a seat on her 4WW at discharge, patient agreeable.     Ambulation / Gait (additional trials)  - Assist Level: contact guard/touching/steadying assist  - Assistive device: gait belt and single point cane  - Distance (feet unless otherwise indicated): 5  - Description: decreased romaine/pace, unsteady and shuffling  Minimal unsteadiness observed when walking with her cane. Educated the patient to continue with use of her 2WW     Interventions     Training provided: activity  Pt calling to follow up with RN regarding update on condition.  Please call thank you [phone] tolerance, balance retraining, bed mobility training, energy conservation, functional ambulation, gait training, positioning, safety training, transfer training and use of assistive device  Stressed importance of walking multiple times per day with nursing to assist with activity tolerance     Patient notes concerns with her PCW being unable to provide assistance due to her having COVID, discussed with RN and .  Skilled input: Verbal instruction/cues  Verbal Consent: Writer verbally educated and received verbal consent for hand placement, positioning of patient, and techniques to be performed today from patient for clothing adjustments for techniques, therapist position for techniques and hand placement and palpation for techniques as described above and how they are pertinent to the patient's plan of care.         Education:   - Present and ready to learn: patient  Education provided during session:  - Results of above outlined education: Verbalizes understanding    ASSESSMENT   Progress: progressing toward goals  Interferring components: decreased activity tolerance and medical status limitations    Discharge needs based on today's assessment:   - Current level of function: slightly below baseline level of function   - Therapy needs at discharge: therapy 1-3 times per week   - Activities of daily living (ADLs) requiring support at discharge: ambulation   - Instrumental activities of daily living (IADLs) requiring support at discharge: home management   - Impairments that require further therapy intervention: activity tolerance, strength and pain    AM-PAC  - Generalized Prior Level of Function: IND/MOD I ([facility] 22-24)       Key: MOD A=moderate assistance, IND/MOD I=independent/modified independent  - Generalized Current Level of Function     - Current Mobility Score: 22       AM-PAC Scoring Key= >21 Modified Independent; 20-21 Supervision; 18-19 Minimal assist; 13-17 Moderate assist; 9-12 Max assist;  <9 Total assist      PLAN (while hospitalized)  Suggestions for next session as indicated: Prolonged ambulation for household distances (100 feet) and home exercise program   PT Frequency: 3-5 x per week      PT/OT Mobility Equipment for Discharge: has 4WW and cane  Agreement to plan and goals: patient agrees with goals and treatment plan        GOALS  Review Date: 1/5/2024  Long Term Goals: (to be met by time of discharge from hospital)  Sit to supine: Patient will complete sit to supine modified independent.  Supine to sit: Patient will complete supine to sit modified independent.  Sit to stand: Patient will complete sit to stand transfer with least restrictive device, modified independent.   Ambulation (even): Patient will ambulate on even surface for 75 feet with least restrictive device, modified independent.   Documented in the chart in the following areas: Assessment/Plan.      Patient at End of Session:   Location: in chair  Safety measures: call light within reach and equipment intact  Handoff to: nurse      Therapy procedure time and total treatment time can be found documented on the Time Entry flowsheet

## 2024-01-17 NOTE — TELEPHONE ENCOUNTER
Dr. Arian Garcia    There are no rooms at [facility] at 1pm available. Could you see him at 11:30pm or 12:00pm?  I got the ok for that time from [place].     Thank you consult

## 2024-05-23 NOTE — PROGRESS NOTES
[facility] - [facility]    Progress Note    Honey Milder Patient Status:  Inpatient    3/28/1940 MRN [id number]   Location [facility] 3W/SW Attending Jefferson Suresh MD   [facility] Day # 7 PCP Mane Pryor MD         Assessment and Has been having sinus congestion over weekend.   Taking  Zyrtec.   I sent Flonase to use daily for a week   per Dr. Governor Lóen  On Protonix 40mg/day; carafate stopped recently  Iron levels normal; Fe 115, TIBC, 203, iron sat 57%  Hgb  7.9> 8.5> 8.4> 8.7> 9.3; will monitor      Elevated liver transaminases  AST on admit 24>2676 >>917> 458> 215> 73  ALT on admit 27>168 CP           OBJECTIVE:  PHYSICAL EXAM:     Vital signs in last 24 hours:  /83 (BP Location: Right arm)   Pulse 70   Temp 98.1 °F (36.7 °C) (Oral)   Resp 16   Ht 5' 7\" (1.702 m)   Wt 145 lb 12.8 oz (66.1 kg)   SpO2 100%   BMI 22.84 kg/m²     Intake/ Oral, TID PRN  glucose (DEX4) oral liquid 15 g, 15 g, Oral, Q15 Min PRN   Or  glucose-vitamin C (DEX-4) chewable tab 4 tablet, 4 tablet, Oral, Q15 Min PRN   Or  dextrose 50 % injection 50 mL, 50 mL, Intravenous, Q15 Min PRN   Or  glucose (DEX4) oral liquid 10/9/2021  CONCLUSION:   Trace left pleural effusion. Mild patchy subpleural left lower lobe adjacent consolidation which may reflect adjacent passive atelectasis with mild superimposed pneumonia not excluded.   Additional minimal patchy subpleural consoli

## 2024-12-13 NOTE — PLAN OF CARE
[facility] [place] Hearing screening results were discussed with parent. Questions answered. Brochure given to parent. Advised to monitor developmental milestones and contact physician for any concerns.   Molly Rao, AuD     Weaned oxygen down to 2L, midodrine given for low blood pressure, C.T. chest today, occupational therapy, continuing antibiotics, insulin as needed    Problem: Patient Centered Care  Goal: Patient preferences are identified and integrated in the patient's cardiac output  - Evaluate effectiveness of vasoactive medications to optimize hemodynamic stability  - Monitor arterial and/or venous puncture sites for bleeding and/or hematoma  - Assess quality of pulses, skin color and temperature  - Assess for signs o

## 2024-12-30 NOTE — PROGRESS NOTES
[facility] dosing service    Follow-up Pharmacokinetic Consult for Vancomycin Dosing     05/3/2022    Zheng Manzanares is a 80year old patient who is being treated for osteomyelitis. Patient is on day 5 of Vancomycin and is currently receiving appropriate supplemental doses of Vancomycin IV after dialysis. Dialysis Details:  Patient dialysis schedule is Tue/Thu/Sat  Last dialysis was 5/3, next HD on 5/5    Levels:  Pre-dialysis random. 18.5 mcg/mL      Based on the above:    1. Give Vancomycin at 500 mg IVPB based on pharmacokinetics and renal function. 2.  Pharmacy will recheck a vancomycin random level  prior to 3rd HD session . Goal pre-dialysis level is 15-20 mcg/mL which is likely to achieve the goal AUC24 of 400 to 600 mg-h/L.    3. Pharmacy will follow and monitor renal function, toxicity and efficacy. We appreciate the opportunity to assist in the care of this patient.     Tiana West, [facility]  5/5/2022  7:37 AM  [address]: [phone] Statement Selected

## (undated) DIAGNOSIS — Z01.00 DIABETIC EYE EXAM (HCC): Primary | ICD-10-CM

## (undated) DIAGNOSIS — E11.9 DIABETIC EYE EXAM (HCC): Primary | ICD-10-CM

## (undated) DEVICE — Device: Brand: CUSTOM PROCEDURE KIT

## (undated) DEVICE — CAPSULE ENDO PILL CAM SB3

## (undated) DEVICE — FORCEP RADIAL JAW 4

## (undated) DEVICE — FIAPC® PROBE W/ FILTER 2200 A OD 2.3MM/6.9FR; L 2.2M/7.2FT: Brand: ERBE

## (undated) DEVICE — ORISE GEL

## (undated) DEVICE — 35 ML SYRINGE REGULAR TIP: Brand: MONOJECT

## (undated) DEVICE — REM POLYHESIVE ADULT PATIENT RETURN ELECTRODE: Brand: VALLEYLAB

## (undated) DEVICE — TRAP 4 CPTR CHMBR N EZ INLN

## (undated) DEVICE — LINE MNTR ADLT SET O2 INTMD

## (undated) DEVICE — SNARE ENDOSCOPIC 10MM ROUND

## (undated) DEVICE — STERILE LATEX POWDER-FREE SURGICAL GLOVESWITH NITRILE COATING: Brand: PROTEXIS

## (undated) DEVICE — CONMED SCOPE SAVER BITE BLOCK, 20X27 MM: Brand: SCOPE SAVER

## (undated) DEVICE — NEEDLE CONTRAST INTERJECT 25G

## (undated) DEVICE — Device: Brand: DEFENDO AIR/WATER/SUCTION AND BIOPSY VALVE

## (undated) DEVICE — MEDI-VAC NON-CONDUCTIVE SUCTION TUBING 6MM X 1.8M (6FT.) L: Brand: CARDINAL HEALTH

## (undated) NOTE — LETTER
[address] [place] [address]  Authorization for Invasive Procedures  1.  I hereby authorize Dr. Yoseph Mcdonald , my physician and whomever may be designated as the doctor's assistant, to perform the following operation and/or procedure:  Right Hea performed for the purposes of advancing medicine, science, and/or education, provided my identity is not revealed. If the procedure has been videotaped, the physician/surgeon will obtain the original videotape.  The hospital will not be responsible for stor My signature below affirms that prior to the time of the procedure, I have explained to the patient and/or his legal representative, the risks and benefits involved in the proposed treatment and any reasonable alternative to the proposed treatment.  I have

## (undated) NOTE — LETTER
[address]   Date:   11/17/2020     Name:   Zully Cotton    YOB: 1940   MRN:   [id number]       WHERE IS YOUR PAIN NOW? Marcos the areas on your body where you feel the described sensations.   Use the appropria

## (undated) NOTE — IP AVS SNAPSHOT
[facility]            (For Outpatient Use Only) Initial Admit Date: 2022   Inpt/Obs Admit Date: Inpt: 22 / Obs: N/A   Discharge Date:    Tayla Larson:  [de-identified]   MRN: [de-identified]   CSN: [id number]   CEID: [id number]        ENCOUNTER  Patient Class: Inpatient Admitting Provider: Christian Alvarez MD Unit: [address] [facility] Service: Medical Attending Provider: Christian Alvarez MD   Bed: 521-A   Visit Type:   Referring Physician: No ref. provider found Billing Flag:    Admit Diagnosis: ESRD on hemodialysis ([facility] [address] [N18.6, Z99.2]      PATIENT  Legal Name:   Jalyn Hansen    Legal Sex: Male  Gender ID: Female             [address] [facility] Floor Name:    PCP:  Itzel Ward MD Home: [phone]   Address:  [address] [place] [address] : 3/28/1940 (82 yrs) Mobile: [phone]         City/State/Zip: [place] [address] [facility] Marital:  Language: Katerin pabon: Ed SSN4: [id number] Church: Denominational - Parish Not *     Race: White Ethnicity: Non  Or [address] [place] [address]   Name Relationship Legal Guardian? Home Phone Work Phone Mobile Phone   1. Charles Burk  2.  Winifred Burk Tung Botello    [phone]     GUARANTOR  Guarantor: Jalyn Hansen : 3/28/1940 Home Phone: [phone]   Address: [address] [place] [address]  Sex: Male Work Phone:    City/State/Zip: [place] [address] [facility]   Rel. to Patient: Self Guarantor ID: [id number]   [address]   Employer:  Status: RETIRED     COVERAGE  PRIMARY INSURANCE   [facility]   Group Number: [id number] Insurance Type: [id number] Name: Moe Dawson : 1940   Subscriber ID: [id number] Pt Rel to Subscriber: Self   SECONDARY INSURANCE   Payor:  Plan:    Group Number:  Insurance Type:    Subscriber Name:  Subscriber :    Subscriber ID:  Pt Rel to Subscriber:    TERTIARY INSURANCE   Payor:  Plan:    Group Number: Insurance Type:    Subscriber Name:  Subscriber :    Subscriber ID:  Pt Rel to Subscriber:    Hospital Account Financial Class: Medicare Advantage    May 13, 2022

## (undated) NOTE — ED AVS SNAPSHOT
Arlinda Jeans   MRN: [id number]    Department:  [facility] Emergency Department   Date of Visit:  4/8/2018           Disclosure     Insurance plans vary and the physician(s) referred by the ER may not be covered by your plan.  Please conta within the next three months to obtain basic health screening including reassessment of your blood pressure.     IF THERE IS ANY CHANGE OR WORSENING OF YOUR CONDITION, CALL YOUR PRIMARY CARE PHYSICIAN AT ONCE OR RETURN IMMEDIATELY TO THE EMERGENCY DEPARTMEN

## (undated) NOTE — LETTER
[facility]  Administration of Anesthesia  1.  Fabiano Acevedo, or _________________________________ acting on his behalf, (Patient) (Dependent/Representative) request to receive anesthesia for my pending procedure/operation/treatment infections, high spinal block, spinal bleeding, seizure, cardiac arrest and death. 7. AWARENESS: I understand that it is possible (but unlikely) to have explicit memory of events from the operating room while under general anesthesia.   8. ELECTROCONVULSIV unconscious pt /Relationship    My signature below affirms that prior to the time of the procedure, I have explained to the patient and/or his/her guardian, the risks and benefits of undergoing anesthesia, as well as any reasonable alternatives.     _______

## (undated) NOTE — Clinical Note
Dear Paula Carrasquillo,    I had the opportunity to see your patient Leobardo Robert recently. I am sending you this update, and I appreciate your confidence in me to care for your patients.  Please feel free call me with any questions at [phone] or contact me

## (undated) NOTE — LETTER
[address]   Date:   3/17/2020     Name:   Amanda Ruth    YOB: 1940   MRN:   [id number]       WHERE IS YOUR PAIN NOW? Marcos the areas on your body where you feel the described sensations.   Use the appropriat

## (undated) NOTE — Clinical Note
Dear Ramírez Diehl,    I had the opportunity to see your patient Delle Party recently. I am sending you this update, and I appreciate your confidence in me to care for your patients.  Please feel free call me with any questions at [phone] or contact me

## (undated) NOTE — LETTER
[address], [place]  Authorization for Invasive Procedures  1.  I hereby authorize  Dr. Estrada Dural my physician and whomever may be designated as the doctor's assistant, to perform the following operation and/or procedure: Ileana Hollingsworth performed for the purposes of advancing medicine, science, and/or education, provided my identity is not revealed. If the procedure has been videotaped, the physician/surgeon will obtain the original videotape.  The hospital will not be responsible for stor My signature below affirms that prior to the time of the procedure, I have explained to the patient and/or his legal representative, the risks and benefits involved in the proposed treatment and any reasonable alternative to the proposed treatment.  I have

## (undated) NOTE — LETTER
[address], [place]  Authorization for Invasive Procedures  1.  I hereby authorize     , my physician and whomever may be designated as the doctor's assistant, to perform the following operation and/or procedure:  Hemodialysis occur: fever and allergic reactions, hemolytic reactions, transmission of disease such as hepatitis, AIDS, cytomegalovirus (CMV), and flluid overload.  In the event that I wish to have autologous transfusions of my own blood, or a directed donor transfusion Signature of Patient:  ________________________________________________ Date: _________Time: _________    Responsible person in case of minor or unconscious: _____________________________Relationship: ____________     Witness Signature: _______________

## (undated) NOTE — LETTER
[facility], [place]  Authorization for Invasive Procedures  1.  I hereby authorize Dr. Cristhian Jorge , my physician and whomever may be designated as the doctor's assistant, to perform the following operation and/or procedure:  BiVentricu performed for the purposes of advancing medicine, science, and/or education, provided my identity is not revealed. If the procedure has been videotaped, the physician/surgeon will obtain the original videotape.  The hospital will not be responsible for stor My signature below affirms that prior to the time of the procedure, I have explained to the patient and/or his legal representative, the risks and benefits involved in the proposed treatment and any reasonable alternative to the proposed treatment.  I have

## (undated) NOTE — LETTER
[address], [place]  Authorization for Invasive Procedures  1.  I hereby authorize Dr. Azar Paulson , my physician and whomever may be designated as the doctor's assistant, to perform the following operation and/or procedure:  Ablation on allergic reactions, hemolytic reactions, transmission of disease such as hepatitis, AIDS, cytomegalovirus (CMV), and flluid overload.  In the event that I wish to have autologous transfusions of my own blood, or a directed donor transfusion, I will discuss Patient:  ________________________________________________ Date: _________Time: _________    Responsible person in case of minor or unconscious: _____________________________Relationship: ____________     Witness Signature: ________________________________

## (undated) NOTE — IP AVS SNAPSHOT
[facility]            (For Outpatient Use Only) Initial Admit Date: 2022   Inpt/Obs Admit Date: Inpt: 22 / Obs: 22   Discharge Date:    Hospital Acct:  [de-identified]   MRN: [de-identified]   CSN: [id number]   CEID: [id number]        ENCOUNTER  Patient Class: Observation Admitting Provider: Rossi Freedman MD Unit: [address] [facility] Service: Medical Attending Provider: Rossi Freedman MD   Bed: 524-A   Visit Type:   Referring Physician: No ref. provider found Billing Flag:    Admit Diagnosis: Peripheral edema [R60.9]      PATIENT  Legal Name:   daleeann Husbands    Legal Sex: Male  Gender ID: Male             [address] [facility] Floor Name:    PCP:  Matt Briceño MD Home: [phone]   Address:  [address] [place] [address] : 3/28/1940 (82 yrs) Mobile: [phone]         City/State/Zip: [place] [address] [facility] Marital:  Language: Katerin peppre: Ed SSN4: [id number] Methodist: Rastafarian - [place] Not *     Race: White Ethnicity: Non  Or [address] [place] [address] CONTACT   Name Relationship Legal Guardian? Home Phone Work Phone Mobile Phone   1. Winifred Michaels  2.  Alphonse Gregory  Son    [phone]  [phone]     GUARANTOR  Guarantor: GERARD MICHAELS : 3/28/1940 Home Phone: [phone]   Address: [address] [place] [address]  Sex: Male Work Phone:    City/State/Zip: [place] [address] [facility]   Rel. to Patient: Self Guarantor ID: [id number]   GUARANTOR EMPLOYER   Employer:  Status: RETIRED     COVERAGE  PRIMARY INSURANCE   PayorDaripedro Us MA O   Group Number: [id number] Insurance Type: [address] Name: Carrie Ngo : 1940   Subscriber ID: [id number] Pt Rel to Subscriber: Self   SECONDARY INSURANCE   Payor:  Plan:    Group Number:  Insurance Type:    Subscriber Name:  Subscriber :    Subscriber ID:  Pt Rel to Subscriber:    TERTIARY INSURANCE   Payor:  Plan:    Group Number:  Insurance Type:    Subscriber Name:  Gaye Sheppard :    Subscriber ID:  Pt Rel to Subscriber:    Hospital Account Financial Class: Medicare Advantage    2022

## (undated) NOTE — IP AVS SNAPSHOT
[facility]            (For Outpatient Use Only) Initial Admit Date: 3/31/2022   Inpt/Obs Admit Date: Inpt: 3/31/22 / Obs: N/A   Discharge Date:    Avel Knee:  [de-identified]   MRN: [de-identified]   CSN: [id number]   CEID: [id number]        ENCOUNTER  Patient Class: Inpatient Admitting Provider: Gabriella Almonte MD Unit: [address] [place] [address]   Hospital Service: Cardiac Telemetry Attending Provider: Sonia Ayon MD   Bed: 309-A   Visit Type:   Referring Physician: No ref. provider found Billing Flag:    Admit Diagnosis: Dehydration [E86.0]      PATIENT  Legal Name:   Nguyễn Harry    Legal Sex: Male  Gender ID: Male             [address] [place] [address],[facility] Floor Name:    PCP:  Lucia Quintana MD Home: [phone]   Address:  [address] [place] [address] : 3/28/1940 (82 yrs) Mobile: [phone]         City/State/Zip: [place] [address] [facility] Marital:  Language: [address] [place] [address]: [employer] SSN4: [id number] Religious: Lutheran - [place] Not *     Race: White Ethnicity: Non  Or [address] [place] [address]   Name Relationship Legal Guardian? Home Phone Work Phone Mobile Phone   1. Winifred Michaels  2.  Hillary Hill  Son    [phone]  [phone]     GUARANTOR  Guarantor: GERARD MICHAELS : 3/28/1940 Home Phone: [phone]   Address: [address] [place] [address]  Sex: Male Work Phone:    City/State/Zip: [place] [address]   Rel. to Patient: Self Guarantor ID: [id number]   GUARANTOR EMPLOYER   Employer:  Status: RETIRED     COVERAGE  PRIMARY INSURANCE   PayorChinmay Salazar MA O   Group Number: [id number] Insurance Type: [address] Name: Mani Jimenez : 1940   Subscriber ID: [id number] Pt Rel to Subscriber: Self   SECONDARY INSURANCE   Payor:  Plan:    Group Number:  Insurance Type:    Subscriber Name:  Subscriber :    Subscriber ID:  Pt Rel to Subscriber:    TERTIARY INSURANCE   Payor:  Plan:    Group Number:  Insurance Type:    Subscriber Name:  Gaye Sheppard :    Subscriber ID:  Pt Rel to Subscriber:    Hospital Account Financial Class: Medicare Advantage    2022

## (undated) NOTE — LETTER
October 24, 2019    [facility] [phone]     Patient: Iona Elizondo   YOB: 1940   Date of Visit: 10/24/2019       Dear Dr. Yayo Alvarez MD:    Thank you for referring Iona Elizondo to me Types: Cigarettes        Quit date: 1991        Years since quittin.4      Smokeless tobacco: Never Used    Alcohol use:  Yes      Alcohol/week: 0.0 standard drinks      Comment: occassionaly    Drug use: No      Medications:  POTASSIUM CHLO No Known Allergies    ROS:       PHYSICAL EXAM:     Base Eye Exam     Visual Acuity (Snellen - Linear)       Right Left    Dist sc 20/50 20/30 +1    Dist ph sc 20/30 -2 NI    Near cc 20/25 20/25          Tonometry (Icare, 10:49 AM)       Right Left    Pres Diet-controlled diabetes mellitus ([facility] [address]  Diet controlled diabetes: no background of retinopathy, no signs of neovascularization noted. Discussed ocular and systemic benefits of blood sugar control.   Diagnosis and treatment discussed in detail with patient

## (undated) NOTE — LETTER
[address]   Date:   12/15/2020     Name:   Starla Cardenas    YOB: 1940   MRN:   [id number]       WHERE IS YOUR PAIN NOW? Marcos the areas on your body where you feel the described sensations.   Use the appropria

## (undated) NOTE — Clinical Note
Dear Roxy Drew,    I had the opportunity to see your patient Dre Montes De Oca recently. I am sending you this update, and I appreciate your confidence in me to care for your patients.  Please feel free call me with any questions at [phone] or contact me

## (undated) NOTE — LETTER
[address], [facility]   Date:   3/2/2022     Name:   Suzie Huang    YOB: 1940   MRN:   [id number]       [facility]? Radha the areas on your body where you feel the described sensations. Use the appropriate symbol. Brenda Lulu the areas of radiation. Include all affected areas. Just to complete the picture, please draw in the face. ACHE:  ^ ^ ^   NUMBNESS:  0000   PINS & NEEDLES:  = = = =                              ^ ^ ^                       0000              = = = =                                    ^ ^ ^                       0000            = = = =      BURNING:  XXXX   STABBING: ////                  XXXX                ////                         XXXX          ////     Please radha the line below indicating your degree of pain right now  with 0 being no pain 10 being the worst pain possible.                                          0             1             2              3             4              5              6              7             8             9             10         Patient Signature:

## (undated) NOTE — LETTER
Levy Reza [address], [place], [place]  [address]  INFORMED CONSENT FOR TRANSFUSION OF BLOOD OR BLOOD PRODUCTS  My physician has informed me of the nature, purpose, benefits and risks of transfusion for blood and blood components that he/she may deem necessary during my treatment or hospitalization. He/she has also discussed alternatives to receiving blood from the voluntary blood supply with me, such as self-donation (autologous) and directed donation (blood donated by family or friends to be used specifically for me). I further understand that while the [address] [place] [address] will attempt to supply any autologous or directed donor blood prior to transfusion of blood from the routine blood supply, medical circumstances may require that other or additional blood components may be required for my care. In giving consent, I acknowledge that my physician has also informed me that despite careful screening and testing in accordance with national and regional regulations, there is still a small risk of transmission of infectious agents including hepatitis, HIV-1/2, cytomegalovirus and other viruses or diseases as yet unknown for which licensed definitive screening tests do not currently exist. Additionally, my physician has informed me of the potential for transfusion reactions not related to an infectious agent. [  ]  Check here for Recurring Outpatient Transfusion Therapy (valid for 1 year) In addition to the above, my physician has informed me that I shall receive numerous transfusions over a period of time and that these can lead to other increased risks. I hereby authorize the transfusion of blood and/or blood products to me as deemed necessary and ordered by physicians participating in my care.  My physician has given me the opportunity to ask questions and any questions asked have been answered to my satisfaction  __________________________________________ ______________________________________________  (Signature of Patient)                                                            (Responsible party in case of Minor,                                                                                                 Incompetent, or unconscious Patient)  ___________________________________________       ________ ______________________________________  (Relationship to Patient)                                                       (Signature of Witness)  ______________________________________________________________________________________________   (Date)                                                                           (Time)  REFUSAL OF CONSENT FOR BLOOD TRANSFUSIONS   Sign only if Refusing   [  ] I understand refusal of blood or blood products as deemed necessary by my physician may have serious consequences to my condition to include possible death.  I hereby assume responsibility for my refusal and release the hospital, its personnel, and my physicians from any responsibility for the consequences of my refusal.    ________________________________________________________________________________  (Signature of Patient)                                                         (Responsible Party/Relationship to Patient)    ________________________________________________________________________________  (Signature of Witness)                                                       (Date/Time)     Patient Name: Bren Guardado     : 3/28/1940                 Printed: 3/31/2022      Medical Record #: [id number]                                 Page 1 of 1

## (undated) NOTE — LETTER
[address]   Date:   2/20/2020     Name:   Debra Torre    YOB: 1940   MRN:   [id number]       WHERE IS YOUR PAIN NOW? Marcos the areas on your body where you feel the described sensations.   Use the appropriat

## (undated) NOTE — LETTER
[address] [place] [address]      Authorization for Surgical Operation and Procedure     Date:___________                                                                                                         Time:__________  1. I hereby authorize*                                         , my physician and his/her assistants (if applicable), which may include medical students, residents, and/or fellows, to perform the following surgical operation/ procedure and administer such anesthesia as may be determined necessary by my physician:                                                                                     preeti Beverly   2. I recognize that during the surgical operation/procedure, unforeseen conditions may necessitate additional or different procedures than those listed above. I, therefore, further authorize and request that the above-named surgeon, assistants, or designees perform such procedures as are, in their judgment, necessary and desirable. 3.   My surgeon/physician has discussed prior to my surgery the potential benefits, risks and side effects of this procedure; the likelihood of achieving goals; and potential problems that might occur during recuperation. They also discussed reasonable alternatives to the procedure, including risks, benefits, and side effects related to the alternatives and risks related to not receiving this procedure. I have had all my questions answered and I acknowledge that no guarantee has been made as to the result that may be obtained. 4.   Should the need arise during my operation or immediate post-operative period, I also consent to the administration of blood and/or blood products. Further, I understand that despite careful testing and screening of blood or blood products by collecting agencies, I may still be subject to ill effects as a result of receiving a blood transfusion and/or blood products.   The following are some, but not all, of the potential risks that can occur: fever and allergic reactions, hemolytic reactions, transmission of diseases such as Hepatitis, AIDS and Cytomegalovirus (CMV) and fluid overload. In the event that I wish to have an autologous transfusion of my own blood, or a directed donor transfusion. I will discuss this with my physician. 5.   I authorize the use of any specimen, organs, tissues, body parts or foreign objects that may be removed from my body during the operation/procedure for diagnosis, research or teaching purposes and their subsequent disposal by hospital authorities. I also authorize the release of specimen test results and/or written reports to my treating physician on the hospital medical staff or other referring or consulting physicians involved in my care, at the discretion of the Pathologist or my treating physician. 6.   I consent to the photographing or videotaping of the operations or procedures to be performed, including appropriate portions of my body for medical, scientific, or educational purposes, provided my identity is not revealed by the pictures or by descriptive texts accompanying them. If the procedure has been photographed/videotaped, the surgeon will obtain the original picture, image, videotape or CD. The hospital will not be responsible for storage, release or maintenance of the picture, image, tape or CD.    7.   I consent to the presence of a  or observers in the operating room as deemed necessary by my physician or their designees. 8.   I recognize that in the event my procedure results in extended X-Ray/fluoroscopy time, I may develop a skin reaction. 9. If I have a Do Not Attempt Resuscitation (DNAR) order in place, that status will be suspended while in the operating room, procedural suite, and during the recovery period unless otherwise explicitly stated by me (or a person authorized to consent on my behalf). The surgeon or my attending physician will determine when the applicable recovery period ends for purposes of reinstating the DNAR order. 10. Patients having a sterilization procedure: I understand that if the procedure is successful the results will be permanent and it will therefore be impossible for me to inseminate, conceive, or bear children. I also understand that the procedure is intended to result in sterility, although the result has not been guaranteed. 11. I acknowledge that my physician has explained sedation/analgesia administration to me including the risk and benefits I consent to the administration of sedation/analgesia as may be necessary or desirable in the judgment of my physician. I CERTIFY THAT I HAVE READ AND FULLY UNDERSTAND THE ABOVE CONSENT TO OPERATION and/or OTHER PROCEDURE.    _________________________________________  __________________________________  Signature of Patient     Signature of Responsible Person         ___________________________________         Printed Name of Responsible Person           _________________________________                  Relationship to Patient  _________________________________________  ______________________________  Signature of Witness          Date  Time    STATEMENT OF PHYSICIAN My signature below affirms that prior to the time of the procedure; I have explained to the patient and/or his/her legal representative, the risks and benefits involved in the proposed treatment and any reasonable alternative to the proposed treatment. I have also explained the risks and benefits involved in refusal of the proposed treatment and alternatives to the proposed treatment and have answered the patient's questions.  If I have a significant financial interest in a co-management agreement or a significant financial interest in any product or implant, or other significant relationship used in this procedure/surgery, I have disclosed this and had a discussion with my patient.     _______________________________________________________________ _____________________________  Stefan Sawyer Physician)                                                                                         (Date)                                   (Time)        Patient Name: Caron Bowman    : 3/28/1940   Printed: 2022      Medical Record #: [id number]                                              Page 1 of 1

## (undated) NOTE — Clinical Note
Dear Becca Kuo,    I had the opportunity to see your patient Debra Torre recently. I am sending you this update, and I appreciate your confidence in me to care for your patients.  Please feel free call me with any questions at [phone] or contact me

## (undated) NOTE — LETTER
January 6, 2021    [phone]     Patient: Freddie Judd   YOB: 1940   Date of Visit: 1/6/2021       Dear Dr. Salma Goff MD:    Thank you for referring Freddie Judd to me for Surgical History: Brunilda Saha has a past surgical history that includes cabg; cholecystectomy; colonoscopy (N/A, 6/26/2020) (Procedure: COLONOSCOPY;  Surgeon: Kandy Epstein MD;  Location: [address] [place] [address]); and colonoscopy (N/A, 9/8/2020) • Warfarin Sodium 5 MG Oral Tab Take 1 tablet (5 mg total) by mouth nightly. (Patient taking differently: Take 5 mg by mouth daily.  And as directed ) 30 tablet 5   • carvedilol 6.25 MG Oral Tab Take 1 tablet (6.25 mg total) by mouth 2 (two) times daily wit Left PERRL          Visual Fields       Left Right     Full Full          Extraocular Movement       Right Left     Full, Ortho Full, Ortho          Dilation     Both eyes: 1.0% Mydriacyl and 2.5% Randy Synephrine @ 10:01 AM            Slit Lamp and Fundus No orders of the defined types were placed in this encounter.       Meds This Visit:  Requested Prescriptions      No prescriptions requested or ordered in this encounter        Follow up instructions:  Return in about 1 year (around 1/6/2022) for Diabetic

## (undated) NOTE — Clinical Note
Dear Regine Shin,    Thank you for sending [facility] to see me for physiatry consultation. I appreciate your confidence in me to care for your patients. Please feel free call me with any questions at [phone] or contact me through [address] [place] [address] Rd.     Sincerely

## (undated) NOTE — LETTER
7/8/2019              [address] [place] [address]         Dear Lorraine Rosa records indicate that the blood test ordered for you by Randy Jeronimo. Naya Dumont MD  have not been done.   If you have, in fact, already c

## (undated) NOTE — LETTER
[address] [place] [address]      Authorization for Surgical Operation and Procedure     Date:___________                                                                                                         Time:_______ and/or blood products. The following are some, but not all, of the potential risks that can occur: fever and allergic reactions, hemolytic reactions, transmission of diseases such as Hepatitis, AIDS and Cytomegalovirus (CMV) and fluid overload.   In the ev (or a person authorized to consent on my behalf). The surgeon or my attending physician will determine when the applicable recovery period ends for purposes of reinstating the DNAR order.   10. Patients having a sterilization procedure: I understand that if procedure/surgery, I have disclosed this and had a discussion with my patient.     _______________________________________________________________ _____________________________  Gwendolyn Avery Physician)

## (undated) NOTE — LETTER
[address] [place] [address]      Authorization for Surgical Operation and Procedure     Date:___________                                                                                                         Time:__________  1. I hereby authorize*                                            *, my physician and his/her assistants (if applicable), which may include medical students, residents, and/or fellows, to perform the following surgical operation/ procedure and administer such anesthesia as may be determined necessary by my physician:  Operation/Procedure name (s)                                                                                                                      on Dora Frame   2. I recognize that during the surgical operation/procedure, unforeseen conditions may necessitate additional or different procedures than those listed above. I, therefore, further authorize and request that the above-named surgeon, assistants, or designees perform such procedures as are, in their judgment, necessary and desirable. 3.   My surgeon/physician has discussed prior to my surgery the potential benefits, risks and side effects of this procedure; the likelihood of achieving goals; and potential problems that might occur during recuperation. They also discussed reasonable alternatives to the procedure, including risks, benefits, and side effects related to the alternatives and risks related to not receiving this procedure. I have had all my questions answered and I acknowledge that no guarantee has been made as to the result that may be obtained. 4.   Should the need arise during my operation or immediate post-operative period, I also consent to the administration of blood and/or blood products.   Further, I understand that despite careful testing and screening of blood or blood products by collecting agencies, I may still be subject to ill effects as a result of receiving a blood transfusion and/or blood products. The following are some, but not all, of the potential risks that can occur: fever and allergic reactions, hemolytic reactions, transmission of diseases such as Hepatitis, AIDS and Cytomegalovirus (CMV) and fluid overload. In the event that I wish to have an autologous transfusion of my own blood, or a directed donor transfusion. I will discuss this with my physician. 5.   I authorize the use of any specimen, organs, tissues, body parts or foreign objects that may be removed from my body during the operation/procedure for diagnosis, research or teaching purposes and their subsequent disposal by hospital authorities. I also authorize the release of specimen test results and/or written reports to my treating physician on the hospital medical staff or other referring or consulting physicians involved in my care, at the discretion of the Pathologist or my treating physician. 6.   I consent to the photographing or videotaping of the operations or procedures to be performed, including appropriate portions of my body for medical, scientific, or educational purposes, provided my identity is not revealed by the pictures or by descriptive texts accompanying them. If the procedure has been photographed/videotaped, the surgeon will obtain the original picture, image, videotape or CD. The hospital will not be responsible for storage, release or maintenance of the picture, image, tape or CD.    7.   I consent to the presence of a  or observers in the operating room as deemed necessary by my physician or their designees. 8.   I recognize that in the event my procedure results in extended X-Ray/fluoroscopy time, I may develop a skin reaction. 9.  If I have a Do Not Attempt Resuscitation (DNAR) order in place, that status will be suspended while in the operating room, procedural suite, and during the recovery period unless otherwise explicitly stated by me (or a person authorized to consent on my behalf). The surgeon or my attending physician will determine when the applicable recovery period ends for purposes of reinstating the DNAR order. 10. Patients having a sterilization procedure: I understand that if the procedure is successful the results will be permanent and it will therefore be impossible for me to inseminate, conceive, or bear children. I also understand that the procedure is intended to result in sterility, although the result has not been guaranteed. 11. I acknowledge that my physician has explained sedation/analgesia administration to me including the risk and benefits I consent to the administration of sedation/analgesia as may be necessary or desirable in the judgment of my physician. I CERTIFY THAT I HAVE READ AND FULLY UNDERSTAND THE ABOVE CONSENT TO OPERATION and/or OTHER PROCEDURE.    _________________________________________  __________________________________  Signature of Patient     Signature of Responsible Person         ___________________________________         Printed Name of Responsible Person           _________________________________                  Relationship to Patient  _________________________________________  ______________________________  Signature of Witness          Date  Time    STATEMENT OF PHYSICIAN My signature below affirms that prior to the time of the procedure; I have explained to the patient and/or his/her legal representative, the risks and benefits involved in the proposed treatment and any reasonable alternative to the proposed treatment. I have also explained the risks and benefits involved in refusal of the proposed treatment and alternatives to the proposed treatment and have answered the patient's questions.  If I have a significant financial interest in a co-management agreement or a significant financial interest in any product or implant, or other significant relationship used in this procedure/surgery, I have disclosed this and had a discussion with my patient.     _______________________________________________________________ _____________________________  Jose Gallery  Physician)                                                                                         (Date)                                   (Time)        Patient Name: Mina Lynn    : 3/28/1940   Printed: 2022      Medical Record #: [id number]                                              Page 1 of 1

## (undated) NOTE — LETTER
[address]  [facility] [address], [place], [place]  [address]  INFORMED CONSENT FOR TRANSFUSION OF BLOOD OR BLOOD PRODUCTS  My physician has informed me of the nature, purpose, benefits and risks of transfusion for blood and blood components that ______________________________________________  (Signature of Patient)                                                            (Responsible party in case of Minor,

## (undated) NOTE — Clinical Note
FYI, Patient will need med reconciliation at [facility] as he did not have his list with him and was not sure of his medicaitons.

## (undated) NOTE — LETTER
[address] [place], [place] [address]PHYSIATRY   Date:   11/15/2021     Name:   Arlinda Jeans    YOB: 1940   MRN:   [id number]       [facility]?   Kalee Sharma the areas on your body where you feel the describe

## (undated) NOTE — LETTER
October 30, 2017         MD Octavio Mojica [facility] [address] [id number]      Patient: Leobardo Robert   YOB: 1940   Date of Visit: 10/27/2017     Dear Paula Carrasquillo:    I saw Steph Coleman for followup in the office.  As

## (undated) NOTE — LETTER
October 25, 2018    Linette Aragon MD  [address] [address]     Patient: Ha Juarez   YOB: 1940   Date of Visit: 10/25/2018       Dear Dr. Eulalio Clancy MD:    Thank you for referring Ha Juarez to [facility] Social History: Social History    Tobacco Use      Smoking status: Former Smoker        Packs/day: 1.00        Years: 35.00        Pack years: 35        Types: Cigarettes        Quit date: 1991        Years since quittin.4      Smokeless tobacco: Miotic OU, round and slightly reactive/nw            Visual Fields (Counting fingers)       Left Right     Full Full          Extraocular Movement       Right Left     Full, Ortho Full, Ortho          Dilation     Both eyes:  Aman x2 @ 9:04 AM progression of macular degeneration. Floaters  No treatment is required at this time. No orders of the defined types were placed in this encounter.       Meds This Visit:  Requested Prescriptions      No prescriptions requested or ordered in thi

## (undated) NOTE — Clinical Note
Dear Arianna Mcdaniel,    I had the opportunity to see your patient Tucker Birmingham recently. I am sending you this update, and I appreciate your confidence in me to care for your patients. Please feel free call me with any questions at [phone] or contact me through Dim[facility].     Sincerely,  Izzy Starr MD  Board Certified, Physical Medicine and Rehabilitation  Board certified, [address] [place] [address]

## (undated) NOTE — MR AVS SNAPSHOT
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

## (undated) NOTE — LETTER
11/5/2018              \[facility]\"[address] [id number]         Dear Cullen Snyder records indicate that the lab tests ordered for you by Kalli Corona. Meera Sapp MD  have not been done.   If you have, in fact, already c

## (undated) NOTE — LETTER
[address]   Date:   7/7/2021     Name:   Flako Tejada    YOB: 1940   MRN:   [id number]       WHERE IS YOUR PAIN NOW? Marcos the areas on your body where you feel the described sensations.   Use the appropriate

## (undated) NOTE — Clinical Note
TCM call completed. A TE was sent to the office for an appointment request. An updated was also included on the patient's medications. The patient has not started new prescriptions, and the importance of resuming therapy was discussed.  The patient did repo

## (undated) NOTE — LETTER
[address], [place]  Authorization for Invasive Procedures  1.  I hereby authorize Dr. Madison Branham , my physician and whomever may be designated as the doctor's assistant, to perform the following operation and/or procedure:  Madison Matthews occur: fever and allergic reactions, hemolytic reactions, transmission of disease such as hepatitis, AIDS, cytomegalovirus (CMV), and flluid overload.  In the event that I wish to have autologous transfusions of my own blood, or a directed donor transfusion Signature of Patient:  ________________________________________________ Date: _________Time: _________    Responsible person in case of minor or unconscious: _____________________________Relationship: ____________     Witness Signature: _______________

## (undated) NOTE — LETTER
[address], [facility]   Date:   12/29/2021     Name:   Negra Noland    YOB: 1940   MRN:   [id number]       Kishore?   Marcos the areas on your body where you feel the d

## (undated) NOTE — LETTER
[address] [place] [address]  Authorization for Invasive Procedures  1.  I hereby authorize Dr. Polly Andrews , my physician and whomever may be designated as the doctor's assistant, to perform the following operation and/or procedure:  Central mirna potential risks that can occur: fever and allergic reactions, hemolytic reactions, transmission of disease such as hepatitis, AIDS, cytomegalovirus (CMV), and flluid overload.  In the event that I wish to have autologous transfusions of my own blood, or a d judgment of my physician.      Signature of Patient:  ________________________________________________ Date: _________Time: _________    Responsible person in case of minor or unconscious: _____________________________Relationship: ____________     Witness

## (undated) NOTE — LETTER
03/11/22        [facility] [place] [address]      Dear Andrew Sr records indicate that you have outstanding lab work and or testing that was ordered for you and has not yet been completed:  IRON AND TIBC     To provide you with the best possible care, please complete these orders at your earliest convenience. If you have recently completed these orders please disregard this letter.      If you have any questions please call the office at [phone]    Thank you,   Dr. Lorenzo Sales

## (undated) NOTE — LETTER
[address]   Date:   10/1/2020     Name:   Wu Reilly    YOB: 1940   MRN:   [id number]       WHERE IS YOUR PAIN NOW? Marcos the areas on your body where you feel the described sensations.   Use the appropriat